# Patient Record
Sex: FEMALE | Race: WHITE | Employment: OTHER | URBAN - METROPOLITAN AREA
[De-identification: names, ages, dates, MRNs, and addresses within clinical notes are randomized per-mention and may not be internally consistent; named-entity substitution may affect disease eponyms.]

---

## 2017-01-18 ENCOUNTER — OFFICE VISIT (OUTPATIENT)
Dept: NEUROLOGY | Age: 62
End: 2017-01-18

## 2017-01-18 VITALS
HEIGHT: 61 IN | WEIGHT: 197 LBS | HEART RATE: 89 BPM | SYSTOLIC BLOOD PRESSURE: 119 MMHG | DIASTOLIC BLOOD PRESSURE: 73 MMHG | BODY MASS INDEX: 37.19 KG/M2

## 2017-01-18 DIAGNOSIS — G25.0 ESSENTIAL TREMOR: ICD-10-CM

## 2017-01-18 DIAGNOSIS — G31.84 MILD COGNITIVE IMPAIRMENT, SO STATED: Primary | ICD-10-CM

## 2017-01-18 DIAGNOSIS — F32.89 DEPRESSIVE DISORDER, NOT ELSEWHERE CLASSIFIED: ICD-10-CM

## 2017-01-18 DIAGNOSIS — G47.33 OBSTRUCTIVE SLEEP APNEA (ADULT) (PEDIATRIC): ICD-10-CM

## 2017-01-18 PROCEDURE — 99214 OFFICE O/P EST MOD 30 MIN: CPT | Performed by: PSYCHIATRY & NEUROLOGY

## 2017-01-18 RX ORDER — SERTRALINE HYDROCHLORIDE 100 MG/1
100 TABLET, FILM COATED ORAL DAILY
COMMUNITY
End: 2017-04-27

## 2017-01-18 RX ORDER — DONEPEZIL HYDROCHLORIDE 10 MG/1
10 TABLET, FILM COATED ORAL NIGHTLY
Qty: 90 TABLET | Refills: 3 | Status: SHIPPED | OUTPATIENT
Start: 2017-01-18 | End: 2017-06-13 | Stop reason: SDUPTHER

## 2017-01-23 ENCOUNTER — TELEPHONE (OUTPATIENT)
Dept: PULMONOLOGY | Age: 62
End: 2017-01-23

## 2017-01-30 ENCOUNTER — OFFICE VISIT (OUTPATIENT)
Dept: PULMONOLOGY | Age: 62
End: 2017-01-30

## 2017-01-30 VITALS
TEMPERATURE: 97.4 F | BODY MASS INDEX: 37.19 KG/M2 | OXYGEN SATURATION: 96 % | RESPIRATION RATE: 22 BRPM | WEIGHT: 197 LBS | SYSTOLIC BLOOD PRESSURE: 106 MMHG | HEIGHT: 61 IN | HEART RATE: 96 BPM | DIASTOLIC BLOOD PRESSURE: 74 MMHG

## 2017-01-30 DIAGNOSIS — R06.00 DYSPNEA, UNSPECIFIED TYPE: Primary | ICD-10-CM

## 2017-01-30 DIAGNOSIS — J47.9 BRONCHIECTASIS WITHOUT COMPLICATION (HCC): ICD-10-CM

## 2017-01-30 DIAGNOSIS — R93.89 ABNORMAL CT SCAN, CHEST: ICD-10-CM

## 2017-01-30 DIAGNOSIS — G47.33 MODERATE OBSTRUCTIVE SLEEP APNEA: ICD-10-CM

## 2017-01-30 PROCEDURE — 99214 OFFICE O/P EST MOD 30 MIN: CPT | Performed by: INTERNAL MEDICINE

## 2017-01-30 RX ORDER — PNV NO.95/FERROUS FUM/FOLIC AC 28MG-0.8MG
1 TABLET ORAL DAILY
COMMUNITY
End: 2017-04-27

## 2017-01-30 RX ORDER — ALBUTEROL SULFATE 90 UG/1
2 AEROSOL, METERED RESPIRATORY (INHALATION) EVERY 6 HOURS PRN
Qty: 1 INHALER | Refills: 6 | Status: SHIPPED | OUTPATIENT
Start: 2017-01-30 | End: 2018-01-23 | Stop reason: CLARIF

## 2017-01-30 ASSESSMENT — SLEEP AND FATIGUE QUESTIONNAIRES
HOW LIKELY ARE YOU TO NOD OFF OR FALL ASLEEP WHILE SITTING AND READING: 3
HOW LIKELY ARE YOU TO NOD OFF OR FALL ASLEEP WHILE LYING DOWN TO REST IN THE AFTERNOON WHEN CIRCUMSTANCES PERMIT: 3
HOW LIKELY ARE YOU TO NOD OFF OR FALL ASLEEP IN A CAR, WHILE STOPPED FOR A FEW MINUTES IN TRAFFIC: 0
HOW LIKELY ARE YOU TO NOD OFF OR FALL ASLEEP WHEN YOU ARE A PASSENGER IN A CAR FOR AN HOUR WITHOUT A BREAK: 0
ESS TOTAL SCORE: 13
HOW LIKELY ARE YOU TO NOD OFF OR FALL ASLEEP WHILE SITTING AND TALKING TO SOMEONE: 2
HOW LIKELY ARE YOU TO NOD OFF OR FALL ASLEEP WHILE SITTING QUIETLY AFTER LUNCH WITHOUT ALCOHOL: 3
HOW LIKELY ARE YOU TO NOD OFF OR FALL ASLEEP WHILE WATCHING TV: 2
NECK CIRCUMFERENCE (INCHES): 11
HOW LIKELY ARE YOU TO NOD OFF OR FALL ASLEEP WHILE SITTING INACTIVE IN A PUBLIC PLACE: 0

## 2017-02-02 ENCOUNTER — HOSPITAL ENCOUNTER (OUTPATIENT)
Dept: PULMONOLOGY | Age: 62
Discharge: HOME | End: 2017-02-02
Attending: INTERNAL MEDICINE | Admitting: INTERNAL MEDICINE

## 2017-02-02 DIAGNOSIS — J47.9 BRONCHIECTASIS WITHOUT COMPLICATION (HCC): ICD-10-CM

## 2017-02-02 DIAGNOSIS — R93.89 ABNORMAL CT SCAN, CHEST: ICD-10-CM

## 2017-02-02 DIAGNOSIS — R06.00 DYSPNEA, UNSPECIFIED TYPE: ICD-10-CM

## 2017-02-02 DIAGNOSIS — R06.00 DYSPNEA: ICD-10-CM

## 2017-02-02 LAB
HEMOGLOBIN: 14.5 G/DL (ref 12–16)
TSH SERPL DL<=0.05 MIU/L-ACNC: 1.07 UIU/ML (ref 0.27–4.2)

## 2017-02-02 RX ORDER — ALBUTEROL SULFATE 2.5 MG/3ML
2.5 SOLUTION RESPIRATORY (INHALATION) ONCE
Status: COMPLETED | OUTPATIENT
Start: 2017-02-02 | End: 2017-02-02

## 2017-02-02 RX ADMIN — ALBUTEROL SULFATE 2.5 MG: 2.5 SOLUTION RESPIRATORY (INHALATION) at 09:30

## 2017-02-06 ENCOUNTER — OFFICE VISIT (OUTPATIENT)
Dept: ORTHOPEDIC SURGERY | Age: 62
End: 2017-02-06

## 2017-02-06 VITALS
HEIGHT: 61 IN | BODY MASS INDEX: 37.21 KG/M2 | DIASTOLIC BLOOD PRESSURE: 84 MMHG | HEART RATE: 96 BPM | WEIGHT: 197.09 LBS | SYSTOLIC BLOOD PRESSURE: 115 MMHG

## 2017-02-06 DIAGNOSIS — M25.561 ACUTE PAIN OF BOTH KNEES: Primary | ICD-10-CM

## 2017-02-06 DIAGNOSIS — M25.562 ACUTE PAIN OF BOTH KNEES: Primary | ICD-10-CM

## 2017-02-06 PROCEDURE — 99213 OFFICE O/P EST LOW 20 MIN: CPT | Performed by: ORTHOPAEDIC SURGERY

## 2017-02-06 PROCEDURE — 73562 X-RAY EXAM OF KNEE 3: CPT | Performed by: ORTHOPAEDIC SURGERY

## 2017-02-07 VITALS
DIASTOLIC BLOOD PRESSURE: 73 MMHG | SYSTOLIC BLOOD PRESSURE: 107 MMHG | RESPIRATION RATE: 16 BRPM | OXYGEN SATURATION: 95 % | HEART RATE: 79 BPM

## 2017-02-07 ASSESSMENT — PAIN - FUNCTIONAL ASSESSMENT: PAIN_FUNCTIONAL_ASSESSMENT: 0-10

## 2017-02-10 ENCOUNTER — OFFICE VISIT (OUTPATIENT)
Dept: ORTHOPEDIC SURGERY | Age: 62
End: 2017-02-10

## 2017-02-10 VITALS
BODY MASS INDEX: 37.21 KG/M2 | SYSTOLIC BLOOD PRESSURE: 108 MMHG | DIASTOLIC BLOOD PRESSURE: 69 MMHG | WEIGHT: 197.09 LBS | HEIGHT: 61 IN | HEART RATE: 87 BPM

## 2017-02-10 DIAGNOSIS — M17.11 PRIMARY OSTEOARTHRITIS OF RIGHT KNEE: Primary | ICD-10-CM

## 2017-02-10 PROCEDURE — 99213 OFFICE O/P EST LOW 20 MIN: CPT | Performed by: ORTHOPAEDIC SURGERY

## 2017-02-10 PROCEDURE — 20610 DRAIN/INJ JOINT/BURSA W/O US: CPT | Performed by: ORTHOPAEDIC SURGERY

## 2017-02-13 ENCOUNTER — HOSPITAL ENCOUNTER (OUTPATIENT)
Dept: ULTRASOUND IMAGING | Age: 62
Discharge: HOME | End: 2017-02-13

## 2017-02-13 DIAGNOSIS — R10.11 RIGHT UPPER QUADRANT PAIN: ICD-10-CM

## 2017-02-13 DIAGNOSIS — R10.11 ABDOMINAL PAIN, RIGHT UPPER QUADRANT: ICD-10-CM

## 2017-02-14 ENCOUNTER — HOSPITAL ENCOUNTER (OUTPATIENT)
Dept: CT IMAGING | Age: 62
Discharge: HOME | End: 2017-02-07

## 2017-02-14 DIAGNOSIS — M25.562 ACUTE PAIN OF BOTH KNEES: ICD-10-CM

## 2017-02-14 DIAGNOSIS — M25.561 ACUTE PAIN OF BOTH KNEES: ICD-10-CM

## 2017-02-14 DIAGNOSIS — M25.561 PAIN IN RIGHT KNEE: ICD-10-CM

## 2017-02-24 ENCOUNTER — TELEPHONE (OUTPATIENT)
Dept: PULMONOLOGY | Age: 62
End: 2017-02-24

## 2017-02-24 ENCOUNTER — OFFICE VISIT (OUTPATIENT)
Dept: ORTHOPEDIC SURGERY | Age: 62
End: 2017-02-24

## 2017-02-24 VITALS
DIASTOLIC BLOOD PRESSURE: 69 MMHG | SYSTOLIC BLOOD PRESSURE: 135 MMHG | HEART RATE: 60 BPM | HEIGHT: 61 IN | WEIGHT: 197.09 LBS | BODY MASS INDEX: 37.21 KG/M2

## 2017-02-24 DIAGNOSIS — M17.12 PRIMARY OSTEOARTHRITIS OF LEFT KNEE: ICD-10-CM

## 2017-02-24 DIAGNOSIS — M17.11 PRIMARY OSTEOARTHRITIS OF RIGHT KNEE: Primary | ICD-10-CM

## 2017-02-24 PROCEDURE — 20610 DRAIN/INJ JOINT/BURSA W/O US: CPT | Performed by: ORTHOPAEDIC SURGERY

## 2017-02-24 PROCEDURE — 99213 OFFICE O/P EST LOW 20 MIN: CPT | Performed by: ORTHOPAEDIC SURGERY

## 2017-02-24 RX ORDER — ATORVASTATIN CALCIUM 20 MG/1
TABLET, FILM COATED ORAL
COMMUNITY
Start: 2017-01-17 | End: 2017-03-09 | Stop reason: ALTCHOICE

## 2017-02-24 RX ORDER — PANTOPRAZOLE SODIUM 40 MG/1
TABLET, DELAYED RELEASE ORAL
Refills: 0 | COMMUNITY
Start: 2017-02-16

## 2017-02-24 RX ORDER — GABAPENTIN 800 MG/1
TABLET ORAL
Refills: 2 | COMMUNITY
Start: 2017-02-13 | End: 2017-03-09 | Stop reason: SDUPTHER

## 2017-03-02 ENCOUNTER — TELEPHONE (OUTPATIENT)
Dept: ORTHOPEDIC SURGERY | Age: 62
End: 2017-03-02

## 2017-03-09 ENCOUNTER — TELEPHONE (OUTPATIENT)
Dept: ORTHOPEDIC SURGERY | Age: 62
End: 2017-03-09

## 2017-03-31 ENCOUNTER — OFFICE VISIT (OUTPATIENT)
Dept: ORTHOPEDIC SURGERY | Age: 62
End: 2017-03-31

## 2017-03-31 VITALS
WEIGHT: 195.99 LBS | DIASTOLIC BLOOD PRESSURE: 63 MMHG | HEIGHT: 63 IN | BODY MASS INDEX: 34.73 KG/M2 | SYSTOLIC BLOOD PRESSURE: 99 MMHG | HEART RATE: 106 BPM

## 2017-03-31 DIAGNOSIS — M17.10 LOCALIZED OSTEOARTHROSIS, LOWER LEG: Primary | ICD-10-CM

## 2017-03-31 PROCEDURE — 20610 DRAIN/INJ JOINT/BURSA W/O US: CPT | Performed by: ORTHOPAEDIC SURGERY

## 2017-04-06 ENCOUNTER — OFFICE VISIT (OUTPATIENT)
Dept: ORTHOPEDIC SURGERY | Age: 62
End: 2017-04-06

## 2017-04-06 VITALS
DIASTOLIC BLOOD PRESSURE: 70 MMHG | BODY MASS INDEX: 34.73 KG/M2 | SYSTOLIC BLOOD PRESSURE: 120 MMHG | HEART RATE: 78 BPM | HEIGHT: 63 IN | WEIGHT: 195.99 LBS

## 2017-04-06 DIAGNOSIS — M17.0 BILATERAL PRIMARY OSTEOARTHRITIS OF KNEE: Primary | ICD-10-CM

## 2017-04-06 PROCEDURE — 20610 DRAIN/INJ JOINT/BURSA W/O US: CPT | Performed by: ORTHOPAEDIC SURGERY

## 2017-04-06 RX ORDER — DONEPEZIL HYDROCHLORIDE 10 MG/1
10 TABLET, FILM COATED ORAL
COMMUNITY
Start: 2017-01-18 | End: 2017-05-18 | Stop reason: SDUPTHER

## 2017-04-06 RX ORDER — ATORVASTATIN CALCIUM 10 MG/1
10 TABLET, FILM COATED ORAL
COMMUNITY
Start: 2016-04-11 | End: 2017-04-27

## 2017-04-06 RX ORDER — CEPHALEXIN 500 MG/1
CAPSULE ORAL 3 TIMES DAILY
COMMUNITY
Start: 2017-04-04 | End: 2018-05-02 | Stop reason: CLARIF

## 2017-04-06 RX ORDER — CEPHALEXIN 500 MG/1
500 CAPSULE ORAL 3 TIMES DAILY
COMMUNITY
Start: 2017-04-04 | End: 2017-06-13

## 2017-04-06 RX ORDER — OXYBUTYNIN CHLORIDE 15 MG/1
15 TABLET, EXTENDED RELEASE ORAL
COMMUNITY
End: 2017-06-13

## 2017-04-06 RX ORDER — FLUDROCORTISONE ACETATE 0.1 MG/1
0.1 TABLET ORAL
COMMUNITY
Start: 2016-09-22 | End: 2017-06-13

## 2017-04-06 RX ORDER — SERTRALINE HYDROCHLORIDE 100 MG/1
200 TABLET, FILM COATED ORAL NIGHTLY
COMMUNITY
End: 2022-02-24

## 2017-04-06 RX ORDER — OXYCODONE AND ACETAMINOPHEN 7.5; 325 MG/1; MG/1
1 TABLET ORAL
COMMUNITY
End: 2017-08-30 | Stop reason: HOSPADM

## 2017-04-13 ENCOUNTER — OFFICE VISIT (OUTPATIENT)
Dept: ORTHOPEDIC SURGERY | Age: 62
End: 2017-04-13

## 2017-04-13 DIAGNOSIS — M17.0 PRIMARY OSTEOARTHRITIS OF BOTH KNEES: Primary | ICD-10-CM

## 2017-04-13 PROCEDURE — 20610 DRAIN/INJ JOINT/BURSA W/O US: CPT | Performed by: ORTHOPAEDIC SURGERY

## 2017-05-03 ENCOUNTER — HOSPITAL ENCOUNTER (OUTPATIENT)
Dept: SURGERY | Age: 62
Discharge: HOME | End: 2017-05-03
Attending: INTERNAL MEDICINE | Admitting: INTERNAL MEDICINE

## 2017-05-03 VITALS
RESPIRATION RATE: 16 BRPM | HEART RATE: 65 BPM | BODY MASS INDEX: 33.86 KG/M2 | HEIGHT: 62 IN | OXYGEN SATURATION: 100 % | WEIGHT: 184 LBS | TEMPERATURE: 97 F | SYSTOLIC BLOOD PRESSURE: 109 MMHG | DIASTOLIC BLOOD PRESSURE: 73 MMHG

## 2017-05-03 RX ORDER — HYDRALAZINE HYDROCHLORIDE 20 MG/ML
5 INJECTION INTRAMUSCULAR; INTRAVENOUS EVERY 10 MIN PRN
Status: DISCONTINUED | OUTPATIENT
Start: 2017-05-03 | End: 2017-05-04 | Stop reason: HOSPADM

## 2017-05-03 RX ORDER — MEPERIDINE HYDROCHLORIDE 50 MG/ML
12.5 INJECTION INTRAMUSCULAR; INTRAVENOUS; SUBCUTANEOUS EVERY 5 MIN PRN
Status: DISCONTINUED | OUTPATIENT
Start: 2017-05-03 | End: 2017-05-04 | Stop reason: HOSPADM

## 2017-05-03 RX ORDER — ONDANSETRON 2 MG/ML
4 INJECTION INTRAMUSCULAR; INTRAVENOUS PRN
Status: DISCONTINUED | OUTPATIENT
Start: 2017-05-03 | End: 2017-05-04 | Stop reason: HOSPADM

## 2017-05-03 RX ORDER — LIDOCAINE HYDROCHLORIDE 10 MG/ML
0.1 INJECTION, SOLUTION EPIDURAL; INFILTRATION; INTRACAUDAL; PERINEURAL ONCE
Status: DISCONTINUED | OUTPATIENT
Start: 2017-05-03 | End: 2017-05-04 | Stop reason: HOSPADM

## 2017-05-03 RX ORDER — LABETALOL HYDROCHLORIDE 5 MG/ML
5 INJECTION, SOLUTION INTRAVENOUS EVERY 10 MIN PRN
Status: DISCONTINUED | OUTPATIENT
Start: 2017-05-03 | End: 2017-05-04 | Stop reason: HOSPADM

## 2017-05-03 RX ORDER — PROMETHAZINE HYDROCHLORIDE 25 MG/ML
6.25 INJECTION, SOLUTION INTRAMUSCULAR; INTRAVENOUS
Status: DISCONTINUED | OUTPATIENT
Start: 2017-05-03 | End: 2017-05-04 | Stop reason: HOSPADM

## 2017-05-03 RX ORDER — MORPHINE SULFATE 2 MG/ML
1 INJECTION, SOLUTION INTRAMUSCULAR; INTRAVENOUS EVERY 5 MIN PRN
Status: DISCONTINUED | OUTPATIENT
Start: 2017-05-03 | End: 2017-05-04 | Stop reason: HOSPADM

## 2017-05-03 RX ORDER — MORPHINE SULFATE 2 MG/ML
2 INJECTION, SOLUTION INTRAMUSCULAR; INTRAVENOUS EVERY 5 MIN PRN
Status: DISCONTINUED | OUTPATIENT
Start: 2017-05-03 | End: 2017-05-04 | Stop reason: HOSPADM

## 2017-05-03 RX ORDER — RANITIDINE 150 MG/1
150 TABLET ORAL 2 TIMES DAILY
Status: ON HOLD | COMMUNITY
End: 2020-04-29

## 2017-05-03 RX ORDER — DIPHENHYDRAMINE HYDROCHLORIDE 50 MG/ML
12.5 INJECTION INTRAMUSCULAR; INTRAVENOUS
Status: ACTIVE | OUTPATIENT
Start: 2017-05-03 | End: 2017-05-03

## 2017-05-03 RX ORDER — SODIUM CHLORIDE, SODIUM LACTATE, POTASSIUM CHLORIDE, CALCIUM CHLORIDE 600; 310; 30; 20 MG/100ML; MG/100ML; MG/100ML; MG/100ML
INJECTION, SOLUTION INTRAVENOUS CONTINUOUS
Status: DISCONTINUED | OUTPATIENT
Start: 2017-05-03 | End: 2017-05-04 | Stop reason: HOSPADM

## 2017-05-03 RX ADMIN — SODIUM CHLORIDE, SODIUM LACTATE, POTASSIUM CHLORIDE, CALCIUM CHLORIDE: 600; 310; 30; 20 INJECTION, SOLUTION INTRAVENOUS at 07:15

## 2017-05-03 ASSESSMENT — PAIN DESCRIPTION - DESCRIPTORS: DESCRIPTORS: BURNING

## 2017-05-03 ASSESSMENT — PAIN - FUNCTIONAL ASSESSMENT: PAIN_FUNCTIONAL_ASSESSMENT: 0-10

## 2017-05-15 ENCOUNTER — TELEPHONE (OUTPATIENT)
Dept: NEUROLOGY | Age: 62
End: 2017-05-15

## 2017-05-18 RX ORDER — DONEPEZIL HYDROCHLORIDE 10 MG/1
10 TABLET, FILM COATED ORAL NIGHTLY
Qty: 90 TABLET | Refills: 2 | Status: SHIPPED | OUTPATIENT
Start: 2017-05-18 | End: 2017-06-13

## 2017-05-19 ENCOUNTER — OFFICE VISIT (OUTPATIENT)
Dept: ORTHOPEDIC SURGERY | Age: 62
End: 2017-05-19

## 2017-05-19 VITALS
DIASTOLIC BLOOD PRESSURE: 74 MMHG | SYSTOLIC BLOOD PRESSURE: 130 MMHG | BODY MASS INDEX: 33.88 KG/M2 | HEART RATE: 77 BPM | WEIGHT: 184.08 LBS | HEIGHT: 62 IN

## 2017-05-19 DIAGNOSIS — M17.10 LOCALIZED OSTEOARTHROSIS, LOWER LEG: Primary | ICD-10-CM

## 2017-05-19 PROCEDURE — 99213 OFFICE O/P EST LOW 20 MIN: CPT | Performed by: ORTHOPAEDIC SURGERY

## 2017-05-19 RX ORDER — DONEPEZIL HYDROCHLORIDE 10 MG/1
10 TABLET, FILM COATED ORAL
COMMUNITY
Start: 2017-01-18 | End: 2017-06-13

## 2017-05-19 RX ORDER — ALENDRONATE SODIUM 70 MG/1
TABLET ORAL
COMMUNITY
End: 2017-06-13

## 2017-05-19 RX ORDER — TROSPIUM CHLORIDE ER 60 MG/1
CAPSULE ORAL
COMMUNITY
End: 2017-06-13

## 2017-05-19 RX ORDER — CITALOPRAM 40 MG/1
TABLET ORAL
COMMUNITY
End: 2017-06-13

## 2017-05-19 RX ORDER — TROSPIUM CHLORIDE ER 60 MG/1
60 CAPSULE ORAL DAILY
COMMUNITY
End: 2017-08-02 | Stop reason: ALTCHOICE

## 2017-05-23 ENCOUNTER — TELEPHONE (OUTPATIENT)
Dept: PULMONOLOGY | Age: 62
End: 2017-05-23

## 2017-06-03 ENCOUNTER — HOSPITAL ENCOUNTER (OUTPATIENT)
Dept: CT IMAGING | Age: 62
Discharge: HOME | End: 2017-06-03
Attending: ORTHOPAEDIC SURGERY | Admitting: ORTHOPAEDIC SURGERY

## 2017-06-03 ENCOUNTER — HOSPITAL ENCOUNTER (OUTPATIENT)
Dept: ULTRASOUND IMAGING | Age: 62
Discharge: HOME | End: 2017-06-03
Attending: NURSE PRACTITIONER | Admitting: NURSE PRACTITIONER

## 2017-06-03 DIAGNOSIS — R10.12 LUQ PAIN: ICD-10-CM

## 2017-06-03 DIAGNOSIS — M43.10 SPONDYLOLISTHESIS: ICD-10-CM

## 2017-06-03 DIAGNOSIS — M43.16 SPONDYLOLISTHESIS OF LUMBAR REGION: ICD-10-CM

## 2017-06-03 DIAGNOSIS — R10.12 LEFT UPPER QUADRANT PAIN: ICD-10-CM

## 2017-06-09 ENCOUNTER — OFFICE VISIT (OUTPATIENT)
Dept: ORTHOPEDIC SURGERY | Age: 62
End: 2017-06-09

## 2017-06-09 ENCOUNTER — HOSPITAL ENCOUNTER (OUTPATIENT)
Dept: OTHER | Age: 62
Discharge: HOME | End: 2017-06-09
Attending: ORTHOPAEDIC SURGERY | Admitting: ORTHOPAEDIC SURGERY

## 2017-06-09 VITALS
WEIGHT: 184.08 LBS | BODY MASS INDEX: 33.88 KG/M2 | HEIGHT: 62 IN | DIASTOLIC BLOOD PRESSURE: 72 MMHG | SYSTOLIC BLOOD PRESSURE: 111 MMHG | HEART RATE: 87 BPM

## 2017-06-09 DIAGNOSIS — B99.9 INFECTION: ICD-10-CM

## 2017-06-09 DIAGNOSIS — M17.12 PATELLOFEMORAL ARTHRITIS OF LEFT KNEE: Primary | ICD-10-CM

## 2017-06-09 LAB
BASOPHILS ABSOLUTE: 0 K/UL (ref 0–0.2)
BASOPHILS RELATIVE PERCENT: 0.2 %
C-REACTIVE PROTEIN: 2.8 MG/L (ref 0–5.1)
EOSINOPHILS ABSOLUTE: 0.2 K/UL (ref 0–0.6)
EOSINOPHILS RELATIVE PERCENT: 4.1 %
HCT VFR BLD CALC: 41.7 % (ref 36–48)
HEMOGLOBIN: 13.7 G/DL (ref 12–16)
LYMPHOCYTES ABSOLUTE: 2.6 K/UL (ref 1–5.1)
LYMPHOCYTES RELATIVE PERCENT: 44.8 %
MCH RBC QN AUTO: 30.7 PG (ref 26–34)
MCHC RBC AUTO-ENTMCNC: 32.9 G/DL (ref 31–36)
MCV RBC AUTO: 93 FL (ref 80–100)
MONOCYTES ABSOLUTE: 0.5 K/UL (ref 0–1.3)
MONOCYTES RELATIVE PERCENT: 8.1 %
NEUTROPHILS ABSOLUTE: 2.5 K/UL (ref 1.7–7.7)
NEUTROPHILS RELATIVE PERCENT: 42.8 %
PDW BLD-RTO: 13.4 % (ref 12.4–15.4)
PLATELET # BLD: 213 K/UL (ref 135–450)
PMV BLD AUTO: 9.3 FL (ref 5–10.5)
RBC # BLD: 4.48 M/UL (ref 4–5.2)
SEDIMENTATION RATE, ERYTHROCYTE: 35 MM/HR (ref 0–30)
WBC # BLD: 5.9 K/UL (ref 4–11)

## 2017-06-09 PROCEDURE — 99213 OFFICE O/P EST LOW 20 MIN: CPT | Performed by: ORTHOPAEDIC SURGERY

## 2017-06-09 RX ORDER — POTASSIUM CHLORIDE 750 MG/1
20 TABLET, FILM COATED, EXTENDED RELEASE ORAL
COMMUNITY
End: 2018-07-12 | Stop reason: SDUPTHER

## 2017-06-09 RX ORDER — ESTRADIOL 0.5 MG/1
0.5 TABLET ORAL DAILY
COMMUNITY
Start: 2017-06-07 | End: 2018-03-05 | Stop reason: ALTCHOICE

## 2017-06-12 DIAGNOSIS — M22.42 CHONDROMALACIA OF PATELLA, LEFT: Primary | ICD-10-CM

## 2017-06-13 ENCOUNTER — OFFICE VISIT (OUTPATIENT)
Dept: NEUROLOGY | Age: 62
End: 2017-06-13

## 2017-06-13 VITALS
DIASTOLIC BLOOD PRESSURE: 74 MMHG | WEIGHT: 193 LBS | HEIGHT: 61 IN | HEART RATE: 70 BPM | BODY MASS INDEX: 36.44 KG/M2 | SYSTOLIC BLOOD PRESSURE: 115 MMHG

## 2017-06-13 DIAGNOSIS — G25.0 ESSENTIAL TREMOR: ICD-10-CM

## 2017-06-13 DIAGNOSIS — F34.1 DYSTHYMIA: ICD-10-CM

## 2017-06-13 DIAGNOSIS — G31.84 MILD COGNITIVE IMPAIRMENT, SO STATED: Primary | ICD-10-CM

## 2017-06-13 DIAGNOSIS — F51.01 PRIMARY INSOMNIA: ICD-10-CM

## 2017-06-13 DIAGNOSIS — G47.33 OSA (OBSTRUCTIVE SLEEP APNEA): ICD-10-CM

## 2017-06-13 DIAGNOSIS — I10 HTN (HYPERTENSION), BENIGN: ICD-10-CM

## 2017-06-13 DIAGNOSIS — E78.5 DYSLIPIDEMIA: ICD-10-CM

## 2017-06-13 PROCEDURE — 99214 OFFICE O/P EST MOD 30 MIN: CPT | Performed by: PSYCHIATRY & NEUROLOGY

## 2017-06-13 RX ORDER — NALOXEGOL OXALATE 25 MG/1
TABLET, FILM COATED ORAL
COMMUNITY
Start: 2017-04-03 | End: 2017-08-02 | Stop reason: ALTCHOICE

## 2017-06-13 RX ORDER — DONEPEZIL HYDROCHLORIDE 10 MG/1
10 TABLET, FILM COATED ORAL NIGHTLY
Qty: 90 TABLET | Refills: 1 | Status: SHIPPED | OUTPATIENT
Start: 2017-06-13 | End: 2017-09-12 | Stop reason: SDUPTHER

## 2017-06-13 RX ORDER — PRIMIDONE 50 MG/1
50 TABLET ORAL 2 TIMES DAILY
Qty: 60 TABLET | Refills: 2 | Status: SHIPPED | OUTPATIENT
Start: 2017-06-13 | End: 2017-09-12 | Stop reason: SDUPTHER

## 2017-06-16 ENCOUNTER — HOSPITAL ENCOUNTER (OUTPATIENT)
Dept: GENERAL RADIOLOGY | Age: 62
Discharge: HOME | End: 2017-06-16
Attending: ORTHOPAEDIC SURGERY | Admitting: ORTHOPAEDIC SURGERY

## 2017-06-16 VITALS
OXYGEN SATURATION: 95 % | HEART RATE: 84 BPM | SYSTOLIC BLOOD PRESSURE: 139 MMHG | DIASTOLIC BLOOD PRESSURE: 89 MMHG | RESPIRATION RATE: 16 BRPM

## 2017-06-16 DIAGNOSIS — M53.3 TAIL BONE PAIN: ICD-10-CM

## 2017-06-16 DIAGNOSIS — M51.17 INTERVERTEBRAL DISC DISORDER WITH RADICULOPATHY OF LUMBOSACRAL REGION: ICD-10-CM

## 2017-06-16 ASSESSMENT — PAIN - FUNCTIONAL ASSESSMENT: PAIN_FUNCTIONAL_ASSESSMENT: 0-10

## 2017-06-19 ENCOUNTER — HOSPITAL ENCOUNTER (OUTPATIENT)
Dept: CT IMAGING | Age: 62
Discharge: HOME | End: 2017-06-19
Attending: ORTHOPAEDIC SURGERY | Admitting: ORTHOPAEDIC SURGERY

## 2017-06-19 DIAGNOSIS — M19.90 OSTEOARTHRITIS: ICD-10-CM

## 2017-06-19 DIAGNOSIS — M19.90 CHRONIC ARTHRITIS: ICD-10-CM

## 2017-07-03 ENCOUNTER — OFFICE VISIT (OUTPATIENT)
Dept: ORTHOPEDIC SURGERY | Age: 62
End: 2017-07-03

## 2017-07-03 VITALS
SYSTOLIC BLOOD PRESSURE: 115 MMHG | WEIGHT: 192.9 LBS | HEART RATE: 90 BPM | DIASTOLIC BLOOD PRESSURE: 71 MMHG | BODY MASS INDEX: 36.42 KG/M2 | HEIGHT: 61 IN

## 2017-07-03 DIAGNOSIS — M17.11 PATELLOFEMORAL ARTHRITIS OF RIGHT KNEE: ICD-10-CM

## 2017-07-03 DIAGNOSIS — M17.12 PATELLOFEMORAL ARTHRITIS OF LEFT KNEE: Primary | ICD-10-CM

## 2017-07-03 PROCEDURE — 99213 OFFICE O/P EST LOW 20 MIN: CPT | Performed by: ORTHOPAEDIC SURGERY

## 2017-07-03 PROCEDURE — L1812 KO ELASTIC W/JOINTS PRE OTS: HCPCS | Performed by: ORTHOPAEDIC SURGERY

## 2017-07-25 ENCOUNTER — HOSPITAL ENCOUNTER (OUTPATIENT)
Dept: OTHER | Age: 62
Discharge: HOME | End: 2017-07-25

## 2017-07-25 DIAGNOSIS — M47.812 SPONDYLOSIS OF CERVICAL REGION WITHOUT MYELOPATHY OR RADICULOPATHY: ICD-10-CM

## 2017-08-01 ENCOUNTER — TELEPHONE (OUTPATIENT)
Dept: ORTHOPEDIC SURGERY | Age: 62
End: 2017-08-01

## 2017-08-02 ENCOUNTER — HOSPITAL ENCOUNTER (OUTPATIENT)
Dept: OTHER | Age: 62
Discharge: HOME | End: 2017-08-02
Attending: ORTHOPAEDIC SURGERY | Admitting: ORTHOPAEDIC SURGERY

## 2017-08-02 LAB
ABO/RH: NORMAL
ANION GAP SERPL CALCULATED.3IONS-SCNC: 11 MMOL/L (ref 3–16)
ANTIBODY SCREEN: NORMAL
APTT: 30 SEC (ref 24.1–34.9)
BUN BLDV-MCNC: 14 MG/DL (ref 7–20)
CALCIUM SERPL-MCNC: 9 MG/DL (ref 8.3–10.6)
CHLORIDE BLD-SCNC: 103 MMOL/L (ref 99–110)
CO2: 26 MMOL/L (ref 21–32)
CREAT SERPL-MCNC: 0.7 MG/DL (ref 0.6–1.2)
EKG ATRIAL RATE: 70 BPM
EKG DIAGNOSIS: NORMAL
EKG P AXIS: 20 DEGREES
EKG P-R INTERVAL: 104 MS
EKG Q-T INTERVAL: 406 MS
EKG QRS DURATION: 86 MS
EKG QTC CALCULATION (BAZETT): 438 MS
EKG R AXIS: 93 DEGREES
EKG T AXIS: 26 DEGREES
EKG VENTRICULAR RATE: 70 BPM
GFR AFRICAN AMERICAN: >60
GFR NON-AFRICAN AMERICAN: >60
GLUCOSE BLD-MCNC: 90 MG/DL (ref 70–99)
HCT VFR BLD CALC: 44.6 % (ref 36–48)
HEMOGLOBIN: 14.8 G/DL (ref 12–16)
INR BLD: 0.9 (ref 0.85–1.15)
MCH RBC QN AUTO: 31.3 PG (ref 26–34)
MCHC RBC AUTO-ENTMCNC: 33.1 G/DL (ref 31–36)
MCV RBC AUTO: 94.4 FL (ref 80–100)
PDW BLD-RTO: 14.5 % (ref 12.4–15.4)
PLATELET # BLD: 178 K/UL (ref 135–450)
PMV BLD AUTO: 9.3 FL (ref 5–10.5)
POTASSIUM SERPL-SCNC: 4.2 MMOL/L (ref 3.5–5.1)
PROTHROMBIN TIME: 10.2 SEC (ref 9.6–13)
RBC # BLD: 4.73 M/UL (ref 4–5.2)
SODIUM BLD-SCNC: 140 MMOL/L (ref 136–145)
WBC # BLD: 6.3 K/UL (ref 4–11)

## 2017-08-02 PROCEDURE — 93010 ELECTROCARDIOGRAM REPORT: CPT | Performed by: INTERNAL MEDICINE

## 2017-08-05 LAB
ORGANISM: ABNORMAL
URINE CULTURE, ROUTINE: ABNORMAL
URINE CULTURE, ROUTINE: ABNORMAL

## 2017-08-07 ENCOUNTER — TELEPHONE (OUTPATIENT)
Dept: ORTHOPEDIC SURGERY | Age: 62
End: 2017-08-07

## 2017-08-14 ENCOUNTER — TELEPHONE (OUTPATIENT)
Dept: ORTHOPEDIC SURGERY | Age: 62
End: 2017-08-14

## 2017-08-31 PROBLEM — M17.12 PATELLOFEMORAL ARTHRITIS OF LEFT KNEE: Status: RESOLVED | Noted: 2017-06-09 | Resolved: 2017-08-31

## 2017-08-31 PROBLEM — Z96.652 STATUS POST LEFT PARTIAL KNEE REPLACEMENT: Status: ACTIVE | Noted: 2017-08-31

## 2017-09-12 ENCOUNTER — OFFICE VISIT (OUTPATIENT)
Dept: NEUROLOGY | Age: 62
End: 2017-09-12

## 2017-09-12 VITALS
HEIGHT: 61 IN | SYSTOLIC BLOOD PRESSURE: 112 MMHG | OXYGEN SATURATION: 92 % | BODY MASS INDEX: 36.44 KG/M2 | HEART RATE: 101 BPM | DIASTOLIC BLOOD PRESSURE: 69 MMHG | WEIGHT: 193 LBS

## 2017-09-12 DIAGNOSIS — G47.33 OSA (OBSTRUCTIVE SLEEP APNEA): ICD-10-CM

## 2017-09-12 DIAGNOSIS — G31.84 MILD COGNITIVE IMPAIRMENT, SO STATED: Primary | ICD-10-CM

## 2017-09-12 DIAGNOSIS — G25.0 ESSENTIAL TREMOR: ICD-10-CM

## 2017-09-12 DIAGNOSIS — E78.5 DYSLIPIDEMIA: ICD-10-CM

## 2017-09-12 DIAGNOSIS — F34.1 DYSTHYMIA: ICD-10-CM

## 2017-09-12 PROCEDURE — 99214 OFFICE O/P EST MOD 30 MIN: CPT | Performed by: PSYCHIATRY & NEUROLOGY

## 2017-09-12 RX ORDER — PRIMIDONE 50 MG/1
50 TABLET ORAL 2 TIMES DAILY
Qty: 180 TABLET | Refills: 2 | Status: SHIPPED | OUTPATIENT
Start: 2017-09-12 | End: 2018-01-23 | Stop reason: SDUPTHER

## 2017-09-12 RX ORDER — DONEPEZIL HYDROCHLORIDE 10 MG/1
10 TABLET, FILM COATED ORAL NIGHTLY
Qty: 90 TABLET | Refills: 1 | Status: SHIPPED | OUTPATIENT
Start: 2017-09-12 | End: 2018-06-23 | Stop reason: SDUPTHER

## 2017-09-12 RX ORDER — OXYCODONE HYDROCHLORIDE AND ACETAMINOPHEN 5; 325 MG/1; MG/1
TABLET ORAL
COMMUNITY
Start: 2017-09-11 | End: 2018-03-05 | Stop reason: ALTCHOICE

## 2017-09-12 ASSESSMENT — ENCOUNTER SYMPTOMS
BLURRED VISION: 0
VOMITING: 1
DOUBLE VISION: 0
NAUSEA: 1
COUGH: 0

## 2017-09-14 ENCOUNTER — OFFICE VISIT (OUTPATIENT)
Dept: ORTHOPEDIC SURGERY | Age: 62
End: 2017-09-14

## 2017-09-14 VITALS
WEIGHT: 192.9 LBS | SYSTOLIC BLOOD PRESSURE: 116 MMHG | HEIGHT: 61 IN | BODY MASS INDEX: 36.42 KG/M2 | DIASTOLIC BLOOD PRESSURE: 87 MMHG | HEART RATE: 86 BPM

## 2017-09-14 DIAGNOSIS — M17.12 OSTEOARTHRITIS OF LEFT PATELLOFEMORAL JOINT: Primary | ICD-10-CM

## 2017-09-14 PROCEDURE — 99024 POSTOP FOLLOW-UP VISIT: CPT | Performed by: ORTHOPAEDIC SURGERY

## 2017-11-10 DIAGNOSIS — M17.11 PRIMARY OSTEOARTHRITIS OF RIGHT KNEE: Primary | ICD-10-CM

## 2017-11-14 ENCOUNTER — HOSPITAL ENCOUNTER (OUTPATIENT)
Dept: PHYSICAL THERAPY | Age: 62
Discharge: HOME | End: 2017-11-30
Admitting: ORTHOPAEDIC SURGERY

## 2017-11-20 ENCOUNTER — HOSPITAL ENCOUNTER (OUTPATIENT)
Dept: PHYSICAL THERAPY | Age: 62
Discharge: HOME | End: 2017-11-20
Admitting: ORTHOPAEDIC SURGERY

## 2017-11-20 NOTE — FLOWSHEET NOTE
Outpatient Physical Therapy     [] Daily Treatment Note   [] Progress Note   [] Discharge Note    Date:  11/20/2017    Patient Name:  Kesha Herrera        YOB: 1955    Medical Diagnosis: primary osteoarthritis of left knee (M17.11)                                             Treatment Diagnosis:  Decreased ROM, decreased strength, gait and balance impairments     Onset Date:  8/30/17               Referral Date: 11/10/17          Referring Physician:  Minesh Baldwin MD                                                Visits Allowed/Insurance/Certification Information:  [employer] Medicare     Restrictions/Precautions:  WBAT    Plan of care signed (Y/N):      Progress Note covers period from (if applicable):    [x]  NA    [] From          To           Next Progress Note due: 12/18/17    Visit# / total visits: 1 /16    Plan for Next Session:  Add nustep, continue with ROM and strengthening exercises, continue with manual therapy if needed for cross friction massage and patellar mobs. Subjective: see eval     Pain level: 0-10/10 left knee      Objective:   See eval    Exercises:    Exercises in bold performed in department today. Items not bolded are carried forward from prior visits for continuity of the record. Exercise/Equipment Resistance/Repetitions Other comments     Nustep NV      QS/ GS/ AP X10, each      Supine HS x10      Supine hip abd x10      Supine SLR x10      Patellar mobs X10, each direction                                                                                                    Therapeutic Exercise/Home Exercise Program:  x30 min. Patient educated on eval findings, POC and goals. Instructed in HEP with handout given. Group Therapy:       Therapeutic Activity:        Gait:     Neuromuscular Re-Education:      Canalith Repositioning Procedure:    Manual Therapy: x15 min.   Lotion applied to left leg due to severely dry flaky skin, retrograde STM to left knee with leg elevated to decrease swelling. Cross friction massage to left knee incision, especially proximal aspect. Patellar mobs in all directions. Modalities:  Instructed to ice versus heat to help decrease swelling.     Functional Outcome Measure:   Measure used:  LEFS  Score:  7/80  % Disability:  91%    Assessment/Treatment/Activity Tolerance:    GCode:  G3973XM  Patients response to treatment:   [x] Patient tolerated treatment well, with no adverse reactions noted [] Patient limited by fatigue   [] Patient limited by pain [] Patient limited by other medical complications   [] Other:     Goals: [id number]  Progress towards goals: goals set at eval      Prognosis: [x] Good [] Fair  [] Poor    Patient Requires Follow-up:  [x] Yes  [] No    Plan: [] Continue per plan of care [] Alter current plan (see comments)   [x] Plan of care initiated [] Hold pending MD visit [] Discharge    Timed Code Treatment Minutes:  45    Total Treatment Minutes:  60    Medicare Cap total YTD:  NA    Electronically signed by:  Juju Weinberg, [address] [place] [address]

## 2017-11-20 NOTE — PROGRESS NOTES
in anterior aspect of left knee. Patient reports 0/10 pain at rest,  10/10 pain at worst  Worsened by: Walking >3 minutes, stairs, squatting, standing >3 min, transitioning from sit to stand. Improved by:  Heating pad, medication, rest, sitting  Pt. also reports that the knee gives out, locks, pops, grinds. Current Functional Limitations:  Stand or walk for >3 min, stairs, household chores   PLOF: independent with all functional activities  Pt. Sleep is disturbed? Yes, only able to sleep for 4 hours before waking due to pain    Patient goal for therapy: \"get it back to normal \"      OBJECTIVE FINDINGS    Posture  Slouched with forward head and rounded shoulders use of cane on right side. Gait/Steps/Balance    Deviations on a level linoleum surface include:  Antalgic on left with straight cane, decreased yordy, decreased foot clearance for limited community distances.      Lumbar Spine  NT    Range of Motion/Strength Testing    Range Tested AROM PROM MMT/Resisted Comments   *denotes pain Left Right Left Right Left Right    Hip Flexion [facility]   2/5 3/5    Hip Extension \" \"   2/5 2/5    Hip Abduction \" \"   3+/5 3+/5    Hip Adduction \" \"   2/5 2/5    Hip IR \" \"   /5 /5    Hip ER \" \"   /5 /5    Knee Flexion 90 130   3/5 4+/5    Knee Extension -10 0   3/5 4/5    Ankle Dorsiflex [facility]   3/5 4/5    Ankle Plantarflex \" \"   3/5 4/5    Ankle Inversion \" \"   4/5 4/5    Ankle Eversion \" \"   4/5 4/5        Girth Measurements (cm)        Location Left Right Location Left Right   2\" above 50.4 49.5 Mid Calf     midpatella 43.8 43.0 Malleoli     2\" below 39.0 38.0 Figure of 8     Inferior Patellar Pole   Met Heads                   Flexibility    Muscle Left Right Comment Muscle Left Right Comment   Hamstrings (90/90) -45 -45  TFL/ITB (Perri)      Gastroc    Iliopsoas (Kenny)      Soleus    Rectus Femoris      Piriformis              Palpation/Tenderness/Visual Inspection    Noted mild/moderate/severe tenderness with: palpation of medial and lateral joint lines, peripatellar and suprapatellar regions, and along incision especially proximal aspect where scare tissue formation is felt under scar. Noted redness: none  Noted warmth: none   Skin is dry and flaky. Incision well healed without signs of infection. Joint Mobility/Accessory Motions    Noted decreased patellar mobility: decreased in all directions. Special Tests  Deferred due to s/p surgery   Test Left Right Comments Test Left  Right Comments   Anterior Drawer    Varus Stress      Posterior Drawer    Valgus Stress      Lachmans    Scouring      VMO Tone            Functional Outcome Measure    Measure used:  LEFS  Score:  7/80  % Disability:  91%    Additional Comments    SLS 3-5 sec bilaterally with straight cane    ASSESSMENT    GCode:  [id number]  Patient presents with s/s consistent with Dx. Recommend PT treatment to address problem list so that the patient may return to regular activities without any functional limitations noted. Problems     Decreased ROM      Decreased strength   Decreased joint mobility   Abnormality of gait   Decreased functional status   Increased swelling   Increased pain   Decreased flexibility   Poor posture/alignment    Rehabilitation Potential:  Good for goals listed below. Strengths for achieving goals include: cooperative  Limitations for achieving goals include:  Low motivation    Prognosis: [x]    Good []    Fair  []    Poor    GOALS  G Code:  E1843WL  Short Term Goals (  4  weeks) Long Term Goals ( 8  weeks)   1). Decrease pain to 5/10 at worst 1). Decrease pain to 0-3/10 at all times   2). Increase AROM by 5-10 degrees in limited areas. 2). AROM Left knee = Right knee within 3 degrees. 3). Increase strength by 1/2 to 1 grade in weak areas. 3).  MMT >/= 4+/5 throughout left LE   4). Patient able to ambulate up/down 4 steps with an alternating pattern without rail independently. 4).   Patient able to ambulate

## 2017-11-22 ENCOUNTER — HOSPITAL ENCOUNTER (OUTPATIENT)
Dept: PHYSICAL THERAPY | Age: 62
Discharge: HOME | End: 2017-11-22
Admitting: ORTHOPAEDIC SURGERY

## 2017-11-22 NOTE — FLOWSHEET NOTE
Outpatient Physical Therapy     [x] Daily Treatment Note   [] Progress Note   [] Discharge Note    Date:  11/22/2017    Patient Name:  Susie Urban        YOB: 1955    Medical Diagnosis: primary osteoarthritis of left knee (M17.11)                                             Treatment Diagnosis:  Decreased ROM, decreased strength, gait and balance impairments     Onset Date:  8/30/17               Referral Date: 11/10/17          Referring Physician:  Maria Ines Brandon MD                                                Visits Allowed/Insurance/Certification Information:  [employer] Duffy Medicare     Restrictions/Precautions:  WBAT    Plan of care signed (Y/N):      Progress Note covers period from (if applicable):    [x]  NA    [] From          To           Next Progress Note due: 12/18/17    Visit# / total visits: 2 /16    Plan for Next Session:  continue with ROM and strengthening exercises, continue with manual therapy if needed for cross friction massage and patellar mobs. Subjective: Patient had spinal surgery 2 years ago with fusions at lumbar spine. Patient reports that she had infection after surgery and has been on antibiotic since then. Had bladder bladder surgery 10/4/17. Patient reports that they put area in abdominal region in case patient need catheter. Pain level: 4/10 left knee    Objective:      Exercises:    Exercises in bold performed in department today. Items not bolded are carried forward from prior visits for continuity of the record.   Exercise/Equipment Resistance/Repetitions Other comments     Nustep NV      QS/ GS/ AP X10, each      Supine heel slides with sheet x10 hold 5-10 seconds      sidelying hip abd x10      Supine SLR x10      Patellar mobs X10, each direction     hamstring stretch EOB and long sitting gastrocs 3 reps hold 20-30 seconds      TKE sitting 1x10                                                                                nustep  5 minutes  Level

## 2017-11-27 ENCOUNTER — HOSPITAL ENCOUNTER (OUTPATIENT)
Dept: PHYSICAL THERAPY | Age: 62
Discharge: HOME | End: 2017-11-27
Admitting: ORTHOPAEDIC SURGERY

## 2017-11-27 NOTE — FLOWSHEET NOTE
Outpatient Physical Therapy     [x] Daily Treatment Note   [] Progress Note   [] Discharge Note    Date:  11/27/2017    Patient Name:  Gwen Montesinos        YOB: 1955    Medical Diagnosis: primary osteoarthritis of left knee (M17.11)                                             Treatment Diagnosis:  Decreased ROM, decreased strength, gait and balance impairments     Onset Date:  8/30/17               Referral Date: 11/10/17          Referring Physician:  Michelle Ratliff MD                                                Visits Allowed/Insurance/Certification Information:  [employer] Medicare, no ionto, no max     Restrictions/Precautions:  WBAT, recent bladder reconstruction surgery 10/4/17 (pt states no restrictions)    Plan of care signed (Y/N): sent to MD on 11/27/17 due to referral states it is her right knee, POC states left knee and sent for MD to sign,  Received back same day on 11/27/17    Progress Note covers period from (if applicable):    [x]  NA    [] From          To           Next Progress Note due: 12/18/17    Visit# / total visits:  3/16    Plan for Next Session:  continue with ROM and strengthening exercises, continue with manual therapy if needed for cross friction massage and patellar mobs, progress balance, gait, steps as able. Subjective:   Pt states her knee was more painful Saturday after being up on it more, pain up to 8/10. Pt states she went to bed and it was better by Sunday. When asked, pt states she doesn't recall how many reps of her exs that she is doing. Pt states she has to look at her handouts. Pt states she is doing HEP 2x/day. Pain level: 0/10 currently left knee at rest, 4/10 walking into dept. On Saturday, pain up to 8/10. Objective:  Pt having difficulty recalling most exs today. PT revised HEP (pt had 4 sheets of exs) to make less complicated. PT highlighted important exs with star on pt's handouts.  Pt kept asking PT (after each ex) Kisha Platt we done yet? \"  Pt states her schedule only said 30 minutes. PT explained to pt that her LE is very weak and stiff and she should plan on being in therapy about 60 minutes each visit. Exercises:    Exercises in bold performed in department today. Items not bolded are carried forward from prior visits for continuity of the record. Exercise/Equipment Resistance/Repetitions Other comments     Nustep Level 3, 5 minutes Seat 10, arms 11     QS/ GS/ AP 3x10 Pt inst to cont with 3x10 at home      Supine heel slides with sheet x10 hold 5-10 seconds Pt to cont at home     sidelying hip abd 2x10 Pt needed multiple cues for form, states she forgot this ex. Pt inst to work up to 3x10     Supine SLR 3x5 Pt only able to lift half range, cues to keep quad tight and lift as high as she can to the other knee. Supine knee extension stretch with towel roll under ankle  5 min  (PT forgot to give pt handout, will do next visit) Knee ext -3 deg after     hamstring stretch EOB and long sitting gastrocs 3 reps hold 20-30 seconds No time today    TKE sitting 1x10  No time today     Hip add baldomero hook lying  NV     standing heel raises         standing hip abd bilat        standing hamstring curl        wall slides       leg press machine       step ups       step downs                           Therapeutic Exercise/Home Exercise Program:    HEP established, See above, pt has handout(s), PT revised, need to give knee ext stretch HO next visit. Group Therapy:   NA    Therapeutic Activity:   NA     Gait: decreased yordy, stiff gait when first gets up but improves. Neuromuscular Re-Education: NA      Manual Therapy: x15 min. Patellar mobs in supine x 5 min  Manual stretch/PROM knee flexion in side lying x 10 min.   AROM after 102, PROM after 112    Modalities: instructed to ice at home    Functional Outcome Measure:   Measure used:  LEFS  Score:  7/80  % Disability:  91%    Assessment/Treatment/Activity Tolerance:    GCode: F7867ER  Patients response to treatment:   [x] Patient tolerated treatment well, with no adverse reactions noted [] Patient limited by fatigue   [] Patient limited by pain [] Patient limited by other medical complications   [] Other:     Goals: [id number]  Progress towards goals: NA    GOALS  G Code:  E5177HR  Short Term Goals (  4  weeks) Long Term Goals ( 8  weeks)   1). Decrease pain to 5/10 at worst 1). Decrease pain to 0-3/10 at all times   2). Increase AROM by 5-10 degrees in limited areas. 2). AROM Left knee = Right knee within 3 degrees. 3). Increase strength by 1/2 to 1 grade in weak areas. 3).  MMT >/= 4+/5 throughout left LE   4). Patient able to ambulate up/down 4 steps with an alternating pattern without rail independently. 4). Patient able to ambulate community distances without an AD independently without significant deviations noted. 5). Independent with HEP 5). Patient able to perform all regular activities independently. 6). 6).          Prognosis: [x] Good [] Fair  [] Poor    Patient Requires Follow-up:  [x] Yes  [] No    Plan: [x] Continue per plan of care [] Alter current plan (see comments)   [] Plan of care initiated [] Hold pending MD visit [] Discharge    Timed Code Treatment Minutes:  55    Total Treatment Minutes:  55 + nustep    Medicare Cap total YTD:  NA    Electronically signed by:  Hayden Escobar, [id number]

## 2017-11-29 ENCOUNTER — HOSPITAL ENCOUNTER (OUTPATIENT)
Dept: PHYSICAL THERAPY | Age: 62
Discharge: HOME | End: 2017-11-29
Admitting: ORTHOPAEDIC SURGERY

## 2017-11-29 NOTE — FLOWSHEET NOTE
GS/ AP 3x10 Pt inst to cont with 3x10 at home      Supine heel slides with sheet x10 hold 5-10 seconds Pt to cont at home     sidelying hip abd 3x10      Supine SLR 3x7 Improved today, pt lifting full range. Cues to keep quad tight and lift as high as she can to the other knee. Supine knee extension stretch with towel roll under ankle  Reviewed, 5 min  (PT forgot to give pt handout, will do next visit) Knee ext -3 deg after     hamstring stretch EOB and long sitting gastrocs 3 reps hold 20-30 seconds No time today    TKE sitting 3x10  Will add 2# next visit     Hip add baldomero hook lying  Hold 5 sec, 2x10 Pt to increase to 3x10 as able    standing heel raises    2x10                   \"    standing hip abd bilat        standing hamstring curl        wall slides       leg press machine       step ups       step downs                           Therapeutic Exercise/Home Exercise Program:  X 30 min total   HEP established, See above, pt has handout(s), PT revised, need to give knee ext stretch HO next visit. Group Therapy:   NA    Therapeutic Activity:   NA     Gait: NA     Neuromuscular Re-Education: NA      Manual Therapy: x15 min. Patellar mobs in supine x 5 min  Manual stretch/PROM knee flexion in side lying x 10 min. AROM after 112, PROM after 117    Modalities: instructed to ice at home    Functional Outcome Measure:   Measure used:  LEFS  Score:  7/80  % Disability:  91%    Assessment/Treatment/Activity Tolerance:    GCode:  B1981NP  Patients response to treatment:   [x] Patient tolerated treatment well, with no adverse reactions noted [] Patient limited by fatigue   [] Patient limited by pain [] Patient limited by other medical complications   [] Other:     Goals: S2858BO  Progress towards goals: NA    GOALS  G Code:  [id number]  Short Term Goals (  4  weeks) Long Term Goals ( 8  weeks)   1). Decrease pain to 5/10 at worst 1). Decrease pain to 0-3/10 at all times   2).   Increase AROM by 5-10 degrees in limited areas. 2). AROM Left knee = Right knee within 3 degrees. 3). Increase strength by 1/2 to 1 grade in weak areas. 3).  MMT >/= 4+/5 throughout left LE   4). Patient able to ambulate up/down 4 steps with an alternating pattern without rail independently. 4). Patient able to ambulate community distances without an AD independently without significant deviations noted. 5). Independent with HEP 5). Patient able to perform all regular activities independently. 6). 6).          Prognosis: [x] Good [] Fair  [] Poor    Patient Requires Follow-up:  [x] Yes  [] No    Plan: [x] Continue per plan of care [] Alter current plan (see comments)   [] Plan of care initiated [] Hold pending MD visit [] Discharge    Timed Code Treatment Minutes:  48    Total Treatment Minutes:  48 + nustep    Medicare Cap total YTD:  NA    Electronically signed by:  Jolene Soto, [id number]

## 2017-12-01 ENCOUNTER — HOSPITAL ENCOUNTER (OUTPATIENT)
Dept: GENERAL RADIOLOGY | Age: 62
Discharge: HOME | End: 2017-12-01
Attending: ORTHOPAEDIC SURGERY | Admitting: ORTHOPAEDIC SURGERY

## 2017-12-01 ENCOUNTER — HOSPITAL ENCOUNTER (OUTPATIENT)
Dept: OTHER | Age: 62
Discharge: HOME | End: 2017-12-31
Attending: ORTHOPAEDIC SURGERY | Admitting: ORTHOPAEDIC SURGERY

## 2017-12-01 VITALS
SYSTOLIC BLOOD PRESSURE: 132 MMHG | HEART RATE: 78 BPM | RESPIRATION RATE: 18 BRPM | DIASTOLIC BLOOD PRESSURE: 93 MMHG | TEMPERATURE: 96.1 F | OXYGEN SATURATION: 96 %

## 2017-12-01 DIAGNOSIS — G89.29 CHRONIC LOW BACK PAIN, UNSPECIFIED BACK PAIN LATERALITY, WITH SCIATICA PRESENCE UNSPECIFIED: ICD-10-CM

## 2017-12-01 DIAGNOSIS — M54.5 CHRONIC LOW BACK PAIN, UNSPECIFIED BACK PAIN LATERALITY, WITH SCIATICA PRESENCE UNSPECIFIED: ICD-10-CM

## 2017-12-01 DIAGNOSIS — M51.17 INTERVERTEBRAL DISC DISORDER WITH RADICULOPATHY OF LUMBOSACRAL REGION: ICD-10-CM

## 2017-12-01 ASSESSMENT — PAIN - FUNCTIONAL ASSESSMENT: PAIN_FUNCTIONAL_ASSESSMENT: 0-10

## 2017-12-01 NOTE — OP NOTE
[facility]   OUTPATIENT PROCEDURE NOTE      PATIENT NAME: Oliva Ramirez    :  1955    PROCEDURE DATE: 2017 MR: [id number]    PROCEDURE:  CAUDAL LUMBARSACRAL  EPIDURAL STEROID INJECTION    DIAGNOSIS: LUMBAR DISC HERNIATION AND SPONDYLOSIS WITH RADICULOPATHY    On 2017 the above noted patient was scheduled for outpatient percutaneous injection in the Fluoroscopic Radiology Outpatient Suite. This percutaneous cortisone injection procedure including possible complications has been discussed in detail with this patient prior to the procedure and they have agreed to proceed with this attempted treatment. Therefore, the patient was appropriately positioned on the fluoroscopic table. Prior to preparing the injection site, the patient was viewed under fluoroscopy in both AP and/or lateral views to determine appropriate injection site orientation. Following this, the region of injection was then prepped and draped in appropriate sterile fashion. Following this, the areas of injection were then subsequently identified utilizing judicious fluoroscopic radiographic imaging. Following identification of appropriate landmarks and injection sites, an appropriate length spinal needle was positioned at each injection site, again under fluoroscopic radiograph guidance. Following placement of all injection needle sites, again fluoroscopic imaging views were obtained in order to verify appropriate placement of all injection needles. Placement was also verified by questioning the patient in location to localized pain and sensitivity. Following verification of placement of all injection needles, next appropriate amount for body weight solution mixture involving use of Kenalog 40 mg per ml 4 cc and Marcaine . 25% 1cc was injected at [address] [facility] site under sterile technique. Standard procedure to assure appropriate injection was performed at each site. Following this all injection needles were removed, and after being cleansed sterile bandages were applied. The patient tolerated this procedure well. The patient was observed in the outpatient setting for a minimum of 15-20 minutes to assure they were feeling satisfactory prior to their discharge to home. All patients have been told prior to scheduling of this outpatient procedure to arrange for a family member or friend to provide home transportation. Patient was scheduled for FU visit in the next 1-2 weeks post injection for further clinical evaluation and treatment. There were no complications.     ALENA Butcher     12/1/2017  10:02 AM

## 2017-12-04 ENCOUNTER — HOSPITAL ENCOUNTER (OUTPATIENT)
Dept: PHYSICAL THERAPY | Age: 62
Discharge: HOME | End: 2017-12-04
Admitting: ORTHOPAEDIC SURGERY

## 2017-12-04 NOTE — FLOWSHEET NOTE
Outpatient Physical Therapy     [x] Daily Treatment Note   [] Progress Note   [] Discharge Note    Date:  12/4/2017    Patient Name:  Med Mayberry        YOB: 1955    Medical Diagnosis: primary osteoarthritis of left knee (M17.11)                                             Treatment Diagnosis:  Decreased ROM, decreased strength, gait and balance impairments     Onset Date:  8/30/17               Referral Date: 11/10/17          Referring Physician:  Sugey Mansfield MD                                                Visits Allowed/Insurance/Certification Information:  [employer] Medicare, no ionto, no max     Restrictions/Precautions:  WBAT, recent bladder reconstruction surgery 10/4/17 (pt states no restrictions), CAD, PACEMAKER, multiple back surgeries, neck surgery    Plan of care signed (Y/N): sent to MD on 11/27/17 due to referral states it is her right knee, POC states left knee and sent for MD to sign,  Received back same day on 11/27/17    Progress Note covers period from (if applicable):    [x]  NA    [] From          To           Next Progress Note due: 12/18/17    Visit# / total visits:  5/16    Plan for Next Session:  continue with ROM and strengthening exercises, continue with manual therapy if needed for cross friction massage and patellar mobs, progress balance, gait, steps as able. Subjective:   Pt states most pain lateral and inferior to incision. Pt states she was tired and wobbly after last visit, muscles were fatigued. Pt states she had epidural and another injection on Friday with Dr. Guera Dyson. Pt states the injections seem to be helping. Pain level:  4/10 walking into dept. Pain as high as 6/10 on Saturday from walking     Objective:      Exercises:    Exercises in bold performed in department today. Items not bolded are carried forward from prior visits for continuity of the record.   Exercise/Equipment Resistance/Repetitions Other comments     Nustep Level 3,

## 2017-12-07 ENCOUNTER — HOSPITAL ENCOUNTER (OUTPATIENT)
Dept: PHYSICAL THERAPY | Age: 62
Discharge: HOME | End: 2017-12-07
Admitting: ORTHOPAEDIC SURGERY

## 2017-12-07 NOTE — FLOWSHEET NOTE
minutes Seat 10, arms 11     QS/ GS/ AP 3x10 Pt inst to cont with 3x10 at home      Supine heel slides with sheet x10 hold 5-10 seconds Pt to cont at home     sidelying hip abd 3x10      Supine SLR 3x10 Improved today, pt lifting full range. Cues to keep quad tight and lift as high as she can to the other knee. Supine knee extension stretch with towel roll under ankle  Reviewed, 5 min  (PT forgot to give pt handout, will do next visit) Knee ext 0 deg today    hamstring stretch EOB (with or without sheet) and long sitting gastrocs with towel  Reviewed, 3 reps hold 20-30 seconds     TKE sitting 3#, 3x10       Hip add baldomero hook lying  Hold 5 sec, 2x10 Pt to increase to 3x10 as able    standing heel raises    3x12                   \"    standing hip abd bilat  (done standing on 2\" block)  3x12 bilat  Pt to increase to 3x10-15 as able    standing hamstring curl   3#, 3x10            \"    wall slides   2x10           \"    leg press machine       step ups  6\", 2x10 2 hand support    step downs  NV                         Therapeutic Exercise/Home Exercise Program:  X 35 min total   HEP established, See above, pt given handout(s) of new exercise(s)    Group Therapy:   NA    Therapeutic Activity:   NA     Gait: NA     Neuromuscular Re-Education: NA      Manual Therapy: x 20 min total .  Patellar mobs in supine x 5 min  Manual stretch/PROM knee flexion in side lying x 10 min.   AROM after 125  Scar massage, cross friction and STM to lateral retinaculum and distal lateral quad x 5 min    Modalities: PACEMAKER  Pt reminded to ice at home    Functional Outcome Measure:   Measure used:  LEFS  Score:  7/80  % Disability:  91%    Assessment/Treatment/Activity Tolerance:    GCode:  M1577BS  Patients response to treatment:   [x] Patient tolerated treatment well, with no adverse reactions noted [] Patient limited by fatigue   [] Patient limited by pain [] Patient limited by other medical complications   [] Other:     Goals: [id number]  Progress towards goals: NA    GOALS  G Code:  M2096EC  Short Term Goals (  4  weeks) Long Term Goals ( 8  weeks)   1). Decrease pain to 5/10 at worst 1). Decrease pain to 0-3/10 at all times   2). Increase AROM by 5-10 degrees in limited areas. 2). AROM Left knee = Right knee within 3 degrees. 3). Increase strength by 1/2 to 1 grade in weak areas. 3).  MMT >/= 4+/5 throughout left LE   4). Patient able to ambulate up/down 4 steps with an alternating pattern without rail independently. 4). Patient able to ambulate community distances without an AD independently without significant deviations noted. 5). Independent with HEP 5). Patient able to perform all regular activities independently. 6). 6).          Prognosis: [x] Good [] Fair  [] Poor    Patient Requires Follow-up:  [x] Yes  [] No    Plan: [x] Continue per plan of care [] Alter current plan (see comments)   [] Plan of care initiated [] Hold pending MD visit [] Discharge    Timed Code Treatment Minutes:  55    Total Treatment Minutes:  55 + nustep    Medicare Cap total YTD:  NA    Electronically signed by:  Louisa Miranda, [id number]

## 2017-12-11 ENCOUNTER — HOSPITAL ENCOUNTER (OUTPATIENT)
Dept: PHYSICAL THERAPY | Age: 62
Discharge: HOME | End: 2017-12-11
Admitting: ORTHOPAEDIC SURGERY

## 2017-12-11 NOTE — FLOWSHEET NOTE
at worst 1). Decrease pain to 0-3/10 at all times   2). Increase AROM by 5-10 degrees in limited areas. 2). AROM Left knee = Right knee within 3 degrees. 3). Increase strength by 1/2 to 1 grade in weak areas. 3).  MMT >/= 4+/5 throughout left LE   4). Patient able to ambulate up/down 4 steps with an alternating pattern without rail independently. 4). Patient able to ambulate community distances without an AD independently without significant deviations noted. 5). Independent with HEP 5). Patient able to perform all regular activities independently. 6). 6).          Prognosis: [x] Good [] Fair  [] Poor    Patient Requires Follow-up:  [x] Yes  [] No    Plan: [x] Continue per plan of care [] Alter current plan (see comments)   [] Plan of care initiated [] Hold pending MD visit [] Discharge    Timed Code Treatment Minutes:  55    Total Treatment Minutes:  55 + nustep    Medicare Cap total YTD:  NA    Electronically signed by:  Cecy Arriola, [id number]

## 2017-12-14 ENCOUNTER — HOSPITAL ENCOUNTER (OUTPATIENT)
Dept: PHYSICAL THERAPY | Age: 62
Discharge: HOME | End: 2017-12-14
Admitting: ORTHOPAEDIC SURGERY

## 2017-12-14 NOTE — FLOWSHEET NOTE
Outpatient Physical Therapy     [x] Daily Treatment Note   [] Progress Note   [] Discharge Note    Date:  12/14/2017    Patient Name:  Jose Vanegas        YOB: 1955    Medical Diagnosis: primary osteoarthritis of left knee (M17.11)                                             Treatment Diagnosis:  Decreased ROM, decreased strength, gait and balance impairments     Onset Date:  8/30/17               Referral Date: 11/10/17          Referring Physician:  Tana Negro MD                                                Visits Allowed/Insurance/Certification Information:  [employer] Medicare, no ionto, no max     Restrictions/Precautions:  WBAT, recent bladder reconstruction surgery 10/4/17 (pt states no restrictions), CAD, PACEMAKER, multiple back surgeries, neck surgery    Plan of care signed (Y/N): sent to MD on 11/27/17 due to referral states it is her right knee, POC states left knee and sent for MD to sign,  Received back same day on 11/27/17    Progress Note covers period from (if applicable):    []  NA    [] From          To           Next Progress Note due: 12/18/17    Visit# / total visits:  8/16    Plan for Next Session:  continue with ROM and strengthening exercises, continue with manual therapy if needed for cross friction massage and patellar mobs, progress balance, gait, steps as able. Subjective:   Pt states the wall ex makes her legs hurt and gets shannon horses       Pain level:  0/10 walking into dept. Pain as high as 2/10 since last visit     Objective:      Exercises:    Exercises in bold performed in department today. Items not bolded are carried forward from prior visits for continuity of the record.   Exercise/Equipment Resistance/Repetitions Other comments     Nustep Level 4, 10 minutes   Subjective taken during Seat 10, arms 11     QS/ GS/ AP 3x10 Pt inst to cont with 3x10 at home      Supine heel slides with sheet x10 hold 5-10 seconds Pt to cont at home     sidelying hip abd 3x10      Supine SLR 3x12 Improved today   Supine knee extension stretch with towel roll under ankle  Reviewed, 5 min  (PT forgot to give pt handout, will do next visit) Knee ext 0 deg today    hamstring stretch EOB (with or without sheet) and long sitting gastrocs with towel  Reviewed, 3 reps hold 20-30 seconds     TKE sitting 3#, 3x12       Hip add baldomero hook lying  Hold 5 sec, 2x10 Pt to increase to 3x10 as able    standing heel raises    3x12                   \"    standing hip abd bilat  (done standing on 2\" block)  3x12 bilat  Pt to increase to 3x10-15 as able    standing hamstring curl   3#, 3x10            \"    wall slides   3x10           \"    leg press machine  NV if able     step ups  6\", 2x10 1 hand support    step downs  4\", 2x10 2 hand support                        Therapeutic Exercise/Home Exercise Program:  X 25 min total   Cues still needed   Pt needed to leave at 1045  HEP established, See above, pt has handout(s)    Group Therapy:   NA    Therapeutic Activity:   NA     Gait: NA     Neuromuscular Re-Education: NA      Manual Therapy: x 20 min total .  Patellar mobs in supine x 5 min  Manual stretch/PROM knee flexion in side lying x 10 min. AROM after 128  Scar massage, cross friction and STM to lateral retinaculum and distal lateral quad x 5 min    Modalities: PACEMAKER  Pt reminded to ice at home    Functional Outcome Measure:   Measure used:  LEFS  Score:  7/80  % Disability:  91%    Assessment/Treatment/Activity Tolerance:    GCode:  B8967MH  Patients response to treatment:   [x] Patient tolerated treatment well, with no adverse reactions noted [] Patient limited by fatigue   [] Patient limited by pain [] Patient limited by other medical complications   [] Other:     Goals: [id number]  Progress towards goals: NA    GOALS  G Code:  [id number]  Short Term Goals (  4  weeks) Long Term Goals ( 8  weeks)   1). Decrease pain to 5/10 at worst 1). Decrease pain to 0-3/10 at all times   2).

## 2017-12-18 ENCOUNTER — HOSPITAL ENCOUNTER (OUTPATIENT)
Dept: ULTRASOUND IMAGING | Age: 62
Discharge: HOME | End: 2017-12-18
Attending: UROLOGY | Admitting: UROLOGY

## 2017-12-18 DIAGNOSIS — R32 URINARY INCONTINENCE, UNSPECIFIED TYPE: ICD-10-CM

## 2017-12-18 LAB
ANION GAP SERPL CALCULATED.3IONS-SCNC: 15 MMOL/L (ref 3–16)
BUN BLDV-MCNC: 16 MG/DL (ref 7–20)
CALCIUM SERPL-MCNC: 9.4 MG/DL (ref 8.3–10.6)
CHLORIDE BLD-SCNC: 106 MMOL/L (ref 99–110)
CO2: 22 MMOL/L (ref 21–32)
CREAT SERPL-MCNC: 0.6 MG/DL (ref 0.6–1.2)
GFR AFRICAN AMERICAN: >60
GFR NON-AFRICAN AMERICAN: >60
GLUCOSE BLD-MCNC: 103 MG/DL (ref 70–99)
POTASSIUM SERPL-SCNC: 3.9 MMOL/L (ref 3.5–5.1)
SODIUM BLD-SCNC: 143 MMOL/L (ref 136–145)

## 2017-12-19 ENCOUNTER — HOSPITAL ENCOUNTER (OUTPATIENT)
Dept: PHYSICAL THERAPY | Age: 62
Discharge: HOME | End: 2017-12-19
Admitting: ORTHOPAEDIC SURGERY

## 2017-12-19 NOTE — FLOWSHEET NOTE
Outpatient Physical Therapy     [x] Daily Treatment Note   [] Progress Note   [] Discharge Note    Date:  12/19/2017    Patient Name:  Kesha Herrera        YOB: 1955    Medical Diagnosis: primary osteoarthritis of left knee (M17.11)                                             Treatment Diagnosis:  Decreased ROM, decreased strength, gait and balance impairments     Onset Date:  8/30/17               Referral Date: 11/10/17          Referring Physician:  Minesh Patient, MD                                                Visits Allowed/Insurance/Certification Information:  [employer] Medicare, no ionto, no max     Restrictions/Precautions:  WBAT, recent bladder reconstruction surgery 10/4/17 (pt states no restrictions), CAD, PACEMAKER, multiple back surgeries, neck surgery    Plan of care signed (Y/N): sent to MD on 11/27/17 due to referral states it is her right knee, POC states left knee and sent for MD to sign,  Received back same day on 11/27/17    Progress Note covers period from (if applicable):    [x]  NA    [] From          To           Next Progress Note due: NV    Visit# / total visits:  9/16    Plan for Next Session:  continue with ROM and strengthening exercises, continue with manual therapy if needed for cross friction massage and patellar mobs, progress balance, gait, steps as able. Subjective:   Pt states knee keeps popping. Hurts on outside of knee. Pain level:  2/10 walking into dept. Pain as high as 4/10 since last visit     Objective:      Exercises:    Exercises in bold performed in department today. Items not bolded are carried forward from prior visits for continuity of the record.   Exercise/Equipment Resistance/Repetitions Other comments     Nustep Level 4, 10 minutes     Seat 10, arms 11     QS/ GS/ AP 3x10 Pt inst to cont with 3x10 at home      Supine heel slides with sheet x10 hold 5-10 seconds Pt to cont at home     sidelying hip abd 3x10      Supine SLR 3x12 Difficult to do 12   Supine knee extension stretch with towel roll under ankle  Reviewed, 5 min   Knee ext 0 deg today    hamstring stretch EOB (with or without sheet) and long sitting gastrocs with towel  Reviewed, 3 reps hold 20-30 seconds     TKE sitting 3#, 3x12       Hip add baldomero hook lying  Hold 5 sec, 2x10 Pt to increase to 3x10 as able    standing heel raises    3x12                   \"    standing hip abd bilat  (done standing on 2\" block)  3x12 bilat  Pt to increase to 3x10-15 as able    standing hamstring curl   3#, 3x10            \"    wall slides   3x10           \"    leg press machine  2 plates [id number]     step ups  6\", 2x10 1 hand support    step downs  4\", 2x10 2 hand support                        Therapeutic Exercise/Home Exercise Program:  X 23 min total   Cues still needed for some ex. HEP established, See above, pt has handout(s)    Group Therapy:   NA    Therapeutic Activity:   NA     Gait: NA     Neuromuscular Re-Education: NA      Manual Therapy: x 15 min total .  Patellar mobs in supine   Manual stretch/PROM knee flexion in side lying . AROM after 125, PROM 132  Scar massage, cross friction and STM to lateral retinaculum and distal lateral quad     Modalities: PACEMAKER  Pt reminded to ice at home    Functional Outcome Measure:   Measure used:  LEFS  Score:  7/80  % Disability:  91%    Assessment/Treatment/Activity Tolerance:    GCode:  X9538CM  Patients response to treatment:   [x] Patient tolerated treatment well, with no adverse reactions noted [] Patient limited by fatigue   [] Patient limited by pain [] Patient limited by other medical complications   [] Other:     Goals: [id number]  Progress towards goals: NA    GOALS  G Code:  [id number]  Short Term Goals (  4  weeks) Long Term Goals ( 8  weeks)   1). Decrease pain to 5/10 at worst 1). Decrease pain to 0-3/10 at all times   2). Increase AROM by 5-10 degrees in limited areas. 2). AROM Left knee = Right knee within 3 degrees.    3). Increase strength by 1/2 to 1 grade in weak areas. 3).  MMT >/= 4+/5 throughout left LE   4). Patient able to ambulate up/down 4 steps with an alternating pattern without rail independently. 4). Patient able to ambulate community distances without an AD independently without significant deviations noted. 5). Independent with HEP 5). Patient able to perform all regular activities independently. 6). 6). Prognosis: [x] Good [] Fair  [] Poor    Patient Requires Follow-up:  [x] Yes  [] No    Plan: [x] Continue per plan of care [] Alter current plan (see comments)   [] Plan of care initiated [] Hold pending MD visit [] Discharge    Timed Code Treatment Minutes:  38  Total Treatment Minutes:  38 + nustep    Medicare Cap total YTD:  NA    Electronically signed by:   Rosalynd Ganser, [id number]

## 2017-12-21 ENCOUNTER — HOSPITAL ENCOUNTER (OUTPATIENT)
Dept: PHYSICAL THERAPY | Age: 62
Discharge: HOME | End: 2017-12-21
Admitting: ORTHOPAEDIC SURGERY

## 2017-12-21 NOTE — FLOWSHEET NOTE
Outpatient Physical Therapy     [x] Daily Treatment Note   [x] Progress Note   [] Discharge Note    Date:  12/21/2017    Patient Name:  Lucita Recio        YOB: 1955    Medical Diagnosis: primary osteoarthritis of left knee (M17.11)                                             Treatment Diagnosis:  Decreased ROM, decreased strength, gait and balance impairments     Onset Date:  8/30/17               Referral Date: 11/10/17          Referring Physician:  Fern Rodriguez MD                                                Visits Allowed/Insurance/Certification Information:  [employer] Medicare, no ionto, no max     Restrictions/Precautions:  WBAT, recent bladder reconstruction surgery 10/4/17 (pt states no restrictions), CAD, PACEMAKER, multiple back surgeries, neck surgery    Plan of care signed (Y/N): sent to MD on 11/27/17 due to referral states it is her right knee, POC states left knee and sent for MD to sign,  Received back same day on 11/27/17    Progress Note covers period from (if applicable):    []  NA    [x] From 11/20/17 To 12/21/17           Next Progress Note due: by 1/1918    Visit# / total visits:  10/16    Plan for Next Session:  continue with strengthening exercises, especially quads, progress balance, gait, steps (including curb step) as able. Subjective:   Pt doing HEP daily. Pt states she has trouble going up and down a curb step. Pt can walk through grocery store with a cart for about an hour. Pt can walk without AD about 15 minutes before needing to sit. Pt able to perform all ADLs      Pain level:  0/10 walking into dept. Pain as high as 4/10 since last visit     Objective:   Limited ex today due to reassessment    Exercises:    Exercises in bold performed in department today. Items not bolded are carried forward from prior visits for continuity of the record.   Exercise/Equipment Resistance/Repetitions Other comments     Nustep Level 5, 10 minutes     Seat 10, arms min A/HHA for balance and strength. Neuromuscular Re-Education: NA      Manual Therapy: none today due to good ROM    Modalities: PACEMAKER  Pt reminded to ice at home as needed    Functional Outcome Measure:   Measure used:  LEFS  Score:  39/80  % Disability:  51.2%    Assessment/Treatment/Activity Tolerance:    GCode: Mobility current status [id number]  Patients response to treatment:   [x] Patient tolerated treatment well, with no adverse reactions noted [] Patient limited by fatigue   [] Patient limited by pain [] Patient limited by other medical complications   [] Other:     Goals: [id number]  Progress towards goals: goals met as marked below per progress note on 12/21/17    GOALS  G Code:  [id number]  Short Term Goals (  4  weeks) Long Term Goals ( 8  weeks)   1). Decrease pain to 5/10 at worst MET 1). Decrease pain to 0-3/10 at all times   2). Increase AROM by 5-10 degrees in limited areas. MET 2). AROM Left knee = Right knee within 3 degrees. MET   3). Increase strength by 1/2 to 1 grade in weak areas. PARTIALLY MET 3). MMT >/= 4+/5 throughout left LE   4). Patient able to ambulate up/down 4 steps with an alternating pattern without rail independently. 4). Patient able to ambulate community distances without an AD independently without significant deviations noted. MET   5). Independent with HEP 5). Patient able to perform all regular activities independently. MET   6). 6).          Prognosis: [x] Good [] Fair  [] Poor    Patient Requires Follow-up:  [x] Yes  [] No    Plan: [x] Continue per plan of care [] Alter current plan (see comments)   [] Plan of care initiated [] Hold pending MD visit [] Discharge    Timed Code Treatment Minutes:  55    Total Treatment Minutes:  55 + nustep    Medicare Cap total YTD:  NA    Electronically signed by:  Jolene Soto, [id number]

## 2017-12-28 ENCOUNTER — HOSPITAL ENCOUNTER (OUTPATIENT)
Dept: PHYSICAL THERAPY | Age: 62
Discharge: HOME | End: 2017-12-28
Admitting: ORTHOPAEDIC SURGERY

## 2017-12-28 NOTE — FLOWSHEET NOTE
Outpatient Physical Therapy     [x] Daily Treatment Note   [] Progress Note   [] Discharge Note    Date:  12/28/2017    Patient Name:  Jeni Goss        YOB: 1955    Medical Diagnosis: primary osteoarthritis of left knee (M17.11)                                             Treatment Diagnosis:  Decreased ROM, decreased strength, gait and balance impairments     Onset Date:  8/30/17               Referral Date: 11/10/17          Referring Physician:  Fredrick Arias MD                                                Visits Allowed/Insurance/Certification Information:  [employer] Medicare, no ionto, no max     Restrictions/Precautions:  WBAT, recent bladder reconstruction surgery 10/4/17 (pt states no restrictions), CAD, PACEMAKER, multiple back surgeries, neck surgery    Plan of care signed (Y/N): sent to MD on 11/27/17 due to referral states it is her right knee, POC states left knee and sent for MD to sign,  Received back same day on 11/27/17    Progress Note covers period from (if applicable):    [x]  NA    [] From            Next Progress Note due: by 1/19/18     Visit# / total visits:  11/16    Plan for Next Session:  continue with strengthening exercises, especially quads, progress balance, gait, steps (including curb step) as able. Subjective:   Pt doing HEP daily. Pt states continues to do well. Pt states she got rid of her toilet bars, her walker, her shower chair. States she no longer needs them. Pain level:  0/10 walking into dept. Pain as high as 3/10 since last visit     Objective:      Exercises:    Exercises in bold performed in department today. Items not bolded are carried forward from prior visits for continuity of the record.   Exercise/Equipment Resistance/Repetitions Other comments     Nustep Level 5, 10 minutes     Seat 10, arms 11     QS/ GS/ AP 3x10 Pt inst to cont with 3x10 at home      Supine heel slides with sheet x10 hold 5-10 seconds Pt to cont at home Right knee within 3 degrees. MET   3). Increase strength by 1/2 to 1 grade in weak areas. PARTIALLY MET 3). MMT >/= 4+/5 throughout left LE   4). Patient able to ambulate up/down 4 steps with an alternating pattern without rail independently. 4). Patient able to ambulate community distances without an AD independently without significant deviations noted. MET   5). Independent with HEP 5). Patient able to perform all regular activities independently. MET   6). 6).          Prognosis: [x] Good [] Fair  [] Poor    Patient Requires Follow-up:  [x] Yes  [] No    Plan: [x] Continue per plan of care [] Alter current plan (see comments)   [] Plan of care initiated [] Hold pending MD visit [] Discharge    Timed Code Treatment Minutes:  50    Total Treatment Minutes:  50 + nustep    Medicare Cap total YTD:  NA    Electronically signed by:  Cecy Arriola, [id number]

## 2018-01-01 ENCOUNTER — HOSPITAL ENCOUNTER (OUTPATIENT)
Dept: OTHER | Age: 63
Discharge: HOME | End: 2018-01-04
Attending: ORTHOPAEDIC SURGERY | Admitting: ORTHOPAEDIC SURGERY

## 2018-01-02 ENCOUNTER — HOSPITAL ENCOUNTER (OUTPATIENT)
Dept: MAMMOGRAPHY | Age: 63
Discharge: HOME | End: 2018-01-02
Attending: FAMILY MEDICINE | Admitting: FAMILY MEDICINE

## 2018-01-02 ENCOUNTER — HOSPITAL ENCOUNTER (OUTPATIENT)
Dept: PHYSICAL THERAPY | Age: 63
Discharge: HOME | End: 2018-01-02
Admitting: ORTHOPAEDIC SURGERY

## 2018-01-02 DIAGNOSIS — Z12.31 ENCOUNTER FOR SCREENING MAMMOGRAM FOR BREAST CANCER: ICD-10-CM

## 2018-01-02 NOTE — FLOWSHEET NOTE
Resistance/Repetitions Other comments     Nustep Level 5, 10 minutes     Seat 10, arms 11     QS 3x10, 8 sec hold      Supine heel slides with sheet x10 hold 5-10 seconds Pt to cont at home     Sidelying clam 3x10, 5 sec hold Issued HEP handout     Supine SLR 3x10, added 2# LLE Pt challenged with increased weight, but able to complete with rest breaks b/w sets. Supine knee extension stretch with towel roll under ankle  Reviewed, 5 min   Knee ext 0 deg today    hamstring stretch EOB (with or without sheet) and long sitting gastrocs with towel  Reviewed, 3 reps hold 20-30 seconds     TKE sitting 5#, 3x15, added 3 sec hold Pt does well with increased reps and hold duration     Hip add baldomero hook lying  Hold 5 sec, 2x10 Pt to increase to 3x10 as able    standing heel raises    3x12                   \"   Standing TKE, red theraband 2 sets 10 reps      standing hip abd bilat  (done standing on 2\" block)  3x15 bilat, added 2# PT gave VCs for proper LE and hip alignment    standing hamstring curl   5#, 3x12           \"    wall slides   3x10           \"    leg press machine  2 plates 6U29     step ups bilat  6\", 2x10 each  1 hand support    step downs  bilat 6\", 2x10 each 1 hand support                        Therapeutic Exercise/Home Exercise Program: X 50 min total     All exercises above performed in bold, issued new HEP exercise handout, reviewed HEP exercises. Group Therapy:   NA    Therapeutic Activity:   NA     Gait: X 10 min today  Gait: WFL with no AD  Steps:  Pt able to do 16 steps in dept with reciprocal pattern with progression from 2 rails to 1 rail. VCs provided for keeping hips facing anteriorly and improving eccentric control on descent.       Neuromuscular Re-Education: NA      Manual Therapy: none today due to good ROM, L knee flexion 131 deg, L knee ext 0 deg    Modalities: PACEMAKER  Pt reminded to ice at home as needed    Functional Outcome Measure:   Measure used:  LEFS  Score:  39/80  % Disability:  51.2%    Assessment/Treatment/Activity Tolerance:    GCode: Mobility current status [id number]  Patients response to treatment: Patient was able to progress with new exercises and progression of exercises and stair training this session despite a fall occurring this Friday 12/29/17 with pt having no c/o pain throughout PT session. [x] Patient tolerated treatment well, with no adverse reactions noted [] Patient limited by fatigue   [] Patient limited by pain [] Patient limited by other medical complications   [] Other:     Goals: F0976HG  Progress towards goals: goals met as marked below per progress note on 12/21/17    GOALS  G Code:  [id number]  Short Term Goals (  4  weeks) Long Term Goals ( 8  weeks)   1). Decrease pain to 5/10 at worst MET 1). Decrease pain to 0-3/10 at all times   2). Increase AROM by 5-10 degrees in limited areas. MET 2). AROM Left knee = Right knee within 3 degrees. MET   3). Increase strength by 1/2 to 1 grade in weak areas. PARTIALLY MET 3). MMT >/= 4+/5 throughout left LE   4). Patient able to ambulate up/down 4 steps with an alternating pattern without rail independently. 4). Patient able to ambulate community distances without an AD independently without significant deviations noted. MET   5). Independent with HEP 5). Patient able to perform all regular activities independently. MET   6). 6).          Prognosis: [x] Good [] Fair  [] Poor    Patient Requires Follow-up:  [x] Yes  [] No    Plan: [x] Continue per plan of care [] Alter current plan (see comments)   [] Plan of care initiated [] Hold pending MD visit [] Discharge    Timed Code Treatment Minutes:  60    Total Treatment Minutes:  60 + 10 nustep (70 total)    Medicare Cap total YTD:  NA    Electronically signed by:  Bruna Morris, PT, DPT #[id number]

## 2018-01-04 ENCOUNTER — HOSPITAL ENCOUNTER (OUTPATIENT)
Dept: PHYSICAL THERAPY | Age: 63
Discharge: HOME | End: 2018-01-04
Admitting: ORTHOPAEDIC SURGERY

## 2018-01-04 NOTE — FLOWSHEET NOTE
Reviewed, 5 min   Knee ext 0 deg today    hamstring stretch EOB (with or without sheet) and long sitting gastrocs with towel  Reviewed, 3 reps hold 20-30 seconds     TKE sitting 5#, 3x15, 3 sec hold Pt does well with increased reps and hold duration     Hip add baldomero hook lying  Hold 5 sec, 3x10     standing heel raises    3x12   Pt to increase to 3x15 as able   Standing TKE, red theraband 2 sets 10 reps      standing hip abd bilat  (done standing on 2\" block)  3x15 bilat, 2#     standing hamstring curl   5#, 3x15     wall slides   3x10 Pt to increase as able to 3x12-15    leg press machine  2 plates [id number]     step ups bilat  6\", 2x10 each  1 hand support    step downs  bilat 6\", 2x10 each 1 hand support                        Therapeutic Exercise/Home Exercise Program: X 48 min total     All exercises above performed in bold, reviewed HEP exercises, pt independent with HEP with use of handouts  Discussed exs pt can do at the [facility]. Reassessed for discharge note:   Range of Motion/Strength Testing                      Range Tested AROM PROM MMT/Resisted Comments   *denotes pain Left Right Left Right Left Right     Hip Flexion [facility]     4/5 4/5     Hip Extension \" \"     NT NT     Hip Abduction \" \"     4+/5 4+/5     Hip Adduction \" \"     4/5 4/5     Hip IR \" \"     4/5 4/5     Hip ER \" \"     4/5 4/5     Knee Flexion 130 130     4/5 4+/5     Knee Extension 0 0     4/5 4/5     Ankle Dorsiflex [facility]     4+/5 4+/5     Ankle Plantarflex \" \"     3-/5 3-/5 difficult for pt today   Ankle Inversion \" \"             Ankle Eversion              Group Therapy:   NA    Therapeutic Activity:   NA     Gait:   Gait: WFL with no AD  Steps:  Pt able to do 12 steps in dept with reciprocal pattern with 1 rail.       Neuromuscular Re-Education: NA      Manual Therapy: none today due to good ROM    Modalities: PACEMAKER  Pt reminded to ice at home as needed    Functional Outcome Measure:   Measure used:  LEFS  Score:  46/80  % Disability:  42.5%    Assessment/Treatment/Activity Tolerance:  Pt has made good progress with PT.   GCode: Mobility discharge status [id number]  Patients response to treatment:    [x] Patient tolerated treatment well, with no adverse reactions noted [] Patient limited by fatigue   [] Patient limited by pain [] Patient limited by other medical complications   [] Other:     Goals: [id number]  Progress towards goals: goals met as marked below     GOALS  G Code:  [id number]  Short Term Goals (  4  weeks) Long Term Goals ( 8  weeks)   1). Decrease pain to 5/10 at worst MET 1). Decrease pain to 0-3/10 at all times MET   2). Increase AROM by 5-10 degrees in limited areas. MET 2). AROM Left knee = Right knee within 3 degrees. MET   3). Increase strength by 1/2 to 1 grade in weak areas. PARTIALLY MET 3). MMT >/= 4+/5 throughout left LE PARTIALLY MET   4). Patient able to ambulate up/down 4 steps with an alternating pattern without rail independently. PARTIALLY MET, ABLE TO DO WITH 1 RAIL 4). Patient able to ambulate community distances without an AD independently without significant deviations noted. MET   5). Independent with HEP MET WITH USE OF HANDOUTS 5). Patient able to perform all regular activities independently. MET   6). 6).          Prognosis: [x] Good [] Fair  [] Poor    Patient Requires Follow-up:  [] Yes  [x] No    Plan: [] Continue per plan of care [] Alter current plan (see comments)   [] Plan of care initiated [] Hold pending MD visit [x] Discharge to [facility]    Timed Code Treatment Minutes:  48    Total Treatment Minutes:  48 + 10 nustep     Medicare Cap total YTD:  NA    Electronically signed by:  TAMRA [id number]

## 2018-01-23 ENCOUNTER — OFFICE VISIT (OUTPATIENT)
Dept: NEUROLOGY | Age: 63
End: 2018-01-23

## 2018-01-23 VITALS
DIASTOLIC BLOOD PRESSURE: 71 MMHG | OXYGEN SATURATION: 95 % | BODY MASS INDEX: 34.74 KG/M2 | HEIGHT: 61 IN | WEIGHT: 184 LBS | HEART RATE: 64 BPM | SYSTOLIC BLOOD PRESSURE: 115 MMHG

## 2018-01-23 DIAGNOSIS — G31.84 MCI (MILD COGNITIVE IMPAIRMENT) WITH MEMORY LOSS: Primary | ICD-10-CM

## 2018-01-23 DIAGNOSIS — G31.84 MILD COGNITIVE IMPAIRMENT: ICD-10-CM

## 2018-01-23 DIAGNOSIS — G47.33 OSA (OBSTRUCTIVE SLEEP APNEA): ICD-10-CM

## 2018-01-23 DIAGNOSIS — F34.1 DYSTHYMIA: ICD-10-CM

## 2018-01-23 DIAGNOSIS — F51.01 PRIMARY INSOMNIA: ICD-10-CM

## 2018-01-23 DIAGNOSIS — E78.5 DYSLIPIDEMIA: ICD-10-CM

## 2018-01-23 DIAGNOSIS — G25.0 ESSENTIAL TREMOR: ICD-10-CM

## 2018-01-23 PROCEDURE — 99214 OFFICE O/P EST MOD 30 MIN: CPT | Performed by: PSYCHIATRY & NEUROLOGY

## 2018-01-23 RX ORDER — PRIMIDONE 50 MG/1
100 TABLET ORAL 2 TIMES DAILY
Qty: 360 TABLET | Refills: 1 | Status: SHIPPED | OUTPATIENT
Start: 2018-01-23 | End: 2018-10-24 | Stop reason: SDUPTHER

## 2018-01-23 ASSESSMENT — ENCOUNTER SYMPTOMS
VOMITING: 1
DOUBLE VISION: 0
BACK PAIN: 1
NAUSEA: 1
BLURRED VISION: 0
COUGH: 0

## 2018-01-23 NOTE — PROGRESS NOTES
The patient came today for follow up regarding: tremors and memory loss. Since the patient's last visit, she continues to have episodic short-term memory loss. She lives by herself. She is independent. She can forget details of recent conversation and days of the week. Symptoms are waxing and waning. Degree is mild to moderate. No psychosis hallucination. No long-term memory loss. She is able to function throughout the day. She fell once since her last visit and no significant trauma or injury. She does have history of sleep apnea and insomnia. No changes with her sleep. She is on Aricept 10 mg daily. No side effect from such medication. She continues to have intermittent tremors are hands. Frequency is daily and duration could be minutes. Degree is moderate. Triggers including moving her head, eating or holding utensils. She is currently on Mysoline 50 mg twice daily. She denies any side effect from Mysoline. She is requesting increase in the dose. She denies any worsening of her neck or back pain. She denies any rigidity or stiffness. She is on Lyrica for fibromyalgia, depression and chronic pain. She is on Zoloft for her depression. She takes Percocet as needed for chronic neck and back pain. Patient takes statin for hyperlipidemia. She is not on aspirin. Other review of system was unremarkable. Past Medical History:   Diagnosis Date    Angina at rest [facility])     Anxiety     Arthritis     hands and hip    Blood transfusion     after back surgery    Bradycardia     Bronchiectasis ([facility] [address] 11/5/2013    CAD (coronary artery disease)     Chronic back pain     Depression     Hyperlipidemia     Hypertension     Localized morphea     NATHAN treated with BiPAP     Stress incontinence     Thyroid disease     hypothyroid     Prior to Visit Medications    Medication Sig Taking? Authorizing Provider   oxyCODONE-acetaminophen (PERCOCET) 5-325 MG per tablet .  Yes Historical Provider, MD   primidone (MYSOLINE) 50 MG tablet Take 1 tablet by mouth 2 times daily Yes Denia Wooten MD   donepezil (ARICEPT) 10 MG tablet Take 1 tablet by mouth nightly Yes Denia Wooten MD   fludrocortisone (FLORINEF) 0.1 MG tablet Take 0.1 mg by mouth 2 times daily Yes Historical Provider, MD   pregabalin (LYRICA) 75 MG capsule Take 75 mg by mouth 2 times daily  Yes Historical Provider, MD   potassium chloride (K-TAB) 10 MEQ extended release tablet Take 20 mEq by mouth Yes Historical Provider, MD   ranitidine (ZANTAC) 150 MG tablet Take 150 mg by mouth 2 times daily Yes Historical Provider, MD   sertraline (ZOLOFT) 100 MG tablet Take 200 mg by mouth  Yes Historical Provider, MD   cephALEXin (KEFLEX) 500 MG capsule 3 times daily  Yes Historical Provider, MD   pantoprazole (PROTONIX) 40 MG tablet TAKE 1 TABLET BY MOUTH DAILY Yes Historical Provider, MD   loratadine (CLARITIN) 10 MG tablet Take 10 mg by mouth daily Yes Historical Provider, MD   fluticasone (FLONASE) 50 MCG/ACT nasal spray 1 spray by Nasal route nightly. Yes Cheryl Pinto MD   levothyroxine (SYNTHROID) 75 MCG tablet Take 1 tablet by mouth daily for 360 days. Yes Jocelyn Feliz MD   Calcium Carbonate-Vitamin D (CALCIUM 500 + D PO) Take 600 mg by mouth 2 times daily. Yes Historical Provider, MD   nitroGLYCERIN (NITROSTAT) 0.4 MG SL tablet Place 1 tablet under the tongue every 5 minutes as needed for Chest pain. Yes Aspen Ko MD   amitriptyline (ELAVIL) 25 MG tablet Take 25 mg by mouth nightly  Yes Historical Provider, MD   Multiple Vitamin (THERA) TABS Take 1 tablet by mouth daily  Yes Historical Provider, MD   clonazepam (KLONOPIN) 1 MG tablet Take 1 tablet by mouth 3 times daily as needed for Anxiety for 21 doses.  Yes Damaris Pina MD   estradiol (ESTRACE) 0.5 MG tablet Take 0.5 mg by mouth daily   Historical Provider, MD     Allergies   Allergen Reactions    Asa [Aspirin]      GI intolerance    Benzoin Compound

## 2018-01-26 ENCOUNTER — OFFICE VISIT (OUTPATIENT)
Dept: ORTHOPEDIC SURGERY | Age: 63
End: 2018-01-26

## 2018-01-26 VITALS
BODY MASS INDEX: 34.76 KG/M2 | HEIGHT: 61 IN | DIASTOLIC BLOOD PRESSURE: 81 MMHG | WEIGHT: 184.08 LBS | HEART RATE: 114 BPM | SYSTOLIC BLOOD PRESSURE: 126 MMHG

## 2018-01-26 DIAGNOSIS — M17.12 PRIMARY OSTEOARTHRITIS OF LEFT KNEE: ICD-10-CM

## 2018-01-26 DIAGNOSIS — M25.562 ACUTE PAIN OF LEFT KNEE: Primary | ICD-10-CM

## 2018-01-26 PROCEDURE — 99213 OFFICE O/P EST LOW 20 MIN: CPT | Performed by: ORTHOPAEDIC SURGERY

## 2018-01-26 RX ORDER — MAGNESIUM HYDROXIDE 1200 MG/15ML
LIQUID ORAL 2 TIMES DAILY
Status: ON HOLD | COMMUNITY
Start: 2018-01-23 | End: 2019-12-05 | Stop reason: HOSPADM

## 2018-01-26 RX ORDER — ATORVASTATIN CALCIUM 20 MG/1
TABLET, FILM COATED ORAL
Status: ON HOLD | COMMUNITY
Start: 2018-01-09 | End: 2019-05-24

## 2018-01-26 RX ORDER — OXYCODONE AND ACETAMINOPHEN 7.5; 325 MG/1; MG/1
1 TABLET ORAL EVERY 4 HOURS PRN
Status: ON HOLD | COMMUNITY
Start: 2018-01-16 | End: 2018-05-14 | Stop reason: HOSPADM

## 2018-01-26 RX ORDER — MELOXICAM 15 MG/1
TABLET ORAL
Qty: 30 TABLET | Refills: 3 | Status: SHIPPED | OUTPATIENT
Start: 2018-01-26 | End: 2018-03-05 | Stop reason: ALTCHOICE

## 2018-01-26 RX ORDER — POTASSIUM CHLORIDE 1500 MG/1
TABLET, EXTENDED RELEASE ORAL
COMMUNITY
Start: 2018-01-16 | End: 2018-03-05 | Stop reason: ALTCHOICE

## 2018-01-26 RX ORDER — OFLOXACIN 3 MG/ML
SOLUTION/ DROPS OPHTHALMIC
COMMUNITY
Start: 2018-01-25 | End: 2018-07-12 | Stop reason: ALTCHOICE

## 2018-02-26 ENCOUNTER — OFFICE VISIT (OUTPATIENT)
Dept: ORTHOPEDIC SURGERY | Age: 63
End: 2018-02-26

## 2018-02-26 VITALS
WEIGHT: 184.08 LBS | HEIGHT: 61 IN | HEART RATE: 90 BPM | BODY MASS INDEX: 34.76 KG/M2 | SYSTOLIC BLOOD PRESSURE: 127 MMHG | DIASTOLIC BLOOD PRESSURE: 82 MMHG

## 2018-02-26 DIAGNOSIS — M17.10 LOCALIZED OSTEOARTHROSIS, LOWER LEG: Primary | ICD-10-CM

## 2018-02-26 PROCEDURE — 99213 OFFICE O/P EST LOW 20 MIN: CPT | Performed by: ORTHOPAEDIC SURGERY

## 2018-02-27 NOTE — PROGRESS NOTES
History  Augusto Rodriguez is a 60 y.o. female who returns for follow-up of a left patellofemoral arthroplasty. Patient is developed crepitation along the lateral aspect of the implant and over the lateral femoral condyle. Can progressively symptomatic to her despite the use of anti-inflammatories and intrajugular steroid injections she continues to have pain and crepitation. She also experiences occasional instability  Symptoms are has worsened. Pain level today is 6/10. Treatment to date includes as noted above. Patient's medications, allergies, past medical, surgical, social and family histories were reviewed and updated as appropriate. Review of Systems  Relevant review of systems reviewed and available in the patient's chart    Primary Area of CC: There is a well-healed surgical scar in the midline. The patella tracked centrally but there is crepitation along the lateral femoral condyle and lateral parapatellar border of the knee. There is good stability medially and laterally no evidence of cruciate ligament instability. Examination proximal and distal to the injured area show good overall ROM, no bony prominence or soft tissue tenderness, no instability or excessive stiffness. Examination of the contralateral extremity is non-focal and serves as an excellent control for the injured extremity. There is adequate ROM, no soft tissue or bony prominence tenderness, good overall strength, with skin that is intact, warm, clean and dry. Normal sensory/motor exam, normal vascular exam, and no significant lymphadenopathy. Radiology:           Impression  1. Localized osteoarthrosis, lower leg              Plan  Because of persistent symptomatology and it appears that there is mechanical irritation the retinaculum over the lateral femoral condyle.   We'll plan to convert the patient from the patellofemoral arthroplasty to a total knee arthroplasty in order to alleviate the soft tissue abrasion against the uncovered lateral femoral condyle. We discussed the risk, benefits, and potential complications of total knee replacement arthroplasty surgery. The patient voiced their understanding to concerns that include infection, deep vein thrombosis, neurological injury, and delayed rehabilitation. The patient also realizes that there are concerns regarding the potential need for manipulation under anesthesia if range of motion proves to be problematic. The patient also understands that there is always a chance of dystrophy and anesthetic complications that would include a stroke, cardiopulmonary pathology, and even death. We also discussed the rehabilitation involved with this operation and options that involved not only the hospitalization but then the potential of either going home with visiting home therapy versus going to a rehabilitation facility. We talked about the nuances of each of these treatments as well as utilization of a continuous passive motion machine (CPM machine). The patient also realizes the need for the hinged knee brace in the rehabilitation process as well as the very significant role that the patient plays in terms of rehabilitation after this type of operation. All questions were answered.     Gilson Stacy

## 2018-03-05 RX ORDER — FLUDROCORTISONE ACETATE 0.1 MG/1
0.1 TABLET ORAL 2 TIMES DAILY
COMMUNITY
End: 2019-03-10 | Stop reason: ALTCHOICE

## 2018-03-08 ENCOUNTER — HOSPITAL ENCOUNTER (OUTPATIENT)
Dept: SURGERY | Age: 63
Discharge: HOME | End: 2018-03-08
Attending: OPHTHALMOLOGY | Admitting: OPHTHALMOLOGY

## 2018-03-08 VITALS
HEIGHT: 63 IN | WEIGHT: 184 LBS | RESPIRATION RATE: 16 BRPM | SYSTOLIC BLOOD PRESSURE: 113 MMHG | DIASTOLIC BLOOD PRESSURE: 80 MMHG | TEMPERATURE: 97.4 F | HEART RATE: 65 BPM | BODY MASS INDEX: 32.6 KG/M2 | OXYGEN SATURATION: 97 %

## 2018-03-08 RX ORDER — HYDRALAZINE HYDROCHLORIDE 20 MG/ML
5 INJECTION INTRAMUSCULAR; INTRAVENOUS
Status: DISCONTINUED | OUTPATIENT
Start: 2018-03-08 | End: 2018-03-09 | Stop reason: HOSPADM

## 2018-03-08 RX ORDER — SODIUM CHLORIDE 0.9 % (FLUSH) 0.9 %
10 SYRINGE (ML) INJECTION PRN
Status: DISCONTINUED | OUTPATIENT
Start: 2018-03-08 | End: 2018-03-09 | Stop reason: HOSPADM

## 2018-03-08 RX ORDER — LABETALOL HYDROCHLORIDE 5 MG/ML
5 INJECTION, SOLUTION INTRAVENOUS EVERY 10 MIN PRN
Status: DISCONTINUED | OUTPATIENT
Start: 2018-03-08 | End: 2018-03-09 | Stop reason: HOSPADM

## 2018-03-08 RX ORDER — OXYCODONE HYDROCHLORIDE AND ACETAMINOPHEN 5; 325 MG/1; MG/1
2 TABLET ORAL PRN
Status: ACTIVE | OUTPATIENT
Start: 2018-03-08 | End: 2018-03-08

## 2018-03-08 RX ORDER — MORPHINE SULFATE 10 MG/ML
2 INJECTION, SOLUTION INTRAMUSCULAR; INTRAVENOUS EVERY 5 MIN PRN
Status: DISCONTINUED | OUTPATIENT
Start: 2018-03-08 | End: 2018-03-09 | Stop reason: HOSPADM

## 2018-03-08 RX ORDER — SODIUM CHLORIDE, SODIUM LACTATE, POTASSIUM CHLORIDE, CALCIUM CHLORIDE 600; 310; 30; 20 MG/100ML; MG/100ML; MG/100ML; MG/100ML
INJECTION, SOLUTION INTRAVENOUS CONTINUOUS
Status: DISCONTINUED | OUTPATIENT
Start: 2018-03-08 | End: 2018-03-09 | Stop reason: HOSPADM

## 2018-03-08 RX ORDER — OXYCODONE HYDROCHLORIDE AND ACETAMINOPHEN 5; 325 MG/1; MG/1
1 TABLET ORAL PRN
Status: ACTIVE | OUTPATIENT
Start: 2018-03-08 | End: 2018-03-08

## 2018-03-08 RX ORDER — TOBRAMYCIN AND DEXAMETHASONE 3; 1 MG/ML; MG/ML
1 SUSPENSION/ DROPS OPHTHALMIC CONTINUOUS
Status: DISCONTINUED | OUTPATIENT
Start: 2018-03-08 | End: 2018-03-09 | Stop reason: HOSPADM

## 2018-03-08 RX ORDER — DIPHENHYDRAMINE HYDROCHLORIDE 50 MG/ML
12.5 INJECTION INTRAMUSCULAR; INTRAVENOUS
Status: ACTIVE | OUTPATIENT
Start: 2018-03-08 | End: 2018-03-08

## 2018-03-08 RX ORDER — SODIUM CHLORIDE 0.9 % (FLUSH) 0.9 %
10 SYRINGE (ML) INJECTION EVERY 12 HOURS SCHEDULED
Status: DISCONTINUED | OUTPATIENT
Start: 2018-03-08 | End: 2018-03-09 | Stop reason: HOSPADM

## 2018-03-08 RX ORDER — PREDNISOLONE ACETATE 10 MG/ML
1 SUSPENSION/ DROPS OPHTHALMIC CONTINUOUS
Status: DISCONTINUED | OUTPATIENT
Start: 2018-03-08 | End: 2018-03-09 | Stop reason: HOSPADM

## 2018-03-08 RX ORDER — HYDROMORPHONE HCL 110MG/55ML
0.5 PATIENT CONTROLLED ANALGESIA SYRINGE INTRAVENOUS EVERY 5 MIN PRN
Status: DISCONTINUED | OUTPATIENT
Start: 2018-03-08 | End: 2018-03-09 | Stop reason: HOSPADM

## 2018-03-08 RX ORDER — ONDANSETRON 2 MG/ML
4 INJECTION INTRAMUSCULAR; INTRAVENOUS
Status: ACTIVE | OUTPATIENT
Start: 2018-03-08 | End: 2018-03-08

## 2018-03-08 RX ORDER — MORPHINE SULFATE 4 MG/ML
1 INJECTION, SOLUTION INTRAMUSCULAR; INTRAVENOUS EVERY 5 MIN PRN
Status: DISCONTINUED | OUTPATIENT
Start: 2018-03-08 | End: 2018-03-09 | Stop reason: HOSPADM

## 2018-03-08 RX ORDER — MEPERIDINE HYDROCHLORIDE 25 MG/ML
12.5 INJECTION INTRAMUSCULAR; INTRAVENOUS; SUBCUTANEOUS EVERY 5 MIN PRN
Status: DISCONTINUED | OUTPATIENT
Start: 2018-03-08 | End: 2018-03-09 | Stop reason: HOSPADM

## 2018-03-08 RX ORDER — HYDROMORPHONE HCL 110MG/55ML
0.25 PATIENT CONTROLLED ANALGESIA SYRINGE INTRAVENOUS EVERY 5 MIN PRN
Status: DISCONTINUED | OUTPATIENT
Start: 2018-03-08 | End: 2018-03-09 | Stop reason: HOSPADM

## 2018-03-08 RX ADMIN — SODIUM CHLORIDE, SODIUM LACTATE, POTASSIUM CHLORIDE, CALCIUM CHLORIDE: 600; 310; 30; 20 INJECTION, SOLUTION INTRAVENOUS at 07:29

## 2018-03-08 ASSESSMENT — PAIN SCALES - GENERAL
PAINLEVEL_OUTOF10: 0
PAINLEVEL_OUTOF10: 0

## 2018-03-08 ASSESSMENT — PAIN - FUNCTIONAL ASSESSMENT: PAIN_FUNCTIONAL_ASSESSMENT: 0-10

## 2018-04-05 ENCOUNTER — HOSPITAL ENCOUNTER (OUTPATIENT)
Dept: SURGERY | Age: 63
Discharge: HOME | End: 2018-04-05
Attending: OPHTHALMOLOGY | Admitting: OPHTHALMOLOGY

## 2018-04-05 VITALS
WEIGHT: 184 LBS | DIASTOLIC BLOOD PRESSURE: 76 MMHG | HEART RATE: 66 BPM | RESPIRATION RATE: 20 BRPM | BODY MASS INDEX: 32.6 KG/M2 | SYSTOLIC BLOOD PRESSURE: 169 MMHG | TEMPERATURE: 97.8 F | HEIGHT: 63 IN | OXYGEN SATURATION: 99 %

## 2018-04-05 DIAGNOSIS — R19.7 DIARRHEA: ICD-10-CM

## 2018-04-05 RX ORDER — SODIUM CHLORIDE, SODIUM LACTATE, POTASSIUM CHLORIDE, CALCIUM CHLORIDE 600; 310; 30; 20 MG/100ML; MG/100ML; MG/100ML; MG/100ML
INJECTION, SOLUTION INTRAVENOUS
Status: DISPENSED
Start: 2018-04-05 | End: 2018-04-05

## 2018-04-05 RX ORDER — OXYCODONE HYDROCHLORIDE AND ACETAMINOPHEN 5; 325 MG/1; MG/1
2 TABLET ORAL PRN
Status: ACTIVE | OUTPATIENT
Start: 2018-04-05 | End: 2018-04-05

## 2018-04-05 RX ORDER — FENTANYL CITRATE 50 UG/ML
25 INJECTION, SOLUTION INTRAMUSCULAR; INTRAVENOUS EVERY 5 MIN PRN
Status: DISCONTINUED | OUTPATIENT
Start: 2018-04-05 | End: 2018-04-06 | Stop reason: HOSPADM

## 2018-04-05 RX ORDER — HYDRALAZINE HYDROCHLORIDE 20 MG/ML
5 INJECTION INTRAMUSCULAR; INTRAVENOUS EVERY 10 MIN PRN
Status: DISCONTINUED | OUTPATIENT
Start: 2018-04-05 | End: 2018-04-06 | Stop reason: HOSPADM

## 2018-04-05 RX ORDER — SODIUM CHLORIDE, SODIUM LACTATE, POTASSIUM CHLORIDE, CALCIUM CHLORIDE 600; 310; 30; 20 MG/100ML; MG/100ML; MG/100ML; MG/100ML
INJECTION, SOLUTION INTRAVENOUS CONTINUOUS
Status: DISCONTINUED | OUTPATIENT
Start: 2018-04-05 | End: 2018-04-06 | Stop reason: HOSPADM

## 2018-04-05 RX ORDER — TOBRAMYCIN AND DEXAMETHASONE 3; 1 MG/ML; MG/ML
1 SUSPENSION/ DROPS OPHTHALMIC CONTINUOUS
Status: DISCONTINUED | OUTPATIENT
Start: 2018-04-05 | End: 2018-04-06 | Stop reason: HOSPADM

## 2018-04-05 RX ORDER — ONDANSETRON 2 MG/ML
4 INJECTION INTRAMUSCULAR; INTRAVENOUS
Status: ACTIVE | OUTPATIENT
Start: 2018-04-05 | End: 2018-04-05

## 2018-04-05 RX ORDER — PREDNISOLONE ACETATE 10 MG/ML
1 SUSPENSION/ DROPS OPHTHALMIC CONTINUOUS
Status: DISCONTINUED | OUTPATIENT
Start: 2018-04-05 | End: 2018-04-06 | Stop reason: HOSPADM

## 2018-04-05 RX ORDER — OXYCODONE HYDROCHLORIDE AND ACETAMINOPHEN 5; 325 MG/1; MG/1
1 TABLET ORAL PRN
Status: ACTIVE | OUTPATIENT
Start: 2018-04-05 | End: 2018-04-05

## 2018-04-05 RX ORDER — MEPERIDINE HYDROCHLORIDE 25 MG/ML
12.5 INJECTION INTRAMUSCULAR; INTRAVENOUS; SUBCUTANEOUS EVERY 5 MIN PRN
Status: DISCONTINUED | OUTPATIENT
Start: 2018-04-05 | End: 2018-04-06 | Stop reason: HOSPADM

## 2018-04-05 RX ORDER — LIDOCAINE HYDROCHLORIDE 10 MG/ML
INJECTION, SOLUTION EPIDURAL; INFILTRATION; INTRACAUDAL; PERINEURAL
Status: COMPLETED
Start: 2018-04-05 | End: 2018-04-05

## 2018-04-05 RX ADMIN — LIDOCAINE HYDROCHLORIDE 0.1 ML: 10 INJECTION, SOLUTION EPIDURAL; INFILTRATION; INTRACAUDAL; PERINEURAL at 06:27

## 2018-04-05 RX ADMIN — TOBRAMYCIN AND DEXAMETHASONE 1 DROP: 3; 1 SUSPENSION/ DROPS OPHTHALMIC at 08:45

## 2018-04-05 ASSESSMENT — PAIN - FUNCTIONAL ASSESSMENT: PAIN_FUNCTIONAL_ASSESSMENT: 0-10

## 2018-04-05 ASSESSMENT — PAIN SCALES - GENERAL
PAINLEVEL_OUTOF10: 0
PAINLEVEL_OUTOF10: 0

## 2018-04-13 ENCOUNTER — TELEPHONE (OUTPATIENT)
Dept: ORTHOPEDIC SURGERY | Age: 63
End: 2018-04-13

## 2018-04-25 ENCOUNTER — HOSPITAL ENCOUNTER (OUTPATIENT)
Dept: OTHER | Age: 63
Discharge: HOME | End: 2018-04-25
Attending: ORTHOPAEDIC SURGERY | Admitting: ORTHOPAEDIC SURGERY

## 2018-04-25 ENCOUNTER — OFFICE VISIT (OUTPATIENT)
Dept: NEUROLOGY | Age: 63
End: 2018-04-25

## 2018-04-25 VITALS
HEIGHT: 61 IN | OXYGEN SATURATION: 96 % | HEART RATE: 82 BPM | BODY MASS INDEX: 34.74 KG/M2 | DIASTOLIC BLOOD PRESSURE: 71 MMHG | WEIGHT: 184 LBS | SYSTOLIC BLOOD PRESSURE: 116 MMHG

## 2018-04-25 DIAGNOSIS — F51.01 PRIMARY INSOMNIA: ICD-10-CM

## 2018-04-25 DIAGNOSIS — G25.0 ESSENTIAL TREMOR: Primary | ICD-10-CM

## 2018-04-25 DIAGNOSIS — F34.1 DYSTHYMIA: ICD-10-CM

## 2018-04-25 DIAGNOSIS — G31.84 MCI (MILD COGNITIVE IMPAIRMENT) WITH MEMORY LOSS: ICD-10-CM

## 2018-04-25 DIAGNOSIS — E78.5 DYSLIPIDEMIA: ICD-10-CM

## 2018-04-25 DIAGNOSIS — G89.29 CHRONIC BILATERAL LOW BACK PAIN WITHOUT SCIATICA: ICD-10-CM

## 2018-04-25 DIAGNOSIS — M54.50 CHRONIC BILATERAL LOW BACK PAIN WITHOUT SCIATICA: ICD-10-CM

## 2018-04-25 LAB
ABO/RH: NORMAL
ANION GAP SERPL CALCULATED.3IONS-SCNC: 15 MMOL/L (ref 3–16)
ANTIBODY SCREEN: NORMAL
APTT: 26.8 SEC (ref 24.1–34.9)
BASOPHILS ABSOLUTE: 0 K/UL (ref 0–0.2)
BASOPHILS RELATIVE PERCENT: 0.7 %
BUN BLDV-MCNC: 18 MG/DL (ref 7–20)
CALCIUM SERPL-MCNC: 8.5 MG/DL (ref 8.3–10.6)
CHLORIDE BLD-SCNC: 110 MMOL/L (ref 99–110)
CO2: 17 MMOL/L (ref 21–32)
CREAT SERPL-MCNC: 0.6 MG/DL (ref 0.6–1.2)
EKG ATRIAL RATE: 120 BPM
EKG DIAGNOSIS: NORMAL
EKG P AXIS: 60 DEGREES
EKG P-R INTERVAL: 130 MS
EKG Q-T INTERVAL: 316 MS
EKG QRS DURATION: 82 MS
EKG QTC CALCULATION (BAZETT): 446 MS
EKG R AXIS: 146 DEGREES
EKG T AXIS: 59 DEGREES
EKG VENTRICULAR RATE: 120 BPM
EOSINOPHILS ABSOLUTE: 0.1 K/UL (ref 0–0.6)
EOSINOPHILS RELATIVE PERCENT: 2.2 %
GFR AFRICAN AMERICAN: >60
GFR NON-AFRICAN AMERICAN: >60
GLUCOSE BLD-MCNC: 126 MG/DL (ref 70–99)
HCT VFR BLD CALC: 42 % (ref 36–48)
HEMOGLOBIN: 13.7 G/DL (ref 12–16)
INR BLD: 0.91 (ref 0.85–1.15)
LYMPHOCYTES ABSOLUTE: 2.1 K/UL (ref 1–5.1)
LYMPHOCYTES RELATIVE PERCENT: 31.4 %
MCH RBC QN AUTO: 32.1 PG (ref 26–34)
MCHC RBC AUTO-ENTMCNC: 32.8 G/DL (ref 31–36)
MCV RBC AUTO: 98.1 FL (ref 80–100)
MONOCYTES ABSOLUTE: 0.4 K/UL (ref 0–1.3)
MONOCYTES RELATIVE PERCENT: 6.4 %
NEUTROPHILS ABSOLUTE: 3.9 K/UL (ref 1.7–7.7)
NEUTROPHILS RELATIVE PERCENT: 59.3 %
PDW BLD-RTO: 15.2 % (ref 12.4–15.4)
PLATELET # BLD: 200 K/UL (ref 135–450)
PMV BLD AUTO: 9.1 FL (ref 5–10.5)
POTASSIUM SERPL-SCNC: 3.5 MMOL/L (ref 3.5–5.1)
PROTHROMBIN TIME: 10.3 SEC (ref 9.6–13)
RBC # BLD: 4.28 M/UL (ref 4–5.2)
SODIUM BLD-SCNC: 142 MMOL/L (ref 136–145)
WBC # BLD: 6.6 K/UL (ref 4–11)

## 2018-04-25 PROCEDURE — 99214 OFFICE O/P EST MOD 30 MIN: CPT | Performed by: PSYCHIATRY & NEUROLOGY

## 2018-04-25 PROCEDURE — 93010 ELECTROCARDIOGRAM REPORT: CPT | Performed by: INTERNAL MEDICINE

## 2018-04-26 LAB — URINE CULTURE, ROUTINE: NORMAL

## 2018-05-02 ENCOUNTER — OFFICE VISIT (OUTPATIENT)
Dept: PULMONOLOGY | Age: 63
End: 2018-05-02

## 2018-05-02 VITALS
DIASTOLIC BLOOD PRESSURE: 73 MMHG | SYSTOLIC BLOOD PRESSURE: 121 MMHG | WEIGHT: 194 LBS | HEIGHT: 61 IN | RESPIRATION RATE: 16 BRPM | HEART RATE: 82 BPM | TEMPERATURE: 97.4 F | OXYGEN SATURATION: 97 % | BODY MASS INDEX: 36.63 KG/M2

## 2018-05-02 DIAGNOSIS — G47.33 OBSTRUCTIVE SLEEP APNEA: Primary | ICD-10-CM

## 2018-05-02 DIAGNOSIS — E66.9 OBESITY (BMI 30-39.9): ICD-10-CM

## 2018-05-02 DIAGNOSIS — G47.10 HYPERSOMNIA: ICD-10-CM

## 2018-05-02 PROCEDURE — 99214 OFFICE O/P EST MOD 30 MIN: CPT | Performed by: NURSE PRACTITIONER

## 2018-05-02 RX ORDER — CEPHALEXIN 500 MG/1
500 CAPSULE ORAL
COMMUNITY
Start: 2018-03-26 | End: 2018-05-10

## 2018-05-02 ASSESSMENT — SLEEP AND FATIGUE QUESTIONNAIRES
HOW LIKELY ARE YOU TO NOD OFF OR FALL ASLEEP WHILE LYING DOWN TO REST IN THE AFTERNOON WHEN CIRCUMSTANCES PERMIT: 3
ESS TOTAL SCORE: 10
HOW LIKELY ARE YOU TO NOD OFF OR FALL ASLEEP WHILE SITTING AND READING: 2
NECK CIRCUMFERENCE (INCHES): 11
HOW LIKELY ARE YOU TO NOD OFF OR FALL ASLEEP WHILE SITTING AND TALKING TO SOMEONE: 0
HOW LIKELY ARE YOU TO NOD OFF OR FALL ASLEEP WHILE SITTING INACTIVE IN A PUBLIC PLACE: 0
HOW LIKELY ARE YOU TO NOD OFF OR FALL ASLEEP WHILE SITTING QUIETLY AFTER LUNCH WITHOUT ALCOHOL: 3
HOW LIKELY ARE YOU TO NOD OFF OR FALL ASLEEP IN A CAR, WHILE STOPPED FOR A FEW MINUTES IN TRAFFIC: 0
HOW LIKELY ARE YOU TO NOD OFF OR FALL ASLEEP WHEN YOU ARE A PASSENGER IN A CAR FOR AN HOUR WITHOUT A BREAK: 0
HOW LIKELY ARE YOU TO NOD OFF OR FALL ASLEEP WHILE WATCHING TV: 2

## 2018-05-09 PROBLEM — M17.12 PRIMARY OSTEOARTHRITIS OF LEFT KNEE: Status: ACTIVE | Noted: 2018-05-09

## 2018-05-09 PROBLEM — M17.11 OSTEOARTHRITIS OF RIGHT KNEE: Status: ACTIVE | Noted: 2018-05-09

## 2018-05-25 ENCOUNTER — OFFICE VISIT (OUTPATIENT)
Dept: ORTHOPEDIC SURGERY | Age: 63
End: 2018-05-25

## 2018-05-25 VITALS
WEIGHT: 209 LBS | HEIGHT: 61 IN | DIASTOLIC BLOOD PRESSURE: 74 MMHG | BODY MASS INDEX: 39.46 KG/M2 | SYSTOLIC BLOOD PRESSURE: 118 MMHG | HEART RATE: 64 BPM

## 2018-05-25 DIAGNOSIS — M17.10 LOCALIZED OSTEOARTHROSIS, LOWER LEG: Primary | ICD-10-CM

## 2018-05-25 PROCEDURE — 99024 POSTOP FOLLOW-UP VISIT: CPT | Performed by: ORTHOPAEDIC SURGERY

## 2018-05-25 RX ORDER — ATORVASTATIN CALCIUM 20 MG/1
TABLET, FILM COATED ORAL
COMMUNITY
Start: 2018-01-09 | End: 2018-07-12 | Stop reason: SDUPTHER

## 2018-05-25 RX ORDER — PREDNISONE 10 MG/1
10 TABLET ORAL
COMMUNITY
Start: 2018-04-06 | End: 2018-07-12 | Stop reason: ALTCHOICE

## 2018-05-25 RX ORDER — PREDNISONE 10 MG/1
TABLET ORAL
COMMUNITY
Start: 2018-03-21 | End: 2018-07-12 | Stop reason: ALTCHOICE

## 2018-05-25 RX ORDER — OXYCODONE AND ACETAMINOPHEN 7.5; 325 MG/1; MG/1
1 TABLET ORAL EVERY 6 HOURS PRN
Status: ON HOLD | COMMUNITY
Start: 2018-01-16 | End: 2019-05-24

## 2018-05-25 RX ORDER — POTASSIUM CHLORIDE 1500 MG/1
20 TABLET, EXTENDED RELEASE ORAL 2 TIMES DAILY
COMMUNITY
Start: 2018-03-19

## 2018-06-04 ENCOUNTER — OFFICE VISIT (OUTPATIENT)
Dept: ORTHOPEDIC SURGERY | Age: 63
End: 2018-06-04

## 2018-06-04 VITALS
HEIGHT: 61 IN | BODY MASS INDEX: 39.46 KG/M2 | WEIGHT: 209 LBS | DIASTOLIC BLOOD PRESSURE: 87 MMHG | SYSTOLIC BLOOD PRESSURE: 121 MMHG | HEART RATE: 80 BPM

## 2018-06-04 DIAGNOSIS — M17.10 LOCALIZED OSTEOARTHROSIS, LOWER LEG: Primary | ICD-10-CM

## 2018-06-04 PROCEDURE — L1832 KO ADJ JNT POS R SUP PRE CST: HCPCS | Performed by: ORTHOPAEDIC SURGERY

## 2018-06-04 PROCEDURE — 99024 POSTOP FOLLOW-UP VISIT: CPT | Performed by: ORTHOPAEDIC SURGERY

## 2018-06-05 ENCOUNTER — TELEPHONE (OUTPATIENT)
Dept: ORTHOPEDIC SURGERY | Age: 63
End: 2018-06-05

## 2018-06-06 ENCOUNTER — HOSPITAL ENCOUNTER (OUTPATIENT)
Dept: PHYSICAL THERAPY | Age: 63
Discharge: HOME | End: 2018-06-30
Admitting: ORTHOPAEDIC SURGERY

## 2018-06-06 DIAGNOSIS — R19.7 DIARRHEA: ICD-10-CM

## 2018-06-06 NOTE — FLOWSHEET NOTE
re-education                                                                Therapeutic Exercise and NMR EXR  [] (92433) Provided verbal/tactile cueing for activities related to strengthening, flexibility, endurance, ROM for improvements in LE, proximal hip, and core control with self care, mobility, lifting, ambulation.  [] (60715) Provided verbal/tactile cueing for activities related to improving balance, coordination, kinesthetic sense, posture, motor skill, proprioception  to assist with LE, proximal hip, and core control in self care, mobility, lifting, ambulation and eccentric single leg control.      NMR and Therapeutic Activities:    [] (33664 or 79253) Provided verbal/tactile cueing for activities related to improving balance, coordination, kinesthetic sense, posture, motor skill, proprioception and motor activation to allow for proper function of core, proximal hip and LE with self care and ADLs  [] (39885) Gait Re-education- Provided training and instruction to the patient for proper LE, core and proximal hip recruitment and positioning and eccentric body weight control with ambulation re-education including up and down stairs     Home Exercise Program:    [x] (54948) Reviewed/Progressed HEP activities related to strengthening, flexibility, endurance, ROM of core, proximal hip and LE for functional self-care, mobility, lifting and ambulation/stair navigation   [] (87482)Reviewed/Progressed HEP activities related to improving balance, coordination, kinesthetic sense, posture, motor skill, proprioception of core, proximal hip and LE for self care, mobility, lifting, and ambulation/stair navigation      Manual Treatments:  PROM / STM / Oscillations-Mobs:  G-I, II, III, IV (PA's, Inf., Post.)  [] (31191) Provided manual therapy to mobilize LE, proximal hip and/or LS spine soft tissue/joints for the purpose of modulating pain, promoting relaxation,  increasing ROM, reducing/eliminating soft tissue ASSESSMENT:  See eval    Treatment/Activity Tolerance:  [] Patient tolerated treatment well [] Patient limited by fatique  [] Patient limited by pain  [] Patient limited by other medical complications  [] Other:     Prognosis: [] Good [] Fair  [] Poor    Patient Requires Follow-up: [x] Yes  [] No    PLAN: See eval  [] Continue per plan of care [] Alter current plan (see comments)  [x] Plan of care initiated [] Hold pending MD visit [] Discharge    Electronically signed by: Keisha Hernandez PT

## 2018-06-06 NOTE — PLAN OF CARE
[facility]  Orthopaedics and Sports Rehabilitation, [place] [address] [place], [address] [facility] [address]  Phone: [phone]   Fax:     [phone]       Physical Therapy Certification    Dear Referring Practitioner: Yue Coker,    We had the pleasure of evaluating the following patient for physical therapy services at [address] [place] [address]. A summary of our findings can be found in the initial assessment below. This includes our plan of care. If you have any questions or concerns regarding these findings, please do not hesitate to contact me at the office phone number checked above. Thank you for the referral.       Physician Signature:_______________________________Date:__________________  By signing above (or electronic signature), therapists plan is approved by physician      Patient: Noel Pierce   : 1955   MRN: [id number]  Referring Physician: Referring Practitioner: VÍCTOR      Evaluation Date: 2018      Medical Diagnosis Information:  Diagnosis: PT: L TKR (18)   Treatment Diagnosis: L knee pain secondary to TKR M17.0                                         Insurance information: PT Insurance Information: [employer]     Precautions/ Contra-indications: PACEMAKER  Latex Allergy:  [x]NO      []YES  Preferred Language for Healthcare:   [x]English       []other:    SUBJECTIVE: Patient stated complaint:Patient had patellofemoral replacement , reports she never had full relief and decided to undergo full TKR. Patient underwent R TKR 18. Patient went to [facility] for almost 3 weeks. SNF wanted to do home PT and was denied by insurance, so here for outpatient therapy. Patient in T-ROM brace unlocked 0-100 degrees. Reports she was swollen with increased redness/ pain in shin last night- told MA who told PT to check for blood clot concern.     Relevant Medical History:PACEMAKER, R knee OA  Functional Disability Index:PT G-Codes  Functional Assessment Tool Used: LEFS  Score: 95% disability  Functional Limitation: Mobility: Walking and moving around  Mobility: Walking and Moving Around Current Status (): At least 80 percent but less than 100 percent impaired, limited or restricted  Mobility: Walking and Moving Around Goal Status (): At least 20 percent but less than 40 percent impaired, limited or restricted    Pain Scale: 8/10  Easing factors: rest, ice, medication  Provocative factors: standing, bending, walking, swelling at end of day     Type: [x]Constant   []Intermittent  []Radiating [x]Localized []other:     Numbness/Tingling: none. Redness in anterior shin/ foot    Occupation/School:Disabled    Living Status/Prior Level of Function: Independent with ADLs and IADLs, bending knee. Lives alone. No stairs in home. Daughter helps with dressing, bathing at this time. OBJECTIVE:     ROM LEFT RIGHT   HIP Flex     HIP Abd     HIP Ext     HIP IR     HIP ER     Knee ext Lacking 4 0   Knee Flex 60 A  74 P 115   Ankle PF     Ankle DF     Ankle In     Ankle Ev     Strength  LEFT RIGHT   HIP Flexors NT    HIP Abductors     HIP Ext     Hip ER     Knee EXT (quad)     Knee Flex (HS)     Ankle DF     Ankle PF     Ankle Inv     Ankle EV          Circumference  Mid apex  7 cm prox             Reflexes/Sensation:    [x]Dermatomes/Myotomes intact    [x]Reflexes equal and normal bilaterally   []Other:    Joint mobility:    []Normal    [x]Hypo   []Hyper    Palpation: tender anterior shin, lateral shin near malleolus and global tenderness at knee joint    Functional Mobility/Transfers: assistance required with leg mobility. Difficulty with sit to stand    Posture: wearing T-scope brace with wheeled walker    Bandages/Dressings/Incisions: incision closed/ healing. Redness/ mild swelling in posterior calf and L ankle.     Gait: (include devices/WB status) ambulating slow yordy, decreased TKE with wheeled walker    Orthopedic Special Tests: [place] (-). Assessed by PT,  PT Supervisor and  where it was assessed blood clot was not a concern at this time. Patient instructed to monitor and call if symptoms change                       [x] Patient history, allergies, meds reviewed. Medical chart reviewed. See intake form. Review Of Systems (ROS):  [x]Performed Review of systems (Integumentary, CardioPulmonary, Neurological) by intake and observation. Intake form has been scanned into medical record. Patient has been instructed to contact their primary care physician regarding ROS issues if not already being addressed at this time.       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   [x]Prior surgical intervention  []Osteoporosis (M81.8)  []Osteopenia (M85.8)   Endocrine conditions   []Hypothyroid (E03.9)  []Hyperthyroid Gastrointestinal conditions   []Constipation (H34.29)   Metabolic conditions   []Morbid obesity (E66.01)  []Diabetes type 1(E10.65) or 2 (E11.65)   []Neuropathy (G60.9)     Pulmonary conditions   []Asthma (J45)  []Coughing   []COPD (J44.9)   Psychological Disorders  [x]Anxiety (F41.9)  [x]Depression (F32.9)   []Other:   []Other:          Barriers to/and or personal factors that will affect rehab potential:              []Age  []Sex              []Motivation/Lack of Motivation                        [x]Co-Morbidities              []Cognitive Function, education/learning barriers              []Environmental, home barriers              []profession/work barriers  [x]past PT/medical experience  []other:  Justification: Previous partial TKR last year without

## 2018-06-08 ENCOUNTER — HOSPITAL ENCOUNTER (OUTPATIENT)
Dept: PHYSICAL THERAPY | Age: 63
Discharge: HOME | End: 2018-06-09
Admitting: ORTHOPAEDIC SURGERY

## 2018-06-08 DIAGNOSIS — R19.7 DIARRHEA: ICD-10-CM

## 2018-06-12 ENCOUNTER — HOSPITAL ENCOUNTER (OUTPATIENT)
Dept: PHYSICAL THERAPY | Age: 63
Discharge: HOME | End: 2018-06-13
Admitting: ORTHOPAEDIC SURGERY

## 2018-06-12 DIAGNOSIS — R19.7 DIARRHEA: ICD-10-CM

## 2018-06-15 ENCOUNTER — HOSPITAL ENCOUNTER (OUTPATIENT)
Dept: PHYSICAL THERAPY | Age: 63
Discharge: HOME | End: 2018-06-16
Admitting: ORTHOPAEDIC SURGERY

## 2018-06-15 DIAGNOSIS — R19.7 DIARRHEA: ICD-10-CM

## 2018-06-20 ENCOUNTER — HOSPITAL ENCOUNTER (OUTPATIENT)
Dept: PHYSICAL THERAPY | Age: 63
Discharge: HOME | End: 2018-06-21
Admitting: ORTHOPAEDIC SURGERY

## 2018-06-20 DIAGNOSIS — R19.7 DIARRHEA: ICD-10-CM

## 2018-06-21 ENCOUNTER — TELEPHONE (OUTPATIENT)
Dept: PULMONOLOGY | Age: 63
End: 2018-06-21

## 2018-06-22 ENCOUNTER — OFFICE VISIT (OUTPATIENT)
Dept: ORTHOPEDIC SURGERY | Age: 63
End: 2018-06-22

## 2018-06-22 ENCOUNTER — HOSPITAL ENCOUNTER (OUTPATIENT)
Dept: PHYSICAL THERAPY | Age: 63
Discharge: HOME | End: 2018-06-23
Admitting: ORTHOPAEDIC SURGERY

## 2018-06-22 VITALS
DIASTOLIC BLOOD PRESSURE: 82 MMHG | WEIGHT: 209 LBS | BODY MASS INDEX: 39.46 KG/M2 | HEIGHT: 61 IN | HEART RATE: 76 BPM | SYSTOLIC BLOOD PRESSURE: 124 MMHG

## 2018-06-22 DIAGNOSIS — R19.7 DIARRHEA: ICD-10-CM

## 2018-06-22 DIAGNOSIS — M17.10 LOCALIZED OSTEOARTHROSIS, LOWER LEG: Primary | ICD-10-CM

## 2018-06-22 PROCEDURE — 99024 POSTOP FOLLOW-UP VISIT: CPT | Performed by: ORTHOPAEDIC SURGERY

## 2018-06-23 DIAGNOSIS — G31.84 MILD COGNITIVE IMPAIRMENT: ICD-10-CM

## 2018-06-25 ENCOUNTER — HOSPITAL ENCOUNTER (OUTPATIENT)
Dept: PHYSICAL THERAPY | Age: 63
Discharge: HOME | End: 2018-06-26
Admitting: ORTHOPAEDIC SURGERY

## 2018-06-25 DIAGNOSIS — R19.7 DIARRHEA: ICD-10-CM

## 2018-06-25 RX ORDER — DONEPEZIL HYDROCHLORIDE 10 MG/1
10 TABLET, FILM COATED ORAL NIGHTLY
Qty: 90 TABLET | Refills: 2 | Status: SHIPPED | OUTPATIENT
Start: 2018-06-25 | End: 2019-02-11 | Stop reason: SDUPTHER

## 2018-06-28 ENCOUNTER — HOSPITAL ENCOUNTER (OUTPATIENT)
Dept: PHYSICAL THERAPY | Age: 63
Discharge: HOME | End: 2018-06-29
Admitting: ORTHOPAEDIC SURGERY

## 2018-06-28 DIAGNOSIS — R19.7 DIARRHEA: ICD-10-CM

## 2018-07-01 ENCOUNTER — HOSPITAL ENCOUNTER (OUTPATIENT)
Dept: PHYSICAL THERAPY | Age: 63
Discharge: HOME | End: 2018-07-01
Attending: ORTHOPAEDIC SURGERY | Admitting: ORTHOPAEDIC SURGERY

## 2018-07-02 ENCOUNTER — HOSPITAL ENCOUNTER (OUTPATIENT)
Dept: PHYSICAL THERAPY | Age: 63
Discharge: HOME | End: 2018-07-03
Admitting: ORTHOPAEDIC SURGERY

## 2018-07-02 DIAGNOSIS — R19.7 DIARRHEA: ICD-10-CM

## 2018-07-02 NOTE — FLOWSHEET NOTE
[facility]    Physical Therapy Daily Treatment Note  Date:  2018    Patient Name:  Luis Ch    :  1955  MRN: [id number]  Restrictions/Precautions:    Medical/Treatment Diagnosis Information:  · Diagnosis: PT: L TKR (18)  · Treatment Diagnosis: L knee pain secondary to TKR E25.8  Insurance/Certification information:  PT Insurance Information: A[employer]  Physician Information:  Referring Practitioner: Christoph Barboza  Plan of care signed (Y/N):     Date of Patient follow up with Physician:     G-Code (if applicable):      Date G-Code Applied:         Progress Note: [x]  Yes  []  No  Next due by: Visit #10       Latex Allergy:  [x]NO      []YES  Preferred Language for Healthcare:   [x]English       []other:    Visit # Insurance Allowable   9 Med nec     Pain level:  6/10     SUBJECTIVE: Going to miss a couple of days of PT with separate heart surgical procedure. Worried her ROM may decrease. States she found out her difficulty seeing was related to her increase in Lyrica, patient states she has lowered her dose which has increased pain/ burning into LLE.           OBJECTIVE:   Observation:   Test measurements:  L knee flex: 112 degrees in  EZ S, knee ext 0     RESTRICTIONS/PRECAUTIONS: ++PACEMAKER++    Exercises/Interventions:     Therapeutic Ex Sets/sec Notes HEP   Belt stretch  Seated HSS 30\"x3  30\"x3     Heel prop 1'     Heel slides  Seated P Flex Review  x10     QS  SLR 10x10 s  3x10       LAQ  HS curls x30 3#  x30 red      HRs  Standing ABD x30  x25 L     EZ stretch 15' Flex/ Ext/ Flex    Bike 5' ROM     Step Ups  Tandem balance 4\"x15  x10   SBA                                              Manual Intervention      AIMEE Brock for flex/ ext 8'                                   NMR re-education                                                                Therapeutic Exercise and NMR EXR  [] (20309) Provided verbal/tactile cueing for activities related to strengthening, flexibility, endurance, ROM for improvements in LE, proximal hip, and core control with self care, mobility, lifting, ambulation.  [] (00499) Provided verbal/tactile cueing for activities related to improving balance, coordination, kinesthetic sense, posture, motor skill, proprioception  to assist with LE, proximal hip, and core control in self care, mobility, lifting, ambulation and eccentric single leg control. NMR and Therapeutic Activities:    [] (70391 or 98961) Provided verbal/tactile cueing for activities related to improving balance, coordination, kinesthetic sense, posture, motor skill, proprioception and motor activation to allow for proper function of core, proximal hip and LE with self care and ADLs  [] (46722) Gait Re-education- Provided training and instruction to the patient for proper LE, core and proximal hip recruitment and positioning and eccentric body weight control with ambulation re-education including up and down stairs     Home Exercise Program:    [x] (61122) Reviewed/Progressed HEP activities related to strengthening, flexibility, endurance, ROM of core, proximal hip and LE for functional self-care, mobility, lifting and ambulation/stair navigation   [] (85922)Reviewed/Progressed HEP activities related to improving balance, coordination, kinesthetic sense, posture, motor skill, proprioception of core, proximal hip and LE for self care, mobility, lifting, and ambulation/stair navigation      Manual Treatments:  PROM / STM / Oscillations-Mobs:  G-I, II, III, IV (PA's, Inf., Post.)  [] (88166) Provided manual therapy to mobilize LE, proximal hip and/or LS spine soft tissue/joints for the purpose of modulating pain, promoting relaxation,  increasing ROM, reducing/eliminating soft tissue swelling/inflammation/restriction, improving soft tissue extensibility and allowing for proper ROM for normal function with self care, mobility, lifting and ambulation. Modalities:  Declined    Charges:  Timed Code Treatment Minutes: 45   Total Treatment Minutes: 45     [] EVAL LOW 12070  [x] TO(85400) x  2   [] IONTO  [] NMR (73717) x      [] VASO  [x] Manual (16282) x       [] Other:  [] TA x       [] Mech Traction (20076)  [] ES(attended) (35457)      [] ES (un) (01366):      GOALS:   Patient stated goal: \"Bend knee again\"    Therapist goals for Patient:   Short Term Goals: To be achieved in: 2 weeks  1. Independent in HEP and progression per patient tolerance, in order to prevent re-injury. 2. Patient will have a decrease in pain to facilitate improvement in movement, function, and ADLs as indicated by Functional Deficits. Long Term Goals: To be achieved in: 12 weeks  1. Disability index score of 30% or less for the LEFS to assist with reaching prior level of function. 2. Patient will demonstrate increased AROM to 0-120 degrees to allow for proper joint functioning as indicated by patients Functional Deficits. 3. Patient will demonstrate an increase in Strength to good proximal hip strength and control, within 5lb HHD in LE to allow for proper functional mobility as indicated by patients Functional Deficits. 4. Patient will return to full functional activities without increased symptoms or restriction including ascend/ descend stairs with reciprocal gait. 5. Patient will demonstrate a TUG score < 12 seconds and ambualte without gait abnormality to prevent possibilities of falls in the community. Progression Towards Functional goals:  [x] Patient is progressing as expected towards functional goals listed. [] Progression is slowed due to complexities listed. [] Progression has been slowed due to co-morbidities. [] Plan just implemented, too soon to assess goals progression  [] Other:     ASSESSMENT:  Instructed to continue HEP regularly to build quad strength, verbalized understanding.     Treatment/Activity Tolerance:  [x] Patient tolerated treatment

## 2018-07-12 ENCOUNTER — HOSPITAL ENCOUNTER (OUTPATIENT)
Dept: PHYSICAL THERAPY | Age: 63
Discharge: HOME | End: 2018-07-13
Admitting: ORTHOPAEDIC SURGERY

## 2018-07-12 ENCOUNTER — HOSPITAL ENCOUNTER (INPATIENT)
Dept: MEDSURG UNIT | Age: 63
LOS: 3 days | Discharge: HOME | DRG: 872 | End: 2018-07-15
Attending: EMERGENCY MEDICINE | Admitting: INTERNAL MEDICINE
Payer: MEDICARE

## 2018-07-12 DIAGNOSIS — N39.0 URINARY TRACT INFECTION WITHOUT HEMATURIA, SITE UNSPECIFIED: Primary | ICD-10-CM

## 2018-07-12 DIAGNOSIS — N13.9 URINARY OBSTRUCTION: ICD-10-CM

## 2018-07-12 DIAGNOSIS — R19.7 DIARRHEA: ICD-10-CM

## 2018-07-12 PROBLEM — Z98.890 HISTORY OF BLADDER SURGERY: Status: ACTIVE | Noted: 2018-07-12

## 2018-07-12 PROBLEM — Z96.89 S/P PLACEMENT OF VNS (VAGUS NERVE STIMULATION) DEVICE: Status: ACTIVE | Noted: 2018-07-12

## 2018-07-12 PROBLEM — R33.9 URINARY RETENTION: Status: ACTIVE | Noted: 2018-07-12

## 2018-07-12 LAB
A/G RATIO: 1.2 (ref 1.1–2.2)
ALBUMIN SERPL-MCNC: 3.8 G/DL (ref 3.4–5)
ALP BLD-CCNC: 146 U/L (ref 40–129)
ALT SERPL-CCNC: 13 U/L (ref 10–40)
ANION GAP SERPL CALCULATED.3IONS-SCNC: 13 MMOL/L (ref 3–16)
AST SERPL-CCNC: 35 U/L (ref 15–37)
BACTERIA: ABNORMAL /HPF
BASOPHILS ABSOLUTE: 0.1 K/UL (ref 0–0.2)
BASOPHILS RELATIVE PERCENT: 1 %
BILIRUB SERPL-MCNC: <0.2 MG/DL (ref 0–1)
BILIRUBIN URINE: NEGATIVE
BLOOD, URINE: NEGATIVE
BUN BLDV-MCNC: 12 MG/DL (ref 7–20)
CALCIUM SERPL-MCNC: 9.7 MG/DL (ref 8.3–10.6)
CHLORIDE BLD-SCNC: 104 MMOL/L (ref 99–110)
CLARITY: ABNORMAL
CO2: 25 MMOL/L (ref 21–32)
COLOR: YELLOW
CREAT SERPL-MCNC: <0.5 MG/DL (ref 0.6–1.2)
EOSINOPHILS ABSOLUTE: 0.1 K/UL (ref 0–0.6)
EOSINOPHILS RELATIVE PERCENT: 1.4 %
EPITHELIAL CELLS, UA: ABNORMAL /HPF
GFR AFRICAN AMERICAN: >60
GFR NON-AFRICAN AMERICAN: >60
GLOBULIN: 3.3 G/DL
GLUCOSE BLD-MCNC: 110 MG/DL (ref 70–99)
GLUCOSE URINE: NEGATIVE MG/DL
HCT VFR BLD CALC: 41.5 % (ref 36–48)
HEMOGLOBIN: 13.8 G/DL (ref 12–16)
INR BLD: 1.04 (ref 0.86–1.14)
KETONES, URINE: ABNORMAL MG/DL
LACTIC ACID: 1.1 MMOL/L (ref 0.4–2)
LEUKOCYTE ESTERASE, URINE: ABNORMAL
LIPASE: 11 U/L (ref 13–60)
LYMPHOCYTES ABSOLUTE: 2.2 K/UL (ref 1–5.1)
LYMPHOCYTES RELATIVE PERCENT: 25.7 %
MCH RBC QN AUTO: 30.2 PG (ref 26–34)
MCHC RBC AUTO-ENTMCNC: 33.2 G/DL (ref 31–36)
MCV RBC AUTO: 90.8 FL (ref 80–100)
MICROSCOPIC EXAMINATION: YES
MONOCYTES ABSOLUTE: 0.7 K/UL (ref 0–1.3)
MONOCYTES RELATIVE PERCENT: 7.6 %
NEUTROPHILS ABSOLUTE: 5.6 K/UL (ref 1.7–7.7)
NEUTROPHILS RELATIVE PERCENT: 64.3 %
NITRITE, URINE: POSITIVE
PDW BLD-RTO: 15.4 % (ref 12.4–15.4)
PH UA: 7
PLATELET # BLD: 244 K/UL (ref 135–450)
PMV BLD AUTO: 8.8 FL (ref 5–10.5)
POTASSIUM SERPL-SCNC: 5.1 MMOL/L (ref 3.5–5.1)
PROTEIN UA: NEGATIVE MG/DL
PROTHROMBIN TIME: 11.9 SEC (ref 9.8–13)
RBC # BLD: 4.57 M/UL (ref 4–5.2)
RBC UA: ABNORMAL /HPF (ref 0–2)
SODIUM BLD-SCNC: 142 MMOL/L (ref 136–145)
SPECIFIC GRAVITY UA: 1.01
TOTAL PROTEIN: 7.1 G/DL (ref 6.4–8.2)
TROPONIN: <0.01 NG/ML
URINE REFLEX TO CULTURE: YES
URINE TYPE: ABNORMAL
UROBILINOGEN, URINE: 0.2 E.U./DL
WBC # BLD: 8.7 K/UL (ref 4–11)
WBC UA: ABNORMAL /HPF (ref 0–5)

## 2018-07-12 PROCEDURE — 96367 TX/PROPH/DG ADDL SEQ IV INF: CPT | Performed by: INTERNAL MEDICINE

## 2018-07-12 PROCEDURE — G0378 HOSPITAL OBSERVATION PER HR: HCPCS | Performed by: INTERNAL MEDICINE

## 2018-07-12 PROCEDURE — 87040 BLOOD CULTURE FOR BACTERIA: CPT

## 2018-07-12 PROCEDURE — 80053 COMPREHEN METABOLIC PANEL: CPT

## 2018-07-12 PROCEDURE — 74177 CT ABD & PELVIS W/CONTRAST: CPT

## 2018-07-12 PROCEDURE — 96365 THER/PROPH/DIAG IV INF INIT: CPT | Performed by: INTERNAL MEDICINE

## 2018-07-12 PROCEDURE — 85025 COMPLETE CBC W/AUTO DIFF WBC: CPT

## 2018-07-12 PROCEDURE — 99285 EMERGENCY DEPT VISIT HI MDM: CPT

## 2018-07-12 PROCEDURE — 87077 CULTURE AEROBIC IDENTIFY: CPT

## 2018-07-12 PROCEDURE — 96361 HYDRATE IV INFUSION ADD-ON: CPT

## 2018-07-12 PROCEDURE — 70491 CT SOFT TISSUE NECK W/DYE: CPT

## 2018-07-12 PROCEDURE — 96366 THER/PROPH/DIAG IV INF ADDON: CPT | Performed by: INTERNAL MEDICINE

## 2018-07-12 PROCEDURE — 81001 URINALYSIS AUTO W/SCOPE: CPT

## 2018-07-12 PROCEDURE — 73562 X-RAY EXAM OF KNEE 3: CPT

## 2018-07-12 PROCEDURE — 84484 ASSAY OF TROPONIN QUANT: CPT

## 2018-07-12 PROCEDURE — 87086 URINE CULTURE/COLONY COUNT: CPT

## 2018-07-12 PROCEDURE — 9990 CHARGE CONVERSION

## 2018-07-12 PROCEDURE — 83605 ASSAY OF LACTIC ACID: CPT

## 2018-07-12 PROCEDURE — 83690 ASSAY OF LIPASE: CPT

## 2018-07-12 PROCEDURE — 96372 THER/PROPH/DIAG INJ SC/IM: CPT | Performed by: INTERNAL MEDICINE

## 2018-07-12 PROCEDURE — 85610 PROTHROMBIN TIME: CPT

## 2018-07-12 PROCEDURE — 87186 SC STD MICRODIL/AGAR DIL: CPT

## 2018-07-12 PROCEDURE — 96365 THER/PROPH/DIAG IV INF INIT: CPT

## 2018-07-12 RX ORDER — CLONAZEPAM 1 MG/1
1 TABLET ORAL 3 TIMES DAILY PRN
Status: DISCONTINUED | OUTPATIENT
Start: 2018-07-12 | End: 2018-07-15 | Stop reason: HOSPADM

## 2018-07-12 RX ORDER — FLUDROCORTISONE ACETATE 0.1 MG/1
0.1 TABLET ORAL 2 TIMES DAILY
Status: DISCONTINUED | OUTPATIENT
Start: 2018-07-12 | End: 2018-07-15 | Stop reason: HOSPADM

## 2018-07-12 RX ORDER — ONDANSETRON 2 MG/ML
4 INJECTION INTRAMUSCULAR; INTRAVENOUS EVERY 6 HOURS PRN
Status: DISCONTINUED | OUTPATIENT
Start: 2018-07-12 | End: 2018-07-15 | Stop reason: HOSPADM

## 2018-07-12 RX ORDER — SODIUM CHLORIDE 0.9 % (FLUSH) 0.9 %
10 SYRINGE (ML) INJECTION PRN
Status: DISCONTINUED | OUTPATIENT
Start: 2018-07-12 | End: 2018-07-15 | Stop reason: HOSPADM

## 2018-07-12 RX ORDER — POTASSIUM CHLORIDE 20MEQ/15ML
40 LIQUID (ML) ORAL PRN
Status: DISCONTINUED | OUTPATIENT
Start: 2018-07-12 | End: 2018-07-12

## 2018-07-12 RX ORDER — 0.9 % SODIUM CHLORIDE 0.9 %
1000 INTRAVENOUS SOLUTION INTRAVENOUS ONCE
Status: COMPLETED | OUTPATIENT
Start: 2018-07-12 | End: 2018-07-12

## 2018-07-12 RX ORDER — OXYCODONE AND ACETAMINOPHEN 7.5; 325 MG/1; MG/1
1 TABLET ORAL ONCE
Status: COMPLETED | OUTPATIENT
Start: 2018-07-12 | End: 2018-07-12

## 2018-07-12 RX ORDER — DONEPEZIL HYDROCHLORIDE 5 MG/1
10 TABLET, FILM COATED ORAL NIGHTLY
Status: DISCONTINUED | OUTPATIENT
Start: 2018-07-12 | End: 2018-07-15 | Stop reason: HOSPADM

## 2018-07-12 RX ORDER — OXYCODONE AND ACETAMINOPHEN 7.5; 325 MG/1; MG/1
1 TABLET ORAL EVERY 6 HOURS PRN
Status: DISCONTINUED | OUTPATIENT
Start: 2018-07-12 | End: 2018-07-15 | Stop reason: HOSPADM

## 2018-07-12 RX ORDER — SODIUM CHLORIDE 9 MG/ML
INJECTION, SOLUTION INTRAVENOUS CONTINUOUS
Status: DISCONTINUED | OUTPATIENT
Start: 2018-07-12 | End: 2018-07-15 | Stop reason: HOSPADM

## 2018-07-12 RX ORDER — PRIMIDONE 50 MG/1
100 TABLET ORAL 2 TIMES DAILY
Status: DISCONTINUED | OUTPATIENT
Start: 2018-07-12 | End: 2018-07-15 | Stop reason: HOSPADM

## 2018-07-12 RX ORDER — SERTRALINE HYDROCHLORIDE 100 MG/1
200 TABLET, FILM COATED ORAL NIGHTLY
Status: DISCONTINUED | OUTPATIENT
Start: 2018-07-12 | End: 2018-07-15 | Stop reason: HOSPADM

## 2018-07-12 RX ORDER — POTASSIUM CHLORIDE 7.45 MG/ML
10 INJECTION INTRAVENOUS PRN
Status: DISCONTINUED | OUTPATIENT
Start: 2018-07-12 | End: 2018-07-15 | Stop reason: HOSPADM

## 2018-07-12 RX ORDER — SODIUM CHLORIDE 0.9 % (FLUSH) 0.9 %
10 SYRINGE (ML) INJECTION EVERY 12 HOURS SCHEDULED
Status: DISCONTINUED | OUTPATIENT
Start: 2018-07-12 | End: 2018-07-15 | Stop reason: HOSPADM

## 2018-07-12 RX ORDER — FLUTICASONE PROPIONATE 50 MCG
1 SPRAY, SUSPENSION (ML) NASAL NIGHTLY
Status: DISCONTINUED | OUTPATIENT
Start: 2018-07-12 | End: 2018-07-15 | Stop reason: HOSPADM

## 2018-07-12 RX ORDER — AMITRIPTYLINE HYDROCHLORIDE 25 MG/1
25 TABLET, FILM COATED ORAL NIGHTLY
Status: DISCONTINUED | OUTPATIENT
Start: 2018-07-12 | End: 2018-07-15 | Stop reason: HOSPADM

## 2018-07-12 RX ORDER — ATORVASTATIN CALCIUM 10 MG/1
20 TABLET, FILM COATED ORAL NIGHTLY
Status: DISCONTINUED | OUTPATIENT
Start: 2018-07-12 | End: 2018-07-15 | Stop reason: HOSPADM

## 2018-07-12 RX ORDER — POTASSIUM CHLORIDE 20 MEQ/1
40 TABLET, EXTENDED RELEASE ORAL PRN
Status: DISCONTINUED | OUTPATIENT
Start: 2018-07-12 | End: 2018-07-15 | Stop reason: HOSPADM

## 2018-07-12 RX ORDER — PANTOPRAZOLE SODIUM 40 MG/1
40 TABLET, DELAYED RELEASE ORAL DAILY
Status: DISCONTINUED | OUTPATIENT
Start: 2018-07-12 | End: 2018-07-15 | Stop reason: HOSPADM

## 2018-07-12 RX ORDER — FAMOTIDINE 20 MG/1
20 TABLET, FILM COATED ORAL 2 TIMES DAILY
Status: DISCONTINUED | OUTPATIENT
Start: 2018-07-12 | End: 2018-07-15 | Stop reason: HOSPADM

## 2018-07-12 RX ADMIN — Medication 1000 ML: at 11:20

## 2018-07-12 RX ADMIN — SODIUM CHLORIDE: 900 INJECTION, SOLUTION INTRAVENOUS at 18:59

## 2018-07-12 RX ADMIN — FAMOTIDINE 20 MG: 20 TABLET ORAL at 20:32

## 2018-07-12 RX ADMIN — SERTRALINE 200 MG: 100 TABLET, FILM COATED ORAL at 20:32

## 2018-07-12 RX ADMIN — FLUDROCORTISONE ACETATE 0.1 MG: 0.1 TABLET ORAL at 20:32

## 2018-07-12 RX ADMIN — PANTOPRAZOLE SODIUM 40 MG: 40 TABLET, DELAYED RELEASE ORAL at 18:59

## 2018-07-12 RX ADMIN — Medication 10 ML: at 20:31

## 2018-07-12 RX ADMIN — ATORVASTATIN CALCIUM 20 MG: 10 TABLET, FILM COATED ORAL at 20:32

## 2018-07-12 RX ADMIN — AMITRIPTYLINE HYDROCHLORIDE 25 MG: 25 TABLET, FILM COATED ORAL at 20:32

## 2018-07-12 RX ADMIN — CLONAZEPAM 1 MG: 1 TABLET ORAL at 20:32

## 2018-07-12 RX ADMIN — ENOXAPARIN SODIUM 40 MG: 40 INJECTION, SOLUTION INTRAVENOUS; SUBCUTANEOUS at 18:59

## 2018-07-12 RX ADMIN — Medication 1 SPRAY: at 20:32

## 2018-07-12 RX ADMIN — DONEPEZIL HYDROCHLORIDE 10 MG: 5 TABLET, FILM COATED ORAL at 20:32

## 2018-07-12 RX ADMIN — LEVOTHYROXINE SODIUM 75 MCG: 50 TABLET ORAL at 18:59

## 2018-07-12 RX ADMIN — SODIUM CHLORIDE: 900 INJECTION, SOLUTION INTRAVENOUS at 20:31

## 2018-07-12 RX ADMIN — Medication 1000 ML: at 14:38

## 2018-07-12 RX ADMIN — OXYCODONE AND ACETAMINOPHEN 1 TABLET: 7.5; 325 TABLET ORAL at 17:18

## 2018-07-12 RX ADMIN — PRIMIDONE 100 MG: 50 TABLET ORAL at 20:32

## 2018-07-12 ASSESSMENT — PAIN DESCRIPTION - PAIN TYPE: TYPE: ACUTE PAIN

## 2018-07-12 ASSESSMENT — PAIN DESCRIPTION - LOCATION: LOCATION: ABDOMEN

## 2018-07-12 ASSESSMENT — PAIN DESCRIPTION - DESCRIPTORS: DESCRIPTORS: SHARP

## 2018-07-12 ASSESSMENT — PAIN DESCRIPTION - FREQUENCY: FREQUENCY: INTERMITTENT

## 2018-07-12 ASSESSMENT — PAIN SCALES - GENERAL
PAINLEVEL_OUTOF10: 6
PAINLEVEL_OUTOF10: 8

## 2018-07-12 ASSESSMENT — PAIN DESCRIPTION - ORIENTATION: ORIENTATION: LEFT;LOWER

## 2018-07-12 NOTE — ED NOTES
[address] [address] [address]  07/12/18 [phone] No call back to STEPHANIE Hernandez   [facility]12/18 [facility]

## 2018-07-12 NOTE — PAYOR INFORMATION
Patient Willian Harrell:  [de-identified]  Primary AUTH/CERT:    [address] Name:   Duncan Alvarado, O, PPO  Primary Insurance Plan Name:  Jason Joseph MEDICARE PPO  Primary Insurance Group Number:  [id number]  Primary Insurance Plan Type: N  Primary Insurance Policy Number:  [id number]

## 2018-07-12 NOTE — ED PROVIDER NOTES
[address] ED  [address] [address]  Dept: [phone]  Loc: [phone]    eMERGENCY dEPARTMENT eNCOUnter      [address] [facility]    Chief Complaint   Patient presents with    Diarrhea     Diarrhea, excessive urination, chills onset yestereday. Concerned about poosible infection in knee c/o swellling, pain onset after surgery on 5/9/18. HPI    Randell Jackson is a 60 y.o. female who presents with Multiple complaints and concerns. Patient is reporting frequent urination and chills over the last 1 week. She states that she cannot get warm at home and she is packing herself in for blankets every day and she continues to have chills. She is unable to calm E if she feels feverish. She just says that she has constant chills. She does have a history of frequent urinary tract infections. She has a history of a bladder repair and she does have a stoma to her abdomen where she can do intermittent catheterizations but hasn't done any and a long time. She is reporting concerns that she may have an infection in her left knee she reports having left total knee replacement 2 weeks ago and she is high risk for infection and whenever she has an infection 8 days to her whole entire body. She also states that she is receiving Keflex 3 times a day every day consistently for the last 2 years because \"I have a bad infection in my spine. \"  The patient is also reporting an ENT procedure performed over at [facility] about a week ago. She states \"they did the spiral procedure on me. \"  Patient states that she has a history of obstructive sleep apnea and they made an incision underneath her right breast and implant to a stimulator of some sort that travels up into the abdomen total area of her neck. She denies chest pain, shortness of breath, abdominal pain, nausea or vomiting.   The chief complaint also states that she has diarrhea but the patient denies this.  She states that she has a history of loose stools and this is common for her. She has not had a stool today. REVIEW OF SYSTEMS    : See HPI, denies hematuria  GI: No vomiting or diarrhea  General: No measured fevers  All other systems reviewed and are negative unless noted in the HPI    PAST MEDICAL & SURGICAL HISTORY    Past Medical History:   Diagnosis Date    Angina at rest [facility])     Anxiety     Arthritis     hands and hip    Blood transfusion     after back surgery    Bradycardia     Bronchiectasis ([address] 11/5/2013    CAD (coronary artery disease)     Chronic back pain     Depression     Hyperlipidemia     Hypertension     Localized morphea     NATHAN treated with BiPAP     Stress incontinence     Thyroid disease     hypothyroid     Past Surgical History:   Procedure Laterality Date    ABDOMEN SURGERY  9/9/11     REPAIR INCISIONAL HERNIA REDUCIBLE WITH POSSIBLE MESH    BACK SURGERY      x3    BACK SURGERY      stimulator    BLADDER SUSPENSION      CATARACT REMOVAL WITH IMPLANT Left 03/08/2018    PHACO EMULSIFICATION OF CATARACT WITH INTRAOCULAR LENS IMPLANT LEFT EYE    CERVICAL DISCECTOMY  6/2014    and fusion    CHOLECYSTECTOMY      COLONOSCOPY  2002    1/26/12    ENDOSCOPY, COLON, DIAGNOSTIC      EYE SURGERY Right 04/05/2018    cataract removal    FOOT SURGERY Right 12/6/12    arthroplasty    FOOT SURGERY Left 04/30/2015    FOOT SURGERY Left 4/30/15    GASTRIC BYPASS SURGERY  8110,5127    HYSTERECTOMY      KNEE ARTHROSCOPY Left 10/3/2014    part.  medial & lateral meniscectomy, synovectomy plica exc    KNEE ARTHROSCOPY Right 10/13/15    partial medial meniscectomy, chondroplasty, partial lateral meniscectomy    NECK SURGERY      OTHER SURGICAL HISTORY  april 2013    removal spinal cord stimulater    OTHER SURGICAL HISTORY      bladder stimulator    OTHER SURGICAL HISTORY  08/30/2017    left patellofemoral arthroplasty    OTHER SURGICAL HISTORY  05/09/2018 convert left patellofemoral arthroplasty to left total knee replacement    PACEMAKER INSERTION  2011    Dr Mahoney/checked last week/told has 12 more yrs for this one/set at 60    SKIN BIOPSY      abdomen    SPINE SURGERY      stimulator    THROAT SURGERY      polyps removed    TONSILLECTOMY      UPPER GASTROINTESTINAL ENDOSCOPY      UPPER GASTROINTESTINAL ENDOSCOPY  05/03/2017       CURRENT MEDICATIONS  (may include discharge medications prescribed in the ED)  Current Outpatient Rx   Medication Sig Dispense Refill    donepezil (ARICEPT) 10 MG tablet TAKE 1 TABLET BY MOUTH NIGHTLY 90 tablet 2    oxyCODONE-acetaminophen (PERCOCET) 7.5-325 MG per tablet       atorvastatin (LIPITOR) 20 MG tablet       predniSONE (DELTASONE) 10 MG tablet Take 10 mg by mouth      predniSONE (DELTASONE) 10 MG tablet One PO Q D for two weeks      MONOJECT 60CC SYRINGE CATH TIP 60 ML JW Player       KLOR-CON M20 20 MEQ extended release tablet       sodium chloride 0.9 % SOLN 1,000 mL with povidone-iodine 10 % SOLN 30 mL Irrigate with 500 mLs as directed      enoxaparin (LOVENOX) 100 MG/ML injection Inject 0.9 mLs into the skin 2 times daily Stop taking when therapeutic on Coumadin 1 Syringe 3    cephALEXin (KEFLEX) 250 MG capsule Take 1 capsule by mouth 4 times daily      magnesium hydroxide (MILK OF MAGNESIA) 400 MG/5ML suspension Take 30 mLs by mouth daily as needed for Constipation      warfarin (COUMADIN) 1 MG tablet Take 7.5 tablets by mouth daily Maintain INR 2-3 30 tablet 3    fludrocortisone (FLORINEF) 0.1 MG tablet Take 0.1 mg by mouth 2 times daily      atorvastatin (LIPITOR) 20 MG tablet       ofloxacin (OCUFLOX) 0.3 % solution       Water For Irrigation, Sterile (STERILE WATER FOR IRRIGATION) 2 times daily       primidone (MYSOLINE) 50 MG tablet Take 2 tablets by mouth 2 times daily 360 tablet 1    pregabalin (LYRICA) 75 MG capsule Take 150 mg by mouth 3 times daily. Takes 2 tabs three times a day.       potassium chloride (K-TAB) 10 MEQ extended release tablet Take 20 mEq by mouth      ranitidine (ZANTAC) 150 MG tablet Take 150 mg by mouth 2 times daily      sertraline (ZOLOFT) 100 MG tablet Take 200 mg by mouth       pantoprazole (PROTONIX) 40 MG tablet TAKE 1 TABLET BY MOUTH DAILY  0    loratadine (CLARITIN) 10 MG tablet Take 10 mg by mouth daily      fluticasone (FLONASE) 50 MCG/ACT nasal spray 1 spray by Nasal route nightly. 1 Bottle 5    levothyroxine (SYNTHROID) 75 MCG tablet Take 1 tablet by mouth daily for 360 days. 30 tablet 11    nitroGLYCERIN (NITROSTAT) 0.4 MG SL tablet Place 1 tablet under the tongue every 5 minutes as needed for Chest pain. 25 tablet 3    amitriptyline (ELAVIL) 25 MG tablet Take 25 mg by mouth nightly       Multiple Vitamin (THERA) TABS Take 1 tablet by mouth daily       clonazepam (KLONOPIN) 1 MG tablet Take 1 tablet by mouth 3 times daily as needed for Anxiety for 21 doses. 21 tablet 0       ALLERGIES    Allergies   Allergen Reactions    Meloxicam Itching     Tolerates Ketorolac/Bromfenac ophthalmic drops.  Benzoin Compound      Blisters from adhesive compound under steri strips after knee VAS      Quetiapine      Other reaction(s): Other (See Comments)  Pt unable to recall reaction  Other reaction(s): Other (See Comments)      Quetiapine Fumarate      Other reaction(s): confused, dizzy  Other reaction(s): Unknown  seroquel      Seroquel [Quetiapine Fumarate] Other (See Comments)     Other reaction(s): confused, dizzy  Pt unable to recall reaction    Tizanidine      hallucinations      Amberderm Rash     Positive patch test results to [facility] Other Rash     Can use paper tape can not  use transpore    Tape [Adhesive Tape] Rash     Mild dermatitis at site of paper tape, with history of previous skin rashes to tape.  STERI-STRIPS  Can use paper tape can not  use transpore      Wound Dressings Rash     Positive patch test results to [facility] SOCIAL HISTORY    Social History     Social History    Marital status:      Spouse name: N/A    Number of children: N/A    Years of education: N/A     Social History Main Topics    Smoking status: Never Smoker    Smokeless tobacco: Never Used    Alcohol use No    Drug use: No    Sexual activity: Yes     Partners: Female     Other Topics Concern    None     Social History Narrative    None       PHYSICAL EXAM    VITAL SIGNS: /69   Pulse 65   Temp 98 °F (36.7 °C) (Oral)   Resp 17   Ht 5' 3\" (1.6 m)   Wt 184 lb (83.5 kg)   SpO2 97%   BMI 32.59 kg/m²    Constitutional:  Well developed, well nourished  HENT:  Atraumatic,  dry mucus membranes. There is no sublingual floor swelling. EYES: Conjunctiva dry, Nonicteric  NECK: No JVD, supple, no lymphadenopathy. Respiratory:  Breath sounds clear throughout auscultation and diminished in the bilateral lower bases. Respirations are even and unlabored. No cough noted. Cardiovascular: Regular rhythm and rate, S1 and S2.  Radial pulses 2+ equal bilaterally. Pedal and posttibial pulses are 1+ equal bilaterally. Extremities are pale but they're warm and dry. Brisk capillary refill. Abdomen:  Soft, morbidly obese abdomen without rebound or guarding. It is nontender and nondistended. There is a 1 cm round stoma to the right upper abdomen. The stoma is flat but it is pink and moist. Open to air. Old surgical healed incision across the right upper abdomen. No CVA flank tenderness   Integument:  Skin is warm and dry. Large amount of dark purple ecchymosis over the anterior shoulder on the right. Ecchymosis without swelling to the left eyelid and purple ecchymosis small amounts to the bilateral upper nasal bridge. Healing surgical incision underneath right breast.  The edges are well approximated. There is no erythema, fluctuance or induration. It is dry. She has a 3 cm long surgical incision to the submental area.   There is a mild Given 7/12/18 1442)     This patient was seen and evaluated by myself and my attending physician Dr. Lidia Bryant. Differential diagnosis includes but is not limited to urinary tract infection, pyelonephritis, urinary retention, sepsis, other infection, septic joint. Other. Patient comes in afebrile. She is not tachycardic. She has no leukocytosis or concerning anemia on CBC. Urinalysis is positive for nitrites trace leukocytes, 10-20 whites with 3 epithelials and 4+ bacteria. She was given IV Rocephin and IV fluids. CMP is unremarkable with a glucose of 110 and an alk phos of 146 lipase is 11. Troponin less than 0.01. No lactic acidosis. Although the patient doesn't have abdominal pain he did order a CT soft tissue neck and of the abdomen and pelvis with IV contrast because we could not find any [facility] records of her recent surgery and because she was presenting with surgical incisions underneath her right breast and her submental area. I did speak to the radiologist.  At the time he stated that he could see a small pocket of gas to the submandibular area just next to the stimulator and that it could be normal because of the recent surgery or that it could be an abscess. Based on my conversation with him I started the patient on IV vancomycin. The CT of the abdomen and pelvis shows moderate to severe urinary retention. The patient does have the ability to self catheterize but she states that she only does it when she needs it. She is unable to tell us the last time she did catheterize herself. We put an indwelling [place] catheter in the patient and it was decided that she would require hospitalization for further monitoring and IV antibiotics. I did speak with the hospitalist Cristina Hernandez regarding this patient. The patient and the family members were updated on the plan of care and they agree with the hospitalization. FINAL IMPRESSION    1.  Urinary tract infection without hematuria, site

## 2018-07-12 NOTE — PATIENT CARE CONFERENCE
Patient came in today feeling sick from her surgery she had last week and is also having problems with her bladder. She was instructed to call her PCP today to rule out UTI.      Jose Loza PT

## 2018-07-12 NOTE — PROGRESS NOTES
Consult has been called to Dr. Pamella Menjivar on 712/18. Spoke with [employer].  5:44 PM    Bradly Ramirez  7/12/2018

## 2018-07-12 NOTE — ED NOTES
Bedside report given to [facility]. Pt to floor per cart.      Jaycee Garner RN  07/12/18 [id number]

## 2018-07-13 PROBLEM — G31.84 MCI (MILD COGNITIVE IMPAIRMENT) WITH MEMORY LOSS: Chronic | Status: ACTIVE | Noted: 2018-01-23

## 2018-07-13 PROBLEM — M54.50 CHRONIC BILATERAL LOW BACK PAIN WITHOUT SCIATICA: Chronic | Status: ACTIVE | Noted: 2018-04-25

## 2018-07-13 PROBLEM — N39.0 ENTEROCOCCUS UTI: Chronic | Status: ACTIVE | Noted: 2018-07-13

## 2018-07-13 PROBLEM — F51.01 PRIMARY INSOMNIA: Chronic | Status: ACTIVE | Noted: 2017-06-13

## 2018-07-13 PROBLEM — M17.11 PATELLOFEMORAL ARTHRITIS OF RIGHT KNEE: Status: RESOLVED | Noted: 2017-07-03 | Resolved: 2018-07-13

## 2018-07-13 PROBLEM — Z95.0 PACEMAKER: Chronic | Status: ACTIVE | Noted: 2018-07-13

## 2018-07-13 PROBLEM — G89.29 CHRONIC BILATERAL LOW BACK PAIN WITHOUT SCIATICA: Chronic | Status: ACTIVE | Noted: 2018-04-25

## 2018-07-13 PROBLEM — A41.9 SEPSIS (HCC): Status: ACTIVE | Noted: 2018-07-13

## 2018-07-13 PROBLEM — G47.33 OSA (OBSTRUCTIVE SLEEP APNEA): Status: RESOLVED | Noted: 2017-06-13 | Resolved: 2018-07-13

## 2018-07-13 PROBLEM — M17.12 OSTEOARTHRITIS OF LEFT PATELLOFEMORAL JOINT: Status: RESOLVED | Noted: 2017-09-14 | Resolved: 2018-07-13

## 2018-07-13 PROBLEM — Z96.652 STATUS POST LEFT PARTIAL KNEE REPLACEMENT: Status: RESOLVED | Noted: 2017-08-31 | Resolved: 2018-07-13

## 2018-07-13 PROBLEM — Z98.890 HISTORY OF BLADDER SURGERY: Chronic | Status: ACTIVE | Noted: 2018-07-12

## 2018-07-13 PROBLEM — M17.11 OSTEOARTHRITIS OF RIGHT KNEE: Status: RESOLVED | Noted: 2018-05-09 | Resolved: 2018-07-13

## 2018-07-13 PROBLEM — Z96.89 S/P PLACEMENT OF VNS (VAGUS NERVE STIMULATION) DEVICE: Chronic | Status: ACTIVE | Noted: 2018-07-12

## 2018-07-13 PROBLEM — E78.5 DYSLIPIDEMIA: Status: RESOLVED | Noted: 2017-06-13 | Resolved: 2018-07-13

## 2018-07-13 PROBLEM — R19.7 DIARRHEA: Status: ACTIVE | Noted: 2018-07-13

## 2018-07-13 PROBLEM — I10 HTN (HYPERTENSION), BENIGN: Chronic | Status: ACTIVE | Noted: 2017-06-13

## 2018-07-13 PROBLEM — M25.462 EFFUSION OF LEFT KNEE JOINT: Status: ACTIVE | Noted: 2018-07-13

## 2018-07-13 PROBLEM — R35.89 POLYURIA: Status: ACTIVE | Noted: 2018-07-13

## 2018-07-13 PROBLEM — B95.2 ENTEROCOCCUS UTI: Chronic | Status: ACTIVE | Noted: 2018-07-13

## 2018-07-13 LAB
ANION GAP SERPL CALCULATED.3IONS-SCNC: 9 MMOL/L (ref 3–16)
BASOPHILS ABSOLUTE: 0.1 K/UL (ref 0–0.2)
BASOPHILS RELATIVE PERCENT: 1 %
BUN BLDV-MCNC: 9 MG/DL (ref 7–20)
CALCIUM SERPL-MCNC: 8 MG/DL (ref 8.3–10.6)
CHLORIDE BLD-SCNC: 110 MMOL/L (ref 99–110)
CO2: 22 MMOL/L (ref 21–32)
CREAT SERPL-MCNC: <0.5 MG/DL (ref 0.6–1.2)
EOSINOPHILS ABSOLUTE: 0.2 K/UL (ref 0–0.6)
EOSINOPHILS RELATIVE PERCENT: 2.9 %
GFR AFRICAN AMERICAN: >60
GFR NON-AFRICAN AMERICAN: >60
GLUCOSE BLD-MCNC: 91 MG/DL (ref 70–99)
HCT VFR BLD CALC: 36.1 % (ref 36–48)
HEMOGLOBIN: 12 G/DL (ref 12–16)
LYMPHOCYTES ABSOLUTE: 2.2 K/UL (ref 1–5.1)
LYMPHOCYTES RELATIVE PERCENT: 41.3 %
MCH RBC QN AUTO: 30.2 PG (ref 26–34)
MCHC RBC AUTO-ENTMCNC: 33.3 G/DL (ref 31–36)
MCV RBC AUTO: 90.5 FL (ref 80–100)
MONOCYTES ABSOLUTE: 0.5 K/UL (ref 0–1.3)
MONOCYTES RELATIVE PERCENT: 9.2 %
NEUTROPHILS ABSOLUTE: 2.5 K/UL (ref 1.7–7.7)
NEUTROPHILS RELATIVE PERCENT: 45.6 %
PDW BLD-RTO: 15.4 % (ref 12.4–15.4)
PLATELET # BLD: 212 K/UL (ref 135–450)
PMV BLD AUTO: 8.5 FL (ref 5–10.5)
POTASSIUM REFLEX MAGNESIUM: 3.9 MMOL/L (ref 3.5–5.1)
RBC # BLD: 3.99 M/UL (ref 4–5.2)
SODIUM BLD-SCNC: 141 MMOL/L (ref 136–145)
WBC # BLD: 5.4 K/UL (ref 4–11)

## 2018-07-13 PROCEDURE — 97161 PT EVAL LOW COMPLEX 20 MIN: CPT

## 2018-07-13 PROCEDURE — 85025 COMPLETE CBC W/AUTO DIFF WBC: CPT

## 2018-07-13 PROCEDURE — G0378 HOSPITAL OBSERVATION PER HR: HCPCS | Performed by: INTERNAL MEDICINE

## 2018-07-13 PROCEDURE — G8978 MOBILITY CURRENT STATUS: HCPCS

## 2018-07-13 PROCEDURE — 36415 COLL VENOUS BLD VENIPUNCTURE: CPT

## 2018-07-13 PROCEDURE — G8979 MOBILITY GOAL STATUS: HCPCS

## 2018-07-13 PROCEDURE — 97535 SELF CARE MNGMENT TRAINING: CPT

## 2018-07-13 PROCEDURE — G8987 SELF CARE CURRENT STATUS: HCPCS

## 2018-07-13 PROCEDURE — 80048 BASIC METABOLIC PNL TOTAL CA: CPT

## 2018-07-13 PROCEDURE — 9990 CHARGE CONVERSION

## 2018-07-13 PROCEDURE — 96372 THER/PROPH/DIAG INJ SC/IM: CPT | Performed by: INTERNAL MEDICINE

## 2018-07-13 PROCEDURE — 97116 GAIT TRAINING THERAPY: CPT

## 2018-07-13 PROCEDURE — 96366 THER/PROPH/DIAG IV INF ADDON: CPT | Performed by: INTERNAL MEDICINE

## 2018-07-13 PROCEDURE — 97530 THERAPEUTIC ACTIVITIES: CPT

## 2018-07-13 PROCEDURE — 97165 OT EVAL LOW COMPLEX 30 MIN: CPT

## 2018-07-13 PROCEDURE — G8988 SELF CARE GOAL STATUS: HCPCS

## 2018-07-13 RX ORDER — SACCHAROMYCES BOULARDII 250 MG
250 CAPSULE ORAL 2 TIMES DAILY
Status: DISCONTINUED | OUTPATIENT
Start: 2018-07-13 | End: 2018-07-15 | Stop reason: HOSPADM

## 2018-07-13 RX ADMIN — OXYCODONE HYDROCHLORIDE AND ACETAMINOPHEN 1 TABLET: 7.5; 325 TABLET ORAL at 08:47

## 2018-07-13 RX ADMIN — FLUDROCORTISONE ACETATE 0.1 MG: 0.1 TABLET ORAL at 20:23

## 2018-07-13 RX ADMIN — FAMOTIDINE 20 MG: 20 TABLET ORAL at 08:36

## 2018-07-13 RX ADMIN — CLONAZEPAM 1 MG: 1 TABLET ORAL at 22:35

## 2018-07-13 RX ADMIN — Medication 1 SPRAY: at 20:25

## 2018-07-13 RX ADMIN — FAMOTIDINE 20 MG: 20 TABLET ORAL at 20:24

## 2018-07-13 RX ADMIN — OXYCODONE HYDROCHLORIDE AND ACETAMINOPHEN 1 TABLET: 7.5; 325 TABLET ORAL at 21:52

## 2018-07-13 RX ADMIN — PRIMIDONE 100 MG: 50 TABLET ORAL at 08:36

## 2018-07-13 RX ADMIN — FLUDROCORTISONE ACETATE 0.1 MG: 0.1 TABLET ORAL at 08:36

## 2018-07-13 RX ADMIN — Medication 10 ML: at 08:37

## 2018-07-13 RX ADMIN — Medication 10 ML: at 20:24

## 2018-07-13 RX ADMIN — ENOXAPARIN SODIUM 40 MG: 40 INJECTION, SOLUTION INTRAVENOUS; SUBCUTANEOUS at 08:37

## 2018-07-13 RX ADMIN — PRIMIDONE 100 MG: 50 TABLET ORAL at 20:23

## 2018-07-13 RX ADMIN — SERTRALINE 200 MG: 100 TABLET, FILM COATED ORAL at 20:24

## 2018-07-13 RX ADMIN — LEVOTHYROXINE SODIUM 75 MCG: 50 TABLET ORAL at 08:36

## 2018-07-13 RX ADMIN — AMITRIPTYLINE HYDROCHLORIDE 25 MG: 25 TABLET, FILM COATED ORAL at 20:24

## 2018-07-13 RX ADMIN — ATORVASTATIN CALCIUM 20 MG: 10 TABLET, FILM COATED ORAL at 20:24

## 2018-07-13 RX ADMIN — PANTOPRAZOLE SODIUM 40 MG: 40 TABLET, DELAYED RELEASE ORAL at 08:36

## 2018-07-13 RX ADMIN — OXYCODONE HYDROCHLORIDE AND ACETAMINOPHEN 1 TABLET: 7.5; 325 TABLET ORAL at 14:44

## 2018-07-13 RX ADMIN — DONEPEZIL HYDROCHLORIDE 10 MG: 5 TABLET, FILM COATED ORAL at 20:23

## 2018-07-13 RX ADMIN — RDII 250 MG CAPSULE 250 MG: at 08:47

## 2018-07-13 RX ADMIN — CLONAZEPAM 1 MG: 1 TABLET ORAL at 08:47

## 2018-07-13 RX ADMIN — RDII 250 MG CAPSULE 250 MG: at 20:24

## 2018-07-13 RX ADMIN — NALOXEGOL OXALATE 25 MG: 12.5 TABLET, FILM COATED ORAL at 06:36

## 2018-07-13 ASSESSMENT — PAIN DESCRIPTION - PAIN TYPE: TYPE: ACUTE PAIN;CHRONIC PAIN

## 2018-07-13 ASSESSMENT — PAIN DESCRIPTION - LOCATION: LOCATION: BACK;KNEE

## 2018-07-13 ASSESSMENT — PAIN SCALES - GENERAL
PAINLEVEL_OUTOF10: 8
PAINLEVEL_OUTOF10: 3
PAINLEVEL_OUTOF10: 6
PAINLEVEL_OUTOF10: 8
PAINLEVEL_OUTOF10: 6
PAINLEVEL_OUTOF10: 8

## 2018-07-13 NOTE — PROGRESS NOTES
Inpatient Physical Therapy Evaluation and Treatment    Unit: 2West  Date:  7/13/2018  Patient Name:    Suha Newell  Admitting diagnosis:  UTI(URINARY TRACT INFECTION)  Admit Date:  7/12/2018  Precautions/Restrictions/WB Status/ Lines/ Wounds/ Oxygen:  Fall risk, IV, barnes catheter   Treatment Time:  08:50 - 09:33  Treatment Number:  1     Patient Goals for Therapy: \"to go home\"        Discharge Recommendations: home with PRN assist  AM-PAC Score: 20  DME needs for discharge:  Needs met    Preadmission Environment    Pt. Lives alone; her daughter is supportive, but unable to stay with pt around the clock  Home environment: [facility]. Steps to enter first floor:  1 threshold step  Bathroom: walk-in shower or tub; pt typically takes shower chair, grab bars, hand-held shower head  Equipment owned: wheelchair, BSC, shower chair, RW, 4WW, cane, reacher, Adjustable bed,no rails     Preadmission Status   Pt. Able to drive, pt's daughter helps also. Pt's daughter typically does the grocery shopping, but pt would like to do this more independently. Pt. Had supervision, light assistance from her daughter for dressing and bathing, but since her most recent surgery, she has not been able to shower. Pt. Had assistance from family for cooking, cleaning, laundry  Pt independent with microwave meals  Pt. Fully independent for transfers and gait and walked with SPC at all times   Pt reports near falls in the past couple months, but no actual falls. Pt has been in OP PT (home PT services denied by insurance) following L TKA which occurred 5/9/18. Has been FWB since 6/4/18 (was initially TTWB 2/2 complicated surgery). The brace has been discontinued.    Recent (7/3/18) hypoglossal nerve stimulator placement for treatment of sleep apnea; no precautions to note     Pain    Rating/Location: 8/10 (L knee), 6/10 (\"Arthritis\" in hips, knees, back), 8/10 at incision sites (under chin & under R breast)  Pain Medicine Status: pt has received pain medicine     Cognition    A&Ox4, patient appropriate and cooperative. Patient lying supine in bed with no family present. Follows 1 step and 2 step commands. Upper Extremity ROM/Strength  Please see OT evaluation. Lower Extremity ROM / Strength    AROM WFL     Right LE   4/5 quads       LEFT   3+/5 quads                                        4/5 ant tibs                 3+/5 ant tibs                                          4/5 hams                    4/5 hams                                           4/5 iliopsoas               3+/5 iliopsoas       Lower Extremity Sensation    No apparent deficits. Lower Extremity Proprioception   No apparent deficits. Coordination and Tone  WNL    Balance  Static Sitting: good  Dynamic Sitting: good  Static Standing: good (-)  Dynamic Standing: good (-)    Bed mobility    Supine to sit: mod I  Sit to supine: NT  Scooting to head of bed: NT  Scooting in sitting: independent   Rolling: NT    Transfers    Sit to stand: supervision from EOB, BSC  Stand to sit: supervision to [facility], chair   Bed to chair: NT, pt is ambulatory     Gait    Ambulated ~20' x2, to/from bathroom with SPC and SBA. Pt noted to be reaching for wall/furniture for increased stability, and suggested trialing walker, but she declined at this time. No LOB noted. Gait deviations included: slow yordy, wide JENISE, short step length    Activity Tolerance   No issues other than pain noted above. Positioning Needs   Pt reclined in chair, alarm active, call light and needs in reach. Patient/Family Education     PT role, POC, safety, dc recommendations   Assessment of Deficits Walking [id number]  Pt demonstrated decreased activity tolerance, decreased safety awareness, decreased strength, decreased balance, and decreased independence with transfers and gait. Pt is appropriate for acute PT to safely progress functional mobility prior to returning home.      Pt. Limited during

## 2018-07-13 NOTE — H&P
Hyperlipidemia     Hypertension     Localized morphea     NATHAN treated with BiPAP     Pacemaker     Stress incontinence     Thyroid disease     hypothyroid       Past Surgical History:          Procedure Laterality Date    ABDOMEN SURGERY  9/9/11     REPAIR INCISIONAL HERNIA REDUCIBLE WITH POSSIBLE MESH    BACK SURGERY      x3    BACK SURGERY      stimulator    BLADDER SUSPENSION      CATARACT REMOVAL WITH IMPLANT Left 03/08/2018    PHACO EMULSIFICATION OF CATARACT WITH INTRAOCULAR LENS IMPLANT LEFT EYE    CERVICAL DISCECTOMY  6/2014    and fusion    CHOLECYSTECTOMY      COLONOSCOPY  2002    1/26/12    ENDOSCOPY, COLON, DIAGNOSTIC      EYE SURGERY Right 04/05/2018    cataract removal    FOOT SURGERY Right 12/6/12    arthroplasty    FOOT SURGERY Left 04/30/2015    FOOT SURGERY Left 4/30/15    GASTRIC BYPASS SURGERY  9731,7520    HYSTERECTOMY      KNEE ARTHROSCOPY Left 10/3/2014    part. medial & lateral meniscectomy, synovectomy plica exc    KNEE ARTHROSCOPY Right 10/13/15    partial medial meniscectomy, chondroplasty, partial lateral meniscectomy    NECK SURGERY      OTHER SURGICAL HISTORY  april 2013    removal spinal cord stimulater    OTHER SURGICAL HISTORY      bladder stimulator    OTHER SURGICAL HISTORY  08/30/2017    left patellofemoral arthroplasty    OTHER SURGICAL HISTORY  05/09/2018    convert left patellofemoral arthroplasty to left total knee replacement   [facility]  2011    Dr Mahoney/checked last week/told has 12 more yrs for this one/set at 60    SKIN BIOPSY      abdomen    SPINE SURGERY      stimulator    THROAT SURGERY      polyps removed    TONSILLECTOMY      UPPER GASTROINTESTINAL ENDOSCOPY      UPPER GASTROINTESTINAL ENDOSCOPY  05/03/2017       Medications Prior to Admission:      Prior to Admission medications    Medication Sig Start Date End Date Taking?  Authorizing Provider   naloxegol (MOVANTIK) 25 MG TABS tablet Take 25 mg by mouth every morning MD   clonazepam (KLONOPIN) 1 MG tablet Take 1 tablet by mouth 3 times daily as needed for Anxiety for 21 doses. 12/3/11  Yes Nya Alexander MD   MONOJECT 60CC SYRINGE CATH TIP 60 ML 0235 [facility]  3/7/18   Historical Provider, MD       Allergies:  Meloxicam; Benzoin compound; Lyrica [pregabalin]; Quetiapine; Quetiapine fumarate; Seroquel [quetiapine fumarate]; Tizanidine; Amberderm; Other; Tape [adhesive tape]; and Wound dressings    Social History:      The patient currently lives---apparently at home. TOBACCO:   reports that she has never smoked. She has never used smokeless tobacco.  ETOH:   reports that she does not drink alcohol. Family History:      Positive as follows:    Family History   Problem Relation Age of Onset    Heart Disease Father     Cancer Sister         multiple organs    Other Sister     Asthma Neg Hx     Diabetes Neg Hx     Emphysema Neg Hx     Heart Failure Neg Hx     Hypertension Neg Hx        REVIEW OF SYSTEMS:   Pertinent positives as noted in the HPI. All other systems reviewed and negative. PHYSICAL EXAM PERFORMED:    /75   Pulse 82   Temp 98.4 °F (36.9 °C) (Oral)   Resp 18   Ht 5' 3\" (1.6 m)   Wt 184 lb (83.5 kg)   SpO2 92%   BMI 32.59 kg/m²     General appearance:  Mildly obese, late-middle-aged WF lying in bed in NAD. HEENT/Neck:  Submental scar. Respiratory:  Normal respiratory effort. Clear to auscultation, bilaterally without Rales/Wheezes/Rhonchi. Cardiovascular:  Regular rate and rhythm with normal S1/S2 without murmurs, rubs or gallops. PPM in situ. Abdomen: Soft, non-tender, non-distended with normal bowel sounds. Stoma present. Musculoskeletal:  L knee slightly swollen but not cellulitic-appearing. Skin: Incisional scar of L knee. Recent submental surgical scar. Neurologic:  Neurovascularly intact without any focal sensory/motor deficits. Cranial nerves: II-XII intact, grossly non-focal.  Psychiatric:  Flat affect.   Capillary Refill: Brisk,< 3 seconds   Peripheral Pulses: +2 palpable, equal bilaterally       Labs:     Recent Labs      07/12/18   1101   WBC  8.7   HGB  13.8   HCT  41.5   PLT  244     Recent Labs      07/12/18   1101   NA  142   K  5.1   CL  104   CO2  25   BUN  12   CREATININE  <0.5*   CALCIUM  9.7     Recent Labs      07/12/18   1101   AST  35   ALT  13   BILITOT  <0.2   ALKPHOS  146*     Recent Labs      07/12/18   1101   INR  1.04     Recent Labs      07/12/18   1101   TROPONINI  <0.01       Urinalysis:      Lab Results   Component Value Date    NITRU POSITIVE 07/12/2018    WBCUA 10-20 07/12/2018    BACTERIA 4+ 07/12/2018    RBCUA 1-3 07/12/2018    BLOODU Negative 07/12/2018    SPECGRAV 1.015 07/12/2018    GLUCOSEU Negative 07/12/2018    GLUCOSEU NEGATIVE 08/10/2011       Radiology:       CT SOFT TISSUE NECK W CONTRAST   Final Result   Stimulator device terminates in the right inferior tongue/ floor of the   mouth. There is a small gas containing collection in the right submandibular   region measuring up to 1.2 cm. It is unclear whether this represents a   developing abscess or resolving postoperative gas and additional   postoperative change. Asymmetric soft tissue fullness/ swelling of the right tongue and lingual   tonsil may relate to postsurgical change or a small hematoma. There is no   significant airway effacement. Proximal esophageal wall thickening. Correlate for esophagitis. Findings were discussed with Renetta Chan at 3:27 pm on 7/12/2018. CT ABDOMEN PELVIS W IV CONTRAST Additional Contrast? None   Final Result   Moderate to severe urinary retention. Small bowel containing right lower quadrant Kilgore's hernia. XR KNEE LEFT (3 VIEWS)   Final Result   Joint effusion and diffuse soft tissue swelling. No acute fracture.              ASSESSMENT:    Active Hospital Problems    Diagnosis Date Noted    Pacemaker [Z95.0] 07/13/2018    Polyuria [R35.8] 07/13/2018    Diarrhea [R19.7] 07/13/2018    Hx of Enterococcus UTI [N39.0, B95.2] 07/13/2018    Sepsis ([facility] [A41.9] 07/13/2018    CAD (coronary artery disease) [H25.99]     Complicated UTI (urinary tract infection) [N39.0] 07/12/2018    S/P placement of hypoglossal cranial nerve stimulator   [Z96.89] 07/12/2018    History of bladder augmentation [Z98.890] 07/12/2018    Urinary retention [R33.9] 07/12/2018    Chronic bilateral low back pain without sciatica [M54.5, G89.29] 04/25/2018    MCI (mild cognitive impairment) with memory loss [G31.84] 01/23/2018    HTN (hypertension), benign [I10] 06/13/2017    Primary insomnia [F51.01] 06/13/2017    Essential tremor [G25.0] 10/17/2016    NATHAN (obstructive sleep apnea) [G47.33] 01/26/2016    Obesity (BMI 30-39. 9) [E66.9] 02/12/2015    Osteoporosis [M81.0] 09/08/2014    S/P gastric bypass [Z98.84] 08/14/2014    Hypothyroidism [E03.9] 06/04/2014    Anxiety & depression [F41.9]     GERD (gastroesophageal reflux disease) [K21.9] 11/05/2013    Bronchiectasis ([facility] [J47.9] 11/05/2013    Esophageal dysmotility [K22.4] 11/05/2013         PLAN:    Complicated UTI; Hx Multiple Bladder Surgeries/Procedures. - urine culture. - pt has hx of Enterococus (SE to vanc) and Klebsiella (SE to Rocephin) UTI's. - Rocephin and vanc. - urology consult (pt followed by Dr. Madai Dejesus). Acute Urinary Retention.  - [place] placed in ED.  - urology consult (pt followed by Dr. Madai Dejesus). Diarrhea. - presumably a symptom of UTI rather than a primary GI pathology/infection. However, if diarrhea persists, will obtain stool studies. - will place on pro-biotic since she is on abx. L Knee Swelling s/p Recent L-TKA. - knee does not look cellulitic to me. - XR showing joint effusion, but my suspicion for septic arthritis is low. - I told her she can follow-up with ortho as an OP when discharged.  - PT/OT. ? Neck Abscess s/p Recent Hypoglossal Nerve Stimulator Implantation.   - CT soft

## 2018-07-13 NOTE — PROGRESS NOTES
Nutrition Assessment    Type and Reason for Visit: Initial, Positive Nutrition Screen (MST 2)    Malnutrition Assessment:  · Malnutrition Status: No malnutrition    Nutrition Diagnosis:   · Problem: No nutrition diagnosis at this time       Nutrition Assessment:  · Subjective Assessment: pt was lying in  bed c/o of ear pain dx with UTI; IVF's and IVATB infusing; poor Po inatke last few weeks r/t symptoms; question actaul weight loss of 20 # d/t stated weight and nit actual ; appetite has currently returned as evidence by >75% consumed of lunch     Nutrition Risk Level   Risk Level: [address] and/or Delivery: Continue current diet  Nutrition Education/Counseling/Coordination of Care:  Continued Inpatient Monitoring, Coordination of Care, Coordination of Community Care    Patient assessed for nutrition risk. Deemed to be at low risk at this time. Will continue to follow patient.       Electronically signed by Ting Easton RD, MARCOS on 7/13/18 at 5:44 PM    Contact Number: [phone]

## 2018-07-13 NOTE — CONSULTS
Urology Attending Consult Note      Reason for Consultation: hist augmented bladder, uti    HPI:  Arnav Sims is a 60 y.o. female who is being seen as an inpatient consult for evaluation of sepsis from UTI. She had a hist of recurrent UTIs and small bladder capacity and then had bowel/bladder augmentation cystoplasty, catheterizable bowel channel 1 year ago at [facility]. She has done fairly well over the year with a couple infections . She flush sterile saline through stoma BID but does not do cath drainage at home. Gradually over last week but really 2-3 days she had had malaise, and shaking chills and increased mucous from ostomy site and she came to ER. Left knee arthroplasty a couple months ago. No real change in pain of knee.       Ucx pending, prelim enterococcus CT reviewed below    Primary Care Provider:  Agustín Yusuf MD      Past Medical History:   Diagnosis Date    Angina at rest [facility])     Anxiety     Arthritis     hands and hip    Blood transfusion     after back surgery    Bradycardia     Bronchiectasis ([address] 11/5/2013    CAD (coronary artery disease)     Chronic back pain     Hyperlipidemia     Hypertension     Localized morphea     NATHAN treated with BiPAP     Pacemaker     Stress incontinence     Thyroid disease     hypothyroid       Past Surgical History:   Procedure Laterality Date    ABDOMEN SURGERY  9/9/11     REPAIR INCISIONAL HERNIA REDUCIBLE WITH POSSIBLE MESH    BACK SURGERY      x3    BACK SURGERY      stimulator    BLADDER SUSPENSION      CATARACT REMOVAL WITH IMPLANT Left 03/08/2018    PHACO EMULSIFICATION OF CATARACT WITH INTRAOCULAR LENS IMPLANT LEFT EYE    CERVICAL DISCECTOMY  6/2014    and fusion    CHOLECYSTECTOMY      COLONOSCOPY  2002 1/26/12    ENDOSCOPY, COLON, DIAGNOSTIC      EYE SURGERY Right 04/05/2018    cataract removal    FOOT SURGERY Right 12/6/12    arthroplasty    FOOT SURGERY Left 04/30/2015    FOOT SURGERY Left 4/30/15    GASTRIC BYPASS SURGERY  3502,0439    HYSTERECTOMY      KNEE ARTHROSCOPY Left 10/3/2014    part. medial & lateral meniscectomy, synovectomy plica exc    KNEE ARTHROSCOPY Right 10/13/15    partial medial meniscectomy, chondroplasty, partial lateral meniscectomy    NECK SURGERY      OTHER SURGICAL HISTORY  april 2013    removal spinal cord stimulater    OTHER SURGICAL HISTORY      bladder stimulator    OTHER SURGICAL HISTORY  08/30/2017    left patellofemoral arthroplasty    OTHER SURGICAL HISTORY  05/09/2018    convert left patellofemoral arthroplasty to left total knee replacement   [facility]  2011    Dr Mahoney/checked last week/told has 12 more yrs for this one/set at 60    SKIN BIOPSY      abdomen    SPINE SURGERY      stimulator    THROAT SURGERY      polyps removed    TONSILLECTOMY      UPPER GASTROINTESTINAL ENDOSCOPY      UPPER GASTROINTESTINAL ENDOSCOPY  05/03/2017         Medication List reviewed: Notably no anticoag. On vanco/zosyn now    Allergies   Allergen Reactions    Meloxicam Itching     Tolerates Ketorolac/Bromfenac ophthalmic drops.  Benzoin Compound      Blisters from adhesive compound under steri strips after knee VAS      Lyrica [Pregabalin]      Keeps patient awake    Quetiapine      Other reaction(s): Other (See Comments)  Pt unable to recall reaction  Other reaction(s): Other (See Comments)      Quetiapine Fumarate      Other reaction(s): confused, dizzy  Other reaction(s): Unknown  seroquel      Seroquel [Quetiapine Fumarate] Other (See Comments)     Other reaction(s): confused, dizzy  Pt unable to recall reaction    Tizanidine      hallucinations      Amberderm Rash     Positive patch [facility] to [facility] Other Rash     Can use paper tape can not  use transpore    Tape [Adhesive Tape] Rash     Mild dermatitis at site of paper tape, with history of previous skin rashes to tape.  STERI-STRIPS  Can use paper tape can not  use transpore      Wound Dressings Rash     Positive patch test results to [facility]         Family History   Problem Relation Age of Onset    Heart Disease Father     Cancer Sister         multiple organs    Other Sister     Asthma Neg Hx     Diabetes Neg Hx     Emphysema Neg Hx     Heart Failure Neg Hx     Hypertension Neg Hx        Social History   Substance Use Topics    Smoking status: Never Smoker    Smokeless tobacco: Never Used    Alcohol use No         Review of Systems: A 12 point ROS was performed and was unremarkable unless listed in the history of present illness. I/O last 3 completed shifts: In: 2378.5 [P.O.:720;  I.V.:1658.5]  Out: 2675 [Urine:2675]    Physical Exam:  Patient Vitals for the past 8 hrs:   BP Temp Temp src Pulse Resp SpO2   07/13/18 1930 119/73 97.8 °F (36.6 °C) Oral 65 16 97 %     Constitutional: pleasant female in NAD, with a obese body habitus   Eyes: pink conjunctivae, no ptosis  ENT: lips without cyanosis, normal appearance of ears and nose externally  Neck: no masses or lesions, trachea midline, no thyromegaly  Respiratory: normal respiratory movements without distress, no audible wheezes  Cardiovascular: regular rate and rhythm, lower extremities with 1+ edema   Abdomen: soft, NTND, no masses  Back: no CVAT  Lymphatic: no palpable cervical or supraclavicular lymphadenopathy  Skin: warm and dry, no rashes, lesions, or ulcers  Musculoskeletal: normal gait and stance, no digital cyanosis, head normocephalic  Psych: normal mood and affect, A&Ox3  :  [place] with minimal mucous, draining well.  2cm urinary stoma continent, pink, viable        Labs:    Lab Results   Component Value Date    CREATININE <0.5 (L) 07/13/2018    CREATININE <0.5 (L) 07/12/2018    CREATININE <0.5 (L) 05/13/2018     Lab Results   Component Value Date    COLORU Yellow 07/12/2018    NITRU POSITIVE 07/12/2018    GLUCOSEU Negative 07/12/2018    GLUCOSEU NEGATIVE 08/10/2011    KETUA TRACE 07/12/2018    UROBILINOGEN 0.2 07/12/2018    BILIRUBINUR Negative 07/12/2018    BILIRUBINUR NEG 12/29/2011    BILIRUBINUR NEGATIVE 08/10/2011     Lab Results   Component Value Date    WBC 5.4 07/13/2018    HGB 12.0 07/13/2018    HCT 36.1 07/13/2018    MCV 90.5 07/13/2018     07/13/2018       Radiology:  Imaging was independently reviewed by myself and I agree with the radiology interpretation  No hydronephrosis, distended augmented bladder. No stones     Impression:  58yo F with hist of recurrent UTIs and small bladder capacity s/p augment, catheterizable bowel channel 1 year ago at [facility] now with sepsis from UTI. Plan:  Cont broad spectrum abx and adjust based on cultures  Cont barnes drainage for now and flush barnes q1hr prn poor drainage or flush if large amt mucous in tubing   She eventually will need fu with [facility] urology, she had visit scheduled with them today.         Yifan Draper MD

## 2018-07-13 NOTE — PROGRESS NOTES
Pharmacy Note  Vancomycin Consult    Lc Levine is a 60 y.o. female started on Vancomycin for UTI consult received from Dr. Ivanna Cotto to manage therapy. Also receiving the following antibiotics: . Patient Active Problem List   Diagnosis    GERD (gastroesophageal reflux disease)    Esophageal dysmotility    Bronchiectasis (HCC)    Anxiety & depression    Hypothyroidism    S/P gastric bypass    Osteoporosis    Obesity (BMI 30-39. 9)    NATHAN (obstructive sleep apnea)    Essential tremor    HTN (hypertension), benign    Primary insomnia    MCI (mild cognitive impairment) with memory loss    Chronic bilateral low back pain without sciatica    Complicated UTI (urinary tract infection)    S/P placement of hypoglossal cranial nerve stimulator      History of bladder augmentation    Urinary retention    CAD (coronary artery disease)    Pacemaker    Polyuria    Diarrhea    Hx of Enterococcus UTI    Sepsis (HCC)    Effusion of prosthetic L knee joint       Allergies:  Meloxicam; Benzoin compound; Lyrica [pregabalin]; Quetiapine; Quetiapine fumarate; Seroquel [quetiapine fumarate]; Tizanidine; Amberderm; Other; Tape [adhesive tape]; and Wound dressings     Temp max:     Recent Labs      07/12/18   1101  07/13/18   0536   BUN  12  9       Recent Labs      07/12/18   1101  07/13/18   0536   CREATININE  <0.5*  <0.5*       Recent Labs      07/12/18   1101  07/13/18   0536   WBC  8.7  5.4         Intake/Output Summary (Last 24 hours) at 07/13/18 0759  Last data filed at 07/13/18 0538   Gross per 24 hour   Intake 3464.53 ml   Output 1650 ml   Net 1814.53 ml       Culture Date      Source                       Results  NGTD- Previous enterococcus    Ht Readings from Last 1 Encounters:   07/12/18 5' 3\" (1.6 m)        Wt Readings from Last 1 Encounters:   07/12/18 184 lb (83.5 kg)         Body mass index is 32.59 kg/m².     CrCl cannot be calculated (This lab value cannot be used to calculate CrCl because it is not a number: <0.5). Goal Trough Level: 12-15mcg/mL    Assessment/Plan:  Will initiate vancomycin 1000 mg IV every 12hours. Trough to be on 7/14 at 2030    Thank you for the consult. Will continue to follow.   Dell Palacios Pharm D 5/31/26090:64 AM  .

## 2018-07-13 NOTE — PLAN OF CARE
Problem: Falls - Risk of:  Goal: Will remain free from falls  Will remain free from falls   Outcome: Ongoing      Problem: Sensory:  Goal: General experience of comfort will improve  General experience of comfort will improve  Outcome: Ongoing      Problem: Urinary Elimination:  Goal: Signs and symptoms of infection will decrease  Signs and symptoms of infection will decrease  Outcome: Ongoing    Goal: Ability to reestablish a normal urinary elimination pattern will improve - after catheter removal  Ability to reestablish a normal urinary elimination pattern will improve  Outcome: Ongoing

## 2018-07-13 NOTE — PROGRESS NOTES
Pt A/O x 4 assessment completed. PM meds given. Stoma noted to abdomen at this time pt states she has had it over 1 year and  at [facility] placed it. PRN Klonopin given. Pt denies any needs at this time.  Call light within reach

## 2018-07-13 NOTE — PROGRESS NOTES
Pt appears to be sleeping at this time as manifested by eyes being closed. Respirations easy. No distress noted. Will continue to monitor.

## 2018-07-13 NOTE — PLAN OF CARE
07/13/18 1800   Urostomy    No Placement Date or Time found. Pre-existing: Yes  Inserted by: [facility], for bladder irrigation ONLY  Urostomy Type: (c)    Stomal Appliance Other (Comment)  (No appliance needed)   Stoma  Assessment Pink;Moist   Treatment Other (Comment)  (Stoma for Irrigation ONLY)     Ostomy Care Consult - Patient has a continent Urinary stoma placed by [facility]. Patient states that she irrigates the stoma with a catheter and Sterile NSS twice daily to flush bladder. Met patient with Dr Jeffrey Carolina from Urology. Patient currently has a [place] catheter through Urethra and into bladder which is draining clear pale yellow urine. Will not need to irrigate bladder while [place] cath is in place. Patient is to follow up with [facility] after Discharge. Will cintinue to follow as needed.   Thanks,  James Mcintosh RN, [facility]

## 2018-07-13 NOTE — PROGRESS NOTES
Inpatient Occupational Therapy  Evaluation and Treatment    Unit:[facility]  Date:  7/13/2018  Patient Name:    Suha Newell  Admitting diagnosis:  UTI(URINARY TRACT INFECTION)  Admit Date:  7/12/2018  Precautions/Restrictions/WB Status/ Lines/ Wounds/ Oxygen:  Treatment Time:  8817 - 0935  Treatment Number:  1      Patient Goals for Therapy: \" go home \"         Discharge Recommendations: Home with PRN assist, resume outpatient therapy  AM-PAC Score: 21  DME needs for discharge:  Needs met     Preadmission Environment    Pt. Lives alone; her daughter is supportive, but unable to stay with pt around the clock  Home environment: [facility] home. Steps to enter first floor:  1 threshold step  Bathroom: walk-in shower or tub; pt typically takes shower chair, grab bars, hand-held shower head  Equipment owned: wheelchair, BSC, shower chair, RW, 4WW, cane, reacher, Adjustable bed,no rails     Preadmission Status   Pt. Able to drive, pt's daughter helps also. Pt's daughter typically does the grocery shopping, but pt would like to do this more independently. Pt. Had supervision, light assistance from her daughter for dressing and bathing, but since her most recent surgery, she has not been able to shower. Pt. Had assistance from family for cooking, cleaning, laundry  Pt independent with microwave meals  Pt. Fully independent for transfers and gait and walked with [facility] at all times   Pt reports near falls in the past couple months, but no actual falls.      Pt has been in OP PT (home PT services denied by insurance) following L TKA which occurred 5/9/18. Has been FWB since 6/4/18 (was initially TTWB 2/2 complicated surgery). The brace has been discontinued.    Recent (7/3/18) hypoglossal nerve stimulator placement for treatment of sleep apnea; no precautions to note      Pain    Rating/Location: 8/10 (L knee), 6/10 (\"Arthritis\" in hips, knees, back), 8/10 at incision sites (under chin & under R breast)  Pain Medicine Status: pt has received pain medicine      Cognition    A&Ox4, patient appropriate and cooperative. Patient lying supine in bed with no family present. Follows 1 step and 2 step commands. Upper Extremity ROM:    WFL, pt able to perform all bed mobility, transfers, and gait without ROM limitation. BUE shoulder flexion limited to ~90 degrees [facility])    Upper Extremity Strength:    WFL, pt able to perform all bed mobility, transfers, and gait without strength limitation. BUE strength grossly 4/5    Upper Extremity Sensation:    No apparent deficits. Upper Extremity Proprioception:    No apparent deficits. Skin Integrity:    Grossly intact  interdry pad over incision below breast    Coordination and Tone:    Appears WNL through observation      Balance:    Static Sitting:good  Dynamic Sitting:good for LE strength testing  Static Standing:Good with unilateral support  Dynamic Standing:good with unilateral support    Bed mobility:    Supine to sit:SBA with HOB elevated, use of bedrails  Sit to supine:NT  Scooting to head of bed:NT  Scooting in sitting:SBA at EOB  Rolling:NT  Bridging:NT    Transfers:    Sit to stand:CGA from EOB and from [facility] placed over toilet  Stand to sit:CGA to [facility] placed over toilet, CGA to chair  Bed to Chair:Ambulatory CGA with SPC  Bed to BSC:Ambulatory to restroom CGA with SPC    Dressing:   UE:NT  LE:Min A to na depends over feet, Min A to pull up in stance    Bathing:  UE:NT  LE:NT  Grooming: Pt IND to brush teeth after set up     Eating:    IND per report, not observed    Positioning Needs:   Call light and needs in reach    Exercise / Activities Initiated:   none    Patient/Family Education:   Role of OT    Assessment of Deficits: Self Care [id number]  Pt demonstrated decreased activity tolerance, decreased safety awareness, decreased strength, decreased ROM, decreased balance, and decreased bed mobility, transfers, dressing, and bathing.     Pt. Limited during evaluation by

## 2018-07-13 NOTE — PLAN OF CARE
Problem: Falls - Risk of:  Goal: Will remain free from falls  Will remain free from falls   Outcome: Ongoing    Goal: Absence of physical injury  Absence of physical injury   Outcome: Ongoing      Problem: Sensory:  Goal: General experience of comfort will improve  General experience of comfort will improve   Outcome: Ongoing      Problem: Urinary Elimination:  Goal: Signs and symptoms of infection will decrease  Signs and symptoms of infection will decrease   Outcome: Ongoing    Goal: Ability to reestablish a normal urinary elimination pattern will improve - after catheter removal  Ability to reestablish a normal urinary elimination pattern will improve   Outcome: Ongoing    Goal: Complications related to the disease process, condition or treatment will be avoided or minimized  Complications related to the disease process, condition or treatment will be avoided or minimized   Outcome: Ongoing

## 2018-07-13 NOTE — CARE COORDINATION
Case Management Assessment  Initial Evaluation    Date/Time of Evaluation: 7/13/2018 12:00 PM  Assessment Completed by: Guadalupe Bhatti    Patient Name: Erica August  YOB: 1955  Diagnosis: UTI(URINARY TRACT INFECTION)  Date / Time: 7/12/2018  4:43 PM  Admission status/Date: 07/12/2018  Chart Reviewed: Yes      Patient Interviewed: Yes   Family Interviewed:  No      Hospitalization in the last 30 days:  No    Contacts  : Virgie Justice   Relationship to Patient:daughter  Phone Number: [phone]  Alternate Contact:     Relationship to Patient:     Phone Number:      Current PCP Dr Hinton Kil: Family Members  Transportation: self   and daughter   Meal Preparation: self and daughter     Housing  Home Environment: lives alone   Steps: 1 to entry  Plans to Return to Present Housing: Yes  Other Identified Issues: none     Luis Enrique Escobar  Currently active with [employer] : No  Type of Home Care Services: None  Passport/Waiver : No  Passport/Waiver Services: none     Durable Medical Equipment   DME Provider:   Equipment: wheelchair, walker, cane, shower chair, elevated toilet seat, grab bars, bedside commode    Has Home O2 in place on admit:  No  Informed of need to bring portable home O2 tank on day of discharge for nursing to connect prior to leaving:   Not Indicated  Verbalized agreement/Understanding:   No    Community Service Affiliation  Dialysis:  No  Outpatient PT/OT: No  - states has attended in the past   [facility]: No     CHF Clinic: No     Pulmonary Rehab: No  Pain Clinic: Yes - attends the Pain Clinic in [address]: No    Wound Clinic: No     Other: none     DISCHARGE PLAN:  Chart reviewed. Spoke with the patient at the bedside. Explained the role of the RN Case Manager, voices understanding. Patient reports that she resides alone, has the support and assist of her daughter when needed. She has had OP PT/OT and HHC in the past, is agreeable to these services if \"necessary\". CM will follow and provide any assist needed.

## 2018-07-13 NOTE — PROGRESS NOTES
Bedside report given to [facility] RN pt in stable condition no needs at this time.  Call light within reach

## 2018-07-14 LAB
ALBUMIN SERPL-MCNC: 2.9 G/DL (ref 3.4–5)
ALBUMIN SERPL-MCNC: 3 G/DL (ref 3.4–5)
ANION GAP SERPL CALCULATED.3IONS-SCNC: 10 MMOL/L (ref 3–16)
ANION GAP SERPL CALCULATED.3IONS-SCNC: 13 MMOL/L (ref 3–16)
BASOPHILS ABSOLUTE: 0.1 K/UL (ref 0–0.2)
BASOPHILS RELATIVE PERCENT: 1.2 %
BUN BLDV-MCNC: 10 MG/DL (ref 7–20)
BUN BLDV-MCNC: 8 MG/DL (ref 7–20)
CALCIUM SERPL-MCNC: 7.9 MG/DL (ref 8.3–10.6)
CALCIUM SERPL-MCNC: 8.5 MG/DL (ref 8.3–10.6)
CHLORIDE BLD-SCNC: 110 MMOL/L (ref 99–110)
CHLORIDE BLD-SCNC: 111 MMOL/L (ref 99–110)
CO2: 20 MMOL/L (ref 21–32)
CO2: 23 MMOL/L (ref 21–32)
CREAT SERPL-MCNC: <0.5 MG/DL (ref 0.6–1.2)
CREAT SERPL-MCNC: <0.5 MG/DL (ref 0.6–1.2)
EOSINOPHILS ABSOLUTE: 0.2 K/UL (ref 0–0.6)
EOSINOPHILS RELATIVE PERCENT: 3.9 %
GFR AFRICAN AMERICAN: >60
GFR AFRICAN AMERICAN: >60
GFR NON-AFRICAN AMERICAN: >60
GFR NON-AFRICAN AMERICAN: >60
GLUCOSE BLD-MCNC: 124 MG/DL (ref 70–99)
GLUCOSE BLD-MCNC: 93 MG/DL (ref 70–99)
HCT VFR BLD CALC: 36.8 % (ref 36–48)
HEMOGLOBIN: 12.2 G/DL (ref 12–16)
LYMPHOCYTES ABSOLUTE: 2.2 K/UL (ref 1–5.1)
LYMPHOCYTES RELATIVE PERCENT: 47 %
MAGNESIUM: 1.8 MG/DL (ref 1.8–2.4)
MCH RBC QN AUTO: 30.2 PG (ref 26–34)
MCHC RBC AUTO-ENTMCNC: 33.3 G/DL (ref 31–36)
MCV RBC AUTO: 91 FL (ref 80–100)
MONOCYTES ABSOLUTE: 0.4 K/UL (ref 0–1.3)
MONOCYTES RELATIVE PERCENT: 8.9 %
NEUTROPHILS ABSOLUTE: 1.8 K/UL (ref 1.7–7.7)
NEUTROPHILS RELATIVE PERCENT: 39 %
ORGANISM: ABNORMAL
PDW BLD-RTO: 15.4 % (ref 12.4–15.4)
PHOSPHORUS: 2.2 MG/DL (ref 2.5–4.9)
PHOSPHORUS: 3.2 MG/DL (ref 2.5–4.9)
PLATELET # BLD: 215 K/UL (ref 135–450)
PMV BLD AUTO: 8.6 FL (ref 5–10.5)
POTASSIUM SERPL-SCNC: 2.8 MMOL/L (ref 3.5–5.1)
POTASSIUM SERPL-SCNC: 3.6 MMOL/L (ref 3.5–5.1)
RBC # BLD: 4.04 M/UL (ref 4–5.2)
SODIUM BLD-SCNC: 143 MMOL/L (ref 136–145)
SODIUM BLD-SCNC: 144 MMOL/L (ref 136–145)
URINE CULTURE, ROUTINE: ABNORMAL
URINE CULTURE, ROUTINE: ABNORMAL
WBC # BLD: 4.7 K/UL (ref 4–11)

## 2018-07-14 PROCEDURE — 1200000000 HC SEMI PRIVATE

## 2018-07-14 PROCEDURE — 96367 TX/PROPH/DG ADDL SEQ IV INF: CPT | Performed by: INTERNAL MEDICINE

## 2018-07-14 PROCEDURE — 6370000000 HC RX 637 (ALT 250 FOR IP): Performed by: HOSPITALIST

## 2018-07-14 PROCEDURE — 6360000002 HC RX W HCPCS: Performed by: INTERNAL MEDICINE

## 2018-07-14 PROCEDURE — 96372 THER/PROPH/DIAG INJ SC/IM: CPT | Performed by: INTERNAL MEDICINE

## 2018-07-14 PROCEDURE — 51798 US URINE CAPACITY MEASURE: CPT

## 2018-07-14 PROCEDURE — 9990 CHARGE CONVERSION

## 2018-07-14 PROCEDURE — 2580000003 HC RX 258: Performed by: INTERNAL MEDICINE

## 2018-07-14 PROCEDURE — 6370000000 HC RX 637 (ALT 250 FOR IP): Performed by: INTERNAL MEDICINE

## 2018-07-14 PROCEDURE — 2580000003 HC RX 258: Performed by: PHYSICIAN ASSISTANT

## 2018-07-14 PROCEDURE — 36415 COLL VENOUS BLD VENIPUNCTURE: CPT

## 2018-07-14 PROCEDURE — 83735 ASSAY OF MAGNESIUM: CPT

## 2018-07-14 PROCEDURE — G0378 HOSPITAL OBSERVATION PER HR: HCPCS | Performed by: INTERNAL MEDICINE

## 2018-07-14 PROCEDURE — 6360000002 HC RX W HCPCS: Performed by: PHYSICIAN ASSISTANT

## 2018-07-14 PROCEDURE — 6370000000 HC RX 637 (ALT 250 FOR IP): Performed by: PHYSICIAN ASSISTANT

## 2018-07-14 PROCEDURE — 96375 TX/PRO/DX INJ NEW DRUG ADDON: CPT | Performed by: INTERNAL MEDICINE

## 2018-07-14 PROCEDURE — 80069 RENAL FUNCTION PANEL: CPT

## 2018-07-14 PROCEDURE — 96366 THER/PROPH/DIAG IV INF ADDON: CPT | Performed by: INTERNAL MEDICINE

## 2018-07-14 PROCEDURE — 85025 COMPLETE CBC W/AUTO DIFF WBC: CPT

## 2018-07-14 RX ORDER — MAGNESIUM SULFATE IN WATER 40 MG/ML
2 INJECTION, SOLUTION INTRAVENOUS ONCE
Status: COMPLETED | OUTPATIENT
Start: 2018-07-14 | End: 2018-07-14

## 2018-07-14 RX ORDER — POTASSIUM CHLORIDE 7.45 MG/ML
20 INJECTION INTRAVENOUS ONCE
Status: DISCONTINUED | OUTPATIENT
Start: 2018-07-14 | End: 2018-07-14 | Stop reason: CLARIF

## 2018-07-14 RX ORDER — POTASSIUM CHLORIDE 20 MEQ/1
40 TABLET, EXTENDED RELEASE ORAL 2 TIMES DAILY WITH MEALS
Status: COMPLETED | OUTPATIENT
Start: 2018-07-14 | End: 2018-07-14

## 2018-07-14 RX ADMIN — PRIMIDONE 100 MG: 50 TABLET ORAL at 21:29

## 2018-07-14 RX ADMIN — SODIUM CHLORIDE: 900 INJECTION, SOLUTION INTRAVENOUS at 03:07

## 2018-07-14 RX ADMIN — Medication 10 ML: at 21:30

## 2018-07-14 RX ADMIN — SERTRALINE 200 MG: 100 TABLET, FILM COATED ORAL at 21:30

## 2018-07-14 RX ADMIN — FAMOTIDINE 20 MG: 20 TABLET ORAL at 21:30

## 2018-07-14 RX ADMIN — OXYCODONE HYDROCHLORIDE AND ACETAMINOPHEN 1 TABLET: 7.5; 325 TABLET ORAL at 21:47

## 2018-07-14 RX ADMIN — RDII 250 MG CAPSULE 250 MG: at 09:04

## 2018-07-14 RX ADMIN — NALOXEGOL OXALATE 25 MG: 12.5 TABLET, FILM COATED ORAL at 05:48

## 2018-07-14 RX ADMIN — SODIUM CHLORIDE 20 MEQ: 9 INJECTION, SOLUTION INTRAVENOUS at 09:05

## 2018-07-14 RX ADMIN — PANTOPRAZOLE SODIUM 40 MG: 40 TABLET, DELAYED RELEASE ORAL at 09:04

## 2018-07-14 RX ADMIN — ENOXAPARIN SODIUM 40 MG: 40 INJECTION, SOLUTION INTRAVENOUS; SUBCUTANEOUS at 09:05

## 2018-07-14 RX ADMIN — MAGNESIUM SULFATE HEPTAHYDRATE 2 G: 40 INJECTION, SOLUTION INTRAVENOUS at 11:18

## 2018-07-14 RX ADMIN — LEVOTHYROXINE SODIUM 75 MCG: 50 TABLET ORAL at 09:04

## 2018-07-14 RX ADMIN — PRIMIDONE 100 MG: 50 TABLET ORAL at 09:04

## 2018-07-14 RX ADMIN — SODIUM CHLORIDE 1.5 G: 900 INJECTION INTRAVENOUS at 08:26

## 2018-07-14 RX ADMIN — SODIUM CHLORIDE: 900 INJECTION, SOLUTION INTRAVENOUS at 17:04

## 2018-07-14 RX ADMIN — FAMOTIDINE 20 MG: 20 TABLET ORAL at 09:04

## 2018-07-14 RX ADMIN — OXYCODONE HYDROCHLORIDE AND ACETAMINOPHEN 1 TABLET: 7.5; 325 TABLET ORAL at 16:05

## 2018-07-14 RX ADMIN — Medication 1 SPRAY: at 21:47

## 2018-07-14 RX ADMIN — POTASSIUM CHLORIDE 40 MEQ: 1500 TABLET, EXTENDED RELEASE ORAL at 16:05

## 2018-07-14 RX ADMIN — CLONAZEPAM 1 MG: 1 TABLET ORAL at 09:04

## 2018-07-14 RX ADMIN — FLUDROCORTISONE ACETATE 0.1 MG: 0.1 TABLET ORAL at 21:30

## 2018-07-14 RX ADMIN — SODIUM CHLORIDE 1.5 G: 900 INJECTION INTRAVENOUS at 13:50

## 2018-07-14 RX ADMIN — ATORVASTATIN CALCIUM 20 MG: 10 TABLET, FILM COATED ORAL at 21:30

## 2018-07-14 RX ADMIN — ONDANSETRON 4 MG: 2 INJECTION INTRAMUSCULAR; INTRAVENOUS at 23:54

## 2018-07-14 RX ADMIN — FLUDROCORTISONE ACETATE 0.1 MG: 0.1 TABLET ORAL at 09:04

## 2018-07-14 RX ADMIN — DONEPEZIL HYDROCHLORIDE 10 MG: 5 TABLET, FILM COATED ORAL at 21:29

## 2018-07-14 RX ADMIN — Medication 10 ML: at 09:05

## 2018-07-14 RX ADMIN — POTASSIUM CHLORIDE 40 MEQ: 1500 TABLET, EXTENDED RELEASE ORAL at 07:34

## 2018-07-14 RX ADMIN — RDII 250 MG CAPSULE 250 MG: at 21:30

## 2018-07-14 RX ADMIN — CLONAZEPAM 1 MG: 1 TABLET ORAL at 21:47

## 2018-07-14 RX ADMIN — AMITRIPTYLINE HYDROCHLORIDE 25 MG: 25 TABLET, FILM COATED ORAL at 21:30

## 2018-07-14 RX ADMIN — SODIUM CHLORIDE 1.5 G: 900 INJECTION INTRAVENOUS at 21:46

## 2018-07-14 RX ADMIN — OXYCODONE HYDROCHLORIDE AND ACETAMINOPHEN 1 TABLET: 7.5; 325 TABLET ORAL at 09:04

## 2018-07-14 ASSESSMENT — PAIN DESCRIPTION - DESCRIPTORS: DESCRIPTORS: SHARP

## 2018-07-14 ASSESSMENT — PAIN SCALES - GENERAL
PAINLEVEL_OUTOF10: 8
PAINLEVEL_OUTOF10: 8
PAINLEVEL_OUTOF10: 6
PAINLEVEL_OUTOF10: 6
PAINLEVEL_OUTOF10: 8

## 2018-07-14 ASSESSMENT — PAIN DESCRIPTION - PROGRESSION: CLINICAL_PROGRESSION: GRADUALLY WORSENING

## 2018-07-14 ASSESSMENT — PAIN DESCRIPTION - LOCATION: LOCATION: KNEE;BACK

## 2018-07-14 ASSESSMENT — PAIN DESCRIPTION - PAIN TYPE
TYPE: ACUTE PAIN;CHRONIC PAIN
TYPE: CHRONIC PAIN

## 2018-07-14 ASSESSMENT — PAIN DESCRIPTION - FREQUENCY: FREQUENCY: INTERMITTENT

## 2018-07-14 ASSESSMENT — PAIN DESCRIPTION - ORIENTATION: ORIENTATION: LEFT;LOWER

## 2018-07-14 ASSESSMENT — PAIN DESCRIPTION - ONSET: ONSET: PROGRESSIVE

## 2018-07-14 NOTE — PROGRESS NOTES
Rales/Wheezes/Rhonchi. Cardiovascular:  Regular rate and rhythm with normal S1/S2 without murmurs, rubs or gallops. PPM in situ. Abdomen: Soft, non-tender, non-distended with normal bowel sounds. Stoma present. Musculoskeletal:  L knee slightly swollen but not cellulitic-appearing. Skin: Incisional scar of L knee. Recent submental surgical scar. Neurologic:  Neurovascularly intact without any focal sensory/motor deficits. Cranial nerves: II-XII intact, grossly non-focal.  Psychiatric:  Flat affect. Capillary Refill: Brisk,< 3 seconds   Peripheral Pulses: +2 palpable, equal bilaterally     Labs:   Recent Labs      07/12/18   1101  07/13/18   0536  07/14/18   0540   WBC  8.7  5.4  4.7   HGB  13.8  12.0  12.2   HCT  41.5  36.1  36.8   PLT  244  212  215     Recent Labs      07/12/18   1101  07/13/18   0536  07/14/18   0540   NA  142  141  143   K  5.1  3.9  2.8*   CL  104  110  110   CO2  25  22  23   BUN  12  9  10   CREATININE  <0.5*  <0.5*  <0.5*   CALCIUM  9.7  8.0*  7.9*   PHOS   --    --   3.2     Recent Labs      07/12/18   1101   AST  35   ALT  13   BILITOT  <0.2   ALKPHOS  146*     Recent Labs      07/12/18   1101   INR  1.04     Recent Labs      07/12/18   1101   TROPONINI  <0.01       Urinalysis:    Lab Results   Component Value Date    NITRU POSITIVE 07/12/2018    WBCUA 10-20 07/12/2018    BACTERIA 4+ 07/12/2018    RBCUA 1-3 07/12/2018    BLOODU Negative 07/12/2018    SPECGRAV 1.015 07/12/2018    GLUCOSEU Negative 07/12/2018    GLUCOSEU NEGATIVE 08/10/2011       Radiology:  CT SOFT TISSUE NECK W CONTRAST   Final Result   Stimulator device terminates in the right inferior tongue/ floor of the   mouth. There is a small gas containing collection in the right submandibular   region measuring up to 1.2 cm. It is unclear whether this represents a   developing abscess or resolving postoperative gas and additional   postoperative change.       Asymmetric soft tissue fullness/ swelling of the right tongue and lingual   tonsil may relate to postsurgical change or a small hematoma. There is no   significant airway effacement. Proximal esophageal wall thickening. Correlate for esophagitis. Findings were discussed with Deborah Alegria at 3:27 pm on 7/12/2018. CT ABDOMEN PELVIS W IV CONTRAST Additional Contrast? None   Final Result   Moderate to severe urinary retention. Small bowel containing right lower quadrant Kilgore's hernia. XR KNEE LEFT (3 VIEWS)   Final Result   Joint effusion and diffuse soft tissue swelling. No acute fracture. Assessment/Plan:    Active Hospital Problems    Diagnosis Date Noted    Pacemaker [Z95.0] 07/13/2018    Polyuria [R35.8] 07/13/2018    Diarrhea [R19.7] 07/13/2018    Hx of Enterococcus UTI [N39.0, B95.2] 07/13/2018    Sepsis ([facility] [address] [A41.9] 07/13/2018    Effusion of prosthetic L knee joint [M25.462] 07/13/2018    CAD (coronary artery disease) [U71.45]     Complicated UTI (urinary tract infection) [N39.0] 07/12/2018    S/P placement of hypoglossal cranial nerve stimulator   [Z96.89] 07/12/2018    History of bladder augmentation [Z98.890] 07/12/2018    Urinary retention [R33.9] 07/12/2018    Chronic bilateral low back pain without sciatica [M54.5, G89.29] 04/25/2018    MCI (mild cognitive impairment) with memory loss [G31.84] 01/23/2018    HTN (hypertension), benign [I10] 06/13/2017    Primary insomnia [F51.01] 06/13/2017    Essential tremor [G25.0] 10/17/2016    NATHAN (obstructive sleep apnea) [G47.33] 01/26/2016    Obesity (BMI 30-39. 9) [E66.9] 02/12/2015    Osteoporosis [M81.0] 09/08/2014    S/P gastric bypass [Z98.84] 08/14/2014    Hypothyroidism [E03.9] 06/04/2014    Anxiety & depression [F41.9]     GERD (gastroesophageal reflux disease) [K21.9] 11/05/2013    Bronchiectasis ([facility] [address] [J47.9] 11/05/2013    Esophageal dysmotility [K22.4] 33/49/2517       Complicated UTI; Hx Multiple Bladder Surgeries/Procedures.   - urine culture prelim enterococcus.   - Rocephin and vanc to Unasyn IV. - urology consult (pt followed by Dr. Cammy Silva).       Acute Urinary Retention.  - [place] placed in ED.  - urology consult (pt followed by Dr. Cammy Silva).       Diarrhea - resolved. - presumably a symptom of UTI rather than a primary GI pathology/infection. - place on pro-biotic since she is on abx.        L Knee Swelling s/p Recent L -TKA. - knee does not look infected. - XR showing joint effusion, but my suspicion for septic arthritis is low. - I told her she can follow-up with ortho as an OP when discharged.  - PT/OT.        Neck Abscess unlikely with no symptoms. s/p Recent Hypoglossal Nerve Stimulator Implantation. - CT soft tissue neck showing fluid collection. - pt does not have any complaints of neck discomfort.         Sepsis. - pt barely but technically meets SIRS/sepsis criteria with tachycardia to 94 and tachypnea to 21. She does have known infection  - abx as above. - IVF's. - monitor labs and vitals.   - LA normal.       DVT Prophylaxis: lovenox  Diet: DIET GENERAL;  Code Status: Full Code        Dispo - cc    Jonathan Redd MD

## 2018-07-14 NOTE — PROGRESS NOTES
Bladder scan shows 838 ml, flushed barnes at this time per Urology note, output restarted. Mucus plug noted in tube. Will continue to monitor.

## 2018-07-14 NOTE — PROGRESS NOTES
Physical Therapy    Attempted tx. Pt. Supine in bed shivering, with reports of not feeling well. Patient reports issues with getting mucus plugs in catheter through the night and this morning which is making her not feel well. Patient politely declined therapy today, stating that she does not feel well enough to participate. Patient educated on benefits of PT treatment, but continued to decline. Warming blankets provided per patient request.  Will continue to follow.        Seferino De Los Santos, [address] [place] [address]

## 2018-07-14 NOTE — PLAN OF CARE
Problem: Falls - Risk of:  Goal: Will remain free from falls  Will remain free from falls   Outcome: Ongoing      Problem: Urinary Elimination:  Goal: Signs and symptoms of infection will decrease  Signs and symptoms of infection will decrease   Outcome: Ongoing      Problem: Pain:  Goal: Pain level will decrease  Pain level will decrease   Outcome: Ongoing

## 2018-07-14 NOTE — PROGRESS NOTES
Pt A/O x 4 assessment completed. PM med's given. Pt denies any needs at this time.  Call light within reach

## 2018-07-14 NOTE — PROGRESS NOTES
Bedside report given to [facility] RN  pt in stable condition no needs at this time.  Call light within reach

## 2018-07-15 VITALS
TEMPERATURE: 97.5 F | SYSTOLIC BLOOD PRESSURE: 89 MMHG | BODY MASS INDEX: 32.6 KG/M2 | DIASTOLIC BLOOD PRESSURE: 52 MMHG | RESPIRATION RATE: 16 BRPM | WEIGHT: 184 LBS | HEIGHT: 63 IN | OXYGEN SATURATION: 94 % | HEART RATE: 69 BPM

## 2018-07-15 LAB
ALBUMIN SERPL-MCNC: 2.9 G/DL (ref 3.4–5)
ANION GAP SERPL CALCULATED.3IONS-SCNC: 8 MMOL/L (ref 3–16)
BASOPHILS ABSOLUTE: 0 K/UL (ref 0–0.2)
BASOPHILS RELATIVE PERCENT: 0.9 %
BUN BLDV-MCNC: 6 MG/DL (ref 7–20)
CALCIUM SERPL-MCNC: 8.2 MG/DL (ref 8.3–10.6)
CHLORIDE BLD-SCNC: 108 MMOL/L (ref 99–110)
CO2: 24 MMOL/L (ref 21–32)
CREAT SERPL-MCNC: <0.5 MG/DL (ref 0.6–1.2)
EOSINOPHILS ABSOLUTE: 0.2 K/UL (ref 0–0.6)
EOSINOPHILS RELATIVE PERCENT: 3.4 %
GFR AFRICAN AMERICAN: >60
GFR NON-AFRICAN AMERICAN: >60
GLUCOSE BLD-MCNC: 91 MG/DL (ref 70–99)
HCT VFR BLD CALC: 36.9 % (ref 36–48)
HEMOGLOBIN: 12.2 G/DL (ref 12–16)
LYMPHOCYTES ABSOLUTE: 2.5 K/UL (ref 1–5.1)
LYMPHOCYTES RELATIVE PERCENT: 47.5 %
MAGNESIUM: 2.3 MG/DL (ref 1.8–2.4)
MCH RBC QN AUTO: 30.2 PG (ref 26–34)
MCHC RBC AUTO-ENTMCNC: 33 G/DL (ref 31–36)
MCV RBC AUTO: 91.3 FL (ref 80–100)
MONOCYTES ABSOLUTE: 0.4 K/UL (ref 0–1.3)
MONOCYTES RELATIVE PERCENT: 8.1 %
NEUTROPHILS ABSOLUTE: 2.1 K/UL (ref 1.7–7.7)
NEUTROPHILS RELATIVE PERCENT: 40.1 %
PDW BLD-RTO: 15.7 % (ref 12.4–15.4)
PHOSPHORUS: 2.8 MG/DL (ref 2.5–4.9)
PLATELET # BLD: 223 K/UL (ref 135–450)
PMV BLD AUTO: 8.8 FL (ref 5–10.5)
POTASSIUM SERPL-SCNC: 3.8 MMOL/L (ref 3.5–5.1)
RBC # BLD: 4.04 M/UL (ref 4–5.2)
SODIUM BLD-SCNC: 140 MMOL/L (ref 136–145)
WBC # BLD: 5.2 K/UL (ref 4–11)

## 2018-07-15 PROCEDURE — 6370000000 HC RX 637 (ALT 250 FOR IP): Performed by: HOSPITALIST

## 2018-07-15 PROCEDURE — 2580000003 HC RX 258: Performed by: PHYSICIAN ASSISTANT

## 2018-07-15 PROCEDURE — 96372 THER/PROPH/DIAG INJ SC/IM: CPT | Performed by: INTERNAL MEDICINE

## 2018-07-15 PROCEDURE — 80069 RENAL FUNCTION PANEL: CPT

## 2018-07-15 PROCEDURE — 83735 ASSAY OF MAGNESIUM: CPT

## 2018-07-15 PROCEDURE — 6370000000 HC RX 637 (ALT 250 FOR IP): Performed by: PHYSICIAN ASSISTANT

## 2018-07-15 PROCEDURE — 97116 GAIT TRAINING THERAPY: CPT

## 2018-07-15 PROCEDURE — 6360000002 HC RX W HCPCS: Performed by: INTERNAL MEDICINE

## 2018-07-15 PROCEDURE — 85025 COMPLETE CBC W/AUTO DIFF WBC: CPT

## 2018-07-15 PROCEDURE — 2580000003 HC RX 258: Performed by: INTERNAL MEDICINE

## 2018-07-15 PROCEDURE — 96366 THER/PROPH/DIAG IV INF ADDON: CPT | Performed by: INTERNAL MEDICINE

## 2018-07-15 PROCEDURE — 6360000002 HC RX W HCPCS: Performed by: PHYSICIAN ASSISTANT

## 2018-07-15 PROCEDURE — 36415 COLL VENOUS BLD VENIPUNCTURE: CPT

## 2018-07-15 PROCEDURE — 97110 THERAPEUTIC EXERCISES: CPT

## 2018-07-15 PROCEDURE — G0378 HOSPITAL OBSERVATION PER HR: HCPCS | Performed by: INTERNAL MEDICINE

## 2018-07-15 RX ORDER — SACCHAROMYCES BOULARDII 250 MG
250 CAPSULE ORAL 2 TIMES DAILY
Qty: 28 CAPSULE | Refills: 0 | Status: SHIPPED | OUTPATIENT
Start: 2018-07-15 | End: 2018-07-29

## 2018-07-15 RX ORDER — AMOXICILLIN AND CLAVULANATE POTASSIUM 875; 125 MG/1; MG/1
1 TABLET, FILM COATED ORAL 2 TIMES DAILY
Qty: 14 TABLET | Refills: 0 | Status: SHIPPED | OUTPATIENT
Start: 2018-07-15 | End: 2018-07-22

## 2018-07-15 RX ADMIN — SODIUM CHLORIDE 1.5 G: 900 INJECTION INTRAVENOUS at 13:50

## 2018-07-15 RX ADMIN — OXYCODONE HYDROCHLORIDE AND ACETAMINOPHEN 1 TABLET: 7.5; 325 TABLET ORAL at 12:03

## 2018-07-15 RX ADMIN — PANTOPRAZOLE SODIUM 40 MG: 40 TABLET, DELAYED RELEASE ORAL at 09:05

## 2018-07-15 RX ADMIN — FAMOTIDINE 20 MG: 20 TABLET ORAL at 09:05

## 2018-07-15 RX ADMIN — NALOXEGOL OXALATE 25 MG: 12.5 TABLET, FILM COATED ORAL at 05:37

## 2018-07-15 RX ADMIN — CLONAZEPAM 1 MG: 1 TABLET ORAL at 15:15

## 2018-07-15 RX ADMIN — OXYCODONE HYDROCHLORIDE AND ACETAMINOPHEN 1 TABLET: 7.5; 325 TABLET ORAL at 05:37

## 2018-07-15 RX ADMIN — SODIUM CHLORIDE: 900 INJECTION, SOLUTION INTRAVENOUS at 11:35

## 2018-07-15 RX ADMIN — Medication 10 ML: at 09:05

## 2018-07-15 RX ADMIN — ENOXAPARIN SODIUM 40 MG: 40 INJECTION, SOLUTION INTRAVENOUS; SUBCUTANEOUS at 09:06

## 2018-07-15 RX ADMIN — SODIUM CHLORIDE 1.5 G: 900 INJECTION INTRAVENOUS at 02:07

## 2018-07-15 RX ADMIN — RDII 250 MG CAPSULE 250 MG: at 09:05

## 2018-07-15 RX ADMIN — SODIUM CHLORIDE: 900 INJECTION, SOLUTION INTRAVENOUS at 02:07

## 2018-07-15 RX ADMIN — FLUDROCORTISONE ACETATE 0.1 MG: 0.1 TABLET ORAL at 09:05

## 2018-07-15 RX ADMIN — SODIUM CHLORIDE 1.5 G: 900 INJECTION INTRAVENOUS at 08:17

## 2018-07-15 RX ADMIN — CLONAZEPAM 1 MG: 1 TABLET ORAL at 09:05

## 2018-07-15 RX ADMIN — LEVOTHYROXINE SODIUM 75 MCG: 50 TABLET ORAL at 09:05

## 2018-07-15 RX ADMIN — PRIMIDONE 100 MG: 50 TABLET ORAL at 09:05

## 2018-07-15 ASSESSMENT — PAIN SCALES - GENERAL
PAINLEVEL_OUTOF10: 6
PAINLEVEL_OUTOF10: 8
PAINLEVEL_OUTOF10: 5

## 2018-07-15 ASSESSMENT — PAIN DESCRIPTION - LOCATION: LOCATION: BACK;KNEE

## 2018-07-15 ASSESSMENT — PAIN DESCRIPTION - PAIN TYPE: TYPE: CHRONIC PAIN

## 2018-07-15 ASSESSMENT — PAIN DESCRIPTION - ORIENTATION: ORIENTATION: LEFT;LOWER

## 2018-07-15 ASSESSMENT — PAIN DESCRIPTION - DESCRIPTORS: DESCRIPTORS: CONSTANT;THROBBING

## 2018-07-15 ASSESSMENT — PAIN DESCRIPTION - ONSET: ONSET: ON-GOING

## 2018-07-15 ASSESSMENT — PAIN DESCRIPTION - PROGRESSION: CLINICAL_PROGRESSION: GRADUALLY IMPROVING

## 2018-07-15 NOTE — DISCHARGE SUMMARY
Hospital Medicine Discharge Summary    Patient ID: Kartik Barrera      Patient's PCP: Marciano Quinteros MD    Admit Date: 7/12/2018     Discharge Date:   7/15/2018    Admitting Physician: Greg Macias MD     Discharge Physician: Claudell Hard, MD     Discharge Diagnoses: Active Hospital Problems    Diagnosis Date Noted    Pacemaker [Z95.0] 07/13/2018    Polyuria [R35.8] 07/13/2018    Diarrhea [R19.7] 07/13/2018    Hx of Enterococcus UTI [N39.0, B95.2] 07/13/2018    Sepsis ([facility] [address] [A41.9] 07/13/2018    Effusion of prosthetic L knee joint [M25.462] 07/13/2018    CAD (coronary artery disease) [N71.03]     Complicated UTI (urinary tract infection) [N39.0] 07/12/2018    S/P placement of hypoglossal cranial nerve stimulator   [Z96.89] 07/12/2018    History of bladder augmentation [Z98.890] 07/12/2018    Urinary retention [R33.9] 07/12/2018    Chronic bilateral low back pain without sciatica [M54.5, G89.29] 04/25/2018    MCI (mild cognitive impairment) with memory loss [G31.84] 01/23/2018    HTN (hypertension), benign [I10] 06/13/2017    Primary insomnia [F51.01] 06/13/2017    Essential tremor [G25.0] 10/17/2016    NATHAN (obstructive sleep apnea) [G47.33] 01/26/2016    Obesity (BMI 30-39. 9) [E66.9] 02/12/2015    Osteoporosis [M81.0] 09/08/2014    S/P gastric bypass [Z98.84] 08/14/2014    Hypothyroidism [E03.9] 06/04/2014    Anxiety & depression [F41.9]     GERD (gastroesophageal reflux disease) [K21.9] 11/05/2013    Bronchiectasis ([facility] [address] [J47.9] 11/05/2013    Esophageal dysmotility [K22.4] 11/05/2013       The patient was seen and examined on day of discharge and this discharge summary is in conjunction with any daily progress note from day of discharge. Hospital Course: 58yo woman with complex surgical and urological history, s/p bladder augmentation, recent hypoglossal nerve stimulator implant, presented with polyuria, chills and rigors. Complicated UTI;  Hx Multiple Bladder Surgeries/Procedures. - urine culture with enterococcus.   - Rocephin and vanc to Unasyn IV - to augmentin PO on discharge  - urology consult (pt followed by Dr. Kimmie Moody). - needs OP follow up with primary urology at [facility] and assess for urodynamics        Acute Urinary Retention.  - [place] placed in ED.  - urology consult (pt followed by Dr. Kimmie Moody). - barnes removed today - voiding trial.         Diarrhea - resolved. - presumably a symptom of UTI rather than a primary GI pathology/infection. - place on pro-biotic since she is on abx.        L Knee Swelling s/p Recent L -TKA. - knee does not look infected. - XR showing joint effusion, but my suspicion for septic arthritis is low.  - follow-up with ortho as an OP when discharged.  - PT/OT.        Neck Abscess unlikely with no symptoms. s/p Recent Hypoglossal Nerve Stimulator Implantation. - CT soft tissue neck showing fluid collection. - pt does not have any complaints of neck discomfort.         Sepsis. - pt barely but technically meets SIRS/sepsis criteria with tachycardia to 94 and tachypnea to 21. She does have known infection  - abx as above. - IVF's. - monitor labs and vitals. - LA normal.          Physical Exam Performed:     BP (!) 89/52   Pulse 69   Temp 97.5 °F (36.4 °C) (Oral)   Resp 16   Ht 5' 3\" (1.6 m)   Wt 184 lb (83.5 kg)   SpO2 94%   BMI 32.59 kg/m²       General appearance:  Mildly obese, late-middle-aged WF lying in bed in NAD. HEENT/Neck:  Submental scar. Respiratory:  Normal respiratory effort. Clear to auscultation, bilaterally without Rales/Wheezes/Rhonchi. Cardiovascular:  Regular rate and rhythm with normal S1/S2 without murmurs, rubs or gallops. PPM in situ. Abdomen: Soft, non-tender, non-distended with normal bowel sounds. Stoma present. Musculoskeletal:  L knee slightly swollen but not cellulitic-appearing. Skin: Incisional scar of L knee. Recent submental surgical scar.   Neurologic:  Neurovascularly intact without any focal sensory/motor deficits. Cranial nerves: II-XII intact, grossly non-focal.  Psychiatric:  Flat affect. Capillary Refill: Brisk,< 3 seconds   Peripheral Pulses: +2 palpable, equal bilaterally       Labs: For convenience and continuity at follow-up the following most recent labs are provided:      CBC:    Lab Results   Component Value Date    WBC 5.2 07/15/2018    HGB 12.2 07/15/2018    HCT 36.9 07/15/2018     07/15/2018       Renal:    Lab Results   Component Value Date     07/15/2018    K 3.8 07/15/2018    K 3.9 07/13/2018     07/15/2018    CO2 24 07/15/2018    BUN 6 07/15/2018    CREATININE <0.5 07/15/2018    CALCIUM 8.2 07/15/2018    PHOS 2.8 07/15/2018         Significant Diagnostic Studies    Radiology:   CT SOFT TISSUE NECK W CONTRAST   Final Result   Stimulator device terminates in the right inferior tongue/ floor of the   mouth. There is a small gas containing collection in the right submandibular   region measuring up to 1.2 cm. It is unclear whether this represents a   developing abscess or resolving postoperative gas and additional   postoperative change. Asymmetric soft tissue fullness/ swelling of the right tongue and lingual   tonsil may relate to postsurgical change or a small hematoma. There is no   significant airway effacement. Proximal esophageal wall thickening. Correlate for esophagitis. Findings were discussed with Jyoti Servin at 3:27 pm on 7/12/2018. CT ABDOMEN PELVIS W IV CONTRAST Additional Contrast? None   Final Result   Moderate to severe urinary retention. Small bowel containing right lower quadrant Kilgore's hernia. XR KNEE LEFT (3 VIEWS)   Final Result   Joint effusion and diffuse soft tissue swelling. No acute fracture.                 Consults:     IP CONSULT TO HOSPITALIST  IP CONSULT TO UROLOGY  IP CONSULT TO PHARMACY    Disposition: home    Condition at Discharge: Stable    Discharge Instructions/Follow-up:   urology 1-2 weeks. PCP 1 week. Code Status:  Full Code     Activity: activity as tolerated    Diet: regular diet      Discharge Medications:     Current Discharge Medication List           Details   saccharomyces boulardii (FLORASTOR) 250 MG capsule Take 1 capsule by mouth 2 times daily for 14 days  Qty: 28 capsule, Refills: 0      amoxicillin-clavulanate (AUGMENTIN) 875-125 MG per tablet Take 1 tablet by mouth 2 times daily for 7 days  Qty: 14 tablet, Refills: 0              Details   naloxegol (MOVANTIK) 25 MG TABS tablet Take 25 mg by mouth every morning      donepezil (ARICEPT) 10 MG tablet TAKE 1 TABLET BY MOUTH NIGHTLY  Qty: 90 tablet, Refills: 2    Associated Diagnoses: Mild cognitive impairment      oxyCODONE-acetaminophen (PERCOCET) 7.5-325 MG per tablet Take 1 tablet by mouth every 6 hours as needed for Pain. Nila Lin KLOR-CON M20 20 MEQ extended release tablet Take 20 mEq by mouth 2 times daily       fludrocortisone (FLORINEF) 0.1 MG tablet Take 0.1 mg by mouth 2 times daily      atorvastatin (LIPITOR) 20 MG tablet       Water For Irrigation, Sterile (STERILE WATER FOR IRRIGATION) 2 times daily       primidone (MYSOLINE) 50 MG tablet Take 2 tablets by mouth 2 times daily  Qty: 360 tablet, Refills: 1    Associated Diagnoses: Essential tremor; Mild cognitive impairment; MCI (mild cognitive impairment) with memory loss      ranitidine (ZANTAC) 150 MG tablet Take 150 mg by mouth 2 times daily      sertraline (ZOLOFT) 100 MG tablet Take 200 mg by mouth nightly       pantoprazole (PROTONIX) 40 MG tablet TAKE 1 TABLET BY MOUTH DAILY  Refills: 0      loratadine (CLARITIN) 10 MG tablet Take 10 mg by mouth daily      fluticasone (FLONASE) 50 MCG/ACT nasal spray 1 spray by Nasal route nightly. Qty: 1 Bottle, Refills: 5      levothyroxine (SYNTHROID) 75 MCG tablet Take 1 tablet by mouth daily for 360 days.   Qty: 30 tablet, Refills: 11    Associated Diagnoses: Osteoporosis; Hyperparathyroidism ([address] Hypothyroidism      nitroGLYCERIN (NITROSTAT) 0.4 MG SL tablet Place 1 tablet under the tongue every 5 minutes as needed for Chest pain. Qty: 25 tablet, Refills: 3      amitriptyline (ELAVIL) 25 MG tablet Take 25 mg by mouth nightly       Multiple Vitamin (THERA) TABS Take 1 tablet by mouth daily       clonazepam (KLONOPIN) 1 MG tablet Take 1 tablet by mouth 3 times daily as needed for Anxiety for 21 doses. Qty: 21 tablet, Refills: 0      MONOJECT 60CC SYRINGE CATH TIP 60 ML MISC              Time Spent on discharge is more than 30 minutes in the examination, evaluation, counseling and review of medications and discharge plan. Signed:    Kendra Hernández MD   7/15/2018      Thank you Chadd Gomez MD for the opportunity to be involved in this patient's care. If you have any questions or concerns please feel free to contact me at [phone].

## 2018-07-15 NOTE — PROGRESS NOTES
Urology Attending Progress Note      Subjective: pt feeling better    Vitals:  /66   Pulse 68   Temp 97.6 °F (36.4 °C) (Oral)   Resp 16   Ht 5' 3\" (1.6 m)   Wt 184 lb (83.5 kg)   SpO2 97%   BMI 32.59 kg/m²   Temp  Av.5 °F (36.4 °C)  Min: 97 °F (36.1 °C)  Max: 97.8 °F (36.6 °C)    Intake/Output Summary (Last 24 hours) at 07/15/18 1213  Last data filed at 07/15/18 1135   Gross per 24 hour   Intake             2972 ml   Output             2600 ml   Net              372 ml       Exam: urine in barnes clear    Labs:  WBC:    Lab Results   Component Value Date    WBC 5.2 07/15/2018     Hemoglobin/Hematocrit:  Lab Results   Component Value Date    HGB 12.2 07/15/2018    HCT 36.9 07/15/2018     BMP:  Lab Results   Component Value Date     07/15/2018    K 3.8 07/15/2018    K 3.9 2018     07/15/2018    CO2 24 07/15/2018    BUN 6 07/15/2018    LABALBU 2.9 07/15/2018    CREATININE <0.5 07/15/2018    CALCIUM 8.2 07/15/2018    GFRAA >60 07/15/2018    GFRAA >60 2013    LABGLOM >60 07/15/2018     PT/INR:    Lab Results   Component Value Date    PROTIME 11.9 2018    INR 1.04 2018     PTT:    Lab Results   Component Value Date    APTT 26.8 2018   [APTT    Urine Culture:  enterococcus    Antibiotic Therapy:  Unasyn    Impression/Plan: neurogenic bladder with augmentation, UTI  1. Will d/c barnes today and let pt urinate  2. Urine culture final, on abx  3.  D/c to home when feeling better and pt to f/u with  Urology    Jann Gómez MD

## 2018-07-15 NOTE — PROGRESS NOTES
Prescriptions for Florastor & Augmentin called into [facility] at [facility]. Dr. Martines Led aware.

## 2018-07-15 NOTE — PROGRESS NOTES
Discharge instructions given to pt,verbalize understanding. Pt waiting for physician to sign prescription.

## 2018-07-15 NOTE — PROGRESS NOTES
Inpatient Physical Therapy Daily Treatment Note    Unit: 2West  Date:  7/15/2018  Patient Name:    Agustin Boudreaux  Admitting diagnosis:  UTI(URINARY TRACT INFECTION)  Admit Date:  7/12/2018  Precautions/Restrictions:  Fall risk, IV, barnes catheter   Pt has been in OP PT (home PT services denied by insurance) following L TKA which occurred 5/9/18. Has been FWB since 6/4/18 (was initially TTWB 2/2 complicated surgery). The brace has been discontinued. Recent (7/3/18) hypoglossal nerve stimulator placement for treatment of sleep apnea; no precautions to note     Discharge Recommendations: Home with PRN assist  AM-PAC Score: 21  DME needs at discharge: needs met     Treatment Time: 9:19 - 9:45  Treatment Number:  2    Subjective    Pt. Supine in bed with no family present. Pt. agreeable to PT treatment. Pt stated \"I am depressed. I'm taking my medicine but I don't know if it is working. I just feel overwhelmed. \" PT discussed matters that are making pt feel overwhelmed and possible solutions to help improve mood. Educated on benefits of inc physical activity during admission and how it can help with inc mood. Pt's family will be visiting today as well. Pain    Generalized pain 3-4/10. States she took pain meds this AM    Bed Mobility   Supine to Sit: SBA  Sit to Supine: NT  Rolling: SBA  Scooting: SBA    Transfer Training   Sit to stand: SBA  Stand to sit: SBA  Bed to Chair: SBA    Gait Training   Patient ambulated with SPC and CGA x125ft. Noted wide JENISE, dec yordy, dec step length B, steady, VC's for placement and sequencing with SPC.     Therapeutic Exercise   Ankle Pumps: x20 B  Quad Sets: x15 B  Glute Sets: x15 B  Heel Slides: x15 L  SLR: x15 L with min assistance  Hip ABD/ADD: x15 L  LAQ: x15 L    Balance     Static Sitting: good  Dynamic Sitting: good  Static Standing: fair +  Dynamic Standing: fair    Patient Education       Role of PT, POC, D/C recommendations, LE exercises, safe/proper transfer technique    Positioning Needs       Reclined in chair with all needs in reach and alarm on         Activity Tolerance     Vitals stable, min fatigue, no SOB     Assessment   Patient tolerated tx well with improved endurance and tolerance for ambulation. No LOB noted. Pt able to perform LE exercises, however continues to be limited by dec ROM into L knee flexion. PT encouraged pt to continue with knee flexion exercises throughout the day. Encouraged to ambulate at least twice more today. Continue to recommend D/C home with PRN assist.     Plan   Continue with plan of care. At end of treatment, patient in chair, alarm on, with call light and needs within reach.     Time Coded Treatment Minutes:  26 minutes    Total Treatment Time:  26  minutes    Jonathan Parra PT, DPT # [id number]

## 2018-07-15 NOTE — CARE COORDINATION
DISCHARGE ORDER  Date/Time 7/15/2018 2:07 PM  Completed by: Maxim Hobson, Case Management    Patient Name: Suha Newell    : 1955  Admitting Diagnosis: UTI(URINARY TRACT INFECTION)  Admit Date/Time: 2018  4:43 PM    Noted discharge order. Confirmed discharge plan with patient / family (pt): Yes   Discharge Plan: Reviewed chart. Role of discharge planner explained and patient verbalized understanding. Discharge order is noted. Pt is being d/c'd to home today to begin oral antibiotics tomorrow. Pt will need to call daughter for transportation home. Pt instructed to follow up with  urology in 1-2 weeks. She denies any other home care needs at this time. Pt's O2 sats are 94% on RA. No further discharge needs needed or noted. Discharge timeout done at the bedside with patient, and discussed with [facility] RN. All discharge needs and concerns addressed.

## 2018-07-15 NOTE — PROGRESS NOTES
Patient c/o nausea. Given farrah crackers secondary patient states \"I think it is because I've not eaten much today\" and cool washcloth to forehead. Medicated with Zofran 4 mg IVP.

## 2018-07-17 LAB
BLOOD CULTURE, ROUTINE: NORMAL
CULTURE, BLOOD 2: NORMAL

## 2018-07-22 NOTE — ED PROVIDER NOTES
I independently performed a history and physical on [address] [place] [address]. All diagnostic, treatment, and disposition decisions were made by myself in conjunction with the advanced practice provider. For further details of [facility] emergency department encounter, please see the advance practice provider's documentation. In brief, this is a 60y.o. year old female, with   Chief Complaint   Patient presents with    Diarrhea     Diarrhea, excessive urination, chills onset yestereday. Concerned about poosible infection in knee c/o swellling, pain onset after surgery on 5/9/18. Patient complains of diarrhea and urination since having a spinal stimulator laced. On examination she appears chronically ill. She is confused. Patient has some erythema about her right jawline but no obvious induration or warmth. Her abdomen has a small stoma site that is not leaking anything on the right upper quadrant. Otherwise soft and diffusely tender. Workup at this time does not show any evidence of infection of her newly placed implant. She does have significant urinary retention and a urinary tract infection. Catheter was placed and the patient was admitted.      Elmer Padilla MD  07/22/18 Andreas Reveles

## 2018-07-27 ENCOUNTER — OFFICE VISIT (OUTPATIENT)
Dept: ORTHOPEDIC SURGERY | Age: 63
End: 2018-07-27

## 2018-07-27 VITALS
HEIGHT: 63 IN | HEART RATE: 91 BPM | BODY MASS INDEX: 30.12 KG/M2 | SYSTOLIC BLOOD PRESSURE: 104 MMHG | DIASTOLIC BLOOD PRESSURE: 80 MMHG | WEIGHT: 170 LBS

## 2018-07-27 DIAGNOSIS — M17.12 PRIMARY LOCALIZED OSTEOARTHROSIS OF LEFT LOWER LEG: Primary | ICD-10-CM

## 2018-07-27 PROCEDURE — 99024 POSTOP FOLLOW-UP VISIT: CPT | Performed by: ORTHOPAEDIC SURGERY

## 2018-07-27 RX ORDER — ONDANSETRON 4 MG/1
4 TABLET, FILM COATED ORAL
COMMUNITY
Start: 2018-07-17 | End: 2018-08-16 | Stop reason: SDUPTHER

## 2018-07-27 RX ORDER — RANITIDINE 150 MG/1
TABLET ORAL
COMMUNITY
Start: 2018-06-23 | End: 2018-07-30

## 2018-07-27 RX ORDER — TROSPIUM CHLORIDE 20 MG/1
TABLET, FILM COATED ORAL
COMMUNITY
Start: 2018-07-09 | End: 2018-08-16 | Stop reason: ALTCHOICE

## 2018-07-27 RX ORDER — OXYCODONE AND ACETAMINOPHEN 7.5; 325 MG/1; MG/1
TABLET ORAL
COMMUNITY
Start: 2018-01-16 | End: 2018-07-30

## 2018-07-27 RX ORDER — AMOXICILLIN AND CLAVULANATE POTASSIUM 875; 125 MG/1; MG/1
TABLET, FILM COATED ORAL
COMMUNITY
Start: 2018-07-15 | End: 2018-07-30

## 2018-07-27 RX ORDER — SERTRALINE HYDROCHLORIDE 100 MG/1
TABLET, FILM COATED ORAL
COMMUNITY
End: 2018-07-30

## 2018-07-27 RX ORDER — PHENAZOPYRIDINE HYDROCHLORIDE 200 MG/1
200 TABLET, FILM COATED ORAL
COMMUNITY
Start: 2018-07-22 | End: 2018-08-01

## 2018-07-27 NOTE — PROGRESS NOTES
History  Florentino Rodriguez is a 60 y.o. female who returns for follow-up of her left total knee arthroplasty. The patient was in protected weightbearing after grafting of a defect in her lateral femoral condyle at the junction of the femoral shaft and the femoral component. This delayed her ability to regain quadriceps strength and recently she's been hospitalized for a urinary tract infection which required placement of a catheter and high-dose antibiotics. Her knee appears to be benign does not show any signs of sepsis. Symptoms are has slightly improved. Pain level today is 4/10. Treatment to date includes left total knee arthroplasty with grafting of the femoral defect and physical therapy. Patient's medications, allergies, past medical, surgical, social and family histories were reviewed and updated as appropriate. Review of Systems  Relevant review of systems reviewed and available in the patient's chart    Primary Area of CC: Termination left knee reveals well-healed surgical scar in the midline. She is able to extend to approximately -5° flexes to 110°. There is good stability medially and laterally. Distal neurovascular function is intact. Examination proximal and distal to the injured area show good overall ROM, no bony prominence or soft tissue tenderness, no instability or excessive stiffness. Radiology:       Impression   Diagnosis Orders   1. Primary localized osteoarthrosis of left lower leg              Plan  Visions currently recovering from her urinary tract infection and hopefully this will resolve she'll then be able to return to physical therapy to complete her course of therapy. I'll have her follow up with Dr. Emelia Price for further evaluation and treatment.     Melissa Head

## 2018-07-30 ENCOUNTER — HOSPITAL ENCOUNTER (EMERGENCY)
Age: 63
Discharge: HOME | End: 2018-07-30
Attending: EMERGENCY MEDICINE
Payer: MEDICARE

## 2018-07-30 VITALS
HEIGHT: 63 IN | HEART RATE: 89 BPM | SYSTOLIC BLOOD PRESSURE: 140 MMHG | WEIGHT: 180 LBS | BODY MASS INDEX: 31.89 KG/M2 | DIASTOLIC BLOOD PRESSURE: 85 MMHG | TEMPERATURE: 98 F | RESPIRATION RATE: 15 BRPM | OXYGEN SATURATION: 99 %

## 2018-07-30 DIAGNOSIS — R19.7 DIARRHEA, UNSPECIFIED TYPE: ICD-10-CM

## 2018-07-30 DIAGNOSIS — R11.0 NAUSEA: ICD-10-CM

## 2018-07-30 DIAGNOSIS — N12 PYELONEPHRITIS: Primary | ICD-10-CM

## 2018-07-30 LAB
A/G RATIO: 1 (ref 1.1–2.2)
ALBUMIN SERPL-MCNC: 3.6 G/DL (ref 3.4–5)
ALP BLD-CCNC: 134 U/L (ref 40–129)
ALT SERPL-CCNC: 11 U/L (ref 10–40)
ANION GAP SERPL CALCULATED.3IONS-SCNC: 10 MMOL/L (ref 3–16)
AST SERPL-CCNC: 25 U/L (ref 15–37)
BACTERIA: ABNORMAL /HPF
BASOPHILS ABSOLUTE: 0.1 K/UL (ref 0–0.2)
BASOPHILS RELATIVE PERCENT: 0.9 %
BILIRUB SERPL-MCNC: 0.3 MG/DL (ref 0–1)
BILIRUBIN URINE: ABNORMAL
BLOOD, URINE: ABNORMAL
BUN BLDV-MCNC: 12 MG/DL (ref 7–20)
CALCIUM SERPL-MCNC: 9.5 MG/DL (ref 8.3–10.6)
CHLORIDE BLD-SCNC: 102 MMOL/L (ref 99–110)
CLARITY: ABNORMAL
CO2: 24 MMOL/L (ref 21–32)
COLOR: YELLOW
CREAT SERPL-MCNC: <0.5 MG/DL (ref 0.6–1.2)
EOSINOPHILS ABSOLUTE: 0.2 K/UL (ref 0–0.6)
EOSINOPHILS RELATIVE PERCENT: 2.4 %
EPITHELIAL CELLS, UA: ABNORMAL /HPF
GFR AFRICAN AMERICAN: >60
GFR NON-AFRICAN AMERICAN: >60
GLOBULIN: 3.7 G/DL
GLUCOSE BLD-MCNC: 100 MG/DL (ref 70–99)
GLUCOSE URINE: NEGATIVE MG/DL
HCT VFR BLD CALC: 41.7 % (ref 36–48)
HEMOGLOBIN: 13.6 G/DL (ref 12–16)
KETONES, URINE: ABNORMAL MG/DL
LEUKOCYTE ESTERASE, URINE: ABNORMAL
LIPASE: 10 U/L (ref 13–60)
LYMPHOCYTES ABSOLUTE: 2.8 K/UL (ref 1–5.1)
LYMPHOCYTES RELATIVE PERCENT: 36.2 %
MCH RBC QN AUTO: 29.6 PG (ref 26–34)
MCHC RBC AUTO-ENTMCNC: 32.7 G/DL (ref 31–36)
MCV RBC AUTO: 90.4 FL (ref 80–100)
MICROSCOPIC EXAMINATION: YES
MONOCYTES ABSOLUTE: 0.6 K/UL (ref 0–1.3)
MONOCYTES RELATIVE PERCENT: 7.4 %
MUCUS: ABNORMAL /LPF
NEUTROPHILS ABSOLUTE: 4 K/UL (ref 1.7–7.7)
NEUTROPHILS RELATIVE PERCENT: 53.1 %
NITRITE, URINE: POSITIVE
PDW BLD-RTO: 15.5 % (ref 12.4–15.4)
PH UA: 6
PLATELET # BLD: 243 K/UL (ref 135–450)
PMV BLD AUTO: 8.5 FL (ref 5–10.5)
POTASSIUM SERPL-SCNC: 4.5 MMOL/L (ref 3.5–5.1)
PROTEIN UA: ABNORMAL MG/DL
RBC # BLD: 4.61 M/UL (ref 4–5.2)
RBC UA: ABNORMAL /HPF (ref 0–2)
SODIUM BLD-SCNC: 136 MMOL/L (ref 136–145)
SPECIFIC GRAVITY UA: 1.02
TOTAL PROTEIN: 7.3 G/DL (ref 6.4–8.2)
URINE TYPE: ABNORMAL
UROBILINOGEN, URINE: 1 E.U./DL
WBC # BLD: 7.6 K/UL (ref 4–11)
WBC UA: ABNORMAL /HPF (ref 0–5)

## 2018-07-30 PROCEDURE — 87086 URINE CULTURE/COLONY COUNT: CPT

## 2018-07-30 PROCEDURE — 87184 SC STD DISK METHOD PER PLATE: CPT

## 2018-07-30 PROCEDURE — 96374 THER/PROPH/DIAG INJ IV PUSH: CPT

## 2018-07-30 PROCEDURE — 96375 TX/PRO/DX INJ NEW DRUG ADDON: CPT

## 2018-07-30 PROCEDURE — 2580000003 HC RX 258: Performed by: NURSE PRACTITIONER

## 2018-07-30 PROCEDURE — 81001 URINALYSIS AUTO W/SCOPE: CPT

## 2018-07-30 PROCEDURE — 80053 COMPREHEN METABOLIC PANEL: CPT

## 2018-07-30 PROCEDURE — 83690 ASSAY OF LIPASE: CPT

## 2018-07-30 PROCEDURE — 96361 HYDRATE IV INFUSION ADD-ON: CPT

## 2018-07-30 PROCEDURE — 6360000002 HC RX W HCPCS

## 2018-07-30 PROCEDURE — 85025 COMPLETE CBC W/AUTO DIFF WBC: CPT

## 2018-07-30 PROCEDURE — 99284 EMERGENCY DEPT VISIT MOD MDM: CPT

## 2018-07-30 PROCEDURE — 87186 SC STD MICRODIL/AGAR DIL: CPT

## 2018-07-30 PROCEDURE — 6360000002 HC RX W HCPCS: Performed by: NURSE PRACTITIONER

## 2018-07-30 PROCEDURE — 87077 CULTURE AEROBIC IDENTIFY: CPT

## 2018-07-30 PROCEDURE — 6370000000 HC RX 637 (ALT 250 FOR IP): Performed by: NURSE PRACTITIONER

## 2018-07-30 RX ORDER — MORPHINE SULFATE 1 MG/ML
4 INJECTION, SOLUTION EPIDURAL; INTRATHECAL; INTRAVENOUS ONCE
Status: COMPLETED | OUTPATIENT
Start: 2018-07-30 | End: 2018-07-30

## 2018-07-30 RX ORDER — METOCLOPRAMIDE HYDROCHLORIDE 5 MG/ML
10 INJECTION INTRAMUSCULAR; INTRAVENOUS ONCE
Status: COMPLETED | OUTPATIENT
Start: 2018-07-30 | End: 2018-07-30

## 2018-07-30 RX ORDER — METOCLOPRAMIDE HYDROCHLORIDE 5 MG/ML
INJECTION INTRAMUSCULAR; INTRAVENOUS
Status: COMPLETED
Start: 2018-07-30 | End: 2018-07-30

## 2018-07-30 RX ORDER — ONDANSETRON 4 MG/1
4 TABLET, ORALLY DISINTEGRATING ORAL EVERY 8 HOURS PRN
Qty: 20 TABLET | Refills: 0 | Status: SHIPPED | OUTPATIENT
Start: 2018-07-30 | End: 2019-03-10 | Stop reason: ALTCHOICE

## 2018-07-30 RX ORDER — 0.9 % SODIUM CHLORIDE 0.9 %
1000 INTRAVENOUS SOLUTION INTRAVENOUS ONCE
Status: COMPLETED | OUTPATIENT
Start: 2018-07-30 | End: 2018-07-30

## 2018-07-30 RX ORDER — AMOXICILLIN AND CLAVULANATE POTASSIUM 875; 125 MG/1; MG/1
1 TABLET, FILM COATED ORAL 2 TIMES DAILY
Qty: 20 TABLET | Refills: 0 | Status: SHIPPED | OUTPATIENT
Start: 2018-07-30 | End: 2018-08-09

## 2018-07-30 RX ORDER — AMOXICILLIN AND CLAVULANATE POTASSIUM 875; 125 MG/1; MG/1
1 TABLET, FILM COATED ORAL ONCE
Status: COMPLETED | OUTPATIENT
Start: 2018-07-30 | End: 2018-07-30

## 2018-07-30 RX ORDER — ONDANSETRON 2 MG/ML
4 INJECTION INTRAMUSCULAR; INTRAVENOUS ONCE
Status: DISCONTINUED | OUTPATIENT
Start: 2018-07-30 | End: 2018-07-30

## 2018-07-30 RX ADMIN — Medication 4 MG: at 18:47

## 2018-07-30 RX ADMIN — AMOXICILLIN AND CLAVULANATE POTASSIUM 1 TABLET: 875; 125 TABLET, FILM COATED ORAL at 21:50

## 2018-07-30 RX ADMIN — SODIUM CHLORIDE 1000 ML: 9 INJECTION, SOLUTION INTRAVENOUS at 18:46

## 2018-07-30 RX ADMIN — METOCLOPRAMIDE HYDROCHLORIDE 10 MG: 5 INJECTION INTRAMUSCULAR; INTRAVENOUS at 18:47

## 2018-07-30 RX ADMIN — METOCLOPRAMIDE 10 MG: 5 INJECTION, SOLUTION INTRAMUSCULAR; INTRAVENOUS at 18:47

## 2018-07-30 ASSESSMENT — ENCOUNTER SYMPTOMS
SHORTNESS OF BREATH: 0
RESPIRATORY NEGATIVE: 1
EYES NEGATIVE: 1
ABDOMINAL PAIN: 1
DIARRHEA: 1
NAUSEA: 1
VOMITING: 0
COUGH: 0

## 2018-07-30 ASSESSMENT — PAIN DESCRIPTION - DESCRIPTORS: DESCRIPTORS: SHARP

## 2018-07-30 ASSESSMENT — PAIN DESCRIPTION - PAIN TYPE: TYPE: ACUTE PAIN

## 2018-07-30 ASSESSMENT — PAIN SCALES - GENERAL: PAINLEVEL_OUTOF10: 8

## 2018-07-30 ASSESSMENT — PAIN DESCRIPTION - FREQUENCY: FREQUENCY: CONTINUOUS

## 2018-07-30 ASSESSMENT — PAIN DESCRIPTION - LOCATION: LOCATION: ABDOMEN

## 2018-07-30 NOTE — ED PROVIDER NOTES
nausea. Negative for vomiting. Endocrine: Negative. Genitourinary: Positive for flank pain. Negative for decreased urine volume, hematuria and vaginal discharge. Skin: Negative. Neurological: Negative. Hematological: Negative. Psychiatric/Behavioral: Negative. Positives and Pertinent negatives as per HPI. Except as noted above in the ROS, all other systems were reviewed and negative. PAST MEDICAL HISTORY     Past Medical History:   Diagnosis Date    Angina at rest [facility])     Anxiety     Arthritis     hands and hip    Blood transfusion     after back surgery    Bradycardia     Bronchiectasis ([address] 11/5/2013    CAD (coronary artery disease)     Chronic back pain     Hyperlipidemia     Hypertension     Localized morphea     NATHAN treated with BiPAP     Pacemaker     Stress incontinence     Thyroid disease     hypothyroid         SURGICAL HISTORY       Past Surgical History:   Procedure Laterality Date    ABDOMEN SURGERY  9/9/11     REPAIR INCISIONAL HERNIA REDUCIBLE WITH POSSIBLE MESH    BACK SURGERY      x3    BACK SURGERY      stimulator    BLADDER SUSPENSION      CATARACT REMOVAL WITH IMPLANT Left 03/08/2018    PHACO EMULSIFICATION OF CATARACT WITH INTRAOCULAR LENS IMPLANT LEFT EYE    CERVICAL DISCECTOMY  6/2014    and fusion    CHOLECYSTECTOMY      COLONOSCOPY  2002 1/26/12    ENDOSCOPY, COLON, DIAGNOSTIC      EYE SURGERY Right 04/05/2018    cataract removal    FOOT SURGERY Right 12/6/12    arthroplasty    FOOT SURGERY Left 04/30/2015    FOOT SURGERY Left 4/30/15    GASTRIC BYPASS SURGERY  3777,3002    HYSTERECTOMY      KNEE ARTHROSCOPY Left 10/3/2014    part.  medial & lateral meniscectomy, synovectomy plica exc    KNEE ARTHROSCOPY Right 10/13/15    partial medial meniscectomy, chondroplasty, partial lateral meniscectomy    NECK SURGERY      OTHER SURGICAL HISTORY  april 2013    removal spinal cord stimulater    OTHER SURGICAL HISTORY      bladder Take 2 tablets by mouth 2 times daily    RANITIDINE (ZANTAC) 150 MG TABLET    Take 150 mg by mouth 2 times daily    SERTRALINE (ZOLOFT) 100 MG TABLET    Take 200 mg by mouth nightly     TROSPIUM (SANCTURA) 20 MG TABLET        WATER FOR IRRIGATION, STERILE (STERILE WATER FOR IRRIGATION)    2 times daily          ALLERGIES     Meloxicam; Benzoin compound; Lyrica [pregabalin]; Quetiapine; Quetiapine fumarate; Seroquel [quetiapine fumarate]; Tizanidine; Amberderm; Other; Tape [adhesive tape]; and Wound dressings    FAMILY HISTORY       Family History   Problem Relation Age of Onset    Heart Disease Father     Cancer Sister         multiple organs    Other Sister     Asthma Neg Hx     Diabetes Neg Hx     Emphysema Neg Hx     Heart Failure Neg Hx     Hypertension Neg Hx           SOCIAL HISTORY       Social History     Social History    Marital status:      Spouse name: N/A    Number of children: N/A    Years of education: N/A     Social History Main Topics    Smoking status: Never Smoker    Smokeless tobacco: Never Used    Alcohol use No    Drug use: No    Sexual activity: Yes     Partners: Female     Other Topics Concern    None     Social History Narrative    None       SCREENINGS             PHYSICAL EXAM    (up to 7 for level 4, 8 or more for level 5)     ED Triage Vitals [07/30/18 1744]   BP Temp Temp Source Pulse Resp SpO2 Height Weight   (!) 149/82 97.9 °F (36.6 °C) Oral 71 20 98 % 5' 3\" (1.6 m) 180 lb (81.6 kg)       Physical Exam   Constitutional: She is oriented to person, place, and time. She appears well-developed and well-nourished. HENT:   Head: Normocephalic and atraumatic. Right Ear: External ear normal.   Left Ear: External ear normal.   Nose: Nose normal.   Eyes: Conjunctivae are normal.   Neck: Normal range of motion. Cardiovascular: Normal rate, regular rhythm and normal heart sounds. Pulmonary/Chest: Effort normal and breath sounds normal.   Abdominal: Soft.  Bowel sounds are normal. She exhibits no distension and no mass. There is tenderness in the right lower quadrant and suprapubic area. There is CVA tenderness. Musculoskeletal: Normal range of motion. Neurological: She is alert and oriented to person, place, and time. Skin: Skin is warm and dry. Psychiatric: She has a normal mood and affect.  Her behavior is normal.       DIAGNOSTIC RESULTS   LABS:    Labs Reviewed   CBC WITH AUTO DIFFERENTIAL - Abnormal; Notable for the following:        Result Value    RDW 15.5 (*)     All other components within normal limits    Narrative:     Performed at:  [facility],  [email or web address] [facility]   Phone [phone]   COMPREHENSIVE METABOLIC PANEL - Abnormal; Notable for the following:     Glucose 100 (*)     CREATININE <0.5 (*)     Albumin/Globulin Ratio 1.0 (*)     Alkaline Phosphatase 134 (*)     All other components within normal limits    Narrative:     Performed at:  [place] [address],  [employer] [facility]   Phone [phone]   LIPASE - Abnormal; Notable for the following:     Lipase 10.0 (*)     All other components within normal limits    Narrative:     Performed at:  [facility],  [employer] [facility]   Phone [phone]   URINALYSIS - Abnormal; Notable for the following:     Clarity, UA SL CLOUDY (*)     Bilirubin Urine MODERATE (*)     Ketones, Urine TRACE (*)     Blood, Urine TRACE-INTACT (*)     Protein, UA TRACE (*)     Nitrite, Urine POSITIVE (*)     Leukocyte Esterase, Urine SMALL (*)     All other components within normal limits    Narrative:     Performed at:  [place] [address],  [facility]   Phone [phone]   MICROSCOPIC URINALYSIS - Abnormal; Notable for the following:     Mucus, UA 2+ (*)     WBC, UA  (*)     RBC, UA 3-5 (*)     Bacteria, UA 4+ words are mis-transcribed.)    MATIAS Akins CNP (electronically signed)         MATIAS Akins CNP  07/30/18 7495

## 2018-08-06 LAB
ORGANISM: ABNORMAL
ORGANISM: ABNORMAL
URINE CULTURE, ROUTINE: ABNORMAL

## 2018-08-09 ENCOUNTER — HOSPITAL ENCOUNTER (OUTPATIENT)
Dept: CT IMAGING | Age: 63
Discharge: HOME | End: 2018-08-09
Payer: MEDICARE

## 2018-08-09 DIAGNOSIS — R10.30 LOWER ABDOMINAL PAIN OF UNKNOWN ETIOLOGY: ICD-10-CM

## 2018-08-09 PROCEDURE — 6360000004 HC RX CONTRAST MEDICATION: Performed by: FAMILY MEDICINE

## 2018-08-09 PROCEDURE — 74178 CT ABD&PLV WO CNTR FLWD CNTR: CPT

## 2018-08-09 RX ADMIN — IOHEXOL 50 ML: 240 INJECTION, SOLUTION INTRATHECAL; INTRAVASCULAR; INTRAVENOUS; ORAL at 10:06

## 2018-08-09 RX ADMIN — IOPAMIDOL 75 ML: 755 INJECTION, SOLUTION INTRAVENOUS at 10:07

## 2018-08-14 ENCOUNTER — HOSPITAL ENCOUNTER (OUTPATIENT)
Age: 63
Setting detail: SPECIMEN
Discharge: HOME | End: 2018-08-14
Payer: MEDICARE

## 2018-08-14 DIAGNOSIS — R19.7 DIARRHEA, UNSPECIFIED TYPE: ICD-10-CM

## 2018-08-15 ENCOUNTER — HOSPITAL ENCOUNTER (OUTPATIENT)
Age: 63
Setting detail: SPECIMEN
Discharge: HOME | End: 2018-08-15
Payer: MEDICARE

## 2018-08-15 PROCEDURE — 87493 C DIFF AMPLIFIED PROBE: CPT

## 2018-08-15 PROCEDURE — 87324 CLOSTRIDIUM AG IA: CPT

## 2018-08-15 PROCEDURE — 87449 NOS EACH ORGANISM AG IA: CPT

## 2018-08-16 ENCOUNTER — HOSPITAL ENCOUNTER (EMERGENCY)
Age: 63
Discharge: HOME | End: 2018-08-16
Attending: EMERGENCY MEDICINE
Payer: MEDICARE

## 2018-08-16 VITALS
BODY MASS INDEX: 30.12 KG/M2 | RESPIRATION RATE: 18 BRPM | HEIGHT: 63 IN | WEIGHT: 170 LBS | OXYGEN SATURATION: 94 % | DIASTOLIC BLOOD PRESSURE: 64 MMHG | TEMPERATURE: 97.6 F | HEART RATE: 73 BPM | SYSTOLIC BLOOD PRESSURE: 128 MMHG

## 2018-08-16 DIAGNOSIS — R33.9 URINARY RETENTION: ICD-10-CM

## 2018-08-16 DIAGNOSIS — Z97.8 FOLEY CATHETER IN PLACE: ICD-10-CM

## 2018-08-16 DIAGNOSIS — A04.72 C. DIFFICILE DIARRHEA: Primary | ICD-10-CM

## 2018-08-16 LAB
A/G RATIO: 1.2 (ref 1.1–2.2)
ALBUMIN SERPL-MCNC: 3.5 G/DL (ref 3.4–5)
ALP BLD-CCNC: 115 U/L (ref 40–129)
ALT SERPL-CCNC: 12 U/L (ref 10–40)
ANION GAP SERPL CALCULATED.3IONS-SCNC: 15 MMOL/L (ref 3–16)
AST SERPL-CCNC: 22 U/L (ref 15–37)
BACTERIA: ABNORMAL /HPF
BASOPHILS ABSOLUTE: 0.1 K/UL (ref 0–0.2)
BASOPHILS RELATIVE PERCENT: 1.2 %
BILIRUB SERPL-MCNC: <0.2 MG/DL (ref 0–1)
BILIRUBIN URINE: ABNORMAL
BLOOD, URINE: NEGATIVE
BUN BLDV-MCNC: 11 MG/DL (ref 7–20)
C DIFFICILE TOXIN, EIA: NORMAL
CALCIUM SERPL-MCNC: 8.9 MG/DL (ref 8.3–10.6)
CHLORIDE BLD-SCNC: 105 MMOL/L (ref 99–110)
CLARITY: CLEAR
CLOSTRIDIUM DIFFICILE DNA AMPLIFICATION: NORMAL
CO2: 20 MMOL/L (ref 21–32)
COLOR: YELLOW
CREAT SERPL-MCNC: <0.5 MG/DL (ref 0.6–1.2)
EOSINOPHILS ABSOLUTE: 0.1 K/UL (ref 0–0.6)
EOSINOPHILS RELATIVE PERCENT: 1.7 %
GFR AFRICAN AMERICAN: >60
GFR NON-AFRICAN AMERICAN: >60
GLOBULIN: 2.9 G/DL
GLUCOSE BLD-MCNC: 86 MG/DL (ref 70–99)
GLUCOSE URINE: NEGATIVE MG/DL
HCT VFR BLD CALC: 43.1 % (ref 36–48)
HEMOGLOBIN: 14.1 G/DL (ref 12–16)
KETONES, URINE: ABNORMAL MG/DL
LEUKOCYTE ESTERASE, URINE: ABNORMAL
LYMPHOCYTES ABSOLUTE: 2.3 K/UL (ref 1–5.1)
LYMPHOCYTES RELATIVE PERCENT: 36.5 %
MCH RBC QN AUTO: 29.8 PG (ref 26–34)
MCHC RBC AUTO-ENTMCNC: 32.8 G/DL (ref 31–36)
MCV RBC AUTO: 90.9 FL (ref 80–100)
MICROSCOPIC EXAMINATION: YES
MONOCYTES ABSOLUTE: 0.5 K/UL (ref 0–1.3)
MONOCYTES RELATIVE PERCENT: 7.4 %
NEUTROPHILS ABSOLUTE: 3.3 K/UL (ref 1.7–7.7)
NEUTROPHILS RELATIVE PERCENT: 53.2 %
NITRITE, URINE: POSITIVE
PDW BLD-RTO: 18 % (ref 12.4–15.4)
PH UA: 7
PLATELET # BLD: 214 K/UL (ref 135–450)
PMV BLD AUTO: 9.2 FL (ref 5–10.5)
POTASSIUM SERPL-SCNC: 3.7 MMOL/L (ref 3.5–5.1)
PROTEIN UA: NEGATIVE MG/DL
RBC # BLD: 4.74 M/UL (ref 4–5.2)
RBC UA: ABNORMAL /HPF (ref 0–2)
SODIUM BLD-SCNC: 140 MMOL/L (ref 136–145)
SPECIFIC GRAVITY UA: 1.01
TOTAL PROTEIN: 6.4 G/DL (ref 6.4–8.2)
URINE REFLEX TO CULTURE: YES
URINE TYPE: ABNORMAL
UROBILINOGEN, URINE: 0.2 E.U./DL
WBC # BLD: 6.2 K/UL (ref 4–11)
WBC UA: ABNORMAL /HPF (ref 0–5)

## 2018-08-16 PROCEDURE — 81001 URINALYSIS AUTO W/SCOPE: CPT

## 2018-08-16 PROCEDURE — 96360 HYDRATION IV INFUSION INIT: CPT

## 2018-08-16 PROCEDURE — 51798 US URINE CAPACITY MEASURE: CPT

## 2018-08-16 PROCEDURE — 36415 COLL VENOUS BLD VENIPUNCTURE: CPT

## 2018-08-16 PROCEDURE — 51702 INSERT TEMP BLADDER CATH: CPT

## 2018-08-16 PROCEDURE — 99284 EMERGENCY DEPT VISIT MOD MDM: CPT

## 2018-08-16 PROCEDURE — 85025 COMPLETE CBC W/AUTO DIFF WBC: CPT

## 2018-08-16 PROCEDURE — 80053 COMPREHEN METABOLIC PANEL: CPT

## 2018-08-16 PROCEDURE — 6370000000 HC RX 637 (ALT 250 FOR IP): Performed by: NURSE PRACTITIONER

## 2018-08-16 PROCEDURE — 87086 URINE CULTURE/COLONY COUNT: CPT

## 2018-08-16 PROCEDURE — 2580000003 HC RX 258: Performed by: NURSE PRACTITIONER

## 2018-08-16 RX ORDER — CEPHALEXIN 500 MG/1
500 CAPSULE ORAL 3 TIMES DAILY
Status: ON HOLD | COMMUNITY
End: 2020-04-07 | Stop reason: HOSPADM

## 2018-08-16 RX ORDER — ONDANSETRON 4 MG/1
4 TABLET, FILM COATED ORAL EVERY 8 HOURS PRN
Qty: 20 TABLET | Refills: 0 | Status: SHIPPED | OUTPATIENT
Start: 2018-08-16 | End: 2018-11-08

## 2018-08-16 RX ORDER — 0.9 % SODIUM CHLORIDE 0.9 %
500 INTRAVENOUS SOLUTION INTRAVENOUS ONCE
Status: COMPLETED | OUTPATIENT
Start: 2018-08-16 | End: 2018-08-16

## 2018-08-16 RX ORDER — METRONIDAZOLE 250 MG/1
500 TABLET ORAL ONCE
Status: COMPLETED | OUTPATIENT
Start: 2018-08-16 | End: 2018-08-16

## 2018-08-16 RX ORDER — METRONIDAZOLE 500 MG/1
500 TABLET ORAL 3 TIMES DAILY
Qty: 30 TABLET | Refills: 0 | Status: SHIPPED | OUTPATIENT
Start: 2018-08-16 | End: 2018-08-26

## 2018-08-16 RX ADMIN — SODIUM CHLORIDE 500 ML: 9 INJECTION, SOLUTION INTRAVENOUS at 12:10

## 2018-08-16 RX ADMIN — METRONIDAZOLE 500 MG: 250 TABLET ORAL at 14:51

## 2018-08-16 ASSESSMENT — PAIN SCALES - GENERAL: PAINLEVEL_OUTOF10: 8

## 2018-08-16 ASSESSMENT — ENCOUNTER SYMPTOMS
DIARRHEA: 1
ABDOMINAL PAIN: 1
SHORTNESS OF BREATH: 0

## 2018-08-16 ASSESSMENT — PAIN DESCRIPTION - LOCATION: LOCATION: ABDOMEN

## 2018-08-16 NOTE — ED PROVIDER NOTES
nitroGLYCERIN (NITROSTAT) 0.4 MG SL tablet Place 1 tablet under the tongue every 5 minutes as needed for Chest pain., Disp-25 tablet, R-3      amitriptyline (ELAVIL) 25 MG tablet Take 25 mg by mouth nightly Historical Med      Multiple Vitamin (THERA) TABS Take 1 tablet by mouth daily       clonazepam (KLONOPIN) 1 MG tablet Take 1 tablet by mouth 3 times daily as needed for Anxiety for 21 doses. , Disp-21 tablet, R-0      MONOJECT 60CC SYRINGE CATH TIP 60 ML MISC Starting Wed 3/7/2018, SIMIN, Historical Med               ALLERGIES     Meloxicam; Benzoin compound; Lyrica [pregabalin]; Quetiapine; Quetiapine fumarate; Seroquel [quetiapine fumarate]; Tizanidine; Amberderm; Other; Tape [adhesive tape]; and Wound dressings    FAMILY HISTORY       Family History   Problem Relation Age of Onset    Heart Disease Father     Cancer Sister         multiple organs    Other Sister     Asthma Neg Hx     Diabetes Neg Hx     Emphysema Neg Hx     Heart Failure Neg Hx     Hypertension Neg Hx           SOCIAL HISTORY       Social History     Social History    Marital status:      Spouse name: N/A    Number of children: N/A    Years of education: N/A     Social History Main Topics    Smoking status: Never Smoker    Smokeless tobacco: Never Used    Alcohol use No    Drug use: No    Sexual activity: Yes     Partners: Female     Other Topics Concern    None     Social History Narrative    None       SCREENINGS    [place] Coma Scale  Eye Opening: Spontaneous  Best Verbal Response: Oriented  Best Motor Response: Obeys commands  [place] Coma Scale Score: 15        PHYSICAL EXAM    (up to 7 for level 4, 8 or more for level 5)     ED Triage Vitals [08/16/18 1133]   BP Temp Temp Source Pulse Resp SpO2 Height Weight   129/73 97.6 °F (36.4 °C) Oral 83 18 93 % 5' 3\" (1.6 m) 170 lb (77.1 kg)       Physical Exam   Constitutional: She is oriented to person, place, and time. She appears well-developed and well-nourished. HENT:   Head: Normocephalic and atraumatic. Neck: Normal range of motion. Cardiovascular: Normal rate. Pulmonary/Chest: Effort normal. No respiratory distress. Abdominal: Soft. She exhibits no distension. There is tenderness in the periumbilical area. Musculoskeletal: Normal range of motion. Neurological: She is alert and oriented to person, place, and time. Skin: Skin is warm and dry. Psychiatric: She has a normal mood and affect. DIAGNOSTIC RESULTS   LABS:    Labs Reviewed   CBC WITH AUTO DIFFERENTIAL - Abnormal; Notable for the following:        Result Value    RDW 18.0 (*)     All other components within normal limits    Narrative:     Performed at:  [facility],  [facility] [employer] [facility] [place]   Phone [phone]   URINE RT REFLEX TO CULTURE - Abnormal; Notable for the following:     Bilirubin Urine MODERATE (*)     Ketones, Urine TRACE (*)     Nitrite, Urine POSITIVE (*)     Leukocyte Esterase, Urine TRACE (*)     All other components within normal limits    Narrative:     Performed at:  [place] [address],  [facility] [employer] [facility]   Phone [phone]   COMPREHENSIVE METABOLIC PANEL - Abnormal; Notable for the following:     CO2 20 (*)     CREATININE <0.5 (*)     All other components within normal limits    Narrative:     Performed at:  [facility] [address],  [facility] [employer] [facility]   Phone [phone]   MICROSCOPIC URINALYSIS - Abnormal; Notable for the following:     WBC, UA 10-20 (*)     Bacteria, UA 2+ (*)     All other components within normal limits    Narrative:     Performed at:  [facility],  [place] [place]   Phone [phone]   URINE CULTURE       All other labs were within normal range or not returned as of this dictation. EKG:  All EKG's are interpreted by the Emergency Department Physician who either signs or Co-signs this chart in the absence of a cardiologist.  Please see their note for interpretation of EKG. RADIOLOGY:   Non-plain film images such as CT, Ultrasound and MRI are read by the radiologist. Plain radiographic images are visualized and preliminarily interpreted by the  ED Provider with the below findings:        Interpretation per the Radiologist below, if available at the time of this note:    No orders to display     No results found. PROCEDURES   Unless otherwise noted below, none     Procedures    CRITICAL CARE TIME   N/A    CONSULTS:  IP CONSULT TO UROLOGY      EMERGENCY DEPARTMENT COURSE and DIFFERENTIAL DIAGNOSIS/MDM:   Vitals:    Vitals:    08/16/18 1133 08/16/18 1452   BP: 129/73 128/64   Pulse: 83 73   Resp: 18 18   Temp: 97.6 °F (36.4 °C)    TempSrc: Oral    SpO2: 93% 94%   Weight: 170 lb (77.1 kg)    Height: 5' 3\" (1.6 m)        Patient was given the following medications:  Medications   0.9 % sodium chloride bolus (0 mLs Intravenous Stopped 8/16/18 1315)   metroNIDAZOLE (FLAGYL) tablet 500 mg (500 mg Oral Given 8/16/18 1451)       Patient was seen and evaluated by Dr. Hieu Vela myself. Patient here for urinary retention. Patient reports that she is a history of a bladder augmentation and had a suprapubic catheter to her right upper abdomen. She reports this was removed on Tuesday and she is supposed to irrigate this throughout the day. She also reports that she does have urinary output from her urethra however that has decreased as well. Patient states that she's had diarrhea and had a C. difficile test.  Not been notified of his results yet. Patient is currently on antibiotics for UTI. On exam today the patient is awake and alert hemodynamically stable nontoxic in appearance. Patient did have a bladder scan which showed urinary retention. He [place] catheter was placed and did have urine obtained.   Patient is currently being treated for UTI however days, Disp-30 tablet, R-0Print             DISCONTINUED MEDICATIONS:  Discharge Medication List as of 8/16/2018  2:26 PM                 (Please note that portions of this note were completed with a voice recognition program.  Efforts were made to edit the dictations but occasionally words are mis-transcribed.)    MATIAS Nicholson CNP (electronically signed)     MATIAS Nicholson CNP  08/16/18 155

## 2018-08-16 NOTE — ED PROVIDER NOTES
I independently performed a history and physical on [address] [place] [address]. All diagnostic, treatment, and disposition decisions were made by myself in conjunction with the advanced practice provider. For further details of [facility] emergency department encounter, please see the advance practice provider's documentation. In brief, this is a 60y.o. year old female, with   Chief Complaint   Patient presents with    Urinary Retention     Pt brought in by squad for urinary retention. Pt states that she had a suprapubic catheter removed on Tuesday, initially was able to void, but now hasn't voided since yesterday afternoon.  Diarrhea     Pt states that she has had diarrhea for 6-8 weeks and sent a sample to lab (here) yesterday to be tested for c-diff. Patient presents with urinary retention and diarrhea. She underwent kali-bladder surgery about 10 months ago. On exam, I find a chronically ill female crying. Abdomen is soft, non-tender, non-distended, normal bowel sounds. Patient's c difficile is indeterminate. We will place the patient on Flagyl. Patient has a barnes catheter that is actively draining.   She was instructed to follow up with her PCP and urologist.     Rick Phillips MD  08/16/18 8242

## 2018-08-17 ENCOUNTER — HOSPITAL ENCOUNTER (EMERGENCY)
Age: 63
Discharge: HOME | End: 2018-08-17
Attending: EMERGENCY MEDICINE
Payer: MEDICARE

## 2018-08-17 VITALS
RESPIRATION RATE: 16 BRPM | SYSTOLIC BLOOD PRESSURE: 127 MMHG | DIASTOLIC BLOOD PRESSURE: 78 MMHG | WEIGHT: 170 LBS | BODY MASS INDEX: 30.12 KG/M2 | HEART RATE: 79 BPM | HEIGHT: 63 IN | TEMPERATURE: 98.4 F | OXYGEN SATURATION: 96 %

## 2018-08-17 DIAGNOSIS — T83.091A COMPLICATION, BLOCKED FOLEY CATHETER, INITIAL ENCOUNTER (HCC): Primary | ICD-10-CM

## 2018-08-17 LAB
AMORPHOUS: ABNORMAL /HPF
BACTERIA: ABNORMAL /HPF
BILIRUBIN URINE: ABNORMAL
BLOOD, URINE: ABNORMAL
CLARITY: ABNORMAL
COLOR: YELLOW
GLUCOSE URINE: NEGATIVE MG/DL
KETONES, URINE: 15 MG/DL
LEUKOCYTE ESTERASE, URINE: ABNORMAL
MICROSCOPIC EXAMINATION: YES
MUCUS: ABNORMAL /LPF
NITRITE, URINE: POSITIVE
PH UA: 7
PROTEIN UA: 30 MG/DL
RBC UA: ABNORMAL /HPF (ref 0–2)
SPECIFIC GRAVITY UA: 1.02
URINE CULTURE, ROUTINE: NORMAL
URINE REFLEX TO CULTURE: YES
URINE TYPE: ABNORMAL
UROBILINOGEN, URINE: 1 E.U./DL
WBC UA: >100 /HPF (ref 0–5)

## 2018-08-17 PROCEDURE — 51701 INSERT BLADDER CATHETER: CPT

## 2018-08-17 PROCEDURE — 99283 EMERGENCY DEPT VISIT LOW MDM: CPT

## 2018-08-17 PROCEDURE — 81001 URINALYSIS AUTO W/SCOPE: CPT

## 2018-08-17 PROCEDURE — 87086 URINE CULTURE/COLONY COUNT: CPT

## 2018-08-17 PROCEDURE — 51798 US URINE CAPACITY MEASURE: CPT

## 2018-08-17 ASSESSMENT — PAIN DESCRIPTION - LOCATION: LOCATION: ABDOMEN

## 2018-08-17 ASSESSMENT — PAIN SCALES - GENERAL: PAINLEVEL_OUTOF10: 7

## 2018-08-18 NOTE — ED PROVIDER NOTES
fludrocortisone (FLORINEF) 0.1 MG tablet Take 0.1 mg by mouth 2 times daily   Yes Historical Provider, MD   atorvastatin (LIPITOR) 20 MG tablet  1/9/18  Yes Historical Provider, MD   primidone (MYSOLINE) 50 MG tablet Take 2 tablets by mouth 2 times daily 1/23/18  Yes Kevon Dasilva MD   ranitidine (ZANTAC) 150 MG tablet Take 150 mg by mouth 2 times daily   Yes Historical Provider, MD   sertraline (ZOLOFT) 100 MG tablet Take 200 mg by mouth nightly    Yes Historical Provider, MD   pantoprazole (PROTONIX) 40 MG tablet TAKE 1 TABLET BY MOUTH DAILY 2/16/17  Yes Historical Provider, MD   loratadine (CLARITIN) 10 MG tablet Take 10 mg by mouth daily   Yes Historical Provider, MD   fluticasone (FLONASE) 50 MCG/ACT nasal spray 1 spray by Nasal route nightly. 4/9/15  Yes Taylor Duffy MD   levothyroxine (SYNTHROID) 75 MCG tablet Take 1 tablet by mouth daily for 360 days. 6/4/14 10/17/25 Yes Sia Gerard MD   amitriptyline (ELAVIL) 25 MG tablet Take 25 mg by mouth nightly    Yes Historical Provider, MD   Multiple Vitamin (THERA) TABS Take 1 tablet by mouth daily    Yes Historical Provider, MD   clonazepam (KLONOPIN) 1 MG tablet Take 1 tablet by mouth 3 times daily as needed for Anxiety for 21 doses. 12/3/11  Yes Savita Bloom MD   ondansetron (ZOFRAN) 4 MG tablet Take 1 tablet by mouth every 8 hours as needed for Nausea 8/16/18   MATIAS Wolff - CNP   MONOJECT 60CC SYRINGE CATH TIP 60 ML [address] [facility]  3/7/18   Historical Provider, MD   KLOR-CON M20 20 MEQ extended release tablet Take 20 mEq by mouth 2 times daily  3/19/18   Historical Provider, MD   Water For Irrigation, Sterile (STERILE WATER FOR IRRIGATION) 2 times daily  1/23/18   Historical Provider, MD   nitroGLYCERIN (NITROSTAT) 0.4 MG SL tablet Place 1 tablet under the tongue every 5 minutes as needed for Chest pain.  9/4/12   Gunnar Kohler MD       Allergies as of 08/17/2018 - Review Complete 08/17/2018   Allergen Reaction Noted    Meloxicam patellofemoral arthroplasty    OTHER SURGICAL HISTORY  05/09/2018    convert left patellofemoral arthroplasty to left total knee replacement   Padmini Player  2011    Dr Mahoney/checked last week/told has 12 more yrs for this one/set at 60    SKIN BIOPSY      abdomen    SPINE SURGERY      stimulator    THROAT SURGERY      polyps removed    TONSILLECTOMY      UPPER GASTROINTESTINAL ENDOSCOPY      UPPER GASTROINTESTINAL ENDOSCOPY  05/03/2017        Family History:    Family History   Problem Relation Age of Onset    Heart Disease Father     Cancer Sister         multiple organs    Other Sister     Asthma Neg Hx     Diabetes Neg Hx     Emphysema Neg Hx     Heart Failure Neg Hx     Hypertension Neg Hx        Social History     Social History    Marital status:      Spouse name: N/A    Number of children: N/A    Years of education: N/A     Occupational History    Not on file. Social History Main Topics    Smoking status: Never Smoker    Smokeless tobacco: Never Used    Alcohol use No    Drug use: No    Sexual activity: Yes     Partners: Female     Other Topics Concern    Not on file     Social History Narrative    No narrative on file       Nursing notes reviewed. ED Triage Vitals [08/17/18 1822]   Enc Vitals Group      BP (!) 116/96      Pulse 79      Resp 16      Temp 98.4 °F (36.9 °C)      Temp Source Oral      SpO2 96 %      Weight 170 lb (77.1 kg)      Height 5' 3\" (1.6 m)      Head Circumference       Peak Flow       Pain Score       Pain Loc       Pain Edu? Excl. in [address]? GENERAL:  Awake, alert. Well developed, well nourished with no apparent distress. HENT:  Normocephalic, Atraumatic, no lacerations. EYES:  Conjunctiva normal, Pupils equal round and reactive to light, extraocular movements normal.  NECK:  Trachea is midline. No stridor. CHEST:  Regular rate and rhythm, no murmurs/rubs/gallops, normal S1/S2, chest wall non-tender.    LUNGS:  No to remove a significant amount of debris and now patient has had resolution of her abdominal pain and there is urine freely flowing from the catheter at this time. This is a very pleasant patient with [place] complications. Vitals have been reviewed; the patient is nontoxic in appearance. I feel the source of the abdominal pain was due to the [place] obstruction and not another acute abdominal process. Appropriate labs were ordered. There is no significant evidence for toxicity, shock, sepsis, or any disease process requiring other immediate surgical or medical intervention at this time. At this time, I feel the patient is stable for d/c home with a leg bag as they are well appearing, have stable vital signs and their pain has been controlled. We have considered possible differential diagnoses and that further evaluation is needed by their urologist in follow-up. They verbalized comprehension that if they are not improving as expected or if other new symptoms or signs of concern develop, other etiologies or diagnoses may need to be considered requiring other tests, treatments, consultations, and/or admission. Final Impression    1. Complication, blocked [place] catheter, [facility]        Blood pressure 127/78, pulse 79, temperature 98.4 °F (36.9 °C), temperature source Oral, resp. rate 16, height 5' 3\" (1.6 m), weight 170 lb (77.1 kg), SpO2 96 %, not currently breastfeeding. Patient was given scripts for the following medications. I counseled patient how to take these medications. Discharge Medication List as of 8/17/2018  8:14 PM          Disposition  Pt is in stable condition upon Discharge to home. Please note, critical care time was 0 minutes, exclusive of separately billable procedures and discussion with the family, specifically for management of the [place] complication initially, direct bedside care, reevaluation, review of records, and consultation.     The note was completed using

## 2018-08-19 LAB — URINE CULTURE, ROUTINE: NORMAL

## 2018-08-21 ENCOUNTER — HOSPITAL ENCOUNTER (EMERGENCY)
Age: 63
Discharge: HOME | End: 2018-08-21
Attending: EMERGENCY MEDICINE
Payer: MEDICARE

## 2018-08-21 VITALS
OXYGEN SATURATION: 98 % | SYSTOLIC BLOOD PRESSURE: 109 MMHG | WEIGHT: 170 LBS | HEIGHT: 63 IN | TEMPERATURE: 98 F | HEART RATE: 94 BPM | RESPIRATION RATE: 16 BRPM | BODY MASS INDEX: 30.12 KG/M2 | DIASTOLIC BLOOD PRESSURE: 81 MMHG

## 2018-08-21 DIAGNOSIS — T83.018D: Primary | ICD-10-CM

## 2018-08-21 LAB
BACTERIA: ABNORMAL /HPF
BILIRUBIN URINE: ABNORMAL
BLOOD, URINE: ABNORMAL
CLARITY: ABNORMAL
COLOR: YELLOW
EPITHELIAL CELLS, UA: ABNORMAL /HPF
GLUCOSE URINE: NEGATIVE MG/DL
KETONES, URINE: 15 MG/DL
LEUKOCYTE ESTERASE, URINE: ABNORMAL
MICROSCOPIC EXAMINATION: YES
NITRITE, URINE: NEGATIVE
PH UA: 6.5
PROTEIN UA: NEGATIVE MG/DL
RBC UA: ABNORMAL /HPF (ref 0–2)
SPECIFIC GRAVITY UA: 1.01
URINE REFLEX TO CULTURE: YES
URINE TYPE: ABNORMAL
UROBILINOGEN, URINE: 0.2 E.U./DL
WBC UA: ABNORMAL /HPF (ref 0–5)
YEAST: PRESENT /HPF

## 2018-08-21 PROCEDURE — 51702 INSERT TEMP BLADDER CATH: CPT

## 2018-08-21 PROCEDURE — 81001 URINALYSIS AUTO W/SCOPE: CPT

## 2018-08-21 PROCEDURE — 99284 EMERGENCY DEPT VISIT MOD MDM: CPT

## 2018-08-21 PROCEDURE — 87086 URINE CULTURE/COLONY COUNT: CPT

## 2018-08-21 ASSESSMENT — ENCOUNTER SYMPTOMS
ABDOMINAL PAIN: 1
VOMITING: 0
EYE REDNESS: 0
SORE THROAT: 0
SHORTNESS OF BREATH: 0
COUGH: 0
BACK PAIN: 0
NAUSEA: 0
EYE PAIN: 0

## 2018-08-21 ASSESSMENT — PAIN SCALES - GENERAL: PAINLEVEL_OUTOF10: 8

## 2018-08-21 ASSESSMENT — PAIN DESCRIPTION - PAIN TYPE: TYPE: ACUTE PAIN

## 2018-08-21 ASSESSMENT — PAIN DESCRIPTION - DESCRIPTORS: DESCRIPTORS: CONSTANT;SPASM

## 2018-08-21 NOTE — ED PROVIDER NOTES
education: N/A     Social History Main Topics    Smoking status: Never Smoker    Smokeless tobacco: Never Used    Alcohol use No    Drug use: No    Sexual activity: Yes     Partners: Female     Other Topics Concern    None     Social History Narrative    None     Family History   Problem Relation Age of Onset    Heart Disease Father     Cancer Sister         multiple organs    Other Sister     Asthma Neg Hx     Diabetes Neg Hx     Emphysema Neg Hx     Heart Failure Neg Hx     Hypertension Neg Hx        Medications/Allergies     Discharge Medication List as of 8/21/2018  1:53 PM      CONTINUE these medications which have NOT CHANGED    Details   metroNIDAZOLE (FLAGYL) 500 MG tablet Take 1 tablet by mouth 3 times daily for 10 days, Disp-30 tablet, R-0Print      ondansetron (ZOFRAN) 4 MG tablet Take 1 tablet by mouth every 8 hours as needed for Nausea, Disp-20 tablet, R-0Print      cephALEXin (KEFLEX) 500 MG capsule Take 500 mg by mouth 3 times dailyHistorical Med      ondansetron (ZOFRAN ODT) 4 MG disintegrating tablet Take 1 tablet by mouth every 8 hours as needed for Nausea, Disp-20 tablet, R-0Print      donepezil (ARICEPT) 10 MG tablet TAKE 1 TABLET BY MOUTH NIGHTLY, Disp-90 tablet, R-2Normal      oxyCODONE-acetaminophen (PERCOCET) 7.5-325 MG per tablet Take 1 tablet by mouth every 6 hours as needed for Pain. Mario Brooks Historical Med      MONOJECT 60CC SYRINGE CATH TIP 60 ML MISC Starting Wed 3/7/2018, SIMIN, Historical Med      KLOR-CON M20 20 MEQ extended release tablet Take 20 mEq by mouth 2 times daily , DAWHistorical Med      fludrocortisone (FLORINEF) 0.1 MG tablet Take 0.1 mg by mouth 2 times dailyHistorical Med      atorvastatin (LIPITOR) 20 MG tablet Historical Med      Water For Irrigation, Sterile (STERILE WATER FOR IRRIGATION) 2 TIMES DAILY Starting Tue 1/23/2018, Historical Med      primidone (MYSOLINE) 50 MG tablet Take 2 tablets by mouth 2 times daily, Disp-360 tablet, R-1Normal      ranitidine (ZANTAC) 150 MG tablet Take 150 mg by mouth 2 times dailyHistorical Med      sertraline (ZOLOFT) 100 MG tablet Take 200 mg by mouth nightly Historical Med      pantoprazole (PROTONIX) 40 MG tablet TAKE 1 TABLET BY MOUTH DAILY, R-0      loratadine (CLARITIN) 10 MG tablet Take 10 mg by mouth daily      fluticasone (FLONASE) 50 MCG/ACT nasal spray 1 spray by Nasal route nightly., Disp-1 Bottle, R-5      levothyroxine (SYNTHROID) 75 MCG tablet Take 1 tablet by mouth daily for 360 days. , Disp-30 tablet, R-11      nitroGLYCERIN (NITROSTAT) 0.4 MG SL tablet Place 1 tablet under the tongue every 5 minutes as needed for Chest pain., Disp-25 tablet, R-3      amitriptyline (ELAVIL) 25 MG tablet Take 25 mg by mouth nightly Historical Med      Multiple Vitamin (THERA) TABS Take 1 tablet by mouth daily       clonazepam (KLONOPIN) 1 MG tablet Take 1 tablet by mouth 3 times daily as needed for Anxiety for 21 doses. , Disp-21 tablet, R-0           Allergies   Allergen Reactions    Meloxicam Itching     Tolerates Ketorolac/Bromfenac ophthalmic drops.  Benzoin Compound      Blisters from adhesive compound under steri strips after knee VAS      Lyrica [Pregabalin]      Keeps patient awake    Quetiapine      Other reaction(s): Other (See Comments)  Pt unable to recall reaction  Other reaction(s): Other (See Comments)      Quetiapine Fumarate      Other reaction(s): confused, dizzy  Other reaction(s): Unknown  seroquel      Seroquel [Quetiapine Fumarate] Other (See Comments)     Other reaction(s): confused, dizzy  Pt unable to recall reaction    Tizanidine      hallucinations      Amberderm Rash     Positive patch test results to [facility] Other Rash     Can use paper tape can not  use transpore    Tape [Adhesive Tape] Rash     Mild dermatitis at site of paper tape, with history of previous skin rashes to tape.  STERI-STRIPS  Can use paper tape can not  use transpore      Wound Dressings Rash     Positive patch test Culture Yes     Urine Type Not Specified    Microscopic Urinalysis   Result Value Ref Range    WBC, UA 20-50 (A) 0 - 5 /HPF    RBC, UA None seen 0 - 2 /HPF    Epi Cells 3-5 /HPF    Bacteria, UA 2+ (A) /HPF    Yeast, UA Present (A) /HPF     Radiographs:  No results found. Procedures/EKG:   Procedures      ED Course and MDM           MDM  Number of Diagnoses or Management Options  Urinary catheter dysfunction, subsequent encounter: established and worsening     Amount and/or Complexity of Data Reviewed  Clinical lab tests: ordered and reviewed  Review and summarize past medical records: yes    Risk of Complications, Morbidity, and/or Mortality  Presenting problems: moderate  Diagnostic procedures: low  Management options: low        In brief, Matthew Kraus is a 60 y.o. female who presented to the emergency department With complaints of no drainage to urinary catheter. It was replaced and is draining normally. Had over 200 mL of urine output. Patient is currently on antibiotics for UTI. Discussed that she needs to follow-up with her urologist for further management. Discussed return precautions and need for f/u. Verbalized understanding of diagnosis and discharge plan. ED Medication Orders     None        Vitals:    08/21/18 1206 08/21/18 1358   BP: 103/73 109/81   Pulse: 105 94   Resp: 17 16   Temp: 97.9 °F (36.6 °C) 98 °F (36.7 °C)   TempSrc: Oral    SpO2: 97% 98%   Weight: 170 lb (77.1 kg)    Height: 5' 3\" (1.6 m)          DISPOSITION Decision To Discharge 08/21/2018 01:42:53 PM       PATIENT REFERRED TO:  Your urologist at [address] [place] [address] an appointment as soon as possible for a visit         DISCHARGE MEDICATIONS:  Discharge Medication List as of 8/21/2018  1:53 PM          Final Impression      1.  Urinary catheter dysfunction, subsequent encounter        (Please note that portions of this note may have been completed with a voice recognition program. Efforts were made to edit the dictations but

## 2018-08-21 NOTE — ED NOTES
Pt states her catheter is clogged with mucous. States it hasnt drained since yesterday. .  Discontinued the catheter that was in place. Mucous noted. Catheter replace with 16 f barnes using sterile process. Urine return.      Isadora Ortiz RN  08/21/18 2558

## 2018-08-22 ENCOUNTER — HOSPITAL ENCOUNTER (EMERGENCY)
Age: 63
Discharge: HOME | End: 2018-08-23
Attending: EMERGENCY MEDICINE
Payer: MEDICARE

## 2018-08-22 DIAGNOSIS — T83.9XXD PROBLEM WITH FOLEY CATHETER, SUBSEQUENT ENCOUNTER: Primary | ICD-10-CM

## 2018-08-22 LAB — URINE CULTURE, ROUTINE: NORMAL

## 2018-08-22 PROCEDURE — 99283 EMERGENCY DEPT VISIT LOW MDM: CPT

## 2018-08-22 PROCEDURE — 51702 INSERT TEMP BLADDER CATH: CPT

## 2018-08-23 VITALS
WEIGHT: 170 LBS | HEART RATE: 99 BPM | TEMPERATURE: 98.3 F | SYSTOLIC BLOOD PRESSURE: 152 MMHG | DIASTOLIC BLOOD PRESSURE: 97 MMHG | OXYGEN SATURATION: 94 % | BODY MASS INDEX: 30.11 KG/M2 | RESPIRATION RATE: 20 BRPM

## 2018-08-23 PROCEDURE — 51798 US URINE CAPACITY MEASURE: CPT

## 2018-08-23 PROCEDURE — 6370000000 HC RX 637 (ALT 250 FOR IP): Performed by: EMERGENCY MEDICINE

## 2018-08-23 RX ORDER — OXYCODONE HYDROCHLORIDE AND ACETAMINOPHEN 5; 325 MG/1; MG/1
1 TABLET ORAL ONCE
Status: COMPLETED | OUTPATIENT
Start: 2018-08-23 | End: 2018-08-23

## 2018-08-23 RX ADMIN — OXYCODONE HYDROCHLORIDE AND ACETAMINOPHEN 1 TABLET: 5; 325 TABLET ORAL at 01:32

## 2018-08-23 ASSESSMENT — PAIN DESCRIPTION - PAIN TYPE: TYPE: ACUTE PAIN

## 2018-08-23 ASSESSMENT — PAIN SCALES - GENERAL
PAINLEVEL_OUTOF10: 6
PAINLEVEL_OUTOF10: 8

## 2018-08-23 ASSESSMENT — PAIN DESCRIPTION - LOCATION: LOCATION: ABDOMEN

## 2018-08-23 ASSESSMENT — PAIN DESCRIPTION - ORIENTATION: ORIENTATION: LOWER

## 2018-08-23 ASSESSMENT — PAIN DESCRIPTION - FREQUENCY: FREQUENCY: CONTINUOUS

## 2018-08-23 NOTE — ED NOTES
Pt's catheter flushed with NS some urine return noted but patient has pain in pelvic region when meeting resistance. MD at bedside and aware. Catheter to be replaced. We didn't have a bigger cath size here in ED.      Jere Yip RN  08/23/18 3613

## 2018-08-24 NOTE — ED PROVIDER NOTES
Left 04/30/2015    FOOT SURGERY Left 4/30/15    GASTRIC BYPASS SURGERY  0680,0300    HYSTERECTOMY      KNEE ARTHROSCOPY Left 10/3/2014    part. medial & lateral meniscectomy, synovectomy plica exc    KNEE ARTHROSCOPY Right 10/13/15    partial medial meniscectomy, chondroplasty, partial lateral meniscectomy    NECK SURGERY      OTHER SURGICAL HISTORY  april 2013    removal spinal cord stimulater    OTHER SURGICAL HISTORY      bladder stimulator    OTHER SURGICAL HISTORY  08/30/2017    left patellofemoral arthroplasty    OTHER SURGICAL HISTORY  05/09/2018    convert left patellofemoral arthroplasty to left total knee replacement   Mu Yates  2011    Dr Mahoney/checked last week/told has 12 more yrs for this one/set at 60    SKIN BIOPSY      abdomen    SPINE SURGERY      stimulator    THROAT SURGERY      polyps removed    TONSILLECTOMY      UPPER GASTROINTESTINAL ENDOSCOPY      UPPER GASTROINTESTINAL ENDOSCOPY  05/03/2017     Social History     Social History    Marital status:       Spouse name: N/A    Number of children: N/A    Years of education: N/A     Social History Main Topics    Smoking status: Never Smoker    Smokeless tobacco: Never Used    Alcohol use No    Drug use: No    Sexual activity: Yes     Partners: Female     Other Topics Concern    None     Social History Narrative    None       Medications/Allergies     Discharge Medication List as of 8/23/2018  2:03 AM      CONTINUE these medications which have NOT CHANGED    Details   metroNIDAZOLE (FLAGYL) 500 MG tablet Take 1 tablet by mouth 3 times daily for 10 days, Disp-30 tablet, R-0Print      ondansetron (ZOFRAN) 4 MG tablet Take 1 tablet by mouth every 8 hours as needed for Nausea, Disp-20 tablet, R-0Print      cephALEXin (KEFLEX) 500 MG capsule Take 500 mg by mouth 3 times dailyHistorical Med      ondansetron (ZOFRAN ODT) 4 MG disintegrating tablet Take 1 tablet by mouth every 8 hours as needed for Nausea, Disp-20 tablet, R-0Print      donepezil (ARICEPT) 10 MG tablet TAKE 1 TABLET BY MOUTH NIGHTLY, Disp-90 tablet, R-2Normal      oxyCODONE-acetaminophen (PERCOCET) 7.5-325 MG per tablet Take 1 tablet by mouth every 6 hours as needed for Pain. Jim Counter Historical Med      MONOJECT 60CC SYRINGE CATH TIP 60 ML MISC Starting Wed 3/7/2018, SIMIN, Historical Med      KLOR-CON M20 20 MEQ extended release tablet Take 20 mEq by mouth 2 times daily , DAWHistorical Med      fludrocortisone (FLORINEF) 0.1 MG tablet Take 0.1 mg by mouth 2 times dailyHistorical Med      atorvastatin (LIPITOR) 20 MG tablet Historical Med      Water For Irrigation, Sterile (STERILE WATER FOR IRRIGATION) 2 TIMES DAILY Starting Tue 1/23/2018, Historical Med      primidone (MYSOLINE) 50 MG tablet Take 2 tablets by mouth 2 times daily, Disp-360 tablet, R-1Normal      ranitidine (ZANTAC) 150 MG tablet Take 150 mg by mouth 2 times dailyHistorical Med      sertraline (ZOLOFT) 100 MG tablet Take 200 mg by mouth nightly Historical Med      pantoprazole (PROTONIX) 40 MG tablet TAKE 1 TABLET BY MOUTH DAILY, R-0      loratadine (CLARITIN) 10 MG tablet Take 10 mg by mouth daily      fluticasone (FLONASE) 50 MCG/ACT nasal spray 1 spray by Nasal route nightly., Disp-1 Bottle, R-5      levothyroxine (SYNTHROID) 75 MCG tablet Take 1 tablet by mouth daily for 360 days. , Disp-30 tablet, R-11      nitroGLYCERIN (NITROSTAT) 0.4 MG SL tablet Place 1 tablet under the tongue every 5 minutes as needed for Chest pain., Disp-25 tablet, R-3      amitriptyline (ELAVIL) 25 MG tablet Take 25 mg by mouth nightly Historical Med      Multiple Vitamin (THERA) TABS Take 1 tablet by mouth daily       clonazepam (KLONOPIN) 1 MG tablet Take 1 tablet by mouth 3 times daily as needed for Anxiety for 21 doses. , Disp-21 tablet, R-0           Allergies   Allergen Reactions    Meloxicam Itching     Tolerates Ketorolac/Bromfenac ophthalmic drops.     Benzoin Compound      Blisters from adhesive compound consolable. Diagnostics   Labs:  No results found for this visit on 08/22/18. Radiographs:  No results found. Procedures/EKG:   None    ED Course and MDM   In brief, Scott Hunt is a 60 y.o. female who presented to the emergency department with report that her [place] catheter has not been draining well. The patient did have irrigation of the catheter. The patient did have the catheter exchanged in the emergency department due to the inability to adequately irrigate the catheter. It is likely obstructed by mucus. The patient is to follow-up with her primary care provider and surgeon regarding the persistent issues with the catheter. She is to return to the emergency department if any worsening concerning symptoms. Patient is stable no acute distress. ED Medication Orders     Start Ordered     Status Ordering Provider    08/23/18 0145 08/23/18 0120  oxyCODONE-acetaminophen (PERCOCET) 5-325 MG per tablet 1 tablet  ONCE      Last MAR action:  Given - by Ken Moscoso on 08/23/18 at [address] [place] [address], [address] [place] [address]          Final Impression      1.  Problem with [place] catheter, subsequent encounter      DISPOSITION Decision To Discharge 08/23/2018 01:46:39 AM     (Please note that portions of this note may have been completed with a voice recognition program. Efforts were made to edit the dictations but occasionally words are mis-transcribed.)    Roz Roth MD  [address] MD  08/24/18 [address], MD  08/24/18 8643

## 2018-09-21 ENCOUNTER — APPOINTMENT (OUTPATIENT)
Dept: GENERAL RADIOLOGY | Age: 63
End: 2018-09-21
Payer: MEDICARE

## 2018-09-21 ENCOUNTER — HOSPITAL ENCOUNTER (EMERGENCY)
Age: 63
Discharge: HOME | End: 2018-09-21
Attending: EMERGENCY MEDICINE
Payer: MEDICARE

## 2018-09-21 VITALS
WEIGHT: 170 LBS | HEIGHT: 63 IN | BODY MASS INDEX: 30.12 KG/M2 | OXYGEN SATURATION: 100 % | SYSTOLIC BLOOD PRESSURE: 129 MMHG | HEART RATE: 80 BPM | DIASTOLIC BLOOD PRESSURE: 69 MMHG | RESPIRATION RATE: 16 BRPM | TEMPERATURE: 97.5 F

## 2018-09-21 DIAGNOSIS — S39.012A STRAIN OF LUMBAR REGION, INITIAL ENCOUNTER: Primary | ICD-10-CM

## 2018-09-21 DIAGNOSIS — S20.212A RIB CONTUSION, LEFT, INITIAL ENCOUNTER: ICD-10-CM
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
## 2018-09-21 DIAGNOSIS — M25.551 RIGHT HIP PAIN: ICD-10-CM

## 2018-09-21 DIAGNOSIS — S50.01XA CONTUSION OF RIGHT ELBOW, INITIAL ENCOUNTER: ICD-10-CM

## 2018-09-21 PROCEDURE — 73080 X-RAY EXAM OF ELBOW: CPT

## 2018-09-21 PROCEDURE — 73502 X-RAY EXAM HIP UNI 2-3 VIEWS: CPT

## 2018-09-21 PROCEDURE — 99283 EMERGENCY DEPT VISIT LOW MDM: CPT

## 2018-09-21 PROCEDURE — 6370000000 HC RX 637 (ALT 250 FOR IP): Performed by: NURSE PRACTITIONER

## 2018-09-21 PROCEDURE — 71101 X-RAY EXAM UNILAT RIBS/CHEST: CPT

## 2018-09-21 PROCEDURE — 72100 X-RAY EXAM L-S SPINE 2/3 VWS: CPT

## 2018-09-21 RX ORDER — OXYCODONE HYDROCHLORIDE AND ACETAMINOPHEN 5; 325 MG/1; MG/1
1 TABLET ORAL ONCE
Status: COMPLETED | OUTPATIENT
Start: 2018-09-21 | End: 2018-09-21

## 2018-09-21 RX ORDER — TROSPIUM CHLORIDE 20 MG/1
20 TABLET, FILM COATED ORAL 2 TIMES DAILY
COMMUNITY
Start: 2018-09-18 | End: 2018-10-18

## 2018-09-21 RX ORDER — NITROFURANTOIN MACROCRYSTALS 50 MG/1
50 CAPSULE ORAL DAILY
COMMUNITY
Start: 2018-09-16 | End: 2018-10-16

## 2018-09-21 RX ADMIN — OXYCODONE HYDROCHLORIDE AND ACETAMINOPHEN 1 TABLET: 5; 325 TABLET ORAL at 20:27

## 2018-09-21 ASSESSMENT — PAIN DESCRIPTION - LOCATION: LOCATION: BACK;ARM;HIP

## 2018-09-21 ASSESSMENT — PAIN SCALES - GENERAL
PAINLEVEL_OUTOF10: 10
PAINLEVEL_OUTOF10: 6
PAINLEVEL_OUTOF10: 8

## 2018-09-21 ASSESSMENT — PAIN DESCRIPTION - PAIN TYPE: TYPE: ACUTE PAIN

## 2018-09-21 NOTE — ED PROVIDER NOTES
04/30/2015    FOOT SURGERY Left 4/30/15    GASTRIC BYPASS SURGERY  [id number]    HYSTERECTOMY      KNEE ARTHROSCOPY Left 10/3/2014    part. medial & lateral meniscectomy, synovectomy plica exc    KNEE ARTHROSCOPY Right 10/13/15    partial medial meniscectomy, chondroplasty, partial lateral meniscectomy    NECK SURGERY      OTHER SURGICAL HISTORY  april 2013    removal spinal cord stimulater    OTHER SURGICAL HISTORY      bladder stimulator    OTHER SURGICAL HISTORY  08/30/2017    left patellofemoral arthroplasty    OTHER SURGICAL HISTORY  05/09/2018    convert left patellofemoral arthroplasty to left total knee replacement   Radha Rondon  2011    Dr Mahoney/checked last week/told has 12 more yrs for this one/set at 60    SKIN BIOPSY      abdomen    SPINE SURGERY      stimulator    THROAT SURGERY      polyps removed    TONSILLECTOMY      UPPER GASTROINTESTINAL ENDOSCOPY      UPPER GASTROINTESTINAL ENDOSCOPY  05/03/2017     Family History   Problem Relation Age of Onset    Heart Disease Father     Cancer Sister         multiple organs    Other Sister     Asthma Neg Hx     Diabetes Neg Hx     Emphysema Neg Hx     Heart Failure Neg Hx     Hypertension Neg Hx      Social History     Social History    Marital status:      Spouse name: N/A    Number of children: N/A    Years of education: N/A     Occupational History    Not on file. Social History Main Topics    Smoking status: Never Smoker    Smokeless tobacco: Never Used    Alcohol use No    Drug use: No    Sexual activity: Yes     Partners: Female     Other Topics Concern    Not on file     Social History Narrative    No narrative on file     No current facility-administered medications for this encounter.       Current Outpatient Prescriptions   Medication Sig Dispense Refill    nitrofurantoin (MACRODANTIN) 50 MG capsule Take 50 mg by mouth daily      trospium (SANCTURA) 20 MG tablet Take 20 mg by mouth 2 times daily Blisters from adhesive compound under steri strips after knee VAS      Lyrica [Pregabalin]      Keeps patient awake    Quetiapine      Other reaction(s): Other (See Comments)  Pt unable to recall reaction  Other reaction(s): Other (See Comments)      Quetiapine Fumarate      Other reaction(s): confused, dizzy  Other reaction(s): Unknown  seroquel      Seroquel [Quetiapine Fumarate] Other (See Comments)     Other reaction(s): confused, dizzy  Pt unable to recall reaction    Tizanidine      hallucinations      Amberderm Rash     Positive patch test results to [facility] Other Rash     Can use paper tape can not  use transpore    Tape [Adhesive Tape] Rash     Mild dermatitis at site of paper tape, with history of previous skin rashes to tape. STERI-STRIPS  Can use paper tape can not  use transpore      Wound Dressings Rash     Positive patch test results to [facility]  10 systems reviewed, pertinent positives per HPI otherwise noted to be negative    PHYSICAL EXAM  /69   Pulse 80   Temp 97.5 °F (36.4 °C)   Resp 16   Ht 5' 3\" (1.6 m)   Wt 170 lb (77.1 kg)   SpO2 100%   BMI 30.11 kg/m²     Physical Exam   Constitutional: She is oriented to person, place, and time. She appears well-developed and well-nourished. She is active. Non-toxic appearance. She appears ill (chronic). No distress. HENT:   Head: Normocephalic and atraumatic. Eyes: Conjunctivae are normal. Right eye exhibits no discharge. Left eye exhibits no discharge. Neck: Normal range of motion. Neck supple. Pulmonary/Chest: Effort normal. No respiratory distress. She exhibits tenderness and bony tenderness. She exhibits no crepitus. Abdominal: Soft. Bowel sounds are normal. There is no tenderness. There is no rebound. Musculoskeletal:        Right shoulder: She exhibits normal range of motion, no deformity, no pain and normal strength.         Right elbow: She exhibits laceration (skin findings in the right elbow. 2. Bony demineralization. XR RIBS LEFT INCLUDE CHEST (MIN 3 VIEWS)   Final Result   1. No acute findings in the chest.  Specifically, no displaced left rib   fractures are identified. 2. Bony demineralization partially limits evaluation for fractures. XR LUMBAR SPINE (2-3 VIEWS)   Final Result   1. No acute findings in the lumbar spine or right hip. 2. Bony demineralization partially limits evaluation for fractures especially   in the lumbar spine. Consider further evaluation with CT (for the lumbar   spine) or MRI (for the pelvis) if there are clinical findings of fracture. 3. Moderate lumbar spine degenerative disc disease. XR HIP RIGHT (2-3 VIEWS)   Final Result   1. No acute findings in the lumbar spine or right hip. 2. Bony demineralization partially limits evaluation for fractures especially   in the lumbar spine. Consider further evaluation with CT (for the lumbar   spine) or MRI (for the pelvis) if there are clinical findings of fracture. 3. Moderate lumbar spine degenerative disc disease. No results found. ED COURSE/MDM    Patient was given the following medications:  Medications   oxyCODONE-acetaminophen (PERCOCET) 5-325 MG per tablet 1 tablet (1 tablet Oral Given 9/21/18 2027)            Patient Evaluate her for multiple injuries status post fall. Imaging performed without acute findings. No gross fractures. Patient's neurovascularly intact distal to injuries with brisk capillary refill. She was given pain medication here in the emergency room with moderate improvement in pain. She'll continue her prescribed Percocet for pain control. Recommended reevaluation by her physician. .  Afebrile, non toxic in appearance, in no acute distress, pulse ox is 100% and she is not hypoxic. Will be discharged to home in stable condition with outpatient follow up.   Instructed patient and/or family to return to the emergency room for

## 2018-10-22 ENCOUNTER — OFFICE VISIT (OUTPATIENT)
Dept: ORTHOPEDIC SURGERY | Age: 63
End: 2018-10-22
Payer: MEDICARE

## 2018-10-22 VITALS — HEIGHT: 63 IN | WEIGHT: 169.97 LBS | BODY MASS INDEX: 30.12 KG/M2

## 2018-10-22 DIAGNOSIS — Z98.890 S/P KNEE SURGERY: ICD-10-CM

## 2018-10-22 DIAGNOSIS — M25.562 LEFT KNEE PAIN, UNSPECIFIED CHRONICITY: Primary | ICD-10-CM

## 2018-10-22 PROCEDURE — 99213 OFFICE O/P EST LOW 20 MIN: CPT | Performed by: ORTHOPAEDIC SURGERY

## 2018-10-24 DIAGNOSIS — G31.84 MCI (MILD COGNITIVE IMPAIRMENT) WITH MEMORY LOSS: ICD-10-CM

## 2018-10-24 DIAGNOSIS — G31.84 MILD COGNITIVE IMPAIRMENT: ICD-10-CM

## 2018-10-24 DIAGNOSIS — G25.0 ESSENTIAL TREMOR: ICD-10-CM

## 2018-10-25 RX ORDER — PRIMIDONE 50 MG/1
TABLET ORAL
Qty: 360 TABLET | Refills: 0 | Status: SHIPPED | OUTPATIENT
Start: 2018-10-25 | End: 2019-01-23 | Stop reason: SDUPTHER

## 2018-10-25 NOTE — TELEPHONE ENCOUNTER
Last seen 04.25.18     Last office note states to RTO 6 months/10.2018    Next appointment scheduled for 11.08.18

## 2018-10-26 ENCOUNTER — HOSPITAL ENCOUNTER (OUTPATIENT)
Age: 63
Discharge: HOME | End: 2018-10-26
Payer: MEDICARE

## 2018-10-26 LAB
C-REACTIVE PROTEIN: 1.3 MG/L (ref 0–5.1)
HCT VFR BLD CALC: 44.7 % (ref 36–48)
HEMOGLOBIN: 14.6 G/DL (ref 12–16)
MCH RBC QN AUTO: 31.6 PG (ref 26–34)
MCHC RBC AUTO-ENTMCNC: 32.7 G/DL (ref 31–36)
MCV RBC AUTO: 96.6 FL (ref 80–100)
PDW BLD-RTO: 15.6 % (ref 12.4–15.4)
PLATELET # BLD: 201 K/UL (ref 135–450)
PMV BLD AUTO: 10.2 FL (ref 5–10.5)
RBC # BLD: 4.63 M/UL (ref 4–5.2)
SEDIMENTATION RATE, ERYTHROCYTE: 19 MM/HR (ref 0–30)
WBC # BLD: 5.8 K/UL (ref 4–11)

## 2018-10-26 PROCEDURE — 85027 COMPLETE CBC AUTOMATED: CPT

## 2018-10-26 PROCEDURE — 85652 RBC SED RATE AUTOMATED: CPT

## 2018-10-26 PROCEDURE — 36415 COLL VENOUS BLD VENIPUNCTURE: CPT

## 2018-10-26 PROCEDURE — 86140 C-REACTIVE PROTEIN: CPT

## 2018-10-29 ENCOUNTER — HOSPITAL ENCOUNTER (OUTPATIENT)
Dept: NON INVASIVE DIAGNOSTICS | Age: 63
Discharge: HOME | End: 2018-10-29
Payer: MEDICARE

## 2018-10-29 LAB
LV EF: 58 %
LVEF MODALITY: NORMAL

## 2018-10-29 PROCEDURE — 93306 TTE W/DOPPLER COMPLETE: CPT

## 2018-11-08 ENCOUNTER — OFFICE VISIT (OUTPATIENT)
Dept: NEUROLOGY | Age: 63
End: 2018-11-08
Payer: MEDICARE

## 2018-11-08 VITALS
SYSTOLIC BLOOD PRESSURE: 119 MMHG | HEIGHT: 61 IN | BODY MASS INDEX: 34.74 KG/M2 | HEART RATE: 84 BPM | OXYGEN SATURATION: 96 % | WEIGHT: 184 LBS | DIASTOLIC BLOOD PRESSURE: 78 MMHG

## 2018-11-08 DIAGNOSIS — G25.0 ESSENTIAL TREMOR: ICD-10-CM

## 2018-11-08 DIAGNOSIS — G47.33 OSA (OBSTRUCTIVE SLEEP APNEA): ICD-10-CM

## 2018-11-08 DIAGNOSIS — G31.84 MCI (MILD COGNITIVE IMPAIRMENT) WITH MEMORY LOSS: Primary | ICD-10-CM

## 2018-11-08 DIAGNOSIS — F34.1 DYSTHYMIA: ICD-10-CM

## 2018-11-08 DIAGNOSIS — F51.01 PRIMARY INSOMNIA: ICD-10-CM

## 2018-11-08 PROCEDURE — 99214 OFFICE O/P EST MOD 30 MIN: CPT | Performed by: PSYCHIATRY & NEUROLOGY

## 2018-11-08 RX ORDER — DILTIAZEM HYDROCHLORIDE EXTENDED-RELEASE TABLETS 180 MG/1
180 TABLET, EXTENDED RELEASE ORAL
Status: ON HOLD | COMMUNITY
Start: 2018-10-23 | End: 2019-05-26 | Stop reason: HOSPADM

## 2018-11-08 RX ORDER — LEVOFLOXACIN 500 MG/1
TABLET, FILM COATED ORAL
Status: ON HOLD | COMMUNITY
Start: 2018-10-03 | End: 2019-08-15

## 2018-11-08 RX ORDER — NITROFURANTOIN 25; 75 MG/1; MG/1
100 CAPSULE ORAL
COMMUNITY
Start: 2018-09-14 | End: 2019-03-10 | Stop reason: ALTCHOICE

## 2018-11-08 NOTE — PROGRESS NOTES
The patient came today for follow up regarding: Tremors and dementia. Since her last visit, she continues to have intermittent but daily tremors in her hands. Duration is minutes and degree is moderate. The same triggers with eating and postural. No resting tremors. No other associated symptoms. She is on Mysoline 50 mg bid. No SE from such medication. She fell once in the last few months but no LOC or LILLY. She lives alone. She gets help from her daughter. She has the same episode of short-term memory loss that can be daily. Degree is moderate. She needs prompt from family. She takes Aricept 10 mg daily. No side effect from Aricept. No long-term memory loss, psychosis or hallucination. No severe depression. She was diagnosed recently with sleep apnea. She has been using CPAP for the last month. No improvement so far. She has chronic insomnia. No other new symptoms today. Other review of system was unremarkable. Past Medical History:   Diagnosis Date    Angina at rest [facility])     Anxiety     Arthritis     hands and hip    Blood transfusion     after back surgery    Bradycardia     Bronchiectasis ([facility] [address] 11/5/2013    CAD (coronary artery disease)     Chronic back pain     Hyperlipidemia     Hypertension     Localized morphea     NATHAN treated with BiPAP     Pacemaker     Stress incontinence     Thyroid disease     hypothyroid     Prior to Visit Medications    Medication Sig Taking? Authorizing Provider   levofloxacin (LEVAQUIN) 500 MG tablet Take one tablet prior to [employer].  Yes Historical Provider, MD   diltiazem (CARDIZEM LA) 180 MG TB24 extended release tablet Take 180 mg by mouth Yes Historical Provider, MD   nitrofurantoin, macrocrystal-monohydrate, (MACROBID) 100 MG capsule Take 100 mg by mouth Yes Historical Provider, MD   primidone (MYSOLINE) 50 MG tablet TAKE TWO TABLETS BY MOUTH TWICE DAILY Yes Donavan Lane MD   cephALEXin (KEFLEX) 500 MG capsule Take 500 Bruce Jarvis MD     Allergies   Allergen Reactions    Meloxicam Itching     Tolerates Ketorolac/Bromfenac ophthalmic drops.  Benzoin Compound      Blisters from adhesive compound under steri strips after knee VAS      Lyrica [Pregabalin]      Keeps patient awake    Quetiapine      Other reaction(s): Other (See Comments)  Pt unable to recall reaction  Other reaction(s): Other (See Comments)      Quetiapine Fumarate      Other reaction(s): confused, dizzy  Other reaction(s): Unknown  seroquel      Seroquel [Quetiapine Fumarate] Other (See Comments)     Other reaction(s): confused, dizzy  Pt unable to recall reaction    Tizanidine      hallucinations      Amberderm Rash     Positive patch test results to [facility] Other Rash     Can use paper tape can not  use transpore    Tape [Adhesive Tape] Rash     Mild dermatitis at site of paper tape, with history of previous skin rashes to tape.  STERI-STRIPS  Can use paper tape can not  use transpore      Wound Dressings Rash     Positive patch test results to [facility]       Social History   Substance Use Topics    Smoking status: Never Smoker    Smokeless tobacco: Never Used    Alcohol use No     Family History   Problem Relation Age of Onset    Heart Disease Father     Cancer Sister         multiple organs    Other Sister     Asthma Neg Hx     Diabetes Neg Hx     Emphysema Neg Hx     Heart Failure Neg Hx     Hypertension Neg Hx      Past Surgical History:   Procedure Laterality Date    ABDOMEN SURGERY  9/9/11     REPAIR INCISIONAL HERNIA REDUCIBLE WITH POSSIBLE MESH    BACK SURGERY      x3    BACK SURGERY      stimulator    BLADDER SUSPENSION      CATARACT REMOVAL WITH IMPLANT Left 03/08/2018    PHACO EMULSIFICATION OF CATARACT WITH INTRAOCULAR LENS IMPLANT LEFT EYE    CERVICAL DISCECTOMY  6/2014    and fusion    CHOLECYSTECTOMY      COLONOSCOPY  2002 1/26/12    ENDOSCOPY, COLON, DIAGNOSTIC      EYE SURGERY Right 04/05/2018 cataract removal    FOOT SURGERY Right 12/6/12    arthroplasty    FOOT SURGERY Left 04/30/2015    FOOT SURGERY Left 4/30/15    GASTRIC BYPASS SURGERY  0353,7269    HYSTERECTOMY      KNEE ARTHROSCOPY Left 10/3/2014    part. medial & lateral meniscectomy, synovectomy plica exc    KNEE ARTHROSCOPY Right 10/13/15    partial medial meniscectomy, chondroplasty, partial lateral meniscectomy    NECK SURGERY      OTHER SURGICAL HISTORY  april 2013    removal spinal cord stimulater    OTHER SURGICAL HISTORY      bladder stimulator    OTHER SURGICAL HISTORY  08/30/2017    left patellofemoral arthroplasty    OTHER SURGICAL HISTORY  05/09/2018    convert left patellofemoral arthroplasty to left total knee replacement   Gricelda Little  2011    Dr Mahoney/checked last week/told has 12 more yrs for this one/set at 60    SKIN BIOPSY      abdomen    SPINE SURGERY      stimulator    THROAT SURGERY      polyps removed    TONSILLECTOMY      UPPER GASTROINTESTINAL ENDOSCOPY      UPPER GASTROINTESTINAL ENDOSCOPY  05/03/2017         Exam:   Constitutional:   Vitals:    11/08/18 1153   BP: 119/78   Pulse: 84   SpO2: 96%   Weight: 184 lb (83.5 kg)   Height: 5' 1\" (1.549 m)       General appearance: well-nourished. Eye: No icterus. Neck: supple  Cardiovascular: No carotid bruit. Heart: S1, S2         + lower leg edema with good pulsation. Mental Status: Oriented to person, place, problem, and time. Fluent speech. Poor fund of knowledge. Normal attention span and concentration. Poor immediate recall. Cranial Nerves:   II: Visual fields: Full to confrontation  III: Pupils: equal, round, reactive to light  III,IV,VI: Extra Ocular Movements are intact.  No nystagmus  V: Facial sensation is intact to pin prick and light touch  VII: Facial strength and movements: intact and symmetric smile,cheek puffing and eyebrow elevation  VIII: Hearing: Intact to finger rub bilaterally  IX: Palate elevation is

## 2018-12-04 ENCOUNTER — HOSPITAL ENCOUNTER (OUTPATIENT)
Dept: PHYSICAL THERAPY | Age: 63
Setting detail: THERAPIES SERIES
Discharge: HOME | End: 2018-12-04
Payer: MEDICARE

## 2018-12-04 PROCEDURE — G8979 MOBILITY GOAL STATUS: HCPCS | Performed by: SPECIALIST

## 2018-12-04 PROCEDURE — 97161 PT EVAL LOW COMPLEX 20 MIN: CPT | Performed by: SPECIALIST

## 2018-12-04 PROCEDURE — G8978 MOBILITY CURRENT STATUS: HCPCS | Performed by: SPECIALIST

## 2018-12-04 PROCEDURE — 97110 THERAPEUTIC EXERCISES: CPT | Performed by: SPECIALIST

## 2018-12-04 NOTE — FLOWSHEET NOTE
Minutes: 45     [x] EVAL(LOW) 99769 (typically 20 minutes face-to-face)  [x] NI(13532) x  2   [] IONTO  [] NMR (58479) x      [] VASO  [] Manual (50239) x       [] Other:  [] TA x       [] Mech Traction (58903)  [] ES(attended) (61126)      [] ES (un) (48539):     GOALS:    Patient stated goal: independent ADLs/walking    Therapist goals for Patient:   Short Term Goals: To be achieved in: 2 weeks  1. Independent in HEP and progression per patient tolerance, in order to prevent re-injury. 2. Patient will have a decrease in pain to facilitate improvement in movement, function, and ADLs as indicated by Functional Deficits. Long Term Goals: To be achieved in: 6 weeks  1. Disability index score of 59% or less for the LEFS to assist with reaching prior level of function. 2. Patient will demonstrate increased AROM to 125 to allow for proper joint functioning as indicated by patients Functional Deficits. 3. Patient will demonstrate an increase in Strength to good proximal hip strength and control, within 5lb HHD in LE to allow for proper functional mobility as indicated by patients Functional Deficits. 4. Patient will return to [facility] for functional activities without increased symptoms or restriction. 5. Ambulate short distances with cane. (patient specific functional goal)       Progression Towards Functional goals:  [] Patient is progressing as expected towards functional goals listed. [] Progression is slowed due to complexities listed. [] Progression has been slowed due to co-morbidities.   [x] Plan just implemented, too soon to assess goals progression  [] Other:     ASSESSMENT:  See eval    Treatment/Activity Tolerance:  [x] Patient tolerated treatment well [] Patient limited by fatique  [] Patient limited by pain  [] Patient limited by other medical complications  [] Other:     Prognosis: [x] Good [] Fair  [] Poor    Patient Requires Follow-up: [x] Yes  [] No    PLAN: See eval  [] Continue per plan of care [] Alter current plan (see comments)  [x] Plan of care initiated [] Hold pending MD visit [] Discharge    Electronically signed by: Sonia Harris PT

## 2018-12-06 ENCOUNTER — HOSPITAL ENCOUNTER (OUTPATIENT)
Dept: PHYSICAL THERAPY | Age: 63
Setting detail: THERAPIES SERIES
Discharge: HOME | End: 2018-12-06
Payer: MEDICARE

## 2018-12-06 PROCEDURE — 97110 THERAPEUTIC EXERCISES: CPT | Performed by: SPECIALIST

## 2018-12-06 NOTE — FLOWSHEET NOTE
single leg control. NMR and Therapeutic Activities:    [x] (91755 or 53547) Provided verbal/tactile cueing for activities related to improving balance, coordination, kinesthetic sense, posture, motor skill, proprioception and motor activation to allow for proper function of core, proximal hip and LE with self care and ADLs  [] (88701) Gait Re-education- Provided training and instruction to the patient for proper LE, core and proximal hip recruitment and positioning and eccentric body weight control with ambulation re-education including up and down stairs     Home Exercise Program:    [x] (59451) Reviewed/Progressed HEP activities related to strengthening, flexibility, endurance, ROM of core, proximal hip and LE for functional self-care, mobility, lifting and ambulation/stair navigation   [] (83502)Reviewed/Progressed HEP activities related to improving balance, coordination, kinesthetic sense, posture, motor skill, proprioception of core, proximal hip and LE for self care, mobility, lifting, and ambulation/stair navigation      Manual Treatments:  PROM / STM / Oscillations-Mobs:  G-I, II, III, IV (PA's, Inf., Post.)  [x] (74008) Provided manual therapy to mobilize LE, proximal hip and/or LS spine soft tissue/joints for the purpose of modulating pain, promoting relaxation,  increasing ROM, reducing/eliminating soft tissue swelling/inflammation/restriction, improving soft tissue extensibility and allowing for proper ROM for normal function with self care, mobility, lifting and ambulation.      Modalities:  CP PRN    Charges:  Timed Code Treatment Minutes: 38   Total Treatment Minutes: 38     [] EVAL(LOW) 59775 (typically 20 minutes face-to-face)  [x] NC(10185) x  3   [] IONTO  [] NMR (23811) x      [] VASO  [] Manual (18357) x       [] Other:  [] TA x       [] Mech Traction (58553)  [] ES(attended) (65900)      [] ES (un) (05619):     GOALS:    Patient stated goal: independent ADLs/walking    Therapist goals for

## 2018-12-11 ENCOUNTER — APPOINTMENT (OUTPATIENT)
Dept: PHYSICAL THERAPY | Age: 63
End: 2018-12-11
Payer: MEDICARE

## 2018-12-12 ENCOUNTER — HOSPITAL ENCOUNTER (OUTPATIENT)
Dept: PHYSICAL THERAPY | Age: 63
Setting detail: THERAPIES SERIES
Discharge: HOME | End: 2018-12-12
Payer: MEDICARE

## 2018-12-12 PROCEDURE — 97110 THERAPEUTIC EXERCISES: CPT | Performed by: SPECIALIST

## 2018-12-13 ENCOUNTER — APPOINTMENT (OUTPATIENT)
Dept: PHYSICAL THERAPY | Age: 63
End: 2018-12-13
Payer: MEDICARE

## 2018-12-18 ENCOUNTER — HOSPITAL ENCOUNTER (OUTPATIENT)
Dept: PHYSICAL THERAPY | Age: 63
Setting detail: THERAPIES SERIES
Discharge: HOME | End: 2018-12-18
Payer: MEDICARE

## 2018-12-18 PROCEDURE — 97110 THERAPEUTIC EXERCISES: CPT | Performed by: SPECIALIST

## 2018-12-18 NOTE — FLOWSHEET NOTE
[facility]    [facility] Therapy Daily Treatment Note  Date:  2018    Patient Name:  Noel Palmer    :  1955  MRN: [id number]  Restrictions/Precautions:  pacemaker  Medical/Treatment Diagnosis Information:  ·  chronic left knee pain M25.562  G 89.29  ·  S/P L TKR 49  Insurance/Certification information:   [employer]  Physician Information:   Dr Kennedy Blend of care signed (Y/N):     Date of Patient follow up with Physician:     G-Code (if applicable):      Date G-Code Applied:         Progress Note: [x]  Yes  []  No  Next due by: Visit #10       Latex Allergy:  [x]NO      []YES  Preferred Language for Healthcare:   [x]English       []other:    Visit # Insurance Allowable   4 [employer], $40, med nec     Pain level:  0/10 left knee Left buttock 8/10    SUBJECTIVE:  Compliance with HEP    OBJECTIVE:   Observation:   Test measurements:    18: AROM 122 PROM 124  18: AROM 124 PROM 127  18  AROM 126 PROM 130    RESTRICTIONS/PRECAUTIONS: pacemaker    Exercises/Interventions:     Therapeutic Ex Sets/sec Reps Notes HEP   bike  6 min    EZ stretch  10 min    x   x   x                             See flow sheet  20 min                                 Manual Intervention       Pat mob/PROM knee  5 min                                        NMR re-education                                                                          Therapeutic Exercise and NMR EXR  [x] ([id number]) Provided verbal/tactile cueing for activities related to strengthening, flexibility, endurance, ROM for improvements in LE, proximal hip, and core control with self care, mobility, lifting, ambulation.  [] (17632) Provided verbal/tactile cueing for activities related to improving balance, coordination, kinesthetic sense, posture, motor skill, proprioception  to assist with LE, proximal hip, and core control in self care, mobility, lifting, ambulation and eccentric single

## 2018-12-20 ENCOUNTER — HOSPITAL ENCOUNTER (OUTPATIENT)
Dept: PHYSICAL THERAPY | Age: 63
Setting detail: THERAPIES SERIES
Discharge: HOME | End: 2018-12-20
Payer: MEDICARE

## 2018-12-20 ENCOUNTER — OFFICE VISIT (OUTPATIENT)
Dept: ORTHOPEDIC SURGERY | Age: 63
End: 2018-12-20
Payer: MEDICARE

## 2018-12-20 VITALS — WEIGHT: 184.08 LBS | HEIGHT: 61 IN | BODY MASS INDEX: 34.76 KG/M2

## 2018-12-20 DIAGNOSIS — Z96.652 STATUS POST TOTAL LEFT KNEE REPLACEMENT: Primary | ICD-10-CM

## 2018-12-20 PROCEDURE — 97110 THERAPEUTIC EXERCISES: CPT | Performed by: SPECIALIST

## 2018-12-20 PROCEDURE — 97112 NEUROMUSCULAR REEDUCATION: CPT | Performed by: SPECIALIST

## 2018-12-20 PROCEDURE — 99213 OFFICE O/P EST LOW 20 MIN: CPT | Performed by: ORTHOPAEDIC SURGERY

## 2018-12-20 NOTE — FLOWSHEET NOTE
Inv.          Ladders     Square     Jump/Hop  Low                        Med.                        High               Modality declined    PTA Assessment Pt deconditioned, very fatigued at conclusion. C/o LBP through out tx. Pt has a difficult time with exercises. Fatigues very easily.     Time Based Treatment 20 minutes 40 minutes     Electronically signed by: Jennifer Dawkins, PTA, ATC

## 2018-12-20 NOTE — FLOWSHEET NOTE
[facility]    Physical Therapy Daily Treatment Note  Date:  2018    Patient Name:  Carly Bui    :  1955  MRN: [id number]  Restrictions/Precautions:  pacemaker  Medical/Treatment Diagnosis Information:  ·  chronic left knee pain M25.562  G 89.29  ·  S/P L TKR [birth year]  Insurance/Certification information:   [employer]  Physician Information:   Dr Alonso Canas of care signed (Y/N):     Date of Patient follow up with Physician:     G-Code (if applicable):      Date G-Code Applied:         Progress Note: [x]  Yes  []  No  Next due by: Visit #10       Latex Allergy:  [x]NO      []YES  Preferred Language for Healthcare:   [x]English       []other:    Visit # Insurance Allowable   5 [place], $40, med nec     Pain level:  0/10 left knee Left buttock 8/10    SUBJECTIVE:  Compliance with HEP    OBJECTIVE:   Observation:   Test measurements:    18: AROM 122 PROM 124  18: AROM 124 PROM 127  18  AROM 126 PROM 130  18: PROM 130    RESTRICTIONS/PRECAUTIONS: pacemaker    Exercises/Interventions:     Therapeutic Ex Sets/sec Reps Notes HEP         x   SLS  1 min ea x   x                             See flow sheet  40 min                                 Manual Intervention       Pat mob/PROM knee  5 min                                        NMR re-education                                                                          Therapeutic Exercise and NMR EXR  [x] (24900) Provided verbal/tactile cueing for activities related to strengthening, flexibility, endurance, ROM for improvements in LE, proximal hip, and core control with self care, mobility, lifting, ambulation.  [] (83388) Provided verbal/tactile cueing for activities related to improving balance, coordination, kinesthetic sense, posture, motor skill, proprioception  to assist with LE, proximal hip, and core control in self care, mobility, lifting, ambulation and eccentric single leg control. NMR and Therapeutic Activities:    [x] (39469 or 09663) Provided verbal/tactile cueing for activities related to improving balance, coordination, kinesthetic sense, posture, motor skill, proprioception and motor activation to allow for proper function of core, proximal hip and LE with self care and ADLs  [] (59414) Gait Re-education- Provided training and instruction to the patient for proper LE, core and proximal hip recruitment and positioning and eccentric body weight control with ambulation re-education including up and down stairs     Home Exercise Program:    [x] (62266) Reviewed/Progressed HEP activities related to strengthening, flexibility, endurance, ROM of core, proximal hip and LE for functional self-care, mobility, lifting and ambulation/stair navigation   [] (90134)Reviewed/Progressed HEP activities related to improving balance, coordination, kinesthetic sense, posture, motor skill, proprioception of core, proximal hip and LE for self care, mobility, lifting, and ambulation/stair navigation      Manual Treatments:  PROM / STM / Oscillations-Mobs:  G-I, II, III, IV (PA's, Inf., Post.)  [x] (50023) Provided manual therapy to mobilize LE, proximal hip and/or LS spine soft tissue/joints for the purpose of modulating pain, promoting relaxation,  increasing ROM, reducing/eliminating soft tissue swelling/inflammation/restriction, improving soft tissue extensibility and allowing for proper ROM for normal function with self care, mobility, lifting and ambulation.      Modalities:  CP PRN    Charges:  Timed Code Treatment Minutes: 47   Total Treatment Minutes: 47     [] EVAL(LOW) 37283 (typically 20 minutes face-to-face)  [x] AF(73157) x  2   [] IONTO  [x] NMR (77686) x  1   [] VASO  [] Manual (55473) x       [] Other:  [] TA x       [] Mech Traction (88945)  [] ES(attended) (82723)      [] ES (un) (41026):     GOALS:    Patient stated goal: independent ADLs/walking    Therapist goals for

## 2018-12-27 ENCOUNTER — HOSPITAL ENCOUNTER (OUTPATIENT)
Dept: PHYSICAL THERAPY | Age: 63
Setting detail: THERAPIES SERIES
End: 2018-12-27
Payer: MEDICARE

## 2019-01-03 ENCOUNTER — HOSPITAL ENCOUNTER (OUTPATIENT)
Dept: PHYSICAL THERAPY | Age: 64
Setting detail: THERAPIES SERIES
Discharge: HOME | End: 2019-01-03
Payer: COMMERCIAL

## 2019-01-03 PROCEDURE — 97112 NEUROMUSCULAR REEDUCATION: CPT | Performed by: SPECIALIST

## 2019-01-03 PROCEDURE — 97110 THERAPEUTIC EXERCISES: CPT | Performed by: SPECIALIST

## 2019-01-08 ENCOUNTER — HOSPITAL ENCOUNTER (OUTPATIENT)
Dept: PHYSICAL THERAPY | Age: 64
Setting detail: THERAPIES SERIES
Discharge: HOME | End: 2019-01-08
Payer: COMMERCIAL

## 2019-01-08 PROCEDURE — 97110 THERAPEUTIC EXERCISES: CPT | Performed by: SPECIALIST

## 2019-01-09 ENCOUNTER — HOSPITAL ENCOUNTER (OUTPATIENT)
Dept: PHYSICAL THERAPY | Age: 64
Setting detail: THERAPIES SERIES
Discharge: HOME | End: 2019-01-09
Payer: COMMERCIAL

## 2019-01-09 PROCEDURE — 97110 THERAPEUTIC EXERCISES: CPT | Performed by: SPECIALIST

## 2019-01-10 ENCOUNTER — APPOINTMENT (OUTPATIENT)
Dept: PHYSICAL THERAPY | Age: 64
End: 2019-01-10
Payer: COMMERCIAL

## 2019-01-14 ENCOUNTER — APPOINTMENT (OUTPATIENT)
Dept: PHYSICAL THERAPY | Age: 64
End: 2019-01-14
Payer: COMMERCIAL

## 2019-01-15 ENCOUNTER — APPOINTMENT (OUTPATIENT)
Dept: PHYSICAL THERAPY | Age: 64
End: 2019-01-15
Payer: COMMERCIAL

## 2019-01-23 DIAGNOSIS — G31.84 MILD COGNITIVE IMPAIRMENT: ICD-10-CM

## 2019-01-23 DIAGNOSIS — G25.0 ESSENTIAL TREMOR: ICD-10-CM

## 2019-01-23 DIAGNOSIS — G31.84 MCI (MILD COGNITIVE IMPAIRMENT) WITH MEMORY LOSS: ICD-10-CM

## 2019-01-23 RX ORDER — PRIMIDONE 50 MG/1
TABLET ORAL
Qty: 360 TABLET | Refills: 1 | Status: SHIPPED | OUTPATIENT
Start: 2019-01-23 | End: 2019-07-20 | Stop reason: SDUPTHER

## 2019-01-26 ENCOUNTER — HOSPITAL ENCOUNTER (OUTPATIENT)
Dept: MAMMOGRAPHY | Age: 64
Discharge: HOME | End: 2019-01-26
Payer: COMMERCIAL

## 2019-01-26 DIAGNOSIS — Z12.31 ENCOUNTER FOR SCREENING MAMMOGRAM FOR BREAST CANCER: ICD-10-CM

## 2019-01-26 PROCEDURE — 77067 SCR MAMMO BI INCL CAD: CPT

## 2019-02-11 DIAGNOSIS — G31.84 MILD COGNITIVE IMPAIRMENT: ICD-10-CM

## 2019-02-11 RX ORDER — DONEPEZIL HYDROCHLORIDE 10 MG/1
10 TABLET, FILM COATED ORAL NIGHTLY
Qty: 90 TABLET | Refills: 2 | Status: SHIPPED | OUTPATIENT
Start: 2019-02-11 | End: 2022-03-18

## 2019-03-10 ENCOUNTER — APPOINTMENT (OUTPATIENT)
Dept: GENERAL RADIOLOGY | Age: 64
End: 2019-03-10
Payer: MEDICARE

## 2019-03-10 ENCOUNTER — HOSPITAL ENCOUNTER (EMERGENCY)
Age: 64
Discharge: HOME | End: 2019-03-11
Attending: EMERGENCY MEDICINE
Payer: MEDICARE

## 2019-03-10 ENCOUNTER — APPOINTMENT (OUTPATIENT)
Dept: CT IMAGING | Age: 64
End: 2019-03-10
Payer: MEDICARE

## 2019-03-10 DIAGNOSIS — G45.9 TIA (TRANSIENT ISCHEMIC ATTACK): Primary | ICD-10-CM

## 2019-03-10 DIAGNOSIS — R94.31 ABNORMAL EKG: ICD-10-CM

## 2019-03-10 DIAGNOSIS — R53.83 FATIGUE, UNSPECIFIED TYPE: ICD-10-CM

## 2019-03-10 LAB
A/G RATIO: 1.4 (ref 1.1–2.2)
ALBUMIN SERPL-MCNC: 4.1 G/DL (ref 3.4–5)
ALP BLD-CCNC: 121 U/L (ref 40–129)
ALT SERPL-CCNC: 26 U/L (ref 10–40)
ANION GAP SERPL CALCULATED.3IONS-SCNC: 12 MMOL/L (ref 3–16)
AST SERPL-CCNC: 36 U/L (ref 15–37)
BILIRUB SERPL-MCNC: 0.3 MG/DL (ref 0–1)
BUN BLDV-MCNC: 16 MG/DL (ref 7–20)
CALCIUM SERPL-MCNC: 9.5 MG/DL (ref 8.3–10.6)
CHLORIDE BLD-SCNC: 104 MMOL/L (ref 99–110)
CO2: 24 MMOL/L (ref 21–32)
CREAT SERPL-MCNC: 0.6 MG/DL (ref 0.6–1.2)
GFR AFRICAN AMERICAN: >60
GFR NON-AFRICAN AMERICAN: >60
GLOBULIN: 3 G/DL
GLUCOSE BLD-MCNC: 127 MG/DL (ref 70–99)
HCT VFR BLD CALC: 43.4 % (ref 36–48)
HEMOGLOBIN: 14.4 G/DL (ref 12–16)
MCH RBC QN AUTO: 31.2 PG (ref 26–34)
MCHC RBC AUTO-ENTMCNC: 33.1 G/DL (ref 31–36)
MCV RBC AUTO: 94.5 FL (ref 80–100)
PDW BLD-RTO: 14.4 % (ref 12.4–15.4)
PLATELET # BLD: 164 K/UL (ref 135–450)
PMV BLD AUTO: 9.4 FL (ref 5–10.5)
POTASSIUM SERPL-SCNC: 3.7 MMOL/L (ref 3.5–5.1)
RBC # BLD: 4.59 M/UL (ref 4–5.2)
SODIUM BLD-SCNC: 140 MMOL/L (ref 136–145)
TOTAL PROTEIN: 7.1 G/DL (ref 6.4–8.2)
WBC # BLD: 6.2 K/UL (ref 4–11)

## 2019-03-10 PROCEDURE — 85027 COMPLETE CBC AUTOMATED: CPT

## 2019-03-10 PROCEDURE — 96374 THER/PROPH/DIAG INJ IV PUSH: CPT

## 2019-03-10 PROCEDURE — 71046 X-RAY EXAM CHEST 2 VIEWS: CPT

## 2019-03-10 PROCEDURE — 2580000003 HC RX 258: Performed by: PHYSICIAN ASSISTANT

## 2019-03-10 PROCEDURE — 70450 CT HEAD/BRAIN W/O DYE: CPT

## 2019-03-10 PROCEDURE — 99285 EMERGENCY DEPT VISIT HI MDM: CPT

## 2019-03-10 PROCEDURE — 80053 COMPREHEN METABOLIC PANEL: CPT

## 2019-03-10 PROCEDURE — 93005 ELECTROCARDIOGRAM TRACING: CPT | Performed by: EMERGENCY MEDICINE

## 2019-03-10 PROCEDURE — 96361 HYDRATE IV INFUSION ADD-ON: CPT

## 2019-03-10 PROCEDURE — 6360000002 HC RX W HCPCS: Performed by: EMERGENCY MEDICINE

## 2019-03-10 PROCEDURE — 87040 BLOOD CULTURE FOR BACTERIA: CPT

## 2019-03-10 RX ORDER — ONDANSETRON 2 MG/ML
4 INJECTION INTRAMUSCULAR; INTRAVENOUS EVERY 6 HOURS PRN
Status: DISCONTINUED | OUTPATIENT
Start: 2019-03-10 | End: 2019-03-11 | Stop reason: HOSPADM

## 2019-03-10 RX ORDER — 0.9 % SODIUM CHLORIDE 0.9 %
1000 INTRAVENOUS SOLUTION INTRAVENOUS ONCE
Status: COMPLETED | OUTPATIENT
Start: 2019-03-10 | End: 2019-03-11

## 2019-03-10 RX ORDER — ONDANSETRON 2 MG/ML
INJECTION INTRAMUSCULAR; INTRAVENOUS
Status: DISCONTINUED
Start: 2019-03-10 | End: 2019-03-11 | Stop reason: HOSPADM

## 2019-03-10 RX ORDER — 0.9 % SODIUM CHLORIDE 0.9 %
1000 INTRAVENOUS SOLUTION INTRAVENOUS ONCE
Status: DISCONTINUED | OUTPATIENT
Start: 2019-03-10 | End: 2019-03-11 | Stop reason: HOSPADM

## 2019-03-10 RX ADMIN — SODIUM CHLORIDE 1000 ML: 9 INJECTION, SOLUTION INTRAVENOUS at 23:11

## 2019-03-10 RX ADMIN — ONDANSETRON 4 MG: 2 INJECTION INTRAMUSCULAR; INTRAVENOUS at 21:19

## 2019-03-11 VITALS
RESPIRATION RATE: 15 BRPM | OXYGEN SATURATION: 99 % | HEIGHT: 61 IN | SYSTOLIC BLOOD PRESSURE: 96 MMHG | BODY MASS INDEX: 23.6 KG/M2 | WEIGHT: 125 LBS | DIASTOLIC BLOOD PRESSURE: 56 MMHG | HEART RATE: 62 BPM

## 2019-03-11 LAB
EKG ATRIAL RATE: 66 BPM
EKG DIAGNOSIS: NORMAL
EKG P AXIS: -4 DEGREES
EKG P-R INTERVAL: 132 MS
EKG Q-T INTERVAL: 426 MS
EKG QRS DURATION: 90 MS
EKG QTC CALCULATION (BAZETT): 446 MS
EKG R AXIS: 79 DEGREES
EKG T AXIS: 7 DEGREES
EKG VENTRICULAR RATE: 66 BPM
LACTIC ACID: 1.1 MMOL/L (ref 0.4–2)
TROPONIN: <0.01 NG/ML

## 2019-03-11 PROCEDURE — 96361 HYDRATE IV INFUSION ADD-ON: CPT

## 2019-03-11 PROCEDURE — 84484 ASSAY OF TROPONIN QUANT: CPT

## 2019-03-11 PROCEDURE — 83605 ASSAY OF LACTIC ACID: CPT

## 2019-03-11 PROCEDURE — 93010 ELECTROCARDIOGRAM REPORT: CPT | Performed by: INTERNAL MEDICINE

## 2019-03-11 ASSESSMENT — HEART SCORE: ECG: 1

## 2019-03-16 LAB — BLOOD CULTURE, ROUTINE: NORMAL

## 2019-04-01 ENCOUNTER — OFFICE VISIT (OUTPATIENT)
Dept: NEUROLOGY | Age: 64
End: 2019-04-01
Payer: COMMERCIAL

## 2019-04-01 VITALS
SYSTOLIC BLOOD PRESSURE: 105 MMHG | HEART RATE: 75 BPM | OXYGEN SATURATION: 94 % | HEIGHT: 61 IN | DIASTOLIC BLOOD PRESSURE: 65 MMHG | WEIGHT: 125 LBS | BODY MASS INDEX: 23.6 KG/M2

## 2019-04-01 DIAGNOSIS — F34.1 DYSTHYMIA: ICD-10-CM

## 2019-04-01 DIAGNOSIS — G31.84 MCI (MILD COGNITIVE IMPAIRMENT) WITH MEMORY LOSS: ICD-10-CM

## 2019-04-01 DIAGNOSIS — G25.0 ESSENTIAL TREMOR: Primary | ICD-10-CM

## 2019-04-01 DIAGNOSIS — F51.01 PRIMARY INSOMNIA: ICD-10-CM

## 2019-04-01 DIAGNOSIS — G45.1 TIA INVOLVING LEFT INTERNAL CAROTID ARTERY: ICD-10-CM

## 2019-04-01 PROCEDURE — 99214 OFFICE O/P EST MOD 30 MIN: CPT | Performed by: PSYCHIATRY & NEUROLOGY

## 2019-04-01 RX ORDER — NITROFURANTOIN MACROCRYSTALS 50 MG/1
50 CAPSULE ORAL
Status: ON HOLD | COMMUNITY
End: 2019-05-26 | Stop reason: HOSPADM

## 2019-04-01 RX ORDER — GABAPENTIN 400 MG/1
400 CAPSULE ORAL 3 TIMES DAILY
Status: ON HOLD | COMMUNITY
End: 2019-12-05 | Stop reason: HOSPADM

## 2019-04-01 NOTE — PROGRESS NOTES
The patient came today for follow up regarding: Recent TIA, Tremors and dementia. Since her last visit, she was seen in the ED last month for acute MS changes, confusion and left facial droop. She was home when these sx occur. She was seen in the ED and had unremarkable workup except for low blood pressure. She was discharged home from the ER with possible diagnosis of a TIA. Patient denies any residual deficit. She lives by herself. She continues to have episodic short-term memory loss. She is on Aricept 10 mg daily. No side effect from Aricept. Family denies any hallucination or psychosis. Degree of memory loss is mild to moderate and frequency is sporadic. No triggers or other associated symptoms. She walks with her cane. No recent falling or injury or head trauma. She does have history of intermittent hand tremors. She is on Mysoline 50 mg daily. She feels Mysoline helps her tremor. Symptoms are not daily and can be waxing and waning. Triggers including position, action or stress. Tremors are worse in the right than left hand. She is on statin but she does not take aspirin for unclear reason. She is on blood pressure medications. She takes gabapentin for chronic pain. Other review of system was unremarkable. Past Medical History:   Diagnosis Date    Angina at rest [facility])     Anxiety     Arthritis     hands and hip    Blood transfusion     after back surgery    Bradycardia     Bronchiectasis ([facility] [address] 11/5/2013    CAD (coronary artery disease)     Chronic back pain     Hyperlipidemia     Hypertension     Localized morphea     NATHAN treated with BiPAP     Pacemaker     Stress incontinence     Thyroid disease     hypothyroid     Prior to Visit Medications    Medication Sig Taking? Authorizing Provider   nitrofurantoin (MACRODANTIN) 50 MG capsule Take 50 mg by mouth Yes Historical Provider, MD   gabapentin (NEURONTIN) 400 MG capsule Take 400 mg by mouth.  Yes Historical Provider, MD   donepezil (ARICEPT) 10 MG tablet Take 1 tablet by mouth nightly Yes oRbert , MD   primidone (MYSOLINE) 50 MG tablet TAKE 2 TABLETS BY MOUTH TWICE DAILY Yes Robert , MD   levofloxacin (LEVAQUIN) 500 MG tablet Take one tablet prior to [employer]. Yes Historical Provider, MD   diltiazem (CARDIZEM LA) 180 MG TB24 extended release tablet Take 180 mg by mouth Yes Historical Provider, MD   cephALEXin (KEFLEX) 500 MG capsule Take 500 mg by mouth 3 times daily Yes Historical Provider, MD   oxyCODONE-acetaminophen (PERCOCET) 7.5-325 MG per tablet Take 1 tablet by mouth every 6 hours as needed for Pain. . Yes Historical Provider, MD Deisi Fay CATH TIP 61 ML [address] [facility]  Yes Historical Provider, MD   KLOR-CON M20 20 MEQ extended release tablet Take 20 mEq by mouth 2 times daily  Yes Historical Provider, MD   atorvastatin (LIPITOR) 20 MG tablet  Yes Historical Provider, MD   Water For Irrigation, Sterile (STERILE WATER FOR IRRIGATION) 2 times daily  Yes Historical Provider, MD   ranitidine (ZANTAC) 150 MG tablet Take 150 mg by mouth 2 times daily Yes Historical Provider, MD   sertraline (ZOLOFT) 100 MG tablet Take 200 mg by mouth nightly  Yes Historical Provider, MD   pantoprazole (PROTONIX) 40 MG tablet TAKE 1 TABLET BY MOUTH DAILY Yes Historical Provider, MD   loratadine (CLARITIN) 10 MG tablet Take 10 mg by mouth daily Yes Historical Provider, MD   fluticasone (FLONASE) 50 MCG/ACT nasal spray 1 spray by Nasal route nightly. Yes Delilah Lopez MD   levothyroxine (SYNTHROID) 75 MCG tablet Take 1 tablet by mouth daily for 360 days. Yes Brenda Fierro MD   nitroGLYCERIN (NITROSTAT) 0.4 MG SL tablet Place 1 tablet under the tongue every 5 minutes as needed for Chest pain.  Yes Jovanni Lorenzo MD   amitriptyline (ELAVIL) 25 MG tablet Take 25 mg by mouth nightly  Yes Historical Provider, MD   Multiple Vitamin (THERA) TABS Take 1 tablet by mouth daily  Yes Historical Provider, MD clonazepam (KLONOPIN) 1 MG tablet Take 1 tablet by mouth 3 times daily as needed for Anxiety for 21 doses. Yes Armin Campbell MD     Allergies   Allergen Reactions    Meloxicam Itching     Tolerates Ketorolac/Bromfenac ophthalmic drops.  Benzoin Compound      Blisters from adhesive compound under steri strips after knee VAS      Lyrica [Pregabalin]      Keeps patient awake    Quetiapine      Other reaction(s): Other (See Comments)  Pt unable to recall reaction  Other reaction(s): Other (See Comments)      Quetiapine Fumarate      Other reaction(s): confused, dizzy  Other reaction(s): Unknown  seroquel      Seroquel [Quetiapine Fumarate] Other (See Comments)     Other reaction(s): confused, dizzy  Pt unable to recall reaction    Tizanidine      hallucinations      Amberderm Rash     Positive patch test results to [facility] Other Rash     Can use paper tape can not  use transpore    Tape [Adhesive Tape] Rash     Mild dermatitis at site of paper tape, with history of previous skin rashes to tape.  STERI-STRIPS  Can use paper tape can not  use transpore      Wound Dressings Rash     Positive patch test results to [facility]       Social History     Tobacco Use    Smoking status: Never Smoker    Smokeless tobacco: Never Used   Substance Use Topics    Alcohol use: No     Alcohol/week: 0.0 oz     Family History   Problem Relation Age of Onset    Heart Disease Father     Cancer Sister         multiple organs    Other Sister     Asthma Neg Hx     Diabetes Neg Hx     Emphysema Neg Hx     Heart Failure Neg Hx     Hypertension Neg Hx      Past Surgical History:   Procedure Laterality Date    ABDOMEN SURGERY  9/9/11     REPAIR INCISIONAL HERNIA REDUCIBLE WITH POSSIBLE MESH    BACK SURGERY      x3    BACK SURGERY      stimulator    BLADDER SUSPENSION      CATARACT REMOVAL WITH IMPLANT Left 03/08/2018    PHACO EMULSIFICATION OF CATARACT WITH INTRAOCULAR LENS IMPLANT LEFT EYE    CERVICAL DISCECTOMY  6/2014    and fusion    CHOLECYSTECTOMY      COLONOSCOPY  2002 1/26/12    ENDOSCOPY, COLON, DIAGNOSTIC      EYE SURGERY Right 04/05/2018    cataract removal    FOOT SURGERY Right 12/6/12    arthroplasty    FOOT SURGERY Left 04/30/2015    FOOT SURGERY Left 4/30/15    GASTRIC BYPASS SURGERY  4590,9749    HYSTERECTOMY      KNEE ARTHROSCOPY Left 10/3/2014    part. medial & lateral meniscectomy, synovectomy plica exc    KNEE ARTHROSCOPY Right 10/13/15    partial medial meniscectomy, chondroplasty, partial lateral meniscectomy    NECK SURGERY      OTHER SURGICAL HISTORY  april 2013    removal spinal cord stimulater    OTHER SURGICAL HISTORY      bladder stimulator    OTHER SURGICAL HISTORY  08/30/2017    left patellofemoral arthroplasty    OTHER SURGICAL HISTORY  05/09/2018    convert left patellofemoral arthroplasty to left total knee replacement   Keyla Aguilera  2011    Dr Mahoney/checked last week/told has 12 more yrs for this one/set at 60    SKIN BIOPSY      abdomen    SPINE SURGERY      stimulator    THROAT SURGERY      polyps removed    TONSILLECTOMY      UPPER GASTROINTESTINAL ENDOSCOPY      UPPER GASTROINTESTINAL ENDOSCOPY  05/03/2017         Exam:   Constitutional:   Vitals:    04/01/19 1348   BP: 105/65   Pulse: 75   SpO2: 94%   Weight: 125 lb (56.7 kg)   Height: 5' 1\" (1.549 m)       General appearance: well-nourished. Eye: No icterus. Neck: supple  Cardiovascular:          + lower leg edema with good pulsation. Mental Status: Oriented to person, place, problem, and time. Fluent speech. Poor fund of knowledge. Normal attention span and concentration. Poor immediate recall. Good remote memory. Cranial Nerves:   II: Visual fields: Full to confrontation  III: Pupils: equal, round, reactive to light  III,IV,VI: Extra Ocular Movements are intact.  No nystagmus  V: Facial sensation is intact to pin prick and light touch  VII: Facial strength and movements: intact and symmetric smile,cheek puffing and eyebrow elevation  VIII: Hearing: Intact to finger rub bilaterally  IX: Palate elevation is symmetric  XI: Shoulder shrug is intact  XII: Tongue movements are normal  Musculoskeletal: 5/5 in all 4 extremities. Normal tone. Mild postural hand tremors. No change  Reflexes: Bilateral biceps 2/4, triceps 2/4, brachial radialis 2/4, knee 1/4 and ankle 1/4. Planters: flexor bilaterally. Coordination: no pronator drift, no dysmetria. Finger nose finger testing within normal limits. Sensation: normal to all modalities. Gait/Posture: steady with her walker. No change    ROS : A 10-12 system review of constitutional, cardiovascular, respiratory, musculoskeletal, endocrine, hematological, skin, SHEENT, genitourinary, psychiatric and neurologic systems was obtained and is unremarkable except as mentioned in my HPI      Medical decision making:  I personally reviewed social history, past medical history, medications, allergy, surgical history, and family history as documented in the patient's electronic health records. Labs and/or neuroimaging and other test results reviewed and discussed with the patient. Reviewed notes from other physicians. Provided patient education regarding risk, benefits and treatment options as well as adherence to medication regimen and side effect from these medications. Assessment:     Diagnosis Orders   1. Essential tremor     2. TIA involving left internal carotid artery     3. MCI (mild cognitive impairment) with memory loss     4. Dysthymia     5. Chronic bilateral low back pain without sciatica     6. Primary insomnia             Plan:  Discussed stroke prevention with the patient an dher family  Reviewed recent ER visit and neuroimaging results  Advised the patient to discuss with her cardiologist the need to be on aspirin daily  Continue statin  Continue Aricept 10 mg daily  Continue Mysoline 50 mg daily.   Side effects from these medications were discussed  Advised the patient to follow up with PCP regarding her polypharmacy  Fall precaution  No driving  Continue home blood pressure medications  Improving sleep hygiene  Continue home Synthroid  Follow-up in 6 month

## 2019-04-08 ENCOUNTER — TELEPHONE (OUTPATIENT)
Dept: NEUROLOGY | Age: 64
End: 2019-04-08

## 2019-04-08 NOTE — TELEPHONE ENCOUNTER
Pt called requesting to talk to nurse in regards to writing a letter to her insurance company. Please advise.   Thank you

## 2019-04-10 NOTE — TELEPHONE ENCOUNTER
Left detailed message on voicemail for patient, explaining that I had spoken to TS in regards to this request, and that he does not advise patient to have her current pacemaker removed and another one implanted. The risks are too high for complications. I also explained that if her pcp had any concerns in regards to this and wanted to speak with TS, she could call the office, and he would discuss it with her.

## 2019-04-12 ENCOUNTER — OFFICE VISIT (OUTPATIENT)
Dept: ORTHOPEDIC SURGERY | Age: 64
End: 2019-04-12
Payer: COMMERCIAL

## 2019-04-12 VITALS — HEIGHT: 61 IN | BODY MASS INDEX: 23.6 KG/M2 | WEIGHT: 125 LBS

## 2019-04-12 DIAGNOSIS — M70.62 TROCHANTERIC BURSITIS OF LEFT HIP: ICD-10-CM

## 2019-04-12 DIAGNOSIS — M25.552 LEFT HIP PAIN: Primary | ICD-10-CM

## 2019-04-12 PROCEDURE — 99213 OFFICE O/P EST LOW 20 MIN: CPT | Performed by: ORTHOPAEDIC SURGERY

## 2019-04-12 NOTE — PROGRESS NOTES
Opal Almeida  [facility]#-[phone]  LOT#- [id number]  EXP:12/2022    4CC LIDOCAINE  XTO#-[id number]  LOT#--[id number]  EXP: 10/2020    2CC DEPO  NDC#-[id number]  LOT#- [id number]  EXP: 12/2020    SITE: left hip

## 2019-04-12 NOTE — PROGRESS NOTES
CHIEF COMPLAINT:    Chief Complaint   Patient presents with    Hip Pain     LEFT HIP. NO INJURY PAIN FOR ABOUT 3 MONTHS. Evelyn Rosa Maria LATERAL SIDED, GOES UP INTO BUTTOCK & SOME GROIN PAIN. NO NUMBNESS. HX OF LUMBAR SX IN THE PAST       HISTORY OF PRESENT ILLNESS:                The patient is a 58 y.o. female who presents to clinic for evaluation of left hip pain. She states this pain began a few months ago without injury. She has a history of an ipsilateral knee replacement about one year ago. She states she still is not quite ambulating normal and this surgery. She also had an extensive history of multiple lumbar fusions. Today, she points to the lateral aspect of the hip as the primary location of her pain. She does admit to some pain that wraps around to the groin and goes down the lateral side of the thigh. She denies any radiating pain past the knee. Pain is worsened with activity and relieved by rest.  She is ambulating with a cane.     Past Medical History:   Diagnosis Date    [facility] rest [facility]     Anxiety     Arthritis     hands and hip    Blood transfusion     after back surgery    Bradycardia     Bronchiectasis ([facility] [address] 11/5/2013    CAD (coronary artery disease)     Chronic back pain     Hyperlipidemia     Hypertension     Localized morphea     NATHAN treated with BiPAP     Pacemaker     Stress incontinence     Thyroid disease     hypothyroid    Trochanteric bursitis of left hip 4/12/2019          The pain assessment was noted & is as follows:  Pain Assessment  Location of Pain: Pelvis  Location Modifiers: Left  Severity of Pain: 6  Quality of Pain: Aching, Dull, Sharp  Duration of Pain: Persistent  Frequency of Pain: Intermittent  Aggravating Factors: Bending, Stretching, Walking, Stairs, Other (Comment)(SITTING)  Limiting Behavior: Some  Relieving Factors: Rest  Result of Injury: No  Work-Related Injury: No  Are there other pain locations you wish to document?: No]      Work Status/Functionality:     Past Medical History: Medical history form was reviewed today & can be found in the media tab  Past Medical History:   Diagnosis Date    [facility] rest [facility])     Anxiety     Arthritis     hands and hip    Blood transfusion     after back surgery    Bradycardia     Bronchiectasis ([address] 11/5/2013    CAD (coronary artery disease)     Chronic back pain     Hyperlipidemia     Hypertension     Localized morphea     NATHAN treated with BiPAP     Pacemaker     Stress incontinence     Thyroid disease     hypothyroid    Trochanteric bursitis of left hip 4/12/2019      Past Surgical History:     Past Surgical History:   Procedure Laterality Date    ABDOMEN SURGERY  9/9/11     REPAIR INCISIONAL HERNIA REDUCIBLE WITH POSSIBLE MESH    BACK SURGERY      x3    BACK SURGERY      stimulator    BLADDER SUSPENSION      CATARACT REMOVAL WITH IMPLANT Left 03/08/2018    PHACO EMULSIFICATION OF CATARACT WITH INTRAOCULAR LENS IMPLANT LEFT EYE    CERVICAL DISCECTOMY  6/2014    and fusion    CHOLECYSTECTOMY      COLONOSCOPY  2002 1/26/12    ENDOSCOPY, COLON, DIAGNOSTIC      EYE SURGERY Right 04/05/2018    cataract removal    FOOT SURGERY Right 12/6/12    arthroplasty    FOOT SURGERY Left 04/30/2015    FOOT SURGERY Left 4/30/15    GASTRIC BYPASS SURGERY  1570,9431    HYSTERECTOMY      KNEE ARTHROSCOPY Left 10/3/2014    part.  medial & lateral meniscectomy, synovectomy plica exc    KNEE ARTHROSCOPY Right 10/13/15    partial medial meniscectomy, chondroplasty, partial lateral meniscectomy    NECK SURGERY      OTHER SURGICAL HISTORY  april 2013    removal spinal cord stimulater    OTHER SURGICAL HISTORY      bladder stimulator    OTHER SURGICAL HISTORY  08/30/2017    left patellofemoral arthroplasty    OTHER SURGICAL HISTORY  05/09/2018    convert left patellofemoral arthroplasty to left total knee replacement   Maricarmen Moyer  2011    Dr Mahoney/checked last week/told has 12 more yrs for this one/set at 60    SKIN BIOPSY      abdomen    SPINE SURGERY      stimulator    THROAT SURGERY      polyps removed    TONSILLECTOMY      UPPER GASTROINTESTINAL ENDOSCOPY      UPPER GASTROINTESTINAL ENDOSCOPY  05/03/2017     Current Medications:     Current Outpatient Medications:     nitrofurantoin (MACRODANTIN) 50 MG capsule, Take 50 mg by mouth, Disp: , Rfl:     gabapentin (NEURONTIN) 400 MG capsule, Take 400 mg by mouth., Disp: , Rfl:     donepezil (ARICEPT) 10 MG tablet, Take 1 tablet by mouth nightly, Disp: 90 tablet, Rfl: 2    primidone (MYSOLINE) 50 MG tablet, TAKE 2 TABLETS BY MOUTH TWICE DAILY, Disp: 360 tablet, Rfl: 1    levofloxacin (LEVAQUIN) 500 MG tablet, Take one tablet prior to [employer]. , Disp: , Rfl:     diltiazem (CARDIZEM LA) 180 MG TB24 extended release tablet, Take 180 mg by mouth, Disp: , Rfl:     cephALEXin (KEFLEX) 500 MG capsule, Take 500 mg by mouth 3 times daily, Disp: , Rfl:     oxyCODONE-acetaminophen (PERCOCET) 7.5-325 MG per tablet, Take 1 tablet by mouth every 6 hours as needed for Pain. ., Disp: , Rfl:     MONOJECT 60CC SYRINGE CATH TIP 61 ML MISC, , Disp: , Rfl:     KLOR-CON M20 20 MEQ extended release tablet, Take 20 mEq by mouth 2 times daily , Disp: , Rfl:     atorvastatin (LIPITOR) 20 MG tablet, , Disp: , Rfl:     Water For Irrigation, Sterile (STERILE WATER FOR IRRIGATION), 2 times daily , Disp: , Rfl:     ranitidine (ZANTAC) 150 MG tablet, Take 150 mg by mouth 2 times daily, Disp: , Rfl:     sertraline (ZOLOFT) 100 MG tablet, Take 200 mg by mouth nightly , Disp: , Rfl:     pantoprazole (PROTONIX) 40 MG tablet, TAKE 1 TABLET BY MOUTH DAILY, Disp: , Rfl: 0    loratadine (CLARITIN) 10 MG tablet, Take 10 mg by mouth daily, Disp: , Rfl:     fluticasone (FLONASE) 50 MCG/ACT nasal spray, 1 spray by Nasal route nightly., Disp: 1 Bottle, Rfl: 5    levothyroxine (SYNTHROID) 75 MCG tablet, Take 1 tablet by mouth daily for 360 days. , Disp: 30 tablet, Rfl: 11    nitroGLYCERIN (NITROSTAT) 0.4 MG SL tablet, Place 1 tablet under the tongue every 5 minutes as needed for Chest pain., Disp: 25 tablet, Rfl: 3    amitriptyline (ELAVIL) 25 MG tablet, Take 25 mg by mouth nightly , Disp: , Rfl:     Multiple Vitamin (THERA) TABS, Take 1 tablet by mouth daily , Disp: , Rfl:     clonazepam (KLONOPIN) 1 MG tablet, Take 1 tablet by mouth 3 times daily as needed for Anxiety for 21 doses. , Disp: 21 tablet, Rfl: 0  Allergies:  Meloxicam; Benzoin compound; Lyrica [pregabalin]; Quetiapine; Quetiapine fumarate; Seroquel [quetiapine fumarate]; Tizanidine; Amberderm; Other; Tape [adhesive tape]; and Wound dressings  Social History:    reports that she has never smoked. She has never used smokeless tobacco. She reports that she does not drink alcohol or use drugs. Family History:   Family History   Problem Relation Age of Onset    Heart Disease Father     Cancer Sister         multiple organs    Other Sister     Asthma Neg Hx     Diabetes Neg Hx     Emphysema Neg Hx     Heart Failure Neg Hx     Hypertension Neg Hx        REVIEW OF SYSTEMS:   For new problems, a full review of systems will be found scanned in the patient's chart. CONSTITUTIONAL: Denies unexplained weight loss, fevers, chills   NEUROLOGICAL: Denies unsteady gait or progressive weakness  SKIN: Denies skin changes, delayed healing, rash, itching       PHYSICAL EXAM:    Vitals: Height 5' 0.98\" (1.549 m), weight 125 lb (56.7 kg), not currently breastfeeding. GENERAL EXAM:  · General Apparence: Patient is adequately groomed with no evidence of malnutrition. · Orientation: The patient is oriented to time, place and person. · Mood & Affect:The patient's mood and affect are appropriate       Left hip PHYSICAL EXAMINATION:  · Inspection:  No visible deformity. No significant edema, erythema or ecchymosis.     · Palpation:  Tenderness to palpation directly over the lateral aspect of the hip      · Range of Motion: Range of motion of the hip is not significantly limited    · Strength: No gross strength deficits are noted    · Special Tests:  Negative logroll testing. Negative straight leg raise testing. Negative Homans testing. · Skin:  There are no rashes, ulcerations or lesions. · There are no dysvascular changes     Gait & station: Antalgic with a cane      Additional Examinations:        Right Lower Extremity: Examination of the right lower extremity does not show any tenderness, deformity or injury. Range of motion is unremarkable. There is no gross instability. There are no rashes, ulcerations or lesions. Strength and tone are normal.      Diagnostic Testing: The following x rays were read and interpreted by myself      1.  2 x-rays of the left hip were taken today and reveal normal anatomy with no advanced osteoarthritis of the hip. She does have a significant amount of hardware to her lumbar spine    Orders     Orders Placed This Encounter   Procedures    XR HIP LEFT (2-3 VIEWS)     Standing Status:   Future     Number of Occurrences:   1     Standing Expiration Date:   5/12/2019    US ARTHR/ASP/INJ MAJOR JNT/BURSA LEFT     Order Specific Question:   Reason for exam:     Answer:   PAIN    PA METHYLPREDNISOLONE 40 MG INJ         Assessment / Treatment Plan:     1. Left hip trochanteric bursitis    I discussed with her that given the lateral hip pain she is having, I would recommend that we attempt a cortisone injection at her greater trochanter today. She is also given some physical therapy exercises she can perform at home. She has tolerated these injections well in the past with her knee. If her symptoms do not improve after this injection, she should return to her back surgeon for possible radicular cause for her pain. We will see her back for the hip as needed.     I discussed in detail the risks, benefits, and complications of an injection which include but are not limited to infection, skin reactions, hot swollen joints, and anaphylaxis with the patient. The patient verbalized good understanding and gave informed consent for the injection. The skin was prepped using sterile alcohol. A sterile 22-gauge needle was inserted into the area of maximal tenderness over the greater trochanter and a mixture of 4 mL of 2% Carbocaine, 4 mL of 0.25% Marcaine, and 80 mg of Depo-Medrol was injected under sterile technique. The needle was withdrawn and the puncture site sealed with a Band-Aid. Technique: Under sterile conditions a [employer] ultrasound unit with a variable frequency (6.0-15.0 MHz) linear transducer was used to localize the placement of a 22-gauge needle into the area of maximal tenderness over the left greater trochanter. Findings: Successful needle placement for Hip injection. Final images were taken and saved for permanent record. The patient tolerated the injection well. The patient was instructed to call the office immediately if there is any pain, redness, warmth, fever, or chills. I spent 15 minutes face-to-face with the patient and greater than 50% of that time was spent counseling/coordinating care for the above-stated diagnosis and treatment. I have personally performed and/or participated in the history, exam and medical decision making and agree with all pertinent clinical information. I have also reviewed and agree with the past medical, family and social history unless otherwise noted. This dictation was performed with a verbal recognition program (DRAGON) and it was checked for errors. It is possible that there are still dictated errors within this office note. If so, please bring any errors to my attention for an addendum. All efforts were made to ensure that this office note is accurate.           Anabela Packer MD

## 2019-04-15 ENCOUNTER — TELEPHONE (OUTPATIENT)
Dept: NEUROLOGY | Age: 64
End: 2019-04-15

## 2019-04-15 NOTE — TELEPHONE ENCOUNTER
Pt called said when she was here 4/12/19 she was told we would send her medication refills into [facility], but she contacted pharmacy & they did not have any refills for her. Pt requesting refill. Please advise. Thank you.

## 2019-04-24 ENCOUNTER — TELEPHONE (OUTPATIENT)
Dept: ORTHOPEDIC SURGERY | Age: 64
End: 2019-04-24

## 2019-05-07 ENCOUNTER — HOSPITAL ENCOUNTER (OUTPATIENT)
Age: 64
Discharge: HOME | End: 2019-05-07
Payer: COMMERCIAL

## 2019-05-07 PROCEDURE — 87086 URINE CULTURE/COLONY COUNT: CPT

## 2019-05-08 LAB — URINE CULTURE, ROUTINE: NORMAL

## 2019-05-09 ENCOUNTER — HOSPITAL ENCOUNTER (OUTPATIENT)
Dept: ENDOSCOPY | Age: 64
Discharge: HOME | End: 2019-05-09
Payer: MEDICARE

## 2019-05-09 VITALS
HEIGHT: 61 IN | BODY MASS INDEX: 23.6 KG/M2 | WEIGHT: 125 LBS | DIASTOLIC BLOOD PRESSURE: 63 MMHG | RESPIRATION RATE: 16 BRPM | HEART RATE: 84 BPM | SYSTOLIC BLOOD PRESSURE: 113 MMHG

## 2019-05-09 PROCEDURE — 6370000000 HC RX 637 (ALT 250 FOR IP): Performed by: OPHTHALMOLOGY

## 2019-05-09 PROCEDURE — 66821 AFTER CATARACT LASER SURGERY: CPT

## 2019-05-09 RX ORDER — SOFT LENS RINSE,STORE SOLUTION
SOLUTION, NON-ORAL MISCELLANEOUS ONCE
Status: COMPLETED | OUTPATIENT
Start: 2019-05-09 | End: 2019-05-09

## 2019-05-09 RX ORDER — PROPARACAINE HYDROCHLORIDE 5 MG/ML
1 SOLUTION/ DROPS OPHTHALMIC ONCE
Status: COMPLETED | OUTPATIENT
Start: 2019-05-09 | End: 2019-05-09

## 2019-05-09 RX ORDER — TROPICAMIDE 10 MG/ML
1 SOLUTION/ DROPS OPHTHALMIC ONCE
Status: COMPLETED | OUTPATIENT
Start: 2019-05-09 | End: 2019-05-09

## 2019-05-09 RX ORDER — PHENYLEPHRINE HCL 2.5 %
1 DROPS OPHTHALMIC (EYE) ONCE
Status: COMPLETED | OUTPATIENT
Start: 2019-05-09 | End: 2019-05-09

## 2019-05-09 RX ORDER — APRACLONIDINE HYDROCHLORIDE 5 MG/ML
1 SOLUTION/ DROPS OPHTHALMIC 2 TIMES DAILY PRN
Status: COMPLETED | OUTPATIENT
Start: 2019-05-09 | End: 2019-05-09

## 2019-05-09 RX ADMIN — APRACLONIDINE 1 DROP: 5.75 SOLUTION OPHTHALMIC at 13:05

## 2019-05-09 RX ADMIN — TROPICAMIDE 1 DROP: 10 SOLUTION/ DROPS OPHTHALMIC at 12:22

## 2019-05-09 RX ADMIN — PHENYLEPHRINE HYDROCHLORIDE 1 DROP: 25 SOLUTION/ DROPS OPHTHALMIC at 12:25

## 2019-05-09 RX ADMIN — PROPARACAINE HYDROCHLORIDE 1 DROP: 5 SOLUTION/ DROPS OPHTHALMIC at 12:35

## 2019-05-09 RX ADMIN — Medication: at 13:03

## 2019-05-09 RX ADMIN — APRACLONIDINE 1 DROP: 5.75 SOLUTION OPHTHALMIC at 12:27

## 2019-05-09 ASSESSMENT — PAIN - FUNCTIONAL ASSESSMENT
PAIN_FUNCTIONAL_ASSESSMENT: 0-10
PAIN_FUNCTIONAL_ASSESSMENT: 0-10

## 2019-05-09 NOTE — PROGRESS NOTES
Pt here for laser eye procedure with   Procedure explained to pt and consent obtained  Laser eye procedure completed  See Dr. Dorina Medina procedural note for details  Pt august well  No c/o's voiced   Discharge instructions reviewed with pt and copy given    Understanding verbalized  Pt discharged to home in stable condition

## 2019-05-15 ENCOUNTER — OFFICE VISIT (OUTPATIENT)
Dept: ORTHOPEDIC SURGERY | Age: 64
End: 2019-05-15
Payer: COMMERCIAL

## 2019-05-15 VITALS — BODY MASS INDEX: 23.6 KG/M2 | WEIGHT: 125 LBS | HEIGHT: 61 IN

## 2019-05-15 DIAGNOSIS — Z98.1 HISTORY OF LUMBAR FUSION: ICD-10-CM

## 2019-05-15 DIAGNOSIS — Z98.890 S/P KNEE SURGERY: Primary | ICD-10-CM

## 2019-05-15 DIAGNOSIS — M25.552 LEFT HIP PAIN: ICD-10-CM

## 2019-05-15 PROCEDURE — 99214 OFFICE O/P EST MOD 30 MIN: CPT | Performed by: PHYSICIAN ASSISTANT

## 2019-05-15 RX ORDER — METHYLPREDNISOLONE 4 MG/1
TABLET ORAL
Qty: 1 KIT | Refills: 0 | Status: ON HOLD | OUTPATIENT
Start: 2019-05-15 | End: 2019-05-24

## 2019-05-15 NOTE — PROGRESS NOTES
CHIEF COMPLAINT:    Chief Complaint   Patient presents with    Hip Pain     CK LEFT HIP. MARLENA LAST VISIT ON 4/12/19       HISTORY OF PRESENT ILLNESS:                The patient is a 58 y.o. female this patient presents today for orthopedic evaluation of her LEFT hip. She's had ongoing posterior lateral hip pain for several months. We tried a trochanteric injection back on 4/12/2019 and also recommended that she be evaluated by her physiatry doctor. She describes pain through the lateral and posterior aspects of the hip. No groin pain. She states it radiates from her low back through her buttock into the lateral hip. She occasionally feels pain into the knee. She has a history of a LEFT total knee replacement back in 2018 by Dr. Tomás Luna. She's had an extensive history regarding multiple lumbar surgeries/fusions. She denies any changes in bowel or bladder control. She states she does have an upcoming appointment for potentially with sounds like an SI joint injection.       Past Medical History:   Diagnosis Date    [facility]ina at rest [facility])     Anxiety     Arthritis     hands and hip    Blood transfusion     after back surgery    Bradycardia     Bronchiectasis ([address] 11/5/2013    CAD (coronary artery disease)     Chronic back pain     Hyperlipidemia     Hypertension     Localized morphea     NATHAN treated with BiPAP     Pacemaker     Stress incontinence     Thyroid disease     hypothyroid    Trochanteric bursitis of left hip 4/12/2019            The pain assessment was noted & is as follows:   ]      Work Status/Functionality:     Past Medical History: Medical history form was reviewed today & can be found in the media tab  Past Medical History:   Diagnosis Date    [facility]ina [facility] rest [facility])     Anxiety     Arthritis     hands and hip    Blood transfusion     after back surgery    Bradycardia     Bronchiectasis ([address] 11/5/2013    CAD (coronary artery disease)     Chronic back pain     Hyperlipidemia     Hypertension     Localized morphea     NATHAN treated with BiPAP     Pacemaker     Stress incontinence     Thyroid disease     hypothyroid    Trochanteric bursitis of left hip 4/12/2019      Past Surgical History:     Past Surgical History:   Procedure Laterality Date    ABDOMEN SURGERY  9/9/11     REPAIR INCISIONAL HERNIA REDUCIBLE WITH POSSIBLE MESH    BACK SURGERY      x3    BACK SURGERY      stimulator    BLADDER SUSPENSION      CATARACT REMOVAL WITH IMPLANT Left 03/08/2018    PHACO EMULSIFICATION OF CATARACT WITH INTRAOCULAR LENS IMPLANT LEFT EYE    CERVICAL DISCECTOMY  6/2014    and fusion    CHOLECYSTECTOMY      COLONOSCOPY  2002 1/26/12    ENDOSCOPY, COLON, DIAGNOSTIC      EYE SURGERY Right 04/05/2018    cataract removal    FOOT SURGERY Right 12/6/12    arthroplasty    FOOT SURGERY Left 04/30/2015    FOOT SURGERY Left 4/30/15    GASTRIC BYPASS SURGERY  9748,7284    HYSTERECTOMY      KNEE ARTHROSCOPY Left 10/3/2014    part.  medial & lateral meniscectomy, synovectomy plica exc    KNEE ARTHROSCOPY Right 10/13/15    partial medial meniscectomy, chondroplasty, partial lateral meniscectomy    NECK SURGERY      OTHER SURGICAL HISTORY  april 2013    removal spinal cord stimulater    OTHER SURGICAL HISTORY      bladder stimulator    OTHER SURGICAL HISTORY  08/30/2017    left patellofemoral arthroplasty    OTHER SURGICAL HISTORY  05/09/2018    convert left patellofemoral arthroplasty to left total knee replacement   Rennis Mouse  2011    Dr Mahoney/checked last week/told has 12 more yrs for this one/set at 60    SKIN BIOPSY      abdomen    SPINE SURGERY      stimulator    THROAT SURGERY      polyps removed    TONSILLECTOMY      UPPER GASTROINTESTINAL ENDOSCOPY      UPPER GASTROINTESTINAL ENDOSCOPY  05/03/2017     Current Medications:     Current Outpatient Medications:     methylPREDNISolone (MEDROL, CANDACE,) 4 MG tablet, Take by mouth as directed on package TABS, Take 1 tablet by mouth daily , Disp: , Rfl:     clonazepam (KLONOPIN) 1 MG tablet, Take 1 tablet by mouth 3 times daily as needed for Anxiety for 21 doses. , Disp: 21 tablet, Rfl: 0  Allergies:  Meloxicam; Benzoin compound; Lyrica [pregabalin]; Quetiapine; Quetiapine fumarate; Seroquel [quetiapine fumarate]; Tizanidine; Amberderm; Other; Tape [adhesive tape]; and Wound dressings  Social History:    reports that she has never smoked. She has never used smokeless tobacco. She reports that she does not drink alcohol or use drugs. Family History:   Family History   Problem Relation Age of Onset    Heart Disease Father     Cancer Sister         multiple organs    Other Sister     Asthma Neg Hx     Diabetes Neg Hx     Emphysema Neg Hx     Heart Failure Neg Hx     Hypertension Neg Hx        REVIEW OF SYSTEMS:   For new problems, a full review of systems will be found scanned in the patient's chart. CONSTITUTIONAL: Denies unexplained weight loss, fevers, chills   NEUROLOGICAL: Denies unsteady gait or progressive weakness  SKIN: Denies skin changes, delayed healing, rash, itching       PHYSICAL EXAM:    Vitals: Height 5' 0.98\" (1.549 m), weight 125 lb (56.7 kg), not currently breastfeeding. GENERAL EXAM:  · General Apparence: Patient is adequately groomed with no evidence of malnutrition. · Orientation: The patient is oriented to time, place and person.    · Mood & Affect:The patient's mood and affect are appropriate       LEFT hip PHYSICAL EXAMINATION:  · Inspection:  No significant deformity ecchymosis or erythema    · Palpation:  She is tender particularly in the posterior lateral hip, not even over the greater trochanter but more posterior in the LEFT buttock and LEFT SI joint and extending into the paraspinous musculature of the LEFT low back      · Range of Motion: Negative logroll testing of the LEFT hip    · Strength: Hip flexor hip abductor and adductor are intact, moderate global weakness bilaterally secondary to disuse atrophy    · Special Tests:  Negative logroll testing LEFT hip            · Skin:  There are no rashes, ulcerations or lesions. · There are no distal  dysvascular changes     Gait & station: Slightly antalgic using a cane      Additional Examinations:        Right Lower Extremity: Examination of the right lower extremity does not show any tenderness, deformity or injury. Range of motion is unremarkable. There is no gross instability. There are no rashes, ulcerations or lesions. Strength and tone are normal.      Diagnostic Testing:    Orders   No orders of the defined types were placed in this encounter. Assessment / Treatment Plan:     1. LEFT hip pain, likely lumbar etiology  The patient does not have advanced LEFT hip arthritis. Her pain is more consistent with lumbar etiology involving the posterior lateral buttock and low back. Given her extensive surgical history here, I recommended full evaluation by physiatry or spine specialist.  She has tolerated oral steroids in the past and I will send her a Medrol Dosepak to see this helps her symptoms. If her symptoms significantly worsen or she notices any changes in bowel or bladder control, she will go directly to the emergency room.

## 2019-05-21 ENCOUNTER — OFFICE VISIT (OUTPATIENT)
Dept: NEUROLOGY | Age: 64
End: 2019-05-21
Payer: COMMERCIAL

## 2019-05-21 VITALS
HEIGHT: 61 IN | DIASTOLIC BLOOD PRESSURE: 80 MMHG | WEIGHT: 125 LBS | HEART RATE: 67 BPM | SYSTOLIC BLOOD PRESSURE: 125 MMHG | OXYGEN SATURATION: 97 % | BODY MASS INDEX: 23.6 KG/M2

## 2019-05-21 DIAGNOSIS — E78.5 DYSLIPIDEMIA: ICD-10-CM

## 2019-05-21 DIAGNOSIS — I10 HTN (HYPERTENSION), BENIGN: ICD-10-CM

## 2019-05-21 DIAGNOSIS — G25.0 ESSENTIAL TREMOR: ICD-10-CM

## 2019-05-21 DIAGNOSIS — G70.00 MG (MYASTHENIA GRAVIS) (HCC): ICD-10-CM

## 2019-05-21 DIAGNOSIS — I65.23 BILATERAL CAROTID ARTERY STENOSIS: ICD-10-CM

## 2019-05-21 DIAGNOSIS — G31.84 MCI (MILD COGNITIVE IMPAIRMENT) WITH MEMORY LOSS: ICD-10-CM

## 2019-05-21 DIAGNOSIS — F34.1 DYSTHYMIA: ICD-10-CM

## 2019-05-21 DIAGNOSIS — H02.402: Primary | ICD-10-CM

## 2019-05-21 PROCEDURE — 99214 OFFICE O/P EST MOD 30 MIN: CPT | Performed by: PSYCHIATRY & NEUROLOGY

## 2019-05-21 NOTE — PROGRESS NOTES
The patient came today for follow up regarding: left eye ptosis, tremors and dementia. The patient came back again this month for left eye ptosis and visual disturbance in the left eye. Symptoms started few month ago. She reports painless visual disturbance and blurred vision in left eye after her cataract surgery. Degree severe and daily. No headache or any pain. No double vision or weakness or numbness. No triggers. Other associated symptom including mild left eye ptosis. No dysphagia, dysarthria or excessive fatigue or weakness. No recurrent falling. Recent CT of the head from 2 month ago was unremarkable. She has a pacemaker and cannot have MRI. No recent carotid Doppler. History of episodic short-term memory loss. Degree is mild to moderate and can be daily. She is on Aricept. No side effect. She reports the same intermittent hand tremors. She takes Mysoline daily. No changes compared to last month. She is on statin and blood pressure medications. She did not start aspirin yet. No other new symptoms today. Other review of system was unremarkable. Past Medical History:   Diagnosis Date    Angina at rest [facility])     Anxiety     Arthritis     hands and hip    Blood transfusion     after back surgery    Bradycardia     [address]ct[address] ([facility] [address] 11/5/2013    CAD (coronary artery disease)     Chronic back pain     Hyperlipidemia     Hypertension     Localized morphea     NATHAN treated with BiPAP     Pacemaker     Stress incontinence     Thyroid disease     hypothyroid    Trochanteric bursitis of left hip 4/12/2019     Prior to Visit Medications    Medication Sig Taking? Authorizing Provider   methylPREDNISolone (MEDROL, CANDACE,) 4 MG tablet Take by mouth as directed on package insert Yes AIMEE Carrizales   nitrofurantoin (MACRODANTIN) 50 MG capsule Take 50 mg by mouth Yes Historical Provider, MD   gabapentin (NEURONTIN) 400 MG capsule Take 400 mg by mouth.  Yes Historical Provider, MD   donepezil (ARICEPT) 10 MG tablet Take 1 tablet by mouth nightly Yes Korin Ruffin MD   primidone (MYSOLINE) 50 MG tablet TAKE 2 TABLETS BY MOUTH TWICE DAILY Yes Korin Ruffin MD   levofloxacin (LEVAQUIN) 500 MG tablet Take one tablet prior to [employer]. Yes Historical Provider, MD   diltiazem (CARDIZEM LA) 180 MG TB24 extended release tablet Take 180 mg by mouth Yes Historical Provider, MD   cephALEXin (KEFLEX) 500 MG capsule Take 500 mg by mouth 3 times daily Yes Historical Provider, MD   oxyCODONE-acetaminophen (PERCOCET) 7.5-325 MG per tablet Take 1 tablet by mouth every 6 hours as needed for Pain. . Yes Historical Provider, MD Socorro Kasper CATH TIP 61 ML [address] [facility]  Yes Historical Provider, MD   KLOR-CON M20 20 MEQ extended release tablet Take 20 mEq by mouth 2 times daily  Yes Historical Provider, MD   atorvastatin (LIPITOR) 20 MG tablet  Yes Historical Provider, MD   Water For Irrigation, Sterile (STERILE WATER FOR IRRIGATION) 2 times daily  Yes Historical Provider, MD   ranitidine (ZANTAC) 150 MG tablet Take 150 mg by mouth 2 times daily Yes Historical Provider, MD   sertraline (ZOLOFT) 100 MG tablet Take 200 mg by mouth nightly  Yes Historical Provider, MD   pantoprazole (PROTONIX) 40 MG tablet TAKE 1 TABLET BY MOUTH DAILY Yes Historical Provider, MD   loratadine (CLARITIN) 10 MG tablet Take 10 mg by mouth daily Yes Historical Provider, MD   fluticasone (FLONASE) 50 MCG/ACT nasal spray 1 spray by Nasal route nightly. Yes Derald Gilford, MD   levothyroxine (SYNTHROID) 75 MCG tablet Take 1 tablet by mouth daily for 360 days. Yes Josiah Fothergill, MD   nitroGLYCERIN (NITROSTAT) 0.4 MG SL tablet Place 1 tablet under the tongue every 5 minutes as needed for Chest pain.  Yes Aristides Church MD   amitriptyline (ELAVIL) 25 MG tablet Take 25 mg by mouth nightly  Yes Historical Provider, MD   Multiple Vitamin (THERA) TABS Take 1 tablet by mouth daily  Yes Historical Provider, MD   clonazepam (KLONOPIN) 1 MG tablet Take 1 tablet by mouth 3 times daily as needed for Anxiety for 21 doses. Yes Andreas Hdz MD     Allergies   Allergen Reactions    Meloxicam Itching     Tolerates Ketorolac/Bromfenac ophthalmic drops.  Benzoin Compound      Blisters from adhesive compound under steri strips after knee VAS      Lyrica [Pregabalin]      Keeps patient awake    Quetiapine      Other reaction(s): Other (See Comments)  Pt unable to recall reaction  Other reaction(s): Other (See Comments)      Quetiapine Fumarate      Other reaction(s): confused, dizzy  Other reaction(s): Unknown  seroquel      Seroquel [Quetiapine Fumarate] Other (See Comments)     Other reaction(s): confused, dizzy  Pt unable to recall reaction    Tizanidine      hallucinations      Amberderm Rash     Positive patch test results to [facility] Other Rash     Can use paper tape can not  use transpore    Tape [Adhesive Tape] Rash     Mild dermatitis at site of paper tape, with history of previous skin rashes to tape.  STERI-STRIPS  Can use paper tape can not  use transpore      Wound Dressings Rash     Positive patch test results to [facility]       Social History     Tobacco Use    Smoking status: Never Smoker    Smokeless tobacco: Never Used   Substance Use Topics    Alcohol use: No     Alcohol/week: 0.0 oz     Family History   Problem Relation Age of Onset    Heart Disease Father     Cancer Sister         multiple organs    Other Sister     Asthma Neg Hx     Diabetes Neg Hx     Emphysema Neg Hx     Heart Failure Neg Hx     Hypertension Neg Hx      Past Surgical History:   Procedure Laterality Date    ABDOMEN SURGERY  9/9/11     REPAIR INCISIONAL HERNIA REDUCIBLE WITH POSSIBLE MESH    BACK SURGERY      x3    BACK SURGERY      stimulator    BLADDER SUSPENSION      CATARACT REMOVAL WITH IMPLANT Left 03/08/2018    PHACO EMULSIFICATION OF CATARACT WITH INTRAOCULAR LENS IMPLANT LEFT EYE    CERVICAL DISCECTOMY  6/2014    and fusion    CHOLECYSTECTOMY      COLONOSCOPY  2002 1/26/12    ENDOSCOPY, COLON, DIAGNOSTIC      EYE SURGERY Right 04/05/2018    cataract removal    FOOT SURGERY Right 12/6/12    arthroplasty    FOOT SURGERY Left 04/30/2015    FOOT SURGERY Left 4/30/15    GASTRIC BYPASS SURGERY  [id number]    HYSTERECTOMY      KNEE ARTHROSCOPY Left 10/3/2014    part. medial & lateral meniscectomy, synovectomy plica exc    KNEE ARTHROSCOPY Right 10/13/15    partial medial meniscectomy, chondroplasty, partial lateral meniscectomy    NECK SURGERY      OTHER SURGICAL HISTORY  april 2013    removal spinal cord stimulater    OTHER SURGICAL HISTORY      bladder stimulator    OTHER SURGICAL HISTORY  08/30/2017    left patellofemoral arthroplasty    OTHER SURGICAL HISTORY  05/09/2018    convert left patellofemoral arthroplasty to left total knee replacement   Ryan Almanzar  2011    Dr Mahoney/checked last week/told has 12 more yrs for this one/set at 60    SKIN BIOPSY      abdomen    SPINE SURGERY      stimulator    THROAT SURGERY      polyps removed    TONSILLECTOMY      UPPER GASTROINTESTINAL ENDOSCOPY      UPPER GASTROINTESTINAL ENDOSCOPY  05/03/2017         Exam:   Constitutional:   Vitals:    05/21/19 1151   BP: 125/80   Pulse: 67   SpO2: 97%   Weight: 125 lb (56.7 kg)   Height: 5' 1\" (1.549 m)       General appearance: well-nourished. Eye: No icterus. Neck: supple  Cardiovascular:          + lower leg edema with good pulsation. Mental Status: Oriented to person, place, problem, and time. Fluent speech. Poor fund of knowledge. Normal attention span and concentration. Poor immediate recall. Good remote memory. No change  Cranial Nerves:   II: Visual fields: Full to confrontation  III: Pupils: equal, round, reactive to light. Poor VA From left eye. Mild left eye ptosis. III,IV,VI: Extra Ocular Movements are intact.  No nystagmus  V: Facial sensation is intact to pin prick and light touch  VII: Facial strength and movements: intact and symmetric smile,cheek puffing and eyebrow elevation  VIII: Hearing: Intact to finger rub bilaterally  IX: Palate elevation is symmetric  XI: Shoulder shrug is intact  XII: Tongue movements are normal  Musculoskeletal: 5/5 in all 4 extremities. Normal tone. Mild postural hand tremors. No change  Reflexes: Bilateral biceps 2/4, triceps 2/4, brachial radialis 2/4, knee 1/4 and ankle 1/4. Planters: flexor bilaterally. Coordination: no pronator drift, no dysmetria. Finger nose finger testing within normal limits. Sensation: normal to all modalities. Gait/Posture: steady with her walker. No change    ROS : A 10-12 system review of constitutional, cardiovascular, respiratory, musculoskeletal, endocrine, hematological, skin, SHEENT, genitourinary, psychiatric and neurologic systems was obtained and is unremarkable except as mentioned in my HPI      Medical decision making:  I personally reviewed social history, past medical history, medications, allergy, surgical history, and family history as documented in the patient's electronic health records. Labs and/or neuroimaging and other test results reviewed and discussed with the patient. Reviewed notes from other physicians. Provided patient education regarding risk, benefits and treatment options as well as adherence to medication regimen and side effect from these medications. Assessment:     Diagnosis Orders   1. Eyelash ptosis, left     2. Essential tremor     3. Dysthymia     4. MCI (mild cognitive impairment) with memory loss     5. MG (myasthenia gravis) (HCC)  Acetylcholine Receptor, Binding    ACETYLCHOLINE RECEPTOR AB   6. Bilateral carotid artery stenosis  VL DUP CAROTID BILATERAL   7. HTN (hypertension), benign     8. Dyslipidemia         Left eye visual disturbance and left eye ptosis. New complaints.    Chronic cognitive impairment MCI  Chronic essential tremor  Depression  HTN  HLD      Plan: Will check for [facility] to rule out myasthenia although seem less likely. Repeat CUS for stroke prevention   Start ASA 81 mg daily  Statin  Fall precautions  Monitor BP and continue with the same BP medications. Hold BP medications if BP below 120/80  Stroke prevention was discussed today  Continue the same dose of Aricept and Mysoline  SSRI  RTC 6 months or sooner if new sx arise.

## 2019-05-24 ENCOUNTER — HOSPITAL ENCOUNTER (OUTPATIENT)
Dept: GENERAL RADIOLOGY | Age: 64
Discharge: HOME | End: 2019-05-24
Payer: MEDICARE

## 2019-05-24 ENCOUNTER — HOSPITAL ENCOUNTER (OUTPATIENT)
Age: 64
Setting detail: OBSERVATION
Discharge: HOME | End: 2019-05-26
Attending: EMERGENCY MEDICINE | Admitting: INTERNAL MEDICINE
Payer: MEDICARE

## 2019-05-24 ENCOUNTER — APPOINTMENT (OUTPATIENT)
Dept: GENERAL RADIOLOGY | Age: 64
End: 2019-05-24
Payer: MEDICARE

## 2019-05-24 VITALS
RESPIRATION RATE: 16 BRPM | SYSTOLIC BLOOD PRESSURE: 107 MMHG | OXYGEN SATURATION: 98 % | DIASTOLIC BLOOD PRESSURE: 77 MMHG | HEART RATE: 92 BPM

## 2019-05-24 DIAGNOSIS — Z95.0 CARDIAC PACEMAKER: ICD-10-CM

## 2019-05-24 DIAGNOSIS — M54.5 LOW BACK PAIN, UNSPECIFIED BACK PAIN LATERALITY, UNSPECIFIED CHRONICITY, WITH SCIATICA PRESENCE UNSPECIFIED: ICD-10-CM

## 2019-05-24 DIAGNOSIS — R41.89 EPISODE OF UNRESPONSIVENESS: Primary | ICD-10-CM

## 2019-05-24 PROBLEM — R55 SYNCOPE AND COLLAPSE: Status: ACTIVE | Noted: 2019-05-24

## 2019-05-24 LAB
A/G RATIO: 1.2 (ref 1.1–2.2)
ALBUMIN SERPL-MCNC: 4.1 G/DL (ref 3.4–5)
ALP BLD-CCNC: 124 U/L (ref 40–129)
ALT SERPL-CCNC: 38 U/L (ref 10–40)
ANION GAP SERPL CALCULATED.3IONS-SCNC: 13 MMOL/L (ref 3–16)
AST SERPL-CCNC: 44 U/L (ref 15–37)
BASOPHILS ABSOLUTE: 0 K/UL (ref 0–0.2)
BASOPHILS RELATIVE PERCENT: 0 %
BILIRUB SERPL-MCNC: 0.3 MG/DL (ref 0–1)
BILIRUBIN URINE: NEGATIVE
BLOOD, URINE: NEGATIVE
BUN BLDV-MCNC: 14 MG/DL (ref 7–20)
CALCIUM SERPL-MCNC: 10 MG/DL (ref 8.3–10.6)
CHLORIDE BLD-SCNC: 106 MMOL/L (ref 99–110)
CHP ED QC CHECK: NORMAL
CLARITY: ABNORMAL
CO2: 24 MMOL/L (ref 21–32)
COLOR: YELLOW
CREAT SERPL-MCNC: 0.7 MG/DL (ref 0.6–1.2)
EKG ATRIAL RATE: 79 BPM
EKG DIAGNOSIS: NORMAL
EKG P-R INTERVAL: 168 MS
EKG Q-T INTERVAL: 386 MS
EKG QRS DURATION: 88 MS
EKG QTC CALCULATION (BAZETT): 442 MS
EKG R AXIS: 75 DEGREES
EKG T AXIS: 71 DEGREES
EKG VENTRICULAR RATE: 79 BPM
EOSINOPHILS ABSOLUTE: 0.3 K/UL (ref 0–0.6)
EOSINOPHILS RELATIVE PERCENT: 3 %
GFR AFRICAN AMERICAN: >60
GFR NON-AFRICAN AMERICAN: >60
GLOBULIN: 3.3 G/DL
GLUCOSE BLD-MCNC: 105 MG/DL
GLUCOSE BLD-MCNC: 105 MG/DL (ref 70–99)
GLUCOSE BLD-MCNC: 107 MG/DL (ref 70–99)
GLUCOSE URINE: NEGATIVE MG/DL
HCT VFR BLD CALC: 43.9 % (ref 36–48)
HEMOGLOBIN: 14.6 G/DL (ref 12–16)
KETONES, URINE: NEGATIVE MG/DL
LEUKOCYTE ESTERASE, URINE: NEGATIVE
LV EF: 55 %
LVEF MODALITY: NORMAL
LYMPHOCYTES ABSOLUTE: 7.8 K/UL (ref 1–5.1)
LYMPHOCYTES RELATIVE PERCENT: 76 %
MCH RBC QN AUTO: 32.3 PG (ref 26–34)
MCHC RBC AUTO-ENTMCNC: 33.3 G/DL (ref 31–36)
MCV RBC AUTO: 96.9 FL (ref 80–100)
MICROSCOPIC EXAMINATION: ABNORMAL
MONOCYTES ABSOLUTE: 0.2 K/UL (ref 0–1.3)
MONOCYTES RELATIVE PERCENT: 2 %
NEUTROPHILS ABSOLUTE: 2 K/UL (ref 1.7–7.7)
NEUTROPHILS RELATIVE PERCENT: 19 %
NITRITE, URINE: NEGATIVE
PDW BLD-RTO: 14 % (ref 12.4–15.4)
PERFORMED ON: ABNORMAL
PH UA: 6.5 (ref 5–8)
PLATELET # BLD: 241 K/UL (ref 135–450)
PLATELET SLIDE REVIEW: ADEQUATE
PMV BLD AUTO: 8.4 FL (ref 5–10.5)
POTASSIUM REFLEX MAGNESIUM: 4 MMOL/L (ref 3.5–5.1)
PROTEIN UA: NEGATIVE MG/DL
RBC # BLD: 4.52 M/UL (ref 4–5.2)
SLIDE REVIEW: ABNORMAL
SODIUM BLD-SCNC: 143 MMOL/L (ref 136–145)
SPECIFIC GRAVITY UA: 1.01 (ref 1–1.03)
TOTAL PROTEIN: 7.4 G/DL (ref 6.4–8.2)
TROPONIN: <0.01 NG/ML
URINE TYPE: ABNORMAL
UROBILINOGEN, URINE: 0.2 E.U./DL
WBC # BLD: 10.3 K/UL (ref 4–11)

## 2019-05-24 PROCEDURE — 99285 EMERGENCY DEPT VISIT HI MDM: CPT

## 2019-05-24 PROCEDURE — 85025 COMPLETE CBC W/AUTO DIFF WBC: CPT

## 2019-05-24 PROCEDURE — 80053 COMPREHEN METABOLIC PANEL: CPT

## 2019-05-24 PROCEDURE — 6360000002 HC RX W HCPCS: Performed by: PHYSICIAN ASSISTANT

## 2019-05-24 PROCEDURE — 84484 ASSAY OF TROPONIN QUANT: CPT

## 2019-05-24 PROCEDURE — G0378 HOSPITAL OBSERVATION PER HR: HCPCS

## 2019-05-24 PROCEDURE — 71045 X-RAY EXAM CHEST 1 VIEW: CPT

## 2019-05-24 PROCEDURE — 6370000000 HC RX 637 (ALT 250 FOR IP): Performed by: INTERNAL MEDICINE

## 2019-05-24 PROCEDURE — 81003 URINALYSIS AUTO W/O SCOPE: CPT

## 2019-05-24 PROCEDURE — 64483 NJX AA&/STRD TFRM EPI L/S 1: CPT

## 2019-05-24 PROCEDURE — 99255 IP/OBS CONSLTJ NEW/EST HI 80: CPT | Performed by: INTERNAL MEDICINE

## 2019-05-24 PROCEDURE — 2580000003 HC RX 258: Performed by: PHYSICIAN ASSISTANT

## 2019-05-24 PROCEDURE — 2580000003 HC RX 258: Performed by: EMERGENCY MEDICINE

## 2019-05-24 PROCEDURE — 96374 THER/PROPH/DIAG INJ IV PUSH: CPT

## 2019-05-24 PROCEDURE — 36415 COLL VENOUS BLD VENIPUNCTURE: CPT

## 2019-05-24 PROCEDURE — 77002 NEEDLE LOCALIZATION BY XRAY: CPT

## 2019-05-24 PROCEDURE — 6370000000 HC RX 637 (ALT 250 FOR IP): Performed by: PHYSICIAN ASSISTANT

## 2019-05-24 PROCEDURE — 93010 ELECTROCARDIOGRAM REPORT: CPT | Performed by: INTERNAL MEDICINE

## 2019-05-24 PROCEDURE — 93306 TTE W/DOPPLER COMPLETE: CPT

## 2019-05-24 PROCEDURE — 96372 THER/PROPH/DIAG INJ SC/IM: CPT

## 2019-05-24 PROCEDURE — 96361 HYDRATE IV INFUSION ADD-ON: CPT

## 2019-05-24 PROCEDURE — 87086 URINE CULTURE/COLONY COUNT: CPT

## 2019-05-24 PROCEDURE — 6360000002 HC RX W HCPCS: Performed by: EMERGENCY MEDICINE

## 2019-05-24 PROCEDURE — 93005 ELECTROCARDIOGRAM TRACING: CPT | Performed by: EMERGENCY MEDICINE

## 2019-05-24 RX ORDER — ATORVASTATIN CALCIUM 10 MG/1
20 TABLET, FILM COATED ORAL NIGHTLY
Status: DISCONTINUED | OUTPATIENT
Start: 2019-05-24 | End: 2019-05-24

## 2019-05-24 RX ORDER — CEPHALEXIN 250 MG/1
500 CAPSULE ORAL 3 TIMES DAILY
Status: DISCONTINUED | OUTPATIENT
Start: 2019-05-24 | End: 2019-05-26 | Stop reason: HOSPADM

## 2019-05-24 RX ORDER — LEVOTHYROXINE SODIUM 88 UG/1
88 TABLET ORAL DAILY
COMMUNITY

## 2019-05-24 RX ORDER — SODIUM CHLORIDE 9 MG/ML
INJECTION, SOLUTION INTRAVENOUS CONTINUOUS
Status: DISCONTINUED | OUTPATIENT
Start: 2019-05-24 | End: 2019-05-26 | Stop reason: HOSPADM

## 2019-05-24 RX ORDER — MAGNESIUM SULFATE 1 G/100ML
1 INJECTION INTRAVENOUS PRN
Status: DISCONTINUED | OUTPATIENT
Start: 2019-05-24 | End: 2019-05-26 | Stop reason: HOSPADM

## 2019-05-24 RX ORDER — OXYCODONE AND ACETAMINOPHEN 10; 325 MG/1; MG/1
1 TABLET ORAL EVERY 4 HOURS PRN
Status: DISCONTINUED | OUTPATIENT
Start: 2019-05-24 | End: 2019-05-26 | Stop reason: HOSPADM

## 2019-05-24 RX ORDER — POTASSIUM CHLORIDE 20 MEQ/1
20 TABLET, EXTENDED RELEASE ORAL 2 TIMES DAILY
Status: DISCONTINUED | OUTPATIENT
Start: 2019-05-24 | End: 2019-05-26 | Stop reason: HOSPADM

## 2019-05-24 RX ORDER — CETIRIZINE HYDROCHLORIDE 10 MG/1
10 TABLET ORAL DAILY
Status: DISCONTINUED | OUTPATIENT
Start: 2019-05-25 | End: 2019-05-26 | Stop reason: HOSPADM

## 2019-05-24 RX ORDER — DILTIAZEM HYDROCHLORIDE 180 MG/1
180 CAPSULE, COATED, EXTENDED RELEASE ORAL DAILY
Status: DISCONTINUED | OUTPATIENT
Start: 2019-05-25 | End: 2019-05-25

## 2019-05-24 RX ORDER — SODIUM CHLORIDE 0.9 % (FLUSH) 0.9 %
10 SYRINGE (ML) INJECTION EVERY 12 HOURS SCHEDULED
Status: DISCONTINUED | OUTPATIENT
Start: 2019-05-24 | End: 2019-05-26 | Stop reason: HOSPADM

## 2019-05-24 RX ORDER — SODIUM CHLORIDE 0.9 % (FLUSH) 0.9 %
10 SYRINGE (ML) INJECTION PRN
Status: DISCONTINUED | OUTPATIENT
Start: 2019-05-24 | End: 2019-05-26 | Stop reason: HOSPADM

## 2019-05-24 RX ORDER — ACETAMINOPHEN 325 MG/1
650 TABLET ORAL EVERY 4 HOURS PRN
Status: DISCONTINUED | OUTPATIENT
Start: 2019-05-24 | End: 2019-05-26 | Stop reason: HOSPADM

## 2019-05-24 RX ORDER — POTASSIUM CHLORIDE 7.45 MG/ML
10 INJECTION INTRAVENOUS PRN
Status: DISCONTINUED | OUTPATIENT
Start: 2019-05-24 | End: 2019-05-26 | Stop reason: HOSPADM

## 2019-05-24 RX ORDER — DONEPEZIL HYDROCHLORIDE 5 MG/1
10 TABLET, FILM COATED ORAL NIGHTLY
Status: DISCONTINUED | OUTPATIENT
Start: 2019-05-24 | End: 2019-05-26 | Stop reason: HOSPADM

## 2019-05-24 RX ORDER — PRIMIDONE 50 MG/1
100 TABLET ORAL 2 TIMES DAILY
Status: DISCONTINUED | OUTPATIENT
Start: 2019-05-24 | End: 2019-05-26 | Stop reason: HOSPADM

## 2019-05-24 RX ORDER — 0.9 % SODIUM CHLORIDE 0.9 %
1000 INTRAVENOUS SOLUTION INTRAVENOUS ONCE
Status: COMPLETED | OUTPATIENT
Start: 2019-05-24 | End: 2019-05-24

## 2019-05-24 RX ORDER — ONDANSETRON 2 MG/ML
4 INJECTION INTRAMUSCULAR; INTRAVENOUS
Status: DISCONTINUED | OUTPATIENT
Start: 2019-05-24 | End: 2019-05-24 | Stop reason: SDUPTHER

## 2019-05-24 RX ORDER — FAMOTIDINE 20 MG/1
20 TABLET, FILM COATED ORAL DAILY
Status: DISCONTINUED | OUTPATIENT
Start: 2019-05-24 | End: 2019-05-26 | Stop reason: HOSPADM

## 2019-05-24 RX ORDER — LEVOTHYROXINE SODIUM 88 UG/1
88 TABLET ORAL DAILY
Status: DISCONTINUED | OUTPATIENT
Start: 2019-05-25 | End: 2019-05-26 | Stop reason: HOSPADM

## 2019-05-24 RX ORDER — POTASSIUM CHLORIDE 20 MEQ/1
40 TABLET, EXTENDED RELEASE ORAL PRN
Status: DISCONTINUED | OUTPATIENT
Start: 2019-05-24 | End: 2019-05-26 | Stop reason: HOSPADM

## 2019-05-24 RX ORDER — ONDANSETRON 2 MG/ML
4 INJECTION INTRAMUSCULAR; INTRAVENOUS EVERY 6 HOURS PRN
Status: DISCONTINUED | OUTPATIENT
Start: 2019-05-24 | End: 2019-05-26 | Stop reason: HOSPADM

## 2019-05-24 RX ORDER — OXYCODONE AND ACETAMINOPHEN 10; 325 MG/1; MG/1
10-325 TABLET ORAL EVERY 4 HOURS PRN
Status: ON HOLD | COMMUNITY
Start: 2019-05-22 | End: 2019-12-05 | Stop reason: HOSPADM

## 2019-05-24 RX ORDER — PANTOPRAZOLE SODIUM 40 MG/1
40 TABLET, DELAYED RELEASE ORAL DAILY
Status: DISCONTINUED | OUTPATIENT
Start: 2019-05-24 | End: 2019-05-26 | Stop reason: HOSPADM

## 2019-05-24 RX ORDER — AMITRIPTYLINE HYDROCHLORIDE 25 MG/1
25 TABLET, FILM COATED ORAL NIGHTLY
Status: DISCONTINUED | OUTPATIENT
Start: 2019-05-24 | End: 2019-05-24

## 2019-05-24 RX ORDER — SERTRALINE HYDROCHLORIDE 100 MG/1
200 TABLET, FILM COATED ORAL NIGHTLY
Status: DISCONTINUED | OUTPATIENT
Start: 2019-05-24 | End: 2019-05-26 | Stop reason: HOSPADM

## 2019-05-24 RX ADMIN — OXYCODONE HYDROCHLORIDE AND ACETAMINOPHEN 1 TABLET: 10; 325 TABLET ORAL at 16:37

## 2019-05-24 RX ADMIN — SODIUM CHLORIDE: 9 INJECTION, SOLUTION INTRAVENOUS at 16:38

## 2019-05-24 RX ADMIN — CEPHALEXIN 500 MG: 250 CAPSULE ORAL at 22:02

## 2019-05-24 RX ADMIN — PRIMIDONE 100 MG: 50 TABLET ORAL at 21:55

## 2019-05-24 RX ADMIN — SERTRALINE 200 MG: 100 TABLET, FILM COATED ORAL at 21:56

## 2019-05-24 RX ADMIN — POTASSIUM CHLORIDE 20 MEQ: 1500 TABLET, EXTENDED RELEASE ORAL at 21:55

## 2019-05-24 RX ADMIN — ENOXAPARIN SODIUM 40 MG: 40 INJECTION SUBCUTANEOUS at 16:38

## 2019-05-24 RX ADMIN — ONDANSETRON 4 MG: 2 INJECTION INTRAMUSCULAR; INTRAVENOUS at 10:38

## 2019-05-24 RX ADMIN — DONEPEZIL HYDROCHLORIDE 10 MG: 5 TABLET, FILM COATED ORAL at 21:55

## 2019-05-24 RX ADMIN — SODIUM CHLORIDE 1000 ML: 9 INJECTION, SOLUTION INTRAVENOUS at 10:49

## 2019-05-24 RX ADMIN — OXYCODONE HYDROCHLORIDE AND ACETAMINOPHEN 1 TABLET: 10; 325 TABLET ORAL at 21:56

## 2019-05-24 ASSESSMENT — PAIN - FUNCTIONAL ASSESSMENT
PAIN_FUNCTIONAL_ASSESSMENT: 0-10
PAIN_FUNCTIONAL_ASSESSMENT: PREVENTS OR INTERFERES SOME ACTIVE ACTIVITIES AND ADLS
PAIN_FUNCTIONAL_ASSESSMENT: PREVENTS OR INTERFERES SOME ACTIVE ACTIVITIES AND ADLS

## 2019-05-24 ASSESSMENT — PAIN SCALES - GENERAL
PAINLEVEL_OUTOF10: 3
PAINLEVEL_OUTOF10: 8
PAINLEVEL_OUTOF10: 8
PAINLEVEL_OUTOF10: 5

## 2019-05-24 ASSESSMENT — PAIN DESCRIPTION - FREQUENCY
FREQUENCY: INTERMITTENT
FREQUENCY: INTERMITTENT

## 2019-05-24 ASSESSMENT — PAIN DESCRIPTION - ORIENTATION
ORIENTATION: LOWER;MID;LEFT
ORIENTATION: LOWER

## 2019-05-24 ASSESSMENT — PAIN DESCRIPTION - DESCRIPTORS
DESCRIPTORS: ACHING;CONSTANT;DISCOMFORT
DESCRIPTORS: ACHING;CONSTANT;DISCOMFORT

## 2019-05-24 ASSESSMENT — PAIN DESCRIPTION - PAIN TYPE
TYPE: CHRONIC PAIN
TYPE: CHRONIC PAIN

## 2019-05-24 ASSESSMENT — PAIN DESCRIPTION - ONSET
ONSET: GRADUAL
ONSET: GRADUAL

## 2019-05-24 ASSESSMENT — PAIN DESCRIPTION - PROGRESSION
CLINICAL_PROGRESSION: GRADUALLY WORSENING
CLINICAL_PROGRESSION: GRADUALLY WORSENING

## 2019-05-24 ASSESSMENT — PAIN DESCRIPTION - LOCATION
LOCATION: BACK
LOCATION: BACK;HIP

## 2019-05-24 NOTE — PROGRESS NOTES
4 Eyes Skin Assessment     The patient is being assess for   Admission    I agree that 2 RN's have performed a thorough Head to Toe Skin Assessment on the patient. ALL assessment sites listed below have been assessed. Areas assessed by both nurses:   [x]   Head, Face, and Ears   [x]   Shoulders, Back, and Chest, Abdomen  [x]   Arms, Elbows, and Hands   [x]   Coccyx, Sacrum, and Ischium  [x]   Legs, Feet, and Heels        Scattered scabs and bruising. Dry/ flaky skin. Urostomy site to right abd with dressing over it. **SHARE this note so that the co-signing nurse is able to place an eSignature**    Co-signer eSignature: Electronically signed by Carli Webb RN on 5/24/19 at 7:51 PM    Does the Patient have Skin Breakdown?   No          Van Prevention initiated:  No   Wound Care Orders initiated:  No      [facility] nurse consulted for Pressure Injury (Stage 3,4, Unstageable, DTI, NWPT, Complex wounds)and New or Established Ostomies:  No      Primary Nurse eSignature: Electronically signed by Abran Merchant RN on 5/24/19 at 7:49 PM

## 2019-05-24 NOTE — CONSULTS
[facility]  [phone]        Reason for Consultation/Chief Complaint: \"I have passed out. \"    History of Present Illness:  Luis Stevens is a 58 y.o. patient who presented to the hospital for spinal steroid injection. After the injection she sat up and felt dizzy and felt every thing was far away. She did not have chest pain SOB or palpitations. She subsequently does not recall what happened. When woke up she was nauseated and vomited. It is reported by radiology staff that she was pale and had lost pulse very briefly. A code blue was called. When the code team physician arrived she was sitting in chair. I talked to the patient and got above history. Read the current chart in epic. She follows at [facility] and has [employer]onic cardiac pacemaker. She also has tongue stimulator for sleep apnea. she denies any recent cardiac events. Last visit at [address] [place] [address] Drive was in October 2018. She reports that she has passed out multiple times in past.   I have been asked to provide consultation regarding further management and testing. Past Medical History:   has a past medical history of Angina at rest [facility]), Anxiety, Arthritis, Blood transfusion, Bradycardia, Bronchiectasis ([address] CAD (coronary artery disease), Chronic back pain, Hyperlipidemia, Hypertension, Localized morphea, NATHAN treated with BiPAP, Pacemaker, Stress incontinence, Thyroid disease, and Trochanteric bursitis of left hip. Surgical History:   has a past surgical history that includes Gastric bypass surgery (3655,1398); Cholecystectomy; Tonsillectomy; Hysterectomy; back surgery; skin biopsy; Spine surgery; Colonoscopy (2002); Pacemaker insertion (2011); back surgery; Foot surgery (Right, 12/6/12); Throat surgery; bladder suspension; other surgical history (april 2013); Abdomen surgery (9/9/11); other surgical history; Cervical discectomy (6/2014); Knee arthroscopy (Left, 10/3/2014);  Endoscopy, colon, diagnostic; Upper gastrointestinal endoscopy; Neck surgery; Foot surgery (Left, 04/30/2015); Foot surgery (Left, 4/30/15); Knee arthroscopy (Right, 10/13/15); Upper gastrointestinal endoscopy (05/03/2017); other surgical history (08/30/2017); Cataract removal with implant (Left, 03/08/2018); eye surgery (Right, 04/05/2018); and other surgical history (05/09/2018). Social History:   reports that she has never smoked. She has never used smokeless tobacco. She reports that she does not drink alcohol or use drugs. Family History:  family history includes Cancer in her sister; Heart Disease in her father; Other in her sister. Home Medications:  Were reviewed and are listed in nursing record. and/or listed below  Prior to Admission medications    Medication Sig Start Date End Date Taking? Authorizing Provider   oxyCODONE-acetaminophen (PERCOCET)  MG per tablet Take  tablets by mouth every 4 hours as needed. 5/22/19  Yes Historical Provider, MD   methylPREDNISolone (MEDROL, CANDACE,) 4 MG tablet Take by mouth as directed on package insert 5/15/19  Yes AIMEE Reyna   nitrofurantoin (MACRODANTIN) 50 MG capsule Take 50 mg by mouth   Yes Historical Provider, MD   gabapentin (NEURONTIN) 400 MG capsule Take 400 mg by mouth daily. Yes Historical Provider, MD   donepezil (ARICEPT) 10 MG tablet Take 1 tablet by mouth nightly 2/11/19  Yes Wood Felder MD   primidone (MYSOLINE) 50 MG tablet TAKE 2 TABLETS BY MOUTH TWICE DAILY 1/23/19  Yes Wood Felder MD   levofloxacin (LEVAQUIN) 500 MG tablet Take one tablet prior to [employer].  10/3/18  Yes Historical Provider, MD   diltiazem (CARDIZEM LA) 180 MG TB24 extended release tablet Take 180 mg by mouth 10/23/18  Yes Historical Provider, MD   cephALEXin (KEFLEX) 500 MG capsule Take 500 mg by mouth 3 times daily   Yes Historical Provider, MD   MONOJECT 60CC SYRINGE CATH TIP 60 ML [facility]  3/7/18  Yes Historical Provider, MD MALONEYOR-CON M20 20 MEQ extended than her stated age  [de-identified]:    05/24/19 1300   BP: 106/67   Pulse: 74   Resp: 16   Temp: 97.9 °F (36.6 °C)   SpO2: 90%    Weight: 153 lb 3.2 oz (69.5 kg)         General Appearance:  Alert, cooperative, no distress, appears stated age   Head:  Normocephalic, without obvious abnormality, atraumatic   Eyes:  PERRL, conjunctiva/corneas clear       Nose: Nares normal, no drainage or sinus tenderness   Throat: Lips, mucosa, and tongue normal   Neck: Supple, symmetrical, trachea midline, no adenopathy, thyroid: not enlarged, symmetric, no tenderness/mass/nodules, no carotid bruit or JVD       Lungs:   Clear to auscultation bilaterally, respirations unlabored   Chest Wall:  No tenderness or deformity   Heart:  Regular rate and rhythm, S1, S2 normal, no murmur, rub or gallop   Abdomen:   Soft, non-tender, bowel sounds active all four quadrants,  no masses, no organomegaly           Extremities: Extremities normal, atraumatic, no cyanosis or edema   Pulses: 1+ and symmetric   Skin: Skin color, texture, turgor normal, no rashes or lesions   Pysch: Normal mood and affect   Neurologic: Normal gross motor and sensory exam.         Labs  CBC:   Lab Results   Component Value Date    WBC 10.3 05/24/2019    RBC 4.52 05/24/2019    HGB 14.6 05/24/2019    HCT 43.9 05/24/2019    MCV 96.9 05/24/2019    RDW 14.0 05/24/2019     05/24/2019     CMP:    Lab Results   Component Value Date     05/24/2019    K 4.0 05/24/2019     05/24/2019    CO2 24 05/24/2019    BUN 14 05/24/2019    CREATININE 0.7 05/24/2019    GFRAA >60 05/24/2019    GFRAA >60 05/13/2013    AGRATIO 1.2 05/24/2019    LABGLOM >60 05/24/2019    GLUCOSE 105 05/24/2019    PROT 7.4 05/24/2019    PROT 6.6 09/03/2012    CALCIUM 10.0 05/24/2019    BILITOT 0.3 05/24/2019    ALKPHOS 124 05/24/2019    AST 44 05/24/2019    ALT 38 05/24/2019     PT/INR:  No results found for: [employer]  Lab Results   Component Value Date    CKTOTAL 31 07/24/2015    TROPONINI <0.01 05/24/2019 EKG:  I have reviewed EKG with the following interpretation:  Impression:   Final 05/24/2019 10:32 AM 14      Electronic atrial pacemakerNonspecific T wave abnormalityAbnormal ECGWhen compared with ECG of3.10.19 atrial pacing is now present. Confirmed by Jeffery Foley MD, [address] ([id number]) on 5/24/2019 10:44:58 AM         Chest xray 5.24.19     FINDINGS:   Unchanged left subclavian dual chamber pacemaker with intact leads in   expected position.  Partial inclusion of an implanted neural stimulator with   an intact appearing lead extending into the right neck.  Changes of anterior   cervical spine fusion.  Surgical clips near the gastroesophageal junction.       Clear lungs.  No definite findings of pneumothorax or pleural effusion. Normal mediastinal, hilar, and cardiac contours.  No acute fracture.           Impression   No acute cardiopulmonary process. Echo doppler of heart 10.29.18   Summary   Pacemaker / ICD lead was visualized in the right atrium and right ventricle.   Normal left ventricle systolic function with an estimated ejection fraction   of 55-60%.   No regional wall motion abnormalities are seen.   Grade I diastolic dysfunction with normal filling pressure.   Systolic pulmonary artery pressure (SPAP) is normal and estimated at 23 mmHg   (right atrial pressure 8 mmHg).  Previous echo done 2015 - EF 55%       Assessment  Syncopal episode appears to be vasovagal in nature possible orthostatic hypotension    Patient Active Problem List   Diagnosis    GERD (gastroesophageal reflux disease)    Esophageal dysmotility    Bronchiectasis (HCC)    Anxiety & depression    Hypothyroidism    S/P gastric bypass    Osteoporosis    Obesity (BMI 30-39. 9)    NATHAN (obstructive sleep apnea)    Essential tremor    HTN (hypertension), benign    Dyslipidemia    Primary insomnia    Status post total left knee replacement    MCI (mild cognitive impairment) with memory loss    Chronic bilateral low back pain

## 2019-05-24 NOTE — OP NOTE
[address]    OUTPATIENT PROCEDURE NOTE      PATIENT NAME: Randell Jackson    :  1955    PROCEDURE DATE: 2019 MR: [id number]    PROCEDURE:  LEFT L12 L23  LUMBAR TRANSFORAMINAL EPIDURAL STEROID INJECTION    DIAGNOSIS: LUMBAR DISC HERNIATION AND SPONDYLOSIS WITH RADICULOPATHY    On 2019 the above noted patient was scheduled for outpatient percutaneous injection in the Fluoroscopic Radiology Outpatient Suite. This percutaneous cortisone injection procedure including possible complications has been discussed in detail with this patient prior to the procedure and they have agreed to proceed with this attempted treatment. Therefore, the patient was appropriately positioned on the fluoroscopic table. Prior to preparing the injection site, the patient was viewed under fluoroscopy in both AP and/or lateral views to determine appropriate injection site orientation. Following this, the region of injection was then prepped and draped in appropriate sterile fashion. Following this, the areas of injection were then subsequently identified utilizing judicious fluoroscopic radiographic imaging. Following identification of appropriate landmarks and injection sites, an appropriate length spinal needle was positioned at each injection site, again under fluoroscopic radiograph guidance. Following placement of all injection needle sites, again fluoroscopic imaging views were obtained in order to verify appropriate placement of all injection needles. Placement was also verified by questioning the patient in location to localized pain and sensitivity. Following verification of placement of all injection needles, next appropriate amount for body weight solution mixture involving use of Kenalog 40 mg per ml 4 cc and Marcaine . 25% 1cc was injected at LEFT L12 L23  LUMBAR TRANSFORAMINAL EPIDURAL STEROID INJECTION site under sterile technique.   Standard procedure to assure appropriate injection was performed at each site. Following this all injection needles were removed, and after being cleansed sterile bandages were applied. The patient tolerated this procedure well. The patient was observed in the outpatient setting for a minimum of 15-20 minutes to assure they were feeling satisfactory prior to their discharge to home. All patients have been told prior to scheduling of this outpatient procedure to arrange for a family member or friend to provide home transportation. Patient was scheduled for FU visit in the next 1-2 weeks post injection for further clinical evaluation and treatment. There were no complications.     ALENA Velez.     5/24/2019  10:52 AM

## 2019-05-24 NOTE — ED NOTES
Perfect serve message to Dr. Fernanda Warren @ [address] [facility]  05/24/19 1214 [facility] with the medicine team returned the call to Dr. French Rodriguez @ [address] [place] [address]  05/24/19 [phone]

## 2019-05-24 NOTE — ED NOTES
Spoke with [employer] representative Kera. She stated pt has dual chamber pacemaker. Battery and leads are within range. Pacemaker was functioning properly today with no findings. Fax of interrogation being sent at this time.  Will notify      Negar Yoo RN  05/24/19 1040

## 2019-05-24 NOTE — FLOWSHEET NOTE
05/24/19 1406   Vital Signs   Blood Pressure Lying 117/72   Pulse Lying 60 PER MINUTE   Blood Pressure Sitting 136/81   Pulse Sitting 60 PER MINUTE   Blood Pressure Standing 142/87   Pulse Standing 76 PER MINUTE       Orthostatics as shown above.  Jose Welch, RN

## 2019-05-24 NOTE — ED PROVIDER NOTES
[facility] ED      CHIEF COMPLAINT  Loss of Consciousness (Pt was called as a CODE blue in radiology after steroid injection for back pain. Per Radiology nurse, the pt was dusky and lost pulse and was not breathing, pulse absent for approx 15 seconds. Pt became alert and started vomiting. She has a pacemaker.  )       HISTORY OF PRESENT ILLNESS  Lc Levine is a 58 y.o. female  who presents to the ED complaining of episode of loss of consciousness and possible loss of pulses and apnea. Patient had just received an epidural steroid injection and was in the radiology department as an outpatient for this procedure when a code blue was called overhead. I responded to code blue and found the patient to be sitting in a wheelchair, pale appearing but awake and responding to verbal stimuli. She is placed in a cot and brought immediately to the emergency department for further evaluation. Patient did have a pulse upon my initial evaluation and was responsive and breathing spontaneously without any cyanosis but did appeal pale. She is not diaphoretic. She states that she does not really recall what occurred but didn't lose consciousness. Fingerstick blood sugar obtained immediately noted to be 105. Patient's blood pressure prior to the procedure was about 665 systolic per the nursing staff which was reportedly low for the patient. They proceeded with the procedure at which point afterwards the patient had lost consciousness as noted above. She does have a pacemaker in place. She feels very nauseated. No abdominal pain. No chest pain. No focal numbness, tingling or weakness. Talking to the family, she had possibly similar episode about a month ago where they state that they had to breathe in her mouth she was not breathing.   Per the chart though it appears that she had presented with some slowness to respond and facial droop and was evaluated for possible TIA and has since followed up with neurology. No reported that time of any apneic episode. No other complaints, modifying factors or associated symptoms. I have reviewed the following from the nursing documentation. Past Medical History:   Diagnosis Date    Angina at rest [facility])     Anxiety     Arthritis     hands and hip    Blood transfusion     after back surgery    Bradycardia     Bronchiectasis ([address] 11/5/2013    CAD (coronary artery disease)     Chronic back pain     Hyperlipidemia     Hypertension     Localized morphea     NATHAN treated with BiPAP     Pacemaker     Stress incontinence     Thyroid disease     hypothyroid    Trochanteric bursitis of left hip 4/12/2019     Past Surgical History:   Procedure Laterality Date    ABDOMEN SURGERY  9/9/11     REPAIR INCISIONAL HERNIA REDUCIBLE WITH POSSIBLE MESH    BACK SURGERY      x3    BACK SURGERY      stimulator    BLADDER SUSPENSION      CATARACT REMOVAL WITH IMPLANT Left 03/08/2018    PHACO EMULSIFICATION OF CATARACT WITH INTRAOCULAR LENS IMPLANT LEFT EYE    CERVICAL DISCECTOMY  6/2014    and fusion    CHOLECYSTECTOMY      COLONOSCOPY  2002 1/26/12    ENDOSCOPY, COLON, DIAGNOSTIC      EYE SURGERY Right 04/05/2018    cataract removal    FOOT SURGERY Right 12/6/12    arthroplasty    FOOT SURGERY Left 04/30/2015    FOOT SURGERY Left 4/30/15    GASTRIC BYPASS SURGERY  [id number]    HYSTERECTOMY      KNEE ARTHROSCOPY Left 10/3/2014    part.  medial & lateral meniscectomy, synovectomy plica exc    KNEE ARTHROSCOPY Right 10/13/15    partial medial meniscectomy, chondroplasty, partial lateral meniscectomy    NECK SURGERY      OTHER SURGICAL HISTORY  april 2013    removal spinal cord stimulater    OTHER SURGICAL HISTORY      bladder stimulator    OTHER SURGICAL HISTORY  08/30/2017    left patellofemoral arthroplasty    OTHER SURGICAL HISTORY  05/09/2018    convert left patellofemoral arthroplasty to left total knee replacement    PACEMAKER INSERTION  2011 Dr Mahoney/checked last week/told has 12 more yrs for this one/set at 60    SKIN BIOPSY      abdomen    SPINE SURGERY      stimulator    THROAT SURGERY      polyps removed    TONSILLECTOMY      UPPER GASTROINTESTINAL ENDOSCOPY      UPPER GASTROINTESTINAL ENDOSCOPY  05/03/2017     Family History   Problem Relation Age of Onset    Heart Disease Father     Cancer Sister         multiple organs    Other Sister     Asthma Neg Hx     Diabetes Neg Hx     Emphysema Neg Hx     Heart Failure Neg Hx     Hypertension Neg Hx      Social History     Socioeconomic History    Marital status:       Spouse name: Not on file    Number of children: Not on file    Years of education: Not on file    Highest education level: Not on file   Occupational History    Not on file   Social Needs    Financial resource strain: Not on file    Food insecurity:     Worry: Not on file     Inability: Not on file    Transportation needs:     Medical: Not on file     Non-medical: Not on file   Tobacco Use    Smoking status: Never Smoker    Smokeless tobacco: Never Used   Substance and Sexual Activity    Alcohol use: No     Alcohol/week: 0.0 oz    Drug use: No    Sexual activity: Yes     Partners: Female   Lifestyle    Physical activity:     Days per week: Not on file     Minutes per session: Not on file    Stress: Not on file   Relationships    Social connections:     Talks on phone: Not on file     Gets together: Not on file     Attends Holiness service: Not on file     Active member of club or organization: Not on file     Attends meetings of clubs or organizations: Not on file     Relationship status: Not on file    Intimate partner violence:     Fear of current or ex partner: Not on file     Emotionally abused: Not on file     Physically abused: Not on file     Forced sexual activity: Not on file   Other Topics Concern    Not on file   Social History Narrative    Not on file     Current Facility-Administered Medications   Medication Dose Route Frequency Provider Last Rate Last Dose    ondansetron (ZOFRAN) injection 4 mg  4 mg Intravenous Q1H PRN Verena Hill MD   4 mg at 05/24/19 1038     Current Outpatient Medications   Medication Sig Dispense Refill    methylPREDNISolone (MEDROL, CANDACE,) 4 MG tablet Take by mouth as directed on package insert 1 kit 0    nitrofurantoin (MACRODANTIN) 50 MG capsule Take 50 mg by mouth      gabapentin (NEURONTIN) 400 MG capsule Take 400 mg by mouth.  donepezil (ARICEPT) 10 MG tablet Take 1 tablet by mouth nightly 90 tablet 2    primidone (MYSOLINE) 50 MG tablet TAKE 2 TABLETS BY MOUTH TWICE DAILY 360 tablet 1    levofloxacin (LEVAQUIN) 500 MG tablet Take one tablet prior to [employer].  diltiazem (CARDIZEM LA) 180 MG TB24 extended release tablet Take 180 mg by mouth      cephALEXin (KEFLEX) 500 MG capsule Take 500 mg by mouth 3 times daily      oxyCODONE-acetaminophen (PERCOCET) 7.5-325 MG per tablet Take 1 tablet by mouth every 6 hours as needed for Pain. .      MONOJECT 60CC SYRINGE CATH TIP 60 ML MISC       KLOR-CON M20 20 MEQ extended release tablet Take 20 mEq by mouth 2 times daily       atorvastatin (LIPITOR) 20 MG tablet       Water For Irrigation, Sterile (STERILE WATER FOR IRRIGATION) 2 times daily       ranitidine (ZANTAC) 150 MG tablet Take 150 mg by mouth 2 times daily      sertraline (ZOLOFT) 100 MG tablet Take 200 mg by mouth nightly       pantoprazole (PROTONIX) 40 MG tablet TAKE 1 TABLET BY MOUTH DAILY  0    loratadine (CLARITIN) 10 MG tablet Take 10 mg by mouth daily      fluticasone (FLONASE) 50 MCG/ACT nasal spray 1 spray by Nasal route nightly. 1 Bottle 5    levothyroxine (SYNTHROID) 75 MCG tablet Take 1 tablet by mouth daily for 360 days. 30 tablet 11    nitroGLYCERIN (NITROSTAT) 0.4 MG SL tablet Place 1 tablet under the tongue every 5 minutes as needed for Chest pain.  25 tablet 3    amitriptyline (ELAVIL) 25 MG tablet Take 25 mg by right of midline. No drainage currently or bleeding. Soft. Non-distended. No masses. No organomegaly. No guarding or rebound. MUSCULOSKELETAL: No extremity edema. Compartments soft. No deformity. No tenderness in the extremities. All extremities neurovascularly intact. SKIN: Warm and dry. No acute rashes. NEUROLOGICAL: Alert and oriented. CN's 2-12 intact. No gross facial drooping. Strength 5/5, sensation intact. No gross focal deficits. PSYCHIATRIC: Normal mood and affect. LABS  I have reviewed all labs for this visit.    Results for orders placed or performed during the hospital encounter of 05/24/19   CBC Auto Differential   Result Value Ref Range    WBC 10.3 4.0 - 11.0 K/uL    RBC 4.52 4.00 - 5.20 M/uL    Hemoglobin 14.6 12.0 - 16.0 g/dL    Hematocrit 43.9 36.0 - 48.0 %    MCV 96.9 80.0 - 100.0 fL    MCH 32.3 26.0 - 34.0 pg    MCHC 33.3 31.0 - 36.0 g/dL    RDW 14.0 12.4 - 15.4 %    Platelets 609 124 - 516 K/uL    MPV 8.4 5.0 - 10.5 fL    PLATELET SLIDE REVIEW Adequate     SLIDE REVIEW see below     Neutrophils % 19.0 %    Lymphocytes % 76.0 %    Monocytes % 2.0 %    Eosinophils % 3.0 %    Basophils % 0.0 %    Neutrophils # 2.0 1.7 - 7.7 K/uL    Lymphocytes # 7.8 (H) 1.0 - 5.1 K/uL    Monocytes # 0.2 0.0 - 1.3 K/uL    Eosinophils # 0.3 0.0 - 0.6 K/uL    Basophils # 0.0 0.0 - 0.2 K/uL   Comprehensive Metabolic Panel w/ Reflex to MG   Result Value Ref Range    Sodium 143 136 - 145 mmol/L    Potassium reflex Magnesium 4.0 3.5 - 5.1 mmol/L    Chloride 106 99 - 110 mmol/L    CO2 24 21 - 32 mmol/L    Anion Gap 13 3 - 16    Glucose 107 (H) 70 - 99 mg/dL    BUN 14 7 - 20 mg/dL    CREATININE 0.7 0.6 - 1.2 mg/dL    GFR Non-African American >60 >60    GFR African American >60 >60    Calcium 10.0 8.3 - 10.6 mg/dL    Total Protein 7.4 6.4 - 8.2 g/dL    Alb 4.1 3.4 - 5.0 g/dL    Albumin/Globulin Ratio 1.2 1.1 - 2.2    Total Bilirubin 0.3 0.0 - 1.0 mg/dL    Alkaline Phosphatase 124 40 - 129 U/L    ALT 38 10 - 40 U/L    AST 44 (H) 15 - 37 U/L    Globulin 3.3 g/dL   Troponin   Result Value Ref Range    Troponin <0.01 <0.01 ng/mL   POCT Glucose   Result Value Ref Range    Glucose 105 mg/dL    QC OK? ok    EKG 12 Lead   Result Value Ref Range    Ventricular Rate 79 BPM    Atrial Rate 79 BPM    P-R Interval 168 ms    QRS Duration 88 ms    Q-T Interval 386 ms    QTc Calculation (Bazett) 442 ms    R Axis 75 degrees    T Axis 71 degrees    Diagnosis       Electronic atrial pacemakerNonspecific T wave abnormalityAbnormal ECGWhen compared with ECG of3.10.19 atrial pacing is now present. Confirmed by Hnery Flores MD, [address] ([id number]) on 5/24/2019 10:44:58 AM       ECG  The Ekg interpreted by me shows  normal pacemaker rhythm with a rate of 79  Axis is   Normal  QTc is  normal  Intervals and Durations are unremarkable. ST Segments: nonspecific changes  Paced rhythm has replaced sinus rhythm when compared with prior EKG dated March 10, 2019. RADIOLOGY  Xr Chest Portable    Result Date: 5/24/2019  EXAMINATION: ONE XRAY VIEW OF THE CHEST 5/24/2019 11:02 am COMPARISON: Chest radiograph 03/10/2019 HISTORY: ORDERING SYSTEM PROVIDED HISTORY: syncope TECHNOLOGIST PROVIDED HISTORY: Reason for exam:->syncope Ordering Physician Provided Reason for Exam: Loss of Consciousness (Pt was called as a CODE blue in radiology after steroid injection for back pain. Per Radiology nurse, the pt was dusky and lost pulse and was not breathing, pulse absent for approx 15 seconds. Pt became alert and started vomiting. She has a pacemaker. ) Acuity: Acute Type of Exam: Initial FINDINGS: Unchanged left subclavian dual chamber pacemaker with intact leads in expected position. Partial inclusion of an implanted neural stimulator with an intact appearing lead extending into the right neck. Changes of anterior cervical spine fusion. Surgical clips near the gastroesophageal junction. Clear lungs. No definite findings of pneumothorax or pleural effusion.  Normal mediastinal, hilar, and cardiac contours. No acute fracture. No acute cardiopulmonary process. ED COURSE/MDM  Patient seen and evaluated. Old records reviewed. Labs and imaging reviewed and results discussed with patient. Patient presenting for evaluation after a possible syncopal episode and loss of consciousness versus possible cardiac arrest/respiratory arrest as she was noted to be pulseless and apneic per the nursing staff that was there. She has regained her color now on reevaluation and is currently asymptomatic. We did interrogate the patient's pacemaker which is a dual-chamber pacemaker by [employer]. The battery is good without any appearance of any malfunction. The arrhythmia summary revealed 4 ventricular high rate episodes but most recently noted to be April 27, 2019 and no events at all today. Her troponin is negative. She does not have any chest pain. Workup otherwise unremarkable including CBC, chemistry and Accu-Chek here. At this time that I feel that inpatient admission for further observation and management is most appropriate. Her cardiologist is at [facility] but she prefers to be admitted here if possible. I do not feel that there is any need for intervention of her pacemaker and felt that admission here was appropriate for further management. I spoke with Maite Barboza with the hospitalist team.  We thoroughly discussed the history, physical exam, laboratory and imaging studies, as well as, emergency department course. Based upon that discussion, we've decided to admit Brandie Cantu for further observation and evaluation of Cordelia Pillai's unresponsive episode. As I have deemed necessary from their history, physical, and studies, I have considered and evaluated Brandie Cantu for the following diagnoses:  ACUTE CORONARY SYNDROME, PERICARDIAL TAMPONADE, PNEUMOTHORAX, PULMONARY EMBOLISM, and THORACIC DISSECTION.         During the patient's ED course, the patient was given:  Medications   ondansetron (ZOFRAN) injection 4 mg (4 mg Intravenous Given 5/24/19 1038)   0.9 % sodium chloride bolus (1,000 mLs Intravenous New Bag 5/24/19 1049)        CLINICAL IMPRESSION  1. Episode of unresponsiveness    2. Cardiac pacemaker        Blood pressure 111/65, pulse 60, temperature 97.3 °F (36.3 °C), resp. rate 13, weight 125 lb (56.7 kg), SpO2 99 %, not currently breastfeeding. Megan Tafoya was admitted in stable condition. DISCLAIMER: This chart was created using Dragon dictation software. Efforts were made by me to ensure accuracy, however some errors may be present due to limitations of this technology and occasionally words are not transcribed correctly.         Joby Helms MD  05/24/19 5091

## 2019-05-24 NOTE — PROGRESS NOTES
Pt. Oriented to room and call light system. No signs of distress noted. Pt. Assessment completed- see flow sheet. Pt. denies further needs at this time. Will continue to monitor. Call light is within reach.   Kary Rahman RN

## 2019-05-24 NOTE — PROGRESS NOTES
After completition of epidural steroid injection patient report feeling dizzy and \"not right\". Patient assisted x's 2 to wheelchair. Attempt made to get VS, unable to obtain. Patient became unresponsive, no pulse palpable, no respirations visible. Sternal rubbed patient and she slowly regained consciousness and vomited clear fluid. Rapid response called, patient moved to the emergency room. Patients daughter Shade Whitlock notified, she is now with patient in the ER. Report given to RAGHAV ALEMAN. Care transferred.   Gloria VALDES RN

## 2019-05-24 NOTE — ED NOTES
Interrogation of [employer] pacemaker completed at this time and data sent. Pt resting in bed with no wants or need. Pt on monitor and breathing easy. Will continue to monitor.      Antonina Cunha RN  05/24/19 2653

## 2019-05-25 LAB
ANION GAP SERPL CALCULATED.3IONS-SCNC: 12 MMOL/L (ref 3–16)
BASOPHILS ABSOLUTE: 0 K/UL (ref 0–0.2)
BASOPHILS RELATIVE PERCENT: 0.1 %
BUN BLDV-MCNC: 9 MG/DL (ref 7–20)
CALCIUM SERPL-MCNC: 9.2 MG/DL (ref 8.3–10.6)
CHLORIDE BLD-SCNC: 107 MMOL/L (ref 99–110)
CO2: 19 MMOL/L (ref 21–32)
CREAT SERPL-MCNC: <0.5 MG/DL (ref 0.6–1.2)
D DIMER: 265 NG/ML DDU (ref 0–229)
EOSINOPHILS ABSOLUTE: 0 K/UL (ref 0–0.6)
EOSINOPHILS RELATIVE PERCENT: 0 %
GFR AFRICAN AMERICAN: >60
GFR NON-AFRICAN AMERICAN: >60
GLUCOSE BLD-MCNC: 209 MG/DL (ref 70–99)
HCT VFR BLD CALC: 41.4 % (ref 36–48)
HEMOGLOBIN: 14 G/DL (ref 12–16)
LYMPHOCYTES ABSOLUTE: 1.1 K/UL (ref 1–5.1)
LYMPHOCYTES RELATIVE PERCENT: 18.1 %
MAGNESIUM: 2 MG/DL (ref 1.8–2.4)
MCH RBC QN AUTO: 32.4 PG (ref 26–34)
MCHC RBC AUTO-ENTMCNC: 33.9 G/DL (ref 31–36)
MCV RBC AUTO: 95.8 FL (ref 80–100)
MONOCYTES ABSOLUTE: 0.1 K/UL (ref 0–1.3)
MONOCYTES RELATIVE PERCENT: 2.2 %
NEUTROPHILS ABSOLUTE: 4.9 K/UL (ref 1.7–7.7)
NEUTROPHILS RELATIVE PERCENT: 79.6 %
PDW BLD-RTO: 13.7 % (ref 12.4–15.4)
PLATELET # BLD: 201 K/UL (ref 135–450)
PMV BLD AUTO: 8.5 FL (ref 5–10.5)
POTASSIUM SERPL-SCNC: 4.3 MMOL/L (ref 3.5–5.1)
RBC # BLD: 4.33 M/UL (ref 4–5.2)
SODIUM BLD-SCNC: 138 MMOL/L (ref 136–145)
URINE CULTURE, ROUTINE: NORMAL
WBC # BLD: 6.2 K/UL (ref 4–11)

## 2019-05-25 PROCEDURE — G0378 HOSPITAL OBSERVATION PER HR: HCPCS

## 2019-05-25 PROCEDURE — 97116 GAIT TRAINING THERAPY: CPT

## 2019-05-25 PROCEDURE — 6370000000 HC RX 637 (ALT 250 FOR IP): Performed by: INTERNAL MEDICINE

## 2019-05-25 PROCEDURE — 85025 COMPLETE CBC W/AUTO DIFF WBC: CPT

## 2019-05-25 PROCEDURE — 99233 SBSQ HOSP IP/OBS HIGH 50: CPT | Performed by: INTERNAL MEDICINE

## 2019-05-25 PROCEDURE — 6360000002 HC RX W HCPCS: Performed by: PHYSICIAN ASSISTANT

## 2019-05-25 PROCEDURE — 6370000000 HC RX 637 (ALT 250 FOR IP): Performed by: PHYSICIAN ASSISTANT

## 2019-05-25 PROCEDURE — 97161 PT EVAL LOW COMPLEX 20 MIN: CPT

## 2019-05-25 PROCEDURE — 36415 COLL VENOUS BLD VENIPUNCTURE: CPT

## 2019-05-25 PROCEDURE — 2580000003 HC RX 258: Performed by: PHYSICIAN ASSISTANT

## 2019-05-25 PROCEDURE — 83735 ASSAY OF MAGNESIUM: CPT

## 2019-05-25 PROCEDURE — 96372 THER/PROPH/DIAG INJ SC/IM: CPT

## 2019-05-25 PROCEDURE — 80048 BASIC METABOLIC PNL TOTAL CA: CPT

## 2019-05-25 PROCEDURE — 85379 FIBRIN DEGRADATION QUANT: CPT

## 2019-05-25 RX ORDER — LANOLIN ALCOHOL/MO/W.PET/CERES
3 CREAM (GRAM) TOPICAL NIGHTLY PRN
Status: DISCONTINUED | OUTPATIENT
Start: 2019-05-25 | End: 2019-05-25

## 2019-05-25 RX ORDER — LANOLIN ALCOHOL/MO/W.PET/CERES
3 CREAM (GRAM) TOPICAL NIGHTLY PRN
Status: DISCONTINUED | OUTPATIENT
Start: 2019-05-25 | End: 2019-05-26 | Stop reason: HOSPADM

## 2019-05-25 RX ORDER — CLONAZEPAM 1 MG/1
1 TABLET ORAL 3 TIMES DAILY PRN
Status: DISCONTINUED | OUTPATIENT
Start: 2019-05-25 | End: 2019-05-26 | Stop reason: HOSPADM

## 2019-05-25 RX ADMIN — CETIRIZINE HYDROCHLORIDE 10 MG: 10 TABLET ORAL at 08:42

## 2019-05-25 RX ADMIN — LEVOTHYROXINE SODIUM 88 MCG: 88 TABLET ORAL at 06:50

## 2019-05-25 RX ADMIN — DONEPEZIL HYDROCHLORIDE 10 MG: 5 TABLET, FILM COATED ORAL at 21:24

## 2019-05-25 RX ADMIN — FAMOTIDINE 20 MG: 20 TABLET, FILM COATED ORAL at 08:42

## 2019-05-25 RX ADMIN — CEPHALEXIN 500 MG: 250 CAPSULE ORAL at 14:59

## 2019-05-25 RX ADMIN — CEPHALEXIN 500 MG: 250 CAPSULE ORAL at 08:42

## 2019-05-25 RX ADMIN — PRIMIDONE 100 MG: 50 TABLET ORAL at 21:23

## 2019-05-25 RX ADMIN — SERTRALINE 200 MG: 100 TABLET, FILM COATED ORAL at 21:23

## 2019-05-25 RX ADMIN — POTASSIUM CHLORIDE 20 MEQ: 1500 TABLET, EXTENDED RELEASE ORAL at 08:42

## 2019-05-25 RX ADMIN — OXYCODONE HYDROCHLORIDE AND ACETAMINOPHEN 1 TABLET: 10; 325 TABLET ORAL at 21:24

## 2019-05-25 RX ADMIN — POTASSIUM CHLORIDE 20 MEQ: 1500 TABLET, EXTENDED RELEASE ORAL at 21:24

## 2019-05-25 RX ADMIN — MELATONIN 3 MG ORAL TABLET 3 MG: 3 TABLET ORAL at 01:05

## 2019-05-25 RX ADMIN — PANTOPRAZOLE SODIUM 40 MG: 40 TABLET, DELAYED RELEASE ORAL at 08:42

## 2019-05-25 RX ADMIN — CEPHALEXIN 500 MG: 250 CAPSULE ORAL at 21:24

## 2019-05-25 RX ADMIN — ENOXAPARIN SODIUM 40 MG: 40 INJECTION SUBCUTANEOUS at 08:42

## 2019-05-25 RX ADMIN — PRIMIDONE 100 MG: 50 TABLET ORAL at 08:48

## 2019-05-25 RX ADMIN — DILTIAZEM HYDROCHLORIDE 180 MG: 180 CAPSULE, COATED, EXTENDED RELEASE ORAL at 08:48

## 2019-05-25 RX ADMIN — CLONAZEPAM 1 MG: 1 TABLET ORAL at 21:24

## 2019-05-25 RX ADMIN — OXYCODONE HYDROCHLORIDE AND ACETAMINOPHEN 1 TABLET: 10; 325 TABLET ORAL at 02:09

## 2019-05-25 RX ADMIN — SODIUM CHLORIDE: 9 INJECTION, SOLUTION INTRAVENOUS at 03:50

## 2019-05-25 ASSESSMENT — PAIN DESCRIPTION - DESCRIPTORS
DESCRIPTORS: ACHING;CONSTANT;DISCOMFORT
DESCRIPTORS: ACHING;CONSTANT;DISCOMFORT

## 2019-05-25 ASSESSMENT — PAIN DESCRIPTION - ORIENTATION
ORIENTATION: LEFT;LOWER;MID
ORIENTATION: LEFT;LOWER;MID

## 2019-05-25 ASSESSMENT — PAIN DESCRIPTION - FREQUENCY
FREQUENCY: INTERMITTENT
FREQUENCY: INTERMITTENT

## 2019-05-25 ASSESSMENT — PAIN DESCRIPTION - LOCATION
LOCATION: BACK;HIP
LOCATION: BACK;HIP

## 2019-05-25 ASSESSMENT — PAIN - FUNCTIONAL ASSESSMENT
PAIN_FUNCTIONAL_ASSESSMENT: PREVENTS OR INTERFERES SOME ACTIVE ACTIVITIES AND ADLS
PAIN_FUNCTIONAL_ASSESSMENT: PREVENTS OR INTERFERES SOME ACTIVE ACTIVITIES AND ADLS

## 2019-05-25 ASSESSMENT — PAIN SCALES - GENERAL
PAINLEVEL_OUTOF10: 8
PAINLEVEL_OUTOF10: 6
PAINLEVEL_OUTOF10: 8
PAINLEVEL_OUTOF10: 6

## 2019-05-25 ASSESSMENT — PAIN DESCRIPTION - PAIN TYPE
TYPE: CHRONIC PAIN
TYPE: CHRONIC PAIN

## 2019-05-25 ASSESSMENT — PAIN DESCRIPTION - PROGRESSION
CLINICAL_PROGRESSION: GRADUALLY WORSENING
CLINICAL_PROGRESSION: GRADUALLY WORSENING

## 2019-05-25 ASSESSMENT — PAIN DESCRIPTION - ONSET
ONSET: GRADUAL
ONSET: GRADUAL

## 2019-05-25 NOTE — PROGRESS NOTES
AM assessment completed. Scheduled medications given per MAR. VSS on room air. A/O x4. Denies any needs, call light in reach. Will monitor. Repositioned in bed.

## 2019-05-25 NOTE — FLOWSHEET NOTE
05/25/19 1000   Vital Signs   Orthostatic B/P and Pulse?  Yes   Blood Pressure Lying 106/69   Pulse Lying 70 PER MINUTE   Blood Pressure Sitting 101/67   Pulse Sitting 71 PER MINUTE   Blood Pressure Standing 100/64   Pulse Standing 93 PER MINUTE       Orthostatic BP completed at this time

## 2019-05-25 NOTE — PROGRESS NOTES
Inpatient Physical Therapy Evaluation and Treatment     Unit:  PCU  Date:  7/13/2018  Patient Name:    Kalina Dorman  \"Cordelia\"  Admitting diagnosis: syncope (vasovagal response)  Admit Date:  7/12/2018  Precautions/Restrictions/WB Status/ Lines/ Wounds/ Oxygen:  Fall risk, IV, catheter, pending D-Dimer, patient came here yesterday after an epidural shot in her back (suspected to be vasovagal response per mD)  Treatment Time:  6454-0776  Treatment Number:  1      Patient Goals for Therapy: \"to go home\"         Discharge Recommendations: home with PRN assist  AM-PAC Score: 21  DME needs for discharge:  Needs met     Preadmission Environment    Pt. Lives alone; her daughter is supportive, but unable to stay with pt around the clock  Home environment: [facility]. Steps to enter first floor:  1 threshold step  Bathroom: walk-in shower or tub; pt typically takes shower chair, grab bars, hand-held shower head  Equipment owned: wheelchair, BSC, shower chair, RW, 4WW, cane, reacher, Adjustable bed,no rails     Preadmission Status   Pt. hasnt driven for awhile (had cataract sx last year, cannot see out of L eye well). Pt's daughter helps also. Pt's daughter typically does the grocery shopping, but pt would like to do this more independently. Pt. showering and dressing IND. Pt. Had assistance from family for cooking, cleaning, laundry. Pt independent with microwave meals. Pt. Fully independent for transfers and gait and walked with SPC at all times   No falls. Outpatient therapy completed for B knees in January. Pain    Rating/Location:  Back (epidural yesterday), knee, hip pain. 8/10. Pain Medicine Status: pt has received pain medicine      Cognition    A&Ox4, patient appropriate and cooperative, but flat affect. Patient lying supine in bed with no family present.   Follows 1 step and 2 step commands.     Upper Extremity ROM/Strength  Please see OT evaluation.       Lower Extremity ROM / Strength    AROM to/from sit with mod I  2). Sit to/from stand with independence   3). Bed to chair with SPC and independence   4). Gait: ambulate 150 feet with cane, steady, no LOB, supervision.     Rehabilitation Potential   Good for goals listed above. Strengths for achieving goals include: PLOF, cooperation, family support  Barriers to achieving goals include:  pain     Plan    To be seen 3-5x/week while in acute care setting for therapeutic exercises, bed mobility, transfers, progressive gait training, balance training, and family/patient education.      Timed Code Treatment Minutes:    10  minutes  Total Treatment Time:  30  minutes     Lynette Tabares, PT  #[id number]

## 2019-05-25 NOTE — PROGRESS NOTES
EXTREMITIES: No lower extremity edema. Motor movement grossly intact. No cyanosis or clubbing. Current Inpatient Medications:   donepezil  10 mg Oral Nightly    potassium chloride  20 mEq Oral BID    cetirizine  10 mg Oral Daily    pantoprazole  40 mg Oral Daily    primidone  100 mg Oral BID    famotidine  20 mg Oral Daily    sertraline  200 mg Oral Nightly    sodium chloride flush  10 mL Intravenous 2 times per day    enoxaparin  40 mg Subcutaneous Daily    cephALEXin  500 mg Oral TID    levothyroxine  88 mcg Oral Daily      sodium chloride 75 mL/hr at 05/25/19 0350       Diagnostics:    EKG: normal sinus rhythm, unchanged from previous tracings. Pacemaker interrogation: MVP  Labs:   CBC:   Recent Labs     05/24/19  1036 05/25/19  0441   WBC 10.3 6.2   RBC 4.52 4.33   HGB 14.6 14.0   HCT 43.9 41.4   MCV 96.9 95.8   RDW 14.0 13.7    201     BMP:  Recent Labs     05/24/19  1036 05/25/19  0441    138   K 4.0 4.3    107   CO2 24 19*   BUN 14 9   CREATININE 0.7 <0.5*     BNP: No results for input(s): BNP in the last 72 hours. PT/INR:   Recent Labs     05/24/19  1036   PROT 7.4     APTT:No results for input(s): APTT in the last 72 hours. CARDIAC ENZYMES:  Recent Labs     05/24/19  1036 05/24/19  1353 05/24/19  1637   TROPONINI <0.01 <0.01 <0.01     FASTING LIPID PANEL:  Lab Results   Component Value Date    CHOL 228 09/05/2015    HDL 56 09/05/2015    HDL 40 08/11/2011    TRIG 147 09/05/2015     LIVER PROFILE:  Recent Labs     05/24/19  1036   AST 44*   ALT 38   BILITOT 0.3   ALKPHOS 124       ASSESSMENT AND PLAN:    Patient Active Problem List   Diagnosis    GERD (gastroesophageal reflux disease)    Esophageal dysmotility    Bronchiectasis (HCC)    Anxiety & depression    Hypothyroidism    S/P gastric bypass    Osteoporosis    Obesity (BMI 30-39. 9)    NATHAN (obstructive sleep apnea)    Essential tremor    HTN (hypertension), benign    Dyslipidemia    Primary insomnia    Status post total left knee replacement    MCI (mild cognitive impairment) with memory loss    Chronic bilateral low back pain without sciatica    Complicated UTI (urinary tract infection)    S/P placement of hypoglossal cranial nerve stimulator      History of bladder augmentation    Urinary retention    CAD (coronary artery disease)    Pacemaker    Polyuria    Diarrhea    Hx of Enterococcus UTI    [facility] ([facility])    Effusion of prosthetic L knee joint    Dysthymia    TIA involving left internal carotid artery    Trochanteric bursitis of left hip    Eyelash ptosis, left    MG (myasthenia gravis) ([facility])    Bilateral carotid artery stenosis    Syncope and collapse       Syncope   -ongoing neurological w/u   -pacemaker interrogation normal   -appears to be vasovagal   -Syncope- no prodrome, no CP, palpitations, vision changes. No post ictal period or bowel/bladder loss. No syncope with showers, position, bowel movements    Encouraged to avoid dehydration by increasing fluid intake. Also increase Na intake. Pay close attention to prodromal symptoms. Educated on and how to avert syncope by lying down and elevating legs. Discussed specific techniques to decrease pooling of blood in legs such as foot exercises or tensing leg muscles when standing. Utilize compression stockings. 20-30mmHg compression stockings recommended    Cardiology  will sign off but remains available if needed. Please see orders. Discussed with patient and nursing. Thank you for asking me to assist in the care of this patient. Please contact me if you have any questions regarding her evaluation. All questions and concerns were addressed to the patient/family. Alternatives to my treatment were discussed. The note was completed using EMR. Every effortwas made to ensure accuracy; however, inadvertent computerized transcription errors may be present. ELI GalvanC.   [facility]  [phone] Nettie [place] office

## 2019-05-25 NOTE — PROGRESS NOTES
Vitals:    05/24/19 2152   BP: 134/86   Pulse: 76   Resp: 16   Temp: 97.4 °F (36.3 °C)   SpO2: 95%     Patient resting in bed, A&Ox4. A. Paced on telemetry. Currently on RA. [place] catheter in place. Urostomy site in place. PRN percocet provided. No further needs indicated at this time. Bed in low position with wheels locked. Call light in reach. Will continue to monitor.

## 2019-05-25 NOTE — CARE COORDINATION
Case Management Assessment  Initial Evaluation    Date/Time of Evaluation: 5/25/2019 1:31 PM  Assessment Completed by: Steve Montana    Patient Name: Abran Jj  YOB: 1955  Diagnosis: Syncope and collapse [R55]  Date / Time: 5/24/2019 10:37 AM  Admission status/Date: observation  Chart Reviewed: Yes      Patient Interviewed: Yes   Family Interviewed:  No      Hospitalization in the last 30 days:  No    Contacts  :     Relationship to Patient:   Phone Number:    Alternate Contact:     Relationship to Patient:     Phone Number:       Met with: patient    Current PCP; Shalom Eckert, [address] required for SNF : Y       3 night stay required -  [employer]  Support Systems: Children, Family Members, Friends/Neighbors  Transportation: self/daughter    Meal Preparation: daughter/self    Housing  Home Environment: lives alone (daughter nearby and is supportive)  Steps: one  Plans to Return to Present Housing: Yes  Other Identified Issues: CANDICE Velazquez 77  Currently active with [address] : No  Type of Home Care Services: OT, PT, Aide Services  Passport/Waiver : No  :                      Phone Number:    Passport/Waiver Services: NA          [facility] Provider:   Equipment: Walker_X_Cane_X_RTS_X_ BSC_X_Shower Chair_x_  02__ HHN__ CPAP__  BiPap__  Hospital Bed__ W/C_X__ Other__________      Has Home O2 in place on admit:  No  Informed of need to bring portable home O2 tank on day of discharge for nursing to connect prior to leaving:   No  Verbalized agreement/Understanding:   No    Community Service Affiliation  Dialysis:  No    · Name:  · Location  · Dialysis Schedule:  · Phone:   · Fax: Outpatient PT/OT: No    Cancer Center: No     CHF Clinic: No     Pulmonary Rehab: No  Pain Clinic: No  Community Mental Health: No    Wound Clinic: No     Other: NA    DISCHARGE PLAN: Chart reviewed.  Met with pt at bedside and explained the role of the CM. Plan: Will return home. Denies any HC or DME. Will F/U w/ Dr. Mojgna Newman. NNNF    Explained Case Management role/services.

## 2019-05-26 VITALS
DIASTOLIC BLOOD PRESSURE: 65 MMHG | TEMPERATURE: 97.3 F | WEIGHT: 155.4 LBS | BODY MASS INDEX: 29.34 KG/M2 | OXYGEN SATURATION: 95 % | RESPIRATION RATE: 18 BRPM | SYSTOLIC BLOOD PRESSURE: 100 MMHG | HEART RATE: 68 BPM | HEIGHT: 61 IN

## 2019-05-26 LAB
ANION GAP SERPL CALCULATED.3IONS-SCNC: 8 MMOL/L (ref 3–16)
BUN BLDV-MCNC: 13 MG/DL (ref 7–20)
CALCIUM SERPL-MCNC: 8.9 MG/DL (ref 8.3–10.6)
CHLORIDE BLD-SCNC: 110 MMOL/L (ref 99–110)
CO2: 23 MMOL/L (ref 21–32)
CREAT SERPL-MCNC: <0.5 MG/DL (ref 0.6–1.2)
GFR AFRICAN AMERICAN: >60
GFR NON-AFRICAN AMERICAN: >60
GLUCOSE BLD-MCNC: 129 MG/DL (ref 70–99)
MAGNESIUM: 1.9 MG/DL (ref 1.8–2.4)
POTASSIUM SERPL-SCNC: 3.8 MMOL/L (ref 3.5–5.1)
SODIUM BLD-SCNC: 141 MMOL/L (ref 136–145)

## 2019-05-26 PROCEDURE — 83735 ASSAY OF MAGNESIUM: CPT

## 2019-05-26 PROCEDURE — 80048 BASIC METABOLIC PNL TOTAL CA: CPT

## 2019-05-26 PROCEDURE — 36415 COLL VENOUS BLD VENIPUNCTURE: CPT

## 2019-05-26 PROCEDURE — 96372 THER/PROPH/DIAG INJ SC/IM: CPT

## 2019-05-26 PROCEDURE — 6370000000 HC RX 637 (ALT 250 FOR IP): Performed by: INTERNAL MEDICINE

## 2019-05-26 PROCEDURE — 6370000000 HC RX 637 (ALT 250 FOR IP): Performed by: PHYSICIAN ASSISTANT

## 2019-05-26 PROCEDURE — G0378 HOSPITAL OBSERVATION PER HR: HCPCS

## 2019-05-26 PROCEDURE — 6360000002 HC RX W HCPCS: Performed by: PHYSICIAN ASSISTANT

## 2019-05-26 PROCEDURE — 2580000003 HC RX 258: Performed by: PHYSICIAN ASSISTANT

## 2019-05-26 RX ADMIN — CEPHALEXIN 500 MG: 250 CAPSULE ORAL at 08:47

## 2019-05-26 RX ADMIN — SODIUM CHLORIDE: 9 INJECTION, SOLUTION INTRAVENOUS at 06:55

## 2019-05-26 RX ADMIN — PRIMIDONE 100 MG: 50 TABLET ORAL at 08:47

## 2019-05-26 RX ADMIN — ENOXAPARIN SODIUM 40 MG: 40 INJECTION SUBCUTANEOUS at 08:47

## 2019-05-26 RX ADMIN — CETIRIZINE HYDROCHLORIDE 10 MG: 10 TABLET ORAL at 08:47

## 2019-05-26 RX ADMIN — CLONAZEPAM 1 MG: 1 TABLET ORAL at 08:52

## 2019-05-26 RX ADMIN — Medication 10 ML: at 08:47

## 2019-05-26 RX ADMIN — POTASSIUM CHLORIDE 20 MEQ: 1500 TABLET, EXTENDED RELEASE ORAL at 08:47

## 2019-05-26 RX ADMIN — FAMOTIDINE 20 MG: 20 TABLET, FILM COATED ORAL at 08:47

## 2019-05-26 RX ADMIN — LEVOTHYROXINE SODIUM 88 MCG: 88 TABLET ORAL at 06:55

## 2019-05-26 RX ADMIN — PANTOPRAZOLE SODIUM 40 MG: 40 TABLET, DELAYED RELEASE ORAL at 08:47

## 2019-05-26 NOTE — PROGRESS NOTES
Dc instructions reviewed with patient and spouse, patient stated she has all of her belongings and is fully dressed. Patient waiting for transport to take her out of facility. IV removed with no complications.

## 2019-05-26 NOTE — DISCHARGE SUMMARY
non-tender, non-distended with normal bowel sounds. Musculoskeletal:  No clubbing, cyanosis or edema bilaterally. Full range of motion without deformity. Neurologic:  Neurovascularly intact without any focal sensory/motor deficits. Cranial nerves: II-XII intact, grossly non-focal.  Psychiatric:  Alert and oriented, thought content appropriate, normal insight        Labs: For convenience and continuity at follow-up the following most recent labs are provided:    Normal left ventricular systolic function with a visually estimated ejection   fraction of 55%.   No regional wall motion abnormalities are seen.   Normal left ventricular diastolic filling pressure. No significant valvular   heart disease.   The right ventricle is dilated with impaired systolic function.   Systolic pulmonary artery pressure (SPAP) is normal and estimated at 21 mmHg   (right atrial pressure 8 mmHg).     CBC:    Lab Results   Component Value Date    WBC 6.2 05/25/2019    HGB 14.0 05/25/2019    HCT 41.4 05/25/2019     05/25/2019       Renal:    Lab Results   Component Value Date     05/26/2019    K 3.8 05/26/2019    K 4.0 05/24/2019     05/26/2019    CO2 23 05/26/2019    BUN 13 05/26/2019    CREATININE <0.5 05/26/2019    CALCIUM 8.9 05/26/2019    PHOS 2.8 07/15/2018         Significant Diagnostic Studies    Radiology:   XR CHEST PORTABLE   Final Result   No acute cardiopulmonary process. Consults:     IP CONSULT TO HOSPITALIST  IP CONSULT TO CARDIOLOGY    Disposition:  home     Condition at Discharge: Stable    Discharge Instructions/Follow-up:  pcp  cardio    Code Status:  Full Code     Activity: activity as tolerated    Diet: regular diet      Discharge Medications:     Current Discharge Medication List           Details   oxyCODONE-acetaminophen (PERCOCET)  MG per tablet Take  tablets by mouth every 4 hours as needed.       levothyroxine (SYNTHROID) 88 MCG tablet Take 88 mcg by mouth Daily gabapentin (NEURONTIN) 400 MG capsule Take 400 mg by mouth 3 times daily. donepezil (ARICEPT) 10 MG tablet Take 1 tablet by mouth nightly  Qty: 90 tablet, Refills: 2    Associated Diagnoses: Mild cognitive impairment      primidone (MYSOLINE) 50 MG tablet TAKE 2 TABLETS BY MOUTH TWICE DAILY  Qty: 360 tablet, Refills: 1    Associated Diagnoses: Essential tremor; Mild cognitive impairment; MCI (mild cognitive impairment) with memory loss      levofloxacin (LEVAQUIN) 500 MG tablet Take one tablet prior to Urodynamics. pt. takes PRN      cephALEXin (KEFLEX) 500 MG capsule Take 500 mg by mouth 3 times daily      MONOJECT 60CC SYRINGE CATH TIP 60 ML MISC       KLOR-CON M20 20 MEQ extended release tablet Take 20 mEq by mouth 2 times daily       Water For Irrigation, Sterile (STERILE WATER FOR IRRIGATION) 2 times daily       ranitidine (ZANTAC) 150 MG tablet Take 150 mg by mouth 2 times daily      sertraline (ZOLOFT) 100 MG tablet Take 200 mg by mouth nightly       pantoprazole (PROTONIX) 40 MG tablet TAKE 1 TABLET BY MOUTH DAILY  Refills: 0      loratadine (CLARITIN) 10 MG tablet Take 10 mg by mouth daily      fluticasone (FLONASE) 50 MCG/ACT nasal spray 1 spray by Nasal route nightly. Qty: 1 Bottle, Refills: 5      nitroGLYCERIN (NITROSTAT) 0.4 MG SL tablet Place 1 tablet under the tongue every 5 minutes as needed for Chest pain. Qty: 25 tablet, Refills: 3      Multiple Vitamin (THERA) TABS Take 1 tablet by mouth daily       clonazepam (KLONOPIN) 1 MG tablet Take 1 tablet by mouth 3 times daily as needed for Anxiety for 21 doses. Qty: 21 tablet, Refills: 0             Time Spent on discharge is more than 45 minutes in the examination, evaluation, counseling and review of medications and discharge plan. Signed:    Bee Garzon MD   5/26/2019      Thank you Marcus Albright MD for the opportunity to be involved in this patient's care.  If you have any questions or concerns please feel free to contact me at [phone].

## 2019-05-30 ENCOUNTER — HOSPITAL ENCOUNTER (OUTPATIENT)
Dept: VASCULAR LAB | Age: 64
Discharge: HOME | End: 2019-05-30
Payer: MEDICARE

## 2019-05-30 ENCOUNTER — TELEPHONE (OUTPATIENT)
Dept: NEUROLOGY | Age: 64
End: 2019-05-30

## 2019-05-30 ENCOUNTER — HOSPITAL ENCOUNTER (OUTPATIENT)
Age: 64
Discharge: HOME | End: 2019-05-30
Payer: MEDICARE

## 2019-05-30 DIAGNOSIS — I65.23 BILATERAL CAROTID ARTERY STENOSIS: ICD-10-CM

## 2019-05-30 PROCEDURE — 83516 IMMUNOASSAY NONANTIBODY: CPT

## 2019-05-30 PROCEDURE — 93880 EXTRACRANIAL BILAT STUDY: CPT

## 2019-05-30 PROCEDURE — 83519 RIA NONANTIBODY: CPT

## 2019-05-30 NOTE — TELEPHONE ENCOUNTER
Pt called said she was in the hospital 5/24/19 after code blue was called when she had spinal injection (She passed out). She said her tremors have been pretty bad & was wondering if she should consider changing her meds? Her VL DUP CAROTID BILATERAL was completed today. Please advise. Thank you.

## 2019-06-01 LAB
ACETYLCHOLINE BINDING ANTIBODY: 0 NMOL/L (ref 0–0.4)
ACETYLCHOLINE BLOCKING AB: 0 % (ref 0–26)

## 2019-06-25 ENCOUNTER — OFFICE VISIT (OUTPATIENT)
Dept: ORTHOPEDIC SURGERY | Age: 64
End: 2019-06-25
Payer: MEDICARE

## 2019-06-25 VITALS — BODY MASS INDEX: 29.34 KG/M2 | HEIGHT: 61 IN | WEIGHT: 155.42 LBS

## 2019-06-25 DIAGNOSIS — M17.11 PRIMARY OSTEOARTHRITIS OF RIGHT KNEE: Primary | ICD-10-CM

## 2019-06-25 DIAGNOSIS — M25.561 RIGHT KNEE PAIN, UNSPECIFIED CHRONICITY: ICD-10-CM

## 2019-06-25 PROCEDURE — 99214 OFFICE O/P EST MOD 30 MIN: CPT | Performed by: PHYSICIAN ASSISTANT

## 2019-06-25 PROCEDURE — 20611 DRAIN/INJ JOINT/BURSA W/US: CPT | Performed by: PHYSICIAN ASSISTANT

## 2019-06-25 PROCEDURE — L1812 KO ELASTIC W/JOINTS PRE OTS: HCPCS | Performed by: PHYSICIAN ASSISTANT

## 2019-06-25 NOTE — PROGRESS NOTES
CHIEF COMPLAINT:    Chief Complaint   Patient presents with    Knee Pain     RIGHT KNEE. POPPING & CRACKING FOR ABOUT 2 MONTHS NO INJURY       HISTORY OF PRESENT ILLNESS:                The patient is a 58 y.o. female today she is here for orthopedic evaluation of her RIGHT knee. She has had 2 previous arthroscopic surgeries she believes by Dr. Shimon Gray in the past.  She is also had contralateral TKR in August 2018 by Dr. ki nguyen. She is reporting worsening pain in her RIGHT knee. She describes popping and clicking as well as a feeling of instability. She does use a cane to assist with ambulation. She does receive long-term pain medication prescribed by pain management physician    She reports she recently had an epidural injection about a month ago and had an episode in which they had to call a CODE BLUE. Past Medical History:   Diagnosis Date    Angina at rest [facility])     Anxiety     Arthritis     hands and hip    Blood transfusion     after back surgery    Bradycardia     Bronchiectasis ([facility] [address] 11/5/2013    CAD (coronary artery disease)     Chronic back pain     Hyperlipidemia     Hypertension     Localized morphea     NATHAN treated with BiPAP     Pacemaker     Stress incontinence     Thyroid disease     hypothyroid    Trochanteric bursitis of left hip 4/12/2019        Work Status:  The patient does not work    The pain assessment was noted & is as follows:  Pain Assessment  Location of Pain: Knee  Location Modifiers: Right  Severity of Pain: 7  Quality of Pain: Throbbing, Sharp, Aching, Popping, Cracking  Duration of Pain: Persistent  Frequency of Pain: Constant  Aggravating Factors: Stairs, Walking, Kneeling, Stretching, Bending, Straightening  Limiting Behavior: Some  Relieving Factors: Rest  Result of Injury: No  Work-Related Injury: No  Are there other pain locations you wish to document?: No]      Work Status/Functionality:     Past Medical History: Medical history form was reviewed today & can be found in the media tab  Past Medical History:   Diagnosis Date    Angina at rest [facility])     Anxiety     Arthritis     hands and hip    Blood transfusion     after back surgery    Bradycardia     Bronchiectasis ([address] 11/5/2013    CAD (coronary artery disease)     Chronic back pain     Hyperlipidemia     Hypertension     Localized morphea     NATHAN treated with BiPAP     Pacemaker     Stress incontinence     Thyroid disease     hypothyroid    Trochanteric bursitis of left hip 4/12/2019      Past Surgical History:     Past Surgical History:   Procedure Laterality Date    ABDOMEN SURGERY  9/9/11     REPAIR INCISIONAL HERNIA REDUCIBLE WITH POSSIBLE MESH    BACK SURGERY      x3    BACK SURGERY      stimulator    BLADDER SUSPENSION      CATARACT REMOVAL WITH IMPLANT Left 03/08/2018    PHACO EMULSIFICATION OF CATARACT WITH INTRAOCULAR LENS IMPLANT LEFT EYE    CERVICAL DISCECTOMY  6/2014    and fusion    CHOLECYSTECTOMY      COLONOSCOPY  2002 1/26/12    ENDOSCOPY, COLON, DIAGNOSTIC      EYE SURGERY Right 04/05/2018    cataract removal    FOOT SURGERY Right 12/6/12    arthroplasty    FOOT SURGERY Left 04/30/2015    FOOT SURGERY Left 4/30/15    GASTRIC BYPASS SURGERY  7932,9787    HYSTERECTOMY      KNEE ARTHROSCOPY Left 10/3/2014    part.  medial & lateral meniscectomy, synovectomy plica exc    KNEE ARTHROSCOPY Right 10/13/15    partial medial meniscectomy, chondroplasty, partial lateral meniscectomy    NECK SURGERY      OTHER SURGICAL HISTORY  april 2013    removal spinal cord stimulater    OTHER SURGICAL HISTORY      bladder stimulator    OTHER SURGICAL HISTORY  08/30/2017    left patellofemoral arthroplasty    OTHER SURGICAL HISTORY  05/09/2018    convert left patellofemoral arthroplasty to left total knee replacement   yRan Almanzar  2011    Dr Mahoney/checked last week/told has 12 more yrs for this one/set at 60    SKIN BIOPSY      abdomen    SPINE SURGERY stimulator    THROAT SURGERY      polyps removed    TONSILLECTOMY      UPPER GASTROINTESTINAL ENDOSCOPY      UPPER GASTROINTESTINAL ENDOSCOPY  05/03/2017     Current Medications:     Current Outpatient Medications:     oxyCODONE-acetaminophen (PERCOCET)  MG per tablet, Take  tablets by mouth every 4 hours as needed. , Disp: , Rfl:     levothyroxine (SYNTHROID) 88 MCG tablet, Take 88 mcg by mouth Daily, Disp: , Rfl:     gabapentin (NEURONTIN) 400 MG capsule, Take 400 mg by mouth 3 times daily. , Disp: , Rfl:     donepezil (ARICEPT) 10 MG tablet, Take 1 tablet by mouth nightly, Disp: 90 tablet, Rfl: 2    primidone (MYSOLINE) 50 MG tablet, TAKE 2 TABLETS BY MOUTH TWICE DAILY (Patient taking differently: TAKE 2 TABLETS BY MOUTH every morning and 3 tablets by mouth every evening.), Disp: 360 tablet, Rfl: 1    levofloxacin (LEVAQUIN) 500 MG tablet, Take one tablet prior to Urodynamics. pt. takes PRN, Disp: , Rfl:     cephALEXin (KEFLEX) 500 MG capsule, Take 500 mg by mouth 3 times daily, Disp: , Rfl:     MONOJECT 60CC SYRINGE CATH TIP 60 ML MISC, , Disp: , Rfl:     KLOR-CON M20 20 MEQ extended release tablet, Take 20 mEq by mouth 2 times daily , Disp: , Rfl:     Water For Irrigation, Sterile (STERILE WATER FOR IRRIGATION), 2 times daily , Disp: , Rfl:     ranitidine (ZANTAC) 150 MG tablet, Take 150 mg by mouth 2 times daily, Disp: , Rfl:     sertraline (ZOLOFT) 100 MG tablet, Take 200 mg by mouth nightly , Disp: , Rfl:     pantoprazole (PROTONIX) 40 MG tablet, TAKE 1 TABLET BY MOUTH DAILY, Disp: , Rfl: 0    loratadine (CLARITIN) 10 MG tablet, Take 10 mg by mouth daily, Disp: , Rfl:     fluticasone (FLONASE) 50 MCG/ACT nasal spray, 1 spray by Nasal route nightly.  (Patient taking differently: 1 spray by Nasal route as needed ), Disp: 1 Bottle, Rfl: 5    nitroGLYCERIN (NITROSTAT) 0.4 MG SL tablet, Place 1 tablet under the tongue every 5 minutes as needed for Chest pain., Disp: 25 tablet, Rfl: 3    Multiple Vitamin (THERA) TABS, Take 1 tablet by mouth daily , Disp: , Rfl:     clonazepam (KLONOPIN) 1 MG tablet, Take 1 tablet by mouth 3 times daily as needed for Anxiety for 21 doses. , Disp: 21 tablet, Rfl: 0  Allergies:  Meloxicam; Benzoin compound; Lyrica [pregabalin]; Quetiapine; Quetiapine fumarate; Seroquel [quetiapine fumarate]; Tizanidine; Amberderm; Other; Tape [adhesive tape]; and Wound dressings  Social History:    reports that she has never smoked. She has never used smokeless tobacco. She reports that she does not drink alcohol or use drugs. Family History:   Family History   Problem Relation Age of Onset    Heart Disease Father     Cancer Sister         multiple organs    Other Sister     Asthma Neg Hx     Diabetes Neg Hx     Emphysema Neg Hx     Heart Failure Neg Hx     Hypertension Neg Hx        REVIEW OF SYSTEMS:   For new problems, a full review of systems will be found scanned in the patient's chart. CONSTITUTIONAL: Denies unexplained weight loss, fevers, chills   NEUROLOGICAL: She does complain of unsteady gait. SKIN: Denies skin changes, delayed healing, rash, itching       PHYSICAL EXAM:    Vitals: Height 5' 0.98\" (1.549 m), weight 155 lb 6.8 oz (70.5 kg), not currently breastfeeding. GENERAL EXAM:  · General Apparence: Patient is adequately groomed with no evidence of malnutrition. · Orientation: The patient is oriented to time, place and person. · Mood & Affect:The patient's mood and affect are appropriate       RIGHT knee PHYSICAL EXAMINATION:  · Inspection: No significant swelling ecchymosis or erythema, well-healed arthroscopy portals    · Palpation: She has tenderness to the medial joint line and the medial facet of the patella.   Mild palpable patellofemoral crepitus    · Range of Motion: 0 to 110 degrees    · Strength: Extensor mechanism is intact    · Special Tests: No significant weakness appreciated with ACL PCL MCL or LCL          · Skin:  There are no rashes, ulcerations or lesions. · There are no distal dysvascular changes     Gait & station: She ambulates today with an antalgic gait using her cane      Additional Examinations:              Diagnostic Testing: The following x rays were read and interpreted by myself      1.  3 X-ray views of the RIGHT knee demonstrate mild to moderate tricompartment osteoarthritis    Orders     Orders Placed This Encounter   Procedures    XR KNEE RIGHT (3 VIEWS)     Standing Status:   Future     Number of Occurrences:   1     Standing Expiration Date:   6/25/2020    US ARTHR/ASP/INJ MAJOR JNT/BURSA RIGHT     Order Specific Question:   Reason for exam:     Answer:   pain    SD METHYLPREDNISOLONE 40 MG INJ    Economy Hinged Knee Wrap Around     Patient was prescribed a Breg Economy Hinged Wrap Around Knee Brace. The right knee will require stabilization / immobilization from this semi-rigid / rigid orthosis to improve their function. The orthosis will assist in protecting the affected area, provide functional support and facilitate healing. The patient was educated and fit by a healthcare professional with expert knowledge and specialization in brace application while under the direct supervision of the treating physician. Verbal and written instructions for the use of and application of this item were provided. They were instructed to contact the office immediately should the brace result in increased pain, decreased sensation, increased swelling or worsening of the condition. Assessment / Treatment Plan:     1. Right Knee osteoarthritis, possible recurrent medial meniscus tear: The patient has multiple comorbidities. She struggled with her contralateral total knee replacement. It is my recommendation that we exhaust all conservative treatments before considering any type of surgical procedure for her knee. I recommend that we begin with cortisone injections today and continue with injection therapies.

## 2019-06-28 ENCOUNTER — TELEPHONE (OUTPATIENT)
Dept: NEUROLOGY | Age: 64
End: 2019-06-28

## 2019-07-03 ENCOUNTER — OFFICE VISIT (OUTPATIENT)
Dept: ORTHOPEDIC SURGERY | Age: 64
End: 2019-07-03
Payer: MEDICARE

## 2019-07-03 ENCOUNTER — HOSPITAL ENCOUNTER (OUTPATIENT)
Age: 64
Discharge: HOME | End: 2019-07-03
Payer: MEDICARE

## 2019-07-03 VITALS — HEIGHT: 61 IN | BODY MASS INDEX: 29.34 KG/M2 | WEIGHT: 155.42 LBS

## 2019-07-03 DIAGNOSIS — Z96.652 STATUS POST TOTAL LEFT KNEE REPLACEMENT: Primary | ICD-10-CM

## 2019-07-03 DIAGNOSIS — Z96.652 PAIN DUE TO TOTAL LEFT KNEE REPLACEMENT, SUBSEQUENT ENCOUNTER: ICD-10-CM

## 2019-07-03 DIAGNOSIS — T84.84XD PAIN DUE TO TOTAL LEFT KNEE REPLACEMENT, SUBSEQUENT ENCOUNTER: ICD-10-CM

## 2019-07-03 DIAGNOSIS — Z96.652 STATUS POST TOTAL LEFT KNEE REPLACEMENT: ICD-10-CM

## 2019-07-03 LAB
BASOPHILS ABSOLUTE: 0.1 K/UL (ref 0–0.2)
BASOPHILS RELATIVE PERCENT: 0.7 %
C-REACTIVE PROTEIN: 2.2 MG/L (ref 0–5.1)
EOSINOPHILS ABSOLUTE: 0.1 K/UL (ref 0–0.6)
EOSINOPHILS RELATIVE PERCENT: 0.9 %
HCT VFR BLD CALC: 40.3 % (ref 36–48)
HEMOGLOBIN: 13.2 G/DL (ref 12–16)
LYMPHOCYTES ABSOLUTE: 2.6 K/UL (ref 1–5.1)
LYMPHOCYTES RELATIVE PERCENT: 36.4 %
MCH RBC QN AUTO: 32 PG (ref 26–34)
MCHC RBC AUTO-ENTMCNC: 32.9 G/DL (ref 31–36)
MCV RBC AUTO: 97.3 FL (ref 80–100)
MONOCYTES ABSOLUTE: 0.5 K/UL (ref 0–1.3)
MONOCYTES RELATIVE PERCENT: 7.6 %
NEUTROPHILS ABSOLUTE: 3.8 K/UL (ref 1.7–7.7)
NEUTROPHILS RELATIVE PERCENT: 54.4 %
PDW BLD-RTO: 14.4 % (ref 12.4–15.4)
PLATELET # BLD: 238 K/UL (ref 135–450)
PMV BLD AUTO: 9 FL (ref 5–10.5)
RBC # BLD: 4.14 M/UL (ref 4–5.2)
WBC # BLD: 7.1 K/UL (ref 4–11)

## 2019-07-03 PROCEDURE — 86140 C-REACTIVE PROTEIN: CPT

## 2019-07-03 PROCEDURE — 99214 OFFICE O/P EST MOD 30 MIN: CPT | Performed by: ORTHOPAEDIC SURGERY

## 2019-07-03 PROCEDURE — 85025 COMPLETE CBC W/AUTO DIFF WBC: CPT

## 2019-07-03 PROCEDURE — 36415 COLL VENOUS BLD VENIPUNCTURE: CPT

## 2019-07-03 NOTE — PROGRESS NOTES
left hip 4/12/2019      Past Surgical History:     Past Surgical History:   Procedure Laterality Date    ABDOMEN SURGERY  9/9/11     REPAIR INCISIONAL HERNIA REDUCIBLE WITH POSSIBLE MESH    BACK SURGERY      x3    BACK SURGERY      stimulator    BLADDER SUSPENSION      CATARACT REMOVAL WITH IMPLANT Left 03/08/2018    PHACO EMULSIFICATION OF CATARACT WITH INTRAOCULAR LENS IMPLANT LEFT EYE    CERVICAL DISCECTOMY  6/2014    and fusion    CHOLECYSTECTOMY      COLONOSCOPY  2002    1/26/12    ENDOSCOPY, COLON, DIAGNOSTIC      EYE SURGERY Right 04/05/2018    cataract removal    FOOT SURGERY Right 12/6/12    arthroplasty    FOOT SURGERY Left 04/30/2015    FOOT SURGERY Left 4/30/15    GASTRIC BYPASS SURGERY  [id number]    HYSTERECTOMY      KNEE ARTHROSCOPY Left 10/3/2014    part. medial & lateral meniscectomy, synovectomy plica exc    KNEE ARTHROSCOPY Right 10/13/15    partial medial meniscectomy, chondroplasty, partial lateral meniscectomy    NECK SURGERY      OTHER SURGICAL HISTORY  april 2013    removal spinal cord stimulater    OTHER SURGICAL HISTORY      bladder stimulator    OTHER SURGICAL HISTORY  08/30/2017    left patellofemoral arthroplasty    OTHER SURGICAL HISTORY  05/09/2018    convert left patellofemoral arthroplasty to left total knee replacement   Minor Ray  2011    Dr Mahoney/checked last week/told has 12 more yrs for this one/set at 60    SKIN BIOPSY      abdomen    SPINE SURGERY      stimulator    THROAT SURGERY      polyps removed    TONSILLECTOMY      UPPER GASTROINTESTINAL ENDOSCOPY      UPPER GASTROINTESTINAL ENDOSCOPY  05/03/2017     Current Medications:     Current Outpatient Medications:     oxyCODONE-acetaminophen (PERCOCET)  MG per tablet, Take  tablets by mouth every 4 hours as needed. , Disp: , Rfl:     levothyroxine (SYNTHROID) 88 MCG tablet, Take 88 mcg by mouth Daily, Disp: , Rfl:     gabapentin (NEURONTIN) 400 MG capsule, Take 400 mg by mouth 3 times daily. , Disp: , Rfl:     donepezil (ARICEPT) 10 MG tablet, Take 1 tablet by mouth nightly, Disp: 90 tablet, Rfl: 2    primidone (MYSOLINE) 50 MG tablet, TAKE 2 TABLETS BY MOUTH TWICE DAILY (Patient taking differently: TAKE 2 TABLETS BY MOUTH every morning and 3 tablets by mouth every evening.), Disp: 360 tablet, Rfl: 1    levofloxacin (LEVAQUIN) 500 MG tablet, Take one tablet prior to Urodynamics. pt. takes PRN, Disp: , Rfl:     cephALEXin (KEFLEX) 500 MG capsule, Take 500 mg by mouth 3 times daily, Disp: , Rfl:     MONOJECT 60CC SYRINGE CATH TIP 60 ML MISC, , Disp: , Rfl:     KLOR-CON M20 20 MEQ extended release tablet, Take 20 mEq by mouth 2 times daily , Disp: , Rfl:     Water For Irrigation, Sterile (STERILE WATER FOR IRRIGATION), 2 times daily , Disp: , Rfl:     ranitidine (ZANTAC) 150 MG tablet, Take 150 mg by mouth 2 times daily, Disp: , Rfl:     sertraline (ZOLOFT) 100 MG tablet, Take 200 mg by mouth nightly , Disp: , Rfl:     pantoprazole (PROTONIX) 40 MG tablet, TAKE 1 TABLET BY MOUTH DAILY, Disp: , Rfl: 0    loratadine (CLARITIN) 10 MG tablet, Take 10 mg by mouth daily, Disp: , Rfl:     fluticasone (FLONASE) 50 MCG/ACT nasal spray, 1 spray by Nasal route nightly. (Patient taking differently: 1 spray by Nasal route as needed ), Disp: 1 Bottle, Rfl: 5    nitroGLYCERIN (NITROSTAT) 0.4 MG SL tablet, Place 1 tablet under the tongue every 5 minutes as needed for Chest pain., Disp: 25 tablet, Rfl: 3    Multiple Vitamin (THERA) TABS, Take 1 tablet by mouth daily , Disp: , Rfl:     clonazepam (KLONOPIN) 1 MG tablet, Take 1 tablet by mouth 3 times daily as needed for Anxiety for 21 doses. , Disp: 21 tablet, Rfl: 0  Allergies:  Meloxicam; Benzoin compound; Lyrica [pregabalin]; Quetiapine; Quetiapine fumarate; Seroquel [quetiapine fumarate]; Tizanidine; Amberderm; Other; Tape [adhesive tape]; and Wound dressings  Social History:    reports that she has never smoked.  She has never used smokeless tobacco. She reports that she does not drink alcohol or use drugs. Family History:   Family History   Problem Relation Age of Onset    Heart Disease Father     Cancer Sister         multiple organs    Other Sister     Asthma Neg Hx     Diabetes Neg Hx     Emphysema Neg Hx     Heart Failure Neg Hx     Hypertension Neg Hx        REVIEW OF SYSTEMS:   For new problems, a full review of systems will be found scanned in the patient's chart. CONSTITUTIONAL: Denies unexplained weight loss, fevers, chills   NEUROLOGICAL: Denies unsteady gait or progressive weakness  SKIN: Denies skin changes, delayed healing, rash, itching       PHYSICAL EXAM:    Vitals: Height 5' 0.98\" (1.549 m), weight 155 lb 6.8 oz (70.5 kg), not currently breastfeeding. GENERAL EXAM:  · General Apparence: Patient is adequately groomed with no evidence of malnutrition. · Orientation: The patient is oriented to time, place and person. · Mood & Affect:The patient's mood and affect are appropriate       LEFT knee PHYSICAL EXAMINATION:  · Inspection: Well-healed surgical incision anteriorly. 1+ effusion. No gross rotational or angular deformity. · Palpation: Tender along the extensor mechanism along and along the distal thigh. · Range of Motion: 1-125 degrees    · Strength: No gross motor weakness    · Special Tests: No gross ligamentous instability          · Skin:  There are no rashes, ulcerations or lesions. · There are very mild distal dysvascular changes     Gait & station: She is wearing a brace on the right knee from her arthritis. Additional Examinations:        Right knee is in a brace. She is got some tenderness but good range of motion. This is the knee we have treated in the past with a cortisone injection. Diagnostic Testing: The following x rays were read and interpreted by myself      1.  3 x-ray views of the LEFT knee demonstrates a cruciate retaining knee replacement.   The femoral component significantly notch of the distal femur for about an inch. There is no gross loosening visible. Orders     Orders Placed This Encounter   Procedures    XR KNEE LEFT (3 VIEWS)     Standing Status:   Future     Number of Occurrences:   1     Standing Expiration Date:   7/3/2020     Order Specific Question:   Reason for exam:     Answer:   pain         Assessment / Treatment Plan:     1. Painful LEFT knee replacement. Of course, I am worried about an occult infection. She may also possibly have a stress reaction going on longer distal thigh given the notching of her femoral component. 2.  We will check a CBC, sed rate and C-reactive protein. The next step would be getting a bone scan. 3. I have personally performed and/or participated in the history, exam and medical decision making and agree with all pertinent clinical information. I have also reviewed and agree with the past medical, family and social history unless otherwise noted. This dictation was performed with a verbal recognition program (DRAGON) and it was checked for errors. It is possible that there are still dictated errors within this office note. If so, please bring any errors to my attention for an addendum. All efforts were made to ensure that this office note is accurate.           Vanessa Galindo MD

## 2019-07-08 ENCOUNTER — OFFICE VISIT (OUTPATIENT)
Dept: ORTHOPEDIC SURGERY | Age: 64
End: 2019-07-08
Payer: MEDICARE

## 2019-07-08 VITALS — HEIGHT: 61 IN | WEIGHT: 155.42 LBS | BODY MASS INDEX: 29.34 KG/M2

## 2019-07-08 DIAGNOSIS — Z96.652 PAIN DUE TO TOTAL LEFT KNEE REPLACEMENT, INITIAL ENCOUNTER (HCC): ICD-10-CM

## 2019-07-08 DIAGNOSIS — T84.84XA PAIN DUE TO TOTAL LEFT KNEE REPLACEMENT, INITIAL ENCOUNTER (HCC): ICD-10-CM

## 2019-07-08 DIAGNOSIS — M17.11 PRIMARY OSTEOARTHRITIS OF RIGHT KNEE: ICD-10-CM

## 2019-07-08 DIAGNOSIS — Z96.652 STATUS POST TOTAL LEFT KNEE REPLACEMENT: Primary | ICD-10-CM

## 2019-07-08 PROCEDURE — 99214 OFFICE O/P EST MOD 30 MIN: CPT | Performed by: ORTHOPAEDIC SURGERY

## 2019-07-08 NOTE — PROGRESS NOTES
demonstrates trace swelling. Tender through the medial lateral joint line. Range of motion 0 to 110 degrees      Diagnostic Testing:      Orders   No orders of the defined types were placed in this encounter. Assessment / Treatment Plan:     1. Painful LEFT total knee replacement. The patient's lab work is negative for infection. We are going to get a bone scan of the LEFT knee to rule out stress fracture and loosening. I discussed the magnitude of the stemmed revision surgery with the patient. Will follow up to discuss bone scan    I had a very specific discussion with the patient regarding the magnitude of revision hip replacement surgery. She is going to consider this as we get the bone scan and decide whether her symptoms are bad enough to warrant this type of treatment. 2.  Right knee osteoarthritis: We will get her approved for Visco    I have personally performed and/or participated in the history, exam and medical decision making and agree with all pertinent clinical information. I have also reviewed and agree with the past medical, family and social history unless otherwise noted. This dictation was performed with a verbal recognition program (DRAGON) and it was checked for errors. It is possible that there are still dictated errors within this office note. If so, please bring any errors to my attention for an addendum. All efforts were made to ensure that this office note is accurate.           Liu Chester MD

## 2019-07-09 ENCOUNTER — TELEPHONE (OUTPATIENT)
Dept: ORTHOPEDIC SURGERY | Age: 64
End: 2019-07-09

## 2019-07-15 ENCOUNTER — HOSPITAL ENCOUNTER (OUTPATIENT)
Dept: NUCLEAR MEDICINE | Age: 64
Discharge: HOME | End: 2019-07-15
Payer: MEDICARE

## 2019-07-15 DIAGNOSIS — Z96.652 STATUS POST TOTAL LEFT KNEE REPLACEMENT: ICD-10-CM

## 2019-07-15 PROCEDURE — A9503 TC99M MEDRONATE: HCPCS | Performed by: ORTHOPAEDIC SURGERY

## 2019-07-15 PROCEDURE — 78315 BONE IMAGING 3 PHASE: CPT

## 2019-07-15 PROCEDURE — 3430000000 HC RX DIAGNOSTIC RADIOPHARMACEUTICAL: Performed by: ORTHOPAEDIC SURGERY

## 2019-07-15 RX ORDER — TC 99M MEDRONATE 20 MG/10ML
25.6 INJECTION, POWDER, LYOPHILIZED, FOR SOLUTION INTRAVENOUS
Status: COMPLETED | OUTPATIENT
Start: 2019-07-15 | End: 2019-07-15

## 2019-07-15 RX ADMIN — TC 99M MEDRONATE 25.6 MILLICURIE: 20 INJECTION, POWDER, LYOPHILIZED, FOR SOLUTION INTRAVENOUS at 09:07

## 2019-07-18 ENCOUNTER — OFFICE VISIT (OUTPATIENT)
Dept: ORTHOPEDIC SURGERY | Age: 64
End: 2019-07-18
Payer: MEDICARE

## 2019-07-18 VITALS — BODY MASS INDEX: 29.34 KG/M2 | HEIGHT: 61 IN | WEIGHT: 155.42 LBS

## 2019-07-18 DIAGNOSIS — M17.11 PRIMARY OSTEOARTHRITIS OF RIGHT KNEE: ICD-10-CM

## 2019-07-18 DIAGNOSIS — Z96.652 STATUS POST TOTAL LEFT KNEE REPLACEMENT: Primary | ICD-10-CM

## 2019-07-18 PROCEDURE — 99214 OFFICE O/P EST MOD 30 MIN: CPT | Performed by: PHYSICIAN ASSISTANT

## 2019-07-18 NOTE — PROGRESS NOTES
CHIEF COMPLAINT:    Chief Complaint   Patient presents with    Knee Pain     LEFT KNEE BONE SCAN RESULTS       HISTORY OF PRESENT ILLNESS:                The patient is a 58 y.o. female this patient presents today for follow-up regarding her bone scan regarding her LEFT knee. She reports 5+ years of pain in her LEFT knee. She has had a LEFT total knee replacement by [facility] in May 2018. She notes ongoing pain in her knee even before the surgery. She states that she has difficulty ambulating. She had lab work which was negative for infection. She had a bone scan today to see if she has any obvious loosening. She describes diffuse pain through the LEFT knee which radiates up into the thigh and down into the calf. She does have some lumbar issues and is going to see her spine doctor this week for S1 epidural.    She also has known osteoarthritis of her RIGHT knee. She has had previous Visco supplementation which helped significantly in terms of decreasing morning stiffness and increasing overall function. She said decrease in the need for anti-inflammatory and over-the-counter medications as a result of these injections.   Past Medical History:   Diagnosis Date    Angina at rest [facility])     Anxiety     Arthritis     hands and hip    Blood transfusion     after back surgery    Bradycardia     Bronchiectasis ([facility] [address] 11/5/2013    CAD (coronary artery disease)     Chronic back pain     Hyperlipidemia     Hypertension     Localized morphea     NATHAN treated with BiPAP     Pacemaker     Stress incontinence     Thyroid disease     hypothyroid    Trochanteric bursitis of left hip 4/12/2019            The pain assessment was noted & is as follows:  Pain Assessment  Location of Pain: Knee  Location Modifiers: Left  Severity of Pain: 6  Quality of Pain: Aching, Dull, Sharp  Duration of Pain: Persistent  Frequency of Pain: Intermittent  Aggravating Factors: Stretching, Bending, Walking, Stairs  Limiting

## 2019-07-19 ENCOUNTER — HOSPITAL ENCOUNTER (OUTPATIENT)
Dept: GENERAL RADIOLOGY | Age: 64
Discharge: HOME | End: 2019-07-19
Payer: MEDICARE

## 2019-07-19 VITALS
RESPIRATION RATE: 18 BRPM | SYSTOLIC BLOOD PRESSURE: 127 MMHG | DIASTOLIC BLOOD PRESSURE: 70 MMHG | OXYGEN SATURATION: 94 % | HEART RATE: 71 BPM

## 2019-07-19 DIAGNOSIS — G89.29 CHRONIC SI JOINT PAIN: ICD-10-CM

## 2019-07-19 DIAGNOSIS — M53.3 CHRONIC SI JOINT PAIN: ICD-10-CM

## 2019-07-19 PROCEDURE — 64483 NJX AA&/STRD TFRM EPI L/S 1: CPT

## 2019-07-19 NOTE — OP NOTE
injection was performed at each site. Following this all injection needles were removed, and after being cleansed sterile bandages were applied. The patient tolerated this procedure well. The patient was observed in the outpatient setting for a minimum of 15-20 minutes to assure they were feeling satisfactory prior to their discharge to home. All patients have been told prior to scheduling of this outpatient procedure to arrange for a family member or friend to provide home transportation. Patient was scheduled for FU visit in the next 1-2 weeks post injection for further clinical evaluation and treatment. There were no complications.     ALENA Martinez.     7/19/2019  10:01 AM

## 2019-07-23 ENCOUNTER — TELEPHONE (OUTPATIENT)
Dept: ORTHOPEDIC SURGERY | Age: 64
End: 2019-07-23

## 2019-07-25 ENCOUNTER — TELEPHONE (OUTPATIENT)
Dept: ORTHOPEDIC SURGERY | Age: 64
End: 2019-07-25

## 2019-07-30 ENCOUNTER — NURSE ONLY (OUTPATIENT)
Dept: ORTHOPEDIC SURGERY | Age: 64
End: 2019-07-30
Payer: MEDICARE

## 2019-07-30 DIAGNOSIS — M17.11 PRIMARY OSTEOARTHRITIS OF RIGHT KNEE: Primary | ICD-10-CM

## 2019-07-30 PROCEDURE — 20611 DRAIN/INJ JOINT/BURSA W/US: CPT | Performed by: PHYSICIAN ASSISTANT

## 2019-08-06 ENCOUNTER — NURSE ONLY (OUTPATIENT)
Dept: ORTHOPEDIC SURGERY | Age: 64
End: 2019-08-06
Payer: MEDICARE

## 2019-08-06 DIAGNOSIS — M17.11 PRIMARY OSTEOARTHRITIS OF RIGHT KNEE: Primary | ICD-10-CM

## 2019-08-06 PROCEDURE — 99999 PR OFFICE/OUTPT VISIT,PROCEDURE ONLY: CPT | Performed by: PHYSICIAN ASSISTANT

## 2019-08-12 ENCOUNTER — OFFICE VISIT (OUTPATIENT)
Dept: ORTHOPEDIC SURGERY | Age: 64
End: 2019-08-12
Payer: MEDICARE

## 2019-08-12 DIAGNOSIS — M17.11 PRIMARY OSTEOARTHRITIS OF RIGHT KNEE: Primary | ICD-10-CM

## 2019-08-12 PROCEDURE — 99999 PR OFFICE/OUTPT VISIT,PROCEDURE ONLY: CPT | Performed by: PHYSICIAN ASSISTANT

## 2019-08-13 ENCOUNTER — OFFICE VISIT (OUTPATIENT)
Dept: ORTHOPEDIC SURGERY | Age: 64
End: 2019-08-13
Payer: MEDICARE

## 2019-08-13 VITALS
HEART RATE: 73 BPM | DIASTOLIC BLOOD PRESSURE: 72 MMHG | SYSTOLIC BLOOD PRESSURE: 117 MMHG | WEIGHT: 155.42 LBS | BODY MASS INDEX: 29.34 KG/M2 | HEIGHT: 61 IN

## 2019-08-13 DIAGNOSIS — M19.072 LOCALIZED OSTEOARTHRITIS OF LEFT ANKLE: ICD-10-CM

## 2019-08-13 DIAGNOSIS — M25.572 ACUTE LEFT ANKLE PAIN: Primary | ICD-10-CM

## 2019-08-13 PROCEDURE — 99203 OFFICE O/P NEW LOW 30 MIN: CPT | Performed by: PODIATRIST

## 2019-08-13 NOTE — PROGRESS NOTES
HISTORY OF PRESENT ILLNESS: This is an initial visit for a 69-year-old female who presents with chronic pain to her left ankle. She states that she fell and broke her ankle about 6 years ago. That was treated conservatively but she states that she has had pain since then. She typically has pain with activity and resolves with rest.      FAMILY HISTORY: Documented in chart. SOCIAL HISTORY: Documented in chart. REVIEW OF SYSTEMS:  The patient denies any issues with dermatologic, pulmonary, cardiovascular, genitourinary, hematologic, gastrointestinal, neurologic, psychiatric, and HEENT systems. Family History, Social History, and Review of Systems were reviewed from patient history form dated on 8/13/2019 and available in the patient's chart under the MEDIA tab. PHYSICAL EXAMINATION:     The patient is alert and orientated x3. She has minimal edema at the left ankle. There is no erythema or ecchymosis present. She does have palpable tenderness just above the lateral malleolus on the fibula. There is no pain or crepitus with range of motion of the ankle however it is decreased in dorsiflexion. She has palpable pedal pulses bilateral.  Her sensation is grossly intact bilateral.    The remainder of the exam is unremarkable. RADIOGRAPHS: 3 weightbearing x-ray views of left ankle were taken. These do not demonstrate any acute fracture dislocation. There are mild signs of osteoarthritis at the left ankle in particular at the lateral gutter. ASSESSMENT: Left ankle osteoarthritis, left ankle pain      PLAN: The patient was educated on the pathology and its treatment options. I ordered a CT to rule out a nonunion of the fibula. I will see her back after the CT.

## 2019-08-15 ENCOUNTER — HOSPITAL ENCOUNTER (OUTPATIENT)
Age: 64
Setting detail: OUTPATIENT SURGERY
Discharge: HOME | End: 2019-08-15
Attending: PAIN MEDICINE | Admitting: PAIN MEDICINE
Payer: MEDICARE

## 2019-08-15 VITALS
RESPIRATION RATE: 16 BRPM | TEMPERATURE: 97.5 F | DIASTOLIC BLOOD PRESSURE: 81 MMHG | HEIGHT: 60 IN | WEIGHT: 140 LBS | SYSTOLIC BLOOD PRESSURE: 144 MMHG | HEART RATE: 65 BPM | BODY MASS INDEX: 27.48 KG/M2 | OXYGEN SATURATION: 99 %

## 2019-08-15 PROCEDURE — 6360000004 HC RX CONTRAST MEDICATION: Performed by: PAIN MEDICINE

## 2019-08-15 PROCEDURE — 3600000012 HC SURGERY LEVEL 2 ADDTL 15MIN: Performed by: PAIN MEDICINE

## 2019-08-15 PROCEDURE — 2709999900 HC NON-CHARGEABLE SUPPLY: Performed by: PAIN MEDICINE

## 2019-08-15 PROCEDURE — 3600000002 HC SURGERY LEVEL 2 BASE: Performed by: PAIN MEDICINE

## 2019-08-15 PROCEDURE — 7100000010 HC PHASE II RECOVERY - FIRST 15 MIN: Performed by: PAIN MEDICINE

## 2019-08-15 PROCEDURE — 64450 NJX AA&/STRD OTHER PN/BRANCH: CPT | Performed by: PAIN MEDICINE

## 2019-08-15 PROCEDURE — 2500000003 HC RX 250 WO HCPCS: Performed by: PAIN MEDICINE

## 2019-08-15 RX ORDER — LIDOCAINE HYDROCHLORIDE 20 MG/ML
INJECTION, SOLUTION EPIDURAL; INFILTRATION; INTRACAUDAL; PERINEURAL PRN
Status: DISCONTINUED | OUTPATIENT
Start: 2019-08-15 | End: 2019-08-15 | Stop reason: ALTCHOICE

## 2019-08-15 RX ORDER — LIDOCAINE HYDROCHLORIDE 10 MG/ML
INJECTION, SOLUTION EPIDURAL; INFILTRATION; INTRACAUDAL; PERINEURAL PRN
Status: DISCONTINUED | OUTPATIENT
Start: 2019-08-15 | End: 2019-08-15 | Stop reason: ALTCHOICE

## 2019-08-15 ASSESSMENT — PAIN DESCRIPTION - DESCRIPTORS: DESCRIPTORS: ACHING;BURNING;CONSTANT

## 2019-08-15 ASSESSMENT — PAIN - FUNCTIONAL ASSESSMENT
PAIN_FUNCTIONAL_ASSESSMENT: PREVENTS OR INTERFERES WITH MANY ACTIVE NOT PASSIVE ACTIVITIES
PAIN_FUNCTIONAL_ASSESSMENT: 0-10

## 2019-08-15 ASSESSMENT — PAIN SCALES - GENERAL: PAINLEVEL_OUTOF10: 0

## 2019-08-15 NOTE — PROCEDURES
Deepthi Sheffield is a 58 y.o. female patient. No diagnosis found. Past Medical History:   Diagnosis Date    Angina at rest [facility])     Anxiety     Arthritis     hands and hip    Blood transfusion     after back surgery    Bradycardia     Bronchiectasis ([facility] [address] 11/5/2013    CAD (coronary artery disease)     Chronic back pain     Hyperlipidemia     Hypertension     Localized morphea     NATHAN treated with BiPAP     Pacemaker     Stress incontinence     Thyroid disease     hypothyroid    Trochanteric bursitis of left hip 4/12/2019     Blood pressure 116/69, pulse 60, temperature 97.5 °F (36.4 °C), temperature source Temporal, resp. rate 16, height 5' (1.524 m), weight 140 lb (63.5 kg), SpO2 97 %, not currently breastfeeding.     Marya De Leon MD  8/15/2019

## 2019-08-16 ENCOUNTER — TELEPHONE (OUTPATIENT)
Dept: ORTHOPEDIC SURGERY | Age: 64
End: 2019-08-16

## 2019-08-20 ENCOUNTER — TELEPHONE (OUTPATIENT)
Dept: ORTHOPEDIC SURGERY | Age: 64
End: 2019-08-20

## 2019-08-21 ENCOUNTER — HOSPITAL ENCOUNTER (OUTPATIENT)
Dept: CT IMAGING | Age: 64
Discharge: HOME | End: 2019-08-21
Payer: MEDICARE

## 2019-08-21 PROCEDURE — 73700 CT LOWER EXTREMITY W/O DYE: CPT

## 2019-08-23 ENCOUNTER — HOSPITAL ENCOUNTER (OUTPATIENT)
Dept: GENERAL RADIOLOGY | Age: 64
Discharge: HOME | End: 2019-08-23
Payer: MEDICARE

## 2019-08-23 ENCOUNTER — HOSPITAL ENCOUNTER (OUTPATIENT)
Age: 64
Discharge: HOME | End: 2019-08-23
Payer: MEDICARE

## 2019-08-23 DIAGNOSIS — K59.00 CONSTIPATION, UNSPECIFIED CONSTIPATION TYPE: ICD-10-CM

## 2019-08-23 PROCEDURE — 74018 RADEX ABDOMEN 1 VIEW: CPT

## 2019-08-28 ENCOUNTER — OFFICE VISIT (OUTPATIENT)
Dept: ORTHOPEDIC SURGERY | Age: 64
End: 2019-08-28
Payer: MEDICARE

## 2019-08-28 VITALS
BODY MASS INDEX: 27.48 KG/M2 | SYSTOLIC BLOOD PRESSURE: 129 MMHG | DIASTOLIC BLOOD PRESSURE: 85 MMHG | WEIGHT: 139.99 LBS | HEART RATE: 86 BPM | HEIGHT: 60 IN

## 2019-08-28 DIAGNOSIS — M25.372 ANKLE INSTABILITY, LEFT: Primary | ICD-10-CM

## 2019-08-28 DIAGNOSIS — M24.572 ANKLE CONTRACTURE, LEFT: ICD-10-CM

## 2019-08-28 PROCEDURE — 99213 OFFICE O/P EST LOW 20 MIN: CPT | Performed by: PODIATRIST

## 2019-09-04 ENCOUNTER — HOSPITAL ENCOUNTER (OUTPATIENT)
Age: 64
Discharge: HOME | End: 2019-09-04
Payer: MEDICARE

## 2019-09-04 LAB
ALBUMIN SERPL-MCNC: 3.8 G/DL (ref 3.4–5)
ALP BLD-CCNC: 114 U/L (ref 40–129)
ALT SERPL-CCNC: 50 U/L (ref 10–40)
AST SERPL-CCNC: 60 U/L (ref 15–37)
BILIRUB SERPL-MCNC: <0.2 MG/DL (ref 0–1)
BILIRUBIN DIRECT: <0.2 MG/DL (ref 0–0.3)
BILIRUBIN, INDIRECT: ABNORMAL MG/DL (ref 0–1)
TOTAL PROTEIN: 7 G/DL (ref 6.4–8.2)

## 2019-09-04 PROCEDURE — 80076 HEPATIC FUNCTION PANEL: CPT

## 2019-09-04 PROCEDURE — 36415 COLL VENOUS BLD VENIPUNCTURE: CPT

## 2019-09-09 ENCOUNTER — HOSPITAL ENCOUNTER (OUTPATIENT)
Age: 64
Discharge: HOME | End: 2019-09-09
Payer: MEDICARE

## 2019-09-09 ENCOUNTER — HOSPITAL ENCOUNTER (OUTPATIENT)
Dept: ULTRASOUND IMAGING | Age: 64
Discharge: HOME | End: 2019-09-09
Payer: MEDICARE

## 2019-09-09 DIAGNOSIS — R79.89 ELEVATED LFTS: ICD-10-CM

## 2019-09-09 LAB
FERRITIN: 34.7 NG/ML (ref 15–150)
HBV SURFACE AB TITR SER: <3.5 MIU/ML
HEPATITIS B SURFACE ANTIGEN INTERPRETATION: NORMAL
HEPATITIS C ANTIBODY INTERPRETATION: NORMAL
IRON SATURATION: 14 % (ref 15–50)
IRON: 51 UG/DL (ref 37–145)
TOTAL IRON BINDING CAPACITY: 372 UG/DL (ref 260–445)

## 2019-09-09 PROCEDURE — 86708 HEPATITIS A ANTIBODY: CPT

## 2019-09-09 PROCEDURE — 83516 IMMUNOASSAY NONANTIBODY: CPT

## 2019-09-09 PROCEDURE — 36415 COLL VENOUS BLD VENIPUNCTURE: CPT

## 2019-09-09 PROCEDURE — 82103 ALPHA-1-ANTITRYPSIN TOTAL: CPT

## 2019-09-09 PROCEDURE — 83540 ASSAY OF IRON: CPT

## 2019-09-09 PROCEDURE — 86038 ANTINUCLEAR ANTIBODIES: CPT

## 2019-09-09 PROCEDURE — 87340 HEPATITIS B SURFACE AG IA: CPT

## 2019-09-09 PROCEDURE — 82784 ASSAY IGA/IGD/IGG/IGM EACH: CPT

## 2019-09-09 PROCEDURE — 86803 HEPATITIS C AB TEST: CPT

## 2019-09-09 PROCEDURE — 83550 IRON BINDING TEST: CPT

## 2019-09-09 PROCEDURE — 76705 ECHO EXAM OF ABDOMEN: CPT

## 2019-09-09 PROCEDURE — 86706 HEP B SURFACE ANTIBODY: CPT

## 2019-09-09 PROCEDURE — 82728 ASSAY OF FERRITIN: CPT

## 2019-09-09 PROCEDURE — 82104 ALPHA-1-ANTITRYPSIN PHENO: CPT

## 2019-09-09 PROCEDURE — 82390 ASSAY OF CERULOPLASMIN: CPT

## 2019-09-10 LAB
ANTI-NUCLEAR ANTIBODY (ANA): NEGATIVE
F-ACTIN AB IGG: 5 UNITS (ref 0–19)
HAV AB SERPL IA-ACNC: NEGATIVE
MITOCHONDRIAL M2 AB, IGG: 2.9 UNITS (ref 0–20)

## 2019-09-11 LAB
ALPHA-1 ANTITRYPSIN PHENOTYPE: NORMAL
ALPHA-1 ANTITRYPSIN: 135 MG/DL (ref 90–200)
ALPHA-1 ANTITRYPSIN: 146 MG/DL (ref 90–200)
CERULOPLASMIN: 33 MG/DL (ref 16–45)
IGA: 233 MG/DL (ref 68–408)
TISSUE TRANSGLUTAMINASE IGA: 1 U/ML (ref 0–3)

## 2019-09-13 ENCOUNTER — HOSPITAL ENCOUNTER (OUTPATIENT)
Dept: CT IMAGING | Age: 64
Discharge: HOME | End: 2019-09-13
Payer: MEDICARE

## 2019-09-13 DIAGNOSIS — M79.605 LEFT LEG PAIN: ICD-10-CM

## 2019-09-13 PROCEDURE — 73700 CT LOWER EXTREMITY W/O DYE: CPT

## 2019-10-08 ENCOUNTER — HOSPITAL ENCOUNTER (OUTPATIENT)
Age: 64
Discharge: HOME | End: 2019-10-08
Payer: MEDICARE

## 2019-10-08 ENCOUNTER — HOSPITAL ENCOUNTER (OUTPATIENT)
Dept: CT IMAGING | Age: 64
Discharge: HOME | End: 2019-10-08
Payer: MEDICARE

## 2019-10-08 DIAGNOSIS — R94.5 ABNORMAL RESULTS OF LIVER FUNCTION STUDIES: ICD-10-CM

## 2019-10-08 DIAGNOSIS — R10.11 RIGHT UPPER QUADRANT PAIN: ICD-10-CM

## 2019-10-08 LAB
ANION GAP SERPL CALCULATED.3IONS-SCNC: 14 MMOL/L (ref 3–16)
BUN BLDV-MCNC: 15 MG/DL (ref 7–20)
CALCIUM SERPL-MCNC: 9.2 MG/DL (ref 8.3–10.6)
CHLORIDE BLD-SCNC: 106 MMOL/L (ref 99–110)
CO2: 19 MMOL/L (ref 21–32)
CREAT SERPL-MCNC: <0.5 MG/DL (ref 0.6–1.2)
GFR AFRICAN AMERICAN: >60
GFR NON-AFRICAN AMERICAN: >60
GLUCOSE BLD-MCNC: 118 MG/DL (ref 70–99)
POTASSIUM SERPL-SCNC: 4.2 MMOL/L (ref 3.5–5.1)
SODIUM BLD-SCNC: 139 MMOL/L (ref 136–145)

## 2019-10-08 PROCEDURE — 74177 CT ABD & PELVIS W/CONTRAST: CPT

## 2019-10-08 PROCEDURE — 80048 BASIC METABOLIC PNL TOTAL CA: CPT

## 2019-10-08 PROCEDURE — 6360000004 HC RX CONTRAST MEDICATION: Performed by: NURSE PRACTITIONER

## 2019-10-08 PROCEDURE — 36415 COLL VENOUS BLD VENIPUNCTURE: CPT

## 2019-10-08 RX ADMIN — IOPAMIDOL 75 ML: 755 INJECTION, SOLUTION INTRAVENOUS at 06:54

## 2019-10-08 RX ADMIN — IOHEXOL 50 ML: 240 INJECTION, SOLUTION INTRATHECAL; INTRAVASCULAR; INTRAVENOUS; ORAL at 06:55

## 2019-10-14 ENCOUNTER — HOSPITAL ENCOUNTER (OUTPATIENT)
Age: 64
Discharge: HOME | End: 2019-10-14
Payer: MEDICARE

## 2019-10-14 LAB
A/G RATIO: 1.2 (ref 1.1–2.2)
ALBUMIN SERPL-MCNC: 3.5 G/DL (ref 3.4–5)
ALP BLD-CCNC: 133 U/L (ref 40–129)
ALT SERPL-CCNC: 35 U/L (ref 10–40)
ANION GAP SERPL CALCULATED.3IONS-SCNC: 15 MMOL/L (ref 3–16)
AST SERPL-CCNC: 41 U/L (ref 15–37)
BASOPHILS ABSOLUTE: 0.1 K/UL (ref 0–0.2)
BASOPHILS RELATIVE PERCENT: 0.9 %
BILIRUB SERPL-MCNC: <0.2 MG/DL (ref 0–1)
BUN BLDV-MCNC: 14 MG/DL (ref 7–20)
CALCIUM SERPL-MCNC: 9 MG/DL (ref 8.3–10.6)
CHLORIDE BLD-SCNC: 107 MMOL/L (ref 99–110)
CO2: 19 MMOL/L (ref 21–32)
CREAT SERPL-MCNC: <0.5 MG/DL (ref 0.6–1.2)
EOSINOPHILS ABSOLUTE: 0.2 K/UL (ref 0–0.6)
EOSINOPHILS RELATIVE PERCENT: 2.6 %
FERRITIN: 166.9 NG/ML (ref 15–150)
GFR AFRICAN AMERICAN: >60
GFR NON-AFRICAN AMERICAN: >60
GLOBULIN: 3 G/DL
GLUCOSE BLD-MCNC: 100 MG/DL (ref 70–99)
HCT VFR BLD CALC: 40 % (ref 36–48)
HEMOGLOBIN: 13 G/DL (ref 12–16)
IRON SATURATION: 19 % (ref 15–50)
IRON: 65 UG/DL (ref 37–145)
LYMPHOCYTES ABSOLUTE: 2.6 K/UL (ref 1–5.1)
LYMPHOCYTES RELATIVE PERCENT: 37.8 %
MCH RBC QN AUTO: 30.1 PG (ref 26–34)
MCHC RBC AUTO-ENTMCNC: 32.3 G/DL (ref 31–36)
MCV RBC AUTO: 93 FL (ref 80–100)
MONOCYTES ABSOLUTE: 0.5 K/UL (ref 0–1.3)
MONOCYTES RELATIVE PERCENT: 7 %
NEUTROPHILS ABSOLUTE: 3.5 K/UL (ref 1.7–7.7)
NEUTROPHILS RELATIVE PERCENT: 51.7 %
PDW BLD-RTO: 16.1 % (ref 12.4–15.4)
PLATELET # BLD: 197 K/UL (ref 135–450)
PMV BLD AUTO: 9.3 FL (ref 5–10.5)
POTASSIUM SERPL-SCNC: 4.6 MMOL/L (ref 3.5–5.1)
RBC # BLD: 4.31 M/UL (ref 4–5.2)
SODIUM BLD-SCNC: 141 MMOL/L (ref 136–145)
TOTAL IRON BINDING CAPACITY: 351 UG/DL (ref 260–445)
TOTAL PROTEIN: 6.5 G/DL (ref 6.4–8.2)
WBC # BLD: 6.8 K/UL (ref 4–11)

## 2019-10-14 PROCEDURE — 80053 COMPREHEN METABOLIC PANEL: CPT

## 2019-10-14 PROCEDURE — 85025 COMPLETE CBC W/AUTO DIFF WBC: CPT

## 2019-10-14 PROCEDURE — 36415 COLL VENOUS BLD VENIPUNCTURE: CPT

## 2019-10-14 PROCEDURE — 83540 ASSAY OF IRON: CPT

## 2019-10-14 PROCEDURE — 83550 IRON BINDING TEST: CPT

## 2019-10-14 PROCEDURE — 82728 ASSAY OF FERRITIN: CPT

## 2019-10-31 ENCOUNTER — APPOINTMENT (OUTPATIENT)
Dept: GENERAL RADIOLOGY | Age: 64
End: 2019-10-31
Payer: MEDICARE

## 2019-10-31 ENCOUNTER — HOSPITAL ENCOUNTER (EMERGENCY)
Age: 64
Discharge: HOME | End: 2019-10-31
Payer: MEDICARE

## 2019-10-31 VITALS
WEIGHT: 150 LBS | RESPIRATION RATE: 16 BRPM | BODY MASS INDEX: 29.45 KG/M2 | DIASTOLIC BLOOD PRESSURE: 52 MMHG | TEMPERATURE: 97.5 F | HEIGHT: 60 IN | SYSTOLIC BLOOD PRESSURE: 124 MMHG | OXYGEN SATURATION: 98 % | HEART RATE: 80 BPM
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
## 2019-10-31 DIAGNOSIS — M79.18 MUSCULOSKELETAL PAIN: ICD-10-CM

## 2019-10-31 PROCEDURE — 6370000000 HC RX 637 (ALT 250 FOR IP): Performed by: NURSE PRACTITIONER

## 2019-10-31 PROCEDURE — 72040 X-RAY EXAM NECK SPINE 2-3 VW: CPT

## 2019-10-31 PROCEDURE — 99283 EMERGENCY DEPT VISIT LOW MDM: CPT

## 2019-10-31 PROCEDURE — 73560 X-RAY EXAM OF KNEE 1 OR 2: CPT

## 2019-10-31 PROCEDURE — 72100 X-RAY EXAM L-S SPINE 2/3 VWS: CPT

## 2019-10-31 RX ORDER — LIDOCAINE 50 MG/G
1 PATCH TOPICAL DAILY
Qty: 7 PATCH | Refills: 0 | Status: SHIPPED | OUTPATIENT
Start: 2019-10-31 | End: 2019-12-03 | Stop reason: ALTCHOICE

## 2019-10-31 RX ORDER — LIDOCAINE 4 G/G
1 PATCH TOPICAL ONCE
Status: DISCONTINUED | OUTPATIENT
Start: 2019-10-31 | End: 2019-10-31 | Stop reason: HOSPADM

## 2019-10-31 ASSESSMENT — PAIN DESCRIPTION - DESCRIPTORS: DESCRIPTORS: ACHING

## 2019-10-31 ASSESSMENT — PAIN DESCRIPTION - ORIENTATION: ORIENTATION: LEFT

## 2019-10-31 ASSESSMENT — PAIN DESCRIPTION - PROGRESSION: CLINICAL_PROGRESSION: GRADUALLY WORSENING

## 2019-10-31 ASSESSMENT — PAIN SCALES - GENERAL: PAINLEVEL_OUTOF10: 8

## 2019-10-31 ASSESSMENT — PAIN DESCRIPTION - FREQUENCY: FREQUENCY: CONTINUOUS

## 2019-10-31 ASSESSMENT — PAIN DESCRIPTION - ONSET: ONSET: ON-GOING

## 2019-10-31 ASSESSMENT — PAIN DESCRIPTION - PAIN TYPE: TYPE: ACUTE PAIN

## 2019-10-31 ASSESSMENT — PAIN DESCRIPTION - LOCATION: LOCATION: BACK;KNEE;NECK

## 2019-11-06 ENCOUNTER — OFFICE VISIT (OUTPATIENT)
Dept: ORTHOPEDIC SURGERY | Age: 64
End: 2019-11-06
Payer: MEDICARE

## 2019-11-06 VITALS — HEIGHT: 60 IN | BODY MASS INDEX: 29.43 KG/M2 | WEIGHT: 149.91 LBS

## 2019-11-06 DIAGNOSIS — Z96.652 PAIN DUE TO TOTAL LEFT KNEE REPLACEMENT, INITIAL ENCOUNTER (HCC): ICD-10-CM

## 2019-11-06 DIAGNOSIS — T84.84XA PAIN DUE TO TOTAL LEFT KNEE REPLACEMENT, INITIAL ENCOUNTER (HCC): ICD-10-CM

## 2019-11-06 DIAGNOSIS — M25.562 LEFT KNEE PAIN, UNSPECIFIED CHRONICITY: Primary | ICD-10-CM

## 2019-11-06 DIAGNOSIS — Z96.652 HISTORY OF TOTAL LEFT KNEE REPLACEMENT: ICD-10-CM

## 2019-11-06 DIAGNOSIS — M17.11 PRIMARY OSTEOARTHRITIS OF RIGHT KNEE: ICD-10-CM

## 2019-11-06 PROCEDURE — 99214 OFFICE O/P EST MOD 30 MIN: CPT | Performed by: PHYSICIAN ASSISTANT

## 2019-11-06 RX ORDER — METHYLPREDNISOLONE ACETATE 40 MG/ML
80 INJECTION, SUSPENSION INTRA-ARTICULAR; INTRALESIONAL; INTRAMUSCULAR; SOFT TISSUE ONCE
Status: DISCONTINUED | OUTPATIENT
Start: 2019-11-06 | End: 2019-12-05 | Stop reason: HOSPADM

## 2019-11-07 ENCOUNTER — TELEPHONE (OUTPATIENT)
Dept: ORTHOPEDIC SURGERY | Age: 64
End: 2019-11-07

## 2019-11-08 ENCOUNTER — HOSPITAL ENCOUNTER (OUTPATIENT)
Age: 64
Discharge: HOME | End: 2019-11-08
Payer: MEDICARE

## 2019-11-08 ENCOUNTER — HOSPITAL ENCOUNTER (OUTPATIENT)
Dept: GENERAL RADIOLOGY | Age: 64
Discharge: HOME | End: 2019-11-08
Payer: MEDICARE

## 2019-11-08 DIAGNOSIS — R06.02 BREATH SHORTNESS: ICD-10-CM

## 2019-11-08 PROCEDURE — 71046 X-RAY EXAM CHEST 2 VIEWS: CPT

## 2019-11-12 ENCOUNTER — OFFICE VISIT (OUTPATIENT)
Dept: PULMONOLOGY | Age: 64
End: 2019-11-12
Payer: MEDICARE

## 2019-11-12 VITALS
TEMPERATURE: 96.7 F | BODY MASS INDEX: 29.25 KG/M2 | SYSTOLIC BLOOD PRESSURE: 116 MMHG | HEART RATE: 63 BPM | DIASTOLIC BLOOD PRESSURE: 60 MMHG | OXYGEN SATURATION: 97 % | RESPIRATION RATE: 16 BRPM | WEIGHT: 149 LBS | HEIGHT: 60 IN

## 2019-11-12 DIAGNOSIS — R93.89 ABNORMAL CT OF THE CHEST: ICD-10-CM

## 2019-11-12 DIAGNOSIS — R94.2 DECREASED DIFFUSION CAPACITY: ICD-10-CM

## 2019-11-12 DIAGNOSIS — R06.00 DYSPNEA, UNSPECIFIED TYPE: Primary | ICD-10-CM

## 2019-11-12 DIAGNOSIS — G47.33 MODERATE OBSTRUCTIVE SLEEP APNEA: ICD-10-CM

## 2019-11-12 DIAGNOSIS — J47.9 BRONCHIECTASIS WITHOUT COMPLICATION (HCC): ICD-10-CM

## 2019-11-12 PROCEDURE — 99214 OFFICE O/P EST MOD 30 MIN: CPT | Performed by: INTERNAL MEDICINE

## 2019-11-12 RX ORDER — DILTIAZEM HYDROCHLORIDE 180 MG/1
CAPSULE, EXTENDED RELEASE ORAL
Status: ON HOLD | COMMUNITY
Start: 2019-09-26 | End: 2020-04-29

## 2019-11-12 RX ORDER — ERYTHROMYCIN 5 MG/G
OINTMENT OPHTHALMIC
COMMUNITY
Start: 2019-10-30 | End: 2019-12-03 | Stop reason: ALTCHOICE

## 2019-11-12 RX ORDER — BUTALBITAL, ACETAMINOPHEN AND CAFFEINE 50; 325; 40 MG/1; MG/1; MG/1
1 TABLET ORAL
COMMUNITY
End: 2021-03-29

## 2019-11-12 RX ORDER — FESOTERODINE FUMARATE 8 MG/1
TABLET, EXTENDED RELEASE ORAL
Status: ON HOLD | COMMUNITY
End: 2019-12-04

## 2019-11-12 RX ORDER — LOPERAMIDE HYDROCHLORIDE 2 MG/1
TABLET ORAL
COMMUNITY
Start: 2019-03-29 | End: 2019-12-03 | Stop reason: ALTCHOICE

## 2019-11-12 RX ORDER — DICYCLOMINE HYDROCHLORIDE 10 MG/1
CAPSULE ORAL
COMMUNITY
Start: 2019-11-05 | End: 2019-12-03 | Stop reason: ALTCHOICE

## 2019-11-12 RX ORDER — SULFAMETHOXAZOLE AND TRIMETHOPRIM 800; 160 MG/1; MG/1
1 TABLET ORAL 2 TIMES DAILY
Status: ON HOLD | COMMUNITY
Start: 2019-11-03 | End: 2020-04-07 | Stop reason: HOSPADM

## 2019-11-12 RX ORDER — LEVOFLOXACIN 500 MG/1
TABLET, FILM COATED ORAL
COMMUNITY
End: 2019-12-03 | Stop reason: ALTCHOICE

## 2019-11-12 RX ORDER — FLUDROCORTISONE ACETATE 0.1 MG/1
TABLET ORAL
Status: ON HOLD | COMMUNITY
Start: 2019-10-31 | End: 2019-12-05 | Stop reason: HOSPADM

## 2019-11-12 RX ORDER — ALBUTEROL SULFATE 90 UG/1
2 AEROSOL, METERED RESPIRATORY (INHALATION) EVERY 6 HOURS PRN
Qty: 1 INHALER | Refills: 6 | Status: ON HOLD | OUTPATIENT
Start: 2019-11-12 | End: 2021-04-14

## 2019-11-14 ENCOUNTER — HOSPITAL ENCOUNTER (OUTPATIENT)
Age: 64
Discharge: HOME | End: 2019-11-14
Payer: MEDICARE

## 2019-11-14 ENCOUNTER — HOSPITAL ENCOUNTER (OUTPATIENT)
Dept: NUCLEAR MEDICINE | Age: 64
Discharge: HOME | End: 2019-11-14
Payer: MEDICARE

## 2019-11-14 DIAGNOSIS — T84.84XA PAIN DUE TO TOTAL LEFT KNEE REPLACEMENT, INITIAL ENCOUNTER (HCC): ICD-10-CM

## 2019-11-14 DIAGNOSIS — Z96.652 PAIN DUE TO TOTAL LEFT KNEE REPLACEMENT, INITIAL ENCOUNTER (HCC): ICD-10-CM

## 2019-11-14 DIAGNOSIS — M25.562 LEFT KNEE PAIN, UNSPECIFIED CHRONICITY: ICD-10-CM

## 2019-11-14 DIAGNOSIS — Z96.652 HISTORY OF TOTAL LEFT KNEE REPLACEMENT: ICD-10-CM

## 2019-11-14 DIAGNOSIS — R06.00 DYSPNEA, UNSPECIFIED TYPE: ICD-10-CM

## 2019-11-14 LAB — TSH REFLEX: 0.61 UIU/ML (ref 0.27–4.2)

## 2019-11-14 PROCEDURE — A9503 TC99M MEDRONATE: HCPCS | Performed by: PHYSICIAN ASSISTANT

## 2019-11-14 PROCEDURE — 87086 URINE CULTURE/COLONY COUNT: CPT

## 2019-11-14 PROCEDURE — 36415 COLL VENOUS BLD VENIPUNCTURE: CPT

## 2019-11-14 PROCEDURE — 3430000000 HC RX DIAGNOSTIC RADIOPHARMACEUTICAL: Performed by: PHYSICIAN ASSISTANT

## 2019-11-14 PROCEDURE — 78315 BONE IMAGING 3 PHASE: CPT

## 2019-11-14 PROCEDURE — 84443 ASSAY THYROID STIM HORMONE: CPT

## 2019-11-14 RX ORDER — TC 99M MEDRONATE 20 MG/10ML
25.9 INJECTION, POWDER, LYOPHILIZED, FOR SOLUTION INTRAVENOUS
Status: COMPLETED | OUTPATIENT
Start: 2019-11-14 | End: 2019-11-14

## 2019-11-14 RX ADMIN — TC 99M MEDRONATE 25.9 MILLICURIE: 20 INJECTION, POWDER, LYOPHILIZED, FOR SOLUTION INTRAVENOUS at 10:00

## 2019-11-15 LAB — URINE CULTURE, ROUTINE: NORMAL

## 2019-11-21 ENCOUNTER — HOSPITAL ENCOUNTER (OUTPATIENT)
Dept: PULMONOLOGY | Age: 64
Discharge: HOME | End: 2019-11-21
Payer: MEDICARE

## 2019-11-21 DIAGNOSIS — R06.00 DYSPNEA, UNSPECIFIED TYPE: ICD-10-CM

## 2019-11-21 DIAGNOSIS — J47.9 BRONCHIECTASIS WITHOUT COMPLICATION (HCC): ICD-10-CM

## 2019-11-21 DIAGNOSIS — R94.2 DECREASED DIFFUSION CAPACITY: ICD-10-CM

## 2019-11-21 LAB
DLCO %PRED: 66 %
DLCO PRED: NORMAL
DLCO/VA %PRED: NORMAL
DLCO/VA PRED: NORMAL
DLCO/VA: NORMAL
DLCO: NORMAL
EXPIRATORY TIME-POST: NORMAL
EXPIRATORY TIME: NORMAL
FEF 25-75% %CHNG: NORMAL
FEF 25-75% %PRED-POST: NORMAL
FEF 25-75% %PRED-PRE: NORMAL
FEF 25-75% PRED: NORMAL
FEF 25-75%-POST: NORMAL
FEF 25-75%-PRE: NORMAL
FEV1 %PRED-POST: 110 %
FEV1 %PRED-PRE: 111 %
FEV1 PRED: NORMAL
FEV1-POST: NORMAL
FEV1-PRE: NORMAL
FEV1/FVC %PRED-POST: NORMAL
FEV1/FVC %PRED-PRE: NORMAL
FEV1/FVC PRED: NORMAL
FEV1/FVC-POST: 84 %
FEV1/FVC-PRE: 83 %
FVC %PRED-POST: NORMAL
FVC %PRED-PRE: NORMAL
FVC PRED: NORMAL
FVC-POST: NORMAL
FVC-PRE: NORMAL
GAW %PRED: NORMAL
GAW PRED: NORMAL
GAW: NORMAL
IC %PRED: NORMAL
IC PRED: NORMAL
IC: NORMAL
MEP: NORMAL
MIP: NORMAL
MVV %PRED-PRE: NORMAL
MVV PRED: NORMAL
MVV-PRE: NORMAL
PEF %PRED-POST: NORMAL
PEF %PRED-PRE: NORMAL
PEF PRED: NORMAL
PEF%CHNG: NORMAL
PEF-POST: NORMAL
PEF-PRE: NORMAL
RAW %PRED: NORMAL
RAW PRED: NORMAL
RAW: NORMAL
RV %PRED: NORMAL
RV PRED: NORMAL
RV: NORMAL
SVC %PRED: NORMAL
SVC PRED: NORMAL
SVC: NORMAL
TLC %PRED: 93 %
TLC PRED: NORMAL
TLC: NORMAL
VA %PRED: NORMAL
VA PRED: NORMAL
VA: NORMAL
VTG %PRED: NORMAL
VTG PRED: NORMAL
VTG: NORMAL

## 2019-11-21 PROCEDURE — 6360000002 HC RX W HCPCS: Performed by: INTERNAL MEDICINE

## 2019-11-21 PROCEDURE — 94729 DIFFUSING CAPACITY: CPT

## 2019-11-21 PROCEDURE — 94060 EVALUATION OF WHEEZING: CPT

## 2019-11-21 PROCEDURE — 94726 PLETHYSMOGRAPHY LUNG VOLUMES: CPT

## 2019-11-21 PROCEDURE — 94618 PULMONARY STRESS TESTING: CPT

## 2019-11-21 PROCEDURE — 94640 AIRWAY INHALATION TREATMENT: CPT

## 2019-11-21 RX ORDER — ALBUTEROL SULFATE 2.5 MG/3ML
2.5 SOLUTION RESPIRATORY (INHALATION) ONCE
Status: COMPLETED | OUTPATIENT
Start: 2019-11-21 | End: 2019-11-21

## 2019-11-21 RX ADMIN — ALBUTEROL SULFATE 2.5 MG: 2.5 SOLUTION RESPIRATORY (INHALATION) at 09:24

## 2019-11-21 ASSESSMENT — PULMONARY FUNCTION TESTS
FEV1_PERCENT_PREDICTED_POST: 110
FEV1/FVC_PRE: 83
FEV1/FVC_POST: 84
FEV1_PERCENT_PREDICTED_PRE: 111

## 2019-11-26 ENCOUNTER — OFFICE VISIT (OUTPATIENT)
Dept: ORTHOPEDIC SURGERY | Age: 64
End: 2019-11-26
Payer: MEDICARE

## 2019-11-26 ENCOUNTER — HOSPITAL ENCOUNTER (OUTPATIENT)
Age: 64
Discharge: HOME | End: 2019-11-26
Payer: MEDICARE

## 2019-11-26 VITALS — WEIGHT: 149.03 LBS | HEIGHT: 60 IN | BODY MASS INDEX: 29.26 KG/M2

## 2019-11-26 DIAGNOSIS — T84.84XA PAIN DUE TO TOTAL LEFT KNEE REPLACEMENT, INITIAL ENCOUNTER (HCC): ICD-10-CM

## 2019-11-26 DIAGNOSIS — T84.84XA PAIN DUE TO TOTAL LEFT KNEE REPLACEMENT, INITIAL ENCOUNTER (HCC): Primary | ICD-10-CM

## 2019-11-26 DIAGNOSIS — Z96.652 PAIN DUE TO TOTAL LEFT KNEE REPLACEMENT, INITIAL ENCOUNTER (HCC): ICD-10-CM

## 2019-11-26 DIAGNOSIS — Z96.652 PAIN DUE TO TOTAL LEFT KNEE REPLACEMENT, INITIAL ENCOUNTER (HCC): Primary | ICD-10-CM

## 2019-11-26 LAB
BASOPHILS ABSOLUTE: 0.1 K/UL (ref 0–0.2)
BASOPHILS RELATIVE PERCENT: 1.1 %
C-REACTIVE PROTEIN: 0.5 MG/L (ref 0–5.1)
EOSINOPHILS ABSOLUTE: 0.2 K/UL (ref 0–0.6)
EOSINOPHILS RELATIVE PERCENT: 2.8 %
HCT VFR BLD CALC: 42.1 % (ref 36–48)
HEMOGLOBIN: 13.2 G/DL (ref 12–16)
LYMPHOCYTES ABSOLUTE: 3.1 K/UL (ref 1–5.1)
LYMPHOCYTES RELATIVE PERCENT: 49.7 %
MCH RBC QN AUTO: 29.6 PG (ref 26–34)
MCHC RBC AUTO-ENTMCNC: 31.4 G/DL (ref 31–36)
MCV RBC AUTO: 94.3 FL (ref 80–100)
MONOCYTES ABSOLUTE: 0.4 K/UL (ref 0–1.3)
MONOCYTES RELATIVE PERCENT: 6.6 %
NEUTROPHILS ABSOLUTE: 2.5 K/UL (ref 1.7–7.7)
NEUTROPHILS RELATIVE PERCENT: 39.8 %
PDW BLD-RTO: 16 % (ref 12.4–15.4)
PLATELET # BLD: 248 K/UL (ref 135–450)
PMV BLD AUTO: 8.2 FL (ref 5–10.5)
RBC # BLD: 4.46 M/UL (ref 4–5.2)
SEDIMENTATION RATE, ERYTHROCYTE: 28 MM/HR (ref 0–30)
WBC # BLD: 6.3 K/UL (ref 4–11)

## 2019-11-26 PROCEDURE — 86140 C-REACTIVE PROTEIN: CPT

## 2019-11-26 PROCEDURE — 82175 ASSAY OF ARSENIC: CPT

## 2019-11-26 PROCEDURE — 82495 ASSAY OF CHROMIUM: CPT

## 2019-11-26 PROCEDURE — 83655 ASSAY OF LEAD: CPT

## 2019-11-26 PROCEDURE — 85025 COMPLETE CBC W/AUTO DIFF WBC: CPT

## 2019-11-26 PROCEDURE — 83825 ASSAY OF MERCURY: CPT

## 2019-11-26 PROCEDURE — 85652 RBC SED RATE AUTOMATED: CPT

## 2019-11-26 PROCEDURE — 36415 COLL VENOUS BLD VENIPUNCTURE: CPT

## 2019-11-26 PROCEDURE — 99214 OFFICE O/P EST MOD 30 MIN: CPT | Performed by: PHYSICIAN ASSISTANT

## 2019-11-27 LAB
ARSENIC BLOOD: <10 UG/L (ref 0–12)
LEAD LEVEL BLOOD: <2 UG/DL (ref 0–4.9)
MERCURY BLOOD: <2.5 UG/L (ref 0–10)

## 2019-11-28 LAB — CHROMIUM, SERUM: <1 UG/L

## 2019-12-03 ENCOUNTER — HOSPITAL ENCOUNTER (EMERGENCY)
Age: 64
Discharge: ANOTHER HOSPITAL | End: 2019-12-03
Attending: EMERGENCY MEDICINE
Payer: MEDICARE

## 2019-12-03 ENCOUNTER — APPOINTMENT (OUTPATIENT)
Dept: GENERAL RADIOLOGY | Age: 64
End: 2019-12-03
Payer: MEDICARE

## 2019-12-03 ENCOUNTER — APPOINTMENT (OUTPATIENT)
Dept: CT IMAGING | Age: 64
End: 2019-12-03
Payer: MEDICARE

## 2019-12-03 ENCOUNTER — HOSPITAL ENCOUNTER (INPATIENT)
Age: 64
LOS: 2 days | Discharge: HOME HEALTH | DRG: 310 | End: 2019-12-05
Attending: FAMILY MEDICINE | Admitting: FAMILY MEDICINE
Payer: MEDICARE

## 2019-12-03 VITALS
OXYGEN SATURATION: 96 % | SYSTOLIC BLOOD PRESSURE: 102 MMHG | TEMPERATURE: 98.4 F | RESPIRATION RATE: 18 BRPM | WEIGHT: 155 LBS | BODY MASS INDEX: 30.27 KG/M2 | DIASTOLIC BLOOD PRESSURE: 64 MMHG | HEART RATE: 60 BPM

## 2019-12-03 DIAGNOSIS — R42 LIGHTHEADED: ICD-10-CM

## 2019-12-03 DIAGNOSIS — R00.0 WIDE-COMPLEX TACHYCARDIA: Primary | ICD-10-CM

## 2019-12-03 DIAGNOSIS — R06.00 DYSPNEA, UNSPECIFIED TYPE: ICD-10-CM

## 2019-12-03 PROBLEM — I47.29 NON-SUSTAINED VENTRICULAR TACHYCARDIA (HCC): Status: ACTIVE | Noted: 2019-12-03

## 2019-12-03 LAB
A/G RATIO: 0.9 (ref 1.1–2.2)
ALBUMIN SERPL-MCNC: 3.1 G/DL (ref 3.4–5)
ALP BLD-CCNC: 126 U/L (ref 40–129)
ALT SERPL-CCNC: 32 U/L (ref 10–40)
ANION GAP SERPL CALCULATED.3IONS-SCNC: 10 MMOL/L (ref 3–16)
AST SERPL-CCNC: 44 U/L (ref 15–37)
BASOPHILS ABSOLUTE: 0.1 K/UL (ref 0–0.2)
BASOPHILS RELATIVE PERCENT: 1.1 %
BILIRUB SERPL-MCNC: <0.2 MG/DL (ref 0–1)
BUN BLDV-MCNC: 10 MG/DL (ref 7–20)
CALCIUM SERPL-MCNC: 8.7 MG/DL (ref 8.3–10.6)
CHLORIDE BLD-SCNC: 108 MMOL/L (ref 99–110)
CO2: 18 MMOL/L (ref 21–32)
CREAT SERPL-MCNC: 0.6 MG/DL (ref 0.6–1.2)
EKG ATRIAL RATE: 77 BPM
EKG DIAGNOSIS: NORMAL
EKG P AXIS: 22 DEGREES
EKG P-R INTERVAL: 166 MS
EKG Q-T INTERVAL: 400 MS
EKG QRS DURATION: 88 MS
EKG QTC CALCULATION (BAZETT): 452 MS
EKG R AXIS: 75 DEGREES
EKG T AXIS: 60 DEGREES
EKG VENTRICULAR RATE: 77 BPM
EOSINOPHILS ABSOLUTE: 0.2 K/UL (ref 0–0.6)
EOSINOPHILS RELATIVE PERCENT: 2.8 %
GFR AFRICAN AMERICAN: >60
GFR NON-AFRICAN AMERICAN: >60
GLOBULIN: 3.3 G/DL
GLUCOSE BLD-MCNC: 117 MG/DL (ref 70–99)
GLUCOSE BLD-MCNC: 67 MG/DL (ref 70–99)
HCT VFR BLD CALC: 39.8 % (ref 36–48)
HEMOGLOBIN: 12.7 G/DL (ref 12–16)
LYMPHOCYTES ABSOLUTE: 3 K/UL (ref 1–5.1)
LYMPHOCYTES RELATIVE PERCENT: 53.9 %
MCH RBC QN AUTO: 30.1 PG (ref 26–34)
MCHC RBC AUTO-ENTMCNC: 31.9 G/DL (ref 31–36)
MCV RBC AUTO: 94.5 FL (ref 80–100)
MONOCYTES ABSOLUTE: 0.5 K/UL (ref 0–1.3)
MONOCYTES RELATIVE PERCENT: 8.8 %
NEUTROPHILS ABSOLUTE: 1.9 K/UL (ref 1.7–7.7)
NEUTROPHILS RELATIVE PERCENT: 33.4 %
PDW BLD-RTO: 16.1 % (ref 12.4–15.4)
PERFORMED ON: ABNORMAL
PLATELET # BLD: 218 K/UL (ref 135–450)
PMV BLD AUTO: 8.2 FL (ref 5–10.5)
POTASSIUM SERPL-SCNC: 4.7 MMOL/L (ref 3.5–5.1)
RBC # BLD: 4.21 M/UL (ref 4–5.2)
SODIUM BLD-SCNC: 136 MMOL/L (ref 136–145)
TOTAL PROTEIN: 6.4 G/DL (ref 6.4–8.2)
TROPONIN: <0.01 NG/ML
TROPONIN: <0.01 NG/ML
WBC # BLD: 5.6 K/UL (ref 4–11)

## 2019-12-03 PROCEDURE — 96360 HYDRATION IV INFUSION INIT: CPT

## 2019-12-03 PROCEDURE — 71260 CT THORAX DX C+: CPT

## 2019-12-03 PROCEDURE — 99285 EMERGENCY DEPT VISIT HI MDM: CPT

## 2019-12-03 PROCEDURE — G0379 DIRECT REFER HOSPITAL OBSERV: HCPCS

## 2019-12-03 PROCEDURE — 84484 ASSAY OF TROPONIN QUANT: CPT

## 2019-12-03 PROCEDURE — 6360000004 HC RX CONTRAST MEDICATION: Performed by: EMERGENCY MEDICINE

## 2019-12-03 PROCEDURE — 6370000000 HC RX 637 (ALT 250 FOR IP): Performed by: EMERGENCY MEDICINE

## 2019-12-03 PROCEDURE — 93010 ELECTROCARDIOGRAM REPORT: CPT | Performed by: INTERNAL MEDICINE

## 2019-12-03 PROCEDURE — 85025 COMPLETE CBC W/AUTO DIFF WBC: CPT

## 2019-12-03 PROCEDURE — 1200000000 HC SEMI PRIVATE

## 2019-12-03 PROCEDURE — G0378 HOSPITAL OBSERVATION PER HR: HCPCS

## 2019-12-03 PROCEDURE — 93005 ELECTROCARDIOGRAM TRACING: CPT | Performed by: EMERGENCY MEDICINE

## 2019-12-03 PROCEDURE — 71045 X-RAY EXAM CHEST 1 VIEW: CPT

## 2019-12-03 PROCEDURE — 2580000003 HC RX 258: Performed by: EMERGENCY MEDICINE

## 2019-12-03 PROCEDURE — 80053 COMPREHEN METABOLIC PANEL: CPT

## 2019-12-03 RX ORDER — CLONAZEPAM 1 MG/1
1 TABLET ORAL 3 TIMES DAILY PRN
Status: DISCONTINUED | OUTPATIENT
Start: 2019-12-03 | End: 2019-12-05 | Stop reason: HOSPADM

## 2019-12-03 RX ORDER — ASPIRIN 325 MG
325 TABLET ORAL ONCE
Status: COMPLETED | OUTPATIENT
Start: 2019-12-03 | End: 2019-12-03

## 2019-12-03 RX ORDER — DILTIAZEM HYDROCHLORIDE 180 MG/1
180 CAPSULE, COATED, EXTENDED RELEASE ORAL DAILY
Status: DISCONTINUED | OUTPATIENT
Start: 2019-12-04 | End: 2019-12-05 | Stop reason: HOSPADM

## 2019-12-03 RX ORDER — LEVOTHYROXINE SODIUM 88 UG/1
88 TABLET ORAL DAILY
Status: DISCONTINUED | OUTPATIENT
Start: 2019-12-04 | End: 2019-12-05 | Stop reason: HOSPADM

## 2019-12-03 RX ORDER — ONDANSETRON 2 MG/ML
4 INJECTION INTRAMUSCULAR; INTRAVENOUS EVERY 6 HOURS PRN
Status: DISCONTINUED | OUTPATIENT
Start: 2019-12-03 | End: 2019-12-05 | Stop reason: HOSPADM

## 2019-12-03 RX ORDER — SULFAMETHOXAZOLE AND TRIMETHOPRIM 800; 160 MG/1; MG/1
1 TABLET ORAL ONCE
Status: COMPLETED | OUTPATIENT
Start: 2019-12-04 | End: 2019-12-04

## 2019-12-03 RX ORDER — FLUTICASONE PROPIONATE 50 MCG
1 SPRAY, SUSPENSION (ML) NASAL NIGHTLY
Status: DISCONTINUED | OUTPATIENT
Start: 2019-12-04 | End: 2019-12-05 | Stop reason: HOSPADM

## 2019-12-03 RX ORDER — CETIRIZINE HYDROCHLORIDE 5 MG/1
5 TABLET ORAL DAILY
Status: DISCONTINUED | OUTPATIENT
Start: 2019-12-04 | End: 2019-12-05 | Stop reason: HOSPADM

## 2019-12-03 RX ORDER — DONEPEZIL HYDROCHLORIDE 5 MG/1
10 TABLET, FILM COATED ORAL NIGHTLY
Status: DISCONTINUED | OUTPATIENT
Start: 2019-12-04 | End: 2019-12-05 | Stop reason: HOSPADM

## 2019-12-03 RX ORDER — CHOLECALCIFEROL (VITAMIN D3) 125 MCG
2000 CAPSULE ORAL DAILY
Status: DISCONTINUED | OUTPATIENT
Start: 2019-12-04 | End: 2019-12-05 | Stop reason: HOSPADM

## 2019-12-03 RX ORDER — OXYCODONE AND ACETAMINOPHEN 10; 325 MG/1; MG/1
1 TABLET ORAL EVERY 6 HOURS PRN
Status: DISCONTINUED | OUTPATIENT
Start: 2019-12-03 | End: 2019-12-05 | Stop reason: HOSPADM

## 2019-12-03 RX ORDER — PANTOPRAZOLE SODIUM 40 MG/1
40 TABLET, DELAYED RELEASE ORAL DAILY
Status: DISCONTINUED | OUTPATIENT
Start: 2019-12-04 | End: 2019-12-05 | Stop reason: HOSPADM

## 2019-12-03 RX ORDER — GABAPENTIN 400 MG/1
400 CAPSULE ORAL 3 TIMES DAILY
Status: DISCONTINUED | OUTPATIENT
Start: 2019-12-04 | End: 2019-12-05 | Stop reason: HOSPADM

## 2019-12-03 RX ORDER — CEPHALEXIN 250 MG/1
500 CAPSULE ORAL 3 TIMES DAILY
Status: DISCONTINUED | OUTPATIENT
Start: 2019-12-04 | End: 2019-12-05 | Stop reason: HOSPADM

## 2019-12-03 RX ORDER — ALBUTEROL SULFATE 90 UG/1
2 AEROSOL, METERED RESPIRATORY (INHALATION) EVERY 6 HOURS PRN
Status: DISCONTINUED | OUTPATIENT
Start: 2019-12-03 | End: 2019-12-04

## 2019-12-03 RX ORDER — TROSPIUM CHLORIDE 20 MG/1
20 TABLET, FILM COATED ORAL
Status: DISCONTINUED | OUTPATIENT
Start: 2019-12-04 | End: 2019-12-04

## 2019-12-03 RX ORDER — LAMOTRIGINE 25 MG/1
50 TABLET ORAL DAILY
Status: DISCONTINUED | OUTPATIENT
Start: 2019-12-04 | End: 2019-12-05 | Stop reason: HOSPADM

## 2019-12-03 RX ORDER — FAMOTIDINE 20 MG/1
20 TABLET, FILM COATED ORAL NIGHTLY
Status: DISCONTINUED | OUTPATIENT
Start: 2019-12-04 | End: 2019-12-05 | Stop reason: HOSPADM

## 2019-12-03 RX ORDER — ATORVASTATIN CALCIUM 40 MG/1
40 TABLET, FILM COATED ORAL NIGHTLY
Status: DISCONTINUED | OUTPATIENT
Start: 2019-12-04 | End: 2019-12-05 | Stop reason: HOSPADM

## 2019-12-03 RX ORDER — FLUDROCORTISONE ACETATE 0.1 MG/1
0.1 TABLET ORAL DAILY
Status: DISCONTINUED | OUTPATIENT
Start: 2019-12-04 | End: 2019-12-04

## 2019-12-03 RX ORDER — 0.9 % SODIUM CHLORIDE 0.9 %
500 INTRAVENOUS SOLUTION INTRAVENOUS ONCE
Status: COMPLETED | OUTPATIENT
Start: 2019-12-03 | End: 2019-12-03

## 2019-12-03 RX ORDER — PRIMIDONE 50 MG/1
50 TABLET ORAL NIGHTLY
Status: DISCONTINUED | OUTPATIENT
Start: 2019-12-04 | End: 2019-12-05 | Stop reason: HOSPADM

## 2019-12-03 RX ORDER — POTASSIUM CHLORIDE 20 MEQ/1
20 TABLET, EXTENDED RELEASE ORAL 2 TIMES DAILY
Status: DISCONTINUED | OUTPATIENT
Start: 2019-12-04 | End: 2019-12-05 | Stop reason: HOSPADM

## 2019-12-03 RX ORDER — SODIUM CHLORIDE 0.9 % (FLUSH) 0.9 %
10 SYRINGE (ML) INJECTION PRN
Status: DISCONTINUED | OUTPATIENT
Start: 2019-12-03 | End: 2019-12-05 | Stop reason: HOSPADM

## 2019-12-03 RX ORDER — SODIUM CHLORIDE 0.9 % (FLUSH) 0.9 %
10 SYRINGE (ML) INJECTION EVERY 12 HOURS SCHEDULED
Status: DISCONTINUED | OUTPATIENT
Start: 2019-12-04 | End: 2019-12-05 | Stop reason: HOSPADM

## 2019-12-03 RX ADMIN — ASPIRIN 325 MG: 325 TABLET ORAL at 19:13

## 2019-12-03 RX ADMIN — IOPAMIDOL 85 ML: 755 INJECTION, SOLUTION INTRAVENOUS at 17:22

## 2019-12-03 RX ADMIN — SODIUM CHLORIDE 500 ML: 9 INJECTION, SOLUTION INTRAVENOUS at 17:05

## 2019-12-03 ASSESSMENT — PAIN DESCRIPTION - LOCATION
LOCATION: CHEST
LOCATION: BACK;KNEE

## 2019-12-03 ASSESSMENT — PAIN SCALES - GENERAL
PAINLEVEL_OUTOF10: 8
PAINLEVEL_OUTOF10: 6

## 2019-12-04 PROBLEM — Z79.899 POLYPHARMACY: Status: ACTIVE | Noted: 2019-12-04

## 2019-12-04 PROBLEM — R06.02 SOB (SHORTNESS OF BREATH): Status: ACTIVE | Noted: 2019-12-04

## 2019-12-04 PROBLEM — I95.1 ORTHOSTATIC HYPOTENSION: Status: ACTIVE | Noted: 2019-12-04

## 2019-12-04 PROBLEM — I47.29 NON-SUSTAINED VENTRICULAR TACHYCARDIA: Status: RESOLVED | Noted: 2019-12-03 | Resolved: 2019-12-04

## 2019-12-04 PROBLEM — I47.19 PAT (PAROXYSMAL ATRIAL TACHYCARDIA): Status: ACTIVE | Noted: 2019-12-04

## 2019-12-04 PROBLEM — I47.1 PAT (PAROXYSMAL ATRIAL TACHYCARDIA) (HCC): Status: ACTIVE | Noted: 2019-12-04

## 2019-12-04 LAB
A/G RATIO: 1.2 (ref 1.1–2.2)
ALBUMIN SERPL-MCNC: 3.1 G/DL (ref 3.4–5)
ALP BLD-CCNC: 113 U/L (ref 40–129)
ALT SERPL-CCNC: 34 U/L (ref 10–40)
ANION GAP SERPL CALCULATED.3IONS-SCNC: 10 MMOL/L (ref 3–16)
AST SERPL-CCNC: 50 U/L (ref 15–37)
BILIRUB SERPL-MCNC: <0.2 MG/DL (ref 0–1)
BUN BLDV-MCNC: 10 MG/DL (ref 7–20)
CALCIUM SERPL-MCNC: 8.7 MG/DL (ref 8.3–10.6)
CHLORIDE BLD-SCNC: 108 MMOL/L (ref 99–110)
CHOLESTEROL, TOTAL: 249 MG/DL (ref 0–199)
CO2: 21 MMOL/L (ref 21–32)
CREAT SERPL-MCNC: <0.5 MG/DL (ref 0.6–1.2)
GFR AFRICAN AMERICAN: >60
GFR NON-AFRICAN AMERICAN: >60
GLOBULIN: 2.5 G/DL
GLUCOSE BLD-MCNC: 84 MG/DL (ref 70–99)
HCT VFR BLD CALC: 36.6 % (ref 36–48)
HDLC SERPL-MCNC: 59 MG/DL (ref 40–60)
HEMOGLOBIN: 11.9 G/DL (ref 12–16)
LDL CHOLESTEROL CALCULATED: 162 MG/DL
MAGNESIUM: 2 MG/DL (ref 1.8–2.4)
MCH RBC QN AUTO: 30.2 PG (ref 26–34)
MCHC RBC AUTO-ENTMCNC: 32.4 G/DL (ref 31–36)
MCV RBC AUTO: 93.3 FL (ref 80–100)
PDW BLD-RTO: 16.2 % (ref 12.4–15.4)
PLATELET # BLD: 213 K/UL (ref 135–450)
PMV BLD AUTO: 8.3 FL (ref 5–10.5)
POTASSIUM REFLEX MAGNESIUM: 4.9 MMOL/L (ref 3.5–5.1)
RBC # BLD: 3.93 M/UL (ref 4–5.2)
SODIUM BLD-SCNC: 139 MMOL/L (ref 136–145)
TOTAL PROTEIN: 5.6 G/DL (ref 6.4–8.2)
TRIGL SERPL-MCNC: 138 MG/DL (ref 0–150)
TROPONIN: <0.01 NG/ML
VLDLC SERPL CALC-MCNC: 28 MG/DL
WBC # BLD: 6.2 K/UL (ref 4–11)

## 2019-12-04 PROCEDURE — 96360 HYDRATION IV INFUSION INIT: CPT

## 2019-12-04 PROCEDURE — G0378 HOSPITAL OBSERVATION PER HR: HCPCS

## 2019-12-04 PROCEDURE — 97530 THERAPEUTIC ACTIVITIES: CPT

## 2019-12-04 PROCEDURE — 51702 INSERT TEMP BLADDER CATH: CPT

## 2019-12-04 PROCEDURE — 84484 ASSAY OF TROPONIN QUANT: CPT

## 2019-12-04 PROCEDURE — 94150 VITAL CAPACITY TEST: CPT

## 2019-12-04 PROCEDURE — 2580000003 HC RX 258: Performed by: STUDENT IN AN ORGANIZED HEALTH CARE EDUCATION/TRAINING PROGRAM

## 2019-12-04 PROCEDURE — 99222 1ST HOSP IP/OBS MODERATE 55: CPT | Performed by: INTERNAL MEDICINE

## 2019-12-04 PROCEDURE — 83735 ASSAY OF MAGNESIUM: CPT

## 2019-12-04 PROCEDURE — 6370000000 HC RX 637 (ALT 250 FOR IP): Performed by: FAMILY MEDICINE

## 2019-12-04 PROCEDURE — 96372 THER/PROPH/DIAG INJ SC/IM: CPT

## 2019-12-04 PROCEDURE — 1200000000 HC SEMI PRIVATE

## 2019-12-04 PROCEDURE — 2580000003 HC RX 258: Performed by: FAMILY MEDICINE

## 2019-12-04 PROCEDURE — 97165 OT EVAL LOW COMPLEX 30 MIN: CPT

## 2019-12-04 PROCEDURE — 85027 COMPLETE CBC AUTOMATED: CPT

## 2019-12-04 PROCEDURE — 36415 COLL VENOUS BLD VENIPUNCTURE: CPT

## 2019-12-04 PROCEDURE — 97162 PT EVAL MOD COMPLEX 30 MIN: CPT

## 2019-12-04 PROCEDURE — 80053 COMPREHEN METABOLIC PANEL: CPT

## 2019-12-04 PROCEDURE — 6360000002 HC RX W HCPCS: Performed by: FAMILY MEDICINE

## 2019-12-04 PROCEDURE — 96361 HYDRATE IV INFUSION ADD-ON: CPT

## 2019-12-04 PROCEDURE — 80061 LIPID PANEL: CPT

## 2019-12-04 RX ORDER — ALBUTEROL SULFATE 90 UG/1
2 AEROSOL, METERED RESPIRATORY (INHALATION) EVERY 4 HOURS PRN
Status: DISCONTINUED | OUTPATIENT
Start: 2019-12-04 | End: 2019-12-05 | Stop reason: HOSPADM

## 2019-12-04 RX ORDER — FLUDROCORTISONE ACETATE 0.1 MG/1
0.2 TABLET ORAL DAILY
Status: DISCONTINUED | OUTPATIENT
Start: 2019-12-05 | End: 2019-12-05 | Stop reason: HOSPADM

## 2019-12-04 RX ORDER — 0.9 % SODIUM CHLORIDE 0.9 %
500 INTRAVENOUS SOLUTION INTRAVENOUS ONCE
Status: DISCONTINUED | OUTPATIENT
Start: 2019-12-04 | End: 2019-12-05 | Stop reason: HOSPADM

## 2019-12-04 RX ADMIN — OXYCODONE HYDROCHLORIDE AND ACETAMINOPHEN 1 TABLET: 10; 325 TABLET ORAL at 09:13

## 2019-12-04 RX ADMIN — PANTOPRAZOLE SODIUM 40 MG: 40 TABLET, DELAYED RELEASE ORAL at 09:13

## 2019-12-04 RX ADMIN — DILTIAZEM HYDROCHLORIDE 180 MG: 180 CAPSULE, COATED, EXTENDED RELEASE ORAL at 09:13

## 2019-12-04 RX ADMIN — DONEPEZIL HYDROCHLORIDE 10 MG: 5 TABLET, FILM COATED ORAL at 20:22

## 2019-12-04 RX ADMIN — SERTRALINE HYDROCHLORIDE 200 MG: 50 TABLET ORAL at 20:22

## 2019-12-04 RX ADMIN — DONEPEZIL HYDROCHLORIDE 10 MG: 5 TABLET, FILM COATED ORAL at 00:34

## 2019-12-04 RX ADMIN — POTASSIUM CHLORIDE 20 MEQ: 20 TABLET, EXTENDED RELEASE ORAL at 00:34

## 2019-12-04 RX ADMIN — PRIMIDONE 50 MG: 50 TABLET ORAL at 00:34

## 2019-12-04 RX ADMIN — FAMOTIDINE 20 MG: 20 TABLET, FILM COATED ORAL at 20:22

## 2019-12-04 RX ADMIN — FLUTICASONE PROPIONATE 1 SPRAY: 50 SPRAY, METERED NASAL at 00:39

## 2019-12-04 RX ADMIN — TROSPIUM CHLORIDE 20 MG: 20 TABLET, FILM COATED ORAL at 06:44

## 2019-12-04 RX ADMIN — GABAPENTIN 400 MG: 400 CAPSULE ORAL at 20:22

## 2019-12-04 RX ADMIN — CETIRIZINE HYDROCHLORIDE 5 MG: 5 TABLET ORAL at 09:13

## 2019-12-04 RX ADMIN — SULFAMETHOXAZOLE AND TRIMETHOPRIM 1 TABLET: 800; 160 TABLET ORAL at 00:34

## 2019-12-04 RX ADMIN — GABAPENTIN 400 MG: 400 CAPSULE ORAL at 09:13

## 2019-12-04 RX ADMIN — CYANOCOBALAMIN TAB 500 MCG 2000 MCG: 500 TAB at 09:13

## 2019-12-04 RX ADMIN — LAMOTRIGINE 50 MG: 25 TABLET ORAL at 09:13

## 2019-12-04 RX ADMIN — POTASSIUM CHLORIDE 20 MEQ: 20 TABLET, EXTENDED RELEASE ORAL at 09:13

## 2019-12-04 RX ADMIN — Medication 10 ML: at 09:13

## 2019-12-04 RX ADMIN — Medication 10 ML: at 20:23

## 2019-12-04 RX ADMIN — CLONAZEPAM 1 MG: 1 TABLET ORAL at 00:34

## 2019-12-04 RX ADMIN — POTASSIUM CHLORIDE 20 MEQ: 20 TABLET, EXTENDED RELEASE ORAL at 20:22

## 2019-12-04 RX ADMIN — FLUTICASONE PROPIONATE 1 SPRAY: 50 SPRAY, METERED NASAL at 20:26

## 2019-12-04 RX ADMIN — CLONAZEPAM 1 MG: 1 TABLET ORAL at 09:13

## 2019-12-04 RX ADMIN — CEPHALEXIN 500 MG: 250 CAPSULE ORAL at 14:58

## 2019-12-04 RX ADMIN — GABAPENTIN 400 MG: 400 CAPSULE ORAL at 14:58

## 2019-12-04 RX ADMIN — ATORVASTATIN CALCIUM 40 MG: 40 TABLET, FILM COATED ORAL at 20:22

## 2019-12-04 RX ADMIN — FLUDROCORTISONE ACETATE 0.1 MG: 0.1 TABLET ORAL at 09:13

## 2019-12-04 RX ADMIN — OXYCODONE HYDROCHLORIDE AND ACETAMINOPHEN 1 TABLET: 10; 325 TABLET ORAL at 20:22

## 2019-12-04 RX ADMIN — ENOXAPARIN SODIUM 40 MG: 40 INJECTION SUBCUTANEOUS at 09:12

## 2019-12-04 RX ADMIN — CEPHALEXIN 500 MG: 250 CAPSULE ORAL at 00:34

## 2019-12-04 RX ADMIN — CEPHALEXIN 500 MG: 250 CAPSULE ORAL at 20:22

## 2019-12-04 RX ADMIN — SERTRALINE HYDROCHLORIDE 200 MG: 50 TABLET ORAL at 00:34

## 2019-12-04 RX ADMIN — GABAPENTIN 400 MG: 400 CAPSULE ORAL at 00:34

## 2019-12-04 RX ADMIN — LEVOTHYROXINE SODIUM 88 MCG: 0.09 TABLET ORAL at 06:44

## 2019-12-04 RX ADMIN — PRIMIDONE 50 MG: 50 TABLET ORAL at 20:22

## 2019-12-04 RX ADMIN — OXYCODONE HYDROCHLORIDE AND ACETAMINOPHEN 1 TABLET: 10; 325 TABLET ORAL at 00:34

## 2019-12-04 RX ADMIN — CLONAZEPAM 1 MG: 1 TABLET ORAL at 20:22

## 2019-12-04 ASSESSMENT — PAIN DESCRIPTION - PROGRESSION
CLINICAL_PROGRESSION: NOT CHANGED
CLINICAL_PROGRESSION: NOT CHANGED

## 2019-12-04 ASSESSMENT — PAIN DESCRIPTION - PAIN TYPE
TYPE: CHRONIC PAIN
TYPE: CHRONIC PAIN
TYPE: ACUTE PAIN
TYPE: ACUTE PAIN
TYPE: CHRONIC PAIN

## 2019-12-04 ASSESSMENT — PAIN SCALES - GENERAL
PAINLEVEL_OUTOF10: 0
PAINLEVEL_OUTOF10: 8
PAINLEVEL_OUTOF10: 0
PAINLEVEL_OUTOF10: 0
PAINLEVEL_OUTOF10: 6
PAINLEVEL_OUTOF10: 6
PAINLEVEL_OUTOF10: 8
PAINLEVEL_OUTOF10: 6
PAINLEVEL_OUTOF10: 0
PAINLEVEL_OUTOF10: 8
PAINLEVEL_OUTOF10: 8
PAINLEVEL_OUTOF10: 6
PAINLEVEL_OUTOF10: 8

## 2019-12-04 ASSESSMENT — PAIN DESCRIPTION - LOCATION
LOCATION: BACK
LOCATION: BACK;LEG
LOCATION: BACK
LOCATION: BACK
LOCATION: BACK;LEG

## 2019-12-04 ASSESSMENT — PAIN DESCRIPTION - FREQUENCY
FREQUENCY: INTERMITTENT

## 2019-12-04 ASSESSMENT — PAIN DESCRIPTION - ONSET: ONSET: ON-GOING

## 2019-12-04 ASSESSMENT — PAIN DESCRIPTION - DESCRIPTORS
DESCRIPTORS: ACHING

## 2019-12-04 ASSESSMENT — PAIN DESCRIPTION - ORIENTATION
ORIENTATION: LEFT
ORIENTATION: LEFT

## 2019-12-05 VITALS
OXYGEN SATURATION: 96 % | SYSTOLIC BLOOD PRESSURE: 109 MMHG | BODY MASS INDEX: 30.94 KG/M2 | HEIGHT: 60 IN | TEMPERATURE: 97.7 F | HEART RATE: 82 BPM | RESPIRATION RATE: 18 BRPM | DIASTOLIC BLOOD PRESSURE: 71 MMHG | WEIGHT: 157.6 LBS

## 2019-12-05 PROCEDURE — 6370000000 HC RX 637 (ALT 250 FOR IP): Performed by: STUDENT IN AN ORGANIZED HEALTH CARE EDUCATION/TRAINING PROGRAM

## 2019-12-05 PROCEDURE — 6370000000 HC RX 637 (ALT 250 FOR IP): Performed by: FAMILY MEDICINE

## 2019-12-05 PROCEDURE — G0378 HOSPITAL OBSERVATION PER HR: HCPCS

## 2019-12-05 RX ORDER — FLUDROCORTISONE ACETATE 0.1 MG/1
0.2 TABLET ORAL DAILY
Qty: 14 TABLET | Refills: 0 | Status: SHIPPED | OUTPATIENT
Start: 2019-12-05 | End: 2019-12-12

## 2019-12-05 RX ADMIN — CYANOCOBALAMIN TAB 500 MCG 2000 MCG: 500 TAB at 09:52

## 2019-12-05 RX ADMIN — PANTOPRAZOLE SODIUM 40 MG: 40 TABLET, DELAYED RELEASE ORAL at 09:51

## 2019-12-05 RX ADMIN — LEVOTHYROXINE SODIUM 88 MCG: 0.09 TABLET ORAL at 05:49

## 2019-12-05 RX ADMIN — DILTIAZEM HYDROCHLORIDE 180 MG: 180 CAPSULE, COATED, EXTENDED RELEASE ORAL at 09:52

## 2019-12-05 RX ADMIN — POTASSIUM CHLORIDE 20 MEQ: 20 TABLET, EXTENDED RELEASE ORAL at 09:52

## 2019-12-05 RX ADMIN — LAMOTRIGINE 50 MG: 25 TABLET ORAL at 09:51

## 2019-12-05 RX ADMIN — FLUDROCORTISONE ACETATE 0.2 MG: 0.1 TABLET ORAL at 09:51

## 2019-12-05 RX ADMIN — GABAPENTIN 400 MG: 400 CAPSULE ORAL at 09:51

## 2019-12-05 RX ADMIN — CEPHALEXIN 500 MG: 250 CAPSULE ORAL at 09:51

## 2019-12-05 RX ADMIN — CETIRIZINE HYDROCHLORIDE 5 MG: 5 TABLET ORAL at 09:51

## 2019-12-05 ASSESSMENT — PAIN SCALES - GENERAL: PAINLEVEL_OUTOF10: 10

## 2019-12-05 ASSESSMENT — PAIN DESCRIPTION - LOCATION: LOCATION: HEAD

## 2019-12-05 ASSESSMENT — PAIN DESCRIPTION - PAIN TYPE: TYPE: ACUTE PAIN

## 2019-12-17 ENCOUNTER — HOSPITAL ENCOUNTER (EMERGENCY)
Age: 64
Discharge: HOME | End: 2019-12-17
Payer: MEDICARE

## 2019-12-17 ENCOUNTER — APPOINTMENT (OUTPATIENT)
Dept: CT IMAGING | Age: 64
End: 2019-12-17
Payer: MEDICARE

## 2019-12-17 VITALS
HEART RATE: 83 BPM | HEIGHT: 60 IN | TEMPERATURE: 97 F | RESPIRATION RATE: 16 BRPM | BODY MASS INDEX: 30.43 KG/M2 | WEIGHT: 155 LBS | SYSTOLIC BLOOD PRESSURE: 99 MMHG | DIASTOLIC BLOOD PRESSURE: 49 MMHG | OXYGEN SATURATION: 100 %

## 2019-12-17 DIAGNOSIS — R10.12 LEFT UPPER QUADRANT PAIN: Primary | ICD-10-CM

## 2019-12-17 DIAGNOSIS — N30.00 ACUTE CYSTITIS WITHOUT HEMATURIA: ICD-10-CM

## 2019-12-17 DIAGNOSIS — R11.0 NAUSEA: ICD-10-CM

## 2019-12-17 LAB
A/G RATIO: 1.7 (ref 1.1–2.2)
ALBUMIN SERPL-MCNC: 4 G/DL (ref 3.4–5)
ALP BLD-CCNC: 114 U/L (ref 40–129)
ALT SERPL-CCNC: 21 U/L (ref 10–40)
ANION GAP SERPL CALCULATED.3IONS-SCNC: 11 MMOL/L (ref 3–16)
AST SERPL-CCNC: 41 U/L (ref 15–37)
BACTERIA: ABNORMAL /HPF
BASOPHILS ABSOLUTE: 0.1 K/UL (ref 0–0.2)
BASOPHILS RELATIVE PERCENT: 1.1 %
BILIRUB SERPL-MCNC: <0.2 MG/DL (ref 0–1)
BILIRUBIN URINE: NEGATIVE
BLOOD, URINE: ABNORMAL
BUN BLDV-MCNC: 12 MG/DL (ref 7–20)
CALCIUM SERPL-MCNC: 9.3 MG/DL (ref 8.3–10.6)
CHLORIDE BLD-SCNC: 107 MMOL/L (ref 99–110)
CLARITY: ABNORMAL
CO2: 23 MMOL/L (ref 21–32)
COLOR: YELLOW
CREAT SERPL-MCNC: 0.7 MG/DL (ref 0.6–1.2)
EOSINOPHILS ABSOLUTE: 0.1 K/UL (ref 0–0.6)
EOSINOPHILS RELATIVE PERCENT: 2.2 %
EPITHELIAL CELLS, UA: ABNORMAL /HPF
GFR AFRICAN AMERICAN: >60
GFR NON-AFRICAN AMERICAN: >60
GLOBULIN: 2.4 G/DL
GLUCOSE BLD-MCNC: 70 MG/DL (ref 70–99)
GLUCOSE URINE: NEGATIVE MG/DL
HCT VFR BLD CALC: 39.5 % (ref 36–48)
HEMOGLOBIN: 12.5 G/DL (ref 12–16)
KETONES, URINE: NEGATIVE MG/DL
LEUKOCYTE ESTERASE, URINE: ABNORMAL
LIPASE: 27 U/L (ref 13–60)
LYMPHOCYTES ABSOLUTE: 2.6 K/UL (ref 1–5.1)
LYMPHOCYTES RELATIVE PERCENT: 51.7 %
MCH RBC QN AUTO: 30.3 PG (ref 26–34)
MCHC RBC AUTO-ENTMCNC: 31.7 G/DL (ref 31–36)
MCV RBC AUTO: 95.6 FL (ref 80–100)
MICROSCOPIC EXAMINATION: YES
MONOCYTES ABSOLUTE: 0.5 K/UL (ref 0–1.3)
MONOCYTES RELATIVE PERCENT: 9.4 %
MUCUS: ABNORMAL /LPF
NEUTROPHILS ABSOLUTE: 1.8 K/UL (ref 1.7–7.7)
NEUTROPHILS RELATIVE PERCENT: 35.6 %
NITRITE, URINE: NEGATIVE
PDW BLD-RTO: 16 % (ref 12.4–15.4)
PH UA: 6 (ref 5–8)
PLATELET # BLD: 261 K/UL (ref 135–450)
PMV BLD AUTO: 8.2 FL (ref 5–10.5)
POTASSIUM REFLEX MAGNESIUM: 5.1 MMOL/L (ref 3.5–5.1)
PROTEIN UA: ABNORMAL MG/DL
RBC # BLD: 4.13 M/UL (ref 4–5.2)
RBC UA: ABNORMAL /HPF (ref 0–2)
SODIUM BLD-SCNC: 141 MMOL/L (ref 136–145)
SPECIFIC GRAVITY UA: >=1.03 (ref 1–1.03)
TOTAL PROTEIN: 6.4 G/DL (ref 6.4–8.2)
URINE REFLEX TO CULTURE: YES
URINE TYPE: ABNORMAL
UROBILINOGEN, URINE: 0.2 E.U./DL
WBC # BLD: 5.1 K/UL (ref 4–11)
WBC UA: ABNORMAL /HPF (ref 0–5)

## 2019-12-17 PROCEDURE — 36415 COLL VENOUS BLD VENIPUNCTURE: CPT

## 2019-12-17 PROCEDURE — 83690 ASSAY OF LIPASE: CPT

## 2019-12-17 PROCEDURE — 96375 TX/PRO/DX INJ NEW DRUG ADDON: CPT

## 2019-12-17 PROCEDURE — 96361 HYDRATE IV INFUSION ADD-ON: CPT

## 2019-12-17 PROCEDURE — 85025 COMPLETE CBC W/AUTO DIFF WBC: CPT

## 2019-12-17 PROCEDURE — 87086 URINE CULTURE/COLONY COUNT: CPT

## 2019-12-17 PROCEDURE — 81001 URINALYSIS AUTO W/SCOPE: CPT

## 2019-12-17 PROCEDURE — 6360000002 HC RX W HCPCS: Performed by: NURSE PRACTITIONER

## 2019-12-17 PROCEDURE — 6360000004 HC RX CONTRAST MEDICATION: Performed by: NURSE PRACTITIONER

## 2019-12-17 PROCEDURE — 80053 COMPREHEN METABOLIC PANEL: CPT

## 2019-12-17 PROCEDURE — 99284 EMERGENCY DEPT VISIT MOD MDM: CPT

## 2019-12-17 PROCEDURE — 2580000003 HC RX 258: Performed by: NURSE PRACTITIONER

## 2019-12-17 PROCEDURE — 96365 THER/PROPH/DIAG IV INF INIT: CPT

## 2019-12-17 PROCEDURE — 74177 CT ABD & PELVIS W/CONTRAST: CPT

## 2019-12-17 RX ORDER — METOCLOPRAMIDE HYDROCHLORIDE 5 MG/ML
10 INJECTION INTRAMUSCULAR; INTRAVENOUS ONCE
Status: COMPLETED | OUTPATIENT
Start: 2019-12-17 | End: 2019-12-17

## 2019-12-17 RX ORDER — MORPHINE SULFATE 4 MG/ML
4 INJECTION, SOLUTION INTRAMUSCULAR; INTRAVENOUS ONCE
Status: COMPLETED | OUTPATIENT
Start: 2019-12-17 | End: 2019-12-17

## 2019-12-17 RX ORDER — CEFDINIR 300 MG/1
300 CAPSULE ORAL 2 TIMES DAILY
Qty: 20 CAPSULE | Refills: 0 | Status: SHIPPED | OUTPATIENT
Start: 2019-12-17 | End: 2019-12-27

## 2019-12-17 RX ORDER — 0.9 % SODIUM CHLORIDE 0.9 %
1000 INTRAVENOUS SOLUTION INTRAVENOUS ONCE
Status: COMPLETED | OUTPATIENT
Start: 2019-12-17 | End: 2019-12-17

## 2019-12-17 RX ORDER — DICYCLOMINE HYDROCHLORIDE 10 MG/1
10 CAPSULE ORAL EVERY 6 HOURS PRN
Qty: 20 CAPSULE | Refills: 0 | Status: SHIPPED | OUTPATIENT
Start: 2019-12-17 | End: 2021-03-29

## 2019-12-17 RX ORDER — METOCLOPRAMIDE 10 MG/1
10 TABLET ORAL 4 TIMES DAILY
Qty: 20 TABLET | Refills: 0 | Status: ON HOLD | OUTPATIENT
Start: 2019-12-17 | End: 2020-04-29

## 2019-12-17 RX ORDER — ONDANSETRON 2 MG/ML
4 INJECTION INTRAMUSCULAR; INTRAVENOUS ONCE
Status: COMPLETED | OUTPATIENT
Start: 2019-12-17 | End: 2019-12-17

## 2019-12-17 RX ORDER — DIPHENHYDRAMINE HYDROCHLORIDE 50 MG/ML
12.5 INJECTION INTRAMUSCULAR; INTRAVENOUS ONCE
Status: COMPLETED | OUTPATIENT
Start: 2019-12-17 | End: 2019-12-17

## 2019-12-17 RX ADMIN — ONDANSETRON HYDROCHLORIDE 4 MG: 2 INJECTION, SOLUTION INTRAMUSCULAR; INTRAVENOUS at 10:57

## 2019-12-17 RX ADMIN — METOCLOPRAMIDE 10 MG: 5 INJECTION, SOLUTION INTRAMUSCULAR; INTRAVENOUS at 13:02

## 2019-12-17 RX ADMIN — SODIUM CHLORIDE 1000 ML: 9 INJECTION, SOLUTION INTRAVENOUS at 14:19

## 2019-12-17 RX ADMIN — MORPHINE SULFATE 4 MG: 4 INJECTION, SOLUTION INTRAMUSCULAR; INTRAVENOUS at 10:56

## 2019-12-17 RX ADMIN — CEFTRIAXONE SODIUM 1 G: 1 INJECTION, POWDER, FOR SOLUTION INTRAMUSCULAR; INTRAVENOUS at 14:00

## 2019-12-17 RX ADMIN — IOPAMIDOL 75 ML: 755 INJECTION, SOLUTION INTRAVENOUS at 11:50

## 2019-12-17 RX ADMIN — DIPHENHYDRAMINE HYDROCHLORIDE 12.5 MG: 50 INJECTION, SOLUTION INTRAMUSCULAR; INTRAVENOUS at 13:02

## 2019-12-17 RX ADMIN — SODIUM CHLORIDE 1000 ML: 9 INJECTION, SOLUTION INTRAVENOUS at 11:03

## 2019-12-17 ASSESSMENT — ENCOUNTER SYMPTOMS
COUGH: 0
DIARRHEA: 1
NAUSEA: 1
VOMITING: 0
BACK PAIN: 0
SHORTNESS OF BREATH: 0
ABDOMINAL DISTENTION: 0
ABDOMINAL PAIN: 1
BLOOD IN STOOL: 0

## 2019-12-17 ASSESSMENT — PAIN SCALES - GENERAL: PAINLEVEL_OUTOF10: 6

## 2019-12-18 LAB — URINE CULTURE, ROUTINE: NORMAL

## 2019-12-19 ENCOUNTER — APPOINTMENT (OUTPATIENT)
Dept: CT IMAGING | Age: 64
End: 2019-12-19
Payer: MEDICARE

## 2019-12-19 ENCOUNTER — HOSPITAL ENCOUNTER (EMERGENCY)
Age: 64
Discharge: HOME | End: 2019-12-19
Attending: EMERGENCY MEDICINE
Payer: MEDICARE

## 2019-12-19 ENCOUNTER — APPOINTMENT (OUTPATIENT)
Dept: GENERAL RADIOLOGY | Age: 64
End: 2019-12-19
Payer: MEDICARE

## 2019-12-19 VITALS
HEIGHT: 60 IN | SYSTOLIC BLOOD PRESSURE: 90 MMHG | HEART RATE: 68 BPM | BODY MASS INDEX: 30.43 KG/M2 | RESPIRATION RATE: 15 BRPM | WEIGHT: 155 LBS | DIASTOLIC BLOOD PRESSURE: 60 MMHG | OXYGEN SATURATION: 94 % | TEMPERATURE: 98.4 F

## 2019-12-19 DIAGNOSIS — R06.00 DYSPNEA, UNSPECIFIED TYPE: Primary | ICD-10-CM

## 2019-12-19 DIAGNOSIS — N39.0 URINARY TRACT INFECTION WITHOUT HEMATURIA, SITE UNSPECIFIED: ICD-10-CM

## 2019-12-19 LAB
A/G RATIO: 1.3 (ref 1.1–2.2)
ALBUMIN SERPL-MCNC: 3.6 G/DL (ref 3.4–5)
ALP BLD-CCNC: 118 U/L (ref 40–129)
ALT SERPL-CCNC: 16 U/L (ref 10–40)
ANION GAP SERPL CALCULATED.3IONS-SCNC: 11 MMOL/L (ref 3–16)
ANISOCYTOSIS: ABNORMAL
AST SERPL-CCNC: 30 U/L (ref 15–37)
ATYPICAL LYMPHOCYTE RELATIVE PERCENT: 3 % (ref 0–6)
BACTERIA: ABNORMAL /HPF
BASOPHILS ABSOLUTE: 0.1 K/UL (ref 0–0.2)
BASOPHILS RELATIVE PERCENT: 2 %
BILIRUB SERPL-MCNC: <0.2 MG/DL (ref 0–1)
BILIRUBIN URINE: NEGATIVE
BLOOD, URINE: NEGATIVE
BUN BLDV-MCNC: 7 MG/DL (ref 7–20)
CALCIUM SERPL-MCNC: 9.2 MG/DL (ref 8.3–10.6)
CHLORIDE BLD-SCNC: 105 MMOL/L (ref 99–110)
CLARITY: CLEAR
CO2: 24 MMOL/L (ref 21–32)
COLOR: YELLOW
CREAT SERPL-MCNC: 0.6 MG/DL (ref 0.6–1.2)
D DIMER: 702 NG/ML DDU (ref 0–229)
EKG ATRIAL RATE: 74 BPM
EKG ATRIAL RATE: 83 BPM
EKG DIAGNOSIS: NORMAL
EKG DIAGNOSIS: NORMAL
EKG P AXIS: 43 DEGREES
EKG P AXIS: 70 DEGREES
EKG P-R INTERVAL: 132 MS
EKG P-R INTERVAL: 148 MS
EKG Q-T INTERVAL: 386 MS
EKG Q-T INTERVAL: 412 MS
EKG QRS DURATION: 90 MS
EKG QRS DURATION: 92 MS
EKG QTC CALCULATION (BAZETT): 453 MS
EKG QTC CALCULATION (BAZETT): 457 MS
EKG R AXIS: 80 DEGREES
EKG R AXIS: 83 DEGREES
EKG T AXIS: 56 DEGREES
EKG T AXIS: 66 DEGREES
EKG VENTRICULAR RATE: 74 BPM
EKG VENTRICULAR RATE: 83 BPM
EOSINOPHILS ABSOLUTE: 0.2 K/UL (ref 0–0.6)
EOSINOPHILS RELATIVE PERCENT: 3 %
GFR AFRICAN AMERICAN: >60
GFR NON-AFRICAN AMERICAN: >60
GLOBULIN: 2.7 G/DL
GLUCOSE BLD-MCNC: 91 MG/DL (ref 70–99)
GLUCOSE URINE: NEGATIVE MG/DL
HCT VFR BLD CALC: 38.2 % (ref 36–48)
HEMOGLOBIN: 12 G/DL (ref 12–16)
KETONES, URINE: NEGATIVE MG/DL
LEUKOCYTE ESTERASE, URINE: ABNORMAL
LIPASE: 16 U/L (ref 13–60)
LYMPHOCYTES ABSOLUTE: 3.3 K/UL (ref 1–5.1)
LYMPHOCYTES RELATIVE PERCENT: 49 %
MCH RBC QN AUTO: 30.3 PG (ref 26–34)
MCHC RBC AUTO-ENTMCNC: 31.5 G/DL (ref 31–36)
MCV RBC AUTO: 96.3 FL (ref 80–100)
MICROSCOPIC EXAMINATION: YES
MONOCYTES ABSOLUTE: 0.4 K/UL (ref 0–1.3)
MONOCYTES RELATIVE PERCENT: 6 %
MUCUS: ABNORMAL /LPF
NEUTROPHILS ABSOLUTE: 2.3 K/UL (ref 1.7–7.7)
NEUTROPHILS RELATIVE PERCENT: 37 %
NITRITE, URINE: NEGATIVE
PDW BLD-RTO: 16.2 % (ref 12.4–15.4)
PH UA: 6 (ref 5–8)
PLATELET # BLD: 240 K/UL (ref 135–450)
PLATELET SLIDE REVIEW: ADEQUATE
PMV BLD AUTO: 8.5 FL (ref 5–10.5)
POTASSIUM REFLEX MAGNESIUM: 4.2 MMOL/L (ref 3.5–5.1)
PRO-BNP: 235 PG/ML (ref 0–124)
PROTEIN UA: NEGATIVE MG/DL
RBC # BLD: 3.97 M/UL (ref 4–5.2)
RBC UA: ABNORMAL /HPF (ref 0–2)
SLIDE REVIEW: ABNORMAL
SMUDGE CELLS: PRESENT
SODIUM BLD-SCNC: 140 MMOL/L (ref 136–145)
SPECIFIC GRAVITY UA: 1.01 (ref 1–1.03)
TOTAL PROTEIN: 6.3 G/DL (ref 6.4–8.2)
TROPONIN: <0.01 NG/ML
URINE REFLEX TO CULTURE: YES
URINE TYPE: ABNORMAL
UROBILINOGEN, URINE: 0.2 E.U./DL
WBC # BLD: 6.3 K/UL (ref 4–11)
WBC UA: ABNORMAL /HPF (ref 0–5)

## 2019-12-19 PROCEDURE — 96361 HYDRATE IV INFUSION ADD-ON: CPT

## 2019-12-19 PROCEDURE — 93005 ELECTROCARDIOGRAM TRACING: CPT | Performed by: PHYSICIAN ASSISTANT

## 2019-12-19 PROCEDURE — 96360 HYDRATION IV INFUSION INIT: CPT

## 2019-12-19 PROCEDURE — 87086 URINE CULTURE/COLONY COUNT: CPT

## 2019-12-19 PROCEDURE — 71260 CT THORAX DX C+: CPT

## 2019-12-19 PROCEDURE — 85379 FIBRIN DEGRADATION QUANT: CPT

## 2019-12-19 PROCEDURE — 80053 COMPREHEN METABOLIC PANEL: CPT

## 2019-12-19 PROCEDURE — 6370000000 HC RX 637 (ALT 250 FOR IP): Performed by: PHYSICIAN ASSISTANT

## 2019-12-19 PROCEDURE — 6360000004 HC RX CONTRAST MEDICATION: Performed by: PHYSICIAN ASSISTANT

## 2019-12-19 PROCEDURE — 71045 X-RAY EXAM CHEST 1 VIEW: CPT

## 2019-12-19 PROCEDURE — 93010 ELECTROCARDIOGRAM REPORT: CPT | Performed by: INTERNAL MEDICINE

## 2019-12-19 PROCEDURE — 93005 ELECTROCARDIOGRAM TRACING: CPT | Performed by: EMERGENCY MEDICINE

## 2019-12-19 PROCEDURE — 83880 ASSAY OF NATRIURETIC PEPTIDE: CPT

## 2019-12-19 PROCEDURE — 81001 URINALYSIS AUTO W/SCOPE: CPT

## 2019-12-19 PROCEDURE — 74177 CT ABD & PELVIS W/CONTRAST: CPT

## 2019-12-19 PROCEDURE — 51798 US URINE CAPACITY MEASURE: CPT

## 2019-12-19 PROCEDURE — 2580000003 HC RX 258: Performed by: PHYSICIAN ASSISTANT

## 2019-12-19 PROCEDURE — 83690 ASSAY OF LIPASE: CPT

## 2019-12-19 PROCEDURE — 99285 EMERGENCY DEPT VISIT HI MDM: CPT

## 2019-12-19 PROCEDURE — 84484 ASSAY OF TROPONIN QUANT: CPT

## 2019-12-19 PROCEDURE — 85025 COMPLETE CBC W/AUTO DIFF WBC: CPT

## 2019-12-19 RX ORDER — ACETAMINOPHEN 500 MG
500 TABLET ORAL ONCE
Status: COMPLETED | OUTPATIENT
Start: 2019-12-19 | End: 2019-12-19

## 2019-12-19 RX ORDER — 0.9 % SODIUM CHLORIDE 0.9 %
1000 INTRAVENOUS SOLUTION INTRAVENOUS ONCE
Status: COMPLETED | OUTPATIENT
Start: 2019-12-19 | End: 2019-12-19

## 2019-12-19 RX ADMIN — ACETAMINOPHEN 500 MG: 500 TABLET ORAL at 15:07

## 2019-12-19 RX ADMIN — IOPAMIDOL 75 ML: 755 INJECTION, SOLUTION INTRAVENOUS at 14:48

## 2019-12-19 RX ADMIN — SODIUM CHLORIDE 1000 ML: 9 INJECTION, SOLUTION INTRAVENOUS at 14:26

## 2019-12-19 ASSESSMENT — PAIN DESCRIPTION - DESCRIPTORS: DESCRIPTORS: HEADACHE

## 2019-12-19 ASSESSMENT — PAIN SCALES - GENERAL
PAINLEVEL_OUTOF10: 6
PAINLEVEL_OUTOF10: 3

## 2019-12-20 ASSESSMENT — ENCOUNTER SYMPTOMS
ABDOMINAL PAIN: 1
SHORTNESS OF BREATH: 1

## 2019-12-22 LAB — URINE CULTURE, ROUTINE: NORMAL

## 2020-01-08 ENCOUNTER — OFFICE VISIT (OUTPATIENT)
Dept: ORTHOPEDIC SURGERY | Age: 65
End: 2020-01-08
Payer: MEDICARE

## 2020-01-08 VITALS — HEIGHT: 60 IN | WEIGHT: 154.98 LBS | BODY MASS INDEX: 30.43 KG/M2

## 2020-01-08 PROCEDURE — 99213 OFFICE O/P EST LOW 20 MIN: CPT | Performed by: ORTHOPAEDIC SURGERY

## 2020-01-08 PROCEDURE — L1812 KO ELASTIC W/JOINTS PRE OTS: HCPCS | Performed by: ORTHOPAEDIC SURGERY

## 2020-01-08 RX ORDER — SULINDAC 150 MG/1
150 TABLET ORAL 2 TIMES DAILY
Qty: 60 TABLET | Refills: 2 | Status: SHIPPED | OUTPATIENT
Start: 2020-01-08 | End: 2020-04-13

## 2020-01-08 NOTE — PROGRESS NOTES
good.      Assessment/plan: Ongoing reported disabling pain involving the LEFT knee without clear-cut etiology. We will fit her for a new economy hinged knee brace when to get some physical therapy and [facility]. If symptoms persist the only other option would be to send her to Dr. Purnima Hope to think that she is a candidate for some sort of revision. I have personally performed and/or participated in the history, exam and medical decision making and agree with all pertinent clinical information. I have also reviewed and agree with the past medical, family and social history unless otherwise noted. This dictation was performed with a verbal recognition program (DRAGON) and it was checked for errors. It is possible that there are still dictated errors within this office note. If so, please bring any errors to my attention for an addendum. All efforts were made to ensure that this office note is accurate.           Linda Morin MD

## 2020-01-27 ENCOUNTER — HOSPITAL ENCOUNTER (OUTPATIENT)
Dept: MAMMOGRAPHY | Age: 65
Discharge: HOME | End: 2020-01-27
Payer: MEDICARE

## 2020-01-27 PROCEDURE — 77067 SCR MAMMO BI INCL CAD: CPT

## 2020-01-28 ENCOUNTER — TELEPHONE (OUTPATIENT)
Dept: PULMONOLOGY | Age: 65
End: 2020-01-28

## 2020-02-13 ENCOUNTER — TELEPHONE (OUTPATIENT)
Dept: PULMONOLOGY | Age: 65
End: 2020-02-13

## 2020-02-13 ENCOUNTER — HOSPITAL ENCOUNTER (OUTPATIENT)
Dept: PHYSICAL THERAPY | Age: 65
Setting detail: THERAPIES SERIES
Discharge: HOME | End: 2020-02-13
Payer: MEDICARE

## 2020-02-13 PROCEDURE — 97163 PT EVAL HIGH COMPLEX 45 MIN: CPT

## 2020-02-13 PROCEDURE — 97110 THERAPEUTIC EXERCISES: CPT

## 2020-02-13 PROCEDURE — 97116 GAIT TRAINING THERAPY: CPT

## 2020-02-13 PROCEDURE — 97530 THERAPEUTIC ACTIVITIES: CPT

## 2020-02-13 ASSESSMENT — PAIN DESCRIPTION - ONSET: ONSET: ON-GOING

## 2020-02-13 ASSESSMENT — PAIN DESCRIPTION - ORIENTATION: ORIENTATION: LEFT

## 2020-02-13 ASSESSMENT — PAIN SCALES - GENERAL: PAINLEVEL_OUTOF10: 7

## 2020-02-13 ASSESSMENT — PAIN - FUNCTIONAL ASSESSMENT: PAIN_FUNCTIONAL_ASSESSMENT: PREVENTS OR INTERFERES SOME ACTIVE ACTIVITIES AND ADLS

## 2020-02-13 ASSESSMENT — PAIN DESCRIPTION - DESCRIPTORS: DESCRIPTORS: DISCOMFORT

## 2020-02-13 ASSESSMENT — PAIN DESCRIPTION - PAIN TYPE: TYPE: CHRONIC PAIN

## 2020-02-13 ASSESSMENT — PAIN DESCRIPTION - FREQUENCY: FREQUENCY: CONTINUOUS

## 2020-02-13 ASSESSMENT — PAIN DESCRIPTION - LOCATION: LOCATION: BACK;KNEE

## 2020-02-13 NOTE — TELEPHONE ENCOUNTER
Patient returned call to [facility] PFT lab has no opening. Need to call to see if she can be added Monday with same day appt with Dr Denia Mckinnye.

## 2020-02-13 NOTE — PROGRESS NOTES
Out Patient Physical Therapy: Note received from Dr Julieth Dial at [facility] 11:59 am that pt cannot go back to PT until she has oxygen, which was ordered today. Note scanned into system.     Tonya Vo PT

## 2020-02-13 NOTE — TELEPHONE ENCOUNTER
Patient PCP office called states she is having issues with O2 levels dropping down to 84% with walking only 30ft. Dr Marlon Rowe is requesting appt asap? Asked if patient is currently having any additional symptoms she states she is not. 11/12/19    Assessment:       · Dyspnea-doubt of pulmonary origin. Deconditioning is contributing.   · Mild lower lobe pulmonary bronchiectasis  · Abnormal CT chest with chronic lower lobe scarring  · Decreased diffusion capacity  · Recurrent SVT post dual-chamber pacemaker  · Moderate NATHAN followed by  sleep clinic/ENT post hypoglossal nerve stimulator July 2018  · Chronic pain syndrome on opiates  · Lifelong non-smoker         Plan:    · Repeat PFTs and 6-minute walk  · Check TSH   · Trial of albuterol 2 puffs Q4-6 hrs PRN  · Advised to follow-up with  sleep clinic/ENT for hypoglossal nerve stimulator management

## 2020-02-13 NOTE — PROGRESS NOTES
entrance  Bathroom Shower/Tub: Walk-in shower  Bathroom Toilet: Handicap height  Bathroom Equipment: Grab bars in shower; Shower chair  Home Equipment: 4 wheeled walker;Cane  Receives Help From: Family  ADL Assistance: (family assist with showers, pt dresses indep)  Ambulation Assistance: Independent(cane or [id number])  Transfer Assistance: Independent    Objective     Posture: Poor  Observation: Very rounded shoulders, scapulae sit laterally, protruding abdomen, lack of muscle bulk noted throughout torso, UEs and LEs. RLE AROM: WFL  LLE AROM : WFL(Stiffness noted in L knee for full knee extension,, clicking noted L knee when pt performs bridge duing bed mobility)  RUE AROM : WFL  LUE AROM : WFL    Strength RLE: R hip flexion 4-, abd 3+, ext 3, knee ext 5, knee flex 4+, DF 4+  Strength LLE: L Hip flexion 3-, abd 3-, ext 3-, Knee ext 4, knee flexion 4-, DF 4+  Strength : BUEs grossly 4 to 4+/5 throughout    Bed mobiity  Bridging: Modified independent (minimal hip clearance on 3 trials)  Rolling to Left: Modified independent  Rolling to Right: Modified independent  Supine to Sit: Modified independent  Sit to Supine: Modified independent  Comment: all bed mobility on flat treatment table modified indep but slow and effortful. Pt demonstrates decreased trunk control and relies on UEs to perform movements  Transfers  Sit to Stand: Modified independent  Stand to sit: Modified independent  Comment: Practicied sit to and from stand from 18 inch treatment table height 5 x in succession. Pt relies heavily on UEs to arise and for eccentric lowering of body wt. Early fatigue, high heart rate and decreased O2 sats noted with transfer training     Ambulation  Quality of Gait: On 1st trial using cane pt moves quite slowly, hips shifted to Left, O2 sats drop to 84% after 30 ft.  2nd and 3rd trials with 4WW pt demonstrates improved posture, mild increase in pace, O2 sats drop to 88%.   Distance: 3 trails of 30-40 ft with seated rest and she may need O2 during home activity . Daughter Doreen Traore called Dr. Shon Stearns office while still at PT. Pt taken to car via w/c to avoid any desaturation with activity upon leaving. O2 sat was 91% upon leaving PT dept. Plan   Times per week: 2 x week for 6 weeks.  Pt advised to see MD today for assessment of oxygen needs prior to return to PT  Specific instructions for Next Treatment: reassess vital signs prior to clinical decision making regarding exercise regime  Current Treatment Recommendations: Strengthening, Gait Training, Balance Training, Functional Mobility Training, Endurance Training, Transfer Training, Safety Education & Training, Home Exercise Program    Goals  Short term goals  Time Frame for Short term goals: 3 weeks  Short term goal 1: Pt to report back and knee pain to be less than or equal to 5/10  Short term goal 2: Pt to perform bed mobility with greater ease in normalized time frame  Short term goal 3: pt to be able to amb 150 ft with stable HR and O2 sats  Short term goal 4: pt to be indep with basic HEP  Long term goals  Time Frame for Long term goals : 6 weeks  Long term goal 1: Pt to rate back and knee pain less than or equal to 4/10  Long term goal 2: Pt to be able to complete 5 sit to and from stand transfers without drop in O2 sats  Long term goal 3: Pt to be able to amb 200 ft with stable HEP and O2 sats  Long term goal 4: pt to be indep with complete HEP  Patient Goals   Patient goals : \"to get more strength and endurance\"       Therapy Time   Individual Concurrent Group Co-treatment   Time In 0800         Time Out 0900         Minutes 60         Timed Code Treatment Minutes: [address], [id number]

## 2020-02-13 NOTE — PROGRESS NOTES
TINETTI BALANCE ASSESSMENT TOOL    Patient name:  Camilo Swain     Date:  2/13/2020  Completed by:  Wenceslao Nichole 15    Patient is seated in hard, armless chair;  Sitting Balance     Score: [] 0 [x] 1  Leans or slides in chair = 0     Steady, safe = 1    Rises from chair     Score: [] 0 [x] 1 [] 2  Unable to without help = 0  Able, uses arms to help = 1  Able without use of arms = 2    Attempts to rise     Score: [] 0 [] 1 [x] 2  Unable to without help = 0  Able, requires > 1 attempt = 1  Able to rise, 1 attempt = 2    Immediate standing Balance (first 5 seconds) Score: [] 0 [] 1 [x] 2  Unsteady (staggers, moves feet, trunk sway) = 0  Steady but uses walker or other support = 1  Steady without walker or other support = 2    Standing balance     Score: [] 0 [x] 1 [] 2  Unsteady = 0  Steady but wide stance and uses support = 1  Narrow stance without support = 2    Nudged (sternal nudge)    Score: [] 0 [x] 1 []   2  Begins to fall = 0  Staggers, grabs, catches self = 1  Steady = 2    Eyes closed      Score: [x] 0 [] 1  Unsteady = 0  Steady = 1    Turning 360 degrees    Score: [x] 0 [] 1  (A)  (A) Discontinuous steps = 0      (A) Continuous = 1  (B) Unsteady= 0     Score: [] 0 [x] 1  (B)  (B) Steady = 1    Sitting down     Score: [] 0 [x] 1 [] 2  Unsafe (misjudged distance, falls into chair) = 0  Uses arms or not a smooth motion = 1  Safe, smooth motion = 2  Balance Total Score    Score:       9 /16            GAIT SECTION    Patient stands with therapist, walks across room (+/- aids), first at usual pace, then at rapid pace.   Indication of gait (Immediately after told to [facility].) Score: [] 0 [x] 1  Any hesitancy or multiple attempts = 0  No hesitancy = 1    Step height (Right)      Score: [] 0 [x] 1  R foot does not clear floor = 0  R foot completely clears floor = 1    Step length (Right)      Score: [] 0 [x] 1  Step to = 0  Step through R = 1    Step height (Left)      Score: [] 0 [x] 1  R foot does not clear floor = 0  R foot completely clears floor = 1    Step length (Left)      Score: [] 0 [x] 1  Step to = 0  Step through L = 1    Step symmetry     Score: [] 0 [x] 1  Right and left step length not equal = 0  Right and left step length appear equal = 1    Step continuity     Score: [] 0 [x] 1  Stopping or discontinuity between steps = 0  Steps appear continuous = 1    Path (Excursion)     Score: [] 0 [x] 1 [] 2  Marked deviation = 0  Mild/moderate deviation or uses w. aid = 1  Straight without w. aid = 2    Trunk       Score: [x] 0 [] 1 [] 2  Marked sway or uses w. aid = 0  No sway but flex. knees or back or  uses arms for stability = 1  No sway, flex. , use of arms or w. aid = 2    Walking stance     Score: [x] 0 [] 1  Heels apart = 0  Heels almost touching while walking = 1    Gait Total Score     Score:       8 /12              TOTAL SCORE = BALANCE + GAIT  Score:        17 /28       Risk Indicators:   Score 18 or less = High risk of falls  Score 19-23 = Moderate risk of falls  Score 24 or more = Low risk of falls    Herreraetti ME, Delonte TF, Be R, Fall Risk Index for elderly patients based on number of chronic disabilities.   Am J Med [id number]

## 2020-02-13 NOTE — FLOWSHEET NOTE
Outpatient Physical Therapy     [x] Daily Treatment Note   [] Progress Note   [] Discharge Note    Date:  2/13/2020    Patient Name:  Barber Hodge         YOB: 1955    Medical Diagnosis:  Generalized weakness    ICD 10: R53.1     Treatment Diagnosis:      Onset Date:      Referral Date:  1/27/2020    Referring Physician:  Nohemi Valdovinos MD  Visits Allowed/Insurance/Certification information:   [facility] [place] [facility]    Restrictions/Precautions:  Monitor SpO2 and HR  Plan of care sent to provider:      [x]Faxed  []Co-signature    (attempts: 1[] 2 []3[])         Plan of care signed:      []Yes date:            []No      Progress Note covers period from (if applicable):    [x]NA    []From          To           Next Progress Note due:   10th visit or 3/13/2020    Visit# / total visits:  1/12    Plan for Next Session:  Monitor SpO2 and HR, gentle ex, mobility and gait      Subjective:  Quality of Gait: On 1st trial using cane pt moves quite slowly, hips shifted to Left, O2 sats drop to 84% after 30 ft.  2nd and 3rd trials with 4WW pt demonstrates improved posture, mild increase in pace, O2 sats drop to 88%. Distance: 3 trails of 30-40 ft with seated rest and cued breathing between trials  Balance     Pain level:   AT EVAL:7/10 chronic back and L knee pain with medication      Objective:  See vitals in activity tolerance section below     Exercises:    Exercises in bold performed in department today. Items not bolded are carried forward from prior visits for continuity of the record.   Exercise/Equipment Resistance/Repetitions HEP Other comments       []      Diaphragmatic breathing Hooklying 10 x []    Glute sets 10 x [x]    Quad sets 10 x B [x]    SLR 5 x B [x]    Sidelying abd  5 x B []    Standing B heel raise 10 x with UE support []        []        []          []         []        []        []        []        []        []        []        []      Therapeutic Exercise/Home Exercise Program:   20 history. Overall pt demonstrates poor posture, decreased muscle bulk in arms , legs, and torso, decreased activity tolerance, early fatigue, difficulty with all mobility but especially with ambulation. Tinetti Score 17/28 put her in high fall risk category. Recommended pt return to use of 4WW for ambulation for energy conservation and safety. Recommended pt go to MD office post PT to have oxygen needs assessed.   Treatment Diagnosis: weakness, decreased activty Patients response to treatment:   [x]Patient tolerated treatment well []Patient limited by fatigue   []Patient limited by pain  []Patient limited by other medical complications   []Other:     Goals:   Progress towards goals:  Goals established on 2/13/20   Time Frame for Short term goals: 3 weeks  Short term goal 1: Pt to report back and knee pain to be less than or equal to 5/10  Short term goal 2: Pt to perform bed mobility with greater ease in normalized time frame  Short term goal 3: pt to be able to amb 150 ft with stable HR and O2 sats  Short term goal 4: pt to be indep with basic HEP    Time Frame for Long term goals : 6 weeks  Long term goal 1: Pt to rate back and knee pain less than or equal to 4/10  Long term goal 2: Pt to be able to complete 5 sit to and from stand transfers without drop in O2 sats  Long term goal 3: Pt to be able to amb 200 ft with stable HEP and O2 sats  Long term goal 4: pt to be indep with complete HEP  Patient Goals   Patient goals : \"to get more strength and endurance\"       Prognosis: []Good   [x]Fair   []Poor    Patient Requires Follow-up:  [x]Yes  []No    Plan: [x]Plan of care initiated     []Continue per plan of care    [] Alter current plan (see comments)    []Hold pending MD visit []Discharge    Timed Code Treatment Minutes:  40    Total Treatment Minutes:  60    Medicare Cap total YTD:   $178.86 (eval, gait, ex, TA)       Time In:8:00 Time Out: 9:00    Electronically signed by:  Stella Sandhu [id number]

## 2020-02-14 NOTE — TELEPHONE ENCOUNTER
Pt returned call and was informed. Pt scheduled appt for PFT and f/u. Pt will arrive early for CXR.   Orders

## 2020-02-17 ENCOUNTER — HOSPITAL ENCOUNTER (OUTPATIENT)
Age: 65
Discharge: HOME | End: 2020-02-17
Payer: MEDICARE

## 2020-02-17 ENCOUNTER — HOSPITAL ENCOUNTER (OUTPATIENT)
Dept: CT IMAGING | Age: 65
Discharge: HOME | End: 2020-02-17
Payer: MEDICARE

## 2020-02-17 ENCOUNTER — TELEPHONE (OUTPATIENT)
Dept: PULMONOLOGY | Age: 65
End: 2020-02-17

## 2020-02-17 ENCOUNTER — HOSPITAL ENCOUNTER (OUTPATIENT)
Dept: PULMONOLOGY | Age: 65
Discharge: HOME | End: 2020-02-17
Payer: MEDICARE

## 2020-02-17 ENCOUNTER — HOSPITAL ENCOUNTER (OUTPATIENT)
Dept: GENERAL RADIOLOGY | Age: 65
Discharge: HOME | End: 2020-02-17
Payer: MEDICARE

## 2020-02-17 ENCOUNTER — OFFICE VISIT (OUTPATIENT)
Dept: PULMONOLOGY | Age: 65
End: 2020-02-17
Payer: MEDICARE

## 2020-02-17 VITALS
SYSTOLIC BLOOD PRESSURE: 102 MMHG | OXYGEN SATURATION: 97 % | DIASTOLIC BLOOD PRESSURE: 78 MMHG | HEART RATE: 107 BPM | RESPIRATION RATE: 18 BRPM | HEIGHT: 60 IN | WEIGHT: 148 LBS | BODY MASS INDEX: 29.06 KG/M2

## 2020-02-17 LAB
ANION GAP SERPL CALCULATED.3IONS-SCNC: 16 MMOL/L (ref 3–16)
BASOPHILS ABSOLUTE: 0.1 K/UL (ref 0–0.2)
BASOPHILS RELATIVE PERCENT: 1.1 %
BUN BLDV-MCNC: 17 MG/DL (ref 7–20)
CALCIUM SERPL-MCNC: 9.6 MG/DL (ref 8.3–10.6)
CHLORIDE BLD-SCNC: 106 MMOL/L (ref 99–110)
CO2: 19 MMOL/L (ref 21–32)
CREAT SERPL-MCNC: 0.7 MG/DL (ref 0.6–1.2)
EKG ATRIAL RATE: 81 BPM
EKG DIAGNOSIS: NORMAL
EKG P AXIS: 54 DEGREES
EKG P-R INTERVAL: 138 MS
EKG Q-T INTERVAL: 408 MS
EKG QRS DURATION: 84 MS
EKG QTC CALCULATION (BAZETT): 473 MS
EKG R AXIS: 73 DEGREES
EKG T AXIS: 50 DEGREES
EKG VENTRICULAR RATE: 81 BPM
EOSINOPHILS ABSOLUTE: 0.1 K/UL (ref 0–0.6)
EOSINOPHILS RELATIVE PERCENT: 2.4 %
GFR AFRICAN AMERICAN: >60
GFR NON-AFRICAN AMERICAN: >60
GLUCOSE BLD-MCNC: 114 MG/DL (ref 70–99)
HCT VFR BLD CALC: 38.8 % (ref 36–48)
HEMOGLOBIN: 12.7 G/DL (ref 12–16)
LYMPHOCYTES ABSOLUTE: 2.8 K/UL (ref 1–5.1)
LYMPHOCYTES RELATIVE PERCENT: 48.7 %
MCH RBC QN AUTO: 31.9 PG (ref 26–34)
MCHC RBC AUTO-ENTMCNC: 32.9 G/DL (ref 31–36)
MCV RBC AUTO: 97.2 FL (ref 80–100)
MONOCYTES ABSOLUTE: 0.4 K/UL (ref 0–1.3)
MONOCYTES RELATIVE PERCENT: 7.1 %
NEUTROPHILS ABSOLUTE: 2.4 K/UL (ref 1.7–7.7)
NEUTROPHILS RELATIVE PERCENT: 40.7 %
PDW BLD-RTO: 15.7 % (ref 12.4–15.4)
PLATELET # BLD: 319 K/UL (ref 135–450)
PMV BLD AUTO: 9 FL (ref 5–10.5)
POTASSIUM SERPL-SCNC: 3.8 MMOL/L (ref 3.5–5.1)
RBC # BLD: 3.99 M/UL (ref 4–5.2)
SODIUM BLD-SCNC: 141 MMOL/L (ref 136–145)
WBC # BLD: 5.8 K/UL (ref 4–11)

## 2020-02-17 PROCEDURE — 71260 CT THORAX DX C+: CPT

## 2020-02-17 PROCEDURE — 94618 PULMONARY STRESS TESTING: CPT

## 2020-02-17 PROCEDURE — 93005 ELECTROCARDIOGRAM TRACING: CPT | Performed by: INTERNAL MEDICINE

## 2020-02-17 PROCEDURE — 71046 X-RAY EXAM CHEST 2 VIEWS: CPT

## 2020-02-17 PROCEDURE — 80048 BASIC METABOLIC PNL TOTAL CA: CPT

## 2020-02-17 PROCEDURE — 93010 ELECTROCARDIOGRAM REPORT: CPT | Performed by: INTERNAL MEDICINE

## 2020-02-17 PROCEDURE — 6360000002 HC RX W HCPCS: Performed by: INTERNAL MEDICINE

## 2020-02-17 PROCEDURE — 94060 EVALUATION OF WHEEZING: CPT

## 2020-02-17 PROCEDURE — 94729 DIFFUSING CAPACITY: CPT

## 2020-02-17 PROCEDURE — 36415 COLL VENOUS BLD VENIPUNCTURE: CPT

## 2020-02-17 PROCEDURE — 94726 PLETHYSMOGRAPHY LUNG VOLUMES: CPT

## 2020-02-17 PROCEDURE — 99214 OFFICE O/P EST MOD 30 MIN: CPT | Performed by: INTERNAL MEDICINE

## 2020-02-17 PROCEDURE — 94640 AIRWAY INHALATION TREATMENT: CPT

## 2020-02-17 PROCEDURE — 85025 COMPLETE CBC W/AUTO DIFF WBC: CPT

## 2020-02-17 PROCEDURE — 6360000004 HC RX CONTRAST MEDICATION: Performed by: INTERNAL MEDICINE

## 2020-02-17 RX ORDER — ALBUTEROL SULFATE 2.5 MG/3ML
2.5 SOLUTION RESPIRATORY (INHALATION) ONCE
Status: COMPLETED | OUTPATIENT
Start: 2020-02-17 | End: 2020-02-17

## 2020-02-17 RX ADMIN — IOPAMIDOL 85 ML: 755 INJECTION, SOLUTION INTRAVENOUS at 15:21

## 2020-02-17 RX ADMIN — ALBUTEROL SULFATE 2.5 MG: 2.5 SOLUTION RESPIRATORY (INHALATION) at 12:47

## 2020-02-17 NOTE — PROGRESS NOTES
cyanocobalamin 1000 MCG tablet, Take 2,500 mcg by mouth daily, Disp: , Rfl:     lamoTRIgine (LAMICTAL PO), Take 50 mg by mouth daily, Disp: , Rfl:     CARTIA  MG extended release capsule, , Disp: , Rfl:     butalbital-acetaminophen-caffeine (FIORICET, ESGIC) -40 MG per tablet, Take 1 tablet by mouth, Disp: , Rfl:     sulfamethoxazole-trimethoprim (BACTRIM DS;SEPTRA DS) 800-160 MG per tablet, Take 1 tablet by mouth 2 times daily , Disp: , Rfl:     albuterol sulfate HFA (VENTOLIN HFA) 108 (90 Base) MCG/ACT inhaler, Inhale 2 puffs into the lungs every 6 hours as needed for Wheezing or Shortness of Breath, Disp: 1 Inhaler, Rfl: 6    levothyroxine (SYNTHROID) 88 MCG tablet, Take 88 mcg by mouth Daily, Disp: , Rfl:     donepezil (ARICEPT) 10 MG tablet, Take 1 tablet by mouth nightly, Disp: 90 tablet, Rfl: 2    cephALEXin (KEFLEX) 500 MG capsule, Take 500 mg by mouth 3 times daily, Disp: , Rfl:     MONOJECT 60CC SYRINGE CATH TIP 60 ML MISC, , Disp: , Rfl:     KLOR-CON M20 20 MEQ extended release tablet, Take 20 mEq by mouth 2 times daily , Disp: , Rfl:     ranitidine (ZANTAC) 150 MG tablet, Take 150 mg by mouth 2 times daily, Disp: , Rfl:     sertraline (ZOLOFT) 100 MG tablet, Take 200 mg by mouth nightly , Disp: , Rfl:     pantoprazole (PROTONIX) 40 MG tablet, TAKE 1 TABLET BY MOUTH DAILY, Disp: , Rfl: 0    fluticasone (FLONASE) 50 MCG/ACT nasal spray, 1 spray by Nasal route nightly. (Patient taking differently: 1 spray by Nasal route as needed ), Disp: 1 Bottle, Rfl: 5    nitroGLYCERIN (NITROSTAT) 0.4 MG SL tablet, Place 1 tablet under the tongue every 5 minutes as needed for Chest pain., Disp: 25 tablet, Rfl: 3    Multiple Vitamin (THERA) TABS, Take 1 tablet by mouth daily , Disp: , Rfl:           Objective:   PHYSICAL EXAM:    Blood pressure 102/78, pulse 107, resp. rate 18, height 5' (1.524 m), weight 148 lb (67.1 kg), SpO2 97 %, not currently breastfeeding.' on RA  Gen: No distress.  BMI of 29.10. Ill-appearing  Eyes: PERRL. No sclera icterus. No conjunctival injection. ENT: No discharge. Pharynx clear. Mallampati class IV. Neck: Trachea midline. No obvious mass. Neck circumference \"  Resp: No accessory muscle use. No crackles. No wheezes. No rhonchi. No dullness on percussion. Good air entry. CV: Sinus tachycardia. Regular rhythm. No murmur or rub. No edema. GI: Non-tender. Non-distended. No hernia. Skin: Warm and dry. No nodule on exposed extremities. Lymph: No cervical LAD. No supraclavicular LAD. M/S: No cyanosis. No joint deformity. No clubbing. Neuro: Awake. Alert. Moves all four extremities. Psych: Oriented x 3. No anxiety. DATA reviewed by me:   PFTs 02/17/2020 FVC 2.80(104%) FEV1 2.58 (126 %) FEV1/FVC 92 % TLC 4.51 (101%) DLCO 14.31 (71 %) 6MW 80 F low O2 96%  PFTs 02/02/2017 FVC 2.88 (98 %) FEV1 2.50 (110 %) FEV1/FVC 87 % TLC 4.95 (104%) DLCO 16.96 (79 %) 6MW 700 F low O2 94%      Chest x-ray 2/17/2020   No acute cardiopulmonary disease        Cardiopulmonary exercise testing 3/25/2015 suggestive of deconditioning    Negative methacholine challenge test    2/7/2015 PSG AHI 15.7 desaturation to 85%  2/25/2015 CPAP titration CPAP 10 cm H2O  10/27/2015 BiPAP titration auto BiPAP EPAP min 7 IPAP max 16 pressure support for      Hb 13  A1AT 146     Assessment:       · Dyspnea and Hypoxia on exertion-unclear etiology. Rule out PE. Severe cardiac/respiratory deconditioning due to recent hospitalization is a possibility  · Tachycardia on exertion - 145.   Appears sinus on exam today  · Mild lower lobe pulmonary bronchiectasis  · Abnormal CT chest with chronic lower lobe scarring  · Decreased diffusion capacity  · Recurrent SVT post dual-chamber pacemaker  · Moderate NATHAN followed by  sleep clinic/ENT post hypoglossal nerve stimulator July 2018  · Chronic pain syndrome on opiates  · Lifelong non-smoker        Plan:    · CTPA rule out PE   · Check CBC and BMP   · Advised to follow-up with cardiologist for tachycardia-might need a Holter monitor  · Albuterol 2 puffs Q4-6 hrs PRN  · Advised to follow-up with  sleep clinic/ENT for hypoglossal nerve stimulator management        More than 25 minutes of time was spent in direct patient contact during this visit, more than 50% of which was spent counseling and/or coordinating care.

## 2020-02-18 NOTE — PROCEDURES
[address]                 [address] [facility] [address]                               PULMONARY FUNCTION    PATIENT NAME: Mario Lara                     :        1955  MED REC NO:   [id number]                          ROOM:  ACCOUNT NO:   [de-identified]                           ADMIT DATE: 2020  PROVIDER:     Angelika Yun MD    DATE OF PROCEDURE:  2020    ATTENDING PROVIDER:  Bony Massey MD    INDICATION:  Dyspnea. FINDINGS:  1. Spirometry: The FEV1 is 2.58 liters, which is 126% of predicted. The FEV1/FVC ratio is 0.92. There was no significant response to  bronchodilators. 2.  Plethysmography:  The total lung capacity is within normal limits. 3.  The diffusion capacity of carbon monoxide is 71% of predicted. 4.  The flow volume loop is normal.    IMPRESSION:  There is no obstructive or restrictive defect present. There is a mildly decreased DLCO which is nonspecific. A six-minute walk test was conducted per [facility] protocol. The  patient was unable to complete the test and stopped after walking 80  feet over 1-1/2 minute with an SpO2 of 96%. The heart rate at rest was  114 and heart rate after walking 1-1/2 minutes was 145.         Beau Cota MD    D: 2020 15:56:03       T: 2020 21:51:15     [email or web address]  Job#: [id number]     Doc#: [id number]    CC:

## 2020-02-18 NOTE — TELEPHONE ENCOUNTER
Pt was seen yesterday and said she was only to f/u with cardiology. Does pt need f/u with Dr Abhijit Garcia?

## 2020-02-19 LAB
DLCO %PRED: 71 %
DLCO PRED: NORMAL
DLCO/VA %PRED: NORMAL
DLCO/VA PRED: NORMAL
DLCO/VA: NORMAL
DLCO: NORMAL
EXPIRATORY TIME-POST: NORMAL
EXPIRATORY TIME: NORMAL
FEF 25-75% %CHNG: NORMAL
FEF 25-75% %PRED-POST: NORMAL
FEF 25-75% %PRED-PRE: NORMAL
FEF 25-75% PRED: NORMAL
FEF 25-75%-POST: NORMAL
FEF 25-75%-PRE: NORMAL
FEV1 %PRED-POST: 130 %
FEV1 %PRED-PRE: 126 %
FEV1 PRED: NORMAL
FEV1-POST: NORMAL
FEV1-PRE: NORMAL
FEV1/FVC %PRED-POST: NORMAL
FEV1/FVC %PRED-PRE: NORMAL
FEV1/FVC PRED: NORMAL
FEV1/FVC-POST: 86 %
FEV1/FVC-PRE: 92 %
FVC %PRED-POST: NORMAL
FVC %PRED-PRE: NORMAL
FVC PRED: NORMAL
FVC-POST: NORMAL
FVC-PRE: NORMAL
GAW %PRED: NORMAL
GAW PRED: NORMAL
GAW: NORMAL
IC %PRED: NORMAL
IC PRED: NORMAL
IC: NORMAL
MEP: NORMAL
MIP: NORMAL
MVV %PRED-PRE: NORMAL
MVV PRED: NORMAL
MVV-PRE: NORMAL
PEF %PRED-POST: NORMAL
PEF %PRED-PRE: NORMAL
PEF PRED: NORMAL
PEF%CHNG: NORMAL
PEF-POST: NORMAL
PEF-PRE: NORMAL
RAW %PRED: NORMAL
RAW PRED: NORMAL
RAW: NORMAL
RV %PRED: NORMAL
RV PRED: NORMAL
RV: NORMAL
SVC %PRED: NORMAL
SVC PRED: NORMAL
SVC: NORMAL
TLC %PRED: 101 %
TLC PRED: NORMAL
TLC: NORMAL
VA %PRED: NORMAL
VA PRED: NORMAL
VA: NORMAL
VTG %PRED: NORMAL
VTG PRED: NORMAL
VTG: NORMAL

## 2020-02-19 ASSESSMENT — PULMONARY FUNCTION TESTS
FEV1/FVC_PRE: 92
FEV1/FVC_POST: 86
FEV1_PERCENT_PREDICTED_POST: 130
FEV1_PERCENT_PREDICTED_PRE: 126

## 2020-02-26 ENCOUNTER — HOSPITAL ENCOUNTER (OUTPATIENT)
Dept: OCCUPATIONAL THERAPY | Age: 65
Setting detail: THERAPIES SERIES
Discharge: HOME | End: 2020-02-26
Payer: MEDICARE

## 2020-02-26 ENCOUNTER — OFFICE VISIT (OUTPATIENT)
Dept: ORTHOPEDIC SURGERY | Age: 65
End: 2020-02-26
Payer: MEDICARE

## 2020-02-26 VITALS — BODY MASS INDEX: 29.04 KG/M2 | HEIGHT: 60 IN | WEIGHT: 147.93 LBS

## 2020-02-26 PROCEDURE — 99213 OFFICE O/P EST LOW 20 MIN: CPT | Performed by: ORTHOPAEDIC SURGERY

## 2020-02-26 PROCEDURE — L3913 HFO W/O JOINTS CF: HCPCS | Performed by: OCCUPATIONAL THERAPIST

## 2020-02-26 NOTE — PROGRESS NOTES
Chief Complaint  Hand Pain (Hand pain, finger locking )      History of Present Illness:  Camilo Swain is a 71 y.o. female right-hand-dominant female who presents for right greater than left hand weakness and dysfunction with abnormal popping of the fingers, crossover of the fingers and  weakness. She denies numbness of the hands. She states the symptoms have been ongoing for approximately 6 months. She feels that the right middle finger and ring finger are most symptomatic. She denies prior work-up or diagnosis of rheumatoid arthritis or other rheumatologic disorder. She has had no treatment for the fingers to this point. She denies locking of the fingers.     Medical History  Past Medical History:   Diagnosis Date    Angina at rest [facility])     Anxiety     Arthritis     hands and hip    Blood transfusion     after back surgery    Bradycardia     Bronchiectasis ([facility] [address] 11/5/2013    CAD (coronary artery disease)     Chronic back pain     Hyperlipidemia     Hypertension     Localized morphea     NATHAN treated with BiPAP     Pacemaker     Stress incontinence     Thyroid disease     hypothyroid    Trochanteric bursitis of left hip 4/12/2019     Past Surgical History:   Procedure Laterality Date    ABDOMEN SURGERY  9/9/11     REPAIR INCISIONAL HERNIA REDUCIBLE WITH POSSIBLE MESH    BACK SURGERY      x3    BACK SURGERY      stimulator    BLADDER SUSPENSION      CARDIAC PACEMAKER PLACEMENT      CATARACT REMOVAL WITH IMPLANT Left 03/08/2018    PHACO EMULSIFICATION OF CATARACT WITH INTRAOCULAR LENS IMPLANT LEFT EYE    CERVICAL DISCECTOMY  6/2014    and fusion    CHOLECYSTECTOMY      COLONOSCOPY  2002 1/26/12    ENDOSCOPY, COLON, DIAGNOSTIC      EPIDURAL STEROID INJECTION Left 8/15/2019    LEFT KNEE GENICULAR NERVE BLOCK SITE CONFIRMED BY FLUOROSCOPY performed by Chiquita Flores MD at [address] [place] [address] Right 04/05/2018    cataract removal    FOOT SURGERY Right 12/6/12 arthroplasty    FOOT SURGERY Left 04/30/2015    FOOT SURGERY Left 4/30/15    GASTRIC BYPASS SURGERY  8528,8546    HYSTERECTOMY      KNEE ARTHROSCOPY Left 10/3/2014    part. medial & lateral meniscectomy, synovectomy plica exc    KNEE ARTHROSCOPY Right 10/13/15    partial medial meniscectomy, chondroplasty, partial lateral meniscectomy    NECK SURGERY      OTHER SURGICAL HISTORY  april 2013    removal spinal cord stimulater    OTHER SURGICAL HISTORY      bladder stimulator    OTHER SURGICAL HISTORY  08/30/2017    left patellofemoral arthroplasty    OTHER SURGICAL HISTORY  05/09/2018    convert left patellofemoral arthroplasty to left total knee replacement   Fariha Barrett  2011    Dr Mahoney/checked last week/told has 12 more yrs for this one/set at 60    SKIN BIOPSY      abdomen    SPINE SURGERY      stimulator    THROAT SURGERY      polyps removed    TONSILLECTOMY      UPPER GASTROINTESTINAL ENDOSCOPY      UPPER GASTROINTESTINAL ENDOSCOPY  05/03/2017     Family History   Problem Relation Age of Onset    Heart Disease Father     Cancer Sister         multiple organs    Other Sister     Asthma Neg Hx     Diabetes Neg Hx     Emphysema Neg Hx     Heart Failure Neg Hx     Hypertension Neg Hx      Social History     Socioeconomic History    Marital status:       Spouse name: None    Number of children: None    Years of education: None    Highest education level: None   Occupational History    None   Social Needs    Financial resource strain: None    Food insecurity:     Worry: None     Inability: None    Transportation needs:     Medical: None     Non-medical: None   Tobacco Use    Smoking status: Never Smoker    Smokeless tobacco: Never Used   Substance and Sexual Activity    Alcohol use: No     Alcohol/week: 0.0 standard drinks    Drug use: No    Sexual activity: Yes     Partners: Female   Lifestyle    Physical activity:     Days per week: None     Minutes per session: None    Stress: None   Relationships    Social connections:     Talks on phone: None     Gets together: None     Attends Taoist service: None     Active member of club or organization: None     Attends meetings of clubs or organizations: None     Relationship status: None    Intimate partner violence:     Fear of current or ex partner: None     Emotionally abused: None     Physically abused: None     Forced sexual activity: None   Other Topics Concern    None   Social History Narrative    None     Current Outpatient Medications   Medication Sig Dispense Refill    sulindac (CLINORIL) 150 MG tablet Take 1 tablet by mouth 2 times daily 60 tablet 2    metoclopramide (REGLAN) 10 MG tablet Take 1 tablet by mouth 4 times daily WARNING:  May cause drowsiness. May impair ability to operate vehicles or machinery. Do not use in combination with alcohol.  20 tablet 0    dicyclomine (BENTYL) 10 MG capsule Take 1 capsule by mouth every 6 hours as needed (cramps) 20 capsule 0    cyanocobalamin 1000 MCG tablet Take 2,500 mcg by mouth daily      lamoTRIgine (LAMICTAL PO) Take 50 mg by mouth daily      CARTIA  MG extended release capsule       butalbital-acetaminophen-caffeine (FIORICET, ESGIC) -40 MG per tablet Take 1 tablet by mouth      sulfamethoxazole-trimethoprim (BACTRIM DS;SEPTRA DS) 800-160 MG per tablet Take 1 tablet by mouth 2 times daily       albuterol sulfate HFA (VENTOLIN HFA) 108 (90 Base) MCG/ACT inhaler Inhale 2 puffs into the lungs every 6 hours as needed for Wheezing or Shortness of Breath 1 Inhaler 6    levothyroxine (SYNTHROID) 88 MCG tablet Take 88 mcg by mouth Daily      donepezil (ARICEPT) 10 MG tablet Take 1 tablet by mouth nightly 90 tablet 2    cephALEXin (KEFLEX) 500 MG capsule Take 500 mg by mouth 3 times daily      MONOJECT 60CC SYRINGE CATH TIP 60 ML MISC       KLOR-CON M20 20 MEQ extended release tablet Take 20 mEq by mouth 2 times daily       ranitidine (ZANTAC) 150 MG tablet Take 150 mg by mouth 2 times daily      sertraline (ZOLOFT) 100 MG tablet Take 200 mg by mouth nightly       pantoprazole (PROTONIX) 40 MG tablet TAKE 1 TABLET BY MOUTH DAILY  0    fluticasone (FLONASE) 50 MCG/ACT nasal spray 1 spray by Nasal route nightly. (Patient taking differently: 1 spray by Nasal route as needed ) 1 Bottle 5    nitroGLYCERIN (NITROSTAT) 0.4 MG SL tablet Place 1 tablet under the tongue every 5 minutes as needed for Chest pain. 25 tablet 3    Multiple Vitamin (THERA) TABS Take 1 tablet by mouth daily        No current facility-administered medications for this visit. Allergies   Allergen Reactions    Meloxicam Itching     Tolerates Ketorolac/Bromfenac ophthalmic drops.  Benzoin Compound      Blisters from adhesive compound under steri strips after knee VAS      Lyrica [Pregabalin]      Keeps patient awake    Quetiapine      Other reaction(s): Other (See Comments)  Pt unable to recall reaction  Other reaction(s): Other (See Comments)      Quetiapine Fumarate      Other reaction(s): confused, dizzy  Other reaction(s): Unknown  seroquel      Seroquel [Quetiapine Fumarate] Other (See Comments)     Other reaction(s): confused, dizzy  Pt unable to recall reaction    Tizanidine      hallucinations      Amberderm Rash     Positive patch test results to [facility] Other Rash     Can use paper tape can not  use transpore    Tape [Adhesive Tape] Rash     Mild dermatitis at site of paper tape, with history of previous skin rashes to tape. STERI-STRIPS  Can use paper tape can not  use transpore      Wound Dressings Rash     Positive patch test results to [facility]         Review of Systems  Relevant review of systems reviewed and available in the patient's chart    Vital Signs  There were no vitals filed for this visit. General Exam:   Constitutional: Patient is adequately groomed with no evidence of malnutrition  Mental Status:  The patient is oriented to time, place and person. The patient's mood and affect are appropriate. Lymphatic: The lymphatic examination bilaterally reveals all areas to be without enlargement or induration. Neurological: The patient has good coordination. There is no weakness or sensory deficit. Hand Examination - Right  Inspection: No atrophy or clawing. No swelling or sign of infection. Palpation: Mild discomfort across the MP joints dorsally. No palmar pain. No sign of Dupuytren's in the palm    Range of Motion: Flexion and extension of the fingers actively is present. There is subluxation of the dorsal tendons ulnarly at the middle finger and ring finger consistent with sagittal band dysfunction. No clicking or popping volarly. Strength: Mild  weakness. Patient reports full finger sensation    Special Tests:      Skin: There are no additional worrisome rashes, ulcerations or lesions. Gait: normal    Circulation: well perfused     Radiology:         Assessment: Right hand weakness    Impression:   No diagnosis found. Office Procedures:  No orders of the defined types were placed in this encounter. Treatment Plan: Clinical findings consistent with sagittal band dysfunction of the right hand, greater than the left. Recommendation made for conservative measures at this point, especially with her severe medical comorbidities. Hand therapy referral today for a MP block or brace. Use of this brace will hopefully allow some tightening of the dorsal soft tissues when appropriate aligned to allow better function of the fingers. I am not recommending surgery at this point. I think it is important that she has rheumatoid arthritis work-up, therefore rheumatology referral has been placed. We will see her after rheumatology referral and work-up to repeat assessment on hands. We may consider left-sided brace at that point if the right hand brace is helping. All questions and concerns were addressed today.

## 2020-02-26 NOTE — PLAN OF CARE
[address] [place], [address]              Occupational Therapy Splint Certification Form    Dear Referring Practitioner: Dr. Renu Cintron,  The following patient has been evaluated for occupational therapy services for fabrication of a hand splint. Insurance requires the referring physician to review the treatment plan. Please review the attached evaluation and/or summary of the patient's plan of care, and verify that you agree by signing the attached document and sending it back to our office. Plan of Care/Treatment to date:  [x] Fabrication of custom hand based splint        [x] Instruction on splint use, care and wearing schedule        [x] Follow up as needed for splint modifications          Frequency/Duration:  [x] One time visit for splint fabrication and instructions. Follow up as needed for splint modifcations        [] Splint fabricated, patient to return for full evaluation. Rehab Potential: [x] good [] fair  [] poor         SPLINT EVALUATION  Date: 2020  Name: Rajeev Hernandez            : 1955      Medical/Treatment Diagnosis Information:  · Diagnosis: R hand sagittal band injuries (M94.063)    Insurance/Certification information:  OT Insurance Information: t Medicare  Physician Information:  Referring Practitioner: Dr. Renu Cintron       Next MD Appointment: 20    Subjective  History of Injury/ Mechanism of Injury: insidious onset  Injury Date: approx 6 months ago  Dominant Hand:    [x] Right []Left  Occupational/Vocational Status: Not working  Progress of any previous OT/PT: the patient []has/ [x]has not received OT/PT previously for this diagnosis.     Pain: 3/10    Type of splint:   Hand based P1 block    Splint Purpose: [x]Immobilize or protect [x]Promote healing of bands   [x]Relieve pain  []Provide support for improved hand function []Maximize joint

## 2020-03-09 ENCOUNTER — TELEPHONE (OUTPATIENT)
Dept: ORTHOPEDIC SURGERY | Age: 65
End: 2020-03-09

## 2020-04-06 ENCOUNTER — APPOINTMENT (OUTPATIENT)
Dept: GENERAL RADIOLOGY | Age: 65
End: 2020-04-06
Payer: MEDICARE

## 2020-04-06 ENCOUNTER — HOSPITAL ENCOUNTER (OUTPATIENT)
Age: 65
Setting detail: OBSERVATION
Discharge: HOME | End: 2020-04-07
Attending: EMERGENCY MEDICINE | Admitting: HOSPITALIST
Payer: MEDICARE

## 2020-04-06 ENCOUNTER — APPOINTMENT (OUTPATIENT)
Dept: CT IMAGING | Age: 65
End: 2020-04-06
Payer: MEDICARE

## 2020-04-06 LAB
A/G RATIO: 1.4 (ref 1.1–2.2)
ALBUMIN SERPL-MCNC: 3.8 G/DL (ref 3.4–5)
ALP BLD-CCNC: 151 U/L (ref 40–129)
ALT SERPL-CCNC: 12 U/L (ref 10–40)
ANION GAP SERPL CALCULATED.3IONS-SCNC: 10 MMOL/L (ref 3–16)
AST SERPL-CCNC: 28 U/L (ref 15–37)
BASOPHILS ABSOLUTE: 0 K/UL (ref 0–0.2)
BASOPHILS RELATIVE PERCENT: 1.1 %
BILIRUB SERPL-MCNC: <0.2 MG/DL (ref 0–1)
BUN BLDV-MCNC: 22 MG/DL (ref 7–20)
CALCIUM SERPL-MCNC: 9 MG/DL (ref 8.3–10.6)
CHLORIDE BLD-SCNC: 103 MMOL/L (ref 99–110)
CO2: 21 MMOL/L (ref 21–32)
CREAT SERPL-MCNC: 0.8 MG/DL (ref 0.6–1.2)
D DIMER: 394 NG/ML DDU (ref 0–229)
EOSINOPHILS ABSOLUTE: 0.1 K/UL (ref 0–0.6)
EOSINOPHILS RELATIVE PERCENT: 2.3 %
GFR AFRICAN AMERICAN: >60
GFR NON-AFRICAN AMERICAN: >60
GLOBULIN: 2.8 G/DL
GLUCOSE BLD-MCNC: 88 MG/DL (ref 70–99)
HCT VFR BLD CALC: 34.2 % (ref 36–48)
HEMOGLOBIN: 11.3 G/DL (ref 12–16)
LYMPHOCYTES ABSOLUTE: 2.3 K/UL (ref 1–5.1)
LYMPHOCYTES RELATIVE PERCENT: 53.3 %
MCH RBC QN AUTO: 32.3 PG (ref 26–34)
MCHC RBC AUTO-ENTMCNC: 33 G/DL (ref 31–36)
MCV RBC AUTO: 97.8 FL (ref 80–100)
MONOCYTES ABSOLUTE: 0.4 K/UL (ref 0–1.3)
MONOCYTES RELATIVE PERCENT: 9.3 %
NEUTROPHILS ABSOLUTE: 1.5 K/UL (ref 1.7–7.7)
NEUTROPHILS RELATIVE PERCENT: 34 %
PDW BLD-RTO: 14.5 % (ref 12.4–15.4)
PLATELET # BLD: 277 K/UL (ref 135–450)
PMV BLD AUTO: 7.5 FL (ref 5–10.5)
POTASSIUM REFLEX MAGNESIUM: 4.5 MMOL/L (ref 3.5–5.1)
PRO-BNP: 66 PG/ML (ref 0–124)
RBC # BLD: 3.49 M/UL (ref 4–5.2)
SODIUM BLD-SCNC: 134 MMOL/L (ref 136–145)
TOTAL PROTEIN: 6.6 G/DL (ref 6.4–8.2)
TROPONIN: <0.01 NG/ML
TROPONIN: <0.01 NG/ML
WBC # BLD: 4.4 K/UL (ref 4–11)

## 2020-04-06 PROCEDURE — 83880 ASSAY OF NATRIURETIC PEPTIDE: CPT

## 2020-04-06 PROCEDURE — 84443 ASSAY THYROID STIM HORMONE: CPT

## 2020-04-06 PROCEDURE — 6370000000 HC RX 637 (ALT 250 FOR IP)

## 2020-04-06 PROCEDURE — 80053 COMPREHEN METABOLIC PANEL: CPT

## 2020-04-06 PROCEDURE — 84145 PROCALCITONIN (PCT): CPT

## 2020-04-06 PROCEDURE — 6360000004 HC RX CONTRAST MEDICATION: Performed by: EMERGENCY MEDICINE

## 2020-04-06 PROCEDURE — 6370000000 HC RX 637 (ALT 250 FOR IP): Performed by: EMERGENCY MEDICINE

## 2020-04-06 PROCEDURE — 93005 ELECTROCARDIOGRAM TRACING: CPT | Performed by: PHYSICIAN ASSISTANT

## 2020-04-06 PROCEDURE — 36415 COLL VENOUS BLD VENIPUNCTURE: CPT

## 2020-04-06 PROCEDURE — 82728 ASSAY OF FERRITIN: CPT

## 2020-04-06 PROCEDURE — 85025 COMPLETE CBC W/AUTO DIFF WBC: CPT

## 2020-04-06 PROCEDURE — 2580000003 HC RX 258: Performed by: EMERGENCY MEDICINE

## 2020-04-06 PROCEDURE — 85379 FIBRIN DEGRADATION QUANT: CPT

## 2020-04-06 PROCEDURE — 71260 CT THORAX DX C+: CPT

## 2020-04-06 PROCEDURE — 99285 EMERGENCY DEPT VISIT HI MDM: CPT

## 2020-04-06 PROCEDURE — 86140 C-REACTIVE PROTEIN: CPT

## 2020-04-06 PROCEDURE — 71045 X-RAY EXAM CHEST 1 VIEW: CPT

## 2020-04-06 PROCEDURE — 84484 ASSAY OF TROPONIN QUANT: CPT

## 2020-04-06 RX ORDER — BIOTIN 1000 MCG
TABLET,CHEWABLE ORAL
COMMUNITY
End: 2021-07-01

## 2020-04-06 RX ORDER — LORATADINE 10 MG/1
10 CAPSULE, LIQUID FILLED ORAL DAILY
COMMUNITY
End: 2021-04-14

## 2020-04-06 RX ORDER — TRIAMCINOLONE ACETONIDE 1 MG/G
CREAM TOPICAL 2 TIMES DAILY
COMMUNITY

## 2020-04-06 RX ORDER — FESOTERODINE FUMARATE 8 MG/1
TABLET, FILM COATED, EXTENDED RELEASE ORAL DAILY
COMMUNITY

## 2020-04-06 RX ORDER — ALBUTEROL SULFATE 90 UG/1
6 AEROSOL, METERED RESPIRATORY (INHALATION) ONCE
Status: DISCONTINUED | OUTPATIENT
Start: 2020-04-06 | End: 2020-04-06 | Stop reason: CLARIF

## 2020-04-06 RX ORDER — MIDODRINE HYDROCHLORIDE 2.5 MG/1
2.5 TABLET ORAL 3 TIMES DAILY
COMMUNITY
End: 2021-03-29

## 2020-04-06 RX ORDER — OXYCODONE AND ACETAMINOPHEN 10; 325 MG/1; MG/1
1 TABLET ORAL EVERY 4 HOURS PRN
Status: ON HOLD | COMMUNITY
End: 2020-05-01 | Stop reason: HOSPADM

## 2020-04-06 RX ORDER — CLONAZEPAM 1 MG/1
1 TABLET ORAL 2 TIMES DAILY PRN
COMMUNITY
End: 2021-03-29

## 2020-04-06 RX ORDER — ESZOPICLONE 2 MG/1
2 TABLET, FILM COATED ORAL NIGHTLY
COMMUNITY
End: 2022-02-24

## 2020-04-06 RX ORDER — ALBUTEROL SULFATE 90 UG/1
AEROSOL, METERED RESPIRATORY (INHALATION)
Status: COMPLETED
Start: 2020-04-06 | End: 2020-04-06

## 2020-04-06 RX ORDER — SODIUM CHLORIDE, SODIUM LACTATE, POTASSIUM CHLORIDE, AND CALCIUM CHLORIDE .6; .31; .03; .02 G/100ML; G/100ML; G/100ML; G/100ML
1000 INJECTION, SOLUTION INTRAVENOUS ONCE
Status: COMPLETED | OUTPATIENT
Start: 2020-04-06 | End: 2020-04-06

## 2020-04-06 RX ORDER — LORAZEPAM 1 MG/1
1 TABLET ORAL ONCE
Status: COMPLETED | OUTPATIENT
Start: 2020-04-06 | End: 2020-04-06

## 2020-04-06 RX ORDER — PLECANATIDE 3 MG/1
TABLET ORAL
COMMUNITY
End: 2021-03-29

## 2020-04-06 RX ADMIN — IOPAMIDOL 85 ML: 755 INJECTION, SOLUTION INTRAVENOUS at 22:28

## 2020-04-06 RX ADMIN — SODIUM CHLORIDE, POTASSIUM CHLORIDE, SODIUM LACTATE AND CALCIUM CHLORIDE 1000 ML: 600; 310; 30; 20 INJECTION, SOLUTION INTRAVENOUS at 21:13

## 2020-04-06 RX ADMIN — LORAZEPAM 1 MG: 1 TABLET ORAL at 21:13

## 2020-04-06 RX ADMIN — ALBUTEROL SULFATE 6 PUFF: 90 AEROSOL, METERED RESPIRATORY (INHALATION) at 18:32

## 2020-04-06 ASSESSMENT — HEART SCORE: ECG: 1

## 2020-04-06 NOTE — ED NOTES
Bed: 09  Expected date:   Expected time:   Means of arrival:   Comments:  Medic Genny Reddy [address]  04/06/20 [id number]

## 2020-04-06 NOTE — ED PROVIDER NOTES
[address] ED  EMERGENCY DEPARTMENT ENCOUNTER         Pt Name: Eddi Russ  MRN: [id number]  [place] 1955  Date of evaluation: 4/6/2020  Provider: AIMEE Bo  PCP: Sunil Diaz MD    This patient was seen and evaluated by the attending physician Karla Abdul MD.      [address] [facility] [place]       Chief Complaint   Patient presents with    Shortness of Breath       HISTORY OF PRESENT ILLNESS   (Location/Symptom, Timing/Onset, Context/Setting, Quality, Duration, Modifying Factors, Severity)  Note limiting factors. Eddi Russ is a 71 y.o. female who presents via EMS for evaluation of shortness of breath. Patient notes that approximately 2 weeks ago she developed a mild cough. It lasted for a couple days, she is no longer with a cough. Yesterday however she noted that she developed some shortness of breath with activity. She felt winded. Additionally with walking she felt lightheaded like she was going to pass out. She denies any syncopal episodes. She denies room spinning or other vertiginous symptoms. She endorses a mild sore throat with this. She denies any runny nose nasal congestion or chest pain. She denies any nausea vomiting or abdominal pain. She has a baseline history of urinary problems, she has some sort of a stoma work and she can self cath herself. She denies any new symptoms with this. She notes that she was seen and evaluated about a year ago for similar symptoms. She notes that she was seen by the cardiologist and pulmonologist, Radha Vu both told me I was fine\". She is not had severe symptoms since. She called her family doctor with her symptoms and he instructed her to walk around the room and then wear her pulse oximeter, she did that and she notes \"the top number was 87 the bottom number was 115\". He then instructed her to go to the emergency department therefore she called 911. She does not wear oxygen at home she has never required it.   She denies history of other breathing problems, she does not have an inhaler at home. She denies smoking. She denies any lower extremity redness or pain. She denies any recent travel or immobility. She lives by herself however her family is visiting her regularly, she notes Otoniel Pedroza are all sick\". She denies being in contact with any recent Cobin positive persons or anyone working in healthcare. Nursing Notes were all reviewed and agreed with or any disagreements were addressed  in the HPI. REVIEW OF SYSTEMS    (2-9 systems for level 4, 10 or more for level 5)     Review of Systems    Positives and Pertinent negatives as per HPI. Except as noted abovein the ROS, all other systems were reviewed and negative.        PAST MEDICAL HISTORY     Past Medical History:   Diagnosis Date    Angina at rest [facility])     Anxiety     Arthritis     hands and hip    Blood transfusion     after back surgery    Bradycardia     Bronchiectasis ([facility] [address] 11/5/2013    CAD (coronary artery disease)     Chronic back pain     Hyperlipidemia     Hypertension     Localized morphea     NATHAN treated with BiPAP     Pacemaker     Stress incontinence     Thyroid disease     hypothyroid    Trochanteric bursitis of left hip 4/12/2019         SURGICAL HISTORY     Past Surgical History:   Procedure Laterality Date    ABDOMEN SURGERY  9/9/11     REPAIR INCISIONAL HERNIA REDUCIBLE WITH POSSIBLE MESH    BACK SURGERY      x3    BACK SURGERY      stimulator    BLADDER SUSPENSION      CARDIAC PACEMAKER PLACEMENT      CATARACT REMOVAL WITH IMPLANT Left 03/08/2018    PHACO EMULSIFICATION OF CATARACT WITH INTRAOCULAR LENS IMPLANT LEFT EYE    CERVICAL DISCECTOMY  6/2014    and fusion    CHOLECYSTECTOMY      COLONOSCOPY  2002 1/26/12    ENDOSCOPY, COLON, DIAGNOSTIC      EPIDURAL STEROID INJECTION Left 8/15/2019    LEFT KNEE GENICULAR NERVE BLOCK SITE CONFIRMED BY FLUOROSCOPY performed by Vance Aguilar MD at [address] [place] [address] FESOTERODINE FUMARATE ER (TOVIAZ) 8 MG TB24    Take by mouth    FLUTICASONE (FLONASE) 50 MCG/ACT NASAL SPRAY    1 spray by Nasal route nightly. KLOR-CON M20 20 MEQ EXTENDED RELEASE TABLET    Take 20 mEq by mouth 2 times daily     LAMOTRIGINE (LAMICTAL PO)    Take 50 mg by mouth daily    LEVOTHYROXINE (SYNTHROID) 88 MCG TABLET    Take 88 mcg by mouth Daily    LORATADINE (CLARITIN) 10 MG CAPSULE    Take 10 mg by mouth daily    METOCLOPRAMIDE (REGLAN) 10 MG TABLET    Take 1 tablet by mouth 4 times daily WARNING:  May cause drowsiness. May impair ability to operate vehicles or machinery. Do not use in combination with alcohol. MIDODRINE (PROAMATINE) 2.5 MG TABLET    Take 2.5 mg by mouth 3 times daily    MONOJECT 60CC SYRINGE CATH TIP 60 ML MISC        MULTIPLE VITAMIN (THERA) TABS    Take 1 tablet by mouth daily     NITROGLYCERIN (NITROSTAT) 0.4 MG SL TABLET    Place 1 tablet under the tongue every 5 minutes as needed for Chest pain. OXYCODONE-ACETAMINOPHEN (PERCOCET)  MG PER TABLET    Take 1 tablet by mouth every 4 hours as needed for Pain. PANTOPRAZOLE (PROTONIX) 40 MG TABLET    TAKE 1 TABLET BY MOUTH DAILY    PLECANATIDE (TRULANCE) 3 MG TABS    Take by mouth    RANITIDINE (ZANTAC) 150 MG TABLET    Take 150 mg by mouth 2 times daily    SENNOSIDES (SENNA LAX PO)    Take by mouth    SERTRALINE (ZOLOFT) 100 MG TABLET    Take 200 mg by mouth nightly     SULFAMETHOXAZOLE-TRIMETHOPRIM (BACTRIM DS;SEPTRA DS) 800-160 MG PER TABLET    Take 1 tablet by mouth 2 times daily     SULINDAC (CLINORIL) 150 MG TABLET    Take 1 tablet by mouth 2 times daily    TRIAMCINOLONE (KENALOG) 0.1 % CREAM    Apply topically 2 times daily Apply topically 2 times daily. ALLERGIES     Meloxicam; Benzoin compound; Lyrica [pregabalin]; Quetiapine; Quetiapine fumarate; Seroquel [quetiapine fumarate]; Tizanidine; Adhesive tape; Amberderm; Linaclotide;  Other; and Wound dressings    FAMILYHISTORY       Family History   Problem [facility],  [place], [facility]   Phone [phone]   COMPREHENSIVE METABOLIC PANEL W/ REFLEX TO MG FOR LOW K - Abnormal; Notable for the following components:    Sodium 134 (*)     BUN 22 (*)     Alkaline Phosphatase 151 (*)     All other components within normal limits    Narrative:     Performed at:  [facility],  [place], [facility]   Phone [phone]   TROPONIN    Narrative:     Performed at:  [facility] [address],  [place], [facility]   Phone [phone]   BRAIN NATRIURETIC PEPTIDE    Narrative:     Performed at:  [facility],  [place], [facility]   Phone [phone]       All other labs were within normal range or not returned as of this dictation. EKG: All EKG's are interpreted by the Emergency Department Physician who either signs orCo-signs this chart in the absence of a cardiologist.  Please see their note for interpretation of EKG. RADIOLOGY:   Non-plain film images such as CT, Ultrasound and MRI are read by the radiologist. Plain radiographic images are visualized andpreliminarily interpreted by the  ED Provider with the below findings:        Interpretation [facility] Radiologist below, if available at the time of this note:    XR CHEST PORTABLE   Final Result   No pneumonia or edema           No results found.        PROCEDURES   Unless otherwise noted below, none     Procedures    CRITICAL CARE TIME   N/A    CONSULTS:  None      EMERGENCY DEPARTMENT COURSE and DIFFERENTIALDIAGNOSIS/MDM:   Vitals:    Vitals:    04/06/20 1722 04/06/20 1831   BP: 123/70 107/85   Pulse: 89 63   Resp: 18 18   Temp: 97.9 °F (36.6 °C)    TempSrc: Oral    SpO2: 98% 97%   Weight: 145 lb (65.8 kg)    Height: 5' (1.524 m)        Patient was given thefollowing medications:  Medications   albuterol sulfate  (90 Base) negative. BNP is within normal limits. Chest x-ray is negative for sign of acute pneumonia or edema. Every time I go and reevaluate the patient she is playing on her phone, not appearing in any distress. She was road tested, she did become mildly tachycardic with the road test but she did not become hypoxic. She just recently had a CT chest, less than 20 days ago which was negative. Her legs are without edema or erythema. No tenderness. I have very low concern for acute PE.     9:18 PM: I discussed the history, physical, and treatment plan with Dr. Roma Starr MD. Adriel Del Real was signed out in stable condition. Please see Dr. Roma Starr MD's note for further details, including diagnosis and disposition. FINAL IMPRESSION      1. Shortness of breath          DISPOSITION/PLAN   DISPOSITION        PATIENT REFERREDTO:  No follow-up provider specified.     DISCHARGE MEDICATIONS:  New Prescriptions    No medications on file       DISCONTINUED MEDICATIONS:  Discontinued Medications    No medications on file              (Please note that portions ofthis note were completed with a voice recognition program.  Efforts were made to edit the dictations but occasionally words are mis-transcribed.)    Elisabeth Her (electronically signed)        Elisabeth Her  04/06/20 8958

## 2020-04-06 NOTE — ED PROVIDER NOTES
I independently performed a history and physical on [address] [place] [address]. All diagnostic, treatment, and disposition decisions were made by myself in conjunction with the advanced practice provider. For further details of Stephon Duenas emergency department encounter, please see AIMEE Burch's documentation. Patient is a 69-year-old female complaining of worsening shortness of breath starting yesterday along with significant lightheadedness to the point where she feels like she is going to pass out. She does occasionally have intermittent chest pain as well today. She vomited 2 days ago but none since. She does complain of intermittent diarrhea. No abdominal pain. No fever, cough, exposure to anybody with coronavirus. She states she had similar symptoms like this roughly a year ago but they were unable to determine what caused it. She had a normal stress test within the last few months. She did check her oxygen at home and it was reportedly at 87% and therefore she was told by her primary doctor to go to the emergency department for further evaluation. Physical:   Gen: Mild acute distress. AOx3. Psych: Anxious mood and affect  HEENT: NCAT,  PERRL, dry MM  Neck: supple  Cardiac: RRR, pulses 2+ in upper extremities  Lungs: C2AB, no R/R/W  Abdomen: soft and nontender with no R/D/G  Neuro: no focal neuro deficits with strength and sensation 5/5 in all 4 extremities    The Ekg interpreted by me shows  normal pacemaker rhythm with a rate of 65 - atrial paced   Axis is   Normal  QTc is  normal  Intervals and Durations are unremarkable. ST Segments: no acute change and nonspecific changes  Significant change from prior EKG dated - 2/17/20  No STEMI, now paced (was normal sinus rhythm with old EKG)      MDM: Patient was evaluated due to concern for increasing shortness of breath since yesterday. She did appear anxious when I spoke to her and this may have been part of the issue.   She denies any leg

## 2020-04-07 VITALS
DIASTOLIC BLOOD PRESSURE: 75 MMHG | HEART RATE: 75 BPM | WEIGHT: 155 LBS | HEIGHT: 60 IN | TEMPERATURE: 97.1 F | OXYGEN SATURATION: 95 % | BODY MASS INDEX: 30.43 KG/M2 | SYSTOLIC BLOOD PRESSURE: 110 MMHG | RESPIRATION RATE: 18 BRPM

## 2020-04-07 LAB
C-REACTIVE PROTEIN: <0.3 MG/L (ref 0–5.1)
EKG ATRIAL RATE: 65 BPM
EKG DIAGNOSIS: NORMAL
EKG P AXIS: 42 DEGREES
EKG P-R INTERVAL: 166 MS
EKG Q-T INTERVAL: 428 MS
EKG QRS DURATION: 84 MS
EKG QTC CALCULATION (BAZETT): 445 MS
EKG R AXIS: 69 DEGREES
EKG T AXIS: 53 DEGREES
EKG VENTRICULAR RATE: 65 BPM
FERRITIN: 32.3 NG/ML (ref 15–150)
PROCALCITONIN: 0.04 NG/ML (ref 0–0.15)
TROPONIN: <0.01 NG/ML
TROPONIN: <0.01 NG/ML
TSH SERPL DL<=0.05 MIU/L-ACNC: 0.46 UIU/ML (ref 0.27–4.2)

## 2020-04-07 PROCEDURE — 84484 ASSAY OF TROPONIN QUANT: CPT

## 2020-04-07 PROCEDURE — 36415 COLL VENOUS BLD VENIPUNCTURE: CPT

## 2020-04-07 PROCEDURE — 93010 ELECTROCARDIOGRAM REPORT: CPT | Performed by: INTERNAL MEDICINE

## 2020-04-07 PROCEDURE — G0378 HOSPITAL OBSERVATION PER HR: HCPCS

## 2020-04-07 PROCEDURE — 2580000003 HC RX 258: Performed by: HOSPITALIST

## 2020-04-07 PROCEDURE — 6360000002 HC RX W HCPCS: Performed by: HOSPITALIST

## 2020-04-07 PROCEDURE — 6370000000 HC RX 637 (ALT 250 FOR IP): Performed by: HOSPITALIST

## 2020-04-07 PROCEDURE — 96372 THER/PROPH/DIAG INJ SC/IM: CPT

## 2020-04-07 PROCEDURE — 99220 PR INITIAL OBSERVATION CARE/DAY 70 MINUTES: CPT | Performed by: INTERNAL MEDICINE

## 2020-04-07 RX ORDER — DILTIAZEM HYDROCHLORIDE 180 MG/1
180 CAPSULE, COATED, EXTENDED RELEASE ORAL DAILY
Status: DISCONTINUED | OUTPATIENT
Start: 2020-04-07 | End: 2020-04-07 | Stop reason: HOSPADM

## 2020-04-07 RX ORDER — POLYETHYLENE GLYCOL 3350 17 G/17G
17 POWDER, FOR SOLUTION ORAL DAILY PRN
Status: DISCONTINUED | OUTPATIENT
Start: 2020-04-07 | End: 2020-04-07 | Stop reason: HOSPADM

## 2020-04-07 RX ORDER — PANTOPRAZOLE SODIUM 40 MG/1
40 TABLET, DELAYED RELEASE ORAL DAILY
Status: DISCONTINUED | OUTPATIENT
Start: 2020-04-07 | End: 2020-04-07 | Stop reason: HOSPADM

## 2020-04-07 RX ORDER — PROMETHAZINE HYDROCHLORIDE 25 MG/1
12.5 TABLET ORAL EVERY 6 HOURS PRN
Status: DISCONTINUED | OUTPATIENT
Start: 2020-04-07 | End: 2020-04-07 | Stop reason: HOSPADM

## 2020-04-07 RX ORDER — SODIUM CHLORIDE 9 MG/ML
INJECTION, SOLUTION INTRAVENOUS CONTINUOUS
Status: DISCONTINUED | OUTPATIENT
Start: 2020-04-07 | End: 2020-04-07 | Stop reason: HOSPADM

## 2020-04-07 RX ORDER — DICYCLOMINE HYDROCHLORIDE 10 MG/1
10 CAPSULE ORAL EVERY 6 HOURS PRN
Status: DISCONTINUED | OUTPATIENT
Start: 2020-04-07 | End: 2020-04-07 | Stop reason: HOSPADM

## 2020-04-07 RX ORDER — ACETAMINOPHEN 325 MG/1
650 TABLET ORAL EVERY 6 HOURS PRN
Status: DISCONTINUED | OUTPATIENT
Start: 2020-04-07 | End: 2020-04-07 | Stop reason: HOSPADM

## 2020-04-07 RX ORDER — ALBUTEROL SULFATE 2.5 MG/3ML
2.5 SOLUTION RESPIRATORY (INHALATION) EVERY 4 HOURS PRN
Status: DISCONTINUED | OUTPATIENT
Start: 2020-04-07 | End: 2020-04-07 | Stop reason: HOSPADM

## 2020-04-07 RX ORDER — CLONAZEPAM 1 MG/1
1 TABLET ORAL 2 TIMES DAILY PRN
Status: DISCONTINUED | OUTPATIENT
Start: 2020-04-07 | End: 2020-04-07 | Stop reason: HOSPADM

## 2020-04-07 RX ORDER — DONEPEZIL HYDROCHLORIDE 5 MG/1
10 TABLET, FILM COATED ORAL NIGHTLY
Status: DISCONTINUED | OUTPATIENT
Start: 2020-04-07 | End: 2020-04-07 | Stop reason: HOSPADM

## 2020-04-07 RX ORDER — SODIUM CHLORIDE 0.9 % (FLUSH) 0.9 %
10 SYRINGE (ML) INJECTION EVERY 12 HOURS SCHEDULED
Status: DISCONTINUED | OUTPATIENT
Start: 2020-04-07 | End: 2020-04-07 | Stop reason: HOSPADM

## 2020-04-07 RX ORDER — LAMOTRIGINE 25 MG/1
50 TABLET ORAL DAILY
Status: DISCONTINUED | OUTPATIENT
Start: 2020-04-07 | End: 2020-04-07 | Stop reason: HOSPADM

## 2020-04-07 RX ORDER — SODIUM CHLORIDE 0.9 % (FLUSH) 0.9 %
10 SYRINGE (ML) INJECTION PRN
Status: DISCONTINUED | OUTPATIENT
Start: 2020-04-07 | End: 2020-04-07 | Stop reason: HOSPADM

## 2020-04-07 RX ORDER — LEVOTHYROXINE SODIUM 88 UG/1
88 TABLET ORAL DAILY
Status: DISCONTINUED | OUTPATIENT
Start: 2020-04-07 | End: 2020-04-07 | Stop reason: HOSPADM

## 2020-04-07 RX ORDER — ONDANSETRON 2 MG/ML
4 INJECTION INTRAMUSCULAR; INTRAVENOUS EVERY 6 HOURS PRN
Status: DISCONTINUED | OUTPATIENT
Start: 2020-04-07 | End: 2020-04-07 | Stop reason: HOSPADM

## 2020-04-07 RX ORDER — SERTRALINE HYDROCHLORIDE 100 MG/1
200 TABLET, FILM COATED ORAL NIGHTLY
Status: DISCONTINUED | OUTPATIENT
Start: 2020-04-07 | End: 2020-04-07 | Stop reason: HOSPADM

## 2020-04-07 RX ORDER — ACETAMINOPHEN 650 MG/1
650 SUPPOSITORY RECTAL EVERY 6 HOURS PRN
Status: DISCONTINUED | OUTPATIENT
Start: 2020-04-07 | End: 2020-04-07 | Stop reason: HOSPADM

## 2020-04-07 RX ORDER — MIDODRINE HYDROCHLORIDE 2.5 MG/1
2.5 TABLET ORAL 3 TIMES DAILY
Status: DISCONTINUED | OUTPATIENT
Start: 2020-04-07 | End: 2020-04-07 | Stop reason: HOSPADM

## 2020-04-07 RX ORDER — ALBUTEROL SULFATE 90 UG/1
2 AEROSOL, METERED RESPIRATORY (INHALATION) EVERY 6 HOURS PRN
Status: DISCONTINUED | OUTPATIENT
Start: 2020-04-07 | End: 2020-04-07

## 2020-04-07 RX ADMIN — DICYCLOMINE HYDROCHLORIDE 10 MG: 10 CAPSULE ORAL at 10:02

## 2020-04-07 RX ADMIN — ENOXAPARIN SODIUM 40 MG: 40 INJECTION SUBCUTANEOUS at 10:02

## 2020-04-07 RX ADMIN — SODIUM CHLORIDE: 9 INJECTION, SOLUTION INTRAVENOUS at 05:14

## 2020-04-07 RX ADMIN — Medication 10 ML: at 10:03

## 2020-04-07 RX ADMIN — PROMETHAZINE HYDROCHLORIDE 12.5 MG: 25 TABLET ORAL at 10:02

## 2020-04-07 RX ADMIN — DILTIAZEM HYDROCHLORIDE 180 MG: 180 CAPSULE, COATED, EXTENDED RELEASE ORAL at 10:01

## 2020-04-07 NOTE — H&P
Pacemaker     Stress incontinence     Thyroid disease     hypothyroid    Trochanteric bursitis of left hip 4/12/2019       Past Surgical History:        Procedure Laterality Date    ABDOMEN SURGERY  9/9/11     REPAIR INCISIONAL HERNIA REDUCIBLE WITH POSSIBLE MESH    BACK SURGERY      x3    BACK SURGERY      stimulator    BLADDER SUSPENSION      CARDIAC PACEMAKER PLACEMENT      CATARACT REMOVAL WITH IMPLANT Left 03/08/2018    PHACO EMULSIFICATION OF CATARACT WITH INTRAOCULAR LENS IMPLANT LEFT EYE    CERVICAL DISCECTOMY  6/2014    and fusion    CHOLECYSTECTOMY      COLONOSCOPY  2002    1/26/12    ENDOSCOPY, COLON, DIAGNOSTIC      EPIDURAL STEROID INJECTION Left 8/15/2019    LEFT KNEE GENICULAR NERVE BLOCK SITE CONFIRMED BY FLUOROSCOPY performed by Jodee Chan MD at [facility] [facility] Right 04/05/2018    cataract removal    FOOT SURGERY Right 12/6/12    arthroplasty    FOOT SURGERY Left 04/30/2015    FOOT SURGERY Left 4/30/15    GASTRIC BYPASS SURGERY  9319,[id number]    HYSTERECTOMY      KNEE ARTHROSCOPY Left 10/3/2014    part.  medial & lateral meniscectomy, synovectomy plica exc    KNEE ARTHROSCOPY Right 10/13/15    partial medial meniscectomy, chondroplasty, partial lateral meniscectomy    NECK SURGERY      OTHER SURGICAL HISTORY  april 2013    removal spinal cord stimulater    OTHER SURGICAL HISTORY      bladder stimulator    OTHER SURGICAL HISTORY  08/30/2017    left patellofemoral arthroplasty    OTHER SURGICAL HISTORY  05/09/2018    convert left patellofemoral arthroplasty to left total knee replacement   Imani Lu  2011    Dr Mahoney/checked last week/told has 12 more yrs for this one/set at 60    SKIN BIOPSY      abdomen    SPINE SURGERY      stimulator    THROAT SURGERY      polyps removed    TONSILLECTOMY      UPPER GASTROINTESTINAL ENDOSCOPY      UPPER GASTROINTESTINAL ENDOSCOPY  05/03/2017       Medications Prior to Admission:    Prior to Admission medications    Medication Sig Start Date End Date Taking? Authorizing Provider   oxyCODONE-acetaminophen (PERCOCET)  MG per tablet Take 1 tablet by mouth every 4 hours as needed for Pain. Yes Historical Provider, MD   clonazePAM (KLONOPIN) 1 MG tablet Take 1 mg by mouth 2 times daily as needed. Yes Historical Provider, MD   loratadine (CLARITIN) 10 MG capsule Take 10 mg by mouth daily   Yes Historical Provider, MD   Biotin 1000 MCG CHEW Take by mouth   Yes Historical Provider, MD   triamcinolone (KENALOG) 0.1 % cream Apply topically 2 times daily Apply topically 2 times daily. Yes Historical Provider, MD   Fesoterodine Fumarate ER (TOVIAZ) 8 MG TB24 Take by mouth   Yes Historical Provider, MD   Plecanatide (TRULANCE) 3 MG TABS Take by mouth   Yes Historical Provider, MD   midodrine (PROAMATINE) 2.5 MG tablet Take 2.5 mg by mouth 3 times daily   Yes Historical Provider, MD   eszopiclone (LUNESTA) 2 MG TABS Take 2 mg by mouth nightly. Yes Historical Provider, MD   sulindac (CLINORIL) 150 MG tablet Take 1 tablet by mouth 2 times daily 1/8/20  Yes Iam Mendoza MD   metoclopramide (REGLAN) 10 MG tablet Take 1 tablet by mouth 4 times daily WARNING:  May cause drowsiness. May impair ability to operate vehicles or machinery. Do not use in combination with alcohol.  12/17/19  Yes MATIAS Murillo CNP   dicyclomine (BENTYL) 10 MG capsule Take 1 capsule by mouth every 6 hours as needed (cramps) 12/17/19  Yes MATIAS Murillo CNP   cyanocobalamin 1000 MCG tablet Take 2,500 mcg by mouth daily   Yes Historical Provider, MD   lamoTRIgine (LAMICTAL PO) Take 50 mg by mouth daily   Yes Historical Provider, MD   CARTIA  MG extended release capsule  9/26/19  Yes Historical Provider, MD   butalbital-acetaminophen-caffeine (FIORICET, ESGIC) -40 MG per tablet Take 1 tablet by mouth   Yes Historical Provider, MD   sulfamethoxazole-trimethoprim (BACTRIM DS;SEPTRA DS) 800-160 MG per tablet BACTERIA Rare 12/19/2019    CLARITYU Clear 12/19/2019    SPECGRAV 1.015 12/19/2019    LEUKOCYTESUR TRACE 12/19/2019    BLOODU Negative 12/19/2019    GLUCOSEU Negative 12/19/2019    GLUCOSEU NEGATIVE 08/10/2011    AMORPHOUS 3+ 08/17/2018     CULTURES  N/A    EKG:  I have reviewed the EKG with the following interpretation:   4/6/2020  Atrial-paced rhythm rate of 65  Abnormal ECG  When compared with ECG of 2.17.20 atrial paced rhythm is now presentConfirmed by Rosette Stein MD, [address] ([id number]) on 4/7/2020 6:49:04 AM    RADIOLOGY    CT CHEST PULMONARY EMBOLISM W CONTRAST   Final Result   No evidence of pulmonary embolism or acute pulmonary abnormality. XR CHEST PORTABLE   Final Result   No pneumonia or edema           Pertinent previous results reviewed     TTE 5/24/2019  Conclusions      Summary   Normal left ventricular systolic function with a visually estimated ejection   fraction of 55%. No regional wall motion abnormalities are seen. Normal left ventricular diastolic filling pressure. No significant valvular   heart disease. The right ventricle is dilated with impaired systolic function. Systolic pulmonary artery pressure (SPAP) is normal and estimated at 21 mmHg   (right atrial pressure 8 mmHg). CT Chest Pulmonary 2/17/2020  No evidence of pulmonary embolism or aortic dissection.       Overall, no acute abnormalities are detected within the chest.       Large amount of stool is noted within the visualized colon.  Correlate with   clinical evidence of constipation. Stress Test  12/31/2019    Interpetation Summary:  The LV EF is 88%  Normal global and regional wall motion in all territories.       o Negative ECG for ischemia with pharmocologic stress.     o No significant areas of ischemia identified with pharmocologic stress.     o Nuclear stress image findings indicate low risk for future ischemic       event .         Davion Rizzo have reviewed the chart on [employer] and personally interviewed and examined patient, reviewed the data (labs and imaging) and after discussion with my PA formulated the plan. Agree with note with the following edits. HPI:     Patient is a 76-year-old white female with a history of chronic pain syndrome, tobacco abuse, bradycardia status post pacemaker, chronic dyspnea on exertion with extensive outpatient work-up including pulmonary and cardiac work-up, history of pots who came to the ER with some exertion and dyspnea with ambulation. Review of her chart shows that she has had ongoing problems with dyspnea which is been worked up both by pulmonologist and cardiologist.  In fact she recently had a stress test that was normal.  She is seeing a pulmonologist was not been able to come up with an etiology. Patient is admitted the hospital for same. Since her admission she feels okay. She still has some persistent chronic dyspnea. No ACS. Troponins are negative. She had a CTPA that showed no PE. Oxygen saturation on room air is 100%. I reviewed the patient's Past Medical History, Past Surgical History, Medications, and Allergies. Physical exam:    /75   Pulse 75   Temp 97.1 °F (36.2 °C) (Oral)   Resp 18   Ht 5' (1.524 m)   Wt 155 lb (70.3 kg)   SpO2 95%   BMI 30.27 kg/m²     Gen: No distress. Alert. Eyes: PERRL. No sclera icterus. No conjunctival injection. ENT: No discharge. Pharynx clear. Neck: Trachea midline. Normal thyroid. Resp: No accessory muscle use. No crackles. No wheezes. No rhonchi. No dullness on percussion. CV: Regular rate. Regular rhythm. No murmur or rub. No edema.              ASSESSMENT/PLAN:    Chest Pain  ROONEY - Chronic  Tachycardia with ambulation - chronic  - CXR, CT PA chest - non acute, trop neg x 3  - Admitted to PCU, telemetry  - Patient has undergone significant work-up through her cardiologist and her pulmonologist with an unclear etiology as to her dyspnea on exertion   - recent stress in December 2019 was negative for any ischemia; echo in May 2019 showed a normal ejection fraction of 55%; patient underwent a 6-minute walk test which showed no desaturations but did show tachycardia with heart rate in the 140s in February of this year  - EKG here is nonacute and troponins have been negative  - Patient will need to follow-up outpatient with her PCP, no further work-up here in the hospital as this appears to be a chronic issue  - cont'd Diltiazem    Hyponatremia  - 134, mild  - IVF  - monitored    Normocytic Anemia  - Hgb 11.3  - Likely dilutional  - Baseline hemoglobin around 13    Hypothyroidism  - home dose of synthroid 88 mcg      GERD  - Protonix    History of POTS  - follows with Dr. Aparna Bryson  - on Midodrine    Hx of Bradycardia  - s/p pacemaker    Severe Urgency  Neurogenic Bladder  - underwent bladder augmentation & continent diversion in 2017 at [facility]  - cont'd home regimen    Former Smoker    Hx of MCI with Memory Loss  - episodic short term memory loss  - follows with Dr. Sandra Coleman in Neurology  - on Aricept    Moderate NATHAN  - Followed by  sleep clinic/ENT post hypoglossal nerve stimulator in July 2018    Chronic Pain with Opioid Dependence  - held PRN Percocet for now 2/2 low BP and c/o dizziness  - resumed home regimen at d/c    DVT Prophylaxis: Lovenox  Diet: DIET GENERAL;  Code Status: Full Code       Medication List      CHANGE how you take these medications    fluticasone 50 MCG/ACT nasal spray  Commonly known as:  FLONASE  1 spray by Nasal route nightly.   What changed:    · when to take this  · reasons to take this        CONTINUE taking these medications    albuterol sulfate  (90 Base) MCG/ACT inhaler  Commonly known as:  Ventolin HFA  Inhale 2 puffs into the lungs every 6 hours as needed for Wheezing or Shortness of Breath     Biotin 1000 MCG Chew     butalbital-acetaminophen-caffeine -40 MG per tablet  Commonly known as:  FIORICET, ESGIC     Cartia  MG extended release capsule  Generic drug:  dilTIAZem     Claritin 10 MG capsule  Generic drug:  loratadine     clonazePAM 1 MG tablet  Commonly known as:  KLONOPIN     cyanocobalamin 1000 MCG tablet     dicyclomine 10 MG capsule  Commonly known as:  Bentyl  Take 1 capsule by mouth every 6 hours as needed (cramps)     donepezil 10 MG tablet  Commonly known as:  ARICEPT  Take 1 tablet by mouth nightly     Klor-Con M20 20 MEQ extended release tablet  Generic drug:  potassium chloride     LAMICTAL PO     levothyroxine 88 MCG tablet  Commonly known as:  SYNTHROID     Lunesta 2 MG Tabs  Generic drug:  eszopiclone     metoclopramide 10 MG tablet  Commonly known as:  Reglan  Take 1 tablet by mouth 4 times daily WARNING:  May cause drowsiness. May impair ability to operate vehicles or machinery. Do not use in combination with alcohol.     midodrine 2.5 MG tablet  Commonly known as:  PROAMATINE     MONOJECT 60CC SYRINGE CATH TIP 60 ML Misc  Generic drug:  Syringe (Disposable)     nitroGLYCERIN 0.4 MG SL tablet  Commonly known as:  Nitrostat  Place 1 tablet under the tongue every 5 minutes as needed for Chest pain. oxyCODONE-acetaminophen  MG per tablet  Commonly known as:  PERCOCET     pantoprazole 40 MG tablet  Commonly known as:  PROTONIX     SENNA LAX PO     sertraline 100 MG tablet  Commonly known as:  ZOLOFT     sulindac 150 MG tablet  Commonly known as:  CLINORIL  Take 1 tablet by mouth 2 times daily     thera/beta-carotene Tabs     Toviaz 8 MG Tb24  Generic drug:  Fesoterodine Fumarate ER     triamcinolone 0.1 % cream  Commonly known as:  KENALOG     Trulance 3 MG Tabs  Generic drug:  Plecanatide     Zantac 150 MG tablet  Generic drug:  raNITIdine        STOP taking these medications    cephALEXin 500 MG capsule  Commonly known as:  KEFLEX     sulfamethoxazole-trimethoprim 800-160 MG per tablet  Commonly known as:  BACTRIM DS;SEPTRA DS            Dispo: discharged in stable condition to home.     Will need f/u with PCP in 1

## 2020-04-07 NOTE — PLAN OF CARE
64f presenting to ER with chest pain, sob. Not hypoxemic, but becoming dyspneic with tachycardia on ambulation. No fever or cough. Ct chest neg for PE/anything.  F/u serial trop, procalcitonin, tsh

## 2020-04-07 NOTE — PROGRESS NOTES
Admission navigator completed with exception of home medications, personal belongings, head to toe, care plan and education.  Tiffanie Montague Clinical 
Assessment complete as charted. VSS on room air. She denies pain. Atrial paced rhythm on telemetry. She is A/Ox4. Meds as per MAR. Safety measures in place and will continue to monitor.
Assessment completed. VSS. Patient voices no complaints at this time. Will continue to monitor.
EOS report and transfer of care to [facility]. Patient resting in bed, stable.
Patient admitted to room 320-1 from ER. Patient oriented to room, call light, bed rails, phone, lights and bathroom. Patient instructed about the schedule of the day including: vital sign frequency, lab draws, possible tests, frequency of MD and staff rounds, daily weights, I &O's and prescribed diet. Bed alarm in place, patient aware of placement and reason. PCU #19 Telemetry box in place, patient aware of placement and reason. Bed locked, in lowest position, side rails up 2/4, call light within reach. Recliner Assessment  Patient is not able to demonstrated the ability to move from a reclining position to an upright position within the recliner. however patient is alert, oriented and able to provide informed consent    4 Eyes Skin Assessment     NOT completed at this time.     Primary Nurse eSignature: Electronically signed by Piyush Haro on 4/7/20 at 4:25 AM EDT
Patient to be discharged. VSS. Will be leaving with family.
Report received from night shift RN. Care transferred.
per minute. Therapy will be held for heart rate (HR) greater than 140 beats per minute, pending direction from physician. 3. Bronchodilators will be administered via Metered Dose Inhaler (MDI) with spacer when the following criteria are met:  a. Alert and cooperative     b. HR < 140 bpm  c. RR < 30 bpm                d. Can demonstrate a 2-3 second inspiratory hold  4. Bronchodilators will be administered via Hand Held Nebulizer [facility]) to patients when ANY of the following criteria are met  a. Incognizant or uncooperative          b. Patients treated with HHN at Home        c. Unable to demonstrate proper use of MDI with spacer     d. RR > 30 bpm   5. Bronchodilators will be delivered via Metered Dose Inhaler (MDI), HHN, Aerogen to intubated patients on mechanical ventilation. 6. Inhalation medication orders will be delivered and/or substituted as outlined below. Aerosolized Medications Ordering and Administration Guidelines:    1. All Medications will be ordered by a physician, and their frequency and/or modality will be adjusted as defined by the patients Respiratory Severity Index (RSI) score. 2. If the patient does not have documented COPD, consider discontinuing anticholinergics when RSI is less than 9.  3. If the bronchospasm worsens (increased RSI), then the bronchodilator frequency can be increased to a maximum of every 4 hours. If greater than every 4 hours is required, the physician will be contacted. 4. If the bronchospasm improves, the frequency of the bronchodilator can be decreased, based on the patient's RSI, but not less than home treatment regimen frequency. 5. Bronchodilator(s) will be discontinued if patient has a RSI less than 9 and has received no scheduled or as needed treatment for 72  Hrs. Patients Ordered on a Mucolytic Agent:    1. Must always be administered with a bronchodilator.     2. Discontinue if patient experiences worsened bronchospasm, or secretions have lessened to the

## 2020-04-08 ENCOUNTER — CARE COORDINATION (OUTPATIENT)
Dept: CARE COORDINATION | Age: 65
End: 2020-04-08

## 2020-04-08 ENCOUNTER — TELEPHONE (OUTPATIENT)
Dept: PULMONOLOGY | Age: 65
End: 2020-04-08

## 2020-04-08 NOTE — TELEPHONE ENCOUNTER
I called Dr Stephan Palacio office and they were not able to pull pt up in their system. Pt has to call and verify info. I called pt and asked her to call and verify and have Dr Stephan Palacio office call this office to discuss. I also informed pt of Dr Perry Romano response. She is not willing to repeat 6 MW just yet due to covid pandemic.

## 2020-04-08 NOTE — CARE COORDINATION
COVID-19 Screening Initial Follow-up Note    Patient contacted regarding Angel Negron. Care Transition Nurse/ Ambulatory Care Manager contacted the daughter by telephone to perform post discharge assessment. Verified name and  with family as identifiers. Provided introduction to self, and explanation of the CTN/ACM role, and reason for call due to risk factors for infection and/or exposure to COVID-19. Symptoms reviewed with family who verbalized the following symptoms: fatigue, shortness of breath and no new/worsening symptoms       Due to no new or worsening symptoms encounter was not routed to provider for escalation. Patient has following risk factors of: MG. CTN/ACM reviewed discharge instructions, medical action plan and red flags such as increased shortness of breath, increasing fever and signs of decompensation with family who verbalized understanding. Discussed exposure protocols and quarantine with CDC Guidelines What to do if you are sick with coronavirus disease  Family was given an opportunity for questions and concerns. The family agrees to contact the [facility] exposure line [phone], [facility] department [address]: ([phone]) and PCP office for questions related to their healthcare. CTN/ACM provided contact information for future reference. Reviewed and educated family on any new and changed medications related to discharge diagnosis     Patient/family/caregiver given information for [facility] Loop and agrees to enroll no  Patient's preferred e-mail:    Patient's preferred phone number:   Based on Loop alert triggers, patient will be contacted by nurse care manager for worsening symptoms. Plan for follow-up call in 14 days based on severity of symptoms and risk factors    Spoke with patient's daughter Jai Mcgee over the phone. She was heading to patient's home with prescriptions. Her mother is resting.   She has plenty groceries and has been observing the corona virus safety precautions. Wilmer Amador said the only symptom her mother is experiencing is mild SOB. She did not have any questions or concerns. Plan  Contact in 14 days    Enrique Gauthier.  Isela Mosher RN, BSN, [address] [place] [address] Care  [phone]

## 2020-04-13 RX ORDER — SULINDAC 150 MG/1
TABLET ORAL
Qty: 60 TABLET | Refills: 1 | Status: ON HOLD
Start: 2020-04-13 | End: 2020-05-01 | Stop reason: HOSPADM

## 2020-04-14 NOTE — TELEPHONE ENCOUNTER
Dr. Nikhil Romeo office L/M on VM stating that pt IS a pt at Dr. Nikhil Romeo office, but pt cannot schedule with them unless they get confirmation from our office that pt is being treated by one of our physicians, and asking what pt needs to be seen for. Return call number [phone] ext. [phone].

## 2020-04-22 ENCOUNTER — CARE COORDINATION (OUTPATIENT)
Dept: CARE COORDINATION | Age: 65
End: 2020-04-22

## 2020-04-23 ENCOUNTER — CARE COORDINATION (OUTPATIENT)
Dept: CARE COORDINATION | Age: 65
End: 2020-04-23

## 2020-04-27 ENCOUNTER — TELEPHONE (OUTPATIENT)
Dept: PULMONOLOGY | Age: 65
End: 2020-04-27

## 2020-04-28 ENCOUNTER — APPOINTMENT (OUTPATIENT)
Dept: ULTRASOUND IMAGING | Age: 65
DRG: 440 | End: 2020-04-28
Payer: MEDICARE

## 2020-04-28 ENCOUNTER — HOSPITAL ENCOUNTER (INPATIENT)
Age: 65
LOS: 3 days | Discharge: HOME | DRG: 440 | End: 2020-05-01
Attending: EMERGENCY MEDICINE | Admitting: INTERNAL MEDICINE
Payer: MEDICARE

## 2020-04-28 ENCOUNTER — APPOINTMENT (OUTPATIENT)
Dept: CT IMAGING | Age: 65
DRG: 440 | End: 2020-04-28
Payer: MEDICARE

## 2020-04-28 PROBLEM — K85.00 IDIOPATHIC ACUTE PANCREATITIS: Status: ACTIVE | Noted: 2020-04-28

## 2020-04-28 LAB
A/G RATIO: 1.4 (ref 1.1–2.2)
ALBUMIN SERPL-MCNC: 3.5 G/DL (ref 3.4–5)
ALP BLD-CCNC: 387 U/L (ref 40–129)
ALT SERPL-CCNC: 97 U/L (ref 10–40)
ANION GAP SERPL CALCULATED.3IONS-SCNC: 13 MMOL/L (ref 3–16)
AST SERPL-CCNC: 164 U/L (ref 15–37)
BASOPHILS ABSOLUTE: 0.1 K/UL (ref 0–0.2)
BASOPHILS RELATIVE PERCENT: 0.8 %
BILIRUB SERPL-MCNC: 0.9 MG/DL (ref 0–1)
BUN BLDV-MCNC: 16 MG/DL (ref 7–20)
CALCIUM SERPL-MCNC: 8.6 MG/DL (ref 8.3–10.6)
CHLORIDE BLD-SCNC: 106 MMOL/L (ref 99–110)
CO2: 18 MMOL/L (ref 21–32)
CREAT SERPL-MCNC: 0.6 MG/DL (ref 0.6–1.2)
D DIMER: 660 NG/ML DDU (ref 0–229)
EOSINOPHILS ABSOLUTE: 0 K/UL (ref 0–0.6)
EOSINOPHILS RELATIVE PERCENT: 0.5 %
GFR AFRICAN AMERICAN: >60
GFR NON-AFRICAN AMERICAN: >60
GLOBULIN: 2.5 G/DL
GLUCOSE BLD-MCNC: 131 MG/DL (ref 70–99)
HCT VFR BLD CALC: 34.9 % (ref 36–48)
HEMOGLOBIN: 11.4 G/DL (ref 12–16)
LACTIC ACID: 1.8 MMOL/L (ref 0.4–2)
LIPASE: 373 U/L (ref 13–60)
LYMPHOCYTES ABSOLUTE: 0.5 K/UL (ref 1–5.1)
LYMPHOCYTES RELATIVE PERCENT: 5.4 %
MAGNESIUM: 1.3 MG/DL (ref 1.8–2.4)
MCH RBC QN AUTO: 31.6 PG (ref 26–34)
MCHC RBC AUTO-ENTMCNC: 32.7 G/DL (ref 31–36)
MCV RBC AUTO: 96.6 FL (ref 80–100)
MONOCYTES ABSOLUTE: 0.5 K/UL (ref 0–1.3)
MONOCYTES RELATIVE PERCENT: 5.4 %
NEUTROPHILS ABSOLUTE: 8.4 K/UL (ref 1.7–7.7)
NEUTROPHILS RELATIVE PERCENT: 87.9 %
PDW BLD-RTO: 14 % (ref 12.4–15.4)
PLATELET # BLD: 234 K/UL (ref 135–450)
PMV BLD AUTO: 8.2 FL (ref 5–10.5)
POTASSIUM REFLEX MAGNESIUM: 3.3 MMOL/L (ref 3.5–5.1)
PRO-BNP: 296 PG/ML (ref 0–124)
RBC # BLD: 3.61 M/UL (ref 4–5.2)
SODIUM BLD-SCNC: 137 MMOL/L (ref 136–145)
TOTAL PROTEIN: 6 G/DL (ref 6.4–8.2)
TROPONIN: <0.01 NG/ML
WBC # BLD: 9.6 K/UL (ref 4–11)

## 2020-04-28 PROCEDURE — 85025 COMPLETE CBC W/AUTO DIFF WBC: CPT

## 2020-04-28 PROCEDURE — 93005 ELECTROCARDIOGRAM TRACING: CPT | Performed by: EMERGENCY MEDICINE

## 2020-04-28 PROCEDURE — 71275 CT ANGIOGRAPHY CHEST: CPT

## 2020-04-28 PROCEDURE — 83735 ASSAY OF MAGNESIUM: CPT

## 2020-04-28 PROCEDURE — 2060000000 HC ICU INTERMEDIATE R&B

## 2020-04-28 PROCEDURE — 80053 COMPREHEN METABOLIC PANEL: CPT

## 2020-04-28 PROCEDURE — 96375 TX/PRO/DX INJ NEW DRUG ADDON: CPT

## 2020-04-28 PROCEDURE — 76705 ECHO EXAM OF ABDOMEN: CPT

## 2020-04-28 PROCEDURE — 6370000000 HC RX 637 (ALT 250 FOR IP): Performed by: PHYSICIAN ASSISTANT

## 2020-04-28 PROCEDURE — 84484 ASSAY OF TROPONIN QUANT: CPT

## 2020-04-28 PROCEDURE — 6360000002 HC RX W HCPCS: Performed by: PHYSICIAN ASSISTANT

## 2020-04-28 PROCEDURE — 6360000004 HC RX CONTRAST MEDICATION: Performed by: PHYSICIAN ASSISTANT

## 2020-04-28 PROCEDURE — 99285 EMERGENCY DEPT VISIT HI MDM: CPT

## 2020-04-28 PROCEDURE — 83690 ASSAY OF LIPASE: CPT

## 2020-04-28 PROCEDURE — 83880 ASSAY OF NATRIURETIC PEPTIDE: CPT

## 2020-04-28 PROCEDURE — 83605 ASSAY OF LACTIC ACID: CPT

## 2020-04-28 PROCEDURE — 96365 THER/PROPH/DIAG IV INF INIT: CPT

## 2020-04-28 PROCEDURE — 36415 COLL VENOUS BLD VENIPUNCTURE: CPT

## 2020-04-28 PROCEDURE — 85379 FIBRIN DEGRADATION QUANT: CPT

## 2020-04-28 PROCEDURE — 2580000003 HC RX 258: Performed by: PHYSICIAN ASSISTANT

## 2020-04-28 RX ORDER — 0.9 % SODIUM CHLORIDE 0.9 %
500 INTRAVENOUS SOLUTION INTRAVENOUS ONCE
Status: DISCONTINUED | OUTPATIENT
Start: 2020-04-28 | End: 2020-04-28

## 2020-04-28 RX ORDER — POTASSIUM CHLORIDE 20 MEQ/1
20 TABLET, EXTENDED RELEASE ORAL ONCE
Status: COMPLETED | OUTPATIENT
Start: 2020-04-28 | End: 2020-04-28

## 2020-04-28 RX ORDER — 0.9 % SODIUM CHLORIDE 0.9 %
1000 INTRAVENOUS SOLUTION INTRAVENOUS ONCE
Status: DISCONTINUED | OUTPATIENT
Start: 2020-04-28 | End: 2020-04-28

## 2020-04-28 RX ORDER — 0.9 % SODIUM CHLORIDE 0.9 %
1000 INTRAVENOUS SOLUTION INTRAVENOUS ONCE
Status: COMPLETED | OUTPATIENT
Start: 2020-04-28 | End: 2020-04-28

## 2020-04-28 RX ORDER — MAGNESIUM SULFATE 1 G/100ML
1 INJECTION INTRAVENOUS ONCE
Status: COMPLETED | OUTPATIENT
Start: 2020-04-28 | End: 2020-04-28

## 2020-04-28 RX ORDER — KETOROLAC TROMETHAMINE 30 MG/ML
15 INJECTION, SOLUTION INTRAMUSCULAR; INTRAVENOUS ONCE
Status: COMPLETED | OUTPATIENT
Start: 2020-04-28 | End: 2020-04-28

## 2020-04-28 RX ADMIN — POTASSIUM CHLORIDE 20 MEQ: 1500 TABLET, EXTENDED RELEASE ORAL at 19:37

## 2020-04-28 RX ADMIN — MAGNESIUM SULFATE HEPTAHYDRATE 1 G: 1 INJECTION, SOLUTION INTRAVENOUS at 19:37

## 2020-04-28 RX ADMIN — SODIUM CHLORIDE 1000 ML: 9 INJECTION, SOLUTION INTRAVENOUS at 22:04

## 2020-04-28 RX ADMIN — IOPAMIDOL 85 ML: 755 INJECTION, SOLUTION INTRAVENOUS at 20:19

## 2020-04-28 RX ADMIN — KETOROLAC TROMETHAMINE 15 MG: 30 INJECTION, SOLUTION INTRAMUSCULAR at 22:24

## 2020-04-28 ASSESSMENT — PAIN DESCRIPTION - LOCATION: LOCATION: CHEST

## 2020-04-28 ASSESSMENT — PAIN SCALES - GENERAL: PAINLEVEL_OUTOF10: 6

## 2020-04-28 NOTE — ED PROVIDER NOTES
[facility]        Pt Name: Chet Hernández  MRN: [id number]  [place] 1955  Date of evaluation: 4/28/2020  Provider: AIMEE Marmolejo  PCP: [address], [place]    This patient was seen and evaluated by the attending physician Yong Fitzpatrick MD    [address] [facility]       Chief Complaint   Patient presents with    Chest Pain     chest pain started today at 1300 c/o abominal, chest pain, diarrhea, fevers and chills       HISTORY OF PRESENT ILLNESS   (Location/Symptom, Timing/Onset, Context/Setting, Quality, Duration, Modifying Factors, Severity)  Note limiting factors. Chet Hernández is a 71 y.o. female who presents via private vehicle for evaluation of chest pain. Patient notes that approximately 5 days ago she started with diarrhea. She endorses multiple episodes of nonbloody diarrhea. She notes that a couple days ago she started having nausea as well, she notes a couple episodes of emesis, nonbilious she denies hematemesis. She notes that today she woke up and she had worsening abdominal pain and she also felt a midsternal chest pressure. This is different from her typical baseline chest pain which is typically sharp. This chest pain occurred around 11 AM.  She took nitro and aspirin without significant or really any improvement. Additionally she notes a mild headache that started today. She denies any new numbness tingling or weakness of the upper or lower extremities, she denies feeling lightheaded or dizzy or like she is going to pass out. She does note that she feels weak. Summary-echocardiogram 5/24/2019   Normal left ventricular systolic function with a visually estimated ejection   fraction of 55%. No regional wall motion abnormalities are seen. Normal left ventricular diastolic filling pressure. No significant valvular   heart disease. The right ventricle is dilated with impaired systolic function.    Systolic pulmonary artery pressure mouth nightly. dicyclomine (BENTYL) 10 MG capsule Take 1 capsule by mouth every 6 hours as needed (cramps)  Qty: 20 capsule, Refills: 0      cyanocobalamin 1000 MCG tablet Take 2,500 mcg by mouth daily      lamoTRIgine (LAMICTAL PO) Take 50 mg by mouth daily      butalbital-acetaminophen-caffeine (FIORICET, ESGIC) -40 MG per tablet Take 1 tablet by mouth      levothyroxine (SYNTHROID) 88 MCG tablet Take 88 mcg by mouth Daily      donepezil (ARICEPT) 10 MG tablet Take 1 tablet by mouth nightly  Qty: 90 tablet, Refills: 2    Associated Diagnoses: Mild cognitive impairment      MONOJECT 60CC SYRINGE CATH TIP 60 ML MISC       KLOR-CON M20 20 MEQ extended release tablet Take 20 mEq by mouth 2 times daily       sertraline (ZOLOFT) 100 MG tablet Take 200 mg by mouth nightly       pantoprazole (PROTONIX) 40 MG tablet TAKE 1 TABLET BY MOUTH DAILY  Refills: 0      fluticasone (FLONASE) 50 MCG/ACT nasal spray 1 spray by Nasal route nightly. Qty: 1 Bottle, Refills: 5      nitroGLYCERIN (NITROSTAT) 0.4 MG SL tablet Place 1 tablet under the tongue every 5 minutes as needed for Chest pain. Qty: 25 tablet, Refills: 3      Multiple Vitamin (THERA) TABS Take 1 tablet by mouth daily       clonazePAM (KLONOPIN) 1 MG tablet Take 1 mg by mouth 2 times daily as needed. albuterol sulfate HFA (VENTOLIN HFA) 108 (90 Base) MCG/ACT inhaler Inhale 2 puffs into the lungs every 6 hours as needed for Wheezing or Shortness of Breath  Qty: 1 Inhaler, Refills: 6               ALLERGIES     Meloxicam; Benzoin compound; Lyrica [pregabalin]; Quetiapine; Quetiapine fumarate; Seroquel [quetiapine fumarate]; Tizanidine; Adhesive tape; Amberderm; Linaclotide;  Other; and Wound dressings    FAMILYHISTORY       Family History   Problem Relation Age of Onset    Heart Disease Father     Cancer Sister         multiple organs    Other Sister     Asthma Neg Hx     Diabetes Neg Hx     Emphysema Neg Hx     Heart Failure Neg Hx     IX, XI through XII grossly intact. Normal FMF, no ataxia or pronator drift appreciated. Power intact upper and lower extremities, sensation intact x4. No gross facial drooping. PSYCHIATRIC: Normal mood and affect.     DIAGNOSTIC RESULTS   LABS:    Labs Reviewed   CBC WITH AUTO DIFFERENTIAL - Abnormal; Notable for the following components:       Result Value    RBC 3.61 (*)     Hemoglobin 11.4 (*)     Hematocrit 34.9 (*)     Neutrophils Absolute 8.4 (*)     Lymphocytes Absolute 0.5 (*)     All other components within normal limits    Narrative:     Performed at:  [facility] [place],  [facility]   Phone [phone]   COMPREHENSIVE METABOLIC PANEL W/ REFLEX TO MG FOR LOW K - Abnormal; Notable for the following components:    Potassium reflex Magnesium 3.3 (*)     CO2 18 (*)     Glucose 131 (*)     Total Protein 6.0 (*)     Alkaline Phosphatase 387 (*)     ALT 97 (*)      (*)     All other components within normal limits    Narrative:     Performed at:  [facility] [place],  [place]   Phone [phone]   BRAIN NATRIURETIC PEPTIDE - Abnormal; Notable for the following components:    Pro- (*)     All other components within normal limits    Narrative:     Performed at:  [facility]) - [facility] [place],  [place]   Phone [phone]   LIPASE - Abnormal; Notable for the following components:    Lipase 373.0 (*)     All other components within normal limits    Narrative:     Performed at:  [facility]) [facility] [place],  [employer], [facility]   Phone [phone]   D-DIMER, QUANTITATIVE - Abnormal; Notable for the following components:    D-Dimer, Quant 660 (*)     All other components within normal limits    Narrative:     Performed at:  [facility] [place],  [employer], [place] at the time of this note:    [address]   Preliminary Result   1. Status post cholecystectomy. 2. Persistent finding of mild prominence of extrahepatic biliary tree. Similar finding was visualized on prior study of 09/09/2019. Finding may be   physiologic associated with prior cholecystectomy as described above. CTA CHEST ABDOMEN PELVIS W CONTRAST   Final Result   No aortic dissection noted      Nonspecific enhancement of mucosa of the common duct. This could be   artifactual or a secondary sign of cholangitis. Correlate with liver   function test.      Gas is seen within the bladder. This could be due to underlying infection or   fistula to bowel. Correlate with urinalysis. Appearance is similar           No results found.        PROCEDURES   Unless otherwise noted below, none     Procedures    CRITICAL CARE TIME   N/A    CONSULTS:  IP CONSULT TO GI  IP CONSULT TO HOSPITALIST      EMERGENCY DEPARTMENT COURSE and DIFFERENTIALDIAGNOSIS/MDM:   Vitals:    Vitals:    04/28/20 1930 04/28/20 2302 04/29/20 0054 04/29/20 0313   BP: (!) 92/58 (!) 99/57 100/78 (!) 115/58   Pulse: 92 70 98 89   Resp: 15  16 16   Temp:   97.2 °F (36.2 °C) 98.1 °F (36.7 °C)   TempSrc:   Oral Oral   SpO2: 97% 93% 94% 96%   Weight:   152 lb 14.4 oz (69.4 kg)    Height:   5' 1\" (1.549 m)        Patient was given thefollowing medications:  Medications   magnesium sulfate 3 g in dextrose 5 % 100 mL IVPB ( Intravenous Canceled Entry 4/29/20 0250)   potassium chloride (KLOR-CON M) extended release tablet 20 mEq (20 mEq Oral Given 4/28/20 1937)   magnesium sulfate 1 g in dextrose 5% 100 mL IVPB (0 g Intravenous Stopped 4/28/20 2052)   iopamidol (ISOVUE-370) 76 % injection 85 mL (85 mLs Intravenous Given 4/28/20 2019)   0.9 % sodium chloride bolus (0 mLs Intravenous Stopped 4/28/20 2350)   ketorolac (TORADOL) injection 15 mg (15 mg Intravenous Given 4/28/20 2224)   0.9 % sodium chloride bolus (0 mLs Intravenous Stopped 4/29/20 3976)       I spoke with Dr. Jake Cartagena, plan to admit to medicine, hopefully MRCP tomorrow, likely pancreatitis likely secondary to common bile duct. Hold on antibiotics at this time given afebrile and without leukocytosis. Patient is a 71-year-old female who presents for evaluation of chest pain. On exam she is alert oriented afebrile well-perfused. She is however she is persistently with on the lower end of normal blood pressures. Overall she is maintaining an adequate map however review of patient's past medical history reviews that she persistently runs on the lower end from a blood pressure standpoint. Patient is not complaining of any lightheadedness or weakness. She was given IV fluids. Patient is complaining of a midsternal chest pain however her troponin is negative, twelve-lead reveals sinus tachycardia, which improved throughout her ER stay. I have low concern for acute coronary syndrome, this is not patient's typical chest pain it was not relieved by nitro or aspirin. Patient does however have new transaminitis, markedly elevated lipase. CT abdomen pelvis was obtained given patient's presentation of chest pain that radiates to the abdomen and headache, no evidence of acute dissection however she does have evidence of common bile duct wall thickening. In absence of fever or acute leukocytosis I do not believe patient warrants antibiosis at this time however urinalysis is nitrite positive, will need to treat for acute cystitis although she is asymptomatic therefore sending cultures prior to antibiosis may be warranted, especially her presence of suprapubic cath. Additionally, her magnesium is low, she was given supplementation in the emergency department. All information including ED workup, results, treatment, diagnosis has been reviewed and discussed with ED attending physician and directly discussed with Hospitalist who is the admitting physician. Pt will be admitted in stable condition.  Pt advised of admission and is in full agreement. FINAL IMPRESSION      1. Acute biliary pancreatitis, unspecified complication status    2.  Hypomagnesemia          DISPOSITION/PLAN   DISPOSITION        PATIENT REFERREDTO:  Judie Alejandro MD  [address] [place] [address] DR RUBEN Alfonso [address] [phone]            DISCHARGE MEDICATIONS:  Current Discharge Medication List          DISCONTINUED MEDICATIONS:  Current Discharge Medication List      STOP taking these medications       metoclopramide (REGLAN) 10 MG tablet Comments:   Reason for Stopping:         CARTIA  MG extended release capsule Comments:   Reason for Stopping:         ranitidine (ZANTAC) 150 MG tablet Comments:   Reason for Stopping:                      (Please note that portions ofthis note were completed with a voice recognition program.  Efforts were made to edit the dictations but occasionally words are mis-transcribed.)    Elisabeth Miranda (electronically signed)        Elisabeth Miranda  04/29/20 9868

## 2020-04-29 PROBLEM — K85.10 ACUTE BILIARY PANCREATITIS: Status: ACTIVE | Noted: 2020-04-28

## 2020-04-29 LAB
A/G RATIO: 1.3 (ref 1.1–2.2)
ALBUMIN SERPL-MCNC: 3.2 G/DL (ref 3.4–5)
ALP BLD-CCNC: 306 U/L (ref 40–129)
ALT SERPL-CCNC: 71 U/L (ref 10–40)
AMORPHOUS: ABNORMAL /HPF
ANION GAP SERPL CALCULATED.3IONS-SCNC: 10 MMOL/L (ref 3–16)
AST SERPL-CCNC: 95 U/L (ref 15–37)
BACTERIA: ABNORMAL /HPF
BILIRUB SERPL-MCNC: 0.5 MG/DL (ref 0–1)
BILIRUBIN URINE: NEGATIVE
BLOOD, URINE: NEGATIVE
BUN BLDV-MCNC: 15 MG/DL (ref 7–20)
CALCIUM SERPL-MCNC: 8 MG/DL (ref 8.3–10.6)
CHLORIDE BLD-SCNC: 108 MMOL/L (ref 99–110)
CLARITY: CLEAR
CO2: 19 MMOL/L (ref 21–32)
COLOR: YELLOW
CREAT SERPL-MCNC: <0.5 MG/DL (ref 0.6–1.2)
CRYSTALS, UA: ABNORMAL /HPF
EKG ATRIAL RATE: 108 BPM
EKG DIAGNOSIS: NORMAL
EKG P AXIS: 56 DEGREES
EKG P-R INTERVAL: 130 MS
EKG Q-T INTERVAL: 344 MS
EKG QRS DURATION: 80 MS
EKG QTC CALCULATION (BAZETT): 460 MS
EKG R AXIS: 82 DEGREES
EKG T AXIS: 75 DEGREES
EKG VENTRICULAR RATE: 108 BPM
FOLATE: >20 NG/ML (ref 4.78–24.2)
GFR AFRICAN AMERICAN: >60
GFR NON-AFRICAN AMERICAN: >60
GLOBULIN: 2.4 G/DL
GLUCOSE BLD-MCNC: 106 MG/DL (ref 70–99)
GLUCOSE URINE: NEGATIVE MG/DL
KETONES, URINE: NEGATIVE MG/DL
LEUKOCYTE ESTERASE, URINE: NEGATIVE
MAGNESIUM: 1.8 MG/DL (ref 1.8–2.4)
MAGNESIUM: 1.8 MG/DL (ref 1.8–2.4)
MICROSCOPIC EXAMINATION: YES
NITRITE, URINE: POSITIVE
PH UA: 5 (ref 5–8)
PHOSPHORUS: 2.5 MG/DL (ref 2.5–4.9)
POTASSIUM REFLEX MAGNESIUM: 3.4 MMOL/L (ref 3.5–5.1)
PROTEIN UA: NEGATIVE MG/DL
RBC UA: ABNORMAL /HPF (ref 0–4)
SODIUM BLD-SCNC: 137 MMOL/L (ref 136–145)
SPECIFIC GRAVITY UA: 1.01 (ref 1–1.03)
TOTAL PROTEIN: 5.6 G/DL (ref 6.4–8.2)
TRIGL SERPL-MCNC: 44 MG/DL (ref 0–150)
URINE TYPE: ABNORMAL
UROBILINOGEN, URINE: 0.2 E.U./DL
VITAMIN B-12: >2000 PG/ML (ref 211–911)
WBC UA: ABNORMAL /HPF (ref 0–5)

## 2020-04-29 PROCEDURE — 80053 COMPREHEN METABOLIC PANEL: CPT

## 2020-04-29 PROCEDURE — 6360000002 HC RX W HCPCS: Performed by: INTERNAL MEDICINE

## 2020-04-29 PROCEDURE — C9113 INJ PANTOPRAZOLE SODIUM, VIA: HCPCS | Performed by: INTERNAL MEDICINE

## 2020-04-29 PROCEDURE — 82746 ASSAY OF FOLIC ACID SERUM: CPT

## 2020-04-29 PROCEDURE — 2580000003 HC RX 258: Performed by: PHYSICIAN ASSISTANT

## 2020-04-29 PROCEDURE — 83735 ASSAY OF MAGNESIUM: CPT

## 2020-04-29 PROCEDURE — 2580000003 HC RX 258: Performed by: INTERNAL MEDICINE

## 2020-04-29 PROCEDURE — 6370000000 HC RX 637 (ALT 250 FOR IP): Performed by: INTERNAL MEDICINE

## 2020-04-29 PROCEDURE — 36415 COLL VENOUS BLD VENIPUNCTURE: CPT

## 2020-04-29 PROCEDURE — 81001 URINALYSIS AUTO W/SCOPE: CPT

## 2020-04-29 PROCEDURE — 93010 ELECTROCARDIOGRAM REPORT: CPT | Performed by: INTERNAL MEDICINE

## 2020-04-29 PROCEDURE — 84478 ASSAY OF TRIGLYCERIDES: CPT

## 2020-04-29 PROCEDURE — 99232 SBSQ HOSP IP/OBS MODERATE 35: CPT | Performed by: INTERNAL MEDICINE

## 2020-04-29 PROCEDURE — 82607 VITAMIN B-12: CPT

## 2020-04-29 PROCEDURE — 84100 ASSAY OF PHOSPHORUS: CPT

## 2020-04-29 PROCEDURE — 2500000003 HC RX 250 WO HCPCS: Performed by: INTERNAL MEDICINE

## 2020-04-29 PROCEDURE — 2060000000 HC ICU INTERMEDIATE R&B

## 2020-04-29 RX ORDER — DICYCLOMINE HYDROCHLORIDE 10 MG/1
10 CAPSULE ORAL EVERY 6 HOURS PRN
Status: DISCONTINUED | OUTPATIENT
Start: 2020-04-29 | End: 2020-05-01 | Stop reason: HOSPADM

## 2020-04-29 RX ORDER — MAGNESIUM SULFATE 1 G/100ML
1 INJECTION INTRAVENOUS PRN
Status: DISCONTINUED | OUTPATIENT
Start: 2020-04-29 | End: 2020-05-01 | Stop reason: HOSPADM

## 2020-04-29 RX ORDER — DONEPEZIL HYDROCHLORIDE 5 MG/1
10 TABLET, FILM COATED ORAL NIGHTLY
Status: DISCONTINUED | OUTPATIENT
Start: 2020-04-29 | End: 2020-05-01 | Stop reason: HOSPADM

## 2020-04-29 RX ORDER — SODIUM CHLORIDE 0.9 % (FLUSH) 0.9 %
10 SYRINGE (ML) INJECTION EVERY 12 HOURS SCHEDULED
Status: DISCONTINUED | OUTPATIENT
Start: 2020-04-29 | End: 2020-05-01 | Stop reason: HOSPADM

## 2020-04-29 RX ORDER — ALBUTEROL SULFATE 90 UG/1
2 AEROSOL, METERED RESPIRATORY (INHALATION) EVERY 6 HOURS PRN
Status: DISCONTINUED | OUTPATIENT
Start: 2020-04-29 | End: 2020-05-01 | Stop reason: HOSPADM

## 2020-04-29 RX ORDER — OXYCODONE HYDROCHLORIDE 5 MG/1
5 TABLET ORAL EVERY 4 HOURS PRN
Status: DISCONTINUED | OUTPATIENT
Start: 2020-04-29 | End: 2020-05-01 | Stop reason: HOSPADM

## 2020-04-29 RX ORDER — MIDODRINE HYDROCHLORIDE 5 MG/1
2.5 TABLET ORAL 3 TIMES DAILY
Status: DISCONTINUED | OUTPATIENT
Start: 2020-04-29 | End: 2020-05-01 | Stop reason: HOSPADM

## 2020-04-29 RX ORDER — TROSPIUM CHLORIDE 20 MG/1
20 TABLET, FILM COATED ORAL
Status: DISCONTINUED | OUTPATIENT
Start: 2020-04-29 | End: 2020-05-01 | Stop reason: HOSPADM

## 2020-04-29 RX ORDER — SERTRALINE HYDROCHLORIDE 100 MG/1
200 TABLET, FILM COATED ORAL NIGHTLY
Status: DISCONTINUED | OUTPATIENT
Start: 2020-04-29 | End: 2020-05-01 | Stop reason: HOSPADM

## 2020-04-29 RX ORDER — SODIUM CHLORIDE 0.9 % (FLUSH) 0.9 %
10 SYRINGE (ML) INJECTION PRN
Status: DISCONTINUED | OUTPATIENT
Start: 2020-04-29 | End: 2020-05-01 | Stop reason: HOSPADM

## 2020-04-29 RX ORDER — LAMOTRIGINE 25 MG/1
50 TABLET ORAL DAILY
Status: DISCONTINUED | OUTPATIENT
Start: 2020-04-29 | End: 2020-05-01 | Stop reason: HOSPADM

## 2020-04-29 RX ORDER — SODIUM CHLORIDE 9 MG/ML
INJECTION, SOLUTION INTRAVENOUS CONTINUOUS
Status: DISCONTINUED | OUTPATIENT
Start: 2020-04-29 | End: 2020-04-30

## 2020-04-29 RX ORDER — CLONAZEPAM 1 MG/1
1 TABLET ORAL 2 TIMES DAILY PRN
Status: DISCONTINUED | OUTPATIENT
Start: 2020-04-29 | End: 2020-05-01 | Stop reason: HOSPADM

## 2020-04-29 RX ORDER — NITROGLYCERIN 0.4 MG/1
0.4 TABLET SUBLINGUAL EVERY 5 MIN PRN
Status: DISCONTINUED | OUTPATIENT
Start: 2020-04-29 | End: 2020-05-01 | Stop reason: HOSPADM

## 2020-04-29 RX ORDER — ONDANSETRON 2 MG/ML
4 INJECTION INTRAMUSCULAR; INTRAVENOUS EVERY 6 HOURS PRN
Status: DISCONTINUED | OUTPATIENT
Start: 2020-04-29 | End: 2020-05-01 | Stop reason: HOSPADM

## 2020-04-29 RX ORDER — ACETAMINOPHEN 325 MG/1
650 TABLET ORAL EVERY 4 HOURS PRN
Status: DISCONTINUED | OUTPATIENT
Start: 2020-04-29 | End: 2020-05-01 | Stop reason: HOSPADM

## 2020-04-29 RX ORDER — POTASSIUM CHLORIDE 7.45 MG/ML
10 INJECTION INTRAVENOUS PRN
Status: DISCONTINUED | OUTPATIENT
Start: 2020-04-29 | End: 2020-05-01 | Stop reason: HOSPADM

## 2020-04-29 RX ORDER — 0.9 % SODIUM CHLORIDE 0.9 %
1000 INTRAVENOUS SOLUTION INTRAVENOUS ONCE
Status: COMPLETED | OUTPATIENT
Start: 2020-04-29 | End: 2020-04-29

## 2020-04-29 RX ORDER — CETIRIZINE HYDROCHLORIDE 10 MG/1
5 TABLET ORAL DAILY
Status: DISCONTINUED | OUTPATIENT
Start: 2020-04-29 | End: 2020-05-01 | Stop reason: HOSPADM

## 2020-04-29 RX ORDER — POTASSIUM CHLORIDE 20 MEQ/1
20 TABLET, EXTENDED RELEASE ORAL 2 TIMES DAILY
Status: DISCONTINUED | OUTPATIENT
Start: 2020-04-29 | End: 2020-05-01 | Stop reason: HOSPADM

## 2020-04-29 RX ORDER — FLUTICASONE PROPIONATE 50 MCG
1 SPRAY, SUSPENSION (ML) NASAL NIGHTLY
Status: DISCONTINUED | OUTPATIENT
Start: 2020-04-29 | End: 2020-05-01 | Stop reason: HOSPADM

## 2020-04-29 RX ORDER — LEVOTHYROXINE SODIUM 88 UG/1
88 TABLET ORAL DAILY
Status: DISCONTINUED | OUTPATIENT
Start: 2020-04-29 | End: 2020-05-01 | Stop reason: HOSPADM

## 2020-04-29 RX ORDER — PANTOPRAZOLE SODIUM 40 MG/10ML
40 INJECTION, POWDER, LYOPHILIZED, FOR SOLUTION INTRAVENOUS DAILY
Status: DISCONTINUED | OUTPATIENT
Start: 2020-04-29 | End: 2020-05-01 | Stop reason: HOSPADM

## 2020-04-29 RX ADMIN — OXYCODONE HYDROCHLORIDE 5 MG: 5 TABLET ORAL at 22:09

## 2020-04-29 RX ADMIN — Medication 10 ML: at 22:10

## 2020-04-29 RX ADMIN — ONDANSETRON HYDROCHLORIDE 4 MG: 2 INJECTION, SOLUTION INTRAMUSCULAR; INTRAVENOUS at 09:32

## 2020-04-29 RX ADMIN — SODIUM CHLORIDE: 9 INJECTION, SOLUTION INTRAVENOUS at 06:12

## 2020-04-29 RX ADMIN — HYDROMORPHONE HYDROCHLORIDE 1 MG: 1 INJECTION, SOLUTION INTRAMUSCULAR; INTRAVENOUS; SUBCUTANEOUS at 05:49

## 2020-04-29 RX ADMIN — LEVOTHYROXINE SODIUM 88 MCG: 88 TABLET ORAL at 05:49

## 2020-04-29 RX ADMIN — CETIRIZINE HYDROCHLORIDE 5 MG: 10 TABLET ORAL at 09:15

## 2020-04-29 RX ADMIN — DONEPEZIL HYDROCHLORIDE 10 MG: 5 TABLET, FILM COATED ORAL at 22:09

## 2020-04-29 RX ADMIN — MIDODRINE HYDROCHLORIDE 2.5 MG: 5 TABLET ORAL at 14:38

## 2020-04-29 RX ADMIN — Medication 10 ML: at 09:15

## 2020-04-29 RX ADMIN — HYDROMORPHONE HYDROCHLORIDE 0.5 MG: 1 INJECTION, SOLUTION INTRAMUSCULAR; INTRAVENOUS; SUBCUTANEOUS at 09:33

## 2020-04-29 RX ADMIN — POTASSIUM CHLORIDE 20 MEQ: 1500 TABLET, EXTENDED RELEASE ORAL at 22:09

## 2020-04-29 RX ADMIN — SODIUM CHLORIDE: 9 INJECTION, SOLUTION INTRAVENOUS at 14:38

## 2020-04-29 RX ADMIN — LAMOTRIGINE 50 MG: 25 TABLET ORAL at 09:15

## 2020-04-29 RX ADMIN — POTASSIUM CHLORIDE 20 MEQ: 1500 TABLET, EXTENDED RELEASE ORAL at 09:16

## 2020-04-29 RX ADMIN — TROSPIUM CHLORIDE 20 MG: 20 TABLET, FILM COATED ORAL at 05:49

## 2020-04-29 RX ADMIN — MIDODRINE HYDROCHLORIDE 2.5 MG: 5 TABLET ORAL at 22:09

## 2020-04-29 RX ADMIN — SODIUM CHLORIDE 1000 ML: 9 INJECTION, SOLUTION INTRAVENOUS at 00:12

## 2020-04-29 RX ADMIN — PANTOPRAZOLE SODIUM 40 MG: 40 INJECTION, POWDER, FOR SOLUTION INTRAVENOUS at 09:15

## 2020-04-29 RX ADMIN — MIDODRINE HYDROCHLORIDE 2.5 MG: 5 TABLET ORAL at 09:16

## 2020-04-29 RX ADMIN — TROSPIUM CHLORIDE 20 MG: 20 TABLET, FILM COATED ORAL at 14:38

## 2020-04-29 RX ADMIN — SERTRALINE 200 MG: 100 TABLET, FILM COATED ORAL at 22:09

## 2020-04-29 ASSESSMENT — PAIN SCALES - GENERAL
PAINLEVEL_OUTOF10: 7
PAINLEVEL_OUTOF10: 0
PAINLEVEL_OUTOF10: 8

## 2020-04-29 NOTE — PROGRESS NOTES
RESPIRATORY THERAPY ASSESSMENT    Name:  Viraj Alvarez Record Number:  [id number]  Age: 71 y.o. Gender: female  : 1955  Today's Date:  2020  Room:  /5954-55    Assessment     Is the patient being admitted for a COPD or Asthma exacerbation? No   (If yes the patient will be seen every 4 hours for the first 24 hours and then reassessed)    Patient Admission Diagnosis      Allergies  Allergies   Allergen Reactions    Meloxicam Itching and Other (See Comments)     Tolerates Ketorolac/Bromfenac ophthalmic drops.  Benzoin Compound      Blisters from adhesive compound under steri strips after knee VAS      Lyrica [Pregabalin]      Keeps patient awake    Quetiapine Other (See Comments)     Other reaction(s): Other (See Comments)  Pt unable to recall reaction  Other reaction(s): Other (See Comments)      Quetiapine Fumarate      Other reaction(s): confused, dizzy  Other reaction(s): Unknown  seroquel      Seroquel [Quetiapine Fumarate] Other (See Comments)     Other reaction(s): confused, dizzy  Pt unable to recall reaction    Tizanidine      hallucinations      Adhesive Tape Rash     Mild dermatitis at site of paper tape, with history of previous skin rashes to tape.  STERI-STRIPS  Can use paper tape can not  use transpore    STERI-STRIPS     Mild dermatitis at site    Can use paper tape can not  use transpore    Amberderm Rash     Positive patch test results to [facility]      Linaclotide Rash    Other Rash     Can use paper tape can not  use transpore    Wound Dressings Rash     Positive patch test results to [facility]         Minimum Predicted Vital Capacity:               Actual Vital Capacity:                    Pulmonary History:bronchiectasis  Home Oxygen Therapy:  room air  Home Respiratory Therapy:Albuterol prn   Current Respiratory Therapy:  Albuterol prn          Respiratory Severity Index(RSI)   Patients with orders for inhalation medications, oxygen, or any convert left patellofemoral arthroplasty to left total knee replacement    PACEMAKER INSERTION  2011    Dr Mahoney/checked last week/told has 12 more yrs for this one/set at [address]      abdomen    SPINE SURGERY      stimulator    THROAT SURGERY      polyps removed    TONSILLECTOMY      UPPER GASTROINTESTINAL ENDOSCOPY      UPPER GASTROINTESTINAL ENDOSCOPY  05/03/2017       Level of Consciousness: Alert, Oriented, and Cooperative = 0    Level of Activity: Walking unassisted = 0    Respiratory Pattern: Regular Pattern; RR 8-20 = 0    Breath Sounds: Diminshed bilaterally and/or crackles = 2    Sputum   ,  ,    Cough: Strong, spontaneous, non-productive = 0    Vital Signs   BP (!) 115/58   Pulse 89   Temp 98.1 °F (36.7 °C) (Oral)   Resp 16   Ht 5' 1\" (1.549 m)   Wt 152 lb 14.4 oz (69.4 kg)   SpO2 96%   BMI 28.89 kg/m²   SPO2 (COPD values may differ): 90-91% on room air or greater than 92% on FiO2 24- 28% = 1    Peak Flow (asthma only): not applicable = 0    RSI: 0-4 = See once and convert to home regimen or discontinue        Plan       Goals: medication delivery, mobilize retained secretions, volume expansion and improve oxygenation    Patient/caregiver was educated on the proper method of use for Respiratory Care Devices:  Yes      Level of patient/caregiver understanding able to:   ? Verbalize understanding   ? Demonstrate understanding       ? Teach back        ? Needs reinforcement       ? No available caregiver               ? Other:     Response to education:  Very Good     Is patient being placed on Home Treatment Regimen? Yes     Does the patient have everything they need prior to discharge? Yes     Comments: pt assessed and chart reviewed    Plan of Care: albuterol prn    Electronically signed by Felipa Mims RCP on 4/29/2020 at 7:06 AM    Respiratory Protocol Guidelines     1.  Assessment and treatment by Respiratory Therapy will be initiated for medication and therapeutic be administered with a bronchodilator. 2. Discontinue if patient experiences worsened bronchospasm, or secretions have lessened to the point that the patient is able to clear them with a cough. Anti-inflammatory and Combination Medications:    1. If the patient lacks prior history of lung disease, is not using inhaled anti-inflammatory medication at home, and lacks wheezing by examination or by history for at least 24 hours, contact physician for possible discontinuation.

## 2020-04-29 NOTE — CONSULTS
Gastroenterology Consult Note    Patient:   Emmanuel Gutierrez   :    1955   Facility:   [facility]  Referring/PCP: Jeff Crum MD  Date:     2020  Consultant:   Joseluis Stark PA-C      Chief Complaint   Patient presents with    Chest Pain     chest pain started today at 1300 c/o abominal, chest pain, diarrhea, fevers and chills        History of Present illness   41-year-old female with a history of angina, anxiety, arthritis, CAD, chronic back pain, chronic constipation, GERD, hx gastric bypass surgery, HTN, HLD, NATHAN, and stress incontinence presented to the ED with chest pain, abdominal pain, and diarrhea x 5 days. She confirms multiple episodes of non-bloody diarrhea and 3 episodes of non-bloody, non-bilious vomiting. She woke up yesterday with worsening abdominal pain and mid-sternal chest pressure. She took nitro and aspirin without significant relief or improvement. She denies alcohol, tobacco, marijuana, and recreational drug use. She admits to [facility] abdominal pain, nausea, vomiting, diarrhea, and decreased appetite. She denies, hematemesis, hematochezia, melena, and recent medication changes. She follows Dr. Atul Andrews with GI. Her last EGD was 2019. Her last colonoscopy was 2020 positive for benign colon polyp.      Past Medical History:   Diagnosis Date    [facility] rest [facility])     Anxiety     Arthritis     hands and hip    Blood transfusion     after back surgery    Bradycardia     Bronchiectasis ([facility] [address] 2013    CAD (coronary artery disease)     Chronic back pain     Hyperlipidemia     Hypertension     Localized morphea     NATHAN treated with BiPAP     Pacemaker     Stress incontinence     Thyroid disease     hypothyroid    Trochanteric bursitis of left hip 2019     Past Surgical History:   Procedure Laterality Date    ABDOMEN SURGERY  11     REPAIR INCISIONAL HERNIA REDUCIBLE WITH POSSIBLE MESH    BACK SURGERY      x3    BACK SURGERY stimulator    BLADDER SUSPENSION      CARDIAC PACEMAKER PLACEMENT      CATARACT REMOVAL WITH IMPLANT Left 03/08/2018    PHACO EMULSIFICATION OF CATARACT WITH INTRAOCULAR LENS IMPLANT LEFT EYE    CERVICAL DISCECTOMY  6/2014    and fusion    CHOLECYSTECTOMY      COLONOSCOPY  2002 1/26/12    ENDOSCOPY, COLON, DIAGNOSTIC      EPIDURAL STEROID INJECTION Left 8/15/2019    LEFT KNEE GENICULAR NERVE BLOCK SITE CONFIRMED BY FLUOROSCOPY performed by Reji Curtis MD at [facility] Right 04/05/2018    cataract removal    FOOT SURGERY Right 12/6/12    arthroplasty    FOOT SURGERY Left 04/30/2015    FOOT SURGERY Left 4/30/15    GASTRIC BYPASS SURGERY  [id number]    HYSTERECTOMY      KNEE ARTHROSCOPY Left 10/3/2014    part.  medial & lateral meniscectomy, synovectomy plica exc    KNEE ARTHROSCOPY Right 10/13/15    partial medial meniscectomy, chondroplasty, partial lateral meniscectomy    NECK SURGERY      OTHER SURGICAL HISTORY  april 2013    removal spinal cord stimulater    OTHER SURGICAL HISTORY      bladder stimulator    OTHER SURGICAL HISTORY  08/30/2017    left patellofemoral arthroplasty    OTHER SURGICAL HISTORY  05/09/2018    convert left patellofemoral arthroplasty to left total knee replacement   Sita Plunkett  2011    Dr Mahoney/checked last week/told has 12 more yrs for this one/set at 60    SKIN BIOPSY      abdomen    SPINE SURGERY      stimulator    THROAT SURGERY      polyps removed    TONSILLECTOMY      UPPER GASTROINTESTINAL ENDOSCOPY      UPPER GASTROINTESTINAL ENDOSCOPY  05/03/2017       Social:   Social History     Tobacco Use    Smoking status: Never Smoker    Smokeless tobacco: Never Used   Substance Use Topics    Alcohol use: No     Alcohol/week: 0.0 standard drinks     Family:   Family History   Problem Relation Age of Onset    Heart Disease Father     Cancer Sister         multiple organs    Other Sister     Asthma Neg Hx     Diabetes Neg Place 1 tablet under the tongue every 5 minutes as needed for Chest pain. 25 tablet 3    Multiple Vitamin (THERA) TABS Take 1 tablet by mouth daily       clonazePAM (KLONOPIN) 1 MG tablet Take 1 mg by mouth 2 times daily as needed.  albuterol sulfate HFA (VENTOLIN HFA) 108 (90 Base) MCG/ACT inhaler Inhale 2 puffs into the lungs every 6 hours as needed for Wheezing or Shortness of Breath 1 Inhaler 6      Infusions:    sodium chloride 150 mL/hr at 04/29/20 0612     PRN Medications: albuterol sulfate HFA, clonazePAM, dicyclomine, nitroGLYCERIN, sodium chloride flush, magnesium sulfate, acetaminophen, HYDROmorphone **OR** HYDROmorphone, potassium chloride, ondansetron, oxyCODONE  Allergies: Allergies   Allergen Reactions    Meloxicam Itching and Other (See Comments)     Tolerates Ketorolac/Bromfenac ophthalmic drops.  Benzoin Compound      Blisters from adhesive compound under steri strips after knee VAS      Lyrica [Pregabalin]      Keeps patient awake    Quetiapine Other (See Comments)     Other reaction(s): Other (See Comments)  Pt unable to recall reaction  Other reaction(s): Other (See Comments)      Quetiapine Fumarate      Other reaction(s): confused, dizzy  Other reaction(s): Unknown  seroquel      Seroquel [Quetiapine Fumarate] Other (See Comments)     Other reaction(s): confused, dizzy  Pt unable to recall reaction    Tizanidine      hallucinations      Adhesive Tape Rash     Mild dermatitis at site of paper tape, with history of previous skin rashes to tape.  STERI-STRIPS  Can use paper tape can not  use transpore    STERI-STRIPS     Mild dermatitis at site    Can use paper tape can not  use transpore    Amberderm Rash     Positive patch test results to [facility]      Linaclotide Rash    Other Rash     Can use paper tape can not  use transpore    Wound Dressings Rash     Positive patch test results to [address]         ROS: Constitutional: negative for chills, fevers and noted       Nonspecific enhancement of mucosa of the common duct.  This could be   artifactual or a secondary sign of cholangitis.  Correlate with liver   function test.       Gas is seen within the bladder.  This could be due to underlying infection or   fistula to bowel.  Correlate with urinalysis.  Appearance is similar     US GALLBLADDER RUQ   Impression   1. Status post cholecystectomy. 2. Persistent finding of mild prominence of extrahepatic biliary tree. Similar finding was visualized on prior study of 09/09/2019. Renelda Card may be   physiologic associated with prior cholecystectomy as described above. Attending Supervising [de-identified] Attestation Statement  The patient is a 71 y.o. female. I have performed a history and physical examination of the patient. I discussed the case with my physician assistant Corey Ramesh PA-C    I reviewed the patient's Past Medical History, Past Surgical History, Medications, and Allergies.      Physical Exam:  Vitals:    04/28/20 2302 04/29/20 0054 04/29/20 0313 04/29/20 0900   BP: (!) 99/57 100/78 (!) 115/58 105/66   Pulse: 70 98 89 79   Resp:  16 16 14   Temp:  97.2 °F (36.2 °C) 98.1 °F (36.7 °C) 98 °F (36.7 °C)   TempSrc:  Oral Oral Oral   SpO2: 93% 94% 96% 98%   Weight:  152 lb 14.4 oz (69.4 kg)     Height:  5' 1\" (1.549 m)         Physical Examination: General appearance - alert, well appearing, and in no distress  Mental status - alert, oriented to person, place, and time  Eyes - pupils equal and reactive, extraocular eye movements intact  Neck - supple, no significant adenopathy  Chest - clear to auscultation, no wheezes, rales or rhonchi, symmetric air entry  Heart - normal rate, regular rhythm, normal S1, S2, no murmurs, rubs, clicks or gallops  Abdomen - soft, nontender, nondistended, no masses or organomegaly  Extremities - peripheral pulses normal, no pedal edema, no clubbing or cyanosis       Assessment:   69-year-old female with a history of angina,

## 2020-04-29 NOTE — FLOWSHEET NOTE
04/29/20 0313   Vital Signs   Temp 98.1 °F (36.7 °C)   Temp Source Oral   Pulse 89   Heart Rate Source Monitor   Resp 16   BP (!) 115/58   MAP (mmHg) 77   Oxygen Therapy   SpO2 96 %   O2 Device None (Room air)   Patient admitted to room 314-2  from the ED. Patient oriented to room, call light, bed rails, phone, lights and bathroom. Patient instructed about the schedule of the day including: vital sign frequency, lab draws, possible tests, frequency of MD and staff rounds, daily weights, I &O's and prescribed diet. Bed alarm in place, patient aware of placement and reason. Telemetry box in place, patient aware of placement and reason. Bed locked, in lowest position, side rails up 2/4, call light within reach. Recliner Assessment  Patient is able to demonstrated the ability to move from a reclining position to an upright position within the recliner. 4 Eyes Skin Assessment     The patient is being assess for   Admission    I agree that 2 RN's have performed a thorough Head to Toe Skin Assessment on the patient. ALL assessment sites listed below have been assessed. Areas assessed by both nurses:   [x]   Head, Face, and Ears   [x]   Shoulders, Back, and Chest, Abdomen  [x]   Arms, Elbows, and Hands   [x]   Coccyx, Sacrum, and Ischium  [x]   Legs, Feet, and Heels        Skin tear to L forearm, scattered bruising and abrasions. **SHARE this note so that the co-signing nurse is able to place an eSignature**    Co-signer eSignature: Electronically signed by Deena Cid RN on 4/29/20 at 3:29 AM EDT    Does the Patient have Skin Breakdown?   No          Van Prevention initiated:  Yes   Wound Care Orders initiated:  No      [facility] nurse consulted for Pressure Injury (Stage 3,4, Unstageable, DTI, NWPT, Complex wounds)and New or Established Ostomies:  yes     Primary Nurse eSignature: Electronically signed by Michi Schreiber RN on 4/29/20 at 3:23 AM EDT yes...

## 2020-04-29 NOTE — PROGRESS NOTES
Straight catheterization completed per orders and per protocol. Patient tolerated without complaint. 250 ml malodorous urine out. Call light in reach. Patient denies pain/discomfort or needs at this time. Will continue with P.O.C.

## 2020-04-29 NOTE — PROGRESS NOTES
Verified with Dr. Cory Pitt that straight catheterization should be ordered TID for patient (patient typically follows this routine at home and is independent with this). MRI called to verify that patient unable to have MRCP completed due to having rods in her back and pacemaker, order discontinued after updating hospitalist.     GI in to assess patient, will continue NPO sips/chips diet at this time.

## 2020-04-29 NOTE — PROGRESS NOTES
etiology   - recent stress in Dec 2019 - neg, Echo May 2019 - normal EF, underwent 6 minute walk test w/o any desats but rather tachycardia in 140s in Feb 2020    Normocytic Anemia  - Hgb 11.4   - Baseline hemoglobin around 13  - similar value to prior admission, recent iron studies in 10/2019 - no iron deficiency anemia  - will check B12 and folate, monitor      Hypothyroidism  - home dose of synthroid 88 mcg       GERD  - Protonix     History of POTS  - follows with Dr. Luciano Grand  - on Midodrine     Hx of Bradycardia  - s/p pacemaker     Severe Urgency  Neurogenic Bladder  - underwent bladder augmentation & continent diversion in 2017 at   - cont [place]     Former Smoker     Hx of MCI with Memory Loss  - episodic short term memory loss  - follows with Dr. Zamzam Khalil in Neurology  - on Aricept     Moderate NATHAN  - Followed by  sleep clinic/ENT post hypoglossal nerve stimulator in July 2018     Chronic Pain with Opioid Dependence  - PRN pain meds as above, hold home Percocet    DVT Prophylaxis: Lovenox  Diet: Diet NPO Effective Now Exceptions are: Ice Chips  Code Status: Full Code     ANNETTE Marquez.

## 2020-04-29 NOTE — H&P
Hospital Medicine History & Physical      PCP: Raffy Grider MD    Date of Service: Pt seen/examined on 4/28/20 and admitted on 4/28/20 to Inpatient    Chief Complaint   Patient presents with    Chest Pain     chest pain started today at 1300 c/o abominal, chest pain, diarrhea, fevers and chills       History Of Present Illness: The patient is a 71 y.o. female with PMH below, presents with abd pain, CP, N/V/D. Pt reports that she has had N/V/D and severe upper abd pain which have been progressive over the last 5 days. She reports that she has hx of chronic CP and that she is followed by Dr. David Mena. She notes that she has had substernal CP the last day which has been different than her chronic pain which is usually sharp. CP now more pressure and heavy sensation. She was found to have an elevated lipase. She denies hx of ETOH or of previous episodes of pancreatitis.      Past Medical History:        Diagnosis Date    Angina at rest [facility])     Anxiety     Arthritis     hands and hip    Blood transfusion     after back surgery    Bradycardia     Bronchiectasis ([address] 11/5/2013    CAD (coronary artery disease)     Chronic back pain     Hyperlipidemia     Hypertension     Localized morphea     NATHAN treated with BiPAP     Pacemaker     Stress incontinence     Thyroid disease     hypothyroid    Trochanteric bursitis of left hip 4/12/2019       Past Surgical History:        Procedure Laterality Date    ABDOMEN SURGERY  9/9/11     REPAIR INCISIONAL HERNIA REDUCIBLE WITH POSSIBLE MESH    BACK SURGERY      x3    BACK SURGERY      stimulator    BLADDER SUSPENSION      CARDIAC PACEMAKER PLACEMENT      CATARACT REMOVAL WITH IMPLANT Left 03/08/2018    PHACO EMULSIFICATION OF CATARACT WITH INTRAOCULAR LENS IMPLANT LEFT EYE    CERVICAL DISCECTOMY  6/2014    and fusion    CHOLECYSTECTOMY      COLONOSCOPY  2002 1/26/12    ENDOSCOPY, COLON, DIAGNOSTIC      EPIDURAL STEROID INJECTION Left Historical Provider, MD   pantoprazole (PROTONIX) 40 MG tablet TAKE 1 TABLET BY MOUTH DAILY 2/16/17   Historical Provider, MD   fluticasone (FLONASE) 50 MCG/ACT nasal spray 1 spray by Nasal route nightly. Patient taking differently: 1 spray by Nasal route as needed  4/9/15   Asya Gold MD   nitroGLYCERIN (NITROSTAT) 0.4 MG SL tablet Place 1 tablet under the tongue every 5 minutes as needed for Chest pain. 9/4/12   Felicity Ball MD   Multiple Vitamin (THERA) TABS Take 1 tablet by mouth daily     Historical Provider, MD       Allergies:  Meloxicam; Benzoin compound; Lyrica [pregabalin]; Quetiapine; Quetiapine fumarate; Seroquel [quetiapine fumarate]; Tizanidine; Adhesive tape; Amberderm; Linaclotide; Other; and Wound dressings    Social History:    TOBACCO:   reports that she has never smoked. She has never used smokeless tobacco.  ETOH:   reports no history of alcohol use. Family History:  Reviewed in detail and negative for DM, Early CAD, Cancer (except as below). Positive as follows:        Problem Relation Age of Onset    Heart Disease Father     Cancer Sister         multiple organs    Other Sister     Asthma Neg Hx     Diabetes Neg Hx     Emphysema Neg Hx     Heart Failure Neg Hx     Hypertension Neg Hx        REVIEW OF SYSTEMS:   Pertinent positives/negatives as follows: abd pain, CP, N/V/D, and as discussed in HPI, otherwise a complete ROS performed and all other systems are negative  PHYSICAL EXAM PERFORMED:    BP (!) 92/58   Pulse 92   Temp 97.9 °F (36.6 °C) (Oral)   Resp 15   Ht 5' 1\" (1.549 m)   Wt 150 lb (68 kg)   SpO2 97%   BMI 28.34 kg/m²     GEN:  A&Ox3, NAD. HEENT:  NC/AT,EOMI, semi dry MM, no erythema/exudates or visible masses. CVS:  Normal S1,S2. RRR. Without M/G/R.   LUNG:   CTA-B. no wheezes, rales or rhonchi  ABD:  Soft, ND, upper abd ttp, BS+ x4. Without G/R.  EXT: 2+ pulses, no c/c/e. Brisk cap refill. PSY:  Thought process intact, affect appropriate.   ARLETTE:  CN 4/28/2020  EXAMINATION: CTA OF THE CHEST, ABDOMEN AND PELVIS WITH CONTRAST, 4/28/2020 8:19 pm TECHNIQUE: CTA of the chest, abdomen and pelvis was performed after the administration of intravenous contrast.  Multiplanar reformatted images are provided for review. MIP images are provided for review. Dose modulation, iterative reconstruction, and/or weight based adjustment of the mA/kV was utilized to reduce the radiation dose to as low as reasonably achievable. COMPARISON: 04/06/2020 December 2019 HISTORY: ORDERING SYSTEM PROVIDED HISTORY: abd pain, chest pain, headache RO AAD TECHNOLOGIST PROVIDED HISTORY: Reason for exam:->abd pain, chest pain, headache RO AAD Reason for Exam: abd pain, chest pain, headache RO AAD Acuity: Acute Type of Exam: Initial FINDINGS: CTA CHEST: Streak artifact is seen from pacer. No aortic aneurysm. No intimal flap seen. No embolus is seen in the central, right, or left main pulmonary artery. Small hiatal hernia seen. There is nonspecific thickening at the GE junction No pneumonia. No edema. No pleural effusions. No pneumothorax Bandlike opacity seen in the right middle lobe. Bandlike opacity seen in the right lower lobe. Bandlike morphology favors atelectasis or scarring Spurring is seen in the spine. Postfusion changes are seen in the cervical spine CTA ABDOMEN: No splenomegaly. Low attenuation is seen throughout the liver. Gallbladder is surgically absent. There is questionable mucosal enhancement of the extrahepatic common duct. Fatty atrophy of the pancreas is seen. No peripancreatic fluid Right adrenal gland is normal.  Left adrenal gland is normal.  No hydronephrosis on right. No hydronephrosis on left No significant small bowel distention noted. Anastomotic staple line seen in the left lower quadrant. Bowel is patulous at the anastomotic site. Moderate stool is seen throughout the colon. No bowel obstruction noted No abdominal aortic aneurysm.   Celiac artery appears patent. Superior mesenteric artery appears patent. Renal arteries appear patent Spurring is seen in the spine. Postfusion changes are seen CTA PELVIS: Gas is seen within the bladder. There is a questionable fistulous tract connecting the bladder to bowel. No pelvic adenopathy     No aortic dissection noted Nonspecific enhancement of mucosa of the common duct. This could be artifactual or a secondary sign of cholangitis. Correlate with liver function test. Gas is seen within the bladder. This could be due to underlying infection or fistula to bowel. Correlate with urinalysis. Appearance is similar     EKG 12 Lead [[id number]]    Collected: 04/28/20 1732    Updated: 04/29/20 0755     Ventricular Rate 108 BPM    Atrial Rate 108 BPM    P-R Interval 130 ms    QRS Duration 80 ms    Q-T Interval 344 ms    QTc Calculation (Bazett) 460 ms    P Axis 56 degrees    R Axis 82 degrees    T Axis 75 degrees    Diagnosis Sinus tachycardiaNonspecific ST abnormalityNo previous ECGs availableConfirmed by DERRICK CABRERA MD ([id number]) on 4/29/2020 7:55:24 AM         CBC:  Recent Labs     04/28/20  1745   WBC 9.6   HGB 11.4*   HCT 34.9*           RENAL  Recent Labs     04/28/20  1820      K 3.3*      CO2 18*   BUN 16   CREATININE 0.6   GLUCOSE 131*     LFT'S:  Recent Labs     04/28/20  1820   *   ALT 97*   BILITOT 0.9   ALKPHOS 387*     CARDIAC ENZYMES:   Recent Labs     04/28/20  1820   TROPONINI <0.01     Lab Results   Component Value Date    PROBNP 296 (H) 04/28/2020    PROBNP 66 04/06/2020    PROBNP 235 (H) 12/19/2019     LACTIC ACID:  Recent Labs     04/28/20  1745   LACTA 1.8     PHYSICIAN CERTIFICATION  I certify that Son Pollock is expected to be hospitalized for 2 midnights based on the following assessment and plan:    ASSESSMENT/PLAN:  1. Acute pancreatitis, lipase 373. LFTs mildly elevated. Chk Tg level. Dr. Sarah Holt requested admit for MRCP, ordered. GI c/s.   IVF and PRN antiemetics,

## 2020-04-30 LAB
A/G RATIO: 1.1 (ref 1.1–2.2)
ALBUMIN SERPL-MCNC: 2.7 G/DL (ref 3.4–5)
ALP BLD-CCNC: 264 U/L (ref 40–129)
ALT SERPL-CCNC: 49 U/L (ref 10–40)
ANION GAP SERPL CALCULATED.3IONS-SCNC: 11 MMOL/L (ref 3–16)
AST SERPL-CCNC: 54 U/L (ref 15–37)
BASOPHILS ABSOLUTE: 0 K/UL (ref 0–0.2)
BASOPHILS RELATIVE PERCENT: 0.3 %
BILIRUB SERPL-MCNC: 0.3 MG/DL (ref 0–1)
BUN BLDV-MCNC: 10 MG/DL (ref 7–20)
CALCIUM SERPL-MCNC: 8 MG/DL (ref 8.3–10.6)
CHLORIDE BLD-SCNC: 111 MMOL/L (ref 99–110)
CO2: 17 MMOL/L (ref 21–32)
CREAT SERPL-MCNC: <0.5 MG/DL (ref 0.6–1.2)
EOSINOPHILS ABSOLUTE: 0.1 K/UL (ref 0–0.6)
EOSINOPHILS RELATIVE PERCENT: 2.1 %
GFR AFRICAN AMERICAN: >60
GFR NON-AFRICAN AMERICAN: >60
GLOBULIN: 2.4 G/DL
GLUCOSE BLD-MCNC: 118 MG/DL (ref 70–99)
GLUCOSE BLD-MCNC: 130 MG/DL (ref 70–99)
GLUCOSE BLD-MCNC: 57 MG/DL (ref 70–99)
GLUCOSE BLD-MCNC: 62 MG/DL (ref 70–99)
GLUCOSE BLD-MCNC: 78 MG/DL (ref 70–99)
GLUCOSE BLD-MCNC: 82 MG/DL (ref 70–99)
HCT VFR BLD CALC: 31.3 % (ref 36–48)
HEMOGLOBIN: 10 G/DL (ref 12–16)
LYMPHOCYTES ABSOLUTE: 1.3 K/UL (ref 1–5.1)
LYMPHOCYTES RELATIVE PERCENT: 19.8 %
MCH RBC QN AUTO: 31.6 PG (ref 26–34)
MCHC RBC AUTO-ENTMCNC: 32 G/DL (ref 31–36)
MCV RBC AUTO: 98.8 FL (ref 80–100)
MONOCYTES ABSOLUTE: 0.4 K/UL (ref 0–1.3)
MONOCYTES RELATIVE PERCENT: 6 %
NEUTROPHILS ABSOLUTE: 4.5 K/UL (ref 1.7–7.7)
NEUTROPHILS RELATIVE PERCENT: 71.8 %
PDW BLD-RTO: 14.6 % (ref 12.4–15.4)
PERFORMED ON: ABNORMAL
PERFORMED ON: NORMAL
PERFORMED ON: NORMAL
PLATELET # BLD: 193 K/UL (ref 135–450)
PMV BLD AUTO: 8.6 FL (ref 5–10.5)
POTASSIUM SERPL-SCNC: 4.1 MMOL/L (ref 3.5–5.1)
RBC # BLD: 3.16 M/UL (ref 4–5.2)
SODIUM BLD-SCNC: 139 MMOL/L (ref 136–145)
TOTAL PROTEIN: 5.1 G/DL (ref 6.4–8.2)
WBC # BLD: 6.3 K/UL (ref 4–11)

## 2020-04-30 PROCEDURE — 6360000002 HC RX W HCPCS: Performed by: INTERNAL MEDICINE

## 2020-04-30 PROCEDURE — 99232 SBSQ HOSP IP/OBS MODERATE 35: CPT | Performed by: INTERNAL MEDICINE

## 2020-04-30 PROCEDURE — 80053 COMPREHEN METABOLIC PANEL: CPT

## 2020-04-30 PROCEDURE — 2580000003 HC RX 258: Performed by: INTERNAL MEDICINE

## 2020-04-30 PROCEDURE — 6370000000 HC RX 637 (ALT 250 FOR IP): Performed by: INTERNAL MEDICINE

## 2020-04-30 PROCEDURE — 36415 COLL VENOUS BLD VENIPUNCTURE: CPT

## 2020-04-30 PROCEDURE — 85025 COMPLETE CBC W/AUTO DIFF WBC: CPT

## 2020-04-30 PROCEDURE — C9113 INJ PANTOPRAZOLE SODIUM, VIA: HCPCS | Performed by: INTERNAL MEDICINE

## 2020-04-30 PROCEDURE — 2060000000 HC ICU INTERMEDIATE R&B

## 2020-04-30 RX ORDER — DEXTROSE MONOHYDRATE 25 G/50ML
INJECTION, SOLUTION INTRAVENOUS
Status: DISPENSED
Start: 2020-04-30 | End: 2020-04-30

## 2020-04-30 RX ORDER — DEXTROSE AND SODIUM CHLORIDE 5; .45 G/100ML; G/100ML
INJECTION, SOLUTION INTRAVENOUS CONTINUOUS
Status: DISCONTINUED | OUTPATIENT
Start: 2020-04-30 | End: 2020-05-01

## 2020-04-30 RX ADMIN — SODIUM CHLORIDE: 9 INJECTION, SOLUTION INTRAVENOUS at 03:07

## 2020-04-30 RX ADMIN — OXYCODONE HYDROCHLORIDE 5 MG: 5 TABLET ORAL at 20:25

## 2020-04-30 RX ADMIN — OXYCODONE HYDROCHLORIDE 5 MG: 5 TABLET ORAL at 03:07

## 2020-04-30 RX ADMIN — CEFEPIME 2 G: 2 INJECTION, POWDER, FOR SOLUTION INTRAVENOUS at 23:32

## 2020-04-30 RX ADMIN — DONEPEZIL HYDROCHLORIDE 10 MG: 5 TABLET, FILM COATED ORAL at 20:25

## 2020-04-30 RX ADMIN — PANTOPRAZOLE SODIUM 40 MG: 40 INJECTION, POWDER, FOR SOLUTION INTRAVENOUS at 07:47

## 2020-04-30 RX ADMIN — MIDODRINE HYDROCHLORIDE 2.5 MG: 5 TABLET ORAL at 07:47

## 2020-04-30 RX ADMIN — CETIRIZINE HYDROCHLORIDE 5 MG: 10 TABLET ORAL at 07:47

## 2020-04-30 RX ADMIN — ENOXAPARIN SODIUM 40 MG: 40 INJECTION SUBCUTANEOUS at 07:45

## 2020-04-30 RX ADMIN — POTASSIUM CHLORIDE 20 MEQ: 1500 TABLET, EXTENDED RELEASE ORAL at 20:25

## 2020-04-30 RX ADMIN — CLONAZEPAM 1 MG: 1 TABLET ORAL at 13:50

## 2020-04-30 RX ADMIN — MIDODRINE HYDROCHLORIDE 2.5 MG: 5 TABLET ORAL at 20:24

## 2020-04-30 RX ADMIN — POTASSIUM CHLORIDE 20 MEQ: 1500 TABLET, EXTENDED RELEASE ORAL at 07:47

## 2020-04-30 RX ADMIN — ONDANSETRON HYDROCHLORIDE 4 MG: 2 INJECTION, SOLUTION INTRAMUSCULAR; INTRAVENOUS at 04:27

## 2020-04-30 RX ADMIN — TROSPIUM CHLORIDE 20 MG: 20 TABLET, FILM COATED ORAL at 06:22

## 2020-04-30 RX ADMIN — SERTRALINE 200 MG: 100 TABLET, FILM COATED ORAL at 20:24

## 2020-04-30 RX ADMIN — Medication 10 ML: at 07:46

## 2020-04-30 RX ADMIN — Medication 10 ML: at 20:25

## 2020-04-30 RX ADMIN — LEVOTHYROXINE SODIUM 88 MCG: 88 TABLET ORAL at 06:22

## 2020-04-30 RX ADMIN — SODIUM CHLORIDE: 9 INJECTION, SOLUTION INTRAVENOUS at 07:46

## 2020-04-30 RX ADMIN — LAMOTRIGINE 50 MG: 25 TABLET ORAL at 07:47

## 2020-04-30 RX ADMIN — CEFEPIME 2 G: 2 INJECTION, POWDER, FOR SOLUTION INTRAVENOUS at 13:32

## 2020-04-30 RX ADMIN — TROSPIUM CHLORIDE 20 MG: 20 TABLET, FILM COATED ORAL at 14:57

## 2020-04-30 RX ADMIN — MIDODRINE HYDROCHLORIDE 2.5 MG: 5 TABLET ORAL at 13:32

## 2020-04-30 RX ADMIN — DEXTROSE AND SODIUM CHLORIDE: 5; 450 INJECTION, SOLUTION INTRAVENOUS at 22:07

## 2020-04-30 RX ADMIN — DEXTROSE AND SODIUM CHLORIDE: 5; 450 INJECTION, SOLUTION INTRAVENOUS at 13:34

## 2020-04-30 ASSESSMENT — PAIN SCALES - GENERAL
PAINLEVEL_OUTOF10: 0
PAINLEVEL_OUTOF10: 8
PAINLEVEL_OUTOF10: 0
PAINLEVEL_OUTOF10: 8

## 2020-04-30 NOTE — PROGRESS NOTES
Pt is lying in bed with their eyes closed. Respirations are easy and even. Call light within reach bed in lowest position with the wheels locked. Will continue to monitor.  Eric Alvarado

## 2020-04-30 NOTE — PROGRESS NOTES
and unable to have MRCP due to having rods in her back and pacemaker. 4. Will ultimately need outpatient ERCP at  (Lap-assisted)  5.  Will follow    Alanna Hood MD       (O) [phone]          Alanna Hood MD  4/30/2020

## 2020-05-01 VITALS
HEIGHT: 61 IN | BODY MASS INDEX: 29.74 KG/M2 | OXYGEN SATURATION: 99 % | TEMPERATURE: 97.6 F | DIASTOLIC BLOOD PRESSURE: 73 MMHG | WEIGHT: 157.5 LBS | RESPIRATION RATE: 16 BRPM | SYSTOLIC BLOOD PRESSURE: 133 MMHG | HEART RATE: 65 BPM

## 2020-05-01 LAB
ANION GAP SERPL CALCULATED.3IONS-SCNC: 9 MMOL/L (ref 3–16)
BUN BLDV-MCNC: 3 MG/DL (ref 7–20)
CALCIUM SERPL-MCNC: 8.6 MG/DL (ref 8.3–10.6)
CHLORIDE BLD-SCNC: 108 MMOL/L (ref 99–110)
CO2: 16 MMOL/L (ref 21–32)
CREAT SERPL-MCNC: <0.5 MG/DL (ref 0.6–1.2)
GFR AFRICAN AMERICAN: >60
GFR NON-AFRICAN AMERICAN: >60
GLUCOSE BLD-MCNC: 120 MG/DL (ref 70–99)
GLUCOSE BLD-MCNC: 125 MG/DL (ref 70–99)
GLUCOSE BLD-MCNC: 87 MG/DL (ref 70–99)
GLUCOSE BLD-MCNC: 95 MG/DL (ref 70–99)
PERFORMED ON: ABNORMAL
PERFORMED ON: NORMAL
PERFORMED ON: NORMAL
POTASSIUM SERPL-SCNC: 4.5 MMOL/L (ref 3.5–5.1)
SODIUM BLD-SCNC: 133 MMOL/L (ref 136–145)

## 2020-05-01 PROCEDURE — 6360000002 HC RX W HCPCS: Performed by: INTERNAL MEDICINE

## 2020-05-01 PROCEDURE — 97530 THERAPEUTIC ACTIVITIES: CPT

## 2020-05-01 PROCEDURE — 97116 GAIT TRAINING THERAPY: CPT

## 2020-05-01 PROCEDURE — 6370000000 HC RX 637 (ALT 250 FOR IP): Performed by: INTERNAL MEDICINE

## 2020-05-01 PROCEDURE — 99239 HOSP IP/OBS DSCHRG MGMT >30: CPT | Performed by: INTERNAL MEDICINE

## 2020-05-01 PROCEDURE — 2580000003 HC RX 258: Performed by: INTERNAL MEDICINE

## 2020-05-01 PROCEDURE — 97162 PT EVAL MOD COMPLEX 30 MIN: CPT

## 2020-05-01 PROCEDURE — 80048 BASIC METABOLIC PNL TOTAL CA: CPT

## 2020-05-01 PROCEDURE — C9113 INJ PANTOPRAZOLE SODIUM, VIA: HCPCS | Performed by: INTERNAL MEDICINE

## 2020-05-01 PROCEDURE — 97166 OT EVAL MOD COMPLEX 45 MIN: CPT

## 2020-05-01 PROCEDURE — 36415 COLL VENOUS BLD VENIPUNCTURE: CPT

## 2020-05-01 PROCEDURE — 2500000003 HC RX 250 WO HCPCS: Performed by: INTERNAL MEDICINE

## 2020-05-01 RX ORDER — OXYCODONE HYDROCHLORIDE 5 MG/1
5 TABLET ORAL EVERY 6 HOURS PRN
Qty: 20 TABLET | Refills: 0 | Status: SHIPPED | OUTPATIENT
Start: 2020-05-01 | End: 2020-05-06

## 2020-05-01 RX ORDER — AMOXICILLIN AND CLAVULANATE POTASSIUM 500; 125 MG/1; MG/1
1 TABLET, FILM COATED ORAL 3 TIMES DAILY
Qty: 9 TABLET | Refills: 0 | Status: SHIPPED | OUTPATIENT
Start: 2020-05-01 | End: 2020-05-04

## 2020-05-01 RX ORDER — BUTALBITAL, ACETAMINOPHEN AND CAFFEINE 50; 325; 40 MG/1; MG/1; MG/1
1 TABLET ORAL EVERY 4 HOURS PRN
Status: DISCONTINUED | OUTPATIENT
Start: 2020-05-01 | End: 2020-05-01 | Stop reason: HOSPADM

## 2020-05-01 RX ADMIN — CEFEPIME 2 G: 2 INJECTION, POWDER, FOR SOLUTION INTRAVENOUS at 11:02

## 2020-05-01 RX ADMIN — Medication 10 ML: at 08:44

## 2020-05-01 RX ADMIN — ENOXAPARIN SODIUM 40 MG: 40 INJECTION SUBCUTANEOUS at 08:43

## 2020-05-01 RX ADMIN — PANTOPRAZOLE SODIUM 40 MG: 40 INJECTION, POWDER, FOR SOLUTION INTRAVENOUS at 08:43

## 2020-05-01 RX ADMIN — DEXTROSE AND SODIUM CHLORIDE: 5; 450 INJECTION, SOLUTION INTRAVENOUS at 04:22

## 2020-05-01 RX ADMIN — BUTALBITAL, ACETAMINOPHEN, AND CAFFEINE 1 TABLET: 50; 325; 40 TABLET ORAL at 08:49

## 2020-05-01 RX ADMIN — TROSPIUM CHLORIDE 20 MG: 20 TABLET, FILM COATED ORAL at 06:46

## 2020-05-01 RX ADMIN — HYDROMORPHONE HYDROCHLORIDE 1 MG: 1 INJECTION, SOLUTION INTRAMUSCULAR; INTRAVENOUS; SUBCUTANEOUS at 06:46

## 2020-05-01 RX ADMIN — CETIRIZINE HYDROCHLORIDE 5 MG: 10 TABLET ORAL at 08:43

## 2020-05-01 RX ADMIN — MIDODRINE HYDROCHLORIDE 2.5 MG: 5 TABLET ORAL at 08:45

## 2020-05-01 RX ADMIN — LAMOTRIGINE 50 MG: 25 TABLET ORAL at 08:43

## 2020-05-01 RX ADMIN — POTASSIUM CHLORIDE 20 MEQ: 1500 TABLET, EXTENDED RELEASE ORAL at 08:44

## 2020-05-01 RX ADMIN — MIDODRINE HYDROCHLORIDE 2.5 MG: 5 TABLET ORAL at 15:00

## 2020-05-01 RX ADMIN — HYDROMORPHONE HYDROCHLORIDE 1 MG: 1 INJECTION, SOLUTION INTRAMUSCULAR; INTRAVENOUS; SUBCUTANEOUS at 01:10

## 2020-05-01 RX ADMIN — LEVOTHYROXINE SODIUM 88 MCG: 88 TABLET ORAL at 06:46

## 2020-05-01 ASSESSMENT — PAIN SCALES - GENERAL
PAINLEVEL_OUTOF10: 8
PAINLEVEL_OUTOF10: 10
PAINLEVEL_OUTOF10: 9

## 2020-05-01 ASSESSMENT — PAIN DESCRIPTION - PAIN TYPE: TYPE: ACUTE PAIN

## 2020-05-01 ASSESSMENT — PAIN DESCRIPTION - DESCRIPTORS: DESCRIPTORS: ACHING;HEADACHE

## 2020-05-01 ASSESSMENT — PAIN DESCRIPTION - LOCATION: LOCATION: HEAD

## 2020-05-01 ASSESSMENT — PAIN DESCRIPTION - ONSET: ONSET: ON-GOING

## 2020-05-01 ASSESSMENT — PAIN DESCRIPTION - FREQUENCY: FREQUENCY: CONTINUOUS

## 2020-05-01 ASSESSMENT — PAIN - FUNCTIONAL ASSESSMENT: PAIN_FUNCTIONAL_ASSESSMENT: ACTIVITIES ARE NOT PREVENTED

## 2020-05-01 ASSESSMENT — PAIN DESCRIPTION - ORIENTATION: ORIENTATION: MID

## 2020-05-01 ASSESSMENT — PAIN DESCRIPTION - PROGRESSION: CLINICAL_PROGRESSION: GRADUALLY WORSENING

## 2020-05-01 NOTE — FLOWSHEET NOTE
04/30/20 2011   Vital Signs   Temp 97.8 °F (36.6 °C)   Temp Source Oral   Pulse 78   Heart Rate Source Monitor   Resp 16   BP (!) 170/79   MAP (mmHg) 109   Oxygen Therapy   SpO2 95 %   O2 Device Nasal cannula   Vital signs stable. Pt is alert and oriented and denies any pain or SOB at the moment. Nothing new noted on head to toe assessment. Pt is NSR on the monitor. Evening medication administration completed. D5 and half normal saline infusing at 150 ml/hr. Pt denies any further assistance at the moment. Will continue to monitor.

## 2020-05-01 NOTE — PROGRESS NOTES
Discharge paperwork reviewed with pt. Pt. Pina floyd. Pt. Denies any questions at this time. Transport here to take pt. Down to lobby to meet her ride. Pt. Stable when leaving the floor.  Taiwo Honeycutt RN

## 2020-05-01 NOTE — CARE COORDINATION
DISCHARGE ORDER  Date/Time 2020 4:01 PM  Completed by: Mulu Son, Case Management    Patient Name: Paty Plascencia    : 1955  Admitting Diagnosis: Idiopathic acute pancreatitis [K85.00]      Admit order Date and Status: 20 stable  (verify MD's last order for status of admission)      Noted discharge order. Confirmed discharge plan: Yes  with whom Pt and nursing  If pt confirmed DC plan does family need to be contacted by CM No   Discharge Plan[facility]        Reviewed chart. Role of discharge planner explained and patient verbalized understanding. Discharge order is noted. Has Home O2 in place on admit:  No  Informed of need to bring portable home O2 tank on day of discharge for nursing to connect prior to leaving:   Not Indicated  Verbalized agreement/Understanding:   Not Indicated  Pt is being d/c'd to home with Ty Escobar today. Discharge timeout done with nursing. All discharge needs and concerns addressed.

## 2020-05-01 NOTE — PROGRESS NOTES
Pt is lying in bed with their eyes closed. Respirations are easy and even. Call light within reach bed in lowest position with the wheels locked. Will continue to monitor.  rEic Alvarado

## 2020-05-01 NOTE — PROGRESS NOTES
oxyCODONE      Data:  CBC:   Recent Labs     04/28/20  1745 04/30/20  0426   WBC 9.6 6.3   HGB 11.4* 10.0*   HCT 34.9* 31.3*   MCV 96.6 98.8    193     BMP:   Recent Labs     04/28/20  1820 04/29/20  0624 04/30/20  0426    137 139   K 3.3* 3.4* 4.1    108 111*   CO2 18* 19* 17*   PHOS  --  2.5  --    BUN 16 15 10   CREATININE 0.6 <0.5* <0.5*     LIVER PROFILE:   Recent Labs     04/28/20  1820 04/29/20  0624 04/30/20  0426   * 95* 54*   ALT 97* 71* 49*   LIPASE 373.0*  --   --    BILITOT 0.9 0.5 0.3   ALKPHOS 387* 306* 264*     CULTURES    C diff toxin: pending     RADIOLOGY    US GALLBLADDER RUQ   Preliminary Result   1. Status post cholecystectomy. 2. Persistent finding of mild prominence of extrahepatic biliary tree. Similar finding was visualized on prior study of 09/09/2019. Finding may be   physiologic associated with prior cholecystectomy as described above. CTA CHEST ABDOMEN PELVIS W CONTRAST   Final Result   No aortic dissection noted      Nonspecific enhancement of mucosa of the common duct. This could be   artifactual or a secondary sign of cholangitis. Correlate with liver   function test.      Gas is seen within the bladder. This could be due to underlying infection or   fistula to bowel. Correlate with urinalysis. Appearance is similar         MRI ABDOMEN W WO CONTRAST MRCP    (Results Pending)     Assessment/Plan:    Acute Pancreatitis  Abdominal Pain  Elevated LFTs  - Lipase: 373, , ALT 97, AST: 164  - CT abdomen: nonspecific enhancement of mucosa of the common duct  - Admit to PCU, tele  - U/S GB - s/p cholecystectomy, persistent finding of mild prominence of extrahepatic biliary tree  - NPO, IVF, PRN pain meds  - GI consult  IV Cefepime  No clinical evidence of cholangitis  Cannot get MRCP. ERCP cannot be done due to altered bypass anatomy. LFTs declining. Abdominal pain is better.   Start clears   Advance to low fat diet     Diarrhea  She is on daily bactrim  No stools since admission  D/c C. Diff precautions    Hypokalemia  - 3.3 --> 3.4, mild  - on daily K supplement     Hypomagnesemia - Resolved   - 1.3 on arrival  - repleted and repeat 1.8    Nitrite Positive on UA  - no UTI symptoms      Chronic ROONEY  Chronic Sinus Tachycardia - with ambulation  - recent admission for similar complaints  - has undergone significant work up through her cardiologist and pulmonologist without a clear etiology   - recent stress in Dec 2019 - neg, Echo May 2019 - normal EF, underwent 6 minute walk test w/o any desats but rather tachycardia in 140s in Feb 2020    Normocytic Anemia  - Hgb 11.4   - Baseline hemoglobin around 13  - similar value to prior admission, recent iron studies in 10/2019 - no iron deficiency anemia  - will check B12 and folate, monitor      Hypothyroidism  - home dose of synthroid 88 mcg       GERD  - Protonix     History of POTS  - follows with Dr. Leonora Mattson  - on Midodrine     Hx of Bradycardia  - s/p pacemaker     Severe Urgency  Neurogenic Bladder  - underwent bladder augmentation & continent diversion in 2017 at   - [facility]     Former Smoker     Hx of MCI with Memory Loss  - episodic short term memory loss  - follows with Dr. Molly Lee in Neurology  - on Aricept     Moderate NATHAN  - Followed by  sleep clinic/ENT post hypoglossal nerve stimulator in July 2018     Chronic Pain with Opioid Dependence  - PRN pain meds as above, hold home Percocet    DVT Prophylaxis: Lovenox  Diet: DIET CLEAR LIQUID;  Code Status: Full Code      D/c home    ANNETTE Marquez.

## 2020-05-01 NOTE — PROGRESS NOTES
EOB  Static Standing: Good - ; CGA   Tolerance: CGA with Hurrycane on R; improved to SBA with B UE support on RW  Dynamic Standing: Not tested; N/A    Bed Mobility   Supine to Sit:   Modified Independent with HOB elevated (uses pillows at home)  Sit to Supine:  Not Tested  Rolling:   Not Tested  Scooting at EOB: Independent  Scooting to [facility]:  Not Tested    Transfer Training     Sit to stand:   CGA with Hurrycane  Stand to sit:   SBA from RW, with VC for hand placement  Bed to Chair:  Not Tested with use of N/A    Gait gait completed as indicated below  Distance:  130 ft  Deviations (firm surface/linoleum): decreased yordy, Increased JENISE, decreased head and trunk rotation, decreased arm swing on left (held rigid at side), decreased step length bilaterally and increased medial-lateral sway    Assistive Device Used:  gait belt and Hurrycane  Level of Assist: CGA  Comment:     Distance:  370 ft  Deviations (firm surface/linoleum): decreased yordy, decreased step length bilaterally and moderate-heavy use of UEs on walker    Assistive Device Used:  rolling walker (RW) and gait belt  Level of Assist: SBA  Comment: improved balance and confidence with use of RW vs Hurrycane, pt agreeable to using RW at home initially and progressing walking with home therapy    Stair Training deferred, pt does not have stairs in the home environment    Activity Tolerance   Pt completed therapy session with No nausea, dizziness, pain or SOB noted w/activity   Pt anticipated getting dizzy with activity throughout session, but did not report any dizziness during session  Sitting EOB SpO2: 97% on RA  HR: 83 bpm  BP: 135/83    Positioning Needs   Pt instructed and educated on use of call light to call for assist if desiring to get up or change position, call light handed to pt and needs within reach, Pt sitting up in chair and call light provided and all needs within reach   Chair alarm left off as pt has been completing a stand pivot to [facility]

## 2020-05-01 NOTE — PROGRESS NOTES
Bedside report and Pt care transferred to [facility]. Pt denies any assistance at this time.

## 2020-05-01 NOTE — PROGRESS NOTES
Inpatient Occupational Therapy  Evaluation and Treatment    Unit: PCU  Date:  5/1/2020  Patient Name:    Zina Little  Admitting diagnosis:  Idiopathic acute pancreatitis [K85.00]  Admit Date:  4/28/2020  Precautions/Restrictions/WB Status/ Lines/ Wounds/ Oxygen: fall risk, IV and bed/chair alarm    Treatment Time:  5038-4782  Treatment Number: 1   Billable Treatment Time: 28 minutes   Total Treatment Time:   38   minutes    Patient Goals for Therapy:  \" go home today \"      Discharge Recommendations: Home with initial 24/7 assist and home therapy  DME needs for discharge: has RW and BSC in storage, recommend dtr locating and setting up for use at home       Therapy recommendations for staff:   Assist of 1 (contact guard assist) with use of rolling walker (RW) and gait belt for all ambulation to/from bathroom  within halls    History of Present Illness: 71 y. o. female with PMH below, presents with abd pain, CP, N/V/D. She notes that she has had substernal CP the last day which has been different than her chronic pain which is usually sharp.  CP now more pressure and heavy sensation.    She was found to have an elevated lipase.  She denies hx of ETOH or of previous episodes of pancreatitis. Diarrhea (d/c c-diff precautions on 5/1). Per MD note: hx of MCI with memory loss (short term memory loss) and opioid dependence. PMH: angina, anxiety, arthritis, CAD, chronic back pain, chronic constipation, GERD, hx gastric bypass surgery, HTN, HLD, NATHAN, and stress incontinence, pacemaker, L TKA, R foot arthroplasty, multiple back surgeries (cervical fusion, placement/removal of stimulator)    Home Health S4 Level Recommendation:  Level 1 Standard  AM-PAC Score: AM-PAC Inpatient Daily Activity Raw Score: 20    Preadmission Environment  copied from 7/18 eval and updated this date  Pt.  Lives alone; her daughter is supportive but recently moved farther away [facility], patient lives in [place] got mad at me so she OTR/L 5650            If patient discharges from this facility prior to next visit, this note will serve as the Discharge Summary

## 2020-05-02 ENCOUNTER — CARE COORDINATION (OUTPATIENT)
Dept: CASE MANAGEMENT | Age: 65
End: 2020-05-02

## 2020-05-02 NOTE — CARE COORDINATION
[facility] Transitions Initial Follow Up Call    Call within 2 business days of discharge  :  Yes    Patient: Lieutenant Loving Patient : 1955   MRN: <[id number]>  Reason for Admission:   CP  Discharge Date: 20 RARS: Readmission Risk Score: 27      Last Discharge [facility]       Complaint Diagnosis Description Type Department Provider    20 Chest Pain Acute biliary pancreatitis, unspecified complication status . .. ED to Hosp-Admission (Discharged) (ADMITTED) Bismark Calhoun MD; Roberto Robles. .. Facility:   [facility]      Follow Up:  COVID-19 Initial follow up: Attempted patient contact. No answer to phone. Message left with CTN contact information with request for call back. Spoke with [facility]. Confirmed services and plan for [place] [id number] follow up.    Future Appointments   Date Time Provider Sandra Kingsley   2020  8:45 AM Manuel Grossman MD [facility]       Juwan Bocanegra RN

## 2020-05-04 ENCOUNTER — CARE COORDINATION (OUTPATIENT)
Dept: CASE MANAGEMENT | Age: 65
End: 2020-05-04

## 2020-05-04 ENCOUNTER — TELEPHONE (OUTPATIENT)
Dept: PHARMACY | Facility: CLINIC | Age: 65
End: 2020-05-04

## 2020-05-04 NOTE — LETTER
[address]  [address], [place] [address] [address]  Phone: [phone]  Fax: [address]  [place] [address]           05/05/20     Dear Geovanny Schrader,    We tried to reach you recently, after your hospital stay, to review your medications. I was unable to reach you on the telephone. We understand that medications can be confusing after a hospital stay. If you are interested in a pharmacist reviewing your medications, please call [phone] option #7. If we do not hear from you after 2 weeks, we will assume you do not have questions or concerns.          Sincerely,     Fernanda Pham, PharmD  [address]  Phone: [phone], option 7

## 2020-05-08 ENCOUNTER — CARE COORDINATION (OUTPATIENT)
Dept: CASE MANAGEMENT | Age: 65
End: 2020-05-08

## 2020-06-08 ENCOUNTER — HOSPITAL ENCOUNTER (OUTPATIENT)
Dept: CT IMAGING | Age: 65
Discharge: HOME | End: 2020-06-08
Payer: MEDICARE

## 2020-06-08 ENCOUNTER — HOSPITAL ENCOUNTER (OUTPATIENT)
Age: 65
Discharge: HOME | End: 2020-06-08
Payer: MEDICARE

## 2020-06-08 LAB
A/G RATIO: 1.6 (ref 1.1–2.2)
ALBUMIN SERPL-MCNC: 3.8 G/DL (ref 3.4–5)
ALP BLD-CCNC: 102 U/L (ref 40–129)
ALT SERPL-CCNC: 9 U/L (ref 10–40)
ANION GAP SERPL CALCULATED.3IONS-SCNC: 13 MMOL/L (ref 3–16)
AST SERPL-CCNC: 20 U/L (ref 15–37)
BILIRUB SERPL-MCNC: <0.2 MG/DL (ref 0–1)
BUN BLDV-MCNC: 14 MG/DL (ref 7–20)
CALCIUM SERPL-MCNC: 9.1 MG/DL (ref 8.3–10.6)
CHLORIDE BLD-SCNC: 104 MMOL/L (ref 99–110)
CO2: 23 MMOL/L (ref 21–32)
CREAT SERPL-MCNC: 0.7 MG/DL (ref 0.6–1.2)
GFR AFRICAN AMERICAN: >60
GFR NON-AFRICAN AMERICAN: >60
GLOBULIN: 2.4 G/DL
GLUCOSE BLD-MCNC: 92 MG/DL (ref 70–99)
HCT VFR BLD CALC: 34.7 % (ref 36–48)
HEMOGLOBIN: 11.2 G/DL (ref 12–16)
LIPASE: 13 U/L (ref 13–60)
MCH RBC QN AUTO: 30.2 PG (ref 26–34)
MCHC RBC AUTO-ENTMCNC: 32.3 G/DL (ref 31–36)
MCV RBC AUTO: 93.5 FL (ref 80–100)
PDW BLD-RTO: 14.4 % (ref 12.4–15.4)
PLATELET # BLD: 281 K/UL (ref 135–450)
PMV BLD AUTO: 7.1 FL (ref 5–10.5)
POTASSIUM SERPL-SCNC: 3.7 MMOL/L (ref 3.5–5.1)
RBC # BLD: 3.72 M/UL (ref 4–5.2)
SODIUM BLD-SCNC: 140 MMOL/L (ref 136–145)
TOTAL PROTEIN: 6.2 G/DL (ref 6.4–8.2)
TRIGL SERPL-MCNC: 102 MG/DL (ref 0–150)
WBC # BLD: 5 K/UL (ref 4–11)

## 2020-06-08 PROCEDURE — 6360000004 HC RX CONTRAST MEDICATION: Performed by: INTERNAL MEDICINE

## 2020-06-08 PROCEDURE — 74160 CT ABDOMEN W/CONTRAST: CPT

## 2020-06-08 PROCEDURE — 80053 COMPREHEN METABOLIC PANEL: CPT

## 2020-06-08 PROCEDURE — 85027 COMPLETE CBC AUTOMATED: CPT

## 2020-06-08 PROCEDURE — 83690 ASSAY OF LIPASE: CPT

## 2020-06-08 PROCEDURE — 36415 COLL VENOUS BLD VENIPUNCTURE: CPT

## 2020-06-08 PROCEDURE — 84478 ASSAY OF TRIGLYCERIDES: CPT

## 2020-06-08 RX ADMIN — IOHEXOL 50 ML: 240 INJECTION, SOLUTION INTRATHECAL; INTRAVASCULAR; INTRAVENOUS; ORAL at 07:56

## 2020-06-08 RX ADMIN — IOPAMIDOL 75 ML: 755 INJECTION, SOLUTION INTRAVENOUS at 07:56

## 2020-06-24 ENCOUNTER — NURSE ONLY (OUTPATIENT)
Dept: ORTHOPEDIC SURGERY | Age: 65
End: 2020-06-24
Payer: MEDICARE

## 2020-06-24 RX ORDER — METHYLPREDNISOLONE ACETATE 40 MG/ML
80 INJECTION, SUSPENSION INTRA-ARTICULAR; INTRALESIONAL; INTRAMUSCULAR; SOFT TISSUE ONCE
Status: COMPLETED | OUTPATIENT
Start: 2020-06-24 | End: 2020-06-24

## 2020-06-24 RX ORDER — BUPIVACAINE HYDROCHLORIDE 2.5 MG/ML
30 INJECTION, SOLUTION INFILTRATION; PERINEURAL ONCE
Status: COMPLETED | OUTPATIENT
Start: 2020-06-24 | End: 2020-06-24

## 2020-06-24 RX ORDER — LIDOCAINE HYDROCHLORIDE 10 MG/ML
20 INJECTION, SOLUTION INFILTRATION; PERINEURAL ONCE
Status: COMPLETED | OUTPATIENT
Start: 2020-06-24 | End: 2020-06-24

## 2020-06-24 RX ADMIN — METHYLPREDNISOLONE ACETATE 80 MG: 40 INJECTION, SUSPENSION INTRA-ARTICULAR; INTRALESIONAL; INTRAMUSCULAR; SOFT TISSUE at 10:59

## 2020-06-24 RX ADMIN — LIDOCAINE HYDROCHLORIDE 20 ML: 10 INJECTION, SOLUTION INFILTRATION; PERINEURAL at 10:59

## 2020-06-24 RX ADMIN — BUPIVACAINE HYDROCHLORIDE 75 MG: 2.5 INJECTION, SOLUTION INFILTRATION; PERINEURAL at 10:59

## 2020-06-28 ENCOUNTER — APPOINTMENT (OUTPATIENT)
Dept: GENERAL RADIOLOGY | Age: 65
End: 2020-06-28
Payer: MEDICARE

## 2020-06-28 ENCOUNTER — HOSPITAL ENCOUNTER (EMERGENCY)
Age: 65
Discharge: HOME | End: 2020-06-28
Payer: MEDICARE

## 2020-06-28 ENCOUNTER — APPOINTMENT (OUTPATIENT)
Dept: CT IMAGING | Age: 65
End: 2020-06-28
Payer: MEDICARE

## 2020-06-28 VITALS
DIASTOLIC BLOOD PRESSURE: 57 MMHG | SYSTOLIC BLOOD PRESSURE: 114 MMHG | OXYGEN SATURATION: 100 % | HEIGHT: 61 IN | TEMPERATURE: 98 F | RESPIRATION RATE: 12 BRPM | WEIGHT: 145 LBS | BODY MASS INDEX: 27.38 KG/M2 | HEART RATE: 72 BPM

## 2020-06-28 LAB
A/G RATIO: 1.7 (ref 1.1–2.2)
ALBUMIN SERPL-MCNC: 4 G/DL (ref 3.4–5)
ALP BLD-CCNC: 97 U/L (ref 40–129)
ALT SERPL-CCNC: 7 U/L (ref 10–40)
ANION GAP SERPL CALCULATED.3IONS-SCNC: 15 MMOL/L (ref 3–16)
AST SERPL-CCNC: 23 U/L (ref 15–37)
BACTERIA: ABNORMAL /HPF
BASOPHILS ABSOLUTE: 0 K/UL (ref 0–0.2)
BASOPHILS RELATIVE PERCENT: 0.8 %
BILIRUB SERPL-MCNC: <0.2 MG/DL (ref 0–1)
BILIRUBIN URINE: NEGATIVE
BLOOD, URINE: NEGATIVE
BUN BLDV-MCNC: 13 MG/DL (ref 7–20)
CALCIUM SERPL-MCNC: 9.5 MG/DL (ref 8.3–10.6)
CHLORIDE BLD-SCNC: 100 MMOL/L (ref 99–110)
CLARITY: CLEAR
CO2: 22 MMOL/L (ref 21–32)
COLOR: ABNORMAL
CREAT SERPL-MCNC: 0.6 MG/DL (ref 0.6–1.2)
EOSINOPHILS ABSOLUTE: 0.1 K/UL (ref 0–0.6)
EOSINOPHILS RELATIVE PERCENT: 1.7 %
EPITHELIAL CELLS, UA: ABNORMAL /HPF (ref 0–5)
GFR AFRICAN AMERICAN: >60
GFR NON-AFRICAN AMERICAN: >60
GLOBULIN: 2.3 G/DL
GLUCOSE BLD-MCNC: 133 MG/DL (ref 70–99)
GLUCOSE URINE: NEGATIVE MG/DL
HCT VFR BLD CALC: 34.7 % (ref 36–48)
HEMOGLOBIN: 11.2 G/DL (ref 12–16)
KETONES, URINE: NEGATIVE MG/DL
LACTIC ACID: 1.9 MMOL/L (ref 0.4–2)
LEUKOCYTE ESTERASE, URINE: NEGATIVE
LIPASE: 17 U/L (ref 13–60)
LYMPHOCYTES ABSOLUTE: 2.2 K/UL (ref 1–5.1)
LYMPHOCYTES RELATIVE PERCENT: 47.2 %
MCH RBC QN AUTO: 30.1 PG (ref 26–34)
MCHC RBC AUTO-ENTMCNC: 32.3 G/DL (ref 31–36)
MCV RBC AUTO: 93.3 FL (ref 80–100)
MICROSCOPIC EXAMINATION: YES
MONOCYTES ABSOLUTE: 0.4 K/UL (ref 0–1.3)
MONOCYTES RELATIVE PERCENT: 8.4 %
NEUTROPHILS ABSOLUTE: 1.9 K/UL (ref 1.7–7.7)
NEUTROPHILS RELATIVE PERCENT: 41.9 %
NITRITE, URINE: POSITIVE
PDW BLD-RTO: 15.4 % (ref 12.4–15.4)
PH UA: 8 (ref 5–8)
PLATELET # BLD: 268 K/UL (ref 135–450)
PMV BLD AUTO: 8 FL (ref 5–10.5)
POTASSIUM REFLEX MAGNESIUM: 3.7 MMOL/L (ref 3.5–5.1)
PRO-BNP: 139 PG/ML (ref 0–124)
PROTEIN UA: NEGATIVE MG/DL
RBC # BLD: 3.72 M/UL (ref 4–5.2)
RBC UA: ABNORMAL /HPF (ref 0–4)
SODIUM BLD-SCNC: 137 MMOL/L (ref 136–145)
SPECIFIC GRAVITY UA: 1.01 (ref 1–1.03)
TOTAL PROTEIN: 6.3 G/DL (ref 6.4–8.2)
TROPONIN: <0.01 NG/ML
URINE REFLEX TO CULTURE: ABNORMAL
URINE TYPE: ABNORMAL
UROBILINOGEN, URINE: 0.2 E.U./DL
WBC # BLD: 4.6 K/UL (ref 4–11)
WBC UA: ABNORMAL /HPF (ref 0–5)

## 2020-06-28 PROCEDURE — 96375 TX/PRO/DX INJ NEW DRUG ADDON: CPT

## 2020-06-28 PROCEDURE — 96374 THER/PROPH/DIAG INJ IV PUSH: CPT

## 2020-06-28 PROCEDURE — 99284 EMERGENCY DEPT VISIT MOD MDM: CPT

## 2020-06-28 PROCEDURE — 93005 ELECTROCARDIOGRAM TRACING: CPT | Performed by: NURSE PRACTITIONER

## 2020-06-28 PROCEDURE — 85025 COMPLETE CBC W/AUTO DIFF WBC: CPT

## 2020-06-28 PROCEDURE — 83690 ASSAY OF LIPASE: CPT

## 2020-06-28 PROCEDURE — 84484 ASSAY OF TROPONIN QUANT: CPT

## 2020-06-28 PROCEDURE — 81001 URINALYSIS AUTO W/SCOPE: CPT

## 2020-06-28 PROCEDURE — 80053 COMPREHEN METABOLIC PANEL: CPT

## 2020-06-28 PROCEDURE — 74177 CT ABD & PELVIS W/CONTRAST: CPT

## 2020-06-28 PROCEDURE — 83605 ASSAY OF LACTIC ACID: CPT

## 2020-06-28 PROCEDURE — 6360000002 HC RX W HCPCS: Performed by: NURSE PRACTITIONER

## 2020-06-28 PROCEDURE — 2580000003 HC RX 258: Performed by: NURSE PRACTITIONER

## 2020-06-28 PROCEDURE — 71046 X-RAY EXAM CHEST 2 VIEWS: CPT

## 2020-06-28 PROCEDURE — 83880 ASSAY OF NATRIURETIC PEPTIDE: CPT

## 2020-06-28 PROCEDURE — 6360000004 HC RX CONTRAST MEDICATION: Performed by: NURSE PRACTITIONER

## 2020-06-28 RX ORDER — FLUDROCORTISONE ACETATE 0.1 MG/1
0.1 TABLET ORAL 2 TIMES DAILY
COMMUNITY

## 2020-06-28 RX ORDER — MORPHINE SULFATE 4 MG/ML
4 INJECTION, SOLUTION INTRAMUSCULAR; INTRAVENOUS ONCE
Status: COMPLETED | OUTPATIENT
Start: 2020-06-28 | End: 2020-06-28

## 2020-06-28 RX ORDER — ONDANSETRON 2 MG/ML
4 INJECTION INTRAMUSCULAR; INTRAVENOUS ONCE
Status: COMPLETED | OUTPATIENT
Start: 2020-06-28 | End: 2020-06-28

## 2020-06-28 RX ORDER — 0.9 % SODIUM CHLORIDE 0.9 %
1000 INTRAVENOUS SOLUTION INTRAVENOUS ONCE
Status: COMPLETED | OUTPATIENT
Start: 2020-06-28 | End: 2020-06-28

## 2020-06-28 RX ADMIN — MORPHINE SULFATE 4 MG: 4 INJECTION, SOLUTION INTRAMUSCULAR; INTRAVENOUS at 14:44

## 2020-06-28 RX ADMIN — ONDANSETRON HYDROCHLORIDE 4 MG: 2 INJECTION, SOLUTION INTRAMUSCULAR; INTRAVENOUS at 14:44

## 2020-06-28 RX ADMIN — IOPAMIDOL 75 ML: 755 INJECTION, SOLUTION INTRAVENOUS at 15:16

## 2020-06-28 RX ADMIN — SODIUM CHLORIDE 1000 ML: 9 INJECTION, SOLUTION INTRAVENOUS at 14:45

## 2020-06-28 ASSESSMENT — PAIN DESCRIPTION - LOCATION
LOCATION: ABDOMEN
LOCATION: ABDOMEN

## 2020-06-28 ASSESSMENT — PAIN DESCRIPTION - FREQUENCY: FREQUENCY: CONTINUOUS

## 2020-06-28 ASSESSMENT — ENCOUNTER SYMPTOMS
VOMITING: 1
RHINORRHEA: 0
SORE THROAT: 0
COLOR CHANGE: 0
ABDOMINAL PAIN: 1
SHORTNESS OF BREATH: 0
NAUSEA: 1

## 2020-06-28 ASSESSMENT — PAIN SCALES - GENERAL
PAINLEVEL_OUTOF10: 4
PAINLEVEL_OUTOF10: 2
PAINLEVEL_OUTOF10: 6

## 2020-06-28 ASSESSMENT — PAIN - FUNCTIONAL ASSESSMENT: PAIN_FUNCTIONAL_ASSESSMENT: ACTIVITIES ARE NOT PREVENTED

## 2020-06-28 ASSESSMENT — PAIN DESCRIPTION - ONSET: ONSET: OTHER (COMMENT)

## 2020-06-28 ASSESSMENT — PAIN DESCRIPTION - DESCRIPTORS: DESCRIPTORS: ACHING;CONSTANT

## 2020-06-28 ASSESSMENT — PAIN DESCRIPTION - PAIN TYPE: TYPE: ACUTE PAIN

## 2020-06-28 NOTE — ED NOTES
Saline lock removed intact. IV site looked unremarkable. Pressure dressing applied. Discharge instructions reviewed with Ms. Pat Peck. She verbalized understanding. Copy of discharge instructions and prescriptions given. Ms. Pat Peck was discharged to home in good condition per personal vehicle, friend/family driving. She exited the ED without difficulty.         David Pedro RN  06/28/20 5472

## 2020-06-28 NOTE — ED PROVIDER NOTES
under the tongue every 5 minutes as needed for Chest pain. PANTOPRAZOLE (PROTONIX) 40 MG TABLET    TAKE 1 TABLET BY MOUTH DAILY    PLECANATIDE (TRULANCE) 3 MG TABS    Take by mouth    SENNOSIDES (SENNA LAX PO)    Take by mouth    SERTRALINE (ZOLOFT) 100 MG TABLET    Take 200 mg by mouth nightly     TRIAMCINOLONE (KENALOG) 0.1 % CREAM    Apply topically 2 times daily Apply topically 2 times daily. ALLERGIES     Meloxicam; Benzoin compound; Lyrica [pregabalin]; Quetiapine; Quetiapine fumarate; Seroquel [quetiapine fumarate]; Tizanidine; Adhesive tape; Amberderm; Linaclotide; Other; and Wound dressings    FAMILY HISTORY       Family History   Problem Relation Age of Onset    Heart Disease Father     Cancer Sister         multiple organs    Other Sister     Asthma Neg Hx     Diabetes Neg Hx     Emphysema Neg Hx     Heart Failure Neg Hx     Hypertension Neg Hx           SOCIAL HISTORY       Social History     Socioeconomic History    Marital status:       Spouse name: None    Number of children: None    Years of education: None    Highest education level: None   Occupational History    None   Social Needs    Financial resource strain: None    Food insecurity     Worry: None     Inability: None    Transportation needs     Medical: None     Non-medical: None   Tobacco Use    Smoking status: Never Smoker    Smokeless tobacco: Never Used   Substance and Sexual Activity    Alcohol use: No     Alcohol/week: 0.0 standard drinks    Drug use: No    Sexual activity: Yes     Partners: Female   Lifestyle    Physical activity     Days per week: None     Minutes per session: None    Stress: None   Relationships    Social connections     Talks on phone: None     Gets together: None     Attends Cheondoism service: None     Active member of club or organization: None     Attends meetings of clubs or organizations: None     Relationship status: None    Intimate partner violence     Fear of current multiple  prior CT scans.  Clinical correlation is recommended. Chest x-ray interpreted by radiologist for no radiographic evidence of acute pulmonary disease. Interpretation per the Radiologist below, if available at the time of this note:    CT ABDOMEN PELVIS W IV CONTRAST Additional Contrast? None   Final Result   No evidence of acute intra-abdominal pathology. There is again gas seen within the urinary bladder with possible fistulous   connection to bowel. However this is stable in appearance from multiple   prior CT scans. Clinical correlation is recommended. XR CHEST STANDARD (2 VW)   Final Result   No radiographic evidence of acute pulmonary disease. No results found. PROCEDURES   Unless otherwise noted below, none     Procedures     CRITICAL CARE TIME   N/A    CONSULTS:  None      EMERGENCYDEPARTMENT COURSE and DIFFERENTIAL DIAGNOSIS/MDM:   Vitals:    Vitals:    06/28/20 1344 06/28/20 1545   BP: 118/71 124/77   Pulse: 84 63   Resp: 22 12   Temp: 98 °F (36.7 °C)    TempSrc: Oral    SpO2: 100% 98%   Weight: 145 lb (65.8 kg)    Height: 5' 1\" (1.549 m)        Patient was given the following medications:  Medications   0.9 % sodium chloride bolus (0 mLs Intravenous Stopped 6/28/20 1617)   morphine sulfate (PF) injection 4 mg (4 mg Intravenous Given 6/28/20 1444)   ondansetron (ZOFRAN) injection 4 mg (4 mg Intravenous Given 6/28/20 1444)   iopamidol (ISOVUE-370) 76 % injection 75 mL (75 mLs Intravenous Given 6/28/20 1516)       Patient was seen and evaluated by myself. Patient here for generalized abdominal pain that she states started yesterday. Patient also reports nausea and vomiting. On exam she is awake and alert hemodynamically stable nontoxic in appearance. Patient reports that she does have a vesicostomy which she caths herself. She states that she has a bladder made from her intestines and follows up with Dr. Mik Linder.   Lab values have been reviewed and made to edit the dictations but occasionally words are mis-transcribed.)    MATIAS Champagne CNP (electronically signed)         MATIAS Champagne CNP  06/28/20 3180

## 2020-06-29 LAB
EKG ATRIAL RATE: 69 BPM
EKG DIAGNOSIS: NORMAL
EKG P AXIS: 50 DEGREES
EKG P-R INTERVAL: 148 MS
EKG Q-T INTERVAL: 424 MS
EKG QRS DURATION: 86 MS
EKG QTC CALCULATION (BAZETT): 454 MS
EKG R AXIS: 70 DEGREES
EKG T AXIS: 64 DEGREES
EKG VENTRICULAR RATE: 69 BPM

## 2020-06-29 PROCEDURE — 93010 ELECTROCARDIOGRAM REPORT: CPT | Performed by: INTERNAL MEDICINE

## 2020-07-20 NOTE — PLAN OF CARE
Admit PCU   Code blue called in radiology s/p epidural injection   Pulseless for ~15 seconds per radiology staff   No interventions, patient conscious and vomiting  Has pace maker, interrogated with no events   Echo, cardiology consult, trend troponin · 

## 2020-12-09 ENCOUNTER — TELEPHONE (OUTPATIENT)
Dept: ORTHOPEDIC SURGERY | Age: 65
End: 2020-12-09

## 2020-12-09 NOTE — TELEPHONE ENCOUNTER
General Question     Subject: CORTISONE INJECTION  Patient and /or Facility Request:Genny [facility]ai [address] Number: [phone]

## 2020-12-17 ENCOUNTER — NURSE ONLY (OUTPATIENT)
Dept: ORTHOPEDIC SURGERY | Age: 65
End: 2020-12-17
Payer: MEDICARE

## 2020-12-17 RX ORDER — LIDOCAINE HYDROCHLORIDE 10 MG/ML
20 INJECTION, SOLUTION INFILTRATION; PERINEURAL ONCE
Status: COMPLETED | OUTPATIENT
Start: 2020-12-17 | End: 2020-12-17

## 2020-12-17 RX ORDER — BUPIVACAINE HYDROCHLORIDE 2.5 MG/ML
30 INJECTION, SOLUTION INFILTRATION; PERINEURAL ONCE
Status: COMPLETED | OUTPATIENT
Start: 2020-12-17 | End: 2020-12-17

## 2020-12-17 RX ORDER — METHYLPREDNISOLONE ACETATE 40 MG/ML
80 INJECTION, SUSPENSION INTRA-ARTICULAR; INTRALESIONAL; INTRAMUSCULAR; SOFT TISSUE ONCE
Status: COMPLETED | OUTPATIENT
Start: 2020-12-17 | End: 2020-12-17

## 2020-12-17 RX ADMIN — BUPIVACAINE HYDROCHLORIDE 75 MG: 2.5 INJECTION, SOLUTION INFILTRATION; PERINEURAL at 08:35

## 2020-12-17 RX ADMIN — LIDOCAINE HYDROCHLORIDE 20 ML: 10 INJECTION, SOLUTION INFILTRATION; PERINEURAL at 08:35

## 2020-12-17 RX ADMIN — METHYLPREDNISOLONE ACETATE 80 MG: 40 INJECTION, SUSPENSION INTRA-ARTICULAR; INTRALESIONAL; INTRAMUSCULAR; SOFT TISSUE at 08:35

## 2020-12-17 NOTE — PROGRESS NOTES
Chief Complaint   Patient presents with    Injections     RIGHT KNEE CORTISONE INJ     Diagnosis: Right knee osteoarthritis    She returns to clinic today for repeat right knee cortisone injection. She received this injection back in June with good pain relief. Denies any new injury since last visit. Orders Placed This Encounter   Procedures    US ARTHR/ASP/INJ MAJOR JNT/BURSA RIGHT     Standing Status:   Future     Number of Occurrences:   1     Standing Expiration Date:   12/17/2021     Order Specific Question:   Reason for exam:     Answer:   PAIN     I discussed in detail the risks, benefits and complications of an injection which included but are not limited to infection, skin reactions, hot swollen joint, and anaphylaxis with the patient. The patient verbalized understanding and gave informed consent for the injection. The patient's knee was flexed to 90° and the skin prepped using sterile alcohol solution. A sterile 22-gauge needle was inserted into the knee and the mixture of 4 mL of 2% Carbocaine, 4 mL of 0.25% Marcaine, and 80 mg of Depo-Medrol was injected under sterile technique. The needle was withdrawn and the puncture site sealed with a Band-Aid. Technique: Under sterile conditions a [employer] ultrasound unit with a variable frequency (6.0-15.0 MHz) linear transducer was used to localize the placement of a 22-gauge needle into the right knee joint. Findings: Successful needle placement for knee injection. Final images were taken and saved for permanent record. The patient tolerated the injection well. The patient was instructed to call the office immediately if there is any pain, redness, warmth, fever, or chills.       Carol Essex, [facility] This dictation was performed with a verbal recognition program (DRAGON) and it was checked for errors. It is possible that there are still dictated errors within this office note. If so, please bring any errors to my attention for an addendum. All efforts were made to ensure that this office note is accurate.

## 2021-02-04 ENCOUNTER — HOSPITAL ENCOUNTER (OUTPATIENT)
Dept: MAMMOGRAPHY | Age: 66
Discharge: HOME | End: 2021-02-04
Payer: MEDICARE

## 2021-02-04 ENCOUNTER — HOSPITAL ENCOUNTER (OUTPATIENT)
Dept: ULTRASOUND IMAGING | Age: 66
Discharge: HOME | End: 2021-02-04
Payer: MEDICARE

## 2021-02-04 DIAGNOSIS — N64.4 BREAST PAIN, LEFT: ICD-10-CM

## 2021-02-04 DIAGNOSIS — R92.8 ABNORMAL MAMMOGRAM: ICD-10-CM

## 2021-02-04 PROCEDURE — G0279 TOMOSYNTHESIS, MAMMO: HCPCS

## 2021-02-04 PROCEDURE — 76642 ULTRASOUND BREAST LIMITED: CPT

## 2021-03-23 ENCOUNTER — OFFICE VISIT (OUTPATIENT)
Dept: ORTHOPEDIC SURGERY | Age: 66
End: 2021-03-23
Payer: MEDICARE

## 2021-03-23 VITALS — HEIGHT: 61 IN | BODY MASS INDEX: 27.38 KG/M2 | WEIGHT: 145 LBS

## 2021-03-23 DIAGNOSIS — M25.561 ACUTE PAIN OF RIGHT KNEE: ICD-10-CM

## 2021-03-23 DIAGNOSIS — M96.1 FAILED BACK SURGICAL SYNDROME: ICD-10-CM

## 2021-03-23 DIAGNOSIS — M17.11 OSTEOARTHRITIS OF RIGHT KNEE, UNSPECIFIED OSTEOARTHRITIS TYPE: Primary | ICD-10-CM

## 2021-03-23 DIAGNOSIS — M54.50 LOW BACK PAIN, UNSPECIFIED BACK PAIN LATERALITY, UNSPECIFIED CHRONICITY, UNSPECIFIED WHETHER SCIATICA PRESENT: ICD-10-CM

## 2021-03-23 PROCEDURE — 20610 DRAIN/INJ JOINT/BURSA W/O US: CPT | Performed by: ORTHOPAEDIC SURGERY

## 2021-03-23 PROCEDURE — 99203 OFFICE O/P NEW LOW 30 MIN: CPT | Performed by: ORTHOPAEDIC SURGERY

## 2021-03-23 PROCEDURE — L1812 KO ELASTIC W/JOINTS PRE OTS: HCPCS | Performed by: ORTHOPAEDIC SURGERY

## 2021-03-23 RX ORDER — BUPIVACAINE HYDROCHLORIDE 2.5 MG/ML
7 INJECTION, SOLUTION INFILTRATION; PERINEURAL ONCE
Status: COMPLETED | OUTPATIENT
Start: 2021-03-23 | End: 2021-03-23

## 2021-03-23 RX ORDER — METHYLPREDNISOLONE ACETATE 40 MG/ML
40 INJECTION, SUSPENSION INTRA-ARTICULAR; INTRALESIONAL; INTRAMUSCULAR; SOFT TISSUE ONCE
Status: COMPLETED | OUTPATIENT
Start: 2021-03-23 | End: 2021-03-23

## 2021-03-23 RX ORDER — GABAPENTIN 300 MG/1
CAPSULE ORAL
COMMUNITY
Start: 2021-03-18 | End: 2022-02-24

## 2021-03-23 RX ORDER — OXYCODONE AND ACETAMINOPHEN 10; 325 MG/1; MG/1
1 TABLET ORAL 4 TIMES DAILY PRN
COMMUNITY
End: 2021-03-29

## 2021-03-23 RX ADMIN — METHYLPREDNISOLONE ACETATE 40 MG: 40 INJECTION, SUSPENSION INTRA-ARTICULAR; INTRALESIONAL; INTRAMUSCULAR; SOFT TISSUE at 10:08

## 2021-03-23 RX ADMIN — BUPIVACAINE HYDROCHLORIDE 17.5 MG: 2.5 INJECTION, SOLUTION INFILTRATION; PERINEURAL at 10:08

## 2021-03-23 NOTE — PROGRESS NOTES
KNEE VISIT      HISTORY OF PRESENT ILLNESS    Cornell Zarco is a 76 y.o. female who presents for evaluation of right knee and her low back. Says her knee gets injection periodically but has had a recurrence of pain. She had a total knee replacement done several years ago by another practitioner notes also still hurts. This been evaluated in meniscus been told that she has what she has. The right knee though she been given injection for osteoarthritis and has had a recurrence of pain at this time. She also complains of low back pain with radiation to the left buttock consistent with sciatica. She had back surgery which is posterior instrumentation by another doctor who apparently says that there is nothing else that can be done. She says she has difficulty walking without ambulatory aids and feels like she is bent over and cannot straighten up when she walks. She grades her pain 9/10. ROS    Well-documented patient history form dated 3/23/2021  All other ROS negative except for above.     Past Surgical history    Past Surgical History:   Procedure Laterality Date    ABDOMEN SURGERY  9/9/11     REPAIR INCISIONAL HERNIA REDUCIBLE WITH POSSIBLE MESH    BACK SURGERY      x3    BACK SURGERY      stimulator    BLADDER SUSPENSION      CARDIAC PACEMAKER PLACEMENT      CATARACT REMOVAL WITH IMPLANT Left 03/08/2018    PHACO EMULSIFICATION OF CATARACT WITH INTRAOCULAR LENS IMPLANT LEFT EYE    CERVICAL DISCECTOMY  6/2014    and fusion    CHOLECYSTECTOMY      COLONOSCOPY  2002 1/26/12    ENDOSCOPY, COLON, DIAGNOSTIC      EPIDURAL STEROID INJECTION Left 8/15/2019    LEFT KNEE GENICULAR NERVE BLOCK SITE CONFIRMED BY FLUOROSCOPY performed by Mike Baldwin MD at [address] [place] [address] Right 04/05/2018    cataract removal    FOOT SURGERY Right 12/6/12    arthroplasty    FOOT SURGERY Left 04/30/2015    FOOT SURGERY Left 4/30/15    GASTRIC BYPASS SURGERY  9834,9506    HYSTERECTOMY      KNEE ARTHROSCOPY Left 10/3/2014    part. medial & lateral meniscectomy, synovectomy plica exc    KNEE ARTHROSCOPY Right 10/13/15    partial medial meniscectomy, chondroplasty, partial lateral meniscectomy    NECK SURGERY      OTHER SURGICAL HISTORY  april 2013    removal spinal cord stimulater    OTHER SURGICAL HISTORY      bladder stimulator    OTHER SURGICAL HISTORY  08/30/2017    left patellofemoral arthroplasty    OTHER SURGICAL HISTORY  05/09/2018    convert left patellofemoral arthroplasty to left total knee replacement   Carmen Lockwood  2011    Dr Mahoney/checked last week/told has 12 more yrs for this one/set at 60    SKIN BIOPSY      abdomen    SPINE SURGERY      stimulator    THROAT SURGERY      polyps removed    TONSILLECTOMY      UPPER GASTROINTESTINAL ENDOSCOPY      UPPER GASTROINTESTINAL ENDOSCOPY  05/03/2017       PAST MEDICAL    Past Medical History:   Diagnosis Date    Angina at rest [facility])     Anxiety     Arthritis     hands and hip    Blood transfusion     after back surgery    Bradycardia     Bronchiectasis ([facility] [address] 11/5/2013    CAD (coronary artery disease)     Chronic back pain     Hyperlipidemia     Hypertension     Localized morphea     NATHAN treated with BiPAP     Pacemaker     Stress incontinence     Thyroid disease     hypothyroid    Trochanteric bursitis of left hip 4/12/2019       Allergies    Allergies   Allergen Reactions    Meloxicam Itching and Other (See Comments)     Tolerates Ketorolac/Bromfenac ophthalmic drops.  Benzoin Compound      Blisters from adhesive compound under steri strips after knee VAS      Lyrica [Pregabalin]      Keeps patient awake    Quetiapine Other (See Comments)     Other reaction(s): Other (See Comments)  Pt unable to recall reaction  Other reaction(s):  Other (See Comments)      Quetiapine Fumarate      Other reaction(s): confused, dizzy  Other reaction(s): Unknown  seroquel      Seroquel [Quetiapine Fumarate] Other (See Comments)     Other reaction(s): confused, dizzy  Pt unable to recall reaction    Tizanidine      hallucinations      Adhesive Tape Rash     Mild dermatitis at site of paper tape, with history of previous skin rashes to tape. STERI-STRIPS  Can use paper tape can not  use transpore    STERI-STRIPS     Mild dermatitis at site    Can use paper tape can not  use transpore    Amberderm Rash     Positive patch test results to [facility]      Linaclotide Rash    Other Rash     Can use paper tape can not  use transpore    Wound Dressings Rash     Positive patch test results to [facility]         Meds    Current Outpatient Medications   Medication Sig Dispense Refill    oxyCODONE-acetaminophen (PERCOCET)  MG per tablet Take 1 tablet by mouth 4 times daily as needed.  gabapentin (NEURONTIN) 300 MG capsule TAKE 1 CAPSULE BY MOUTH EVERY TWELVE HOURS FOR 30 DAYS      fludrocortisone (FLORINEF) 0.1 MG tablet Take 0.1 mg by mouth 2 times daily      Sennosides (SENNA LAX PO) Take by mouth      clonazePAM (KLONOPIN) 1 MG tablet Take 1 mg by mouth 2 times daily as needed.  loratadine (CLARITIN) 10 MG capsule Take 10 mg by mouth daily      Biotin 1000 MCG CHEW Take by mouth      triamcinolone (KENALOG) 0.1 % cream Apply topically 2 times daily Apply topically 2 times daily.  Fesoterodine Fumarate ER (TOVIAZ) 8 MG TB24 Take by mouth      Plecanatide (TRULANCE) 3 MG TABS Take by mouth      midodrine (PROAMATINE) 2.5 MG tablet Take 2.5 mg by mouth 3 times daily      eszopiclone (LUNESTA) 2 MG TABS Take 2 mg by mouth nightly.       dicyclomine (BENTYL) 10 MG capsule Take 1 capsule by mouth every 6 hours as needed (cramps) 20 capsule 0    cyanocobalamin 1000 MCG tablet Take 2,500 mcg by mouth daily      lamoTRIgine (LAMICTAL PO) Take 50 mg by mouth daily      butalbital-acetaminophen-caffeine (FIORICET, ESGIC) -40 MG per tablet Take 1 tablet by mouth      albuterol sulfate HFA (VENTOLIN HFA) 108 (90 Base) MCG/ACT inhaler Inhale 2 puffs into the lungs every 6 hours as needed for Wheezing or Shortness of Breath 1 Inhaler 6    levothyroxine (SYNTHROID) 88 MCG tablet Take 88 mcg by mouth Daily      donepezil (ARICEPT) 10 MG tablet Take 1 tablet by mouth nightly 90 tablet 2    MONOJECT 60CC SYRINGE CATH TIP 60 ML MISC       KLOR-CON M20 20 MEQ extended release tablet Take 20 mEq by mouth 2 times daily       sertraline (ZOLOFT) 100 MG tablet Take 200 mg by mouth nightly       pantoprazole (PROTONIX) 40 MG tablet TAKE 1 TABLET BY MOUTH DAILY  0    fluticasone (FLONASE) 50 MCG/ACT nasal spray 1 spray by Nasal route nightly. (Patient taking differently: 1 spray by Nasal route as needed ) 1 Bottle 5    nitroGLYCERIN (NITROSTAT) 0.4 MG SL tablet Place 1 tablet under the tongue every 5 minutes as needed for Chest pain. 25 tablet 3    Multiple Vitamin (THERA) TABS Take 1 tablet by mouth daily        No current facility-administered medications for this visit. Social    Social History     Socioeconomic History    Marital status:       Spouse name: Not on file    Number of children: Not on file    Years of education: Not on file    Highest education level: Not on file   Occupational History    Not on file   Social Needs    Financial resource strain: Not on file    Food insecurity     Worry: Not on file     Inability: Not on file    Transportation needs     Medical: Not on file     Non-medical: Not on file   Tobacco Use    Smoking status: Never Smoker    Smokeless tobacco: Never Used   Substance and Sexual Activity    Alcohol use: No     Alcohol/week: 0.0 standard drinks    Drug use: No    Sexual activity: Yes     Partners: Female   Lifestyle    Physical activity     Days per week: Not on file     Minutes per session: Not on file    Stress: Not on file   Relationships    Social connections     Talks on phone: Not on file     Gets together: Not on file     Attends Jain service: Not on file     Active member of club or organization: Not on file     Attends meetings of clubs or organizations: Not on file     Relationship status: Not on file    Intimate partner violence     Fear of current or ex partner: Not on file     Emotionally abused: Not on file     Physically abused: Not on file     Forced sexual activity: Not on file   Other Topics Concern    Not on file   Social History Narrative    Not on file       Family HISTORY    Family History   Problem Relation Age of Onset    Heart Disease Father     Cancer Sister         multiple organs    Other Sister     Asthma Neg Hx     Diabetes Neg Hx     Emphysema Neg Hx     Heart Failure Neg Hx     Hypertension Neg Hx        PHYSICAL EXAM    Vital Signs:  Ht 5' 1\" (1.549 m)   Wt 145 lb (65.8 kg)   BMI 27.40 kg/m²   General Appearance:  Normal body habitus. Alert and oriented to person, place, and time. Affect:  Normal.   Gait: Antalgic. Good balance and coordination. Skin:  Intact. Sensation:  Intact. Strength:  Intact. Reflexes:  Intact. Pulses:  Intact. Right knee Exam:    Effusion: Negative    Range of Motion Right Left   Extension 0 0   Flexion 110 110     Provocative Test Right Left    Positive Negative Positive Negative   Anterior drawer [] [x] [] [x]   Lachman [] [x] [] [x]   Posterior drawer [] [x] [] [x]   Varus testing [] [x] [] [x]   Valgus testing [x] [] [] [x]   Joint line tenderness [x] [] [x] []     Additional Exam Comments: She has pain global but mostly in the medial side of her right knee. She has pain the lateral side of her left knee. She has no effusion or signs of infection or DVT in either extremity. She has some stretch signs some sciatic notch tenderness on the left but a well-healed scar. When she walks she is hunched over to a degree.       IMAGING STUDIES    X-rays 2 views lumbar spine reveals posterior spinous mentation with some degenerative changes seen about this area of fusion. IMPRESSION    Right knee pain secondary to osteoarthritis recurrent  Left knee pain status post total knee replacement  Lumbar spondylosis status post back surgery with failed back syndrome    PLAN      1. Conservative care options including physical therapy, NSAIDs, bracing, and activity modification were discussed. 2.  The indications for therapeutic injections were discussed. 3.  The indications for additional imaging studies were discussed. 4.  After considering the various options discussed, the patient elected to pursue a course that includes a cortisone injection right knee and referral to Shav[facility] Coleman to evaluate spine    Recommendation is for a cortisone injection into the right knee. After informed consent was received from the patient, the right knee was injected with 1 mL(40mg)Depo-Medrol and 4 mL of 0.25% Marcaine  in the syringe from an anterolateral joint line approach, using a 25-gauge needle, under sterile Betadine prep, using ethyl chloride as a topical refrigerant, for a diagnosis of osteoarthritis. The patient appeared to tolerate it well. The patient should return here periodically as needed. Encounter Diagnoses   Name Primary?     Low back pain, unspecified back pain laterality, unspecified chronicity, unspecified whether sciatica present     Acute pain of right knee     Osteoarthritis of right knee, unspecified osteoarthritis type Yes    Failed back surgical syndrome         Orders Placed This Encounter   Procedures    XR LUMBAR SPINE (2-3 VIEWS)     Standing Status:   Future     Number of Occurrences:   1     Standing Expiration Date:   3/23/2022   [address] [place] [address], Orthopedic Surgery, [facility]     Referral Priority:   Routine     Referral Type:   Eval and Treat     Referral Reason:   Specialty Services Required     Referred to Provider:   Shania Negron PA-C     Requested Specialty:   Physician Assistant     Number of Visits Requested:   1    [place] ARTHROCENTESIS ASPIR&/INJ MAJOR JT/BURSA W/O US    [employer] Hinged Knee WrapAround Brace     Patient was prescribed a [employer] Hinged Wrap Around Knee Brace. The right knee will require stabilization / immobilization from this semi-rigid / rigid orthosis to improve their function. The orthosis will assist in protecting the affected area, provide functional support and facilitate healing. The patient was educated and fit by a healthcare professional with expert knowledge and specialization in brace application while under the direct supervision of the treating physician. Verbal and written instructions for the use of and application of this item were provided. They were instructed to contact the office immediately should the brace result in increased pain, decreased sensation, increased swelling or worsening of the condition.

## 2021-03-29 ENCOUNTER — TELEPHONE (OUTPATIENT)
Dept: ORTHOPEDIC SURGERY | Age: 66
End: 2021-03-29

## 2021-03-29 ENCOUNTER — OFFICE VISIT (OUTPATIENT)
Dept: ORTHOPEDIC SURGERY | Age: 66
End: 2021-03-29
Payer: MEDICARE

## 2021-03-29 VITALS — HEIGHT: 61 IN | BODY MASS INDEX: 27.38 KG/M2 | WEIGHT: 145 LBS

## 2021-03-29 DIAGNOSIS — Z98.1 HISTORY OF LUMBAR FUSION: Primary | ICD-10-CM

## 2021-03-29 DIAGNOSIS — M51.36 DDD (DEGENERATIVE DISC DISEASE), LUMBAR: ICD-10-CM

## 2021-03-29 DIAGNOSIS — M54.16 LUMBAR RADICULITIS: ICD-10-CM

## 2021-03-29 DIAGNOSIS — S32.010A CLOSED COMPRESSION FRACTURE OF BODY OF L1 VERTEBRA (HCC): ICD-10-CM

## 2021-03-29 PROCEDURE — 99203 OFFICE O/P NEW LOW 30 MIN: CPT | Performed by: PHYSICIAN ASSISTANT

## 2021-03-29 RX ORDER — LAMOTRIGINE 100 MG/1
TABLET ORAL
COMMUNITY
Start: 2021-03-16 | End: 2022-02-24

## 2021-03-29 RX ORDER — MAGNESIUM HYDROXIDE 1200 MG/15ML
LIQUID ORAL
COMMUNITY
Start: 2021-03-01

## 2021-03-29 RX ORDER — ONDANSETRON 8 MG/1
8 TABLET, ORALLY DISINTEGRATING ORAL EVERY 8 HOURS PRN
COMMUNITY

## 2021-03-29 RX ORDER — OXYCODONE HYDROCHLORIDE 10 MG/1
TABLET ORAL
COMMUNITY
Start: 2021-02-25

## 2021-03-29 RX ORDER — CETIRIZINE HYDROCHLORIDE 10 MG/1
10 TABLET ORAL DAILY
COMMUNITY
End: 2022-03-18

## 2021-03-29 RX ORDER — NYSTATIN 100000 [USP'U]/G
POWDER TOPICAL
COMMUNITY
Start: 2021-02-03 | End: 2022-03-18

## 2021-03-29 RX ORDER — CLONAZEPAM 2 MG/1
TABLET ORAL
COMMUNITY
Start: 2021-03-12

## 2021-03-29 RX ORDER — MECLIZINE HCL 12.5 MG/1
25 TABLET ORAL 3 TIMES DAILY
Status: ON HOLD | COMMUNITY
End: 2021-04-14

## 2021-03-29 RX ORDER — MIDODRINE HYDROCHLORIDE 5 MG/1
TABLET ORAL
COMMUNITY
Start: 2021-01-11 | End: 2022-02-24

## 2021-03-29 RX ORDER — SULFAMETHOXAZOLE AND TRIMETHOPRIM 800; 160 MG/1; MG/1
TABLET ORAL
Status: ON HOLD | COMMUNITY
Start: 2021-02-15 | End: 2022-03-19 | Stop reason: HOSPADM

## 2021-03-29 NOTE — TELEPHONE ENCOUNTER
3/29/21 [facility] [place]  - NO PRECERT REQUIRED FOR PARTICIPATING PROVIDERS - PER RECORDING -   REF # [id number] -   MP

## 2021-03-29 NOTE — PROGRESS NOTES
New Patient: SPINE    3/29/2021     CHIEF COMPLAINT:    Chief Complaint   Patient presents with    Back Pain     NP LUMBAR PAIN REFERRED BY DR MELO       HISTORY OF PRESENT ILLNESS:              The patient is a 76 y.o. female extensive past medical history (below) including pacemaker, osteoporosis, CAD status post failed L2-S1 fusion referred by Dr. Lesley Lama for low back and left leg pain. She reports a several year history of chronic aching low back pain and a 1 month history of pain extending into the left buttock and lateral thigh. She has a history of a posterior spinal fusion with Dr. Clara Lowe years prior complicated by subsequent infection which required ICU stay. She has chronic aching low back pain with some worsening pain/numbness extending in the left buttock and lateral thigh. Her symptoms are constant; she reports some relief with reclining. Conservative care includes remote ROBERT's without benefit, prior spinal cord stimulator (failed & removed), pain management (Dr. Yumiko Umaña 10mg QID/Gabapentin), PT. she denies any improvement at this time. She has a long history of urinary dysfunction and prior bladder stimulator. She denies any recent bowel or bladder dysfunction or saddle anesthesia. She denies any new or progressive extremity weakness. No recent injury or trauma. She recently saw Dr. Harrison Barreto for knee pain and was referred here for spine consultation.     Past Medical History:   Diagnosis Date    Angina at rest [facility])     Anxiety     Arthritis     hands and hip    Blood transfusion     after back surgery    Bradycardia     Bronchiectasis ([facility] [address] 11/5/2013    CAD (coronary artery disease)     Chronic back pain     Hyperlipidemia     Hypertension     Localized morphea     NATHAN treated with BiPAP     Pacemaker     Stress incontinence     Thyroid disease     hypothyroid    Trochanteric bursitis of left hip 4/12/2019        Current/Past Treatment:   · Physical Therapy: yes  · Chiropractic:     · Injection:   ESIs past without benefit   Medications:            NSAIDS: allergy             Muscle relaxer:   Past            Steriods:   Past            Neuropathic medications:   Gabapentin 300 BID            Opioids: Oxycodone 10 4 times daily with Dr. Anjana Ferrari            Other:   · Surgery/Consult: S/p L2-S1 fusion Dr. Henri Link w/ subsequent infection years prior     Work Status/Functionality: disability     Past Medical History: Medical history form was reviewed today & scanned into the media tab  Past Medical History:   Diagnosis Date    Angina [facility] rest [facility])     Anxiety     Arthritis     hands and hip    Blood transfusion     after back surgery    Bradycardia     Bronchiectasis ([facility] [address] 11/5/2013    CAD (coronary artery disease)     Chronic back pain     Hyperlipidemia     Hypertension     Localized morphea     NATHAN treated with BiPAP     Pacemaker     Stress incontinence     Thyroid disease     hypothyroid    Trochanteric bursitis of left hip 4/12/2019      Past Surgical History:     Past Surgical History:   Procedure Laterality Date    ABDOMEN SURGERY  9/9/11     REPAIR INCISIONAL HERNIA REDUCIBLE WITH POSSIBLE MESH    BACK SURGERY      x3    BACK SURGERY      stimulator    BLADDER SUSPENSION      CARDIAC PACEMAKER PLACEMENT      CATARACT REMOVAL WITH IMPLANT Left 03/08/2018    PHACO EMULSIFICATION OF CATARACT WITH INTRAOCULAR LENS IMPLANT LEFT EYE    CERVICAL DISCECTOMY  6/2014    and fusion    CHOLECYSTECTOMY      COLONOSCOPY  2002    1/26/12    ENDOSCOPY, COLON, DIAGNOSTIC      EPIDURAL STEROID INJECTION Left 8/15/2019    LEFT KNEE GENICULAR NERVE BLOCK SITE CONFIRMED BY FLUOROSCOPY performed by Keenan Mike MD at [address] [place] [address] Right 04/05/2018    cataract removal    FOOT SURGERY Right 12/6/12    arthroplasty    FOOT SURGERY Left 04/30/2015    FOOT SURGERY Left 4/30/15    GASTRIC BYPASS SURGERY  2097,0197    HYSTERECTOMY  KNEE ARTHROSCOPY Left 10/3/2014    part. medial & lateral meniscectomy, synovectomy plica exc    KNEE ARTHROSCOPY Right 10/13/15    partial medial meniscectomy, chondroplasty, partial lateral meniscectomy    NECK SURGERY      OTHER SURGICAL HISTORY  april 2013    removal spinal cord stimulater    OTHER SURGICAL HISTORY      bladder stimulator    OTHER SURGICAL HISTORY  08/30/2017    left patellofemoral arthroplasty    OTHER SURGICAL HISTORY  05/09/2018    convert left patellofemoral arthroplasty to left total knee replacement   Lacey Cortez  2011    Dr Mahoney/checked last week/told has 12 more yrs for this one/set at 60    SKIN BIOPSY      abdomen    SPINE SURGERY      stimulator    THROAT SURGERY      polyps removed    TONSILLECTOMY      UPPER GASTROINTESTINAL ENDOSCOPY      UPPER GASTROINTESTINAL ENDOSCOPY  05/03/2017     Current Medications:     Current Outpatient Medications:     lamoTRIgine (LAMICTAL) 100 MG tablet, TAKE 1/2 TABLET BY MOUTH TWO TIMES A DAY, Disp: , Rfl:     oxyCODONE HCl (OXY-IR) 10 MG immediate release tablet, TAKE 1 TABLET BY MOUTH EVERY SIX HOURS AS NEEDED, Disp: , Rfl:     sulfamethoxazole-trimethoprim (BACTRIM DS;SEPTRA DS) 800-160 MG per tablet, TAKE 1 TABLET BY MOUTH TWO TIMES A DAY, Disp: , Rfl:     sodium chloride 0.9 % irrigation, , Disp: , Rfl:     ondansetron (ZOFRAN-ODT) 8 MG TBDP disintegrating tablet, Take 8 mg by mouth every 8 hours as needed, Disp: , Rfl:     nystatin (MYCOSTATIN) 155673 UNIT/GM powder, APPLY TWO TIMES A DAY    TO AFFECTED AREA, Disp: , Rfl:     midodrine (PROAMATINE) 5 MG tablet, TAKE 1 TABLET BY MOUTH THREE TIMES A DAY. generic for proamatine. , Disp: , Rfl:     meclizine (ANTIVERT) 12.5 MG tablet, Take 25 mg by mouth 3 times daily, Disp: , Rfl:     clonazePAM (KLONOPIN) 2 MG tablet, TAKE 1 TABLET BY MOUTH TWO TIMES A DAY, Disp: , Rfl:     cetirizine (ZYRTEC) 10 MG tablet, Take 10 mg by mouth daily, Disp: , Rfl:    gabapentin (NEURONTIN) 300 MG capsule, TAKE 1 CAPSULE BY MOUTH EVERY TWELVE HOURS FOR 30 DAYS, Disp: , Rfl:     fludrocortisone (FLORINEF) 0.1 MG tablet, Take 0.1 mg by mouth 2 times daily, Disp: , Rfl:     Sennosides (SENNA LAX PO), Take by mouth, Disp: , Rfl:     loratadine (CLARITIN) 10 MG capsule, Take 10 mg by mouth daily, Disp: , Rfl:     Biotin 1000 MCG CHEW, Take by mouth, Disp: , Rfl:     triamcinolone (KENALOG) 0.1 % cream, Apply topically 2 times daily Apply topically 2 times daily. , Disp: , Rfl:     Fesoterodine Fumarate ER (TOVIAZ) 8 MG TB24, Take by mouth, Disp: , Rfl:     eszopiclone (LUNESTA) 2 MG TABS, Take 2 mg by mouth nightly., Disp: , Rfl:     cyanocobalamin 1000 MCG tablet, Take 2,500 mcg by mouth daily, Disp: , Rfl:     albuterol sulfate HFA (VENTOLIN HFA) 108 (90 Base) MCG/ACT inhaler, Inhale 2 puffs into the lungs every 6 hours as needed for Wheezing or Shortness of Breath, Disp: 1 Inhaler, Rfl: 6    levothyroxine (SYNTHROID) 88 MCG tablet, Take 88 mcg by mouth Daily, Disp: , Rfl:     donepezil (ARICEPT) 10 MG tablet, Take 1 tablet by mouth nightly, Disp: 90 tablet, Rfl: 2    MONOJECT 60CC SYRINGE CATH TIP 60 ML MISC, , Disp: , Rfl:     KLOR-CON M20 20 MEQ extended release tablet, Take 20 mEq by mouth 2 times daily , Disp: , Rfl:     sertraline (ZOLOFT) 100 MG tablet, Take 200 mg by mouth nightly , Disp: , Rfl:     pantoprazole (PROTONIX) 40 MG tablet, TAKE 1 TABLET BY MOUTH DAILY, Disp: , Rfl: 0    fluticasone (FLONASE) 50 MCG/ACT nasal spray, 1 spray by Nasal route nightly.  (Patient taking differently: 1 spray by Nasal route as needed ), Disp: 1 Bottle, Rfl: 5    nitroGLYCERIN (NITROSTAT) 0.4 MG SL tablet, Place 1 tablet under the tongue every 5 minutes as needed for Chest pain., Disp: 25 tablet, Rfl: 3    Multiple Vitamin (THERA) TABS, Take 1 tablet by mouth daily , Disp: , Rfl:   Allergies:  Meloxicam, Acetaminophen, Benzoin compound, Lyrica [pregabalin], Quetiapine Strength testing is 5/5 in all muscle groups tested. No focal weakness  · Special Tests:   Straight leg raise and crossed SLR negative. Leg length and pelvis level.  0 out of 5 Augusto's signs. · Skin: There are no rashes, ulcerations or lesions. · Reflexes: Reflexes are symmetrically trace to 1+ at the patellar and ankle tendons. Clonus absent bilaterally at the feet. · Gait & station: Visibly antalgic forward flexed with cane  · Additional Examinations:   · RIGHT LOWER EXTREMITY: Inspection/examination of the right lower extremity does not show any tenderness, deformity or injury. Range of motion is full. There is no gross instability. There are no rashes, ulcerations or lesions. Strength and tone are normal.       LEFT LOWER EXTREMITY:  Inspection/examination of the left lower extremity does not show any tenderness, deformity or injury. Range of motion is full. There is no gross instability. There are no rashes, ulcerations or lesions. Strength and tone are normal.    Diagnostic Testin views lumbar spine show posterior fusion L2-S1, anterior compression deformity L1. Bones appear osteoporotic. No clear endplates L5    CT abdomen and pelvis 2020  Bones/Soft Tissues: Degenerative changes involve the thoracolumbar spine. The bones are demineralized.  Status post posterior decompression and   stabilization of L2-S1 with intervertebral body grafts.  No change in the old   compression fracture involving the superior endplate of T9.     CT abd/pelv   Osseous structures: Postoperative changes of lumbar fusion.       Impression:  1) Chronic LBP, 1mo left lumbar radiculitis   2) Status post fusion L2-S1, L1 compression deformity  3) Extensive past medical history detailed above; pacemaker  4) Prior ESIs, SCS failed  5) Pain mgt actively--Dr. Hung Mejia       Plan:   1) Lumbar CT scan   2) Quick draw brace PRN  3) Con't pain control w/Dr. Hung Mejia  4) She will call for L CT results and further recommendations         Procedures    Raheem Quick Draw Lumbar Brace     Patient was prescribed an [employer] Draw back brace. The lumbar spine will require stabilization / immobilization from this semi-rigid / rigid orthosis to improve their function. The orthosis will assist in protecting the affected area, provide functional support and facilitate healing. This orthosis is required for the following reasons:    Reduce pain by restricting mobility of the trunk  Facilitate healing following an injury to the spine or related soft tissues  Support weak spinal muscles      The patient was educated and fit by a healthcare professional with expert knowledge and specialization in brace application while under the direct supervision of the physician. Verbal and written instructions for the use of and application of this item were provided. They were instructed to contact the office immediately should the brace result in increased pain, decreased sensation, increased swelling or worsening of the condition.        Electronically signed by John Restrepo PA-C, MPAS on 3/29/2021 at 1:26 PM     John Restrepo PA-C, [address] [place] [address] Certified by the [facility]

## 2021-04-10 ENCOUNTER — HOSPITAL ENCOUNTER (OUTPATIENT)
Age: 66
Discharge: HOME | DRG: 315 | End: 2021-04-10
Payer: MEDICARE

## 2021-04-10 ENCOUNTER — HOSPITAL ENCOUNTER (OUTPATIENT)
Dept: GENERAL RADIOLOGY | Age: 66
Discharge: HOME | DRG: 315 | End: 2021-04-10
Payer: MEDICARE

## 2021-04-10 DIAGNOSIS — M25.512 LEFT SHOULDER PAIN, UNSPECIFIED CHRONICITY: ICD-10-CM

## 2021-04-10 PROCEDURE — 73030 X-RAY EXAM OF SHOULDER: CPT

## 2021-04-13 ENCOUNTER — APPOINTMENT (OUTPATIENT)
Dept: GENERAL RADIOLOGY | Age: 66
DRG: 315 | End: 2021-04-13
Payer: MEDICARE

## 2021-04-13 ENCOUNTER — HOSPITAL ENCOUNTER (INPATIENT)
Age: 66
LOS: 2 days | Discharge: HOME | DRG: 315 | End: 2021-04-15
Attending: EMERGENCY MEDICINE | Admitting: INTERNAL MEDICINE
Payer: MEDICARE

## 2021-04-13 ENCOUNTER — APPOINTMENT (OUTPATIENT)
Dept: CT IMAGING | Age: 66
DRG: 315 | End: 2021-04-13
Payer: MEDICARE

## 2021-04-13 DIAGNOSIS — N39.0 URINARY TRACT INFECTION ASSOCIATED WITH CYSTOSTOMY CATHETER, INITIAL ENCOUNTER (HCC): Primary | ICD-10-CM

## 2021-04-13 DIAGNOSIS — S01.81XA FACIAL LACERATION, INITIAL ENCOUNTER: ICD-10-CM

## 2021-04-13 DIAGNOSIS — R53.1 GENERALIZED WEAKNESS: ICD-10-CM

## 2021-04-13 DIAGNOSIS — T83.510A URINARY TRACT INFECTION ASSOCIATED WITH CYSTOSTOMY CATHETER, INITIAL ENCOUNTER (HCC): Primary | ICD-10-CM

## 2021-04-13 DIAGNOSIS — R55 SYNCOPE AND COLLAPSE: ICD-10-CM

## 2021-04-13 DIAGNOSIS — Z78.9 FAILURE OF OUTPATIENT TREATMENT: ICD-10-CM

## 2021-04-13 DIAGNOSIS — M79.5 FOREIGN BODY (FB) IN SOFT TISSUE: ICD-10-CM

## 2021-04-13 LAB
A/G RATIO: 1.4 (ref 1.1–2.2)
ALBUMIN SERPL-MCNC: 4 G/DL (ref 3.4–5)
ALP BLD-CCNC: 130 U/L (ref 40–129)
ALT SERPL-CCNC: 9 U/L (ref 10–40)
ANION GAP SERPL CALCULATED.3IONS-SCNC: 10 MMOL/L (ref 3–16)
AST SERPL-CCNC: 21 U/L (ref 15–37)
ATYPICAL LYMPHOCYTE RELATIVE PERCENT: 5 % (ref 0–6)
BACTERIA: ABNORMAL /HPF
BANDED NEUTROPHILS RELATIVE PERCENT: 1 % (ref 0–7)
BASOPHILS ABSOLUTE: 0.1 K/UL (ref 0–0.2)
BASOPHILS RELATIVE PERCENT: 1 %
BILIRUB SERPL-MCNC: <0.2 MG/DL (ref 0–1)
BILIRUBIN URINE: NEGATIVE
BLOOD, URINE: NEGATIVE
BUN BLDV-MCNC: 17 MG/DL (ref 7–20)
CALCIUM SERPL-MCNC: 9.7 MG/DL (ref 8.3–10.6)
CHLORIDE BLD-SCNC: 104 MMOL/L (ref 99–110)
CLARITY: CLEAR
CO2: 25 MMOL/L (ref 21–32)
COLOR: YELLOW
CREAT SERPL-MCNC: 0.7 MG/DL (ref 0.6–1.2)
EOSINOPHILS ABSOLUTE: 0.3 K/UL (ref 0–0.6)
EOSINOPHILS RELATIVE PERCENT: 6 %
EPITHELIAL CELLS, UA: ABNORMAL /HPF (ref 0–5)
GFR AFRICAN AMERICAN: >60
GFR NON-AFRICAN AMERICAN: >60
GLOBULIN: 2.8 G/DL
GLUCOSE BLD-MCNC: 98 MG/DL (ref 70–99)
GLUCOSE URINE: NEGATIVE MG/DL
HCT VFR BLD CALC: 38 % (ref 36–48)
HEMOGLOBIN: 12.6 G/DL (ref 12–16)
KETONES, URINE: NEGATIVE MG/DL
LACTIC ACID: 1.4 MMOL/L (ref 0.4–2)
LEUKOCYTE ESTERASE, URINE: ABNORMAL
LYMPHOCYTES ABSOLUTE: 3.2 K/UL (ref 1–5.1)
LYMPHOCYTES RELATIVE PERCENT: 52 %
MAGNESIUM: 1.9 MG/DL (ref 1.8–2.4)
MCH RBC QN AUTO: 31.1 PG (ref 26–34)
MCHC RBC AUTO-ENTMCNC: 33 G/DL (ref 31–36)
MCV RBC AUTO: 94.1 FL (ref 80–100)
MICROSCOPIC EXAMINATION: YES
MONOCYTES ABSOLUTE: 0.6 K/UL (ref 0–1.3)
MONOCYTES RELATIVE PERCENT: 11 %
MUCUS: ABNORMAL /LPF
NEUTROPHILS ABSOLUTE: 1.4 K/UL (ref 1.7–7.7)
NEUTROPHILS RELATIVE PERCENT: 24 %
NITRITE, URINE: POSITIVE
PDW BLD-RTO: 17.1 % (ref 12.4–15.4)
PH UA: 6.5 (ref 5–8)
PLATELET # BLD: 186 K/UL (ref 135–450)
PLATELET SLIDE REVIEW: ADEQUATE
PMV BLD AUTO: 7.4 FL (ref 5–10.5)
POTASSIUM SERPL-SCNC: 4.2 MMOL/L (ref 3.5–5.1)
PROTEIN UA: NEGATIVE MG/DL
RBC # BLD: 4.04 M/UL (ref 4–5.2)
RBC UA: ABNORMAL /HPF (ref 0–4)
SLIDE REVIEW: ABNORMAL
SODIUM BLD-SCNC: 139 MMOL/L (ref 136–145)
SPECIFIC GRAVITY UA: 1.02 (ref 1–1.03)
TOTAL PROTEIN: 6.8 G/DL (ref 6.4–8.2)
TROPONIN: <0.01 NG/ML
URINE TYPE: ABNORMAL
UROBILINOGEN, URINE: 0.2 E.U./DL
WBC # BLD: 5.7 K/UL (ref 4–11)
WBC UA: ABNORMAL /HPF (ref 0–5)

## 2021-04-13 PROCEDURE — 87077 CULTURE AEROBIC IDENTIFY: CPT

## 2021-04-13 PROCEDURE — 93005 ELECTROCARDIOGRAM TRACING: CPT | Performed by: EMERGENCY MEDICINE

## 2021-04-13 PROCEDURE — 87086 URINE CULTURE/COLONY COUNT: CPT

## 2021-04-13 PROCEDURE — 81001 URINALYSIS AUTO W/SCOPE: CPT

## 2021-04-13 PROCEDURE — 70496 CT ANGIOGRAPHY HEAD: CPT

## 2021-04-13 PROCEDURE — 73100 X-RAY EXAM OF WRIST: CPT

## 2021-04-13 PROCEDURE — 74176 CT ABD & PELVIS W/O CONTRAST: CPT

## 2021-04-13 PROCEDURE — 2060000000 HC ICU INTERMEDIATE R&B

## 2021-04-13 PROCEDURE — 87040 BLOOD CULTURE FOR BACTERIA: CPT

## 2021-04-13 PROCEDURE — 85025 COMPLETE CBC W/AUTO DIFF WBC: CPT

## 2021-04-13 PROCEDURE — 96375 TX/PRO/DX INJ NEW DRUG ADDON: CPT

## 2021-04-13 PROCEDURE — 99284 EMERGENCY DEPT VISIT MOD MDM: CPT

## 2021-04-13 PROCEDURE — 6360000002 HC RX W HCPCS: Performed by: EMERGENCY MEDICINE

## 2021-04-13 PROCEDURE — 83735 ASSAY OF MAGNESIUM: CPT

## 2021-04-13 PROCEDURE — 71045 X-RAY EXAM CHEST 1 VIEW: CPT

## 2021-04-13 PROCEDURE — 70450 CT HEAD/BRAIN W/O DYE: CPT

## 2021-04-13 PROCEDURE — 96361 HYDRATE IV INFUSION ADD-ON: CPT

## 2021-04-13 PROCEDURE — 70150 X-RAY EXAM OF FACIAL BONES: CPT

## 2021-04-13 PROCEDURE — 83605 ASSAY OF LACTIC ACID: CPT

## 2021-04-13 PROCEDURE — 96374 THER/PROPH/DIAG INJ IV PUSH: CPT

## 2021-04-13 PROCEDURE — 84484 ASSAY OF TROPONIN QUANT: CPT

## 2021-04-13 PROCEDURE — 12052 INTMD RPR FACE/MM 2.6-5.0 CM: CPT

## 2021-04-13 PROCEDURE — 87186 SC STD MICRODIL/AGAR DIL: CPT

## 2021-04-13 PROCEDURE — 6360000004 HC RX CONTRAST MEDICATION: Performed by: EMERGENCY MEDICINE

## 2021-04-13 PROCEDURE — 80053 COMPREHEN METABOLIC PANEL: CPT

## 2021-04-13 PROCEDURE — 2580000003 HC RX 258: Performed by: EMERGENCY MEDICINE

## 2021-04-13 PROCEDURE — 73030 X-RAY EXAM OF SHOULDER: CPT

## 2021-04-13 PROCEDURE — 73110 X-RAY EXAM OF WRIST: CPT

## 2021-04-13 RX ORDER — MORPHINE SULFATE 4 MG/ML
4 INJECTION, SOLUTION INTRAMUSCULAR; INTRAVENOUS ONCE
Status: COMPLETED | OUTPATIENT
Start: 2021-04-13 | End: 2021-04-13

## 2021-04-13 RX ORDER — 0.9 % SODIUM CHLORIDE 0.9 %
500 INTRAVENOUS SOLUTION INTRAVENOUS ONCE
Status: COMPLETED | OUTPATIENT
Start: 2021-04-13 | End: 2021-04-13

## 2021-04-13 RX ORDER — ONDANSETRON 2 MG/ML
4 INJECTION INTRAMUSCULAR; INTRAVENOUS ONCE
Status: COMPLETED | OUTPATIENT
Start: 2021-04-13 | End: 2021-04-13

## 2021-04-13 RX ORDER — 0.9 % SODIUM CHLORIDE 0.9 %
1000 INTRAVENOUS SOLUTION INTRAVENOUS ONCE
Status: COMPLETED | OUTPATIENT
Start: 2021-04-13 | End: 2021-04-13

## 2021-04-13 RX ORDER — KETOROLAC TROMETHAMINE 30 MG/ML
15 INJECTION, SOLUTION INTRAMUSCULAR; INTRAVENOUS ONCE
Status: COMPLETED | OUTPATIENT
Start: 2021-04-13 | End: 2021-04-13

## 2021-04-13 RX ADMIN — SODIUM CHLORIDE 500 ML: 9 INJECTION, SOLUTION INTRAVENOUS at 19:33

## 2021-04-13 RX ADMIN — MORPHINE SULFATE 4 MG: 4 INJECTION INTRAVENOUS at 19:33

## 2021-04-13 RX ADMIN — IOPAMIDOL 85 ML: 755 INJECTION, SOLUTION INTRAVENOUS at 19:05

## 2021-04-13 RX ADMIN — KETOROLAC TROMETHAMINE 15 MG: 30 INJECTION, SOLUTION INTRAMUSCULAR at 22:50

## 2021-04-13 RX ADMIN — ONDANSETRON 4 MG: 2 INJECTION INTRAMUSCULAR; INTRAVENOUS at 19:33

## 2021-04-13 RX ADMIN — SODIUM CHLORIDE 1000 ML: 9 INJECTION, SOLUTION INTRAVENOUS at 21:48

## 2021-04-13 ASSESSMENT — PAIN SCALES - GENERAL: PAINLEVEL_OUTOF10: 7

## 2021-04-13 ASSESSMENT — ENCOUNTER SYMPTOMS
NAUSEA: 0
SHORTNESS OF BREATH: 0
CHEST TIGHTNESS: 0
VOMITING: 0
EYE PAIN: 0
ABDOMINAL PAIN: 1
BACK PAIN: 1
COUGH: 0
SORE THROAT: 0
PHOTOPHOBIA: 0
EYE REDNESS: 0
DIARRHEA: 0

## 2021-04-13 NOTE — ED NOTES
Wound care completed to left wrist, right shoulder, cleaned and adaptic with wrap to areas. Cleaned right forehead and applied dressing to area until MD is able to clean out debris. Pt dtr at bedside.       Ghassan Leigh RN  04/13/21 [id number]

## 2021-04-13 NOTE — ED NOTES
Bed: 07  Expected date:   Expected time:   Means of arrival:   Comments:  ARCHANA Irizarry  04/13/21 [id number]

## 2021-04-13 NOTE — ED PROVIDER NOTES
[address] ED  EMERGENCY DEPARTMENT ENCOUNTER        Pt Name: Jess Herrera  MRN: [id number]  [place] 1955  Date of evaluation: 4/13/2021  Provider: Kenisha Padilla MD  PCP: Manjinder Platt MD      [address] [place] [address]       Chief Complaint   Patient presents with    Dizziness     pt c/o dizziness since she woke up around 0700 this am, pt states she felt light headed all day like she was going to pass out, pt states she took a klonopin to calm down and went to take the trash can out and she lost her balance and fell, pt has lac to head over right eye and abrasion to right shoulder and skin tear to LFA    Fall    Urinary Tract Infection     pt has been taking 2 different antibiotics for uti        HISTORY OFPRESENT ILLNESS   (Location/Symptom, Timing/Onset, Context/Setting, Quality, Duration, Modifying Factors,Severity)  Note limiting factors. Jess Herrera is a 76 y.o. female presenting today due to concern for dealing with a urinary tract infection over the last 6 days and recently being placed on Levaquin but having worsening increased frequency since being started on Levaquin along with generalized weakness. She states that today she woke up and has been feeling lightheaded since 7 AM to the point where she thought she could pass out. She started to feel better throughout the day but never totally back to normal and finally thought she was strong enough to take her garbage out this afternoon. She was taking her garbage down to the sidewalk just prior to arrival whenever she turned around and next thing she knew she was on the ground and had some head trauma. She is unsure if she had a syncopal event or just tripped. She denies any chest pain or shortness of breath. She does have some suprapubic discomfort and states she has to self cath herself with a suprapubic catheter. She denies any new back pain although does have some chronic thoracic discomfort.   She has some bilateral neck pain although denies any midline neck pain and states that is worse than normal.  No numbness or weakness on one side of the body. She did have a skin tear to the left forearm along with some lacerations to the right side of her face. She denies any visual changes. No trouble with bite. Her last tetanus shot was within the last 10 years. Due to severe lightheadedness and recent fall, she came by EMS for further evaluation. REVIEW OF SYSTEMS    (2-9 systems for level 4, 10 or more for level 5)     Review of Systems   Constitutional: Positive for fatigue. Negative for chills, diaphoresis and fever. HENT: Negative for congestion, dental problem and sore throat. Eyes: Negative for photophobia, pain, redness and visual disturbance. Respiratory: Negative for cough, chest tightness and shortness of breath. Cardiovascular: Negative for chest pain. Gastrointestinal: Positive for abdominal pain (lower). Negative for diarrhea, nausea and vomiting. Genitourinary: Positive for difficulty urinating (chronic) and frequency (for couple of days). Negative for flank pain. Musculoskeletal: Positive for back pain (chronic, no new changes), gait problem (uses cane) and neck pain (chronic, but worse on right side today). Negative for neck stiffness. Skin: Positive for wound. Neurological: Positive for syncope (possible), weakness (generalized), light-headedness and headaches (had headache prior to fall but worse after hitting head). Negative for dizziness (no vertigo), speech difficulty and numbness. Psychiatric/Behavioral: Negative for confusion. The patient is nervous/anxious. Positives and Pertinent negatives as per HPI.       PASTMEDICAL HISTORY     Past Medical History:   Diagnosis Date    Angina at rest [facility])     Anxiety     Arthritis     hands and hip    Blood transfusion     after back surgery    Bradycardia     Bronchiectasis ([facility] [address] 11/5/2013    CAD (coronary artery disease)  Chronic back pain     Hyperlipidemia     Hypertension     Localized morphea     NATHAN treated with BiPAP     Pacemaker     Stress incontinence     Thyroid disease     hypothyroid    Trochanteric bursitis of left hip 4/12/2019         SURGICAL HISTORY       Past Surgical History:   Procedure Laterality Date    ABDOMEN SURGERY  9/9/11     REPAIR INCISIONAL HERNIA REDUCIBLE WITH POSSIBLE MESH    BACK SURGERY      x3    BACK SURGERY      stimulator    BLADDER SUSPENSION      CARDIAC PACEMAKER PLACEMENT      CATARACT REMOVAL WITH IMPLANT Left 03/08/2018    PHACO EMULSIFICATION OF CATARACT WITH INTRAOCULAR LENS IMPLANT LEFT EYE    CERVICAL DISCECTOMY  6/2014    and fusion    CHOLECYSTECTOMY      COLONOSCOPY  2002 1/26/12    ENDOSCOPY, COLON, DIAGNOSTIC      EPIDURAL STEROID INJECTION Left 8/15/2019    LEFT KNEE GENICULAR NERVE BLOCK SITE CONFIRMED BY FLUOROSCOPY performed by Debra Younger MD at [facility] Right 04/05/2018    cataract removal    FOOT SURGERY Right 12/6/12    arthroplasty    FOOT SURGERY Left 04/30/2015    FOOT SURGERY Left 4/30/15    GASTRIC BYPASS SURGERY  [id number]    HYSTERECTOMY      KNEE ARTHROSCOPY Left 10/3/2014    part.  medial & lateral meniscectomy, synovectomy plica exc    KNEE ARTHROSCOPY Right 10/13/15    partial medial meniscectomy, chondroplasty, partial lateral meniscectomy    NECK SURGERY      OTHER SURGICAL HISTORY  april 2013    removal spinal cord stimulater    OTHER SURGICAL HISTORY      bladder stimulator    OTHER SURGICAL HISTORY  08/30/2017    left patellofemoral arthroplasty    OTHER SURGICAL HISTORY  05/09/2018    convert left patellofemoral arthroplasty to left total knee replacement   Nadege Dominguez  2011    Dr Mahoney/checked last week/told has 12 more yrs for this one/set at 60    SKIN BIOPSY      abdomen    SPINE SURGERY      stimulator    THROAT SURGERY      polyps removed    TONSILLECTOMY      UPPER GASTROINTESTINAL ENDOSCOPY      UPPER GASTROINTESTINAL ENDOSCOPY  05/03/2017         CURRENT MEDICATIONS       Previous Medications    ALBUTEROL SULFATE HFA (VENTOLIN HFA) 108 (90 BASE) MCG/ACT INHALER    Inhale 2 puffs into the lungs every 6 hours as needed for Wheezing or Shortness of Breath    BIOTIN 1000 MCG CHEW    Take by mouth    CETIRIZINE (ZYRTEC) 10 MG TABLET    Take 10 mg by mouth daily    CLONAZEPAM (KLONOPIN) 2 MG TABLET    TAKE 1 TABLET BY MOUTH TWO TIMES A DAY    CYANOCOBALAMIN 1000 MCG TABLET    Take 2,500 mcg by mouth daily    DONEPEZIL (ARICEPT) 10 MG TABLET    Take 1 tablet by mouth nightly    ESZOPICLONE (LUNESTA) 2 MG TABS    Take 2 mg by mouth nightly. FESOTERODINE FUMARATE ER (TOVIAZ) 8 MG TB24    Take by mouth    FLUDROCORTISONE (FLORINEF) 0.1 MG TABLET    Take 0.1 mg by mouth 2 times daily    FLUTICASONE (FLONASE) 50 MCG/ACT NASAL SPRAY    1 spray by Nasal route nightly. GABAPENTIN (NEURONTIN) 300 MG CAPSULE    TAKE 1 CAPSULE BY MOUTH EVERY TWELVE HOURS FOR 30 DAYS    KLOR-CON M20 20 MEQ EXTENDED RELEASE TABLET    Take 20 mEq by mouth 2 times daily     LAMOTRIGINE (LAMICTAL) 100 MG TABLET    TAKE 1/2 TABLET BY MOUTH TWO TIMES A DAY    LEVOTHYROXINE (SYNTHROID) 88 MCG TABLET    Take 88 mcg by mouth Daily    LORATADINE (CLARITIN) 10 MG CAPSULE    Take 10 mg by mouth daily    MECLIZINE (ANTIVERT) 12.5 MG TABLET    Take 25 mg by mouth 3 times daily    MIDODRINE (PROAMATINE) 5 MG TABLET    TAKE 1 TABLET BY MOUTH THREE TIMES A DAY. generic for proamatine. MONOJECT 60CC SYRINGE CATH TIP 60 ML MISC        MULTIPLE VITAMIN (THERA) TABS    Take 1 tablet by mouth daily     NITROGLYCERIN (NITROSTAT) 0.4 MG SL TABLET    Place 1 tablet under the tongue every 5 minutes as needed for Chest pain.     NYSTATIN (MYCOSTATIN) 783118 UNIT/GM POWDER    APPLY TWO TIMES A DAY    TO AFFECTED AREA    ONDANSETRON (ZOFRAN-ODT) 8 MG TBDP DISINTEGRATING TABLET    Take 8 mg by mouth every 8 hours as needed OXYCODONE HCL (OXY-IR) 10 MG IMMEDIATE RELEASE TABLET    TAKE 1 TABLET BY MOUTH EVERY SIX HOURS AS NEEDED    PANTOPRAZOLE (PROTONIX) 40 MG TABLET    TAKE 1 TABLET BY MOUTH DAILY    SENNOSIDES (SENNA LAX PO)    Take by mouth    SERTRALINE (ZOLOFT) 100 MG TABLET    Take 200 mg by mouth nightly     SODIUM CHLORIDE 0.9 % IRRIGATION        SULFAMETHOXAZOLE-TRIMETHOPRIM (BACTRIM DS;SEPTRA DS) 800-160 MG PER TABLET    TAKE 1 TABLET BY MOUTH TWO TIMES A DAY    TRIAMCINOLONE (KENALOG) 0.1 % CREAM    Apply topically 2 times daily Apply topically 2 times daily. ALLERGIES     Meloxicam, Acetaminophen, Benzoin compound, Lyrica [pregabalin], Quetiapine fumarate, Seroquel [quetiapine fumarate], Tizanidine, Adhesive tape, Amberderm, Linaclotide, Other, Quetiapine, and Wound dressings    FAMILY HISTORY       Family History   Problem Relation Age of Onset    Heart Disease Father     Cancer Sister         multiple organs    Other Sister     Asthma Neg Hx     Diabetes Neg Hx     Emphysema Neg Hx     Heart Failure Neg Hx     Hypertension Neg Hx           SOCIAL HISTORY       Social History     Socioeconomic History    Marital status:       Spouse name: None    Number of children: None    Years of education: None    Highest education level: None   Occupational History    None   Social Needs    Financial resource strain: None    Food insecurity     Worry: None     Inability: None    Transportation needs     Medical: None     Non-medical: None   Tobacco Use    Smoking status: Never Smoker    Smokeless tobacco: Never Used   Substance and Sexual Activity    Alcohol use: No     Alcohol/week: 0.0 standard drinks    Drug use: No    Sexual activity: Yes     Partners: Female   Lifestyle    Physical activity     Days per week: None     Minutes per session: None    Stress: None   Relationships    Social connections     Talks on phone: None     Gets together: None     Attends Bahai service: None     Active member of club or organization: None     Attends meetings of clubs or organizations: None     Relationship status: None    Intimate partner violence     Fear of current or ex partner: None     Emotionally abused: None     Physically abused: None     Forced sexual activity: None   Other Topics Concern    None   Social History Narrative    None       SCREENINGS                PHYSICAL EXAM    (up to 7 for level 4, 8 or more for level 5)     ED Triage Vitals   BP Temp Temp src Pulse Resp SpO2 Height Weight   -- -- -- -- -- -- -- --       Physical Exam  Vitals signs and nursing note reviewed. Constitutional:       General: She is awake. She is not in acute distress. Appearance: Normal appearance. She is well-developed, well-groomed and overweight. She is not ill-appearing, toxic-appearing or diaphoretic. Interventions: She is not intubated. HENT:      Head: Normocephalic. Abrasion (right cheek), contusion and laceration present. No raccoon eyes, Lozada's sign, masses, right periorbital erythema or left periorbital erythema. Hair is normal.      Jaw: There is normal jaw occlusion. No trismus, tenderness, swelling, pain on movement or malocclusion. Right Ear: External ear normal. No laceration, drainage, swelling or tenderness. No mastoid tenderness. Left Ear: External ear normal. No laceration, drainage, swelling or tenderness. No mastoid tenderness. Nose: No nasal deformity, septal deviation, signs of injury, laceration, nasal tenderness, mucosal edema, congestion or rhinorrhea. Right Nostril: No epistaxis or septal hematoma. Left Nostril: No epistaxis or septal hematoma. Right Sinus: Frontal sinus tenderness (at site of laceration) present. No maxillary sinus tenderness. Left Sinus: No maxillary sinus tenderness or frontal sinus tenderness. Mouth/Throat:      Lips: Pink. No lesions.       Mouth: Mucous membranes are moist. No injury, lacerations, oral lesions or angioedema. Dentition: No dental tenderness. Tongue: No lesions. Tongue does not deviate from midline. Palate: No mass and lesions. Pharynx: Oropharynx is clear. Uvula midline. No pharyngeal swelling, oropharyngeal exudate, posterior oropharyngeal erythema or uvula swelling. Eyes:      General: Lids are normal.         Right eye: No discharge. Left eye: No discharge. Extraocular Movements: Extraocular movements intact. Right eye: Normal extraocular motion and no nystagmus. Left eye: Normal extraocular motion and no nystagmus. Pupils: Pupils are equal, round, and reactive to light. Pupils are equal.      Right eye: Pupil is round, reactive and not sluggish. Left eye: Pupil is round, reactive and not sluggish. Neck:      Musculoskeletal: Full passive range of motion without pain, normal range of motion and neck supple. Normal range of motion. Muscular tenderness (bilateral bilateral) present. No edema, erythema, neck rigidity, crepitus, injury, pain with movement, torticollis or spinous process tenderness. Vascular: No JVD. Trachea: Trachea and phonation normal. No tracheal tenderness or tracheal deviation. Cardiovascular:      Rate and Rhythm: Normal rate and regular rhythm. Pulses: Normal pulses. Radial pulses are 2+ on the right side and 2+ on the left side. Pulmonary:      Effort: Pulmonary effort is normal. No tachypnea, bradypnea, accessory muscle usage, prolonged expiration, respiratory distress or retractions. She is not intubated. Breath sounds: Normal breath sounds and air entry. No stridor, decreased air movement or transmitted upper airway sounds. No decreased breath sounds, wheezing, rhonchi or rales. Chest:      Chest wall: No tenderness. Abdominal:      General: Abdomen is flat. Bowel sounds are normal. There is no distension. Palpations: Abdomen is soft. Abdomen is not rigid. Tenderness:  There is abdominal tenderness (moderate) in the periumbilical area and suprapubic area. There is no right CVA tenderness, left CVA tenderness, guarding or rebound. Negative signs include Fraser's sign and McBurney's sign. Musculoskeletal:         General: No swelling or deformity. Right shoulder: She exhibits normal range of motion, no tenderness, no bony tenderness and no deformity. Left shoulder: She exhibits decreased range of motion (chronic per patient), tenderness and pain. She exhibits no bony tenderness. Right elbow: She exhibits normal range of motion. No tenderness found. Left elbow: She exhibits normal range of motion. No tenderness found. Right wrist: She exhibits normal range of motion, no tenderness and no bony tenderness. Left wrist: She exhibits tenderness, bony tenderness and laceration (skin tear). She exhibits normal range of motion, no swelling, no effusion, no crepitus and no deformity. Right hip: She exhibits normal range of motion, normal strength, no tenderness and no bony tenderness. Left hip: She exhibits normal range of motion, normal strength, no tenderness and no bony tenderness. Right knee: She exhibits normal range of motion and no bony tenderness. No tenderness found. Left knee: She exhibits normal range of motion (slow with ROM but ROM intact). No tenderness found. Right ankle: She exhibits normal range of motion. No tenderness. Left ankle: She exhibits normal range of motion. No tenderness. Cervical back: She exhibits tenderness and pain. She exhibits normal range of motion, no bony tenderness, no swelling, no edema, no deformity, no laceration and no spasm. Thoracic back: She exhibits pain. She exhibits normal range of motion, no tenderness, no bony tenderness, no swelling, no edema, no deformity, no laceration and no spasm.       Lumbar back: She exhibits normal range of motion, no tenderness, no bony tenderness, no swelling, no edema, no deformity, no laceration, no pain and no spasm. Back:         Arms:       Right lower leg: No edema. Left lower leg: No edema. Skin:     General: Skin is warm and dry. Capillary Refill: Capillary refill takes less than 2 seconds. Coloration: Skin is not ashen, cyanotic, jaundiced or pale. Findings: Abrasion (right maxillary region), bruising, ecchymosis and laceration (right forehead, skin tear to left distal forearm) present. No erythema or rash. Neurological:      General: No focal deficit present. Mental Status: She is alert and oriented to person, place, and time. Mental status is at baseline. GCS: GCS eye subscore is 4. GCS verbal subscore is 5. GCS motor subscore is 6. Cranial Nerves: No cranial nerve deficit, dysarthria or facial asymmetry. Sensory: Sensation is intact. No sensory deficit. Motor: Motor function is intact. No weakness, tremor, atrophy, abnormal muscle tone or seizure activity. Psychiatric:         Attention and Perception: Attention normal.         Mood and Affect: Mood and affect normal.         Speech: Speech normal. Speech is not slurred. Behavior: Behavior is cooperative.              DIAGNOSTIC RESULTS   :    Labs Reviewed   CBC WITH AUTO DIFFERENTIAL - Abnormal; Notable for the following components:       Result Value    RDW 17.1 (*)     Neutrophils Absolute 1.4 (*)     All other components within normal limits    Narrative:     Performed at:  [facility] [address],  [place], [facility]   Phone [phone]   COMPREHENSIVE METABOLIC PANEL - Abnormal; Notable for the following components:    Alkaline Phosphatase 130 (*)     ALT 9 (*)     All other components within normal limits    Narrative:     Performed at:  [facility] [address],  [place], [facility]   Phone [phone]   URINALYSIS - Abnormal; Notable for the following components:    Nitrite, Urine POSITIVE (*)     Leukocyte Esterase, Urine SMALL (*)     All other components within normal limits    Narrative:     Performed at:  [facility] [address],  [place], [facility]   Phone [phone]   MICROSCOPIC URINALYSIS - Abnormal; Notable for the following components:    Mucus, UA 1+ (*)     WBC, UA 6-9 (*)     Bacteria, UA 1+ (*)     All other components within normal limits    Narrative:     Performed at:  [facility]) - [facility] [address],  [place], [facility]   Phone [phone]   CULTURE, URINE   CULTURE, BLOOD 2   CULTURE, BLOOD 1   COVID-19 & INFLUENZA COMBO   LACTIC ACID, PLASMA    Narrative:     Performed at:  [place] [address],  [place], [facility]   Phone [phone]   TROPONIN    Narrative:     Performed at:  [place] [address],  [place], [facility]   Phone [phone]   MAGNESIUM    Narrative:     Performed at:  [facility]) [place] [address],  [place], [facility]   Phone [phone]       All other labs were within normal range or not returned asof this dictation. EKG: All EKG's are interpreted by the Emergency Department Physician who either signs or Co-signs this chart in the absence of a cardiologist.    The Ekg interpreted by me shows  normal sinus rhythm with a rate of 64  Axis is   Normal  QTc is  normal  Intervals and Durations are unremarkable.       ST Segments: no acute change and nonspecific changes  No significant change from prior EKG dated - 6/28/20  No STEMI           RADIOLOGY:   Non-plain film images such as CT, Ultrasound and MRI are read by the radiologist. Parisa Gums images are visualized and preliminarily interpreted by the  ED Provider with the belowfindings:        Interpretation per the Radiologist below, if available at the time of this note:    XR WRIST LEFT (2 VIEWS)   Final Result   Previously noted foreign bodies are no longer seen. No acute osseous   abnormality in the left wrist.         XR FACIAL BONES (MIN 3 VIEWS )   Final Result   Foreign body seen on prior CT are not identified by radiograph. CTA HEAD NECK W CONTRAST   Final Result   No hemodynamically significant stenosis involving the head and neck arteries. CT ABDOMEN PELVIS WO CONTRAST Additional Contrast? None   Final Result   1. No parenchymal injury appreciated on this unenhanced exam.  No acute   fracture as described. 2. Other findings as described. CT HEAD WO CONTRAST   Final Result   No acute hemorrhage or midline shift. Right frontal scalp laceration containing foreign bodies. Other findings as described. XR WRIST LEFT (MIN 3 VIEWS)   Final Result   No acute fracture or dislocation. Questionable foreign bodies. XR SHOULDER LEFT (MIN 2 VIEWS)   Final Result   No gross fracture. XR CHEST PORTABLE   Final Result   No displaced rib fracture. No pneumothorax. Enlarged cardiomediastinal silhouette. CTA chest would better evaluate if   indicated. PROCEDURES   Unless otherwise noted below, none     Lac Repair    Date/Time: 4/13/2021 11:48 PM  Performed by: Pebbles North MD  Authorized by: Pebbles North MD     Consent:     Consent obtained:  Verbal    Consent given by:  Patient    Risks discussed:  Infection, pain, retained foreign body, poor wound healing, poor cosmetic result and need for additional repair    Alternatives discussed:  No treatment  Anesthesia (see MAR for exact dosages): Anesthesia method:  Local infiltration    Local anesthetic:  Lidocaine 2% WITH epi  Laceration details:     Location:  Face    Face location:  Forehead    Length (cm):  3.5    Depth (mm):  4  Repair type:     Repair type:   Intermediate  Pre-procedure details:     Preparation:  Patient was prepped and draped in usual sterile fashion and imaging obtained to evaluate for foreign bodies  Exploration:     Hemostasis achieved with:  Direct pressure    Wound exploration: wound explored through full range of motion and entire depth of wound probed and visualized      Wound extent: foreign bodies/material (3 removed from forehead laceration)      Wound extent: no fascia violation noted, no underlying fracture noted and no vascular damage noted      Foreign bodies/material:  3 pieces of gravel removed    Contaminated: yes    Treatment:     Area cleansed with:  Saline, Hibiclens and soap and water    Amount of cleaning:  Standard    Irrigation solution:  Sterile saline    Irrigation method:  Syringe    Visualized foreign bodies/material removed: yes    Skin repair:     Repair method:  Sutures    Suture size:  5-0    Suture material:  Nylon    Suture technique:  Simple interrupted    Number of sutures:  5  Approximation:     Approximation:  Close  Post-procedure details:     Dressing:  Open (no dressing)    Patient tolerance of procedure: Tolerated well, no immediate complications        CRITICAL CARE TIME   Time: 40 minutes   Includes repeat examinations, speaking with consultants, lab interpretation, charting, treating for hypotension requiring IV fluids that improved following this    Excludes separate billable procedures. Patient at risk for serious decompensation if not treated for this life-threatening condition. CONSULTS: Spoke with Dr. Kathy Morales at [phone] for admission.     IP CONSULT TO HOSPITALIST    EMERGENCY DEPARTMENT COURSE and DIFFERENTIAL DIAGNOSIS/MDM:   Vitals:    Vitals:    04/13/21 1804 04/13/21 1807 04/13/21 2042   BP: (!) 144/77 (!) 146/70 111/60   Pulse: 72 69 64   Resp: 18 13    Temp: 97.8 °F (36.6 °C)     TempSrc: Oral     SpO2: 96% 96% 97%   Weight: 150 lb (68 kg)     Height: 5' 2\" (1.575 m)         Patient was given the following medications:  Medications   cefTRIAXone (ROCEPHIN) 1000 mg IVPB in 50 mL D5W minibag (has no administration in time range)   0.9 % sodium chloride bolus (0 mLs Intravenous Stopped 4/13/21 2333)   morphine sulfate (PF) injection 4 mg (4 mg Intravenous Given 4/13/21 1933)   ondansetron (ZOFRAN) injection 4 mg (4 mg Intravenous Given 4/13/21 1933)   iopamidol (ISOVUE-370) 76 % injection 85 mL (85 mLs Intravenous Given 4/13/21 1905)   0.9 % sodium chloride bolus (0 mLs Intravenous Stopped 4/13/21 2333)   ketorolac (TORADOL) injection 15 mg (15 mg Intravenous Given 4/13/21 2250)     Patient was evaluated due to concern for feeling lightheaded all day but recently being treated for a urinary tract infection with additional Levaquin on top of her chronic antibiotic for frequent UTIs. She had possible tripping versus syncopal event and had obvious trauma to the head and therefore CT of the head was obtained. Since she did have a headache prior to syncope, I also ordered a CTA of the head and neck to ensure no sign of severe atherosclerosis or high risk for stroke although at this time she has no focal neuro deficits and story not suggestive of TIA/stroke. CT of the head was also obtained to rule out intracranial hemorrhage. Otherwise, due to worsening lightheadedness with concern for patient failing outpatient antibiotics, she will need further evaluation in the hospital with antibiotics. I am concerned about her lower abdominal pain and therefore CT of the abdomen was obtained to assess for any concern for possible abscess or urinary tract obstruction. CT of the abdomen did not show any sign of urinary tract obstruction or pyelonephritis. She was started on Rocephin due to concern for failed outpatient treatment with UTI. Laceration was repaired in the ED by me. She was hypotensive briefly in the ED but improved with IV fluids. She will need further evaluation in the hospital due to concern for syncope along with concern for UTI.   She was stable for the floor with telemetry. The patient tolerated their visit well. The patient and / or the family were informed of the results of any tests, a time was given to answer questions. FINAL IMPRESSION      1. Urinary tract infection associated with cystostomy catheter, initial encounter ([facility] [address]    2. Failure of outpatient treatment    3. Syncope and collapse    4. Facial laceration, initial encounter    5. Foreign body (FB) in soft tissue    6. Generalized weakness          DISPOSITION/PLAN   DISPOSITION  -decision to admit      PATIENT REFERRED TO:  No follow-up provider specified.     DISCHARGEMEDICATIONS:  New Prescriptions    No medications on file       DISCONTINUED MEDICATIONS:  Discontinued Medications    No medications on file              (Please note that portions of this note were completed with a voicerecognition program.  Efforts were made to edit the dictations but occasionally words are mis-transcribed.)    Stacie Pinto MD (electronically signed)            Stacie Pinto MD  04/13/21 [id number]

## 2021-04-14 ENCOUNTER — APPOINTMENT (OUTPATIENT)
Dept: GENERAL RADIOLOGY | Age: 66
DRG: 315 | End: 2021-04-14
Payer: MEDICARE

## 2021-04-14 PROBLEM — T83.510A URINARY TRACT INFECTION ASSOCIATED WITH CYSTOSTOMY CATHETER (HCC): Status: ACTIVE | Noted: 2018-07-12

## 2021-04-14 PROBLEM — G90.A POTS (POSTURAL ORTHOSTATIC TACHYCARDIA SYNDROME): Status: ACTIVE | Noted: 2019-12-04

## 2021-04-14 LAB
A/G RATIO: 1.2 (ref 1.1–2.2)
ALBUMIN SERPL-MCNC: 3 G/DL (ref 3.4–5)
ALP BLD-CCNC: 109 U/L (ref 40–129)
ALT SERPL-CCNC: 8 U/L (ref 10–40)
ANION GAP SERPL CALCULATED.3IONS-SCNC: 10 MMOL/L (ref 3–16)
AST SERPL-CCNC: 19 U/L (ref 15–37)
BASOPHILS ABSOLUTE: 0 K/UL (ref 0–0.2)
BASOPHILS RELATIVE PERCENT: 0.9 %
BILIRUB SERPL-MCNC: <0.2 MG/DL (ref 0–1)
BUN BLDV-MCNC: 12 MG/DL (ref 7–20)
CALCIUM SERPL-MCNC: 8.2 MG/DL (ref 8.3–10.6)
CHLORIDE BLD-SCNC: 109 MMOL/L (ref 99–110)
CO2: 20 MMOL/L (ref 21–32)
CREAT SERPL-MCNC: 0.6 MG/DL (ref 0.6–1.2)
EKG ATRIAL RATE: 64 BPM
EKG DIAGNOSIS: NORMAL
EKG P AXIS: 57 DEGREES
EKG P-R INTERVAL: 156 MS
EKG Q-T INTERVAL: 438 MS
EKG QRS DURATION: 90 MS
EKG QTC CALCULATION (BAZETT): 451 MS
EKG R AXIS: 73 DEGREES
EKG T AXIS: 47 DEGREES
EKG VENTRICULAR RATE: 64 BPM
EOSINOPHILS ABSOLUTE: 0.2 K/UL (ref 0–0.6)
EOSINOPHILS RELATIVE PERCENT: 3.7 %
GFR AFRICAN AMERICAN: >60
GFR NON-AFRICAN AMERICAN: >60
GLOBULIN: 2.5 G/DL
GLUCOSE BLD-MCNC: 68 MG/DL (ref 70–99)
HCT VFR BLD CALC: 34.9 % (ref 36–48)
HEMOGLOBIN: 11.3 G/DL (ref 12–16)
INFLUENZA A: NOT DETECTED
INFLUENZA B: NOT DETECTED
LYMPHOCYTES ABSOLUTE: 2.2 K/UL (ref 1–5.1)
LYMPHOCYTES RELATIVE PERCENT: 45.1 %
MCH RBC QN AUTO: 31.4 PG (ref 26–34)
MCHC RBC AUTO-ENTMCNC: 32.4 G/DL (ref 31–36)
MCV RBC AUTO: 96.9 FL (ref 80–100)
MONOCYTES ABSOLUTE: 0.4 K/UL (ref 0–1.3)
MONOCYTES RELATIVE PERCENT: 9.1 %
NEUTROPHILS ABSOLUTE: 2 K/UL (ref 1.7–7.7)
NEUTROPHILS RELATIVE PERCENT: 41.2 %
PDW BLD-RTO: 17.6 % (ref 12.4–15.4)
PLATELET # BLD: 146 K/UL (ref 135–450)
PMV BLD AUTO: 7.9 FL (ref 5–10.5)
POTASSIUM REFLEX MAGNESIUM: 4.4 MMOL/L (ref 3.5–5.1)
RBC # BLD: 3.6 M/UL (ref 4–5.2)
SARS-COV-2 RNA, RT PCR: NOT DETECTED
SODIUM BLD-SCNC: 139 MMOL/L (ref 136–145)
TOTAL PROTEIN: 5.5 G/DL (ref 6.4–8.2)
TROPONIN: <0.01 NG/ML
TSH REFLEX: 4.2 UIU/ML (ref 0.27–4.2)
WBC # BLD: 4.9 K/UL (ref 4–11)

## 2021-04-14 PROCEDURE — 85025 COMPLETE CBC W/AUTO DIFF WBC: CPT

## 2021-04-14 PROCEDURE — 84484 ASSAY OF TROPONIN QUANT: CPT

## 2021-04-14 PROCEDURE — 97166 OT EVAL MOD COMPLEX 45 MIN: CPT

## 2021-04-14 PROCEDURE — 84443 ASSAY THYROID STIM HORMONE: CPT

## 2021-04-14 PROCEDURE — 6360000002 HC RX W HCPCS: Performed by: INTERNAL MEDICINE

## 2021-04-14 PROCEDURE — 80053 COMPREHEN METABOLIC PANEL: CPT

## 2021-04-14 PROCEDURE — 6370000000 HC RX 637 (ALT 250 FOR IP): Performed by: INTERNAL MEDICINE

## 2021-04-14 PROCEDURE — 97535 SELF CARE MNGMENT TRAINING: CPT

## 2021-04-14 PROCEDURE — 36415 COLL VENOUS BLD VENIPUNCTURE: CPT

## 2021-04-14 PROCEDURE — 6370000000 HC RX 637 (ALT 250 FOR IP): Performed by: PHYSICIAN ASSISTANT

## 2021-04-14 PROCEDURE — 2580000003 HC RX 258: Performed by: INTERNAL MEDICINE

## 2021-04-14 PROCEDURE — 2580000003 HC RX 258: Performed by: EMERGENCY MEDICINE

## 2021-04-14 PROCEDURE — 99232 SBSQ HOSP IP/OBS MODERATE 35: CPT | Performed by: PHYSICIAN ASSISTANT

## 2021-04-14 PROCEDURE — 6360000002 HC RX W HCPCS: Performed by: EMERGENCY MEDICINE

## 2021-04-14 PROCEDURE — 97530 THERAPEUTIC ACTIVITIES: CPT

## 2021-04-14 PROCEDURE — 2060000000 HC ICU INTERMEDIATE R&B

## 2021-04-14 PROCEDURE — 71046 X-RAY EXAM CHEST 2 VIEWS: CPT

## 2021-04-14 PROCEDURE — 87636 SARSCOV2 & INF A&B AMP PRB: CPT

## 2021-04-14 PROCEDURE — 99223 1ST HOSP IP/OBS HIGH 75: CPT | Performed by: INTERNAL MEDICINE

## 2021-04-14 PROCEDURE — 2580000003 HC RX 258: Performed by: PHYSICIAN ASSISTANT

## 2021-04-14 PROCEDURE — 97161 PT EVAL LOW COMPLEX 20 MIN: CPT

## 2021-04-14 PROCEDURE — 93010 ELECTROCARDIOGRAM REPORT: CPT | Performed by: INTERNAL MEDICINE

## 2021-04-14 RX ORDER — LEVOFLOXACIN 500 MG/1
500 TABLET, FILM COATED ORAL DAILY
Status: DISCONTINUED | OUTPATIENT
Start: 2021-04-14 | End: 2021-04-15 | Stop reason: HOSPADM

## 2021-04-14 RX ORDER — LEVOTHYROXINE SODIUM 88 UG/1
88 TABLET ORAL DAILY
Status: DISCONTINUED | OUTPATIENT
Start: 2021-04-14 | End: 2021-04-15 | Stop reason: HOSPADM

## 2021-04-14 RX ORDER — 0.9 % SODIUM CHLORIDE 0.9 %
1000 INTRAVENOUS SOLUTION INTRAVENOUS ONCE
Status: COMPLETED | OUTPATIENT
Start: 2021-04-14 | End: 2021-04-14

## 2021-04-14 RX ORDER — NITROGLYCERIN 0.4 MG/1
0.4 TABLET SUBLINGUAL EVERY 5 MIN PRN
Status: DISCONTINUED | OUTPATIENT
Start: 2021-04-14 | End: 2021-04-15 | Stop reason: HOSPADM

## 2021-04-14 RX ORDER — MAGNESIUM SULFATE 1 G/100ML
1000 INJECTION INTRAVENOUS PRN
Status: DISCONTINUED | OUTPATIENT
Start: 2021-04-14 | End: 2021-04-15 | Stop reason: HOSPADM

## 2021-04-14 RX ORDER — OXYCODONE HYDROCHLORIDE 5 MG/1
5 TABLET ORAL EVERY 6 HOURS PRN
Status: DISCONTINUED | OUTPATIENT
Start: 2021-04-14 | End: 2021-04-14

## 2021-04-14 RX ORDER — LANOLIN ALCOHOL/MO/W.PET/CERES
6 CREAM (GRAM) TOPICAL NIGHTLY PRN
Status: DISCONTINUED | OUTPATIENT
Start: 2021-04-14 | End: 2021-04-15 | Stop reason: HOSPADM

## 2021-04-14 RX ORDER — POLYETHYLENE GLYCOL 3350 17 G/17G
17 POWDER, FOR SOLUTION ORAL DAILY PRN
Status: DISCONTINUED | OUTPATIENT
Start: 2021-04-14 | End: 2021-04-15 | Stop reason: HOSPADM

## 2021-04-14 RX ORDER — SERTRALINE HYDROCHLORIDE 100 MG/1
200 TABLET, FILM COATED ORAL NIGHTLY
Status: DISCONTINUED | OUTPATIENT
Start: 2021-04-14 | End: 2021-04-15 | Stop reason: HOSPADM

## 2021-04-14 RX ORDER — OXYCODONE HYDROCHLORIDE 5 MG/1
10 TABLET ORAL EVERY 6 HOURS PRN
Status: DISCONTINUED | OUTPATIENT
Start: 2021-04-14 | End: 2021-04-14

## 2021-04-14 RX ORDER — PANTOPRAZOLE SODIUM 40 MG/1
40 TABLET, DELAYED RELEASE ORAL DAILY
Status: DISCONTINUED | OUTPATIENT
Start: 2021-04-14 | End: 2021-04-15 | Stop reason: HOSPADM

## 2021-04-14 RX ORDER — TROSPIUM CHLORIDE 20 MG/1
20 TABLET, FILM COATED ORAL
Status: DISCONTINUED | OUTPATIENT
Start: 2021-04-14 | End: 2021-04-15 | Stop reason: HOSPADM

## 2021-04-14 RX ORDER — ALBUTEROL SULFATE 90 UG/1
2 AEROSOL, METERED RESPIRATORY (INHALATION) EVERY 6 HOURS PRN
Status: DISCONTINUED | OUTPATIENT
Start: 2021-04-14 | End: 2021-04-15 | Stop reason: HOSPADM

## 2021-04-14 RX ORDER — LAMOTRIGINE 25 MG/1
50 TABLET ORAL 2 TIMES DAILY
Status: DISCONTINUED | OUTPATIENT
Start: 2021-04-14 | End: 2021-04-15 | Stop reason: HOSPADM

## 2021-04-14 RX ORDER — CLONAZEPAM 1 MG/1
1 TABLET ORAL 2 TIMES DAILY
Status: DISCONTINUED | OUTPATIENT
Start: 2021-04-14 | End: 2021-04-14

## 2021-04-14 RX ORDER — ONDANSETRON 2 MG/ML
4 INJECTION INTRAMUSCULAR; INTRAVENOUS EVERY 6 HOURS PRN
Status: DISCONTINUED | OUTPATIENT
Start: 2021-04-14 | End: 2021-04-15 | Stop reason: HOSPADM

## 2021-04-14 RX ORDER — OXYCODONE HYDROCHLORIDE 5 MG/1
5 TABLET ORAL EVERY 6 HOURS PRN
Status: DISCONTINUED | OUTPATIENT
Start: 2021-04-14 | End: 2021-04-15 | Stop reason: HOSPADM

## 2021-04-14 RX ORDER — SODIUM CHLORIDE 9 MG/ML
25 INJECTION, SOLUTION INTRAVENOUS PRN
Status: DISCONTINUED | OUTPATIENT
Start: 2021-04-14 | End: 2021-04-15 | Stop reason: HOSPADM

## 2021-04-14 RX ORDER — DONEPEZIL HYDROCHLORIDE 5 MG/1
10 TABLET, FILM COATED ORAL NIGHTLY
Status: DISCONTINUED | OUTPATIENT
Start: 2021-04-14 | End: 2021-04-15 | Stop reason: HOSPADM

## 2021-04-14 RX ORDER — POTASSIUM CHLORIDE 7.45 MG/ML
10 INJECTION INTRAVENOUS PRN
Status: DISCONTINUED | OUTPATIENT
Start: 2021-04-14 | End: 2021-04-15 | Stop reason: HOSPADM

## 2021-04-14 RX ORDER — POTASSIUM CHLORIDE 20 MEQ/1
40 TABLET, EXTENDED RELEASE ORAL PRN
Status: DISCONTINUED | OUTPATIENT
Start: 2021-04-14 | End: 2021-04-15 | Stop reason: HOSPADM

## 2021-04-14 RX ORDER — SODIUM CHLORIDE 9 MG/ML
INJECTION, SOLUTION INTRAVENOUS CONTINUOUS
Status: DISCONTINUED | OUTPATIENT
Start: 2021-04-14 | End: 2021-04-15

## 2021-04-14 RX ORDER — SODIUM CHLORIDE 0.9 % (FLUSH) 0.9 %
5-40 SYRINGE (ML) INJECTION PRN
Status: DISCONTINUED | OUTPATIENT
Start: 2021-04-14 | End: 2021-04-15 | Stop reason: HOSPADM

## 2021-04-14 RX ORDER — SODIUM CHLORIDE 0.9 % (FLUSH) 0.9 %
5-40 SYRINGE (ML) INJECTION EVERY 12 HOURS SCHEDULED
Status: DISCONTINUED | OUTPATIENT
Start: 2021-04-14 | End: 2021-04-15 | Stop reason: HOSPADM

## 2021-04-14 RX ORDER — FLUDROCORTISONE ACETATE 0.1 MG/1
0.1 TABLET ORAL 2 TIMES DAILY
Status: DISCONTINUED | OUTPATIENT
Start: 2021-04-14 | End: 2021-04-15 | Stop reason: HOSPADM

## 2021-04-14 RX ORDER — CETIRIZINE HYDROCHLORIDE 10 MG/1
10 TABLET ORAL DAILY
Status: DISCONTINUED | OUTPATIENT
Start: 2021-04-14 | End: 2021-04-15 | Stop reason: HOSPADM

## 2021-04-14 RX ORDER — CLONAZEPAM 1 MG/1
2 TABLET ORAL 2 TIMES DAILY
Status: DISCONTINUED | OUTPATIENT
Start: 2021-04-14 | End: 2021-04-15 | Stop reason: HOSPADM

## 2021-04-14 RX ORDER — GABAPENTIN 300 MG/1
300 CAPSULE ORAL 2 TIMES DAILY
Status: DISCONTINUED | OUTPATIENT
Start: 2021-04-14 | End: 2021-04-15 | Stop reason: HOSPADM

## 2021-04-14 RX ORDER — MIDODRINE HYDROCHLORIDE 5 MG/1
5 TABLET ORAL
Status: DISCONTINUED | OUTPATIENT
Start: 2021-04-14 | End: 2021-04-15 | Stop reason: HOSPADM

## 2021-04-14 RX ADMIN — MIDODRINE HYDROCHLORIDE 5 MG: 5 TABLET ORAL at 17:25

## 2021-04-14 RX ADMIN — GABAPENTIN 300 MG: 300 CAPSULE ORAL at 08:57

## 2021-04-14 RX ADMIN — FLUDROCORTISONE ACETATE 0.1 MG: 0.1 TABLET ORAL at 02:45

## 2021-04-14 RX ADMIN — TROSPIUM CHLORIDE 20 MG: 20 TABLET, FILM COATED ORAL at 18:18

## 2021-04-14 RX ADMIN — Medication 10 ML: at 22:16

## 2021-04-14 RX ADMIN — PANTOPRAZOLE SODIUM 40 MG: 40 TABLET, DELAYED RELEASE ORAL at 08:57

## 2021-04-14 RX ADMIN — LAMOTRIGINE 50 MG: 25 TABLET ORAL at 02:45

## 2021-04-14 RX ADMIN — SERTRALINE 200 MG: 100 TABLET, FILM COATED ORAL at 22:15

## 2021-04-14 RX ADMIN — FLUDROCORTISONE ACETATE 0.1 MG: 0.1 TABLET ORAL at 22:16

## 2021-04-14 RX ADMIN — CLONAZEPAM 1 MG: 1 TABLET ORAL at 02:45

## 2021-04-14 RX ADMIN — SODIUM CHLORIDE 1000 ML: 9 INJECTION, SOLUTION INTRAVENOUS at 12:41

## 2021-04-14 RX ADMIN — LEVOTHYROXINE SODIUM 88 MCG: 88 TABLET ORAL at 06:48

## 2021-04-14 RX ADMIN — GABAPENTIN 300 MG: 300 CAPSULE ORAL at 02:45

## 2021-04-14 RX ADMIN — TROSPIUM CHLORIDE 20 MG: 20 TABLET, FILM COATED ORAL at 06:48

## 2021-04-14 RX ADMIN — LEVOFLOXACIN 500 MG: 500 TABLET, FILM COATED ORAL at 11:31

## 2021-04-14 RX ADMIN — CETIRIZINE HYDROCHLORIDE 10 MG: 10 TABLET ORAL at 08:57

## 2021-04-14 RX ADMIN — OXYCODONE 10 MG: 5 TABLET ORAL at 10:23

## 2021-04-14 RX ADMIN — FLUDROCORTISONE ACETATE 0.1 MG: 0.1 TABLET ORAL at 08:57

## 2021-04-14 RX ADMIN — MIDODRINE HYDROCHLORIDE 5 MG: 5 TABLET ORAL at 08:56

## 2021-04-14 RX ADMIN — DONEPEZIL HYDROCHLORIDE 10 MG: 5 TABLET, FILM COATED ORAL at 22:16

## 2021-04-14 RX ADMIN — Medication 10 ML: at 08:56

## 2021-04-14 RX ADMIN — OXYCODONE 5 MG: 5 TABLET ORAL at 22:16

## 2021-04-14 RX ADMIN — ENOXAPARIN SODIUM 40 MG: 40 INJECTION SUBCUTANEOUS at 11:31

## 2021-04-14 RX ADMIN — CLONAZEPAM 1 MG: 1 TABLET ORAL at 08:57

## 2021-04-14 RX ADMIN — CEFTRIAXONE SODIUM 1000 MG: 1 INJECTION, POWDER, FOR SOLUTION INTRAMUSCULAR; INTRAVENOUS at 00:04

## 2021-04-14 RX ADMIN — LAMOTRIGINE 50 MG: 25 TABLET ORAL at 08:57

## 2021-04-14 RX ADMIN — SODIUM CHLORIDE: 9 INJECTION, SOLUTION INTRAVENOUS at 02:45

## 2021-04-14 RX ADMIN — CLONAZEPAM 2 MG: 1 TABLET ORAL at 22:16

## 2021-04-14 RX ADMIN — MIDODRINE HYDROCHLORIDE 5 MG: 5 TABLET ORAL at 11:31

## 2021-04-14 RX ADMIN — LAMOTRIGINE 50 MG: 25 TABLET ORAL at 22:16

## 2021-04-14 ASSESSMENT — PAIN SCALES - GENERAL
PAINLEVEL_OUTOF10: 8
PAINLEVEL_OUTOF10: 8
PAINLEVEL_OUTOF10: 6

## 2021-04-14 ASSESSMENT — PAIN DESCRIPTION - PAIN TYPE: TYPE: ACUTE PAIN

## 2021-04-14 ASSESSMENT — PAIN DESCRIPTION - ONSET
ONSET: ON-GOING
ONSET: ON-GOING

## 2021-04-14 ASSESSMENT — PAIN DESCRIPTION - FREQUENCY: FREQUENCY: INTERMITTENT

## 2021-04-14 ASSESSMENT — PAIN DESCRIPTION - LOCATION: LOCATION: HEAD

## 2021-04-14 NOTE — H&P
Hospital Medicine History & Physical      PCP: Leslie Juan MD    Date of Service: Pt seen/examined on 4/13/21 and admitted on 4/13/21 to Inpatient    Chief Complaint   Patient presents with    Dizziness     pt c/o dizziness since she woke up around 0700 this am, pt states she felt light headed all day like she was going to pass out, pt states she took a klonopin to calm down and went to take the trash can out and she lost her balance and fell, pt has lac to head over right eye and abrasion to right shoulder and skin tear to LFA    Fall    Urinary Tract Infection     pt has been taking 2 different antibiotics for uti        History Of Present Illness: The patient is a 76 y.o. female with PMH below, presents with syncopal episode/lightheaededness, head injury/pain, L wrist injury, urinary frequency/urgency, generalized weakness. Pt reports that her PCP has been titrating her for UTI for the last 6 days or so. She has problems w/ UTI's. She is normally on Bactrim but her PCP added Levaquin and she has not improved since. She reports that she has felt really weak over this time, however, she felt a little better and went to take the trash out. She reports that she awoke in the drive way. She is unsure if she tripped or passed out but thinks the latter. She injured her R head/face and L wrist.  She notes that she has been lightheaded since she woke this am.  She also took Klonopin 2mg (her normall BID dose) tablet prior to taking out the trash bc she thought her lightheadedness was related to anxiety. She is on several sedating Rx.       Past Medical History:        Diagnosis Date    Angina at rest [facility])     Anxiety     Arthritis     hands and hip    Blood transfusion     after back surgery    Bradycardia     Bronchiectasis ([facility] [address] 11/5/2013    CAD (coronary artery disease)     Chronic back pain     Hyperlipidemia     Hypertension     Localized morphea     NATHAN treated with BiPAP     Pacemaker     Stress incontinence     Thyroid disease     hypothyroid    Trochanteric bursitis of left hip 4/12/2019       Past Surgical History:        Procedure Laterality Date    ABDOMEN SURGERY  9/9/11     REPAIR INCISIONAL HERNIA REDUCIBLE WITH POSSIBLE MESH    BACK SURGERY      x3    BACK SURGERY      stimulator    BLADDER SUSPENSION      CARDIAC PACEMAKER PLACEMENT      CATARACT REMOVAL WITH IMPLANT Left 03/08/2018    PHACO EMULSIFICATION OF CATARACT WITH INTRAOCULAR LENS IMPLANT LEFT EYE    CERVICAL DISCECTOMY  6/2014    and fusion    CHOLECYSTECTOMY      COLONOSCOPY  2002    1/26/12    ENDOSCOPY, COLON, DIAGNOSTIC      EPIDURAL STEROID INJECTION Left 8/15/2019    LEFT KNEE GENICULAR NERVE BLOCK SITE CONFIRMED BY FLUOROSCOPY performed by Delilah Castelan MD at [facility] [facility] Right 04/05/2018    cataract removal    FOOT SURGERY Right 12/6/12    arthroplasty    FOOT SURGERY Left 04/30/2015    FOOT SURGERY Left 4/30/15    GASTRIC BYPASS SURGERY  [id number]    HYSTERECTOMY      KNEE ARTHROSCOPY Left 10/3/2014    part.  medial & lateral meniscectomy, synovectomy plica exc    KNEE ARTHROSCOPY Right 10/13/15    partial medial meniscectomy, chondroplasty, partial lateral meniscectomy    NECK SURGERY      OTHER SURGICAL HISTORY  april 2013    removal spinal cord stimulater    OTHER SURGICAL HISTORY      bladder stimulator    OTHER SURGICAL HISTORY  08/30/2017    left patellofemoral arthroplasty    OTHER SURGICAL HISTORY  05/09/2018    convert left patellofemoral arthroplasty to left total knee replacement   Tianna Pizano  2011    Dr Mahoney/checked last week/told has 12 more yrs for this one/set at 60    SKIN BIOPSY      abdomen    SPINE SURGERY      stimulator    THROAT SURGERY      polyps removed    TONSILLECTOMY      UPPER GASTROINTESTINAL ENDOSCOPY      UPPER GASTROINTESTINAL ENDOSCOPY  05/03/2017       Medications Prior to Admission:    Prior to Admission by mouth nightly. Historical Provider, MD   cyanocobalamin 1000 MCG tablet Take 2,500 mcg by mouth daily    Historical Provider, MD   albuterol sulfate HFA (VENTOLIN HFA) 108 (90 Base) MCG/ACT inhaler Inhale 2 puffs into the lungs every 6 hours as needed for Wheezing or Shortness of Breath 11/12/19   Marquis Andrea MD   levothyroxine (SYNTHROID) 88 MCG tablet Take 88 mcg by mouth Daily    Historical Provider, MD   donepezil (ARICEPT) 10 MG tablet Take 1 tablet by mouth nightly 2/11/19   Hardik Yang MD   MONOJECT 60CC SYRINGE CATH TIP 60 ML MISC  3/7/18   Historical Provider, MD   KLOR-CON M20 20 MEQ extended release tablet Take 20 mEq by mouth 2 times daily  3/19/18   Historical Provider, MD   sertraline (ZOLOFT) 100 MG tablet Take 200 mg by mouth nightly     Historical Provider, MD   pantoprazole (PROTONIX) 40 MG tablet TAKE 1 TABLET BY MOUTH DAILY 2/16/17   Historical Provider, MD   fluticasone (FLONASE) 50 MCG/ACT nasal spray 1 spray by Nasal route nightly. Patient taking differently: 1 spray by Nasal route as needed  4/9/15   Yrn Lloyd MD   nitroGLYCERIN (NITROSTAT) 0.4 MG SL tablet Place 1 tablet under the tongue every 5 minutes as needed for Chest pain. 9/4/12   Sharona Hernandez MD   Multiple Vitamin (THERA) TABS Take 1 tablet by mouth daily     Historical Provider, MD       Allergies:  Meloxicam, Acetaminophen, Benzoin compound, Lyrica [pregabalin], Quetiapine fumarate, Seroquel [quetiapine fumarate], Tizanidine, Adhesive tape, Amberderm, Linaclotide, Other, Quetiapine, and Wound dressings    Social History:    TOBACCO:   reports that she has never smoked. She has never used smokeless tobacco.  ETOH:   reports no history of alcohol use. Family History:  Reviewed in detail and negative for DM, Early CAD, Cancer (except as below).  Positive as follows:        Problem Relation Age of Onset    Heart Disease Father     Cancer Sister         multiple organs    Other Sister     Asthma Neg Hx     Diabetes Neg Hx     Emphysema Neg Hx     Heart Failure Neg Hx     Hypertension Neg Hx        REVIEW OF SYSTEMS:   Pertinent positives/negatives as follows: syncopal episode/lightheaededness, head injury/pain, L wrist injury, urinary frequency/urgency, generalized weakness, and as discussed in HPI, otherwise a complete ROS performed and all other systems are negative  PHYSICAL EXAM PERFORMED:    /60   Pulse 64   Temp 97.8 °F (36.6 °C) (Oral)   Resp 13   Ht 5' 2\" (1.575 m)   Wt 150 lb (68 kg)   SpO2 97%   BMI 27.44 kg/m²     GEN:  A&Ox3, NAD. HEENT:  NC/AT,EOMI, MMM, no erythema/exudates or visible masses. CVS:  Normal S1,S2. RRR. Without M/G/R.   LUNG:   CTA-B. no wheezes, rales or rhonchi  ABD:  Soft, ND/NT, BS+ x4. Without G/R.  EXT: 2+ pulses, no c/c/e. Brisk cap refill. PSY:  Thought process intact, affect appropriate. ARLETTE:  CN III-XII intact, moves all 4 spontaneously, sensory grossly intact. SKIN: No rash or lesions on visible skin. Above photo copied from Dr. Raffaele Carlisle note. Chart review shows recent radiographs:  Ct Abdomen Pelvis Wo Contrast Additional Contrast? None    Result Date: 4/13/2021  EXAMINATION: CT OF THE ABDOMEN AND PELVIS WITHOUT CONTRAST 4/13/2021 6:59 pm TECHNIQUE: CT of the abdomen and pelvis was performed without the administration of intravenous contrast. Multiplanar reformatted images are provided for review. Dose modulation, iterative reconstruction, and/or weight based adjustment of the mA/kV was utilized to reduce the radiation dose to as low as reasonably achievable. COMPARISON: CT abdomen pelvis 06/28/2020.  HISTORY: ORDERING SYSTEM PROVIDED HISTORY: lower abdominal pain, fall TECHNOLOGIST PROVIDED HISTORY: Reason for exam:->lower abdominal pain, fall Additional Contrast?->None Decision Support Exception->Emergency Medical Condition (MA) Reason for Exam: lower abdominal pain, fall Acuity: Acute Type of Exam: Initial FINDINGS: Lower Chest: Lung bases demonstrate no focal consolidation or pleural effusion. Organs: Evaluation of the parenchymal organs is limited due to lack of intravenous contrast. Liver: Unremarkable on this unenhanced exam. Spleen: Unremarkable on this unenhanced exam. Pancreas: No inflammatory changes appreciated on this unenhanced exam. Adrenal glands: Unremarkable on this unenhanced exam. Kidneys: Unremarkable on this unenhanced exam. No renal, ureteral, or bladder calculi. Gallbladder: Not visualized and may be surgically absent. GI/Bowel: Evaluation of the bowel and mesentery is limited due to lack of enteric contrast. Visualized esophagus/stomach: Postsurgical changes along the gastric body. Small bowel: No dilated small bowel. Large bowel: Scattered colonic diverticula without adjacent stranding. Large stool throughout the colon. Appendix: Not visualized. Postsurgical changes along the cecum. Pelvis: Bladder is incompletely distended. Air noted in the bladder lumen. Bladder dome appears contiguous with right lower quadrant ostomy. Uterus and ovaries not visualized. Peritoneum/Retroperitoneum: Adenopathy: Suboptimal evaluation for adenopathy due to lack of intravenous and enteric contrast. Abdominal aorta: No abdominal aortic aneurysm. Free fluid/air: No free fluid. No free air. Bones/Soft Tissues: Scattered degenerative changes noted in the visualized spine with grade 1 spondylolistheses. L2 through S1 fusion. No acute fracture or dislocation in the visualized pelvis. No acute fracture in the visualized spine on this nondedicated exam.     1. No parenchymal injury appreciated on this unenhanced exam.  No acute fracture as described. 2. Other findings as described. Xr Facial Bones (min 3 Views )    Result Date: 4/13/2021  EXAMINATION: 2 XRAY VIEWS OF THE FACIAL BONES 4/13/2021 9:17 pm COMPARISON: CT head same day.  HISTORY: ORDERING SYSTEM PROVIDED HISTORY: check to see if FBs to right forehead are gone TECHNOLOGIST PROVIDED HISTORY: Reason for exam:->check to see if FBs to right forehead are gone Reason for Exam: check to see if FBs to right forehead are gone, pt. fell and has a laceration on the right side of her forehead which had peices of rocks in it, checking to see if it is all out. Acuity: Acute Type of Exam: Initial FINDINGS: Foreign body seen on prior CT are not identified by radiograph. No displaced nasal bone fracture. Foreign body seen on prior CT are not identified by radiograph. Xr Lumbar Spine (2-3 Views)    Result Date: 3/23/2021  Radiology exam is complete. No Radiologist dictation. Please follow up with ordering provider. Xr Wrist Left (2 Views)    Result Date: 4/13/2021  EXAMINATION: 3 XRAY VIEWS OF THE LEFT WRIST 4/13/2021 9:51 pm COMPARISON: Earlier same day. HISTORY: ORDERING SYSTEM PROVIDED HISTORY: left wrist FB, see if gone TECHNOLOGIST PROVIDED HISTORY: Reason for exam:->left wrist FB, see if gone Reason for Exam: see if fb is gone FINDINGS: Frontal, lateral and oblique views. Redemonstration of medial wrist/distal forearm laceration. Previously noted foreign bodies are no longer present. No acute fracture or malalignment. Suggestion of osteopenia. No destructive osseous lesion. .     Previously noted foreign bodies are no longer seen. No acute osseous abnormality in the left wrist.     Xr Wrist Left (min 3 Views)    Result Date: 4/13/2021  EXAMINATION: 3 XRAY VIEWS OF THE LEFT WRIST 4/13/2021 6:49 pm COMPARISON: None.  HISTORY: ORDERING SYSTEM PROVIDED HISTORY: fall, left wrist pain TECHNOLOGIST PROVIDED HISTORY: Reason for exam:->fall, left wrist pain Reason for Exam: Dizziness (pt c/o dizziness since she woke up around 0700 this am, pt states she felt light headed all day like she was going to pass out, pt states she took a klonopin to calm down and went to take the trash can out and she lost her balance and fell, pt has lac to head over right eye and abrasion to right shoulder and skin tear mastoid air cells. Left mastoid air cells appear clear. SOFT TISSUES/SKULL: No depressed calvarial fracture. Laceration to the right frontal scalp with foreign bodies measuring up to 0.5 cm. Right frontal scalp edema. No acute hemorrhage or midline shift. Right frontal scalp laceration containing foreign bodies. Other findings as described. Xr Shoulder Left (min 2 Views)    Result Date: 4/13/2021  EXAMINATION: THREE XRAY VIEWS OF THE LEFT SHOULDER 4/13/2021 6:49 pm COMPARISON: 04/10/2021 HISTORY: ORDERING SYSTEM PROVIDED HISTORY: fall, left shoulder pain TECHNOLOGIST PROVIDED HISTORY: Reason for exam:->fall, left shoulder pain Reason for Exam: Dizziness (pt c/o dizziness since she woke up around 0700 this am, pt states she felt light headed all day like she was going to pass out, pt states she took a klonopin to calm down and went to take the trash can out and she lost her balance and fell, pt has lac to head over right eye and abrasion to right shoulder and skin tear to LFA) Acuity: Acute Type of Exam: Initial FINDINGS: Dual lead pacemaker in the left chest wall. Cervical fusion hardware. At the left shoulder, no gross fracture. No dislocation. Acromioclavicular joint is not widened. No gross fracture. Xr Shoulder Left (min 2 Views)    Result Date: 4/11/2021  EXAMINATION: THREE XRAY VIEWS OF THE LEFT SHOULDER 4/10/2021 1:56 pm COMPARISON: None. HISTORY: ORDERING SYSTEM PROVIDED HISTORY: Left shoulder pain, unspecified chronicity TECHNOLOGIST PROVIDED HISTORY: Reason for Exam: Lt. shoulder pain Acuity: Chronic Type of Exam: Ongoing FINDINGS: There is no evidence of fracture, malalignment, or other acute osseous abnormality. No acute osseous abnormality. Xr Chest Portable    Result Date: 4/13/2021  EXAMINATION: ONE XRAY VIEW OF THE CHEST 4/13/2021 6:49 pm COMPARISON: Chest x-ray 06/28/2020.  HISTORY: ORDERING SYSTEM PROVIDED HISTORY: fall TECHNOLOGIST PROVIDED HISTORY: Reason for exam:->fall Reason for Exam: Dizziness (pt c/o dizziness since she woke up around 0700 this am, pt states she felt light headed all day like she was going to pass out, pt states she took a klonopin to calm down and went to take the trash can out and she lost her balance and fell, pt has lac to head over right eye and abrasion to right shoulder and skin tear to LFA) Acuity: Acute Type of Exam: Initial FINDINGS: Cardiomediastinal silhouette is enlarged. Lungs demonstrate no focal consolidation or pleural effusion. No pneumothorax appreciated on this projection. No displaced rib fracture identified on this projection. Cervical spine fusion. Transvenous pacer. Vagal nerve stimulator. No displaced rib fracture. No pneumothorax. Enlarged cardiomediastinal silhouette. CTA chest would better evaluate if indicated. Cta Head Neck W Contrast    Result Date: 4/13/2021  EXAMINATION: CTA OF THE HEAD AND NECK WITH CONTRAST 4/13/2021 7:06 pm: TECHNIQUE: CTA of the head and neck was performed with the administration of intravenous contrast. Multiplanar reformatted images are provided for review. MIP images are provided for review. Stenosis of the internal carotid arteries measured using NASCET criteria. Dose modulation, iterative reconstruction, and/or weight based adjustment of the mA/kV was utilized to reduce the radiation dose to as low as reasonably achievable. COMPARISON: None.  HISTORY: ORDERING SYSTEM PROVIDED HISTORY: fall, headache, head trauma, possible syncope TECHNOLOGIST PROVIDED HISTORY: Reason for exam:->fall, headache, head trauma, possible syncope Decision Support Exception->Emergency Medical Condition (MA) Reason for Exam: Dizziness (pt c/o dizziness since she woke up around 0700 this am, pt states she felt light headed all day like she was going to pass out, pt states she took a klonopin to calm down and went to take the trash can out and she lost her balance and fell, pt has lac to head over right eye and abrasion to right shoulder and skin tear to LFA Acuity: Acute Type of Exam: Initial FINDINGS: CTA NECK: AORTIC ARCH/ARCH VESSELS: No dissection or arterial injury. No significant stenosis of the brachiocephalic or subclavian arteries. CAROTID ARTERIES: No dissection, arterial injury, or hemodynamically significant stenosis by NASCET criteria. VERTEBRAL ARTERIES: No dissection, arterial injury, or significant stenosis. SOFT TISSUES: The lung apices are clear. No cervical or superior mediastinal lymphadenopathy. The larynx and pharynx are unremarkable. No acute abnormality of the salivary and thyroid glands. BONES: No acute osseous abnormality. Postsurgical changes related to anterior cervical discectomy and hardware fusion of C4 through C7. CTA HEAD: ANTERIOR CIRCULATION: No significant stenosis of the intracranial internal carotid, anterior cerebral, or middle cerebral arteries. No aneurysm. POSTERIOR CIRCULATION: No significant stenosis of the vertebral, basilar, or posterior cerebral arteries. No aneurysm. A left-sided persistent primitive trigeminal artery is present, a normal variant. OTHER: No dural venous sinus thrombosis on this non-dedicated study. BRAIN: No mass effect or midline shift. No extra-axial fluid collection. The gray-white differentiation is maintained. No hemodynamically significant stenosis involving the head and neck arteries.      4/14/2021  9:17 AM - Sergio, Tjh Incoming Muse Results    Component Value Ref Range & Units Status Collected Lab   Ventricular Rate 64  BPM Final 04/13/2021  6:26 PM 14   Atrial Rate 64  BPM Final 04/13/2021  6:26 PM 14   P-R Interval 156  ms Final 04/13/2021  6:26 PM 14   QRS Duration 90  ms Final 04/13/2021  6:26 PM 14   Q-T Interval 438  ms Final 04/13/2021  6:26 PM 14   QTc Calculation (Bazett) 451  ms Final 04/13/2021  6:26 PM 14   P Axis 57  degrees Final 04/13/2021  6:26 PM 14   R Axis 73  degrees Final 04/13/2021  6:26 PM 14   T Axis 47  degrees Final 04/13/2021  6:26 PM 14   Diagnosis   Final 04/13/2021  6:26 PM 14   Normal sinus rhythmNonspecific ST & T wave changesNo previous ECGs availableConfirmed by Winsome Larsen MD, DERRICK ([id number]) on 4/14/2021 9:17:19 AM         CBC:  Recent Labs     04/13/21  1835   WBC 5.7   HGB 12.6   HCT 38.0           RENAL  Recent Labs     04/13/21  1835      K 4.2      CO2 25   BUN 17   CREATININE 0.7   GLUCOSE 98     LFT'S:  Recent Labs     04/13/21 1835   AST 21   ALT 9*   BILITOT <0.2   ALKPHOS 130*     CARDIAC ENZYMES:   Recent Labs     04/13/21 1835   TROPONINI <0.01     Lab Results   Component Value Date    PROBNP 139 (H) 06/28/2020    PROBNP 296 (H) 04/28/2020    PROBNP 66 04/06/2020       LACTIC ACID:  Recent Labs     04/13/21 1835   LACTA 1.4       U/A:  Recent Labs     04/13/21  2216   LEUKOCYTESUR SMALL*   BACTERIA 1+*   WBCUA 6-9*   COLORU Yellow   RBCUA 0-2   MUCUS 1+*   CLARITYU Clear   SPECGRAV 1.020   BLOODU Negative   GLUCOSEU Negative       PHYSICIAN CERTIFICATION  I certify that Viral Zurita is expected to be hospitalized for 2 midnights based on the following assessment and plan:    ASSESSMENT/PLAN:  1. Syncope and collapse, chk orthos, echo, Cards c/s, tele, trops. Hx of POTS/vasovagal.  Pt is also on several sedating Rx, reduced some of those. 2. Head and L wrist/forearm injury 2/2 fall. Dressings/repair in ED. 3. UTI, acute on chronic/recurrent. On Bactrim at baseline and recently added Levaquin at home. DVT Prophylaxis: Lx  Diet: NPO for cards c/s  Code Status: Full Code   PT/OT Eval Status: Will order if needed and as patient condition allows  Dispo - Admit to inpatient PCU    Tanika Camarena MD    Thank you Rick Yoo MD for the opportunity to be involved in this patient's care. If you have any questions or concerns please feel free to contact me via the [employer] at [phone]. This chart was generated using the [address] dictation system.  I created this record but it may contain dictation errors given the limitations of this technology.

## 2021-04-14 NOTE — CONSULTS
Hyperlipidemia, Hypertension, Localized morphea, NATHAN treated with BiPAP, Pacemaker, Stress incontinence, Thyroid disease, and Trochanteric bursitis of left hip. Surgical History:   has a past surgical history that includes Gastric bypass surgery ([id number]); Cholecystectomy; Tonsillectomy; Hysterectomy; back surgery; skin biopsy; Spine surgery; Colonoscopy (2002); Pacemaker insertion (2011); back surgery; Foot surgery (Right, 12/6/12); Throat surgery; bladder suspension; other surgical history (april 2013); Abdomen surgery (9/9/11); other surgical history; Cervical discectomy (6/2014); Knee arthroscopy (Left, 10/3/2014); Endoscopy, colon, diagnostic; Upper gastrointestinal endoscopy; Neck surgery; Foot surgery (Left, 04/30/2015); Foot surgery (Left, 4/30/15); Knee arthroscopy (Right, 10/13/15); Upper gastrointestinal endoscopy (05/03/2017); other surgical history (08/30/2017); Cataract removal with implant (Left, 03/08/2018); eye surgery (Right, 04/05/2018); other surgical history (05/09/2018); epidural steroid injection (Left, 8/15/2019); and Cardiac pacemaker placement. Social History:   She is disabled,  2 grown children lives alone in [place],.. She  reports that she has never smoked. She has never used smokeless tobacco. She reports that she does not drink alcohol or use drugs. Family History: Dad heart dz  family history includes Cancer in her sister; Heart Disease in her father; Other in her sister. Home Medications:  Were reviewed and are listed in nursing record. and/or listed below  Prior to Admission medications    Medication Sig Start Date End Date Taking?  Authorizing Provider   lamoTRIgine (LAMICTAL) 100 MG tablet TAKE 1/2 TABLET BY MOUTH TWO TIMES A DAY 3/16/21  Yes Historical Provider, MD   oxyCODONE HCl (OXY-IR) 10 MG immediate release tablet TAKE 1 TABLET BY MOUTH EVERY SIX HOURS AS NEEDED 2/25/21  Yes Historical Provider, MD   sulfamethoxazole-trimethoprim (BACTRIM DS;SEPTRA DS) 800-160 MG per tablet TAKE 1 TABLET BY MOUTH TWO TIMES A DAY 2/15/21  Yes Historical Provider, MD   midodrine (PROAMATINE) 5 MG tablet TAKE 1 TABLET BY MOUTH THREE TIMES A DAY. generic for proamatine. 1/11/21  Yes Historical Provider, MD   clonazePAM (KLONOPIN) 2 MG tablet TAKE 1 TABLET BY MOUTH TWO TIMES A DAY 3/12/21  Yes Historical Provider, MD   cetirizine (ZYRTEC) 10 MG tablet Take 10 mg by mouth daily   Yes Historical Provider, MD   gabapentin (NEURONTIN) 300 MG capsule TAKE 1 CAPSULE BY MOUTH EVERY TWELVE HOURS FOR 30 DAYS 3/18/21  Yes Historical Provider, MD   fludrocortisone (FLORINEF) 0.1 MG tablet Take 0.1 mg by mouth 2 times daily   Yes Historical Provider, MD   Biotin 1000 MCG CHEW Take by mouth   Yes Historical Provider, MD   Fesoterodine Fumarate ER (TOVIAZ) 8 MG TB24 Take by mouth   Yes Historical Provider, MD   eszopiclone (LUNESTA) 2 MG TABS Take 2 mg by mouth nightly. Yes Historical Provider, MD   cyanocobalamin 1000 MCG tablet Take 2,500 mcg by mouth daily   Yes Historical Provider, MD   levothyroxine (SYNTHROID) 88 MCG tablet Take 88 mcg by mouth Daily   Yes Historical Provider, MD   donepezil (ARICEPT) 10 MG tablet Take 1 tablet by mouth nightly 2/11/19  Yes MD GEOFF Goldberg-CON M20 20 MEQ extended release tablet Take 20 mEq by mouth 2 times daily  3/19/18  Yes Historical Provider, MD   sertraline (ZOLOFT) 100 MG tablet Take 200 mg by mouth nightly    Yes Historical Provider, MD   pantoprazole (PROTONIX) 40 MG tablet TAKE 1 TABLET BY MOUTH DAILY 2/16/17  Yes Historical Provider, MD   fluticasone (FLONASE) 50 MCG/ACT nasal spray 1 spray by Nasal route nightly.   Patient taking differently: 1 spray by Nasal route as needed  4/9/15  Yes Liz Cid MD   Multiple Vitamin (THERA) TABS Take 1 tablet by mouth daily    Yes Historical Provider, MD   sodium chloride 0.9 % irrigation  3/1/21   Historical Provider, MD   ondansetron (ZOFRAN-ODT) 8 MG TBDP disintegrating tablet Take 8 mg by mouth every 8 hours as needed    Historical Provider, MD   nystatin (MYCOSTATIN) 754078 UNIT/GM powder APPLY TWO TIMES A DAY    TO AFFECTED AREA 2/3/21   Historical Provider, MD   Sennosides (SENNA LAX PO) Take by mouth    Historical Provider, MD   triamcinolone (KENALOG) 0.1 % cream Apply topically 2 times daily Apply topically 2 times daily.     Historical Provider, MD Lennox Anaya CATH TIP 61 ML [id number] [facility]  3/7/18   Historical Provider, MD        Allergies:  Meloxicam, Acetaminophen, Benzoin compound, Lyrica [pregabalin], Quetiapine fumarate, Seroquel [quetiapine fumarate], Tizanidine, Adhesive tape, Amberderm, Linaclotide, Other, Quetiapine, and Wound dressings     Review of Systems:   12 point ROS negative in all areas as listed below except as in [place]  Constitutional, EENT, Cardiovascular, pulmonary, GI, , Musculoskeletal, skin, neurological, hematological, endocrine, Psychiatric    Physical Examination:    Vitals:    04/14/21 0827   BP: (!) 83/56   Pulse: 60   Resp: 16   Temp: 96.6 °F (35.9 °C)   SpO2: 97%    Weight: 165 lb 4.8 oz (75 kg)         General Appearance:  Alert, cooperative, no distress, appears frail and stated age   Head:  Normocephalic, without obvious abnormality, atraumatic   Eyes:  PERRL, conjunctiva/corneas clear       Nose: Nares normal, no drainage or sinus tenderness   Throat: Lips, mucosa, and tongue normal   Neck: Supple, symmetrical, trachea midline, no adenopathy, thyroid: not enlarged, symmetric, no tenderness/mass/nodules, no carotid bruit or JVD       Lungs:   Clear to auscultation bilaterally, respirations unlabored   Chest Wall:  No tenderness or deformity   Heart:  Regular rate and rhythm, S1, S2 normal, no murmur, rub or gallop   Abdomen:   Soft, non-tender, bowel sounds active all four quadrants,  no masses, no organomegaly           Extremities: Extremities normal, atraumatic, no cyanosis or edema   Pulses: 2+ and symmetric   Skin: Skin color, texture, turgor normal, no rashes or lesions   Pysch: Normal mood and affect   Neurologic: Normal gross motor and sensory exam.         Labs  CBC:   Lab Results   Component Value Date    WBC 5.7 2021    RBC 4.04 2021    HGB 12.6 2021    HCT 38.0 2021    MCV 94.1 2021    RDW 17.1 2021     2021     CMP:    Lab Results   Component Value Date     2021    K 4.2 2021    K 3.7 2020     2021    CO2 25 2021    BUN 17 2021    CREATININE 0.7 2021    GFRAA >60 2021    GFRAA >60 2013    AGRATIO 1.4 2021    LABGLOM >60 2021    GLUCOSE 98 2021    PROT 6.8 2021    PROT 6.6 2012    CALCIUM 9.7 2021    BILITOT <0.2 2021    ALKPHOS 130 2021    AST 21 2021    ALT 9 2021     PT/INR:  No results found for: PTINR  Lab Results   Component Value Date    CKTOTAL 31 2015    TROPONINI <0.01 2021       EK21 I have reviewed EKG with the following interpretation: Impression: See HPI Normal sinus rhythm Nonspecific ST & T wave changes No previous ECGs available Confirmed by DERRICK CABRERA MD ([id number]) on 2021 9:17:19 AM     CXR 21  Cardiomediastinal silhouette is enlarged. Lungs demonstrate no focal consolidation or pleural effusion. No pneumothorax appreciated on this projection. No displaced rib fracture identified on this projection. Cervical spine fusion. Transvenous pacer. Vagal nerve stimulator       3/2/2021 ECG - sinus tachycardia (I have personally reviewed the ECG)   2016 Echo - EF 60, mild TR  2016 Lexiscan nuclear stress - EF 80, normal perfusion   Dual chamber Medtronic PPM  ECHO 16  Summary:  Overall left ventricular ejection fraction is estimated to be 55-60%. There is a pacemaker lead in the right ventricle. The left ventricular wall motion is normal.  Mild tricuspid regurgitation is present.   There is trace mitral regurgitation. ECHO 5/24/19  Summary   Normal left ventricular systolic function with a visually estimated ejection   fraction of 55%. No regional wall motion abnormalities are seen. Normal left ventricular diastolic filling pressure. No significant valvular   heart disease. The right ventricle is dilated with impaired systolic function. Systolic pulmonary artery pressure (SPAP) is normal and estimated at 21 mmHg   (right atrial pressure 8 mmHg). Lexiscan 12/31/19  Interpetation Summary:  The LV EF is 88%  Normal global and regional wall motion in all territories. o Negative ECG for ischemia with pharmocologic stress. o No significant areas of ischemia identified with pharmocologic stress.     o Nuclear stress image findings indicate low risk for future ischemic event . Assessment:  Selvin Fuller is a 76 y.o. patient who follows with Dr. Radha Tavera for her cardiology care [address] [place] [address] 3/02/21  She has PMH chronic UTI, urostomy tube for straight cath,  vasovagal syncope, POTS and a dual chamber PPM. Interrogation of her device in March showed normal function and no significant arrhythmias. She atrially paces 50% of the time. She is taking midodrine and florinef. She was having episodes of severe near syncope and at her last cards appt they increased her midodrine. This has improved her symptoms by 80%. Note ECHO 5/31/16 EF 55-60% mild TR, trace MR. Most recent ECHO 5/24/19 EF 55% No regional wall motion abnormalities. Most recent Lexiscan stress test 12/31/19  EF is 88% no ischemia    Now presents with fall and injury. She has been treated for UTI x 1 week with levaquin. Reported going to take garbage out and put can down and then fell to ground. She is not sure how fall happened and denies premonitory symptoms. She remembers fall and then seeing blood but no other details. Admit EKG revealed NSR 64bpm; Nonspecific ST & T wave change (no change 6/20).  CXR nothing acute; troponins neg x 2; flu and rapid covid negative. Note low BP 80-90's SBP and given NS IVF boluses and continuous gtt. She reports not eating much (bowl cereal breakfast, frozen dinner, and PB or cheese cracker). Tries to drink diet coke and water. Note orthostatics negative. Diagnosis of near syncope and hypotension in older female with UTI and hx vasovagal syncope, POTS, and . She remembers falling to ground and ? LOC. Possibly related to combination of poor po intake, polypharmacy (pain med, klonopin, gabapentin, lunesta), and UTI. Recs:  1. Try to d/c or decrease dosing of meds that produce hypotension. 2. Encourage po intake. 3. IVF for hydration. 4. Continue florinef and midodrine  5. Follow serial BP  6. Treat UTI as appropriate. 7. No need for cardiac testing at this time. Possibly home tomorrow if BP improved and clinically improved. Patient Active Problem List   Diagnosis    Chest pain    Failed back surgical syndrome    GERD (gastroesophageal reflux disease)    Esophageal dysmotility    Bronchiectasis ([facility])    Anxiety & depression    Hypothyroidism    S/P gastric bypass    Osteoporosis    Obesity (BMI 30-39. 9)    NATHAN (obstructive sleep apnea)    Essential tremor    HTN (hypertension), benign    Dyslipidemia    Primary insomnia    Status post total left knee replacement    MCI (mild cognitive impairment) with memory loss    Chronic bilateral low back pain without sciatica    Osteoarthritis of right knee    Complicated UTI (urinary tract infection)    S/P placement of hypoglossal cranial nerve stimulator      History of bladder augmentation    Urinary retention    CAD (coronary artery disease)    Pacemaker    Polyuria    Diarrhea    Hx of Enterococcus UTI    Sepsis ([facility])    Effusion of prosthetic L knee joint    Dysthymia    TIA involving left internal carotid artery    Trochanteric bursitis of left hip    Eyelash ptosis, left    MG (myasthenia gravis) ([facility])    Bilateral carotid artery stenosis    Syncope and collapse    Acute pain of right knee    Orthostatic hypotension    Polypharmacy    Shortness of breath    PAT (paroxysmal atrial tachycardia) (H[facility])    Lightheadedness    Acute biliary pancreatitis    Elevated LFTs    S/P placement of cardiac pacemaker       Thank you for allowing to us to participate in the care or Selvin Layman. Further evaluation will be based upon the patient's clinical course and testing results.

## 2021-04-14 NOTE — PROGRESS NOTES
Assessment completed, Neuro WNL, orthostatics completed. Pt educated on NPO and cardiology consult. IVF infusing via PIV.

## 2021-04-14 NOTE — PROGRESS NOTES
Progress Note    Admit Date:  4/13/2021    65F with POTS, h/o vasovagal syncope, chronic pain, poor mobility at baseline who presents after a fall with head laceration while walking to take her trash cans to the curb on 4/13 evening. Subjective:  Ms. Florina Smith complains of some mild pain right forehead where she has a laceration. Objective:   Patient Vitals for the past 4 hrs:   BP Temp Temp src Pulse Resp SpO2   04/14/21 0827 (!) 83/56 96.6 °F (35.9 °C) Oral 60 16 97 %            Intake/Output Summary (Last 24 hours) at 4/14/2021 0932  Last data filed at 4/14/2021 0842  Gross per 24 hour   Intake 1992 ml   Output 1050 ml   Net 942 ml       Physical Exam:    Gen: No distress. Alert. Laceration with 5 sutures in place right side of forehead   Mild hematoma surrounding   Eyes: PERRL. No sclera icterus. No conjunctival injection. ENT: No discharge. Pharynx clear. Neck: No JVD. Trachea midline. Resp: No accessory muscle use. No crackles. No wheezes. No rhonchi. CV: Regular rate. Regular rhythm. No murmur. No rub. No edema. Capillary Refill: Brisk,< 3 seconds   Peripheral Pulses: +2 palpable, equal bilaterally   GI: Non-tender. Non-distended. Normal bowel sounds. Urostomy stoma noted- clean stoma   Skin: Warm and dry. No nodule on exposed extremities. No rash on exposed extremities. Forehead lac  Skin tear posterior left forearm   M/S: No cyanosis. No joint deformity. No clubbing. Neuro: Awake. Grossly nonfocal    Psych: Oriented x 3. No anxiety or agitation.      Scheduled Meds:   clonazePAM  1 mg Oral BID    donepezil  10 mg Oral Nightly    trospium  20 mg Oral BID AC    fludrocortisone  0.1 mg Oral BID    gabapentin  300 mg Oral BID    lamoTRIgine  50 mg Oral BID    levothyroxine  88 mcg Oral Daily    midodrine  5 mg Oral TID WC    miconazole   Topical BID    pantoprazole  40 mg Oral Daily    sertraline  200 mg Oral Nightly    sodium chloride flush  5-40 mL Intravenous 2 times per day    enoxaparin  40 mg Subcutaneous Daily    cetirizine  10 mg Oral Daily       Continuous Infusions:   sodium chloride 75 mL/hr at 04/14/21 0245    sodium chloride         PRN Meds:  albuterol sulfate HFA, melatonin, nitroGLYCERIN, oxyCODONE, sodium chloride flush, sodium chloride, potassium chloride **OR** potassium alternative oral replacement **OR** potassium chloride, magnesium sulfate, polyethylene glycol, ondansetron      Data:  CBC:   Recent Labs     04/13/21  1835   WBC 5.7   HGB 12.6   HCT 38.0   MCV 94.1        BMP:   Recent Labs     04/13/21  1835      K 4.2      CO2 25   BUN 17   CREATININE 0.7     LIVER PROFILE:   Recent Labs     04/13/21  1835   AST 21   ALT 9*   BILITOT <0.2   ALKPHOS 130*     PT/INR: No results for input(s): PROTIME, INR in the last 72 hours. CULTURES  Blood: pending  Urine: pending      RADIOLOGY  XR WRIST LEFT (2 VIEWS)   Final Result   Previously noted foreign bodies are no longer seen. No acute osseous   abnormality in the left wrist.         XR FACIAL BONES (MIN 3 VIEWS )   Final Result   Foreign body seen on prior CT are not identified by radiograph. CTA HEAD NECK W CONTRAST   Final Result   No hemodynamically significant stenosis involving the head and neck arteries. CT ABDOMEN PELVIS WO CONTRAST Additional Contrast? None   Final Result   1. No parenchymal injury appreciated on this unenhanced exam.  No acute   fracture as described. 2. Other findings as described. CT HEAD WO CONTRAST   Final Result   No acute hemorrhage or midline shift. Right frontal scalp laceration containing foreign bodies. Other findings as described. XR WRIST LEFT (MIN 3 VIEWS)   Final Result   No acute fracture or dislocation. Questionable foreign bodies. XR SHOULDER LEFT (MIN 2 VIEWS)   Final Result   No gross fracture. XR CHEST PORTABLE   Final Result   No displaced rib fracture. No pneumothorax. Enlarged cardiomediastinal silhouette. CTA chest would better evaluate if   indicated. Assessment/Plan:    #Mechanical fall   - admitted with concern for syncope- based on my discussion with patient it seems she had a mechanical fall   - She has limited mobility at baseline, last evening she took her trash cans to her curb (a task usually completed by her neighbors) when she fell striking her head on the concrete. She remembers the fall, but is unsure why she fell. She thinks her cane was caught on something on the driveway  - PT OT and CM consult - she lives at home alone- she refuses to go to SNF if recommended but would be agreeable to home therapy     #Closed head injury   #Head laceration   - CT head neg for traumatic intracranial abnormality  - forehead lac with sutures placed 4/13. Sutures should be removed on 4/18/21. She reports that she is up to date on her tetanus shot     #Left wrist skin tear  - wound care per RN  - no fx on XR    #POTS  #Vasovagal syncope   - She is not orthostatic but she is hypotensive. Seems asymptomatic with this   - Cont florinef 0.1 mg BID and midodrine 5 mg TID as she takes at home (midodrine dose increased to 5 mg recently per cardio OV notes)  - recommend she try compression stockings- she is hesitant as does not like the way they feel. She will try them during hospitalization  - Cont IVF for now, if BP remains borderline we can increase her midodrine     #Bradycardia with PPM in place  - f/w Dr. Sean Velasquez (per OV 3/2021- PPM interrogation at that time was unremarkable)  - Cardio consulted     #Neurogenic bladder   #Urostomy with chronic UTI   -patient f/w Dr. Emigdio Sparks urology. She self straight catheterizes using her urostomy stoma. She noticed some green discharged and frequent urge to self cath - she called uro who placed her on Levaquin 500 mg daily for 7 days. She started this rx on 4/10/2021. She states her symptoms have since improved.   There are 6-8 WBC on UA. I do not think that this indicates a failure of antibiotic. Will continue Levaquin D#5/7 and hold her home daily macrobid for now    - Urine cx was sent from ER    #Abnormal CXR  - portable CXR showed an enlarged cardiomediastinal silhouette. Check 2view    #Hypothyroidism   - cont synthroid 88 mcg daily  - check TSH- pending     #Chronic pain syndrome with opiate dependence   - cont oxycodone 10 mg q6 hours PRN, gabapentin 300 mg BID  (confirmed on [facility]). She f/w pain management for these prescriptions     #Depression and anxiety   - cont Klonopin 2 mg BID- verified on [facility]  - Cont Lamictal 50 mg BID and Zoloft 200 mg QHS  - managed by PCP    #Insomnia  - she takes Lunesta 2 mg QHS- not ordered     #Polypharmacy  - Patient on multiple sedating and habit forming medications which I think are likely contributing to her presentation   - I urged her to discuss her multiple medications with her PCP as I think this would greatly effect her ability to function at home, increase her BP, and likely help her complaint of fatigue     DVT Prophylaxis: Lovenox   Diet: Diet NPO Effective Now Exceptions are: Ice Chips, Sips of Water with Meds  Code Status: Full Code    Dispo: PT OT eval. Cont IVF and monitor BP.   Likely dc tomorrow    Pallavi Reis PA-C  4/14/2021 9:37 AM

## 2021-04-14 NOTE — PROGRESS NOTES
She denies any new back pain although does have some chronic thoracic discomfort. She has some bilateral neck pain although denies any midline neck pain and states that is worse than normal.  No numbness or weakness on one side of the body. She did have a skin tear to the left forearm along with some lacerations to the right side of her face. She denies any visual changes. No trouble with bite. Her last tetanus shot was within the last 10 years. Due to severe lightheadedness and recent fall, she came by EMS for further evaluation.  - PT OT and CM consult - she lives at home alone- she refuses to go to SNF if recommended but would be agreeable to home therapy  - CT head neg for traumatic intracranial abnormality  - forehead lac with sutures placed . Sutures should be removed on 21. PMH: POTS, vasovagal syncope, chronic pain, neurogenic bladder, depression, anxiety, insomia    Home Health S4 Level Recommendation:  Level 3 Safety  AM-PAC Mobility Score    AM-PAC Inpatient Mobility Raw Score : 15       Preadmission Environment    Pt. Lives Alone with dog (Max, able to let him out into the back yard)   Neighbor checks on her every day  Home environment:  one story home  Steps to enter first floor: No steps  Bathroom: walk in shower, grab bars and shower chair, comfort height toilet with grab bars  Equipment owned: RW, rollator, shower chair/bench, SPC, pulse ox and reacher   Pt sleeps in flat bed    Preadmission Status:  Pt. Able to drive: Yes - only short distances  Pt Fully independent with ADLs: Yes  Pt.  Required assistance from family for: Cleaning    HHA assists with cleaning every other week   MOW 5 days a week  Pt. independent for transfers and gait and walked with [address] [address]    Uses Rollator in the home on days she feels bad   History of falls Yes - reason for admission, reports multiple other falls     Pain   Yes  Location: head  Ratin /10  Pain Medicine Status: Received pain med prior to tx    Cognition    A&O Person, Place and Time (reads day off board), Situation   Able to follow 2 step commands    Subjective  Patient lying supine in bed with no family present. Pt agreeable to this PT eval & tx. Upper Extremity ROM/Strength  Please see OT evaluation. Lower Extremity ROM / Strength   AROM WFL: Yes  ROM limitations: lacks ~8 deg knee extension bilaterally    Strength Assessment (measured on a 0-5 scale):  R LE   Quad   4+   Ant Tib  4+   Hamstring 4+   Iliopsoas 4-  L LE  Quad   4   Ant Tib  4+   Hamstring 4   Iliopsoas 4-    Lower Extremity Sensation    WFL    Lower Extremity Proprioception:   WFL    Coordination and Tone  WFL    Balance  Sitting:  Good - ; Supervision  Comments: 10 minutes total at EOB with frequent use of B UE support    Standing: Fair +; CGA-Min A  Comments: 2 minutes with unilateral UE support on SPC, strong posterior lean against bed    Bed Mobility   Supine to Sit:    SBA with HOB slightly elevated, use of rail and cues for sequencing  Sit to Supine:   SBA with use of rail  Rolling:   SBA with use of rail and cues for sequencing  Scooting in sitting: SBA  Scooting in supine:  Not Tested    Transfer Training     Sit to stand:   CGA  Stand to sit:   CGA  Bed to Chair:   Not Tested with use of N/A    Gait gait deferred due to dizziness and low BP with standing; pt ambulated 0 ft. Stair Training deferred, pt does not have stairs in home environment    Activity Tolerance   Pt completed therapy session with Dizziness noted with standing, resolved with return to sitting and then supine   Supine (at rest) SpO2: 100% on RA  HR: 61 bpm  BP: 88/51  Sitting EOB   HR:  73 bpm  BP: 92/63    After 5 minutes 100/71  Standing   HR: 81 bpm  BP: 90/56   Pt then returned to bed    Positioning Needs   Pt in bed, alarm set, positioned in proper neutral alignment and pressure relief provided.    Call light provided and all needs within reach    Exercises Initiated  all completed bilaterally unless indicated  Ankle Pumps x 10 reps    Other  See OT note for assist with lower body dressing and ADLs at [facility]    Patient/Family Education   Pt educated on role of inpatient PT, POC, importance of continued activity, DC recommendations, safety awareness, transfer techniques, pacing activity and calling for assist with mobility. Assessment  Pt seen for Physical Therapy evaluation in acute care setting. Pt demonstrated decreased Activity tolerance, Balance, Safety and Strength as well as decreased independence with Ambulation, Bed Mobility  and Transfers. Pt continues to be moderately orthostatic with sit<>stand transfers. Pt reported dizziness only with standing activities. Pt lives alone and family is unable to provide 24/7 assistance. At this time pt is requiring assist of 1 for all out of bed mobility and is at a high risk of falling again should she return home alone. Recommending continued skilled physical therapy to safely progress activity tolerance, balance, transfers, and ambulation prior to return home. Recommending SNF upon discharge as patient functioning well below baseline, demonstrates good rehab potential and unable to return home due to limited or no family support, home environment not conducive to patient recovery and limited safety awareness. Goals : To be met in 3 visits:  1). Independent with LE Ex x 10 reps  2). Stand x5 minutes with unilateral UE support and no more than SBA in order to complete ADLs    To be met in 6 visits:  1). Supine to/from sit: Supervision  2). Sit to/from stand: Supervision  3). Bed to chair: Supervision with LRAD  4). Gait: Ambulate 50 ft.  with  Supervision and use of LRAD (least restrictive assistive device)  5).   Tolerate B LE exercises 3 sets of 10-15 reps    Rehabilitation Potential: Good  Strengths for achieving goals include:   Family Support and Pt cooperative   Barriers to achieving goals include:    Weakness    Plan    To be

## 2021-04-14 NOTE — PROGRESS NOTES
Inpatient Occupational Therapy  Evaluation and Treatment    Unit: PCU  Date:  4/14/2021  Patient Name:    Nimco Yanes  Admitting diagnosis:  Syncope and collapse [R55]  Admit Date:  4/13/2021  Precautions/RestrictioNS   Fall risk, Bed/chair alarm, Lines -IV and Telemetry, BP runs low, pacemaker    Time 7476-6878  Treatment Number: 1   Timed code treatment minutes 32 minutes   Total Treatment minutes:   42   minutes    Patient Goals for Therapy:  \" go home  \"      Discharge Recommendations: SNF  DME needs for discharge: defer to facility       Therapy recommendations for staff:   Assist of 1 with use of Single point cane  for all transfers to/from [facility]  to/from chair    History of Present Illness:   (Per ED provider note on 4/13/21)  65 y.o. female presenting today due to concern for dealing with a urinary tract infection over the last 6 days and recently being placed on Levaquin but having worsening increased frequency since being started on Levaquin along with generalized weakness.  She states that today she woke up and has been feeling lightheaded since 7 AM to the point where she thought she could pass out.  She started to feel better throughout the day but never totally back to normal and finally thought she was strong enough to take her garbage out this afternoon.  She was taking her garbage down to the sidewalk just prior to arrival whenever she turned around and next thing she knew she was on the ground and had some head trauma. Robyn Pearce is unsure if she had a syncopal event or just tripped. Robyn Pearce denies any chest pain or shortness of breath.  She does have some suprapubic discomfort and states she has to self cath herself with a suprapubic catheter.  She denies any new back pain although does have some chronic thoracic discomfort.  She has some bilateral neck pain although denies any midline neck pain and states that is worse than normal.  No numbness or weakness on one side of the body.  She did have a skin tear to the left forearm along with some lacerations to the right side of her face.  She denies any visual changes.  No trouble with bite. Massimo Forbes last tetanus shot was within the last 10 years.  Due to severe lightheadedness and recent fall, she came by EMS for further evaluation.     Home Health S4 Level Recommendation:  NA  AM-PAC Score:      Preadmission Environment    Pt. Lives Alone with dog (Max, able to let him out into the back yard)             Neighbor checks on her every day  Home environment:            one story home  Steps to enter first floor: No steps  Bathroom: walk in shower, grab bars and shower chair, comfort height toilet with grab bars  Equipment owned: RW, rollator, shower chair/bench, SPC, pulse ox and reacher   Pt sleeps in flat bed     Preadmission Status:  Pt. Able to drive: Yes - only short distances  Pt Fully independent with ADLs: Yes  Pt. Required assistance from family for: Cleaning              HHA assists with cleaning every other week             MOW 5 days a week  Pt. independent for transfers and gait and walked with [employer]              Uses Rollator in the home on days she feels bad   History of falls          Yes - reason for admission, reports multiple other falls      Pain  Yes  Ratin/10  Location:h3  h4  qda e  Pain Medicine Status: Received pain med prior to tx      Cognition    A&O Person, Place and Time (reads day off board), Situation   Able to follow 2 step commands    Subjective  Patient lying supine in bed with no family present. Pt agreeable to this OT eval & tx.      Upper Extremity ROM:    WFL    Upper Extremity Strength:    WFL     Upper Extremity Sensation    Diminished    Upper Extremity Proprioception:  WFL    Coordination and Tone  Diminished- shaking in UEs     Balance  Functional Sitting Balance:  WFL  Functional Standing Balance:Diminished    Bed mobility:    Supine to sit:   SBA  Sit to supine:   SBA  Rolling:    Not Tested  Scooting in sitting:  SBA  Scooting to head of bed:   Not Tested    Bridging:   Not Tested    Transfers:    Sit to stand:  CGA  Stand to sit:  CGA  Bed to chair:   Not Tested  Standard toilet: Not Tested  Bed to [facility]:  Not Tested    Dressing:      UE:   Not Tested  LE:    SBA to doff and don socks     Bathing:    UE:  Not Tested  LE:  Not Tested    Eating:   Not Tested    Toileting:  Not Tested    Activity Tolerance   Pt completed therapy session with fatigue, decreased endurance, dizziness when standing   Supine (at rest) SpO2: 100% on RA  HR: 61 bpm  BP: 88/51  Siting 92/63 HR 73   BP sitting 100/71 HR 78  BP standing 90/56   Positioning Needs:   Pt in bed, alarm set, positioned in proper neutral alignment and pressure relief provided. Exercise / Activities Initiated:   N/A    Patient/Family Education:   Role of OT    Assessment of Deficits: Pt seen for Occupational therapy evaluation in acute care setting. Pt demonstrated decreased Activity tolerance, ADLs, Balance , Bed mobility and Transfers. Pt functioning below baseline and will likely benefit from skilled occupational therapy services to maximize safety and independence. Goal(s) : To be met in 3 Visits:  1). Bed to toilet/BSC: SBA with appropriate AD    To be met in 5 Visits:  1). Supine to/from Sit:  Independent  2). Upper Body Bathing:   Independent  3). Lower Body Bathing:   SBA  4). Upper Body Dressing:  Independent  5). Lower Body Dressing:  SBA  6). Pt to demonstrate UE exs x 15 reps with minimal cues    Rehabilitation Potential:  Good for goals listed above. Strengths for achieving goals include: Pt cooperative  Barriers to achieving goals include:  Complexity of condition     Plan: To be seen 3-5 x/wk while in acute care setting for therapeutic exercises, bed mobility, transfers, dressing, bathing, family/patient education, ADL/IADL retraining, energy conservation training.        Rita Hawkins OTR/L [id number]        If patient discharges from this facility prior to next visit, this note will serve as the Discharge Summary

## 2021-04-14 NOTE — PROGRESS NOTES
Patient admitted to room 326 from ED. Patient oriented to room, call light, bed rails, phone, lights and bathroom. Patient instructed about the schedule of the day including: vital sign frequency, lab draws, possible tests, frequency of MD and staff rounds, daily weights, I &O's and prescribed diet. The bed alarm in place, patient aware of placement and reason. Telemetry box in place, patient aware of placement and reason. Bed locked, in lowest position, side rails up 2/4, call light within reach. Recliner Assessment  Patient is not able to demonstrated the ability to move from a reclining position to an upright position within the recliner. however patient is alert, oriented and able to provide informed consent    4 Eyes Skin Assessment     The patient is being assess for   Admission    I agree that 2 RN's have performed a thorough Head to Toe Skin Assessment on the patient. ALL assessment sites listed below have been assessed. Areas assessed by both nurses:   [x]   Head, Face, and Ears   [x]   Shoulders, Back, and Chest, Abdomen  [x]   Arms, Elbows, and Hands   [x]   Coccyx, Sacrum, and Ischium  [x]   Legs, Feet, and Heels        Abrasion and laceration to L wrist and R forehead, scattered. Urostomy to JONATHAN. **SHARE this note so that the co-signing nurse is able to place an eSignature**    Co-signer eSignature: {Esignature:[id number]}    Does the Patient have Skin Breakdown?   No          Van Prevention initiated:  No   Wound Care Orders initiated:  No      [facility] nurse consulted for Pressure Injury (Stage 3,4, Unstageable, DTI, NWPT, Complex wounds)and New or Established Ostomies:  No      Primary Nurse eSignature: Electronically signed by Fuentes Millan RN on 4/14/21 at 2:36 AM EDT

## 2021-04-14 NOTE — ACP (ADVANCE CARE PLANNING)
Advance Care Planning   Healthcare Decision Maker:    Primary Decision Maker: Bud Antonio - Child - [phone]    Click here to complete Healthcare Decision Makers including selection of the Healthcare Decision Maker Relationship (ie \"Primary\"). Pt wants her daughter Sendy Hatch to be her primary decision maker if she is unable to make her medical decisions. She is aware that without POA papers in place, it would fall to her next of kin and she stated agreement.  Pt aware to notify staff if she wants assistance with POA papers while in the hospital

## 2021-04-14 NOTE — PROGRESS NOTES
RESPIRATORY THERAPY ASSESSMENT    Name:  Viraj Alvarez Record Number:  [id number]  Age: 76 y.o. Gender: female  : 1955  Today's Date:  2021  Room:  \[facility]\""/0326-01    Assessment     Is the patient being admitted for a COPD or Asthma exacerbation? No   (If yes the patient will be seen every 4 hours for the first 24 hours and then reassessed)    Patient Admission Diagnosis      Allergies  Allergies   Allergen Reactions    Meloxicam Itching and Other (See Comments)     Tolerates Ketorolac/Bromfenac ophthalmic drops.  Acetaminophen Other (See Comments)     Unable to take due to liver  Unable to take due to liver  Unable to take due to liver  Unable to take due to liver      Benzoin Compound      Blisters from adhesive compound under steri strips after knee VAS      Lyrica [Pregabalin]      Keeps patient awake    Quetiapine Fumarate      Other reaction(s): confused, dizzy  Other reaction(s): Unknown  seroquel      Seroquel [Quetiapine Fumarate] Other (See Comments)     Other reaction(s): confused, dizzy  Pt unable to recall reaction    Tizanidine      hallucinations      Adhesive Tape Rash     Mild dermatitis at site of paper tape, with history of previous skin rashes to tape. STERI-STRIPS  Can use paper tape can not  use transpore    STERI-STRIPS     Mild dermatitis at site    Can use paper tape can not  use transpore    Amberderm Rash     Positive patch test results to [facility]    Positive patch test results to [facility]  Positive patch test results to [facility]    Positive patch test results to [facility]  Positive patch test results to [facility]    Linaclotide Rash    Other Rash     Can use paper tape can not  use transpore    Quetiapine Other (See Comments)     Other reaction(s): Other (See Comments)  Pt unable to recall reaction  Other reaction(s):  Other (See Comments)    Pt unable to recall reaction  Pt unable to recall reaction  Made her dizzy and confused   Other reaction(s): confused, dizzy  Pt unable to recall reaction  Other reaction(s): confused, dizzy  Other reaction(s): Unknown  seroquel  Other reaction(s): Other (See Comments)  Pt unable to recall reaction  Other reaction(s): Other (See Comments)    Wound Dressings Rash     Positive patch test results to [facility]         Minimum Predicted Vital Capacity:               Actual Vital Capacity:                    Pulmonary History: ángel  Home Oxygen Therapy:  room air  Home Respiratory Therapy:None   Current Respiratory Therapy:  Albuterol 2 puffs Q6PRN. Respiratory Severity Index(RSI)   Patients with orders for inhalation medications, oxygen, or any therapeutic treatment modality will be placed on Respiratory Protocol. They will be assessed with the first treatment and at least every 72 hours thereafter. The following severity scale will be used to determine frequency of treatment intervention.     Smoking History: No Smoking History = 0    Social History  Social History     Tobacco Use    Smoking status: Never Smoker    Smokeless tobacco: Never Used   Substance Use Topics    Alcohol use: No     Alcohol/week: 0.0 standard drinks    Drug use: No       Recent Surgical History: None = 0  Past Surgical History  Past Surgical History:   Procedure Laterality Date    ABDOMEN SURGERY  9/9/11     REPAIR INCISIONAL HERNIA REDUCIBLE WITH POSSIBLE MESH    BACK SURGERY      x3    BACK SURGERY      stimulator    BLADDER SUSPENSION      CARDIAC PACEMAKER PLACEMENT      CATARACT REMOVAL WITH IMPLANT Left 03/08/2018    PHACO EMULSIFICATION OF CATARACT WITH INTRAOCULAR LENS IMPLANT LEFT EYE    CERVICAL DISCECTOMY  6/2014    and fusion    CHOLECYSTECTOMY      COLONOSCOPY  2002 1/26/12    ENDOSCOPY, COLON, DIAGNOSTIC      EPIDURAL STEROID INJECTION Left 8/15/2019    LEFT KNEE GENICULAR NERVE BLOCK SITE CONFIRMED BY FLUOROSCOPY performed by Quan Talley MD at [address] [place] [address] SURGERY Right 04/05/2018    cataract removal    FOOT SURGERY Right 12/6/12    arthroplasty    FOOT SURGERY Left 04/30/2015    FOOT SURGERY Left 4/30/15    GASTRIC BYPASS SURGERY  3548,2005    HYSTERECTOMY      KNEE ARTHROSCOPY Left 10/3/2014    part. medial & lateral meniscectomy, synovectomy plica exc    KNEE ARTHROSCOPY Right 10/13/15    partial medial meniscectomy, chondroplasty, partial lateral meniscectomy    NECK SURGERY      OTHER SURGICAL HISTORY  april 2013    removal spinal cord stimulater    OTHER SURGICAL HISTORY      bladder stimulator    OTHER SURGICAL HISTORY  08/30/2017    left patellofemoral arthroplasty    OTHER SURGICAL HISTORY  05/09/2018    convert left patellofemoral arthroplasty to left total knee replacement   Brianna OrtegaYamileth  2011    Dr Mahoney/checked last week/told has 12 more yrs for this one/set at 60    SKIN BIOPSY      abdomen    SPINE SURGERY      stimulator    THROAT SURGERY      polyps removed    TONSILLECTOMY      UPPER GASTROINTESTINAL ENDOSCOPY      UPPER GASTROINTESTINAL ENDOSCOPY  05/03/2017       Level of Consciousness: Alert, Oriented, and Cooperative = 0    Level of Activity: Walking with assistance = 1    Respiratory Pattern: Regular Pattern; RR 8-20 = 0    Breath Sounds: Diminshed bilaterally and/or crackles = 2    Sputum   ,  ,    Cough: Strong, spontaneous, non-productive = 0    Vital Signs   BP 95/64   Pulse 73   Temp 98 °F (36.7 °C) (Oral)   Resp 16   Ht 5' 2\" (1.575 m)   Wt 165 lb 4.8 oz (75 kg)   SpO2 98%   BMI 30.23 kg/m²   SPO2 (COPD values may differ): Greater than or equal to 92% on room air = 0    Peak Flow (asthma only): not applicable = 0    RSI: 0-4 = See once and convert to home regimen or discontinue        Plan       Goals: medication delivery    Patient/caregiver was educated on the proper method of use for Respiratory Care Devices:        Level of patient/caregiver understanding able to:   ? Verbalize understanding   ? Demonstrate understanding       ? Teach back        ? Needs reinforcement       ? No available caregiver               ? Other:     Response to education:       Is patient being placed on Home Treatment Regimen? No     Does the patient have everything they need prior to discharge? NA     Comments: Chart reviewed and patient assessed. Plan of Care: Albuterol 2puffs Q6PRN. Electronically signed by Alannah East RCP on 4/14/2021 at 4:14 AM    Respiratory Protocol Guidelines     1. Assessment and treatment by Respiratory Therapy will be initiated for medication and therapeutic interventions upon initiation of aerosolized medication. 2. Physician will be contacted for respiratory rate (RR) greater than 35 breaths per minute. Therapy will be held for heart rate (HR) greater than 140 beats per minute, pending direction from physician. 3. Bronchodilators will be administered via Metered Dose Inhaler (MDI) with spacer when the following criteria are met:  a. Alert and cooperative     b. HR < 140 bpm  c. RR < 30 bpm                d. Can demonstrate a 2-3 second inspiratory hold  4. Bronchodilators will be administered via Hand Held Nebulizer [facility]) to patients when ANY of the following criteria are met  a. Incognizant or uncooperative          b. Patients treated with HHN at Home        c. Unable to demonstrate proper use of MDI with spacer     d. RR > 30 bpm   5. Bronchodilators will be delivered via Metered Dose Inhaler (MDI), HHN, Aerogen to intubated patients on mechanical ventilation. 6. Inhalation medication orders will be delivered and/or substituted as outlined below. Aerosolized Medications Ordering and Administration Guidelines:    1. All Medications will be ordered by a physician, and their frequency and/or modality will be adjusted as defined by the patients Respiratory Severity Index (RSI) score.   2. If the patient does not have documented COPD, consider discontinuing anticholinergics when RSI is less than 9.  3. If the bronchospasm worsens (increased RSI), then the bronchodilator frequency can be increased to a maximum of every 4 hours. If greater than every 4 hours is required, the physician will be contacted. 4. If the bronchospasm improves, the frequency of the bronchodilator can be decreased, based on the patient's RSI, but not less than home treatment regimen frequency. 5. Bronchodilator(s) will be discontinued if patient has a RSI less than 9 and has received no scheduled or as needed treatment for 72  Hrs. Patients Ordered on a Mucolytic Agent:    1. Must always be administered with a bronchodilator. 2. Discontinue if patient experiences worsened bronchospasm, or secretions have lessened to the point that the patient is able to clear them with a cough. Anti-inflammatory and Combination Medications:    1. If the patient lacks prior history of lung disease, is not using inhaled anti-inflammatory medication at home, and lacks wheezing by examination or by history for at least 24 hours, contact physician for possible discontinuation.

## 2021-04-14 NOTE — FLOWSHEET NOTE
04/14/21 0827   Vitals   Temp 96.6 °F (35.9 °C)   Temp Source Oral   Pulse 60   Heart Rate Source Monitor   Resp 16   BP (!) 83/56   BP Location Left upper arm   Orthostatic B/P and Pulse? Yes   Blood Pressure Lying 85/46   Pulse Lying 60 PER MINUTE   Blood Pressure Sitting 101/53   Pulse Sitting 60 PER MINUTE   Blood Pressure Standing 93/62   Pulse Standing 94 PER MINUTE   Level of Consciousness Alert (0)   MEWS Score 2   Oxygen Therapy   SpO2 97 %   O2 Device None (Room air)     Pt awake, alert. Oriented X4. Neuro check completed WNL. Orthostatics VS positive. PAin states headacxhe rates 8/10 to Rside of head r/t fall. Scattered brusing and abrasions. Cardio consult- NPO    Per pt pacemaker interrogation 2 weeks prior and WNL. Shift assessment complete. RR easy unlabored. Heart Pace Rhythm. No edema. Bowel sounds active. Urostomy- dry dressing. [place] patent. Bed alarm. All light in reach.

## 2021-04-15 VITALS
WEIGHT: 169.5 LBS | OXYGEN SATURATION: 97 % | HEART RATE: 77 BPM | HEIGHT: 62 IN | TEMPERATURE: 97 F | SYSTOLIC BLOOD PRESSURE: 110 MMHG | BODY MASS INDEX: 31.19 KG/M2 | RESPIRATION RATE: 16 BRPM | DIASTOLIC BLOOD PRESSURE: 67 MMHG

## 2021-04-15 LAB
ORGANISM: ABNORMAL
URINE CULTURE, ROUTINE: ABNORMAL

## 2021-04-15 PROCEDURE — 6370000000 HC RX 637 (ALT 250 FOR IP): Performed by: PHYSICIAN ASSISTANT

## 2021-04-15 PROCEDURE — 97110 THERAPEUTIC EXERCISES: CPT

## 2021-04-15 PROCEDURE — 6360000002 HC RX W HCPCS: Performed by: INTERNAL MEDICINE

## 2021-04-15 PROCEDURE — 99232 SBSQ HOSP IP/OBS MODERATE 35: CPT | Performed by: NURSE PRACTITIONER

## 2021-04-15 PROCEDURE — 97530 THERAPEUTIC ACTIVITIES: CPT

## 2021-04-15 PROCEDURE — 2580000003 HC RX 258: Performed by: PHYSICIAN ASSISTANT

## 2021-04-15 PROCEDURE — 2580000003 HC RX 258: Performed by: INTERNAL MEDICINE

## 2021-04-15 PROCEDURE — 94761 N-INVAS EAR/PLS OXIMETRY MLT: CPT

## 2021-04-15 PROCEDURE — 6370000000 HC RX 637 (ALT 250 FOR IP): Performed by: INTERNAL MEDICINE

## 2021-04-15 PROCEDURE — 99239 HOSP IP/OBS DSCHRG MGMT >30: CPT | Performed by: PHYSICIAN ASSISTANT

## 2021-04-15 RX ORDER — LEVOFLOXACIN 500 MG/1
500 TABLET, FILM COATED ORAL DAILY
Qty: 5 TABLET | Refills: 0 | Status: SHIPPED | OUTPATIENT
Start: 2021-04-16 | End: 2021-04-21

## 2021-04-15 RX ADMIN — LEVOFLOXACIN 500 MG: 500 TABLET, FILM COATED ORAL at 08:11

## 2021-04-15 RX ADMIN — SODIUM CHLORIDE: 9 INJECTION, SOLUTION INTRAVENOUS at 10:34

## 2021-04-15 RX ADMIN — FLUDROCORTISONE ACETATE 0.1 MG: 0.1 TABLET ORAL at 08:11

## 2021-04-15 RX ADMIN — Medication 10 ML: at 08:13

## 2021-04-15 RX ADMIN — LAMOTRIGINE 50 MG: 25 TABLET ORAL at 08:10

## 2021-04-15 RX ADMIN — LEVOTHYROXINE SODIUM 88 MCG: 88 TABLET ORAL at 05:27

## 2021-04-15 RX ADMIN — OXYCODONE 5 MG: 5 TABLET ORAL at 05:27

## 2021-04-15 RX ADMIN — PANTOPRAZOLE SODIUM 40 MG: 40 TABLET, DELAYED RELEASE ORAL at 08:10

## 2021-04-15 RX ADMIN — MIDODRINE HYDROCHLORIDE 5 MG: 5 TABLET ORAL at 11:40

## 2021-04-15 RX ADMIN — CETIRIZINE HYDROCHLORIDE 10 MG: 10 TABLET ORAL at 08:10

## 2021-04-15 RX ADMIN — MIDODRINE HYDROCHLORIDE 5 MG: 5 TABLET ORAL at 08:11

## 2021-04-15 RX ADMIN — CLONAZEPAM 2 MG: 1 TABLET ORAL at 08:11

## 2021-04-15 RX ADMIN — TROSPIUM CHLORIDE 20 MG: 20 TABLET, FILM COATED ORAL at 05:27

## 2021-04-15 RX ADMIN — SODIUM CHLORIDE: 9 INJECTION, SOLUTION INTRAVENOUS at 03:00

## 2021-04-15 RX ADMIN — ENOXAPARIN SODIUM 40 MG: 40 INJECTION SUBCUTANEOUS at 08:13

## 2021-04-15 RX ADMIN — OXYCODONE 5 MG: 5 TABLET ORAL at 11:40

## 2021-04-15 ASSESSMENT — PAIN DESCRIPTION - PROGRESSION: CLINICAL_PROGRESSION: NOT CHANGED

## 2021-04-15 ASSESSMENT — PAIN - FUNCTIONAL ASSESSMENT
PAIN_FUNCTIONAL_ASSESSMENT: ACTIVITIES ARE NOT PREVENTED
PAIN_FUNCTIONAL_ASSESSMENT: ACTIVITIES ARE NOT PREVENTED

## 2021-04-15 ASSESSMENT — PAIN SCALES - GENERAL
PAINLEVEL_OUTOF10: 2
PAINLEVEL_OUTOF10: 8

## 2021-04-15 ASSESSMENT — PAIN DESCRIPTION - DESCRIPTORS
DESCRIPTORS: HEADACHE
DESCRIPTORS: DISCOMFORT

## 2021-04-15 ASSESSMENT — PAIN DESCRIPTION - DIRECTION: RADIATING_TOWARDS: NON

## 2021-04-15 NOTE — PROGRESS NOTES
Shift assessment complete. Call light and bedside table within reach.  Electronically signed by La Nena Arteaga RN on 4/14/2021 at 10:15 PM

## 2021-04-15 NOTE — PROGRESS NOTES
[facility]     Cardiology                                     Progress Note    Admission date:  2021    Reason for follow up visit: hypotension    HPI/CC: Harrison Ramirez was admitted on 2021 with a reported mechanical fall. Cardiology following for hypotension. Has also been treated for a head injury and UTI. Rhythm has been sinus. Subjective: She complains of forehead pain. Denies chest pain, palpitations, shortness of breath, and dizziness. Vitals:  Blood pressure 110/67, pulse 77, temperature 97 °F (36.1 °C), temperature source Oral, resp. rate 16, height 5' 2\" (1.575 m), weight 169 lb 8 oz (76.9 kg), SpO2 97 %, not currently breastfeeding.   Temp  Av.1 °F (36.2 °C)  Min: 96.5 °F (35.8 °C)  Max: 98 °F (36.7 °C)  Pulse  Av.2  Min: 60  Max: 106  BP  Min: 85/53  Max: 110/67  SpO2  Av.5 %  Min: 96 %  Max: 100 %    24 hour I/O    Intake/Output Summary (Last 24 hours) at 4/15/2021 1256  Last data filed at 4/15/2021 0819  Gross per 24 hour   Intake 4100 ml   Output 1250 ml   Net 2850 ml     Current Facility-Administered Medications   Medication Dose Route Frequency Provider Last Rate Last Admin    albuterol sulfate  (90 Base) MCG/ACT inhaler 2 puff  2 puff Inhalation Q6H PRN Damaris Brooks MD        donepezil (ARICEPT) tablet 10 mg  10 mg Oral Nightly Damairs Brooks MD   10 mg at 216    melatonin tablet 6 mg  6 mg Oral Nightly PRN Damaris Brooks MD        [facility]) tablet 20 mg  20 mg Oral BID AC Damaris Brooks MD   20 mg at 04/15/21 0527    fludrocortisone (FLORINEF) tablet 0.1 mg  0.1 mg Oral BID Damaris Brooks MD   0.1 mg at 04/15/21 187    [Held by provider] gabapentin (NEURONTIN) capsule 300 mg  300 mg Oral BID Damaris Brooks MD   300 mg at 21 0857    lamoTRIgine (LAMICTAL) tablet 50 mg  50 mg Oral BID Damaris Brooks MD   50 mg at 04/15/21 0810    levothyroxine (SYNTHROID) tablet 88 mcg  88 mcg Oral Daily Kela Ahumada, MD   88 mcg at 04/15/21 0527    midodrine (PROAMATINE) tablet 5 mg  5 mg Oral TID WC Kela Ahumada, MD   5 mg at 04/15/21 1140    nitroGLYCERIN (NITROSTAT) SL tablet 0.4 mg  0.4 mg Sublingual Q5 Min PRN Kela Ahumada, MD        miconazole (MICOTIN) 2 % powder   Topical BID Kela Ahumada, MD        pantoprazole (PROTONIX) tablet 40 mg  40 mg Oral Daily Kela Ahumada, MD   40 mg at 04/15/21 0810    sertraline (ZOLOFT) tablet 200 mg  200 mg Oral Nightly Kela Ahumada, MD   200 mg at 04/14/21 2215    0.9 % sodium chloride infusion   Intravenous Continuous Pasqual Agent, PA-C 125 mL/hr at 04/15/21 1034 New Bag at 04/15/21 1034    sodium chloride flush 0.9 % injection 5-40 mL  5-40 mL Intravenous 2 times per day Kela Ahumada, MD   10 mL at 04/15/21 0813    sodium chloride flush 0.9 % injection 5-40 mL  5-40 mL Intravenous PRN Kela Ahumada, MD        0.9 % sodium chloride infusion  25 mL Intravenous PRN Kela Ahumada, MD        potassium chloride Eddi Don M) extended release tablet 40 mEq  40 mEq Oral PRN Kela Ahumada, MD        Or    potassium bicarb-citric acid (EFFER-K) effervescent tablet 40 mEq  40 mEq Oral PRN Kela Ahumada, MD        Or   [facility] potassium chloride 10 mEq/100 mL IVPB (Peripheral Line)  10 mEq Intravenous PRN Kela Ahumada, MD        magnesium sulfate 1000 mg in dextrose 5% 100 mL IVPB  1,000 mg Intravenous PRN Kela Ahumada, MD        enoxaparin (LOVENOX) injection 40 mg  40 mg Subcutaneous Daily Kela Ahumada, MD   40 mg at 04/15/21 0813    polyethylene glycol (GLYCOLAX) packet 17 g  17 g Oral Daily PRN Kela Ahumada, MD        ondansetron [facility]) injection 4 mg  4 mg Intravenous Q6H PRN Kela Ahumada, MD        cetirizine (ZYRTEC) tablet 10 mg  10 mg Oral Daily Kela Ahumada, MD   10 mg at 04/15/21 0810    clonazePAM (KLONOPIN) tablet 2 mg  2 mg Oral BID Eulalia Luu PA-C   2 mg at 04/15/21 8966    levoFLOXacin (LEVAQUIN) tablet 500 mg  500 mg Oral Daily Sheridan BloodDANIAL   500 mg at 04/15/21 1310    oxyCODONE (ROXICODONE) immediate release tablet 5 mg  5 mg Oral Q6H PRN Vanesa Goodpasture, PA-C   5 mg at 04/15/21 1140       Objective:     Telemetry monitor: sinus    Physical Exam:  Constitutional and general appearance: alert, cooperative, no distress, appears older than stated age and pale  HEENT: PERRL, no cervical lymphadenopathy. No masses palpable. Normal oral mucosa  Respiratory:  · Normal excursion and expansion without use of accessory muscles  · Resp auscultation: Normal breath sounds without wheezing, rhonchi, and rales  Cardiovascular:  · The apical impulse is not displaced  · Heart tones are crisp and normal. regular S1 and S2.  · Jugular venous pulsation Normal  · The carotid upstroke is normal in amplitude and contour without delay or bruit  · Peripheral pulses are symmetrical and full   Abdomen:  · No masses or tenderness  · Bowel sounds present  Extremities:  ·  No cyanosis or clubbing  ·  No lower extremity edema  ·  Skin: warm and dry; + bandage to right forehead   Neurological:  · Alert and oriented  · Moves all extremities well  · No abnormalities of mood, affect, memory, mentation, or behavior are noted    Data    Echo 5/24/2019:  Normal left ventricular systolic function with a visually estimated ejection fraction of 55%. No regional wall motion abnormalities are seen. Normal left ventricular diastolic filling pressure. No significant valvular  heart disease. The right ventricle is dilated with impaired systolic function. Systolic pulmonary artery pressure (SPAP) is normal and estimated at 21 mmHg (right atrial pressure 8 mmHg). All labs and testing reviewed.   Lab Review     Renal Profile:   Lab Results   Component Value Date    CREATININE 0.6 04/14/2021    BUN 12 04/14/2021     04/14/2021    K 4.4 04/14/2021     04/14/2021    CO2 20 04/14/2021     CBC:    Lab Results   Component Value Date    WBC 4.9 04/14/2021    RBC 3.60 04/14/2021    HGB 11.3 04/14/2021    HCT 34.9 04/14/2021    MCV 96.9 04/14/2021    RDW 17.6 04/14/2021     04/14/2021     BNP:    Lab Results   Component Value Date    BNP 94 08/10/2011     Fasting Lipid Panel:    Lab Results   Component Value Date    CHOL 249 12/04/2019    HDL 59 12/04/2019    HDL 40 08/11/2011    TRIG 102 06/08/2020     Cardiac Enzymes:  CK/MbTroponin  Lab Results   Component Value Date    CKTOTAL 31 07/24/2015    TROPONINI <0.01 04/14/2021     PT/ INR   Lab Results   Component Value Date    INR 1.04 07/12/2018    INR 1.39 05/14/2018    INR 1.29 05/13/2018    PROTIME 11.9 07/12/2018    PROTIME 15.7 05/14/2018    PROTIME 14.6 05/13/2018     PTT No results found for: PTT   Lab Results   Component Value Date    MG 1.90 04/13/2021      Lab Results   Component Value Date    TSH 0.46 04/06/2020       Assessment:  Hypotension in the setting of UTI and poor po intake    -improved   Reported sinus node dysfunction   -s/p dual chamber pacemaker implant in 2011   -followed by Dr. Di Reed  History of syncope  History of POTS and OH  Chronic pain   Hypothyroidism  Neurogenic bladder with urostomy   Acute on chronic UTI  Mechanical fall with head laceration  Polypharmacy     Plan:   1. Continue Florinef and midodrine (can increase to 10mg po TID if needed)  2. Stop IVF  3. Antibiotics per primary     Cardiology will sign off but remains available if needed. Patient will follow up with [facility] cardiology.     MATIAS Wheeler-CNP  [facility]  [phone]

## 2021-04-15 NOTE — PROGRESS NOTES
Shift assessment complete, morning medications given, patient resting with no complaints of pain, will continue to monitor.  Wale Asher RN

## 2021-04-15 NOTE — PROGRESS NOTES
End of shift report given to [facility]. Pt in stable condition, care transferred at this time.  Electronically signed by Vivian Cody RN on 4/15/2021 at 7:54 AM

## 2021-04-15 NOTE — PROGRESS NOTES
Patient educated on discharge instructions as well as new medications use, dosage, administration and possible side effects. Patient verified knowledge. IV removed without difficulty and dry dressing in place. Telemetry monitor removed and returned to [address] [place] [address]. Pt left facility in stable condition to home with all of their personal belongings.  Marycarmen Castillo RN

## 2021-04-15 NOTE — DISCHARGE SUMMARY
Name:  Yamel Licona  Room:  /1578-38  MRN:    [id number]    Discharge Summary      This discharge summary is in conjunction with a complete physical exam done on the day of discharge. Discharging Provider : Ygnacio Boxer PA-C    Admit: 4/13/2021  Discharge:  4/15/2021    HPI taken from admission H&P:    The patient is a 76 y.o. female with PMH below, presents with syncopal episode/lightheaededness, head injury/pain, L wrist injury, urinary frequency/urgency, generalized weakness. Pt reports that her PCP has been titrating her for UTI for the last 6 days or so. She has problems w/ UTI's. She is normally on Bactrim but her PCP added Levaquin and she has not improved since. She reports that she has felt really weak over this time, however, she felt a little better and went to take the trash out. She reports that she awoke in the drive way. She is unsure if she tripped or passed out but thinks the latter. She injured her R head/face and L wrist.  She notes that she has been lightheaded since she woke this am.  She also took Klonopin 2mg (her normall BID dose) tablet prior to taking out the trash bc she thought her lightheadedness was related to anxiety. She is on several sedating Rx.       Diagnoses this Admission and Hospital Course     #Mechanical fall   - admitted with concern for syncope- based on my discussion with patient it seems she had a mechanical fall   - She has limited mobility at baseline, last evening she took her trash cans to her curb (a task usually completed by her neighbors) when she fell striking her head on the concrete. She remembers the fall, but is unsure why she fell.   She thinks her cane was caught on something on the driveway  - PT OT and CM consult - she lives at home alone- she refuses to go to SNF if recommended but would be agreeable to home therapy      #Closed head injury   #Head laceration   - CT head neg for traumatic intracranial abnormality  - forehead lac with sutures placed 4/13. Sutures should be removed on 4/18/21. She reports that she is up to date on her tetanus shot      #Left wrist skin tear  - wound care per RN  - no fx on XR     #POTS  #Vasovagal syncope   - She is not orthostatic but she is hypotensive. Seems asymptomatic with this. I suspect related to poor PO intake and multiple sedating meds   - Cont florinef 0.1 mg BID and midodrine 5 mg TID as she takes at home (midodrine dose increased to 5 mg recently per cardio OV notes)  - recommend she try compression stockings- she is hesitant as does not like the way they feel. She tried them while in hospital   - BP improved with IVF. Hold off on increasing midodrine now, see PCP in follow up      #Bradycardia with PPM in place  - f/w Dr. Negar Marroquin (per OV 3/2021- PPM interrogation at that time was unremarkable)  - Cardio consult appreciated      #Neurogenic bladder   #Urostomy with chronic UTI   -patient f/w Dr. Julio Collazo urology. She self straight catheterizes using her urostomy stoma. She noticed some green discharged and frequent urge to self cath - she called uro who placed her on Levaquin 500 mg daily for 7 days. She started this rx on 4/10/2021. She states her symptoms have since resolved. There are 6-8 WBC on UA. I do not think that this indicates a failure of antibiotic. Will continue Levaquin D#5/7  - after discharge the urine culture sent from ER grew E Coli MDRO. She had resolution of UTI symptoms prior to this culture being sent, therefore I think this is indicative of colonization rather than acute infection. She can f/u with her urologist to further discuss. I tried to call her day after dc to discuss, she did not answer, I left a voicemail requesting a return call      #Abnormal CXR- resolved   - portable CXR showed an enlarged cardiomediastinal silhouette.   Checked 2view- NORMAL      #Hypothyroidism   - cont synthroid 88 mcg daily  - check TSH- 4.2     #Chronic pain syndrome with opiate Final Result   1. No parenchymal injury appreciated on this unenhanced exam.  No acute   fracture as described. 2. Other findings as described. CT HEAD WO CONTRAST   Final Result   No acute hemorrhage or midline shift. Right frontal scalp laceration containing foreign bodies. Other findings as described. XR WRIST LEFT (MIN 3 VIEWS)   Final Result   No acute fracture or dislocation. Questionable foreign bodies. XR SHOULDER LEFT (MIN 2 VIEWS)   Final Result   No gross fracture. XR CHEST PORTABLE   Final Result   No displaced rib fracture. No pneumothorax. Enlarged cardiomediastinal silhouette. CTA chest would better evaluate if   indicated. Discharge Medications     Medication List      START taking these medications    levoFLOXacin 500 MG tablet  Commonly known as: LEVAQUIN  Take 1 tablet by mouth daily for 5 days        CHANGE how you take these medications    fluticasone 50 MCG/ACT nasal spray  Commonly known as: FLONASE  1 spray by Nasal route nightly.   What changed:   · when to take this  · reasons to take this        CONTINUE taking these medications    Biotin 1000 MCG Chew     cetirizine 10 MG tablet  Commonly known as: ZYRTEC     clonazePAM 2 MG tablet  Commonly known as: KLONOPIN     cyanocobalamin 1000 MCG tablet     donepezil 10 MG tablet  Commonly known as: ARICEPT  Take 1 tablet by mouth nightly     fludrocortisone 0.1 MG tablet  Commonly known as: FLORINEF     gabapentin 300 MG capsule  Commonly known as: NEURONTIN     Klor-Con M20 20 MEQ extended release tablet  Generic drug: potassium chloride     lamoTRIgine 100 MG tablet  Commonly known as: LAMICTAL     levothyroxine 88 MCG tablet  Commonly known as: SYNTHROID     Lunesta 2 MG Tabs  Generic drug: eszopiclone     midodrine 5 MG tablet  Commonly known as: PROAMATINE     MONOJECT 60CC SYRINGE CATH TIP 60 ML Misc  Generic drug: Syringe (Disposable)     nystatin 683092 UNIT/GM powder  Commonly known as: MYCOSTATIN     ondansetron 8 MG Tbdp disintegrating tablet  Commonly known as: ZOFRAN-ODT     oxyCODONE HCl 10 MG immediate release tablet  Commonly known as: OXY-IR     pantoprazole 40 MG tablet  Commonly known as: PROTONIX     SENNA LAX PO     sertraline 100 MG tablet  Commonly known as: ZOLOFT     sodium chloride 0.9 % irrigation     sulfamethoxazole-trimethoprim 800-160 MG per tablet  Commonly known as: BACTRIM DS;SEPTRA DS     thera/beta-carotene Tabs     Toviaz 8 MG Tb24  Generic drug: Fesoterodine Fumarate ER     triamcinolone 0.1 % cream  Commonly known as: KENALOG        STOP taking these medications    Claritin 10 MG capsule  Generic drug: loratadine           Where to Get Your Medications      These medications were sent to [facility]  [address]    Phone: [phone]   · levoFLOXacin 500 MG tablet           Discharged in stable condition to home (SNF recommended but she denied)    Follow Up:   Follow up with PCP in 1 week - suture removal 4/18    [facility] nilo Waite PA-C  4/16/2021 3:51 PM

## 2021-04-15 NOTE — CARE COORDINATION
DISCHARGE ORDER  Date/Time 4/15/2021 3:07 PM  Completed by: Ramon Pepper, Case Management    Patient Name: Domenic Pacheco      : 1955  Admitting Diagnosis: Syncope and collapse [R55]      Admit order Date and Status:2021 inpt  (verify MD's last order for status of admission)      Noted discharge order. If applicable PT/OT recommendation at Discharge: SNF  DME recommendation by PT/OT:n/a  Confirmed discharge plan  (pt): Yes  with whom_____________pt_  If pt confirmed DC plan does family need to be contacted by CM No if yes who__n/a____  Discharge Plan: Reviewed chart. Role of discharge planner explained and patient verbalized understanding. Discharge order is noted. Pt is being d/c'd to home today. Pt's O2 sats are 97% on RA. Pt is aware that PT/OT recommend SNF and she states that she did not want a SNF and would seun [facility]. Pt states that she would like [facility]. CM called Celine with [facility] and she will pull the info from [facility]. No further discharge needs needed or noted. Reviewed chart. Role of discharge planner explained and patient verbalized understanding. Discharge order is noted. Has Home O2 in place on admit:  Yes  Informed of need to bring portable home O2 tank on day of discharge for nursing to connect prior to leaving:   Yes  Verbalized agreement/Understanding:   Yes    Discharge timeout done with Porsche Hoover RN. All discharge needs and concerns addressed.

## 2021-04-15 NOTE — DISCHARGE INSTR - COC
convert left patellofemoral arthroplasty to left total knee replacement    PACEMAKER INSERTION  2011    Dr Mahoney/checked last week/told has 12 more yrs for this one/set at 60    SKIN BIOPSY      abdomen    SPINE SURGERY      stimulator    THROAT SURGERY      polyps removed    TONSILLECTOMY      UPPER GASTROINTESTINAL ENDOSCOPY      UPPER GASTROINTESTINAL ENDOSCOPY  05/03/2017       Immunization History:   Immunization History   Administered Date(s) Administered    Hepatitis A Adult (Havrix, Vaqta) 10/03/2019    Hepatitis B 10/03/2019, 11/07/2019    Influenza A ([id number]) Vaccine PF IM 11/09/2009    Influenza Vaccine, unspecified formulation 10/01/2016, 10/01/2017, 10/31/2017    Influenza Virus Vaccine 10/01/2014, 11/01/2014, 11/01/2014, 10/01/2015, 10/01/2016, 11/07/2016, 11/07/2016, 10/01/2017, 10/31/2017, 09/19/2018    Influenza Whole 10/20/2013, 10/01/2014, 10/01/2015    Influenza, Quadv, IM, PF (6 mo and older Fluzone, Flulaval, Fluarix, and 3 yrs and older Afluria) 11/09/2016, 09/19/2018, 10/03/2019    Pneumococcal Conjugate 7-valent (Prevnar7) 10/01/2014    Pneumococcal Polysaccharide ([id number]) 09/19/2018    Tdap (Boostrix, Adacel) 04/11/2016    Zoster Recombinant (Shingrix) 09/19/2018, 11/30/2018       Active Problems:  Patient Active Problem List   Diagnosis Code    Chest pain R07.9    Failed back surgical syndrome M96.1    GERD (gastroesophageal reflux disease) K21.9    Esophageal dysmotility K22.4    Bronchiectasis (HCC) J47.9    Anxiety & depression F41.9    Hypothyroidism E03.9    S/P gastric bypass Z98.84    Osteoporosis M81.0    Obesity (BMI 30-39. 9) E66.9    NATHAN (obstructive sleep apnea) G47.33    Generalized weakness R53.1    Essential tremor G25.0    HTN (hypertension), benign I10    Dyslipidemia E78.5    Primary insomnia F51.01    Status post total left knee replacement Z96.652    MCI (mild cognitive impairment) with memory loss G31.84    Chronic bilateral low back pain without sciatica M54.5, G89.29    Osteoarthritis of right knee M17.11    Urinary tract infection associated with cystostomy catheter ([facility]) T83.510A, N39.0    S/P placement of hypoglossal cranial nerve stimulator   Z96.89    History of bladder augmentation Z98.890    Urinary retention R33.9    CAD (coronary artery disease) I25.10    Pacemaker Z95.0    Polyuria R35.8    Diarrhea R19.7    Hx of Enterococcus UTI N39.0, B95.2    Sepsis ([facility]) A41.9    Effusion of prosthetic L knee joint M25.462    Dysthymia F34.1    TIA involving left internal carotid artery G45.1    Trochanteric bursitis of left hip M70.62    Eyelash ptosis, left H02.402    MG (myasthenia gravis) ([facility]) G70.00    Bilateral carotid artery stenosis I65.23    Near syncope R55    Acute pain of right knee M25.561    POTS (postural orthostatic tachycardia syndrome) I49.8    Polypharmacy Z79.899    Shortness of breath R06.02    PAT (paroxysmal atrial tachycardia) ([facility]) I47.1    Lightheadedness R42    Acute biliary pancreatitis K85.10    Elevated LFTs R79.89    S/P placement of cardiac pacemaker Z95.0    Neurogenic bladder N31.9    Closed head injury S09.90XA    Laceration of forehead S01.81XA       Isolation/Infection:   Isolation          No Isolation        Patient Infection Status     Infection Onset Added Last Indicated Last Indicated By Review Planned Expiration Resolved Resolved By    None active    Resolved    COVID-19 Rule Out 04/13/21 04/13/21 04/13/21 COVID-19 (Ordered)   04/14/21 Lex Gregorio RN    C-diff Rule Out 04/29/20 04/29/20 04/29/20 Gastrointestinal Panel, Molecular (Ordered)   04/30/20 Lex Gregorio RN          Nurse Assessment:  Last Vital Signs: /67   Pulse 77   Temp 97 °F (36.1 °C) (Oral)   Resp 16   Ht 5' 2\" (1.575 m)   Wt 169 lb 8 oz (76.9 kg)   SpO2 97%   BMI 31.00 kg/m²     Last documented pain score (0-10 scale): Pain Level: 2  Last Weight:   Wt Readings from Last 1 Encounters: 04/15/21 169 lb 8 oz (76.9 kg)     Mental Status:  {IP PT MENTAL STATUS:48899}    IV Access:  { DANIELLE IV ACCESS:198363702}    Nursing Mobility/ADLs:  Walking   {CHP DME QEGV:824426190}  Transfer  {CHP DME VVMJ:867985192}  Bathing  {CHP DME GAYB:690479781}  Dressing  {CHP DME HQQX:939759332}  Toileting  {CHP DME BRUP:642220854}  Feeding  {CHP DME LPDH:703263392}  Med Admin  {CHP DME PHRM:415219913}  Med Delivery   { DANIELLE MED Delivery:601896066}    Wound Care Documentation and Therapy:  Incision 10/13/15 Knee Anterior (Active)   Number of days: 2011       Wound 04/14/21 Face Right Right Eyebrow (Active)   Wound Cleansed Cleansed with saline 04/15/21 0819   Dressing/Treatment Petroleum impregnated gauze;Tape change 04/15/21 0819   Number of days: 0        Elimination:  Continence:   · Bowel: {YES / KS:06136}  · Bladder: {YES / SB:12216}  Urinary Catheter: {Urinary Catheter:601109768}   Colostomy/Ileostomy/Ileal Conduit: {YES / UU:41677}  Urostomy-Stoma  Assessment: Pale  Urostomy-Mucocutaneous Junction: Intact  Urostomy-Peristomal Assessment: Pale    Date of Last BM: ***    Intake/Output Summary (Last 24 hours) at 4/15/2021 1504  Last data filed at 4/15/2021 1412  Gross per 24 hour   Intake 4160 ml   Output 2450 ml   Net 1710 ml     I/O last 3 completed shifts:   In: 4160 [P.O.:955; I.V.:3205]  Out: 2450 [Urine:2450]    Safety Concerns:     [address] [employer] Safety Concerns:254843568}    Impairments/Disabilities:      [address] [employer] Impairments/Disabilities:356984103}    Nutrition Therapy:  Current Nutrition Therapy:   [address] [employer] Diet List:648996496}    Routes of Feeding: {CHP DME Other Feedings:684173399}  Liquids: {Slp liquid thickness:78345}  Daily Fluid Restriction: {CHP DME Yes amt example:081752425}  Last Modified Barium Swallow with Video (Video Swallowing Test): {Done Not Done HVQV:375125887}    Treatments at the Time of Hospital Discharge:   Respiratory Treatments: ***  Oxygen Therapy:  {Therapy; copd oxygen:04558}  Ventilator: [address] CC Vent LLDY:260173650}    Rehab Therapies: Physical Therapy, Occupational Therapy and SN  Weight Bearing Status/Restrictions: [address] CC Weight Bearin}  Other Medical Equipment (for information only, NOT a DME order):  {EQUIPMENT:514945158}  Other Treatments: ***    Patient's personal belongings (please select all that are sent with patient):  {P DME Belongings:213064016}    RN SIGNATURE:  {Esignature:511737344}    CASE MANAGEMENT/SOCIAL WORK SECTION    Inpatient Status Date: 2021    Readmission Risk Assessment Score:  Readmission Risk              Risk of Unplanned Readmission:        19           Discharging to Facility/ Agency   Name: [facility]  Address:  Phone: [phone]  Fax: [phone]        / signature: Electronically signed by Gilma Cuellar RN on 4/15/21 at 3:05 PM EDT    PHYSICIAN SECTION    Prognosis: {Prognosis:5396064848}    Condition at Discharge: [address] Patient Condition:700509872}    Rehab Potential (if transferring to Rehab): {Prognosis:3664848878}    Recommended Labs or Other Treatments After Discharge: ***    Physician Certification: I certify the above information and transfer of Nimco Yanes  is necessary for the continuing treatment of the diagnosis listed and that she requires Home Care for less 30 days.      Update Admission H&P: {CHP DME Changes in LYXZ:196567687}    PHYSICIAN SIGNATURE:  Electronically signed by Renee Hager MD/Rocio Hdz RN on 4/15/21 at 3:05 PM EDT

## 2021-04-16 ENCOUNTER — CARE COORDINATION (OUTPATIENT)
Dept: CASE MANAGEMENT | Age: 66
End: 2021-04-16

## 2021-04-16 NOTE — CARE COORDINATION
Patient contacted regarding Rosalio Graves. Call within 2 business days of discharge: Yes  Discharge Date: 4/15/21   RARS: Readmission Risk Score: 19       Discussed COVID-19 related testing which was available at this time. COVID-19 and Influenza A&B Not Detected 2021. Patient informed of results, if available? Yes  Patient completed Graciella Belling 2-step COVID vaccination on 3/18/2021. Patient has following risk factors of: CAD, NATHAN on BiPAP. Care Transition Nurse (CTN) contacted the patient by telephone to perform post discharge assessment. Verified name and  with patient as identifiers. Provided introduction to self, and explanation of the CTN role, and reason for call due to risk factors for infection and/or exposure to COVID-19. Symptoms reviewed with patient who verbalized the following symptoms: no new symptoms and no worsening symptoms. Due to no new or worsening symptoms encounter was not routed to provider for escalation. CTN reviewed discharge instructions, medical action plan and red flags such as increased shortness of breath, increasing fever and signs of decompensation with patient who verbalized understanding. Reviewed and educated patient on any new and changed medications related to discharge diagnosis. Was patient discharged with a pulse oximeter? Has one at home prior to admission - has not been checking. Denies any issues with respiratory status at this time. Discussed exposure protocols and quarantine with CDC Guidelines What to do if you are sick with coronavirus disease .  Patient was given an opportunity for questions and concerns. The patient can contact the [facility] exposure line [phone], local [facility] department [place] [facility]: ([phone]) and/or the PCP office for questions related to their healthcare.       Advance Care Planning:   Does patient have an Advance Directive: decision maker updated      Discussed follow-up appointments. If no appointment was previously scheduled, appointment scheduling offered: Yes  [facility] follow up appointment(s): No future appointments. Non-[facility] follow up appointment(s): Dr Brent Beebe on 4/19/2021 for removal of stitches and OV on 4/28/2021    Non-face-to-face services provided:  Obtained and reviewed discharge summary and/or continuity of care documents  Education of patient/family/caregiver/guardian to support self-management-s/s monitor/report  Assessment and support for treatment adherence and medication management-med review completed    States [facility] is coming this afternoon. Patient states her daughter has been arguing with home care on the phone all morning and that the agency is sending a nurse with a deputy this afternoon because they think she is being held hostage by daughter. She denies being held hostage. She states she feels safe and is in no harm. Her primary decision maker was updated to her \"best friend\" [employer]. She states Gina has \"all the legal paperwork\". She is tolerating PO. Denies fever, chills, cough, SOB, cp, palpitations. She c/o headache but otherwise feels well. She takes oxy for her headache and cannot take Tylenol. She denies needs going into the weekend. CTN provided contact information for future needs. Plan for follow up call in 5-10 days based on severity of symptoms and risk factors.     Carine Quinn RN  Care Transitions Nurse  [phone] mobile

## 2021-04-17 LAB
BLOOD CULTURE, ROUTINE: NORMAL
CULTURE, BLOOD 2: NORMAL

## 2021-04-22 ENCOUNTER — CARE COORDINATION (OUTPATIENT)
Dept: CASE MANAGEMENT | Age: 66
End: 2021-04-22

## 2021-04-22 NOTE — CARE COORDINATION
[address] Transitions Follow Up Call    2021    Patient: Keiry Coates  Patient : 1955   MRN: [id number]  Reason for Admission: fall with injury  Discharge Date: 4/15/21 RARS: Readmission Risk Score: 23    COVID-19 and Influenza A&B Not Detected 2021. Patient informed of results, if available? Yes  Patient completed Shari Pizarro 2-step COVID vaccination on 3/18/2021. Spoke with: Keiry Coates (patient)    Had stitches removed earlier this week. Asks about dressing on arm from skin tear. States it is falling off. It is a white dressing with small holes all over it. Does not sound like Mepilex or gauze and tape. It does not have a date written on it. It does have dried blood in it. There is no active bleeding. It is not painful, warm or edematous. She did not allow HC to come into the home so that is not an option. States she is having surgery in [facility] [employer] sometime in May and that is why she refused HC. States the dressing is from where she tore her skin when she fell in gravel. States it was rinsed and pieced together like a puzzle in the ED by the RN and MD. Per chart review, dressing is adaptic with wrap. She states there is no wrap. CTN reviewed DC instructions and did not see dressing change order. CTN will ask PCP office to contact patient with instructions on wound care. Patient confirms she has another appt on 2021 with PCP. She requests number for financial counselor at [facility] and that was provided to her also at this time. She denies other concerns. Outreach to Robert Durán MD office (spoke with Davon Pillai). She transferred me to their new triage nurse (Jeane who states is MA). Request for recommendation to patient on wound care to L wrist/forearm. She will discuss with Senia PATTERSON who saw patient earlier this week for removal of stitches and they will notify patient. Follow Up  No future appointments.     Toyin Machuca RN

## 2021-04-29 ENCOUNTER — CARE COORDINATION (OUTPATIENT)
Dept: CASE MANAGEMENT | Age: 66
End: 2021-04-29

## 2021-04-29 NOTE — CARE COORDINATION
Patient resolved from the [address] Transitions episode on 4/29/2021. Unable to reach patient by phone at this time. Message left requesting return call. No further outreach scheduled with this CTN. Episode of Care resolved. Patient has this CTN contact information if future needs arise. Kerline Antonio RN  Care Transition Nurse  [phone] mobile    No future appointments.

## 2021-05-05 NOTE — ED NOTES
1:03 PM 5/5/21  Pt called after receiving letter; wrong abx. Talked to Dr Elyssa Robles and phoned into [address] 100 mg BID for 7 days.  RAGHAV Cruz RN  05/05/21 0997

## 2021-06-03 NOTE — H&P
Mother, [place], told patient heading to [place] FOOT SURGERY Left 4/30/15    GASTRIC BYPASS SURGERY  [id number]    HYSTERECTOMY      KNEE ARTHROSCOPY Left 10/3/2014    part. medial & lateral meniscectomy, synovectomy plica exc    KNEE ARTHROSCOPY Right 10/13/15    partial medial meniscectomy, chondroplasty, partial lateral meniscectomy    NECK SURGERY      OTHER SURGICAL HISTORY  april 2013    removal spinal cord stimulater    OTHER SURGICAL HISTORY      bladder stimulator    OTHER SURGICAL HISTORY  08/30/2017    left patellofemoral arthroplasty    OTHER SURGICAL HISTORY  05/09/2018    convert left patellofemoral arthroplasty to left total knee replacement   Karla Arreola  2011    Dr Mahoney/checked last week/told has 12 more yrs for this one/set at 60    SKIN BIOPSY      abdomen    SPINE SURGERY      stimulator    THROAT SURGERY      polyps removed    TONSILLECTOMY      UPPER GASTROINTESTINAL ENDOSCOPY      UPPER GASTROINTESTINAL ENDOSCOPY  05/03/2017       Medications Prior to Admission:      Prior to Admission medications    Medication Sig Start Date End Date Taking? Authorizing Provider   oxyCODONE-acetaminophen (PERCOCET)  MG per tablet Take  tablets by mouth every 4 hours as needed. 5/22/19  Yes Historical Provider, MD   levothyroxine (SYNTHROID) 88 MCG tablet Take 88 mcg by mouth Daily   Yes Historical Provider, MD   nitrofurantoin (MACRODANTIN) 50 MG capsule Take 50 mg by mouth   Yes Historical Provider, MD   gabapentin (NEURONTIN) 400 MG capsule Take 400 mg by mouth 3 times daily. Yes Historical Provider, MD   donepezil (ARICEPT) 10 MG tablet Take 1 tablet by mouth nightly 2/11/19  Yes Orly Bess MD   primidone (MYSOLINE) 50 MG tablet TAKE 2 TABLETS BY MOUTH TWICE DAILY 1/23/19  Yes Orly Bess MD   levofloxacin (LEVAQUIN) 500 MG tablet Take one tablet prior to [employer].  pt. takes PRN 10/3/18  Yes Historical Provider, MD   diltiazem (CARDIZEM LA) 180 MG TB24 extended release tablet Take 180 mg by mouth 10/23/18  Yes Historical Provider, MD   cephALEXin (KEFLEX) 500 MG capsule Take 500 mg by mouth 3 times daily   Yes Historical Provider, MD Ballard Dexter CATH TIP 60 ML [address] [facility]  3/7/18  Yes Historical Provider, MD   KLOR-CON M20 20 MEQ extended release tablet Take 20 mEq by mouth 2 times daily  3/19/18  Yes Historical Provider, MD   Water For Irrigation, Sterile (STERILE WATER FOR IRRIGATION) 2 times daily  1/23/18  Yes Historical Provider, MD   ranitidine (ZANTAC) 150 MG tablet Take 150 mg by mouth 2 times daily   Yes Historical Provider, MD   sertraline (ZOLOFT) 100 MG tablet Take 200 mg by mouth nightly    Yes Historical Provider, MD   pantoprazole (PROTONIX) 40 MG tablet TAKE 1 TABLET BY MOUTH DAILY 2/16/17  Yes Historical Provider, MD   loratadine (CLARITIN) 10 MG tablet Take 10 mg by mouth daily   Yes Historical Provider, MD   fluticasone (FLONASE) 50 MCG/ACT nasal spray 1 spray by Nasal route nightly. Patient taking differently: 1 spray by Nasal route as needed  4/9/15  Yes Marlene Boeck, MD   nitroGLYCERIN (NITROSTAT) 0.4 MG SL tablet Place 1 tablet under the tongue every 5 minutes as needed for Chest pain. 9/4/12  Yes Jamie Welch MD   Multiple Vitamin (THERA) TABS Take 1 tablet by mouth daily    Yes Historical Provider, MD   clonazepam (KLONOPIN) 1 MG tablet Take 1 tablet by mouth 3 times daily as needed for Anxiety for 21 doses. 12/3/11  Yes Kathalene Phoenix, MD       Allergies:  Meloxicam; Benzoin compound; Lyrica [pregabalin]; Quetiapine; Quetiapine fumarate; Seroquel [quetiapine fumarate]; Tizanidine; Amberderm; Other; Tape [adhesive tape]; and Wound dressings    Social History:      The patient currently livesalone    TOBACCO:   reports that she has never smoked. She has never used smokeless tobacco.  ETOH:   reports that she does not drink alcohol. Family History:       Reviewed in detail and negative for DM, CAD, Cancer, CVA.  Positive as follows:        Problem Relation Age of Onset    Heart Disease Father     Cancer Sister         multiple organs    Other Sister     Asthma Neg Hx     Diabetes Neg Hx     Emphysema Neg Hx     Heart Failure Neg Hx     Hypertension Neg Hx        REVIEW OF SYSTEMS:   Pertinent positives as noted in the HPI. All other systems reviewed and negative. PHYSICAL EXAM PERFORMED:    /61   Pulse 62   Temp 97.1 °F (36.2 °C) (Oral)   Resp 16   Ht 5' 1\" (1.549 m)   Wt 150 lb 1.6 oz (68.1 kg)   SpO2 96%   BMI 28.36 kg/m²     General appearance:  No apparent distress, appears stated age and cooperative. HEENT:  Normal cephalic, atraumatic without obvious deformity. Pupils equal, round, and reactive to light. Extra ocular muscles intact. Conjunctivae/corneas clear. Neck: Supple, with full range of motion. No jugular venous distention. Trachea midline. Respiratory:  Normal respiratory effort. Clear to auscultation, bilaterally without Rales/Wheezes/Rhonchi. Cardiovascular:  Regular rate and rhythm with normal S1/S2 without murmurs, rubs or gallops. Abdomen: Soft, non-tender, non-distended with normal bowel sounds. Musculoskeletal:  No clubbing, cyanosis or edema bilaterally. Full range of motion without deformity. Skin: Skin color, texture, turgor normal.  No rashes or lesions. Neurologic:  Neurovascularly intact without any focal sensory/motor deficits.  Cranial nerves: II-XII intact, grossly non-focal.  Psychiatric:  Alert and oriented, thought content appropriate, normal insight  Capillary Refill: Brisk,< 3 seconds   Peripheral Pulses: +2 palpable, equal bilaterally       Labs:     Recent Labs     05/24/19  1036 05/25/19  0441   WBC 10.3 6.2   HGB 14.6 14.0   HCT 43.9 41.4    201     Recent Labs     05/24/19  1036 05/25/19  0441    138   K 4.0 4.3    107   CO2 24 19*   BUN 14 9   CREATININE 0.7 <0.5*   CALCIUM 10.0 9.2     Recent Labs     05/24/19  1036   AST 44*   ALT 38   BILITOT 0.3   ALKPHOS 124     No results for [facility] [phone]

## 2021-06-25 ENCOUNTER — TELEPHONE (OUTPATIENT)
Dept: ORTHOPEDIC SURGERY | Age: 66
End: 2021-06-25

## 2021-06-25 ENCOUNTER — HOSPITAL ENCOUNTER (OUTPATIENT)
Age: 66
Discharge: HOME | End: 2021-06-25
Payer: MEDICARE

## 2021-06-25 ENCOUNTER — HOSPITAL ENCOUNTER (OUTPATIENT)
Dept: GENERAL RADIOLOGY | Age: 66
Discharge: HOME | End: 2021-06-25
Payer: MEDICARE

## 2021-06-25 DIAGNOSIS — R07.89 PAIN, CHEST WALL: ICD-10-CM

## 2021-06-25 PROCEDURE — 71046 X-RAY EXAM CHEST 2 VIEWS: CPT

## 2021-06-29 ENCOUNTER — TELEPHONE (OUTPATIENT)
Dept: ORTHOPEDIC SURGERY | Age: 66
End: 2021-06-29

## 2021-07-01 ENCOUNTER — APPOINTMENT (OUTPATIENT)
Dept: GENERAL RADIOLOGY | Age: 66
End: 2021-07-01
Payer: MEDICARE

## 2021-07-01 ENCOUNTER — APPOINTMENT (OUTPATIENT)
Dept: CT IMAGING | Age: 66
End: 2021-07-01
Payer: MEDICARE

## 2021-07-01 ENCOUNTER — HOSPITAL ENCOUNTER (EMERGENCY)
Age: 66
Discharge: ANOTHER HOSPITAL | End: 2021-07-02
Attending: STUDENT IN AN ORGANIZED HEALTH CARE EDUCATION/TRAINING PROGRAM
Payer: MEDICARE

## 2021-07-01 DIAGNOSIS — N30.00 ACUTE CYSTITIS WITHOUT HEMATURIA: ICD-10-CM

## 2021-07-01 DIAGNOSIS — K56.609 SBO (SMALL BOWEL OBSTRUCTION) (HCC): Primary | ICD-10-CM

## 2021-07-01 LAB
A/G RATIO: 1.4 (ref 1.1–2.2)
ALBUMIN SERPL-MCNC: 4.5 G/DL (ref 3.4–5)
ALP BLD-CCNC: 138 U/L (ref 40–129)
ALT SERPL-CCNC: 12 U/L (ref 10–40)
AMORPHOUS: ABNORMAL /HPF
ANION GAP SERPL CALCULATED.3IONS-SCNC: 15 MMOL/L (ref 3–16)
AST SERPL-CCNC: 27 U/L (ref 15–37)
BACTERIA: ABNORMAL /HPF
BASOPHILS ABSOLUTE: 0.1 K/UL (ref 0–0.2)
BASOPHILS RELATIVE PERCENT: 0.7 %
BILIRUB SERPL-MCNC: 0.3 MG/DL (ref 0–1)
BILIRUBIN URINE: NEGATIVE
BLOOD, URINE: NEGATIVE
BUN BLDV-MCNC: 17 MG/DL (ref 7–20)
CALCIUM SERPL-MCNC: 10 MG/DL (ref 8.3–10.6)
CHLORIDE BLD-SCNC: 100 MMOL/L (ref 99–110)
CLARITY: CLEAR
CO2: 21 MMOL/L (ref 21–32)
COLOR: YELLOW
CREAT SERPL-MCNC: 0.7 MG/DL (ref 0.6–1.2)
CRYSTALS, UA: ABNORMAL /HPF
EOSINOPHILS ABSOLUTE: 0 K/UL (ref 0–0.6)
EOSINOPHILS RELATIVE PERCENT: 0.6 %
EPITHELIAL CELLS, UA: ABNORMAL /HPF (ref 0–5)
GFR AFRICAN AMERICAN: >60
GFR NON-AFRICAN AMERICAN: >60
GLOBULIN: 3.3 G/DL
GLUCOSE BLD-MCNC: 169 MG/DL (ref 70–99)
GLUCOSE URINE: NEGATIVE MG/DL
HCT VFR BLD CALC: 43.2 % (ref 36–48)
HEMOGLOBIN: 14.4 G/DL (ref 12–16)
KETONES, URINE: NEGATIVE MG/DL
LACTIC ACID, SEPSIS: 1.1 MMOL/L (ref 0.4–1.9)
LACTIC ACID, SEPSIS: 2 MMOL/L (ref 0.4–1.9)
LEUKOCYTE ESTERASE, URINE: NEGATIVE
LIPASE: 15 U/L (ref 13–60)
LYMPHOCYTES ABSOLUTE: 1.7 K/UL (ref 1–5.1)
LYMPHOCYTES RELATIVE PERCENT: 21.7 %
MCH RBC QN AUTO: 32.3 PG (ref 26–34)
MCHC RBC AUTO-ENTMCNC: 33.3 G/DL (ref 31–36)
MCV RBC AUTO: 97.1 FL (ref 80–100)
MICROSCOPIC EXAMINATION: YES
MONOCYTES ABSOLUTE: 0.3 K/UL (ref 0–1.3)
MONOCYTES RELATIVE PERCENT: 3.8 %
MUCUS: ABNORMAL /LPF
NEUTROPHILS ABSOLUTE: 5.6 K/UL (ref 1.7–7.7)
NEUTROPHILS RELATIVE PERCENT: 73.2 %
NITRITE, URINE: POSITIVE
PDW BLD-RTO: 13.5 % (ref 12.4–15.4)
PH UA: 6.5 (ref 5–8)
PLATELET # BLD: 236 K/UL (ref 135–450)
PMV BLD AUTO: 7.4 FL (ref 5–10.5)
POTASSIUM REFLEX MAGNESIUM: 4.8 MMOL/L (ref 3.5–5.1)
PROTEIN UA: ABNORMAL MG/DL
RBC # BLD: 4.45 M/UL (ref 4–5.2)
RBC UA: ABNORMAL /HPF (ref 0–4)
SARS-COV-2, NAAT: NOT DETECTED
SODIUM BLD-SCNC: 136 MMOL/L (ref 136–145)
SPECIFIC GRAVITY UA: 1.01 (ref 1–1.03)
TOTAL PROTEIN: 7.8 G/DL (ref 6.4–8.2)
TROPONIN: <0.01 NG/ML
URINE TYPE: ABNORMAL
UROBILINOGEN, URINE: 1 E.U./DL
WBC # BLD: 7.7 K/UL (ref 4–11)
WBC UA: ABNORMAL /HPF (ref 0–5)

## 2021-07-01 PROCEDURE — 99285 EMERGENCY DEPT VISIT HI MDM: CPT

## 2021-07-01 PROCEDURE — 96375 TX/PRO/DX INJ NEW DRUG ADDON: CPT

## 2021-07-01 PROCEDURE — 87086 URINE CULTURE/COLONY COUNT: CPT

## 2021-07-01 PROCEDURE — 80053 COMPREHEN METABOLIC PANEL: CPT

## 2021-07-01 PROCEDURE — 6360000004 HC RX CONTRAST MEDICATION: Performed by: STUDENT IN AN ORGANIZED HEALTH CARE EDUCATION/TRAINING PROGRAM

## 2021-07-01 PROCEDURE — 96365 THER/PROPH/DIAG IV INF INIT: CPT

## 2021-07-01 PROCEDURE — 83690 ASSAY OF LIPASE: CPT

## 2021-07-01 PROCEDURE — 71045 X-RAY EXAM CHEST 1 VIEW: CPT

## 2021-07-01 PROCEDURE — 6360000002 HC RX W HCPCS: Performed by: STUDENT IN AN ORGANIZED HEALTH CARE EDUCATION/TRAINING PROGRAM

## 2021-07-01 PROCEDURE — 85025 COMPLETE CBC W/AUTO DIFF WBC: CPT

## 2021-07-01 PROCEDURE — 81001 URINALYSIS AUTO W/SCOPE: CPT

## 2021-07-01 PROCEDURE — 93005 ELECTROCARDIOGRAM TRACING: CPT | Performed by: STUDENT IN AN ORGANIZED HEALTH CARE EDUCATION/TRAINING PROGRAM

## 2021-07-01 PROCEDURE — 74177 CT ABD & PELVIS W/CONTRAST: CPT

## 2021-07-01 PROCEDURE — 2580000003 HC RX 258: Performed by: STUDENT IN AN ORGANIZED HEALTH CARE EDUCATION/TRAINING PROGRAM

## 2021-07-01 PROCEDURE — 84484 ASSAY OF TROPONIN QUANT: CPT

## 2021-07-01 PROCEDURE — 87635 SARS-COV-2 COVID-19 AMP PRB: CPT

## 2021-07-01 PROCEDURE — 83605 ASSAY OF LACTIC ACID: CPT

## 2021-07-01 RX ORDER — CEPHALEXIN 500 MG/1
500 CAPSULE ORAL DAILY
Status: ON HOLD | COMMUNITY
End: 2022-06-13 | Stop reason: HOSPADM

## 2021-07-01 RX ORDER — MORPHINE SULFATE 4 MG/ML
4 INJECTION, SOLUTION INTRAMUSCULAR; INTRAVENOUS ONCE
Status: COMPLETED | OUTPATIENT
Start: 2021-07-01 | End: 2021-07-01

## 2021-07-01 RX ORDER — 0.9 % SODIUM CHLORIDE 0.9 %
1000 INTRAVENOUS SOLUTION INTRAVENOUS ONCE
Status: COMPLETED | OUTPATIENT
Start: 2021-07-01 | End: 2021-07-01

## 2021-07-01 RX ORDER — ONDANSETRON 2 MG/ML
4 INJECTION INTRAMUSCULAR; INTRAVENOUS EVERY 6 HOURS PRN
Status: DISCONTINUED | OUTPATIENT
Start: 2021-07-01 | End: 2021-07-02 | Stop reason: HOSPADM

## 2021-07-01 RX ORDER — BIOTIN 1 MG
TABLET ORAL DAILY
COMMUNITY

## 2021-07-01 RX ADMIN — IOPAMIDOL 75 ML: 755 INJECTION, SOLUTION INTRAVENOUS at 21:32

## 2021-07-01 RX ADMIN — MORPHINE SULFATE 4 MG: 4 INJECTION, SOLUTION INTRAMUSCULAR; INTRAVENOUS at 20:42

## 2021-07-01 RX ADMIN — ONDANSETRON HYDROCHLORIDE 4 MG: 2 INJECTION, SOLUTION INTRAMUSCULAR; INTRAVENOUS at 20:43

## 2021-07-01 RX ADMIN — SODIUM CHLORIDE 1000 ML: 9 INJECTION, SOLUTION INTRAVENOUS at 20:42

## 2021-07-01 RX ADMIN — CEFTRIAXONE SODIUM 1000 MG: 1 INJECTION, POWDER, FOR SOLUTION INTRAMUSCULAR; INTRAVENOUS at 22:48

## 2021-07-01 ASSESSMENT — ENCOUNTER SYMPTOMS
SHORTNESS OF BREATH: 0
ABDOMINAL PAIN: 1
PHOTOPHOBIA: 0
VOMITING: 1
NAUSEA: 1
RHINORRHEA: 0
COUGH: 0
SORE THROAT: 0
BACK PAIN: 0

## 2021-07-01 ASSESSMENT — PAIN DESCRIPTION - PAIN TYPE: TYPE: ACUTE PAIN

## 2021-07-01 ASSESSMENT — PAIN SCALES - GENERAL
PAINLEVEL_OUTOF10: 10
PAINLEVEL_OUTOF10: 7

## 2021-07-01 ASSESSMENT — PAIN DESCRIPTION - LOCATION: LOCATION: ABDOMEN

## 2021-07-02 VITALS
DIASTOLIC BLOOD PRESSURE: 52 MMHG | HEART RATE: 75 BPM | BODY MASS INDEX: 31.1 KG/M2 | SYSTOLIC BLOOD PRESSURE: 106 MMHG | TEMPERATURE: 97.9 F | HEIGHT: 62 IN | RESPIRATION RATE: 14 BRPM | WEIGHT: 169 LBS | OXYGEN SATURATION: 91 %

## 2021-07-02 LAB
EKG ATRIAL RATE: 75 BPM
EKG DIAGNOSIS: NORMAL
EKG P AXIS: 20 DEGREES
EKG P-R INTERVAL: 136 MS
EKG Q-T INTERVAL: 410 MS
EKG QRS DURATION: 84 MS
EKG QTC CALCULATION (BAZETT): 457 MS
EKG R AXIS: 80 DEGREES
EKG T AXIS: 50 DEGREES
EKG VENTRICULAR RATE: 75 BPM

## 2021-07-02 PROCEDURE — 96376 TX/PRO/DX INJ SAME DRUG ADON: CPT

## 2021-07-02 PROCEDURE — 93010 ELECTROCARDIOGRAM REPORT: CPT | Performed by: INTERNAL MEDICINE

## 2021-07-02 PROCEDURE — 6360000002 HC RX W HCPCS: Performed by: STUDENT IN AN ORGANIZED HEALTH CARE EDUCATION/TRAINING PROGRAM

## 2021-07-02 RX ADMIN — ONDANSETRON HYDROCHLORIDE 4 MG: 2 INJECTION, SOLUTION INTRAMUSCULAR; INTRAVENOUS at 00:55

## 2021-07-02 NOTE — ED NOTES
Bed: 02  Expected date:   Expected time:   Means of arrival:   Comments:  EMS     Gris Mann RN  07/01/21 2018

## 2021-07-02 NOTE — ED NOTES
Patient going to bed [phone] at [facility]. Call report to [phone]. waiting squsanford Pacheco  07/02/21 0026

## 2021-07-02 NOTE — ED TRIAGE NOTES
Chief Complaint   Patient presents with    Emesis     pt in via EMS for c/o n/v for the past day. Pt has not been drinking as much as normal.  Pt states tried to take zofran at home but couldn't keep it down. EMS unable to get IV access. VSS per EMS with HR in the 80s.

## 2021-07-02 NOTE — ED NOTES
Xray ordered for NGT placement. Placement checked by air auscultation/stethoscope and pt returned 100 ml of yellow/brown liquid from NGT.        Lupe Page RN  07/01/21 9921

## 2021-07-02 NOTE — ED NOTES
Assisted pt with straight cath through abd opening. Pt with return of bright orange urine with visible mucus. Pt reports she took pyridium a day or two ago.       Briana Diop RN  07/01/21 6914

## 2021-07-02 NOTE — ED PROVIDER NOTES
[address] ED  EMERGENCY DEPARTMENT ENCOUNTER      Pt Name: Joby Caceres  MRN: [id number]  [place] 1955  Date of evaluation: 7/1/2021  Provider: Ama Ovalles, [address] [place] [address]       Chief Complaint   Patient presents with    Emesis     pt in via EMS for c/o n/v for the past day. Pt has not been drinking as much as normal.  Pt states tried to take zofran at home but couldn't keep it down. EMS unable to get IV access. VSS per EMS with HR in the 80s. hx bowel reconstruction         HISTORY OF PRESENT ILLNESS   (Location/Symptom, Timing/Onset, Context/Setting, Quality, Duration, Modifying Factors, Severity)  Note limiting factors. Joby Caceres is a 76 y.o. female CAD hypertension hyperlipidemia who presents to the emergency department complaining of complaining of generalized abdominal pain, nausea vomiting. Patient reports a complicated past surgical history with bowel reconstruction is well as bowel pouch for bladder reconstruction. States that she has been unable to keep down p.o. intake she tried Zofran at home but could not keep the medication down. Arrives of vomiting, bilious. Mild abdominal distention, generalized abdominal pain described as cramping, severe at times. No radiation to back. No chest pain no shortness of breath no coughing however she states that when she vomits she feels like she is choking on sputum at times. She reports decreased p.o. intake due to illness. Symptoms have been ongoing for the last few days. Chronic history of alternating diarrhea and constipation. Nursing Notes were reviewed.     PAST MEDICAL HISTORY     Past Medical History:   Diagnosis Date    Angina at rest [facility])     Anxiety     Arthritis     hands and hip    Blood transfusion     after back surgery    Bradycardia     Bronchiectasis ([facility] [address] 11/05/2013    CAD (coronary artery disease)     Chronic back pain     Hyperlipidemia     Hypertension     Localized morphea  Multiple drug resistant organism (MDRO) culture positive 04/13/2021    E.COLI-URINE    NATHAN treated with BiPAP     Pacemaker     Stress incontinence     Thyroid disease     hypothyroid    Trochanteric bursitis of left hip 04/12/2019         SURGICAL HISTORY       Past Surgical History:   Procedure Laterality Date    ABDOMEN SURGERY  9/9/11     REPAIR INCISIONAL HERNIA REDUCIBLE WITH POSSIBLE MESH    BACK SURGERY      x3    BACK SURGERY      stimulator    BLADDER SUSPENSION      CARDIAC PACEMAKER PLACEMENT      CATARACT REMOVAL WITH IMPLANT Left 03/08/2018    PHACO EMULSIFICATION OF CATARACT WITH INTRAOCULAR LENS IMPLANT LEFT EYE    CERVICAL DISCECTOMY  6/2014    and fusion    CHOLECYSTECTOMY      COLONOSCOPY  2002 1/26/12    ENDOSCOPY, COLON, DIAGNOSTIC      EPIDURAL STEROID INJECTION Left 8/15/2019    LEFT KNEE GENICULAR NERVE BLOCK SITE CONFIRMED BY FLUOROSCOPY performed by Cesar Jain MD at [facility] Right 04/05/2018    cataract removal    FOOT SURGERY Right 12/6/12    arthroplasty    FOOT SURGERY Left 04/30/2015    FOOT SURGERY Left 4/30/15    GASTRIC BYPASS SURGERY  [id number]    HYSTERECTOMY      KNEE ARTHROSCOPY Left 10/3/2014    part.  medial & lateral meniscectomy, synovectomy plica exc    KNEE ARTHROSCOPY Right 10/13/15    partial medial meniscectomy, chondroplasty, partial lateral meniscectomy    NECK SURGERY      OTHER SURGICAL HISTORY  april 2013    removal spinal cord stimulater    OTHER SURGICAL HISTORY      bladder stimulator    OTHER SURGICAL HISTORY  08/30/2017    left patellofemoral arthroplasty    OTHER SURGICAL HISTORY  05/09/2018    convert left patellofemoral arthroplasty to left total knee replacement   Lily Campos  2011    Dr Mahoney/checked last week/told has 12 more yrs for this one/set at 60    SKIN BIOPSY      abdomen    SPINE SURGERY      stimulator    THROAT SURGERY      polyps removed    TONSILLECTOMY      UPPER GASTROINTESTINAL ENDOSCOPY      UPPER GASTROINTESTINAL ENDOSCOPY  05/03/2017         CURRENT MEDICATIONS       Previous Medications    BIOTIN 1000 MCG TABS    Take by mouth daily    CEPHALEXIN (KEFLEX) 500 MG CAPSULE    Take 500 mg by mouth 3 times daily    CETIRIZINE (ZYRTEC) 10 MG TABLET    Take 10 mg by mouth daily    CLONAZEPAM (KLONOPIN) 2 MG TABLET    TAKE 1 TABLET BY MOUTH TWO TIMES A DAY    CYANOCOBALAMIN 1000 MCG TABLET    Take 2,500 mcg by mouth daily    DONEPEZIL (ARICEPT) 10 MG TABLET    Take 1 tablet by mouth nightly    ESZOPICLONE (LUNESTA) 2 MG TABS    Take 2 mg by mouth nightly. FESOTERODINE FUMARATE ER (TOVIAZ) 8 MG TB24    Take by mouth daily     FLUDROCORTISONE (FLORINEF) 0.1 MG TABLET    Take 0.1 mg by mouth 2 times daily    FLUTICASONE (FLONASE) 50 MCG/ACT NASAL SPRAY    1 spray by Nasal route nightly. GABAPENTIN (NEURONTIN) 300 MG CAPSULE    TAKE 1 CAPSULE BY MOUTH EVERY TWELVE HOURS FOR 30 DAYS    KLOR-CON M20 20 MEQ EXTENDED RELEASE TABLET    Take 20 mEq by mouth 2 times daily     LAMOTRIGINE (LAMICTAL) 100 MG TABLET    TAKE 1/2 TABLET BY MOUTH TWO TIMES A DAY    LEVOTHYROXINE (SYNTHROID) 88 MCG TABLET    Take 88 mcg by mouth Daily    MIDODRINE (PROAMATINE) 5 MG TABLET    TAKE 1 TABLET BY MOUTH THREE TIMES A DAY. generic for proamatine.     MONOJECT 60CC SYRINGE CATH TIP 60 ML MISC        MULTIPLE VITAMIN (THERA) TABS    Take 1 tablet by mouth daily     NYSTATIN (MYCOSTATIN) 879939 UNIT/GM POWDER    APPLY TWO TIMES A DAY    TO AFFECTED AREA    ONDANSETRON (ZOFRAN-ODT) 8 MG TBDP DISINTEGRATING TABLET    Take 8 mg by mouth every 8 hours as needed    OXYCODONE HCL (OXY-IR) 10 MG IMMEDIATE RELEASE TABLET    TAKE 1 TABLET BY MOUTH EVERY SIX HOURS AS NEEDED    PANTOPRAZOLE (PROTONIX) 40 MG TABLET    TAKE 1 TABLET BY MOUTH DAILY    SENNOSIDES (SENNA LAX PO)    Take by mouth daily     SERTRALINE (ZOLOFT) 100 MG TABLET    Take 200 mg by mouth nightly     SODIUM CHLORIDE 0.9 % IRRIGATION SULFAMETHOXAZOLE-TRIMETHOPRIM (BACTRIM DS;SEPTRA DS) 800-160 MG PER TABLET    TAKE 1 TABLET BY MOUTH TWO TIMES A DAY    TRIAMCINOLONE (KENALOG) 0.1 % CREAM    Apply topically 2 times daily Apply topically 2 times daily. ALLERGIES     Meloxicam, Acetaminophen, Benzoin compound, Lyrica [pregabalin], Quetiapine fumarate, Seroquel [quetiapine fumarate], Tizanidine, Adhesive tape, Amberderm, Linaclotide, Other, Quetiapine, and Wound dressings    FAMILY HISTORY       Family History   Problem Relation Age of Onset    Heart Disease Father     Cancer Sister         multiple organs    Other Sister     Asthma Neg Hx     Diabetes Neg Hx     Emphysema Neg Hx     Heart Failure Neg Hx     Hypertension Neg Hx           SOCIAL HISTORY       Social History     Socioeconomic History    Marital status:      Spouse name: None    Number of children: None    Years of education: None    Highest education level: None   Occupational History    None   Tobacco Use    Smoking status: Never Smoker    Smokeless tobacco: Never Used   Vaping Use    Vaping Use: Never used   Substance and Sexual Activity    Alcohol use: No     Alcohol/week: 0.0 standard drinks    Drug use: No    Sexual activity: Yes     Partners: Female   Other Topics Concern    None   Social History Narrative    None     Social Determinants of Health     Financial Resource Strain:     Difficulty of Paying Living Expenses:    Food Insecurity:     Worried About Running Out of Food in the Last Year:     Ran Out of Food in the Last Year:    Transportation Needs:     Lack of Transportation (Medical):      Lack of Transportation (Non-Medical):    Physical Activity:     Days of Exercise per Week:     Minutes of Exercise per Session:    Stress:     Feeling of Stress :    Social Connections:     Frequency of Communication with Friends and Family:     Frequency of Social Gatherings with Friends and Family:     Attends [employer] Services:     Active Member of Clubs or Organizations:     Attends Club or Organization Meetings:     Marital Status:    Intimate Partner Violence:     Fear of Current or Ex-Partner:     Emotionally Abused:     Physically Abused:     Sexually Abused:        SCREENINGS        Rhodes Coma Scale  Eye Opening: Spontaneous  Best Verbal Response: Oriented  Best Motor Response: Obeys commands  Taras Coma Scale Score: 15                   REVIEW OF SYSTEMS    (2-9 systems for level 4, 10 or more for level 5)   Review of Systems   Constitutional: Negative for chills and fever. HENT: Negative for congestion, rhinorrhea and sore throat. Eyes: Negative for photophobia and visual disturbance. Respiratory: Negative for cough and shortness of breath. Cardiovascular: Negative for chest pain, palpitations and leg swelling. Gastrointestinal: Positive for abdominal pain, nausea and vomiting. Genitourinary: Positive for decreased urine volume. Musculoskeletal: Negative for back pain, neck pain and neck stiffness. Skin: Negative for rash. Allergic/Immunologic: Negative for environmental allergies. Hematological: Negative for adenopathy. Psychiatric/Behavioral: Negative for confusion. PHYSICAL EXAM    (up to 7 for level 4, 8 or more for level 5)     ED Triage Vitals   BP Temp Temp src Pulse Resp SpO2 Height Weight   -- -- -- -- -- -- -- --       Physical Exam  Constitutional:       General: She is not in acute distress. Appearance: She is not diaphoretic. HENT:      Head: Normocephalic and atraumatic. Eyes:      Pupils: Pupils are equal, round, and reactive to light. Neck:      Trachea: No tracheal deviation. Cardiovascular:      Rate and Rhythm: Normal rate and regular rhythm. Pulmonary:      Effort: Pulmonary effort is normal. No respiratory distress. Abdominal:      General: There is no distension. Palpations: Abdomen is soft. Musculoskeletal:         General: Normal range of motion. screen cardiac however to have less concern for ACS without chest pain. EKG nonspecific sinus rhythm with a rate of 75, troponin less than 0.01    Lactic acid 2.0 receiving fluids will trend, no leukocytosis H&H stable. Renal function within normal limits. CT abdomen reviewed showing small bowel obstruction with multiple transition points effectively closed-loop obstruction, GEN surge was paged      10:57 PM EDT Case discussed with our on-call general surgeon Dr. Ace Silva, due to patient's extensive surgical history, she does follow-up with [facility] physicians, and her ileal conduit, is recommended we transfer to [facility] for management. Patient was signed out to overnight physician pending conversation with general surgery at [address] time was 0 minutes, excluding separately reportable procedures. There was a high probability of clinically significant/life threatening deterioration in the patient's condition which required my urgent intervention. Clinical concern   Intervention     CONSULTS:  IP CONSULT TO GENERAL SURGERY    PROCEDURES:  Unless otherwise noted below, none     Procedures        FINAL IMPRESSION      1. SBO (small bowel obstruction) ([address]    2. Acute cystitis without hematuria          DISPOSITION/PLAN   DISPOSITION  xfer      PATIENT REFERRED TO:  No follow-up provider specified. DISCHARGE MEDICATIONS:  New Prescriptions    No medications on file     Controlled Substances Monitoring:     RX Monitoring 10/25/2019   Periodic Controlled Substance Monitoring Possible medication side effects, risk of tolerance/dependence & alternative treatments discussed. ;No signs of potential drug abuse or diversion identified. ;Assessed functional status. ;Obtaining appropriate analgesic effect of treatment.        (Please note that portions of this note were completed with a voice recognition program.  Efforts were made to edit the dictations but occasionally words are mis-transcribed.)    Bianca Martinez DO (electronically signed)  Attending Emergency Physician            Bianca Martinez DO  07/01/21 [id number]

## 2021-07-04 LAB
ORGANISM: ABNORMAL
URINE CULTURE, ROUTINE: ABNORMAL

## 2021-08-02 ENCOUNTER — OFFICE VISIT (OUTPATIENT)
Dept: ORTHOPEDIC SURGERY | Age: 66
End: 2021-08-02
Payer: MEDICARE

## 2021-08-02 VITALS — BODY MASS INDEX: 31.1 KG/M2 | WEIGHT: 169 LBS | HEIGHT: 62 IN

## 2021-08-02 DIAGNOSIS — M17.11 PRIMARY OSTEOARTHRITIS OF RIGHT KNEE: Primary | ICD-10-CM

## 2021-08-02 PROCEDURE — 20610 DRAIN/INJ JOINT/BURSA W/O US: CPT | Performed by: ORTHOPAEDIC SURGERY

## 2021-08-02 RX ORDER — METHYLPREDNISOLONE ACETATE 40 MG/ML
40 INJECTION, SUSPENSION INTRA-ARTICULAR; INTRALESIONAL; INTRAMUSCULAR; SOFT TISSUE ONCE
Status: COMPLETED | OUTPATIENT
Start: 2021-08-02 | End: 2021-08-02

## 2021-08-02 RX ORDER — BUPIVACAINE HYDROCHLORIDE 2.5 MG/ML
7 INJECTION, SOLUTION INFILTRATION; PERINEURAL ONCE
Status: COMPLETED | OUTPATIENT
Start: 2021-08-02 | End: 2021-08-02

## 2021-08-02 RX ADMIN — BUPIVACAINE HYDROCHLORIDE 17.5 MG: 2.5 INJECTION, SOLUTION INFILTRATION; PERINEURAL at 09:08

## 2021-08-02 RX ADMIN — METHYLPREDNISOLONE ACETATE 40 MG: 40 INJECTION, SUSPENSION INTRA-ARTICULAR; INTRALESIONAL; INTRAMUSCULAR; SOFT TISSUE at 09:08

## 2021-08-06 ENCOUNTER — OFFICE VISIT (OUTPATIENT)
Dept: ORTHOPEDIC SURGERY | Age: 66
End: 2021-08-06
Payer: MEDICARE

## 2021-08-06 VITALS — BODY MASS INDEX: 31.1 KG/M2 | HEIGHT: 62 IN | WEIGHT: 169 LBS

## 2021-08-06 DIAGNOSIS — M51.36 DDD (DEGENERATIVE DISC DISEASE), LUMBAR: Primary | ICD-10-CM

## 2021-08-06 DIAGNOSIS — M54.50 LOW BACK PAIN, UNSPECIFIED BACK PAIN LATERALITY, UNSPECIFIED CHRONICITY, UNSPECIFIED WHETHER SCIATICA PRESENT: ICD-10-CM

## 2021-08-06 DIAGNOSIS — M54.16 LUMBAR RADICULITIS: ICD-10-CM

## 2021-08-06 PROCEDURE — 99214 OFFICE O/P EST MOD 30 MIN: CPT | Performed by: PHYSICIAN ASSISTANT

## 2021-08-06 PROCEDURE — L0462 TLSO 3MOD SACRO-SCAP PRE: HCPCS | Performed by: PHYSICIAN ASSISTANT

## 2021-08-06 NOTE — PROGRESS NOTES
FOLLOW UP: SPINE    8/6/2021     CHIEF COMPLAINT:    Chief Complaint   Patient presents with    Back Pain     lumbar, 7/10       HISTORY OF PRESENT ILLNESS:              The patient is a 76 y.o. female last seen in March here for follow-up, extensive past medical history (below) including pacemaker, osteoporosis, CAD status post failed L2-S1 fusion referred by here to f/u for low back and left leg pain. She reports a several year history of chronic aching LBP and a 6 month history of pain extending into the left buttock and lateral thigh with chronic leg numbness. She has a history of a posterior spinal fusion with Dr. Ammy Ireland years prior complicated by subsequent infection which required ICU stay. Her symptoms are constant; she reports some relief with reclining. Conservative care includes remote ROBERT's without benefit, prior spinal cord stimulator (failed & removed), pain management (Dr. Alvin Wagner 10mg QID/Gabapentin), PT. last office visit lumbar CT scan was ordered which she did not proceed with. She was provided a QuickDraw brace which she returned but is now wishing to get a brace. She denies any significant improvement at this time. She has a long history of urinary dysfunction and prior bladder stimulator. She is also dealing with multiple GI and bowel issues currently which have been chronic she has a GI specialist through [address] [place] [address].  She denies any new or progressive extremity weakness.      Past Medical History:   Diagnosis Date    Angina at rest [facility])     Anxiety     Arthritis     hands and hip    Blood transfusion     after back surgery    Bradycardia     Bronchiectasis ([facility] [address] 11/05/2013    CAD (coronary artery disease)     Chronic back pain     Hyperlipidemia     Hypertension     Localized morphea     Multiple drug resistant organism (MDRO) culture positive 04/13/2021    E.COLI-URINE    NATHAN treated with BiPAP     Pacemaker     Stress incontinence     Thyroid disease hypothyroid    Trochanteric bursitis of left hip 04/12/2019        Current/Past Treatment:   · Physical Therapy: yes  · Chiropractic:     · Injection:   ESIs past without benefit   Medications:            NSAIDS: allergy             Muscle relaxer:   Past            Steriods:   Past            Neuropathic medications:   Gabapentin 300 BID            Opioids: Oxycodone 10 4 times daily with Dr. Hyacinth Corey            Other:   · Surgery/Consult: S/p L2-S1 fusion Dr. Bernard Brown w/ subsequent infection years prior     Work Status/Functionality: disability     Past Medical History: Medical history form was reviewed today & scanned into the media tab  Past Medical History:   Diagnosis Date    Angina at rest [facility])     Anxiety     Arthritis     hands and hip    Blood transfusion     after back surgery    Bradycardia     Bronchiectasis ([address] 11/05/2013    CAD (coronary artery disease)     Chronic back pain     Hyperlipidemia     Hypertension     Localized morphea     Multiple drug resistant organism (MDRO) culture positive 04/13/2021    E.COLI-URINE    NATHAN treated with BiPAP     Pacemaker     Stress incontinence     Thyroid disease     hypothyroid    Trochanteric bursitis of left hip 04/12/2019      Past Surgical History:     Past Surgical History:   Procedure Laterality Date    ABDOMEN SURGERY  9/9/11     REPAIR INCISIONAL HERNIA REDUCIBLE WITH POSSIBLE MESH    BACK SURGERY      x3    BACK SURGERY      stimulator    BLADDER SUSPENSION      CARDIAC PACEMAKER PLACEMENT      CATARACT REMOVAL WITH IMPLANT Left 03/08/2018    PHACO EMULSIFICATION OF CATARACT WITH INTRAOCULAR LENS IMPLANT LEFT EYE    CERVICAL DISCECTOMY  6/2014    and fusion    CHOLECYSTECTOMY      COLONOSCOPY  2002 1/26/12    ENDOSCOPY, COLON, DIAGNOSTIC      EPIDURAL STEROID INJECTION Left 8/15/2019    LEFT KNEE GENICULAR NERVE BLOCK SITE CONFIRMED BY FLUOROSCOPY performed by Alessandra Narvaez MD at [address] [place] [address] 04/05/2018    cataract removal    FOOT SURGERY Right 12/6/12    arthroplasty    FOOT SURGERY Left 04/30/2015    FOOT SURGERY Left 4/30/15    GASTRIC BYPASS SURGERY  [id number]    HYSTERECTOMY      KNEE ARTHROSCOPY Left 10/3/2014    part.  medial & lateral meniscectomy, synovectomy plica exc    KNEE ARTHROSCOPY Right 10/13/15    partial medial meniscectomy, chondroplasty, partial lateral meniscectomy    NECK SURGERY      OTHER SURGICAL HISTORY  april 2013    removal spinal cord stimulater    OTHER SURGICAL HISTORY      bladder stimulator    OTHER SURGICAL HISTORY  08/30/2017    left patellofemoral arthroplasty    OTHER SURGICAL HISTORY  05/09/2018    convert left patellofemoral arthroplasty to left total knee replacement   [place]  2011    Dr Mahoney/checked last week/told has 12 more yrs for this one/set at 60    SKIN BIOPSY      abdomen    SPINE SURGERY      stimulator    THROAT SURGERY      polyps removed    TONSILLECTOMY      UPPER GASTROINTESTINAL ENDOSCOPY      UPPER GASTROINTESTINAL ENDOSCOPY  05/03/2017     Current Medications:     Current Outpatient Medications:     Biotin 1000 MCG TABS, Take by mouth daily, Disp: , Rfl:     cephALEXin (KEFLEX) 500 MG capsule, Take 500 mg by mouth 3 times daily, Disp: , Rfl:     lamoTRIgine (LAMICTAL) 100 MG tablet, TAKE 1/2 TABLET BY MOUTH TWO TIMES A DAY, Disp: , Rfl:     oxyCODONE HCl (OXY-IR) 10 MG immediate release tablet, TAKE 1 TABLET BY MOUTH EVERY SIX HOURS AS NEEDED, Disp: , Rfl:     sulfamethoxazole-trimethoprim (BACTRIM DS;SEPTRA DS) 800-160 MG per tablet, TAKE 1 TABLET BY MOUTH TWO TIMES A DAY, Disp: , Rfl:     sodium chloride 0.9 % irrigation, , Disp: , Rfl:     ondansetron (ZOFRAN-ODT) 8 MG TBDP disintegrating tablet, Take 8 mg by mouth every 8 hours as needed, Disp: , Rfl:     nystatin (MYCOSTATIN) 935055 UNIT/GM powder, APPLY TWO TIMES A DAY    TO AFFECTED AREA, Disp: , Rfl:     midodrine (PROAMATINE) 5 MG tablet, TAKE 1 smokeless tobacco. She reports that she does not drink alcohol and does not use drugs. Family History:   Family History   Problem Relation Age of Onset    Heart Disease Father     Cancer Sister         multiple organs    Other Sister     Asthma Neg Hx     Diabetes Neg Hx     Emphysema Neg Hx     Heart Failure Neg Hx     Hypertension Neg Hx        REVIEW OF SYSTEMS: Full ROS reviewed & scanned into chart  CONSTITUTIONAL: Denies current fevers   SKIN: Denies skin conditions, skin cancer      PHYSICAL EXAM:    Vitals: Height 5' 2.01\" (1.575 m), weight 169 lb (76.7 kg), not currently breastfeeding. GENERAL EXAM:  · General Apparence: Patient is adequately groomed with no evidence of malnutrition. · Orientation: The patient is oriented to time, place and person. · Mood & Affect:The patient's mood and affect are appropriate   · Lymphatic: The lymphatic examination bilaterally reveals all areas to be without enlargement or induration  · Sensation: Sensation is intact without deficit    LUMBAR/SACRAL EXAMINATION:  · Inspection: Local inspection shows no step-off or bruising. Kyphosis     · Palpation:   No evidence of tenderness at the midline    · Range of Motion: Able to sit forward flexed  · Strength:   Strength testing is 5/5 in all muscle groups tested. No focal weakness  · Special Tests:   Straight leg raise and crossed SLR negative. Leg length and pelvis level.  0 out of 5 Augusto's signs. · Skin: There are no rashes, ulcerations or lesions. · Reflexes: Reflexes are symmetrically trace to 1+ at the patellar and ankle tendons. Clonus absent bilaterally at the feet. · Gait & station: Visibly antalgic forward flexed with cane  · Additional Examinations:   · RIGHT LOWER EXTREMITY: Inspection/examination of the right lower extremity does not show any tenderness, deformity or injury. Range of motion is full. There is no gross instability. There are no rashes, ulcerations or lesions.  Strength and tone are normal.       LEFT LOWER EXTREMITY:  Inspection/examination of the left lower extremity does not show any tenderness, deformity or injury. Range of motion is full. There is no gross instability. There are no rashes, ulcerations or lesions. Strength and tone are normal.    Diagnostic Testing:   I reviewed a CT of her abdomen and pelvis from July 2021. There is a T9 compression deformity which is stable compared to 2020 CT abdomen and pelvis      Bones/Soft Tissues: Scattered degenerative changes noted in the visualized   spine with spondylolistheses. L2 through S1 fusion. 2 views lumbar spine show posterior fusion L2-S1, anterior compression deformity L1. Bones appear osteoporotic. No clear endplates L5    CT abdomen and pelvis June 2020  Bones/Soft Tissues: Degenerative changes involve the thoracolumbar spine. The bones are demineralized.  Status post posterior decompression and   stabilization of L2-S1 with intervertebral body grafts.  No change in the old   compression fracture involving the superior endplate of T9.     CT abd/pelv   Osseous structures: Postoperative changes of lumbar fusion. Impression:  1) Chronic LBP, 6mo left lumbar radiculitis   2) Status post fusion L2-S1, L1 compression deformity  3) Extensive past medical history detailed above; pacemaker  4) Prior ESIs, SCS failed  5) Pain mgt actively--Dr. Fabby Curry       Plan:   1) We had a long discussion. She did not proceed with lumbar CT scan as previously discussed. We did review her CT from July of her abdomen and pelvis. She is currently wishing to hold off on any further imaging at this time    2) brace: TLSO PRN    3) Con't pain mgt and care with Dr. Fabby Curry     4) May call if wishing to proceed w/CT Lumbar         Procedures    [place] TLSO     Patient was prescribed an [address]   This orthosis is required for the following reasons:    Reduce pain by restricting mobility of the trunk  Facilitate healing following an injury to the spine or related soft tissues  Support weak spinal muscles    The prefabricated orthosis was modified in the following manner to provide a customizable fit for the patient at the time of delivery. Identification of appropriate positioning and alignment of anatomical landmarks  Manual adjustment to bend, mold, shape, and/or contour components and reassemble orthosis  Trimming of straps, guides, panels, inserts, and/or other materials and reassemble orthosis    The patient was educated and fit by a healthcare professional with expert knowledge and specialization in brace application while under the direct supervision of the treating physician. Verbal and written instructions for the use of and application of this item were provided. They were instructed to contact the office immediately should the brace result in increased pain, decreased sensation, increased swelling or worsening of the condition.          Electronically signed by Ramon Finch PA-C MPAS on 8/6/2021 at 9:02 AM     Ramon Finch PA-C, [facility]  Board Certified by the [facility]

## 2021-08-10 ENCOUNTER — TELEPHONE (OUTPATIENT)
Dept: ORTHOPEDIC SURGERY | Age: 66
End: 2021-08-10

## 2021-08-10 NOTE — TELEPHONE ENCOUNTER
8/10/21 [facility] [place]  - NO PRECERT REQUIRED FOR PARTICIPATING PROVIDERS - PER RECORDING -   REF # [id number]  -   MP

## 2021-09-02 ENCOUNTER — TELEPHONE (OUTPATIENT)
Dept: ORTHOPEDIC SURGERY | Age: 66
End: 2021-09-02

## 2021-09-02 DIAGNOSIS — M17.11 PRIMARY OSTEOARTHRITIS OF RIGHT KNEE: Primary | ICD-10-CM

## 2021-09-02 NOTE — TELEPHONE ENCOUNTER
General Question     Subject: REQUEST FOR GEL INJECTION  Patient and /or Facility Request: Tin Haley  Contact Number: [phone]    PATIENT WOULD LIKE FOR THE REQUEST TO BE SUBMITTED FOR HER GEL INJECTION    PLEASE CALL BACK AT THE ABOVE NUMBER

## 2021-09-14 ENCOUNTER — OFFICE VISIT (OUTPATIENT)
Dept: ORTHOPEDIC SURGERY | Age: 66
End: 2021-09-14
Payer: MEDICARE

## 2021-09-14 VITALS — BODY MASS INDEX: 31.1 KG/M2 | HEIGHT: 62 IN | WEIGHT: 169 LBS

## 2021-09-14 DIAGNOSIS — M17.11 PRIMARY OSTEOARTHRITIS OF RIGHT KNEE: Primary | ICD-10-CM

## 2021-09-14 DIAGNOSIS — M96.1 FAILED BACK SURGICAL SYNDROME: ICD-10-CM

## 2021-09-14 PROCEDURE — L0642 LO SAG RI AN/POS PNL PRE OTS: HCPCS | Performed by: ORTHOPAEDIC SURGERY

## 2021-09-14 PROCEDURE — 20610 DRAIN/INJ JOINT/BURSA W/O US: CPT | Performed by: ORTHOPAEDIC SURGERY

## 2021-09-14 RX ORDER — BUPIVACAINE HYDROCHLORIDE 2.5 MG/ML
7 INJECTION, SOLUTION INFILTRATION; PERINEURAL ONCE
Status: COMPLETED | OUTPATIENT
Start: 2021-09-14 | End: 2021-09-14

## 2021-09-14 RX ORDER — METHYLPREDNISOLONE ACETATE 40 MG/ML
40 INJECTION, SUSPENSION INTRA-ARTICULAR; INTRALESIONAL; INTRAMUSCULAR; SOFT TISSUE ONCE
Status: COMPLETED | OUTPATIENT
Start: 2021-09-14 | End: 2021-09-14

## 2021-09-14 RX ADMIN — BUPIVACAINE HYDROCHLORIDE 17.5 MG: 2.5 INJECTION, SOLUTION INFILTRATION; PERINEURAL at 11:14

## 2021-09-14 RX ADMIN — METHYLPREDNISOLONE ACETATE 40 MG: 40 INJECTION, SUSPENSION INTRA-ARTICULAR; INTRALESIONAL; INTRAMUSCULAR; SOFT TISSUE at 11:14

## 2021-09-14 NOTE — PROGRESS NOTES
Recommendation is for viscosupplementation using Gel-One . The Right knee was injected with 3.0 ml of Gel-One from an anterolateral joint line approach using a 25-gauge needle under sterile Betadine prep, using ethyl chloride as a topical refrigerant, for a diagnosis of osteoarthritis. The patient appeared to tolerate it well. The patient should return here in 2 months. Recommendation is for a cortisone injection into the right knee. After informed consent was received from the patient, the right knee was injected with 1 mL(40mg)Depo-Medrol and 4 mL of 0.25% Marcaine  in the syringe from an anterolateral joint line approach, using a 25-gauge needle, under sterile Betadine prep, using ethyl chloride as a topical refrigerant, for a diagnosis of osteoarthritis. The patient appeared to tolerate it well. The patient should return here periodically as needed. She additionally has had a longstanding issue with chronic failed back syndrome. She was offered a back brace recently and at this time has desired to have it applied. Encounter Diagnoses   Name Primary?  Primary osteoarthritis of right knee Yes    Failed back surgical syndrome           Procedures    MI ARTHROCENTESIS ASPIR&/INJ MAJOR JT/BURSA W/O US    Breg Quick Draw Lumbar Brace     Patient was prescribed a Breg Quick Draw Lumbar Brace. The lumbar spine will require stabilization / immobilization from this semi-rigid / rigid orthosis to improve their function. The orthosis will assist in protecting the affected area, provide functional support and facilitate healing. This orthosis is required for the following reasons:    Facilitate healing following a surgical procedure on the spine or related soft tissue    The patient was educated and fit by a healthcare professional with expert knowledge and specialization in brace application while under the direct supervision of the physician.   Verbal and written instructions for the use of and application of this item were provided. They were instructed to contact the office immediately should the brace result in increased pain, decreased sensation, increased swelling or worsening of the condition.

## 2021-11-02 ENCOUNTER — TELEPHONE (OUTPATIENT)
Dept: ORTHOPEDIC SURGERY | Age: 66
End: 2021-11-02

## 2021-11-02 NOTE — TELEPHONE ENCOUNTER
General Question     Subject: GEL INJECTIONS  Patient and /or Facility Request: PATIENT REC'D LAST GEL  INJ 9-14-21 MARLENA INJ 8/2/21 AND SHE WANTS TO MAKE AN APPT TO GET THEM AGAIN. I TOLD HER IT WAS 6 MONTHS FOR GEL AND 3 MONTHS FOR MARLENA.  STATES SOMEONE TOLD HER 2..WOULD LIKE SOMEONE FROM THE OFFICE TO GIVE HER A CALL  Contact Number: [phone]

## 2021-11-09 ENCOUNTER — HOSPITAL ENCOUNTER (OUTPATIENT)
Age: 66
Discharge: HOME | End: 2021-11-09
Payer: MEDICARE

## 2021-11-09 ENCOUNTER — HOSPITAL ENCOUNTER (OUTPATIENT)
Dept: GENERAL RADIOLOGY | Age: 66
Discharge: HOME | End: 2021-11-09
Payer: MEDICARE

## 2021-11-09 DIAGNOSIS — R07.89 PAIN, CHEST WALL: ICD-10-CM

## 2021-11-09 PROCEDURE — 71110 X-RAY EXAM RIBS BIL 3 VIEWS: CPT

## 2021-11-09 PROCEDURE — 73000 X-RAY EXAM OF COLLAR BONE: CPT

## 2021-12-13 ENCOUNTER — HOSPITAL ENCOUNTER (OUTPATIENT)
Dept: GENERAL RADIOLOGY | Age: 66
Discharge: HOME | End: 2021-12-13
Payer: MEDICARE

## 2021-12-13 DIAGNOSIS — N95.1 MENOPAUSAL STATE: ICD-10-CM

## 2021-12-13 PROCEDURE — 77080 DXA BONE DENSITY AXIAL: CPT

## 2022-01-27 ENCOUNTER — OFFICE VISIT (OUTPATIENT)
Dept: ENT CLINIC | Age: 67
End: 2022-01-27
Payer: MEDICARE

## 2022-01-27 VITALS
TEMPERATURE: 97.9 F | SYSTOLIC BLOOD PRESSURE: 131 MMHG | WEIGHT: 170.8 LBS | BODY MASS INDEX: 31.43 KG/M2 | HEART RATE: 98 BPM | HEIGHT: 62 IN | DIASTOLIC BLOOD PRESSURE: 82 MMHG

## 2022-01-27 DIAGNOSIS — R49.0 DYSPHONIA: ICD-10-CM

## 2022-01-27 DIAGNOSIS — J38.7 PRESBYLARYNGES: ICD-10-CM

## 2022-01-27 DIAGNOSIS — J31.2 CHRONIC SORE THROAT: Primary | ICD-10-CM

## 2022-01-27 PROCEDURE — 99203 OFFICE O/P NEW LOW 30 MIN: CPT | Performed by: OTOLARYNGOLOGY

## 2022-01-27 PROCEDURE — 31575 DIAGNOSTIC LARYNGOSCOPY: CPT | Performed by: OTOLARYNGOLOGY

## 2022-01-27 NOTE — PROGRESS NOTES
Anna Marie Rodriguez 89, [address], [address] [place] [address]  P: [phone]       Patient     [facility]  1955    ChiefComplaint     Chief Complaint   Patient presents with   Sismaria g Anderson     States her voice keeps changing.  Pharyngitis     Throat hurts all of the time, has been going on for about 2 years, is getting worse       History of Present Illness     Candi Morrell is a 8year-old female here today for evaluation of chronic left-sided sore throat that has been present x2 years. Reports burning sensation that is there constantly only on the left side. Has been to multiple providers and no etiology has been found. No significant history of acid reflux. Onset did happen after inspire implantation approximately 4 years ago.     Past Medical History     Past Medical History:   Diagnosis Date    Angina at rest [facility])     Anxiety     Arthritis     hands and hip    Blood transfusion     after back surgery    Bradycardia     Bronchiectasis ([facility] [address] 11/05/2013    CAD (coronary artery disease)     Chronic back pain     Hyperlipidemia     Hypertension     Localized morphea     Multiple drug resistant organism (MDRO) culture positive 04/13/2021    E.COLI-URINE    NATHAN treated with BiPAP     Pacemaker     Stress incontinence     Thyroid disease     hypothyroid    Trochanteric bursitis of left hip 04/12/2019       Past Surgical History     Past Surgical History:   Procedure Laterality Date    ABDOMEN SURGERY  9/9/11     REPAIR INCISIONAL HERNIA REDUCIBLE WITH POSSIBLE MESH    BACK SURGERY      x3    BACK SURGERY      stimulator    BLADDER SUSPENSION      CARDIAC PACEMAKER PLACEMENT      CATARACT REMOVAL WITH IMPLANT Left 03/08/2018    PHACO EMULSIFICATION OF CATARACT WITH INTRAOCULAR LENS IMPLANT LEFT EYE    CERVICAL DISCECTOMY  6/2014    and fusion    CHOLECYSTECTOMY      COLONOSCOPY  2002 1/26/12    ENDOSCOPY, COLON, DIAGNOSTIC      EPIDURAL STEROID INJECTION Left 8/15/2019    LEFT KNEE GENICULAR NERVE BLOCK SITE CONFIRMED BY FLUOROSCOPY performed by Joann Xiong MD at [address] Right 04/05/2018    cataract removal    FOOT SURGERY Right 12/6/12    arthroplasty    FOOT SURGERY Left 04/30/2015    FOOT SURGERY Left 4/30/15    GASTRIC BYPASS SURGERY  [id number]    HYSTERECTOMY      KNEE ARTHROSCOPY Left 10/3/2014    part. medial & lateral meniscectomy, synovectomy plica exc    KNEE ARTHROSCOPY Right 10/13/15    partial medial meniscectomy, chondroplasty, partial lateral meniscectomy    NECK SURGERY      OTHER SURGICAL HISTORY  april 2013    removal spinal cord stimulater    OTHER SURGICAL HISTORY      bladder stimulator    OTHER SURGICAL HISTORY  08/30/2017    left patellofemoral arthroplasty    OTHER SURGICAL HISTORY  05/09/2018    convert left patellofemoral arthroplasty to left total knee replacement   Vibha Fails  2011    Dr Mahoney/checked last week/told has 12 more yrs for this one/set at 60    SKIN BIOPSY      abdomen    SPINE SURGERY      stimulator    THROAT SURGERY      polyps removed    TONSILLECTOMY      UPPER GASTROINTESTINAL ENDOSCOPY      UPPER GASTROINTESTINAL ENDOSCOPY  05/03/2017       Family History     Family History   Problem Relation Age of Onset    Heart Disease Father     Cancer Sister         multiple organs    Other Sister     Asthma Neg Hx     Diabetes Neg Hx     Emphysema Neg Hx     Heart Failure Neg Hx     Hypertension Neg Hx        Social History     Social History     Tobacco Use    Smoking status: Never Smoker    Smokeless tobacco: Never Used   Vaping Use    Vaping Use: Never used   Substance Use Topics    Alcohol use: No     Alcohol/week: 0.0 standard drinks    Drug use: No        Allergies     Allergies   Allergen Reactions    Meloxicam Itching and Other (See Comments)     Tolerates Ketorolac/Bromfenac ophthalmic drops.     Acetaminophen Other (See Comments)     Unable to take due to liver  Unable to take due to liver  Unable to take due to liver  Unable to take due to liver      Benzoin Compound      Blisters from adhesive compound under steri strips after knee VAS      Lyrica [Pregabalin]      Keeps patient awake    Quetiapine Fumarate      Other reaction(s): confused, dizzy  Other reaction(s): Unknown  seroquel      Seroquel [Quetiapine Fumarate] Other (See Comments)     Other reaction(s): confused, dizzy  Pt unable to recall reaction    Tizanidine      hallucinations      Adhesive Tape Rash     Mild dermatitis at site of paper tape, with history of previous skin rashes to tape. STERI-STRIPS  Can use paper tape can not  use transpore    STERI-STRIPS     Mild dermatitis at site    Can use paper tape can not  use transpore    Balsam [place] Oil Rash     Positive patch test results to [facility]    Positive patch test results to [facility]  Positive patch test results to [facility]    Positive patch test results to [facility]  Positive patch test results to [facility]    Linaclotide Rash    Other Rash     Can use paper tape can not  use transpore    Quetiapine Other (See Comments)     Other reaction(s): Other (See Comments)  Pt unable to recall reaction  Other reaction(s): Other (See Comments)    Pt unable to recall reaction  Pt unable to recall reaction  Made her dizzy and confused   Other reaction(s): confused, dizzy  Pt unable to recall reaction  Other reaction(s): confused, dizzy  Other reaction(s): Unknown  seroquel  Other reaction(s): Other (See Comments)  Pt unable to recall reaction  Other reaction(s):  Other (See Comments)    Wound Dressings Rash     Positive patch test results to [facility]         Medications     Current Outpatient Medications   Medication Sig Dispense Refill    Biotin 1000 MCG TABS Take by mouth daily      cephALEXin (KEFLEX) 500 MG capsule Take 500 mg by mouth 3 times daily      lamoTRIgine (LAMICTAL) 100 MG tablet TAKE 1/2 TABLET BY MOUTH TWO TIMES A DAY      oxyCODONE HCl (OXY-IR) 10 MG immediate release tablet TAKE 1 TABLET BY MOUTH EVERY SIX HOURS AS NEEDED      sulfamethoxazole-trimethoprim (BACTRIM DS;SEPTRA DS) 800-160 MG per tablet TAKE 1 TABLET BY MOUTH TWO TIMES A DAY      sodium chloride 0.9 % irrigation       ondansetron (ZOFRAN-ODT) 8 MG TBDP disintegrating tablet Take 8 mg by mouth every 8 hours as needed      nystatin (MYCOSTATIN) 273738 UNIT/GM powder APPLY TWO TIMES A DAY    TO AFFECTED AREA      midodrine (PROAMATINE) 5 MG tablet TAKE 1 TABLET BY MOUTH THREE TIMES A DAY. generic for proamatine.  clonazePAM (KLONOPIN) 2 MG tablet TAKE 1 TABLET BY MOUTH TWO TIMES A DAY      cetirizine (ZYRTEC) 10 MG tablet Take 10 mg by mouth daily      gabapentin (NEURONTIN) 300 MG capsule TAKE 1 CAPSULE BY MOUTH EVERY TWELVE HOURS FOR 30 DAYS      fludrocortisone (FLORINEF) 0.1 MG tablet Take 0.1 mg by mouth 2 times daily      Sennosides (SENNA LAX PO) Take by mouth daily       triamcinolone (KENALOG) 0.1 % cream Apply topically 2 times daily Apply topically 2 times daily.  Fesoterodine Fumarate ER (TOVIAZ) 8 MG TB24 Take by mouth daily       eszopiclone (LUNESTA) 2 MG TABS Take 2 mg by mouth nightly.  cyanocobalamin 1000 MCG tablet Take 2,500 mcg by mouth daily      levothyroxine (SYNTHROID) 88 MCG tablet Take 88 mcg by mouth Daily      donepezil (ARICEPT) 10 MG tablet Take 1 tablet by mouth nightly 90 tablet 2    MONOJECT 60CC SYRINGE CATH TIP 60 ML [facility]       KLOR-CON M20 20 MEQ extended release tablet Take 20 mEq by mouth 2 times daily       sertraline (ZOLOFT) 100 MG tablet Take 200 mg by mouth nightly       pantoprazole (PROTONIX) 40 MG tablet TAKE 1 TABLET BY MOUTH DAILY  0    fluticasone (FLONASE) 50 MCG/ACT nasal spray 1 spray by Nasal route nightly.  (Patient taking differently: 1 spray by Nasal route as needed ) 1 Bottle 5    Multiple Vitamin (THERA) TABS Take 1 tablet by mouth daily No current facility-administered medications for this visit. Review of Systems     Review of Systems   Constitutional: Negative for appetite change, chills, fatigue, fever and unexpected weight change. HENT: Positive for sore throat and voice change. Negative for congestion, ear discharge, ear pain, facial swelling, hearing loss, nosebleeds, postnasal drip, sinus pressure, sneezing, tinnitus and trouble swallowing. Eyes: Negative for itching. Respiratory: Negative for apnea, cough and shortness of breath. Endocrine: Negative for cold intolerance and heat intolerance. Musculoskeletal: Negative for myalgias and neck pain. Skin: Negative for rash. Allergic/Immunologic: Negative for environmental allergies. Neurological: Negative for dizziness and headaches. Psychiatric/Behavioral: Negative for confusion, decreased concentration and sleep disturbance. PhysicalExam     Vitals:    01/27/22 1404   BP: 131/82   Site: Left Upper Arm   Position: Sitting   Cuff Size: Medium Adult   Pulse: 98   Temp: 97.9 °F (36.6 °C)   TempSrc: Infrared   Weight: 170 lb 12.8 oz (77.5 kg)   Height: 5' 2\" (1.575 m)       Physical Exam  Constitutional:       General: She is not in acute distress. Appearance: She is well-developed. HENT:      Head: Normocephalic and atraumatic. Right Ear: Tympanic membrane, ear canal and external ear normal. No drainage. No middle ear effusion. Tympanic membrane is not bulging. Tympanic membrane has normal mobility. Left Ear: Tympanic membrane, ear canal and external ear normal. No drainage. No middle ear effusion. Tympanic membrane is not bulging. Tympanic membrane has normal mobility. Nose: No mucosal edema or rhinorrhea. Mouth/Throat:      Lips: Pink. Mouth: Mucous membranes are moist.      Tongue: No lesions. Palate: No mass. Pharynx: Uvula midline. Eyes:      Pupils: Pupils are equal, round, and reactive to light.    Neck: Thyroid: No thyroid mass or thyromegaly. Trachea: Trachea and phonation normal.   Cardiovascular:      Pulses: Normal pulses. Pulmonary:      Effort: Pulmonary effort is normal. No accessory muscle usage or respiratory distress. Breath sounds: No stridor. Musculoskeletal:      Cervical back: Full passive range of motion without pain. Lymphadenopathy:      Head:      Right side of head: No submental or submandibular adenopathy. Left side of head: No submental or submandibular adenopathy. Cervical: No cervical adenopathy. Right cervical: No superficial, deep or posterior cervical adenopathy. Left cervical: No superficial, deep or posterior cervical adenopathy. Skin:     General: Skin is warm and dry. Neurological:      Mental Status: She is alert and oriented to person, place, and time. Cranial Nerves: No cranial nerve deficit. Coordination: Coordination normal.      Gait: Gait normal.   Psychiatric:         Thought Content: Thought content normal.           Procedure     Flexible Laryngoscopy    Pre op: chronic sore throat  Post op: same  Procedure : Flexible Laryngoscopy  Surgeon: Luke Ruiz DO  Anesthesia: Afrin with 4% lidocaine  Indication: Laryngeal mirror examination was not tolerated due to gag reflex  Description:  The scope was passed along the floor of the right naris to the level of the larynx. There was no evidence of concerning masses or lesions of the base of tongue, vallecula, epiglottis, aryepiglottic folds, arytenoids, false vocal folds, true vocal folds, or pyriform sinuses. True vocal folds exhibited symmetric motion bilaterally without evidence of paralysis or paresis. The scope was removed. The patient tolerated the procedure without difficulty. There were no complications. 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   Provided reassurance no evidence of mass or lesion causing throat pain      Assessment and Plan     1. 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 Dysphonia  -Secondary to above    Return as needed    Jennifer Leigh DO  1/28/22      Portions of this note were dictated using Dragon.  There may be linguistic errors secondary to the use of this program.

## 2022-01-28 ASSESSMENT — ENCOUNTER SYMPTOMS
APNEA: 0
SORE THROAT: 1
SHORTNESS OF BREATH: 0
TROUBLE SWALLOWING: 0
COUGH: 0
FACIAL SWELLING: 0
SINUS PRESSURE: 0
VOICE CHANGE: 1
EYE ITCHING: 0

## 2022-02-08 ENCOUNTER — TELEPHONE (OUTPATIENT)
Dept: MAMMOGRAPHY | Age: 67
End: 2022-02-08

## 2022-02-08 ENCOUNTER — HOSPITAL ENCOUNTER (OUTPATIENT)
Dept: MAMMOGRAPHY | Age: 67
Discharge: HOME | End: 2022-02-08
Payer: MEDICARE

## 2022-02-08 DIAGNOSIS — Z12.39 SCREENING BREAST EXAMINATION: ICD-10-CM

## 2022-02-08 PROCEDURE — 77063 BREAST TOMOSYNTHESIS BI: CPT

## 2022-02-17 ENCOUNTER — OFFICE VISIT (OUTPATIENT)
Dept: ENT CLINIC | Age: 67
End: 2022-02-17
Payer: MEDICARE

## 2022-02-17 ENCOUNTER — TELEPHONE (OUTPATIENT)
Dept: ENT CLINIC | Age: 67
End: 2022-02-17

## 2022-02-17 VITALS — BODY MASS INDEX: 31.28 KG/M2 | HEIGHT: 62 IN | WEIGHT: 170 LBS | TEMPERATURE: 97 F

## 2022-02-17 DIAGNOSIS — H90.3 ASYMMETRIC SNHL (SENSORINEURAL HEARING LOSS): ICD-10-CM

## 2022-02-17 DIAGNOSIS — H90.3 SENSORINEURAL HEARING LOSS (SNHL) OF BOTH EARS: ICD-10-CM

## 2022-02-17 DIAGNOSIS — H93.13 TINNITUS OF BOTH EARS: ICD-10-CM

## 2022-02-17 DIAGNOSIS — G47.33 OSA (OBSTRUCTIVE SLEEP APNEA): Primary | ICD-10-CM

## 2022-02-17 PROCEDURE — 99214 OFFICE O/P EST MOD 30 MIN: CPT | Performed by: OTOLARYNGOLOGY

## 2022-02-17 ASSESSMENT — ENCOUNTER SYMPTOMS
SINUS PRESSURE: 0
EYE ITCHING: 0
COUGH: 0
APNEA: 0
VOICE CHANGE: 0
SHORTNESS OF BREATH: 0
FACIAL SWELLING: 0
TROUBLE SWALLOWING: 0
SORE THROAT: 0

## 2022-02-17 NOTE — TELEPHONE ENCOUNTER
[facility]. The office did not answer. LVM asking them to call back. Provided them with the patients last name and the initial of the patients first name to call in regards to. We need a copy of the patients hearing test faxed to our office.

## 2022-02-17 NOTE — PROGRESS NOTES
Anna Marie Rodriguez 89, [address], [address] [place], [place] [address]roro Staton  P: [phone]       Patient     Heriberto Guardian  1955    ChiefComplaint     Chief Complaint   Patient presents with    Tinnitus     States her ears keeps ringing, not having any vertigo.  Other     Discuss inspire. History of Present Illness     Mikki Sharma is a 57-year-old female here today for evaluation of bilateral tinnitus and asymmetric hearing loss. States she is obtaining [facility] and was told she needed medical clearance prior to this. Hearing loss has been ongoing for several years. No family history of early onset hearing loss. History of head trauma. States she is unable to have MRI as she has an old pacemaker. She is interested in starting using her inspire, was hoping to establish with me as per patient [facility] told her they no longer program needs.       Past Medical History     Past Medical History:   Diagnosis Date    Angina at rest [facility])     Anxiety     Arthritis     hands and hip    Blood transfusion     after back surgery    Bradycardia     Bronchiectasis ([address] 11/05/2013    CAD (coronary artery disease)     Chronic back pain     Hyperlipidemia     Hypertension     Localized morphea     Multiple drug resistant organism (MDRO) culture positive 04/13/2021    E.COLI-URINE    NATHAN treated with BiPAP     Pacemaker     Stress incontinence     Thyroid disease     hypothyroid    Trochanteric bursitis of left hip 04/12/2019       Past Surgical History     Past Surgical History:   Procedure Laterality Date    ABDOMEN SURGERY  9/9/11     REPAIR INCISIONAL HERNIA REDUCIBLE WITH POSSIBLE MESH    BACK SURGERY      x3    BACK SURGERY      stimulator    BLADDER SUSPENSION      CARDIAC PACEMAKER PLACEMENT      CATARACT REMOVAL WITH IMPLANT Left 03/08/2018    PHACO EMULSIFICATION OF CATARACT WITH INTRAOCULAR LENS IMPLANT LEFT EYE    Reactions    Meloxicam Itching and Other (See Comments)     Tolerates Ketorolac/Bromfenac ophthalmic drops.  Acetaminophen Other (See Comments)     Unable to take due to liver  Unable to take due to liver  Unable to take due to liver  Unable to take due to liver      Benzoin Compound      Blisters from adhesive compound under steri strips after knee VAS      Lyrica [Pregabalin]      Keeps patient awake    Quetiapine Fumarate      Other reaction(s): confused, dizzy  Other reaction(s): Unknown  seroquel      Seroquel [Quetiapine Fumarate] Other (See Comments)     Other reaction(s): confused, dizzy  Pt unable to recall reaction    Tizanidine      hallucinations      Adhesive Tape Rash     Mild dermatitis at site of paper tape, with history of previous skin rashes to tape. STERI-STRIPS  Can use paper tape can not  use transpore    STERI-STRIPS     Mild dermatitis at site    Can use paper tape can not  use transpore    Balsam [place] Oil Rash     Positive patch test results to [facility]    Positive patch test results to [facility]  Positive patch test results to [facility]    Positive patch test results to [facility]  Positive patch test results to [facility]    Linaclotide Rash    Other Rash     Can use paper tape can not  use transpore    Quetiapine Other (See Comments)     Other reaction(s): Other (See Comments)  Pt unable to recall reaction  Other reaction(s): Other (See Comments)    Pt unable to recall reaction  Pt unable to recall reaction  Made her dizzy and confused   Other reaction(s): confused, dizzy  Pt unable to recall reaction  Other reaction(s): confused, dizzy  Other reaction(s): Unknown  seroquel  Other reaction(s): Other (See Comments)  Pt unable to recall reaction  Other reaction(s):  Other (See Comments)    Wound Dressings Rash     Positive patch test results to [facility]         Medications     Current Outpatient Medications   Medication Sig Dispense Refill  Biotin 1000 MCG TABS Take by mouth daily      cephALEXin (KEFLEX) 500 MG capsule Take 500 mg by mouth 3 times daily      oxyCODONE HCl (OXY-IR) 10 MG immediate release tablet TAKE 1 TABLET BY MOUTH EVERY SIX HOURS AS NEEDED      sulfamethoxazole-trimethoprim (BACTRIM DS;SEPTRA DS) 800-160 MG per tablet TAKE 1 TABLET BY MOUTH TWO TIMES A DAY      sodium chloride 0.9 % irrigation       ondansetron (ZOFRAN-ODT) 8 MG TBDP disintegrating tablet Take 8 mg by mouth every 8 hours as needed      nystatin (MYCOSTATIN) 151246 UNIT/GM powder APPLY TWO TIMES A DAY    TO AFFECTED AREA      clonazePAM (KLONOPIN) 2 MG tablet TAKE 1 TABLET BY MOUTH TWO TIMES A DAY      cetirizine (ZYRTEC) 10 MG tablet Take 10 mg by mouth daily      fludrocortisone (FLORINEF) 0.1 MG tablet Take 0.1 mg by mouth 2 times daily      Sennosides (SENNA LAX PO) Take by mouth daily       triamcinolone (KENALOG) 0.1 % cream Apply topically 2 times daily Apply topically 2 times daily.  Fesoterodine Fumarate ER (TOVIAZ) 8 MG TB24 Take by mouth daily       cyanocobalamin 1000 MCG tablet Take 2,500 mcg by mouth daily      levothyroxine (SYNTHROID) 88 MCG tablet Take 88 mcg by mouth Daily      donepezil (ARICEPT) 10 MG tablet Take 1 tablet by mouth nightly 90 tablet 2    MONOJECT 60CC SYRINGE CATH TIP 60 ML [facility]       KLOR-CON M20 20 MEQ extended release tablet Take 20 mEq by mouth 2 times daily       pantoprazole (PROTONIX) 40 MG tablet TAKE 1 TABLET BY MOUTH DAILY  0    fluticasone (FLONASE) 50 MCG/ACT nasal spray 1 spray by Nasal route nightly. (Patient taking differently: 1 spray by Nasal route as needed ) 1 Bottle 5    Multiple Vitamin (THERA) TABS Take 1 tablet by mouth daily       lamoTRIgine (LAMICTAL) 100 MG tablet TAKE 1/2 TABLET BY MOUTH TWO TIMES A DAY      midodrine (PROAMATINE) 5 MG tablet TAKE 1 TABLET BY MOUTH THREE TIMES A DAY. generic for proamatine.       gabapentin (NEURONTIN) 300 MG capsule TAKE 1 CAPSULE BY MOUTH EVERY TWELVE HOURS FOR 30 DAYS      eszopiclone (LUNESTA) 2 MG TABS Take 2 mg by mouth nightly.  sertraline (ZOLOFT) 100 MG tablet Take 200 mg by mouth nightly        No current facility-administered medications for this visit. Review of Systems     Review of Systems   Constitutional: Negative for appetite change, chills, fatigue, fever and unexpected weight change. HENT: Positive for hearing loss and tinnitus. Negative for congestion, ear discharge, ear pain, facial swelling, nosebleeds, postnasal drip, sinus pressure, sneezing, sore throat, trouble swallowing and voice change. Eyes: Negative for itching. Respiratory: Negative for apnea, cough and shortness of breath. Endocrine: Negative for cold intolerance and heat intolerance. Musculoskeletal: Negative for myalgias and neck pain. Skin: Negative for rash. Allergic/Immunologic: Negative for environmental allergies. Neurological: Negative for dizziness and headaches. Psychiatric/Behavioral: Negative for confusion, decreased concentration and sleep disturbance. PhysicalExam     Vitals:    02/17/22 0840   Temp: 97 °F (36.1 °C)   TempSrc: Infrared   Weight: 170 lb (77.1 kg)   Height: 5' 2\" (1.575 m)       Physical Exam  Constitutional:       General: She is not in acute distress. Appearance: She is well-developed. HENT:      Head: Normocephalic and atraumatic. Right Ear: Tympanic membrane, ear canal and external ear normal. No drainage. No middle ear effusion. Tympanic membrane is not bulging. Tympanic membrane has normal mobility. Left Ear: Tympanic membrane, ear canal and external ear normal. No drainage. No middle ear effusion. Tympanic membrane is not bulging. Tympanic membrane has normal mobility. Nose: No mucosal edema or rhinorrhea. Mouth/Throat:      Lips: Pink. Mouth: Mucous membranes are moist.      Tongue: No lesions. Palate: No mass. Pharynx: Uvula midline.    Eyes:

## 2022-02-24 ENCOUNTER — TELEPHONE (OUTPATIENT)
Dept: ENT CLINIC | Age: 67
End: 2022-02-24

## 2022-02-24 NOTE — TELEPHONE ENCOUNTER
Voicemail left asking patient to return call. [facility] There is an asymmetry present in the high frequencies. Patient can either follow-up in 1 year with repeat audio or have CT temporal bone for further evaluation.

## 2022-02-28 ENCOUNTER — TELEPHONE (OUTPATIENT)
Dept: ENT CLINIC | Age: 67
End: 2022-02-28

## 2022-02-28 DIAGNOSIS — H90.3 ASYMMETRIC SNHL (SENSORINEURAL HEARING LOSS): Primary | ICD-10-CM

## 2022-02-28 NOTE — TELEPHONE ENCOUNTER
Patient called in wanting to have  CT temporal bone for further evaluation    Patient last office visit  2/17/2022    Patient call back number [phone]

## 2022-03-07 ENCOUNTER — HOSPITAL ENCOUNTER (OUTPATIENT)
Age: 67
Discharge: HOME | End: 2022-03-07
Payer: MEDICARE

## 2022-03-07 ENCOUNTER — HOSPITAL ENCOUNTER (OUTPATIENT)
Dept: CT IMAGING | Age: 67
Discharge: HOME | End: 2022-03-07
Payer: MEDICARE

## 2022-03-07 DIAGNOSIS — H90.3 ASYMMETRIC SNHL (SENSORINEURAL HEARING LOSS): ICD-10-CM

## 2022-03-07 LAB
CREAT SERPL-MCNC: 0.8 MG/DL (ref 0.6–1.2)
GFR AFRICAN AMERICAN: >60
GFR NON-AFRICAN AMERICAN: >60

## 2022-03-07 PROCEDURE — 70482 CT ORBIT/EAR/FOSSA W/O&W/DYE: CPT

## 2022-03-07 PROCEDURE — 36415 COLL VENOUS BLD VENIPUNCTURE: CPT

## 2022-03-07 PROCEDURE — 6360000004 HC RX CONTRAST MEDICATION: Performed by: OTOLARYNGOLOGY

## 2022-03-07 PROCEDURE — 82565 ASSAY OF CREATININE: CPT

## 2022-03-07 RX ADMIN — IOPAMIDOL 75 ML: 755 INJECTION, SOLUTION INTRAVENOUS at 07:53

## 2022-03-18 ENCOUNTER — APPOINTMENT (OUTPATIENT)
Dept: GENERAL RADIOLOGY | Age: 67
End: 2022-03-18
Payer: MEDICARE

## 2022-03-18 ENCOUNTER — HOSPITAL ENCOUNTER (OUTPATIENT)
Age: 67
Setting detail: OBSERVATION
Discharge: HOME | End: 2022-03-19
Attending: EMERGENCY MEDICINE | Admitting: INTERNAL MEDICINE
Payer: MEDICARE

## 2022-03-18 ENCOUNTER — APPOINTMENT (OUTPATIENT)
Dept: CT IMAGING | Age: 67
End: 2022-03-18
Payer: MEDICARE

## 2022-03-18 DIAGNOSIS — R06.02 SOB (SHORTNESS OF BREATH) ON EXERTION: Primary | ICD-10-CM

## 2022-03-18 DIAGNOSIS — S22.070A CLOSED WEDGE COMPRESSION FRACTURE OF T9 VERTEBRA, INITIAL ENCOUNTER (HCC): ICD-10-CM

## 2022-03-18 DIAGNOSIS — R55 SYNCOPE, NEAR: ICD-10-CM

## 2022-03-18 LAB
A/G RATIO: 1.9 (ref 1.1–2.2)
ALBUMIN SERPL-MCNC: 4.2 G/DL (ref 3.4–5)
ALP BLD-CCNC: 81 U/L (ref 40–129)
ALT SERPL-CCNC: 21 U/L (ref 10–40)
ANION GAP SERPL CALCULATED.3IONS-SCNC: 16 MMOL/L (ref 3–16)
AST SERPL-CCNC: 35 U/L (ref 15–37)
BASE EXCESS VENOUS: -1.7 MMOL/L (ref -3–3)
BASOPHILS ABSOLUTE: 0 K/UL (ref 0–0.2)
BASOPHILS RELATIVE PERCENT: 0.3 %
BILIRUB SERPL-MCNC: <0.2 MG/DL (ref 0–1)
BILIRUBIN URINE: NEGATIVE
BLOOD, URINE: NEGATIVE
BUN BLDV-MCNC: 19 MG/DL (ref 7–20)
CALCIUM SERPL-MCNC: 9.5 MG/DL (ref 8.3–10.6)
CARBOXYHEMOGLOBIN: 1.7 % (ref 0–1.5)
CHLORIDE BLD-SCNC: 104 MMOL/L (ref 99–110)
CLARITY: CLEAR
CO2: 21 MMOL/L (ref 21–32)
COLOR: YELLOW
CREAT SERPL-MCNC: 0.8 MG/DL (ref 0.6–1.2)
D DIMER: 398 NG/ML DDU (ref 0–229)
EOSINOPHILS ABSOLUTE: 0 K/UL (ref 0–0.6)
EOSINOPHILS RELATIVE PERCENT: 0.1 %
GFR AFRICAN AMERICAN: >60
GFR NON-AFRICAN AMERICAN: >60
GLUCOSE BLD-MCNC: 117 MG/DL (ref 70–99)
GLUCOSE URINE: NEGATIVE MG/DL
HCO3 VENOUS: 22.3 MMOL/L (ref 23–29)
HCT VFR BLD CALC: 35.1 % (ref 36–48)
HEMOGLOBIN: 11.3 G/DL (ref 12–16)
INFLUENZA A: NOT DETECTED
INFLUENZA B: NOT DETECTED
KETONES, URINE: NEGATIVE MG/DL
LEUKOCYTE ESTERASE, URINE: NEGATIVE
LYMPHOCYTES ABSOLUTE: 1.2 K/UL (ref 1–5.1)
LYMPHOCYTES RELATIVE PERCENT: 18.5 %
MCH RBC QN AUTO: 26.6 PG (ref 26–34)
MCHC RBC AUTO-ENTMCNC: 32.1 G/DL (ref 31–36)
MCV RBC AUTO: 82.9 FL (ref 80–100)
METHEMOGLOBIN VENOUS: 0.2 %
MICROSCOPIC EXAMINATION: NORMAL
MONOCYTES ABSOLUTE: 0.3 K/UL (ref 0–1.3)
MONOCYTES RELATIVE PERCENT: 4.4 %
NEUTROPHILS ABSOLUTE: 4.9 K/UL (ref 1.7–7.7)
NEUTROPHILS RELATIVE PERCENT: 76.7 %
NITRITE, URINE: NEGATIVE
O2 SAT, VEN: 97 %
O2 THERAPY: ABNORMAL
PCO2, VEN: 35.1 MMHG (ref 40–50)
PDW BLD-RTO: 17 % (ref 12.4–15.4)
PH UA: 6 (ref 5–8)
PH VENOUS: 7.42 (ref 7.35–7.45)
PLATELET # BLD: 265 K/UL (ref 135–450)
PMV BLD AUTO: 7.6 FL (ref 5–10.5)
PO2, VEN: 83.3 MMHG (ref 25–40)
POTASSIUM REFLEX MAGNESIUM: 4.2 MMOL/L (ref 3.5–5.1)
PRO-BNP: 151 PG/ML (ref 0–124)
PROCALCITONIN: 0.05 NG/ML (ref 0–0.15)
PROTEIN UA: NEGATIVE MG/DL
RBC # BLD: 4.23 M/UL (ref 4–5.2)
SARS-COV-2 RNA, RT PCR: NOT DETECTED
SODIUM BLD-SCNC: 141 MMOL/L (ref 136–145)
SPECIFIC GRAVITY UA: 1.01 (ref 1–1.03)
TCO2 CALC VENOUS: 23 MMOL/L
TOTAL PROTEIN: 6.4 G/DL (ref 6.4–8.2)
TROPONIN: <0.01 NG/ML
URINE REFLEX TO CULTURE: NORMAL
URINE TYPE: NORMAL
UROBILINOGEN, URINE: 0.2 E.U./DL
WBC # BLD: 6.4 K/UL (ref 4–11)

## 2022-03-18 PROCEDURE — 6370000000 HC RX 637 (ALT 250 FOR IP): Performed by: NURSE PRACTITIONER

## 2022-03-18 PROCEDURE — 82803 BLOOD GASES ANY COMBINATION: CPT

## 2022-03-18 PROCEDURE — 99285 EMERGENCY DEPT VISIT HI MDM: CPT

## 2022-03-18 PROCEDURE — 84145 PROCALCITONIN (PCT): CPT

## 2022-03-18 PROCEDURE — 83880 ASSAY OF NATRIURETIC PEPTIDE: CPT

## 2022-03-18 PROCEDURE — 36415 COLL VENOUS BLD VENIPUNCTURE: CPT

## 2022-03-18 PROCEDURE — 71046 X-RAY EXAM CHEST 2 VIEWS: CPT

## 2022-03-18 PROCEDURE — 80053 COMPREHEN METABOLIC PANEL: CPT

## 2022-03-18 PROCEDURE — 81003 URINALYSIS AUTO W/O SCOPE: CPT

## 2022-03-18 PROCEDURE — 6360000004 HC RX CONTRAST MEDICATION: Performed by: NURSE PRACTITIONER

## 2022-03-18 PROCEDURE — 93005 ELECTROCARDIOGRAM TRACING: CPT | Performed by: NURSE PRACTITIONER

## 2022-03-18 PROCEDURE — 71260 CT THORAX DX C+: CPT

## 2022-03-18 PROCEDURE — 84484 ASSAY OF TROPONIN QUANT: CPT

## 2022-03-18 PROCEDURE — 87636 SARSCOV2 & INF A&B AMP PRB: CPT

## 2022-03-18 PROCEDURE — 85379 FIBRIN DEGRADATION QUANT: CPT

## 2022-03-18 PROCEDURE — 85025 COMPLETE CBC W/AUTO DIFF WBC: CPT

## 2022-03-18 RX ORDER — ESZOPICLONE 3 MG/1
TABLET, FILM COATED ORAL
COMMUNITY
Start: 2022-03-14

## 2022-03-18 RX ORDER — DESVENLAFAXINE 25 MG/1
50 TABLET, EXTENDED RELEASE ORAL DAILY
COMMUNITY
Start: 2022-01-11

## 2022-03-18 RX ORDER — IBUPROFEN 400 MG/1
400 TABLET ORAL ONCE
Status: COMPLETED | OUTPATIENT
Start: 2022-03-18 | End: 2022-03-18

## 2022-03-18 RX ORDER — 0.9 % SODIUM CHLORIDE 0.9 %
1000 INTRAVENOUS SOLUTION INTRAVENOUS ONCE
Status: COMPLETED | OUTPATIENT
Start: 2022-03-18 | End: 2022-03-19

## 2022-03-18 RX ORDER — GABAPENTIN 600 MG/1
TABLET ORAL
COMMUNITY
Start: 2022-03-07

## 2022-03-18 RX ORDER — MIDODRINE HYDROCHLORIDE 10 MG/1
TABLET ORAL
COMMUNITY
Start: 2022-01-27

## 2022-03-18 RX ORDER — ALENDRONATE SODIUM 35 MG/1
TABLET ORAL
COMMUNITY
Start: 2022-03-15

## 2022-03-18 RX ORDER — SODIUM FLUORIDE 1.1 G/100G
GEL, DENTIFRICE ORAL
COMMUNITY
Start: 2022-02-09

## 2022-03-18 RX ORDER — METOPROLOL SUCCINATE 25 MG/1
TABLET, EXTENDED RELEASE ORAL
Status: ON HOLD | COMMUNITY
Start: 2022-03-18 | End: 2022-06-13 | Stop reason: HOSPADM

## 2022-03-18 RX ORDER — FAMOTIDINE 20 MG/1
TABLET, FILM COATED ORAL DAILY
COMMUNITY

## 2022-03-18 RX ORDER — LEVOFLOXACIN 500 MG/1
TABLET, FILM COATED ORAL
Status: ON HOLD | COMMUNITY
Start: 2022-03-15 | End: 2022-03-19 | Stop reason: HOSPADM

## 2022-03-18 RX ORDER — MIDAZOLAM HYDROCHLORIDE 1 MG/ML
INJECTION INTRAMUSCULAR; INTRAVENOUS
Status: DISPENSED
Start: 2022-03-18 | End: 2022-03-19

## 2022-03-18 RX ORDER — RIMEGEPANT SULFATE 75 MG/75MG
75 TABLET, ORALLY DISINTEGRATING ORAL
COMMUNITY
Start: 2022-03-18

## 2022-03-18 RX ORDER — ROSUVASTATIN CALCIUM 10 MG/1
TABLET, COATED ORAL
COMMUNITY
Start: 2022-01-11

## 2022-03-18 RX ADMIN — IBUPROFEN 400 MG: 400 TABLET, FILM COATED ORAL at 22:01

## 2022-03-18 RX ADMIN — IOPAMIDOL 85 ML: 755 INJECTION, SOLUTION INTRAVENOUS at 21:32

## 2022-03-18 ASSESSMENT — ENCOUNTER SYMPTOMS
FACIAL SWELLING: 0
SORE THROAT: 0
NAUSEA: 0
ABDOMINAL PAIN: 0
VOMITING: 0
COLOR CHANGE: 0
SHORTNESS OF BREATH: 1
RHINORRHEA: 0

## 2022-03-18 ASSESSMENT — HEART SCORE: ECG: 0

## 2022-03-18 ASSESSMENT — PAIN SCALES - GENERAL: PAINLEVEL_OUTOF10: 10

## 2022-03-18 ASSESSMENT — PAIN DESCRIPTION - PAIN TYPE: TYPE: ACUTE PAIN

## 2022-03-19 ENCOUNTER — APPOINTMENT (OUTPATIENT)
Dept: NUCLEAR MEDICINE | Age: 67
End: 2022-03-19
Payer: MEDICARE

## 2022-03-19 VITALS
RESPIRATION RATE: 16 BRPM | BODY MASS INDEX: 30.36 KG/M2 | TEMPERATURE: 97.2 F | HEIGHT: 62 IN | WEIGHT: 165 LBS | OXYGEN SATURATION: 97 % | HEART RATE: 72 BPM | DIASTOLIC BLOOD PRESSURE: 86 MMHG | SYSTOLIC BLOOD PRESSURE: 154 MMHG

## 2022-03-19 LAB
A/G RATIO: 1.9 (ref 1.1–2.2)
ALBUMIN SERPL-MCNC: 4.2 G/DL (ref 3.4–5)
ALP BLD-CCNC: 82 U/L (ref 40–129)
ALT SERPL-CCNC: 21 U/L (ref 10–40)
ANION GAP SERPL CALCULATED.3IONS-SCNC: 14 MMOL/L (ref 3–16)
AST SERPL-CCNC: 32 U/L (ref 15–37)
BILIRUB SERPL-MCNC: 0.3 MG/DL (ref 0–1)
BUN BLDV-MCNC: 13 MG/DL (ref 7–20)
CALCIUM SERPL-MCNC: 9.1 MG/DL (ref 8.3–10.6)
CHLORIDE BLD-SCNC: 103 MMOL/L (ref 99–110)
CHOLESTEROL, TOTAL: 186 MG/DL (ref 0–199)
CO2: 22 MMOL/L (ref 21–32)
CREAT SERPL-MCNC: 0.6 MG/DL (ref 0.6–1.2)
EKG ATRIAL RATE: 75 BPM
EKG DIAGNOSIS: NORMAL
EKG P-R INTERVAL: 150 MS
EKG Q-T INTERVAL: 396 MS
EKG QRS DURATION: 84 MS
EKG QTC CALCULATION (BAZETT): 442 MS
EKG R AXIS: 143 DEGREES
EKG T AXIS: 155 DEGREES
EKG VENTRICULAR RATE: 75 BPM
GFR AFRICAN AMERICAN: >60
GFR NON-AFRICAN AMERICAN: >60
GLUCOSE BLD-MCNC: 119 MG/DL (ref 70–99)
HCT VFR BLD CALC: 35.7 % (ref 36–48)
HDLC SERPL-MCNC: 72 MG/DL (ref 40–60)
HEMOGLOBIN: 11.5 G/DL (ref 12–16)
LDL CHOLESTEROL CALCULATED: 102 MG/DL
LV EF: 58 %
LV EF: 76 %
LVEF MODALITY: NORMAL
LVEF MODALITY: NORMAL
MCH RBC QN AUTO: 26.5 PG (ref 26–34)
MCHC RBC AUTO-ENTMCNC: 32.3 G/DL (ref 31–36)
MCV RBC AUTO: 81.9 FL (ref 80–100)
PDW BLD-RTO: 16.3 % (ref 12.4–15.4)
PLATELET # BLD: 247 K/UL (ref 135–450)
PMV BLD AUTO: 8 FL (ref 5–10.5)
POTASSIUM REFLEX MAGNESIUM: 3.6 MMOL/L (ref 3.5–5.1)
RBC # BLD: 4.36 M/UL (ref 4–5.2)
SODIUM BLD-SCNC: 139 MMOL/L (ref 136–145)
TOTAL PROTEIN: 6.4 G/DL (ref 6.4–8.2)
TRIGL SERPL-MCNC: 61 MG/DL (ref 0–150)
TROPONIN: <0.01 NG/ML
VLDLC SERPL CALC-MCNC: 12 MG/DL
WBC # BLD: 5.6 K/UL (ref 4–11)

## 2022-03-19 PROCEDURE — A9502 TC99M TETROFOSMIN: HCPCS | Performed by: INTERNAL MEDICINE

## 2022-03-19 PROCEDURE — 2580000003 HC RX 258: Performed by: NURSE PRACTITIONER

## 2022-03-19 PROCEDURE — 84484 ASSAY OF TROPONIN QUANT: CPT

## 2022-03-19 PROCEDURE — 99220 PR INITIAL OBSERVATION CARE/DAY 70 MINUTES: CPT | Performed by: INTERNAL MEDICINE

## 2022-03-19 PROCEDURE — 80061 LIPID PANEL: CPT

## 2022-03-19 PROCEDURE — 93306 TTE W/DOPPLER COMPLETE: CPT

## 2022-03-19 PROCEDURE — G0378 HOSPITAL OBSERVATION PER HR: HCPCS

## 2022-03-19 PROCEDURE — 85027 COMPLETE CBC AUTOMATED: CPT

## 2022-03-19 PROCEDURE — 3430000000 HC RX DIAGNOSTIC RADIOPHARMACEUTICAL: Performed by: INTERNAL MEDICINE

## 2022-03-19 PROCEDURE — 80053 COMPREHEN METABOLIC PANEL: CPT

## 2022-03-19 PROCEDURE — 78452 HT MUSCLE IMAGE SPECT MULT: CPT

## 2022-03-19 PROCEDURE — 99214 OFFICE O/P EST MOD 30 MIN: CPT | Performed by: INTERNAL MEDICINE

## 2022-03-19 PROCEDURE — 6360000002 HC RX W HCPCS: Performed by: INTERNAL MEDICINE

## 2022-03-19 PROCEDURE — 96360 HYDRATION IV INFUSION INIT: CPT

## 2022-03-19 PROCEDURE — 93017 CV STRESS TEST TRACING ONLY: CPT

## 2022-03-19 PROCEDURE — 93010 ELECTROCARDIOGRAM REPORT: CPT | Performed by: INTERNAL MEDICINE

## 2022-03-19 PROCEDURE — 36415 COLL VENOUS BLD VENIPUNCTURE: CPT

## 2022-03-19 PROCEDURE — 96361 HYDRATE IV INFUSION ADD-ON: CPT

## 2022-03-19 PROCEDURE — 2580000003 HC RX 258: Performed by: INTERNAL MEDICINE

## 2022-03-19 RX ORDER — SODIUM CHLORIDE 0.9 % (FLUSH) 0.9 %
5-40 SYRINGE (ML) INJECTION EVERY 12 HOURS SCHEDULED
Status: DISCONTINUED | OUTPATIENT
Start: 2022-03-19 | End: 2022-03-19 | Stop reason: HOSPADM

## 2022-03-19 RX ORDER — POTASSIUM CHLORIDE 20 MEQ/1
20 TABLET, EXTENDED RELEASE ORAL 2 TIMES DAILY WITH MEALS
Status: DISCONTINUED | OUTPATIENT
Start: 2022-03-19 | End: 2022-03-19 | Stop reason: HOSPADM

## 2022-03-19 RX ORDER — FLUDROCORTISONE ACETATE 0.1 MG/1
0.1 TABLET ORAL 2 TIMES DAILY
Status: DISCONTINUED | OUTPATIENT
Start: 2022-03-19 | End: 2022-03-19 | Stop reason: HOSPADM

## 2022-03-19 RX ORDER — ASPIRIN 81 MG/1
81 TABLET, CHEWABLE ORAL DAILY
Status: DISCONTINUED | OUTPATIENT
Start: 2022-03-20 | End: 2022-03-19 | Stop reason: HOSPADM

## 2022-03-19 RX ORDER — SODIUM CHLORIDE 9 MG/ML
25 INJECTION, SOLUTION INTRAVENOUS PRN
Status: DISCONTINUED | OUTPATIENT
Start: 2022-03-19 | End: 2022-03-19 | Stop reason: HOSPADM

## 2022-03-19 RX ORDER — M-VIT,TX,IRON,MINS/CALC/FOLIC 27MG-0.4MG
1 TABLET ORAL DAILY
Status: DISCONTINUED | OUTPATIENT
Start: 2022-03-19 | End: 2022-03-19 | Stop reason: HOSPADM

## 2022-03-19 RX ORDER — CLONAZEPAM 1 MG/1
2 TABLET ORAL 2 TIMES DAILY
Status: DISCONTINUED | OUTPATIENT
Start: 2022-03-19 | End: 2022-03-19 | Stop reason: HOSPADM

## 2022-03-19 RX ORDER — DESVENLAFAXINE 50 MG/1
50 TABLET, EXTENDED RELEASE ORAL DAILY
Status: DISCONTINUED | OUTPATIENT
Start: 2022-03-19 | End: 2022-03-19 | Stop reason: HOSPADM

## 2022-03-19 RX ORDER — NITROGLYCERIN 0.4 MG/1
0.4 TABLET SUBLINGUAL EVERY 5 MIN PRN
Status: DISCONTINUED | OUTPATIENT
Start: 2022-03-19 | End: 2022-03-19 | Stop reason: HOSPADM

## 2022-03-19 RX ORDER — SODIUM CHLORIDE 0.9 % (FLUSH) 0.9 %
5-40 SYRINGE (ML) INJECTION PRN
Status: DISCONTINUED | OUTPATIENT
Start: 2022-03-19 | End: 2022-03-19 | Stop reason: HOSPADM

## 2022-03-19 RX ORDER — ONDANSETRON 2 MG/ML
4 INJECTION INTRAMUSCULAR; INTRAVENOUS EVERY 6 HOURS PRN
Status: DISCONTINUED | OUTPATIENT
Start: 2022-03-19 | End: 2022-03-19 | Stop reason: HOSPADM

## 2022-03-19 RX ORDER — METOPROLOL SUCCINATE 25 MG/1
25 TABLET, EXTENDED RELEASE ORAL DAILY
Status: DISCONTINUED | OUTPATIENT
Start: 2022-03-19 | End: 2022-03-19 | Stop reason: HOSPADM

## 2022-03-19 RX ORDER — ROSUVASTATIN CALCIUM 10 MG/1
10 TABLET, COATED ORAL DAILY
Status: DISCONTINUED | OUTPATIENT
Start: 2022-03-19 | End: 2022-03-19 | Stop reason: HOSPADM

## 2022-03-19 RX ORDER — CHOLECALCIFEROL (VITAMIN D3) 125 MCG
2000 CAPSULE ORAL DAILY
Status: DISCONTINUED | OUTPATIENT
Start: 2022-03-19 | End: 2022-03-19 | Stop reason: HOSPADM

## 2022-03-19 RX ORDER — TROSPIUM CHLORIDE 20 MG/1
20 TABLET, FILM COATED ORAL
Status: DISCONTINUED | OUTPATIENT
Start: 2022-03-19 | End: 2022-03-19 | Stop reason: HOSPADM

## 2022-03-19 RX ORDER — SODIUM CHLORIDE 9 MG/ML
INJECTION, SOLUTION INTRAVENOUS CONTINUOUS
Status: DISCONTINUED | OUTPATIENT
Start: 2022-03-19 | End: 2022-03-19 | Stop reason: HOSPADM

## 2022-03-19 RX ORDER — LEVOTHYROXINE SODIUM 88 UG/1
88 TABLET ORAL DAILY
Status: DISCONTINUED | OUTPATIENT
Start: 2022-03-19 | End: 2022-03-19 | Stop reason: HOSPADM

## 2022-03-19 RX ORDER — MIDODRINE HYDROCHLORIDE 10 MG/1
10 TABLET ORAL
Status: DISCONTINUED | OUTPATIENT
Start: 2022-03-19 | End: 2022-03-19 | Stop reason: HOSPADM

## 2022-03-19 RX ORDER — ONDANSETRON 4 MG/1
4 TABLET, ORALLY DISINTEGRATING ORAL EVERY 8 HOURS PRN
Status: DISCONTINUED | OUTPATIENT
Start: 2022-03-19 | End: 2022-03-19 | Stop reason: HOSPADM

## 2022-03-19 RX ORDER — OXYCODONE HYDROCHLORIDE 5 MG/1
10 TABLET ORAL EVERY 6 HOURS PRN
Status: DISCONTINUED | OUTPATIENT
Start: 2022-03-19 | End: 2022-03-19 | Stop reason: HOSPADM

## 2022-03-19 RX ORDER — FAMOTIDINE 20 MG/1
20 TABLET, FILM COATED ORAL NIGHTLY
Status: DISCONTINUED | OUTPATIENT
Start: 2022-03-19 | End: 2022-03-19 | Stop reason: HOSPADM

## 2022-03-19 RX ORDER — GABAPENTIN 300 MG/1
600 CAPSULE ORAL EVERY 8 HOURS
Status: DISCONTINUED | OUTPATIENT
Start: 2022-03-19 | End: 2022-03-19 | Stop reason: HOSPADM

## 2022-03-19 RX ORDER — POLYETHYLENE GLYCOL 3350 17 G/17G
17 POWDER, FOR SOLUTION ORAL DAILY PRN
Status: DISCONTINUED | OUTPATIENT
Start: 2022-03-19 | End: 2022-03-19 | Stop reason: HOSPADM

## 2022-03-19 RX ORDER — PANTOPRAZOLE SODIUM 40 MG/1
40 TABLET, DELAYED RELEASE ORAL
Status: DISCONTINUED | OUTPATIENT
Start: 2022-03-19 | End: 2022-03-19 | Stop reason: HOSPADM

## 2022-03-19 RX ADMIN — SODIUM CHLORIDE: 9 INJECTION, SOLUTION INTRAVENOUS at 04:36

## 2022-03-19 RX ADMIN — SODIUM CHLORIDE 1000 ML: 9 INJECTION, SOLUTION INTRAVENOUS at 00:16

## 2022-03-19 RX ADMIN — REGADENOSON 0.4 MG: 0.08 INJECTION, SOLUTION INTRAVENOUS at 10:15

## 2022-03-19 RX ADMIN — TETROFOSMIN 11 MILLICURIE: 1.38 INJECTION, POWDER, LYOPHILIZED, FOR SOLUTION INTRAVENOUS at 08:40

## 2022-03-19 RX ADMIN — TETROFOSMIN 31.9 MILLICURIE: 1.38 INJECTION, POWDER, LYOPHILIZED, FOR SOLUTION INTRAVENOUS at 10:15

## 2022-03-19 NOTE — H&P
Combined History and Physical and Discharge Summary    Chief Complaint   Patient presents with    Shortness of Breath     pt states she has been having SOB that has been getting worse for the past few weeks, today she couldn't take it anymore, she talked to her PCP and he told her to go to ER, she has pacemaker         HISTORY OF PRESENT ILLNESS:     The patient is a pleasant 57-year-old white female with a history of anxiety and depression, hypertension, POTS, GERD, hyperlipidemia, status post pacemaker who came to the emergency room with a 2-week history of exertional dyspnea lightheadedness and chest pain. Symptoms worse yesterday. This morning she denies any chest pain. She denies any respiratory symptoms. Any GI symptoms. Patient is allergic to meloxicam, acetaminophen, benzoin compound, lyrica [pregabalin], quetiapine fumarate, seroquel [quetiapine fumarate], tizanidine, adhesive tape, balsam, balsam peru-castor oil, castor oil, linaclotide, other, quetiapine, and wound dressings.     Past Medical History:   Diagnosis Date    Angina at rest [facility])     Anxiety     Arthritis     hands and hip    Blood transfusion     after back surgery    Bradycardia     Bronchiectasis ([facility] [address] 11/05/2013    Chronic back pain     Hyperlipidemia     Hypertension     Localized morphea     Multiple drug resistant organism (MDRO) culture positive 04/13/2021    E.COLI-URINE    NATHAN treated with BiPAP     Pacemaker     Stress incontinence     Thyroid disease     hypothyroid    Trochanteric bursitis of left hip 04/12/2019       Past Surgical History:   Procedure Laterality Date    ABDOMEN SURGERY  09/09/2011     REPAIR INCISIONAL HERNIA REDUCIBLE WITH POSSIBLE MESH    BACK SURGERY      x3    BACK SURGERY      stimulator    BLADDER SUSPENSION      CARDIAC PACEMAKER PLACEMENT  2011    Heart doctor at [address] [place] [address] 1330 Left 03/08/2018    PHACO EMULSIFICATION OF CATARACT WITH INTRAOCULAR LENS IMPLANT LEFT EYE    CERVICAL DISCECTOMY  06/2014    and fusion    CHOLECYSTECTOMY      COLONOSCOPY  2002    1/26/12    ENDOSCOPY, COLON, DIAGNOSTIC      EPIDURAL STEROID INJECTION Left 08/15/2019    LEFT KNEE GENICULAR NERVE BLOCK SITE CONFIRMED BY FLUOROSCOPY performed by Ruby Soliz MD at [address] [facility] Right 04/05/2018    cataract removal    FOOT SURGERY Right 12/06/2012    arthroplasty    FOOT SURGERY Left 04/30/2015    FOOT SURGERY Left 04/30/2015    GASTRIC BYPASS SURGERY  0892,[id number]    HYSTERECTOMY      KNEE ARTHROSCOPY Left 10/03/2014    part.  medial & lateral meniscectomy, synovectomy plica exc    KNEE ARTHROSCOPY Right 10/13/2015    partial medial meniscectomy, chondroplasty, partial lateral meniscectomy    NECK SURGERY      OTHER SURGICAL HISTORY  04/2013    removal spinal cord stimulater    OTHER SURGICAL HISTORY      bladder stimulator    OTHER SURGICAL HISTORY  08/30/2017    left patellofemoral arthroplasty    OTHER SURGICAL HISTORY  05/09/2018    convert left patellofemoral arthroplasty to left total knee replacement    PACEMAKER INSERTION  2011    Dr Mahoney/checked last week/told has 12 more yrs for this one/set at 60    SKIN BIOPSY      abdomen    SPINE SURGERY      stimulator    THROAT SURGERY      polyps removed    TONSILLECTOMY      UPPER GASTROINTESTINAL ENDOSCOPY      UPPER GASTROINTESTINAL ENDOSCOPY  05/03/2017       Scheduled Meds:   clonazePAM  2 mg Oral BID    cyanocobalamin  2,000 mcg Oral Daily    desvenlafaxine succinate  50 mg Oral Daily    famotidine  20 mg Oral Nightly    trospium  20 mg Oral BID AC    fludrocortisone  0.1 mg Oral BID    gabapentin  600 mg Oral Q8H    levothyroxine  88 mcg Oral Daily    potassium chloride  20 mEq Oral BID WC    metoprolol succinate  25 mg Oral Daily    midodrine  10 mg Oral TID WC    therapeutic multivitamin-minerals  1 tablet Oral Daily    pantoprazole  40 mg Oral QAM AC    rosuvastatin  10 mg Oral Daily    sodium chloride flush  5-40 mL IntraVENous 2 times per day    [START ON 3/20/2022] aspirin  81 mg Oral Daily    enoxaparin  40 mg SubCUTAneous Daily    midazolam           Continuous Infusions:   sodium chloride      sodium chloride 75 mL/hr at 03/19/22 0436       PRN Meds:  oxyCODONE HCl, sodium chloride flush, sodium chloride, ondansetron **OR** ondansetron, polyethylene glycol, regadenoson, perflutren lipid microspheres, nitroGLYCERIN       reports that she has never smoked. She has never used smokeless tobacco.    Family History   Problem Relation Age of Onset    Heart Disease Father     Cancer Sister         multiple organs    Other Sister     Asthma Neg Hx     Diabetes Neg Hx     Emphysema Neg Hx     Heart Failure Neg Hx     Hypertension Neg Hx        Social History     Socioeconomic History    Marital status:      Spouse name: None    Number of children: None    Years of education: None    Highest education level: None   Occupational History    None   Tobacco Use    Smoking status: Never Smoker    Smokeless tobacco: Never Used   Vaping Use    Vaping Use: Never used   Substance and Sexual Activity    Alcohol use: No     Alcohol/week: 0.0 standard drinks    Drug use: No    Sexual activity: Yes     Partners: Female   Other Topics Concern    None   Social History Narrative    None     Social Determinants of Health     Financial Resource Strain:     Difficulty of Paying Living Expenses: Not on file   Food Insecurity:     Worried About Running Out of Food in the Last Year: Not on file    Raj of Food in the Last Year: Not on file   Transportation Needs:     Lack of Transportation (Medical): Not on file    Lack of Transportation (Non-Medical):  Not on file   Physical Activity:     Days of Exercise per Week: Not on file    Minutes of Exercise per Session: Not on file   Stress:     Feeling of Stress : Not on file   Social Connections:     Frequency of Communication with Friends and Family: Not on file    Frequency of Social Gatherings with Friends and Family: Not on file    Attends Jehovah's witness Services: Not on file    Active Member of Clubs or Organizations: Not on file    Attends Club or Organization Meetings: Not on file    Marital Status: Not on file   Intimate Partner Violence:     Fear of Current or Ex-Partner: Not on file    Emotionally Abused: Not on file    Physically Abused: Not on file    Sexually Abused: Not on file   Housing Stability:     Unable to Pay for Housing in the Last Year: Not on file    Number of Jibrendanmofatoumata in the Last Year: Not on file    Unstable Housing in the Last Year: Not on file     REVIEW OF SYSTEMS:   Constitutional: Negative for fever   HENT: Negative for sore throat   Eyes: Negative for redness   Respiratory: + for dyspnea, -cough   Cardiovascular: + for chest pain   Gastrointestinal: Negative for vomiting, diarrhea   Genitourinary: Negative for hematuria   Musculoskeletal: Negative for arthralgias   Skin: Negative for rash   Neurological: Negative for syncope   Hematological: Negative for adenopathy   Psychiatric/Behavorial: Negative for anxiety    VS:   BP (!) 168/84   Pulse 70   Temp 96.8 °F (36 °C) (Oral)   Resp 14   Ht 5' 2\" (1.575 m)   Wt 165 lb (74.8 kg)   SpO2 97%   BMI 30.18 kg/m²     Gen: No distress. Alert. Eyes: PERRL. No sclera icterus. No conjunctival injection. ENT: No discharge. Pharynx clear. Neck: Trachea midline. Normal thyroid. Resp: No accessory muscle use. No crackles. No wheezes. No rhonchi. No dullness on percussion. CV: Regular rate. Regular rhythm. No murmur or rub. No edema. GI: Non-tender. Non-distended. No masses. No organomegaly. Normal bowel sounds. No hernia. Supra pubic urostomy  Skin: Warm and dry. No nodule on exposed extremities. No rash on exposed extremities. Lymph: No cervical LAD. No supraclavicular LAD. M/S: No cyanosis. No joint deformity.  No clubbing. Neuro: Awake. Reflexes 2+ symmetric bilaterally. Moves all 4 extremities, non focal  Psych: Oriented x 3. No anxiety or agitation. CBC:   Recent Labs     03/18/22 1947 03/19/22  0449   WBC 6.4 5.6   HGB 11.3* 11.5*   HCT 35.1* 35.7*   MCV 82.9 81.9    247     BMP:   Recent Labs     03/18/22 1947 03/19/22  0450    139   K 4.2 3.6    103   CO2 21 22   BUN 19 13   CREATININE 0.8 0.6     LIVER PROFILE:   Recent Labs     03/18/22 1947 03/19/22  0450   AST 35 32   ALT 21 21   BILITOT <0.2 0.3   ALKPHOS 81 82     Results for Alex Mcnair (MRN [id number]) as of 3/19/2022 08:05   Ref. Range 7/1/2021 20:43 3/18/2022 19:47 3/19/2022 00:06 3/19/2022 04:49 3/19/2022 06:51   Pro-BNP Latest Ref Range: 0 - 124 pg/mL  151 (H)      Troponin Latest Ref Range: <0.01 ng/mL <0.01 <0.01 <0.01 <0.01 <0.01     UA:  Recent Labs     03/18/22  2216   COLORU Yellow   PHUR 6.0   CLARITYU Clear   SPECGRAV 1.010   LEUKOCYTESUR Negative   UROBILINOGEN 0.2   BILIRUBINUR Negative   BLOODU Negative   GLUCOSEU Negative     CT CHEST PULMONARY EMBOLISM W CONTRAST   Final Result   No evidence of a pulmonary embolus. Mildly dilated and atherosclerotic thoracic aorta with no aneurysm or   dissection and unremarkable mediastinum. Mild bibasilar linear atelectasis vs early infiltrates or scarring. Suggest   follow-up with serial chest x-rays. Mild compression fracture of T9 which may be chronic. Recommend clinical   follow-up. Status post cholecystectomy and previous gastric bypass surgery.          XR CHEST (2 VW)   Final Result   Mild bibasilar discoid atelectasis or scarring which is less prominent         NM Cardiac Stress Test Nuclear Imaging    (Results Pending)   Stress test 3/19/2022  Conclusions        Summary    *No EKG evidence for ischemia with lexiscan    *Normal LV size and function with EF of 76%    *SPECT imaging with homogeneous tracer distribution with stress and rest.      Impression    *Normal LV function    *Normal myocardial perfusion         Previous study    Stress test  Study Date: 12/31/2019 10:26 AM     Interpetation Summary:   The LV EF is 88%   Normal global and regional wall motion in all territories.        o Negative ECG for ischemia with pharmocologic stress.      o No significant areas of ischemia identified with pharmocologic stress.      o Nuclear stress image findings indicate low risk for future ischemic        event . ASSESSMENT:    Principal Problem:    Chest pain  Active Problems:    GERD (gastroesophageal reflux disease)    Anxiety & depression    HTN (hypertension), benign    Dyslipidemia    Pacemaker  Resolved Problems:    * No resolved hospital problems. *      PLAN:    #Chest pain, like in the sense of exertional dyspnea. No prior history of coronary artery disease. Lifelong non-smoker. Symptoms somewhat atypical.  She has a pacer for history of bradycardia. No prior history of TIA. Serial troponins are negative. EKG showed no acute changes. Cardiology consultation. Nuclear stress test.    #Hypertension. Blood pressure was elevated at the time of admission. Monitor. #Patient has history of POTS. She is on Florinef and midodrine. She takes Toprol-XL. #Suprapubic urostomy. She self caths herself. #Status post pacemaker. Outpatient follow-up. #Anxiety and depression. She takes Pristiq at home. #Dyslipidemia. On Crestor at home. #Lovenox for DVT prophylaxis. Non cardiac chest pain. Elizabeth  for discharge. IMPORTANT: Please note that some portions of this note may have been created using Dragon voice recognition software. Some \"sound-alike\" and totally wrong word substitutions may have taken place due to known inherent limitations of any such software, including this voice recognition software. In spite of efforts to eliminate such errors, some may not have been corrected.  So please read the note with this in mind and recognize such mistakes and understand the correct version using the  context. If there are still uncertainties in the mind of the medical provider reading this note about any aspect of the note, the provider can feel free to contact me. Thanks.        Dionne Iniguez MD 3/19/2022 8:08 AM

## 2022-03-19 NOTE — DISCHARGE SUMMARY
Non cardiac chest pain. Medication List      CHANGE how you take these medications    fluticasone 50 MCG/ACT nasal spray  Commonly known as: FLONASE  1 spray by Nasal route nightly.   What changed:   · when to take this  · reasons to take this        CONTINUE taking these medications    alendronate 35 MG tablet  Commonly known as: FOSAMAX     Biotin 1000 MCG Tabs     cephALEXin 500 MG capsule  Commonly known as: KEFLEX     clonazePAM 2 MG tablet  Commonly known as: KLONOPIN     cyanocobalamin 1000 MCG tablet     Denta 5000 Plus 1.1 % Crea  Generic drug: SODIUM FLUORIDE (DENTAL GEL)     desvenlafaxine succinate 25 MG Tb24 extended release tablet  Commonly known as: PRISTIQ     eszopiclone 3 MG Tabs  Commonly known as: LUNESTA     fludrocortisone 0.1 MG tablet  Commonly known as: FLORINEF     gabapentin 600 MG tablet  Commonly known as: NEURONTIN     Klor-Con M20 20 MEQ extended release tablet  Generic drug: potassium chloride     levothyroxine 88 MCG tablet  Commonly known as: SYNTHROID     metoprolol succinate 25 MG extended release tablet  Commonly known as: TOPROL XL     midodrine 10 MG tablet  Commonly known as: PROAMATINE     MONOJECT 60CC SYRINGE CATH TIP 60 ML Misc  Generic drug: Syringe (Disposable)     Nurtec 75 MG Tbdp  Generic drug: Rimegepant Sulfate     ondansetron 8 MG Tbdp disintegrating tablet  Commonly known as: ZOFRAN-ODT     oxyCODONE HCl 10 MG immediate release tablet  Commonly known as: OXY-IR     pantoprazole 40 MG tablet  Commonly known as: PROTONIX     Pepcid 20 MG tablet  Generic drug: famotidine     rosuvastatin 10 MG tablet  Commonly known as: CRESTOR     SENNA LAX PO     sodium chloride 0.9 % irrigation     thera/beta-carotene Tabs     Toviaz 8 MG Tb24  Generic drug: Fesoterodine Fumarate ER     triamcinolone 0.1 % cream  Commonly known as: KENALOG        STOP taking these medications    levoFLOXacin 500 MG tablet  Commonly known as: LEVAQUIN     sulfamethoxazole-trimethoprim 800-160 MG per tablet  Commonly known as: BACTRIM DS;SEPTRA DS               Discharged in stable condition      Nikki Mena MD 3/19/2022 2:34 PM

## 2022-03-19 NOTE — PROGRESS NOTES
Patient educated on discharge instructions as well as new medications use, dosage, administration and possible side effects. Patient verified knowledge. IV removed without difficulty and dry dressing in place. Telemetry monitor removed and returned to [facility]. Pt left facility in stable condition to Home with all of their personal belongings.

## 2022-03-19 NOTE — PROGRESS NOTES
Vitals stable. IV removed without difficulty. Patient self catherized her Suprapubic Urostomy. Her output was 800 mL. Patient preparing for discharge.

## 2022-03-19 NOTE — PROGRESS NOTES
Patient admitted to room 301 from er. Patient oriented to room, call light, bed rails, phone, lights and bathroom. Patient instructed about the schedule of the day including: vital sign frequency, lab draws, possible tests, frequency of MD and staff rounds, daily weights, I &O's and prescribed diet. bed alarm in place, patient aware of placement and reason. Telemetry box in place, patient aware of placement and reason. Bed locked, in lowest position, side rails up 2/4, call light within reach. Recliner Assessment  Patient is able to demonstrate the ability to move from a reclining position to an upright position within the recliner. 4 Eyes Skin Assessment     The patient is being assess for   Admission    I agree that 2 RN's have performed a thorough Head to Toe Skin Assessment on the patient. ALL assessment sites listed below have been assessed. Areas assessed for pressure by both nurses:   [x]   Head, Face, and Ears   [x]   Shoulders, Back, and Chest, Abdomen  [x]   Arms, Elbows, and Hands   [x]   Coccyx, Sacrum, and Ischium  [x]   Legs, Feet, and Heels        Skin Assessed Under all Medical Devices by both nurses:  scattered bruising and abrasions              All Mepilex Borders were peeled back and area peeked at by both nurses:  No: na  Please list where Mepilex Borders are located:  na             **SHARE this note so that the co-signing nurse is able to place an eSignature**    Co-signer eSignature: Electronically signed by Tanvir Lockwood RN on 3/19/22 at 5:26 AM EDT    Does the Patient have Skin Breakdown related to pressure?   No     (Insert Photo here)         Van Prevention initiated:  No   Wound Care Orders initiated:  No      [facility] nurse consulted for Pressure Injury (Stage 3,4, Unstageable, DTI, NWPT, Complex wounds)and New or Established Ostomies:  No      Primary Nurse eSignature: Electronically signed by Miguel Rankin RN on 3/19/22 at 5:23 AM EDT

## 2022-03-19 NOTE — ED NOTES
9102- Call placed to transfer center to [facility] at [facility]- Dr. Clarisa Metz with Cardiology at [facility] returned call and spoke with Ghassan Pichardo  03/18/22 [id number]

## 2022-03-19 NOTE — PROGRESS NOTES
Handoff report given to [facility], Care transferred.

## 2022-03-19 NOTE — ED PROVIDER NOTES
[address]        Pt Name: Enrico Cruz  MRN: [id number]  [place] 1955  Dateof evaluation: 3/18/2022  Provider: MATIAS Paz - CNP  PCP: Shreyas Meier MD  ED Attending: No att. providers found    [address] [place] [address]       Chief Complaint   Patient presents with    Shortness of Breath     pt states she has been having SOB that has been getting worse for the past few weeks, today she couldn't take it anymore, she talked to her PCP and he told her to go to ER, she has pacemaker        HISTORY OF PRESENTILLNESS   (Location/Symptom, Timing/Onset, Context/Setting, Quality, Duration, Modifying Factors, Severity)  Note limiting factors. Enrico Cruz is a 78 y.o. female for shortness of breath with exertion. Onset was the past few weeks however has gotten worse in the last day. Context includes patient states that she has been having increased shortness of breath with exertion and dizziness. Patient reports that she does have a pacemaker in place that was placed 11 years ago it is a Medtronic. Patient denies any chest pain currently but states she has had intermittent chest pain. She denies any fevers nausea vomiting or diarrhea. Patient is on any blood thinners. She does follow with cardiology at [facility].  Patient requested [facility] however EMS brought her to [facility]. Alleviating factors include nothing. Aggravating factors include nothing. Pain is 3/10. Nothing has been used for pain today. Nursing Notes were all reviewed and agreed with or any disagreements were addressed  in the HPI. REVIEW OF SYSTEMS    (2-9 systems for level 4, 10 or more for level 5)     Review of Systems   Constitutional: Negative for activity change, appetite change and fever. HENT: Negative for congestion, facial swelling, rhinorrhea and sore throat. Eyes: Negative for visual disturbance. Respiratory: Positive for shortness of breath. Cardiovascular: Positive for chest pain. Gastrointestinal: Negative for abdominal pain, nausea and vomiting. Genitourinary: Negative for difficulty urinating. Musculoskeletal: Negative for arthralgias and myalgias. Skin: Negative for color change and rash. Neurological: Negative for dizziness and light-headedness. Psychiatric/Behavioral: Negative for agitation. All other systems reviewed and are negative. Positives and Pertinent negatives as per HPI. Except as noted above in the ROS, all other systems were reviewed and negative.        PAST MEDICAL HISTORY     Past Medical History:   Diagnosis Date    Angina at rest [facility])     Anxiety     Arthritis     hands and hip    Blood transfusion     after back surgery    Bradycardia     Bronchiectasis ([facility] [address] 11/05/2013    Chronic back pain     Hyperlipidemia     Hypertension     Localized morphea     Multiple drug resistant organism (MDRO) culture positive 04/13/2021    E.COLI-URINE    NATHAN treated with BiPAP     Pacemaker     Stress incontinence     Thyroid disease     hypothyroid    Trochanteric bursitis of left hip 04/12/2019         SURGICAL HISTORY       Past Surgical History:   Procedure Laterality Date    ABDOMEN SURGERY  09/09/2011     REPAIR INCISIONAL HERNIA REDUCIBLE WITH POSSIBLE MESH    BACK SURGERY      x3    BACK SURGERY      stimulator    BLADDER SUSPENSION      CARDIAC PACEMAKER PLACEMENT  2011    Heart doctor at [address] [place] [address] Left 03/08/2018    PHACO EMULSIFICATION OF CATARACT WITH INTRAOCULAR LENS IMPLANT LEFT EYE    CERVICAL DISCECTOMY  06/2014    and fusion    CHOLECYSTECTOMY      COLONOSCOPY  2002 1/26/12    ENDOSCOPY, COLON, DIAGNOSTIC      EPIDURAL STEROID INJECTION Left 08/15/2019    LEFT KNEE GENICULAR NERVE BLOCK SITE CONFIRMED BY FLUOROSCOPY performed by Antelmo Levy MD at [address] [place] [address] Right 04/05/2018    cataract removal    FOOT SURGERY Right 12/06/2012    arthroplasty    FOOT SURGERY Left 04/30/2015    FOOT SURGERY Left 04/30/2015    GASTRIC BYPASS SURGERY  9915,6515    HYSTERECTOMY      KNEE ARTHROSCOPY Left 10/03/2014    part.  medial & lateral meniscectomy, synovectomy plica exc    KNEE ARTHROSCOPY Right 10/13/2015    partial medial meniscectomy, chondroplasty, partial lateral meniscectomy    NECK SURGERY      OTHER SURGICAL HISTORY  04/2013    removal spinal cord stimulater    OTHER SURGICAL HISTORY      bladder stimulator    OTHER SURGICAL HISTORY  08/30/2017    left patellofemoral arthroplasty    OTHER SURGICAL HISTORY  05/09/2018    convert left patellofemoral arthroplasty to left total knee replacement   Kelsi Light  2011    Dr Mahoney/checked last week/told has 12 more yrs for this one/set at 60    SKIN BIOPSY      abdomen    SPINE SURGERY      stimulator    THROAT SURGERY      polyps removed   [address] ENDOSCOPY      UPPER GASTROINTESTINAL ENDOSCOPY  05/03/2017         CURRENT MEDICATIONS       Discharge Medication List as of 3/19/2022  3:44 PM      CONTINUE these medications which have NOT CHANGED    Details   desvenlafaxine succinate (PRISTIQ) 25 MG TB24 extended release tablet Take 50 mg by mouth dailyHistorical Med      Rimegepant Sulfate (NURTEC) 75 MG TBDP Take 75 mg by mouth Med called in today, pt does not have it pharmacy states not available until MondayHistorical Med      famotidine (PEPCID) 20 MG tablet Take by mouth dailyHistorical Med      alendronate (FOSAMAX) 35 MG tablet TAKE 1 TABLET BY MOUTH ONE TIME A WEEK IN THE MORNING AT LEAST 30 MIN BEFORE FIRST FOOD, BEVERAGE, OR MED OF DAYHistorical Med      eszopiclone (LUNESTA) 3 MG TABS TAKE 1 TABLET BY MOUTH AT BEDTIMEHistorical Med      gabapentin (NEURONTIN) 600 MG tablet TAKE 1 TABLET BY MOUTH EVERY EIGHT HOURSHistorical Med      metoprolol succinate (TOPROL XL) 25 MG extended release tablet Med called in today, pt does not have it pharmacy states not available until MondayHistorical Med      midodrine (PROAMATINE) 10 MG tablet TAKE 1 TABLET BY MOUTH THREE TIMES A DAYHistorical Med      rosuvastatin (CRESTOR) 10 MG tablet TAKE 1 TABLET BY MOUTH EVERY DAYHistorical Med      DENTA 5000 PLUS 1.1 % CREA FLOSS THEN BRUSH WITH PEA-SIZED AMOUNT FOR 2 MINUTES TWICE DAILY, SPIT AND DO NOT RINSE OR DRINK FOR 30 MINUTES, DAWHistorical Med      Biotin 1000 MCG TABS Take by mouth dailyHistorical Med      cephALEXin (KEFLEX) 500 MG capsule Take 500 mg by mouth daily Historical Med      oxyCODONE HCl (OXY-IR) 10 MG immediate release tablet TAKE 1 TABLET BY MOUTH EVERY SIX HOURS AS NEEDEDHistorical Med      sodium chloride 0.9 % irrigation Historical Med      ondansetron (ZOFRAN-ODT) 8 MG TBDP disintegrating tablet Take 8 mg by mouth every 8 hours as neededHistorical Med      clonazePAM (KLONOPIN) 2 MG tablet TAKE 1 TABLET BY MOUTH TWO TIMES A DAYHistorical Med      fludrocortisone (FLORINEF) 0.1 MG tablet Take 0.1 mg by mouth 2 times dailyHistorical Med      Sennosides (SENNA LAX PO) Take by mouth daily Historical Med      triamcinolone (KENALOG) 0.1 % cream Apply topically 2 times daily Apply topically 2 times daily. , Topical, 2 TIMES DAILY, Historical Med      Fesoterodine Fumarate ER (TOVIAZ) 8 MG TB24 Take by mouth daily Historical Med      cyanocobalamin 1000 MCG tablet Take 2,500 mcg by mouth dailyHistorical Med      levothyroxine (SYNTHROID) 88 MCG tablet Take 88 mcg by mouth DailyHistorical Med      MONOJECT 60CC SYRINGE CATH TIP 60 ML MISC 3 times daily Starting Wed 3/7/2018, SIMIN, Historical MedWound/hole on abd, uses to keep clean      KLOR-CON M20 20 MEQ extended release tablet Take 20 mEq by mouth 2 times daily , DAWHistorical Med      pantoprazole (PROTONIX) 40 MG tablet TAKE 1 TABLET BY MOUTH DAILY, R-0      fluticasone (FLONASE) 50 MCG/ACT nasal spray 1 spray by Nasal route nightly., Disp-1 Bottle, R-5      Multiple Vitamin (THERA) TABS Take 1 tablet by mouth daily                ALLERGIES     Meloxicam, Acetaminophen, Benzoin compound, Lyrica [pregabalin], Quetiapine fumarate, Seroquel [quetiapine fumarate], Tizanidine, Adhesive tape, Balsam, Balsam peru-castor oil, Castor oil, Linaclotide, Other, Quetiapine, and Wound dressings    FAMILY HISTORY       Family History   Problem Relation Age of Onset    Heart Disease Father     Cancer Sister         multiple organs    Other Sister     Asthma Neg Hx     Diabetes Neg Hx     Emphysema Neg Hx     Heart Failure Neg Hx     Hypertension Neg Hx           SOCIAL HISTORY       Social History     Socioeconomic History    Marital status:      Spouse name: None    Number of children: None    Years of education: None    Highest education level: None   Occupational History    None   Tobacco Use    Smoking status: Never Smoker    Smokeless tobacco: Never Used   Vaping Use    Vaping Use: Never used   Substance and Sexual Activity    Alcohol use: No     Alcohol/week: 0.0 standard drinks    Drug use: No    Sexual activity: Yes     Partners: Female   Other Topics Concern    None   Social History Narrative    None     Social Determinants of Health     Financial Resource Strain:     Difficulty of Paying Living Expenses: Not on file   Food Insecurity:     Worried About Running Out of Food in the Last Year: Not on file    Raj of Food in the Last Year: Not on file   Transportation Needs:     Lack of Transportation (Medical): Not on file    Lack of Transportation (Non-Medical):  Not on file   Physical Activity:     Days of Exercise per Week: Not on file    Minutes of Exercise per Session: Not on file   Stress:     Feeling of Stress : Not on file   Social Connections:     Frequency of Communication with Friends and Family: Not on file    Frequency of Social Gatherings with Friends and Family: Not on file    Attends Jewish Services: Not on file   [employer] of Clubs or Organizations: Not on file    Attends Club or Organization Meetings: Not on file    Marital Status: Not on file   Intimate Partner Violence:     Fear of Current or Ex-Partner: Not on file    Emotionally Abused: Not on file    Physically Abused: Not on file    Sexually Abused: Not on file   Housing Stability:     Unable to Pay for Housing in the Last Year: Not on file    Number of [place] in the Last Year: Not on file    Unstable Housing in the Last Year: Not on file       SCREENINGS    [place]ir Coma Scale  Eye Opening: Spontaneous  Best Verbal Response: Oriented  Best Motor Response: Obeys commands  Taras Coma Scale Score: 15 Heart Score for chest pain patients  History: Moderately Suspicious  ECG: Normal  Patient Age: > 65 years  *Risk factors for Atherosclerotic disease: Obesity,Hypertension,Hypercholesterolemia  Risk Factors: > 3 Risk factors or history of atherosclerotic disease*  Troponin: < 1X normal limit  Heart Score Total: 5      PHYSICAL EXAM  (up to 7 for level 4, 8 or more for level 5)     ED Triage Vitals [03/18/22 1913]   BP Temp Temp Source Pulse Resp SpO2 Height Weight   (!) 174/99 97.8 °F (36.6 °C) Oral 78 20 96 % 5' 2\" (1.575 m) 165 lb (74.8 kg)       Physical Exam  Constitutional:       Appearance: She is well-developed. HENT:      Head: Normocephalic and atraumatic. Cardiovascular:      Rate and Rhythm: Normal rate. Pulmonary:      Effort: Pulmonary effort is normal. No respiratory distress. Chest:      Chest wall: No tenderness. Abdominal:      General: There is no distension. Palpations: Abdomen is soft. Tenderness: There is no abdominal tenderness. Musculoskeletal:         General: Normal range of motion. Cervical back: Normal range of motion. Skin:     General: Skin is warm and dry. Neurological:      Mental Status: She is alert and oriented to person, place, and time.          DIAGNOSTIC RESULTS   LABS:    Labs Reviewed   CBC WITH AUTO DIFFERENTIAL - Abnormal; Notable for the following components:       Result Value    Hemoglobin 11.3 (*)     Hematocrit 35.1 (*)     RDW 17.0 (*)     All other components within normal limits   COMPREHENSIVE METABOLIC PANEL W/ REFLEX TO MG FOR LOW K - Abnormal; Notable for the following components:    Glucose 117 (*)     All other components within normal limits   BRAIN NATRIURETIC PEPTIDE - Abnormal; Notable for the following components:    Pro- (*)     All other components within normal limits   BLOOD GAS, VENOUS - Abnormal; Notable for the following components:    pCO2, Russell 35.1 (*)     pO2, Russell 83.3 (*)     HCO3, Venous 22.3 (*)     Carboxyhemoglobin 1.7 (*)     All other components within normal limits   D-DIMER, QUANTITATIVE - Abnormal; Notable for the following components:    D-Dimer, Quant 398 (*)     All other components within normal limits   CBC - Abnormal; Notable for the following components:    Hemoglobin 11.5 (*)     Hematocrit 35.7 (*)     RDW 16.3 (*)     All other components within normal limits    Narrative:     Collection has been rescheduled by [employer] at 03/19/2022 04:04 Reason:   0400 & 0600 labs drawn   COMPREHENSIVE METABOLIC PANEL W/ REFLEX TO MG FOR LOW K - Abnormal; Notable for the following components:    Glucose 119 (*)     All other components within normal limits    Narrative:     Collection has been rescheduled by [employer] at 03/19/2022 04:04 Reason:   0400 & 0600 labs drawn   LIPID PANEL - Abnormal; Notable for the following components:    HDL 72 (*)     LDL Calculated 102 (*)     All other components within normal limits    Narrative:     Collection has been rescheduled by [employer] at 03/19/2022 04:04 Reason:   0400 & 0600 labs drawn   COVID-19 & INFLUENZA COMBO   PROCALCITONIN   URINALYSIS WITH REFLEX TO CULTURE   TROPONIN   TROPONIN   TROPONIN    Narrative:     Collection has been rescheduled by [employer] at 03/19/2022 04:04 Reason:   0400 & 0600 labs drawn   TROPONIN    Narrative: Collection has been rescheduled by Cassia Cartagena at 03/19/2022 04:04 Reason:   0400 & 0600 labs drawn       All other labs werewithin normal range or not returned as of this dictation. EKG: All EKG's are interpreted by the Emergency Department Physician who either signs or Co-signs this chart in the absence of a cardiologist.  Please see their note for interpretation of EKG. RADIOLOGY:     X-ray interpreted by radiologist for  Impression:    Mild bibasilar discoid atelectasis or scarring which is less prominent             CT the chest pulmonary embolism with contrast interpreted by radiologist for    Impression:    No evidence of a pulmonary embolus. Mildly dilated and atherosclerotic thoracic aorta with no aneurysm or   dissection and unremarkable mediastinum. Mild bibasilar linear atelectasis vs early infiltrates or scarring.  Suggest   follow-up with serial chest x-rays. Mild compression fracture of T9 which may be chronic.  Recommend clinical   follow-up. Status post cholecystectomy and previous gastric bypass surgery. Interpretation per the Radiologist below, if available at the time of this note:    NM Cardiac Stress Test Nuclear Imaging   Final Result      CT CHEST PULMONARY EMBOLISM W CONTRAST   Final Result   No evidence of a pulmonary embolus. Mildly dilated and atherosclerotic thoracic aorta with no aneurysm or   dissection and unremarkable mediastinum. Mild bibasilar linear atelectasis vs early infiltrates or scarring. Suggest   follow-up with serial chest x-rays. Mild compression fracture of T9 which may be chronic. Recommend clinical   follow-up. Status post cholecystectomy and previous gastric bypass surgery. XR CHEST (2 VW)   Final Result   Mild bibasilar discoid atelectasis or scarring which is less prominent           No results found.       PROCEDURES   Unless otherwise noted below, none     Procedures     CRITICAL CARE TIME N/A    CONSULTS:  IP CONSULT TO CARDIOLOGY  IP CONSULT TO HOSPITALIST  IP CONSULT TO CARDIOLOGY      EMERGENCYDEPARTMENT COURSE and DIFFERENTIAL DIAGNOSIS/MDM:   Vitals:    Vitals:    03/19/22 0330 03/19/22 0430 03/19/22 0815 03/19/22 1345   BP: (!) 179/103 (!) 168/84 (!) 166/101 (!) 154/86   Pulse: 97 70 75 72   Resp: 16 14 16 16   Temp: 96.8 °F (36 °C) 96.8 °F (36 °C) 96.8 °F (36 °C) 97.2 °F (36.2 °C)   TempSrc: Oral Oral Oral Oral   SpO2: 97%  98% 97%   Weight:       Height:           Patient was given the following medications:  Medications   midazolam (VERSED) 2 MG/2ML injection (  Not Given 3/18/22 2332)   iopamidol (ISOVUE-370) 76 % injection 85 mL (85 mLs IntraVENous Given 3/18/22 2132)   0.9 % sodium chloride bolus (0 mLs IntraVENous Stopped 3/19/22 0128)   ibuprofen (ADVIL;MOTRIN) tablet 400 mg (400 mg Oral Given 3/18/22 2201)   regadenoson (LEXISCAN) injection 0.4 mg (0.4 mg IntraVENous Given 3/19/22 1015)   technetium tetrofosmin (Tc-MYOVIEW) injection 11 millicurie (11 millicuries IntraVENous Given 3/19/22 0840)   technetium tetrofosmin (Tc-MYOVIEW) injection 93.4 millicurie (66.3 millicuries IntraVENous Given 3/19/22 1015)       Patient was seen evaluated by myself and Dr. Fabiola Landaverde.  Patient here for complaints of shortness of breath with exertion. Patient also complains of dizziness. She does have a pacemaker that is in place that she has had for the last 11 years that is a Medtronic. Patient follows with [facility]ology at [facility].  She denies any fevers nausea vomiting diarrhea. She does not take any blood thinners or aspirin. Patient reports that she has had intermittent brief chest discomfort and increasing shortness of breath particularly with exertion over the last 2 years however the last few days has become worse. Patient also reports that she has become dizzy to the point of feeling as if she was going to pass out.   On exam she is awake and alert hemodynamically stable nontoxic in appearance. Lab values have been reviewed and interpreted. Chest x-ray and CT pulmonary embolism were obtained and reviewed. Patient incidentally was found to have a compression fracture at T9 which was age-indeterminate. Consult was placed to the patient's cardiologist to [facility].  Patient did have a heart score of 5. Patient did have 2 - troponins. I did discuss the case with Dr. Jesus Langford from [facility] who stated that she had a negative stress test in 2019 and he was not suspicious that this was cardiac in nature-but then states we are evaluating the patient and should make a final decision. He recommended discharge home and follow-up outpatient. Consult was placed to [facility] for cardiac work-up due to heart score of 5 and near syncopal event do not feel it is appropriate to discharge the patient. Consult was placed to hospitalist for admission. The patient tolerated their visit well. I have evaluated this patient. My supervising physician was available for consultation. The patient and / or the family were informed of the results of any tests, a time was given to answer questions, a plan was proposed and they agreed with plan. FINAL IMPRESSION      1. SOB (shortness of breath) on exertion    2. Closed wedge compression fracture of T9 vertebra, initial encounter ([facility] [address]    3.  Syncope, near          DISPOSITION/PLAN   DISPOSITION Admitted 03/19/2022 02:36:47 AM      PATIENT REFERRED TO:  Paty Fuentes MD  [address] [place] [address] [address] [facility]  [phone]    In 1 week        DISCHARGE MEDICATIONS:  Discharge Medication List as of 3/19/2022  3:44 PM          DISCONTINUED MEDICATIONS:  Discharge Medication List as of 3/19/2022  3:44 PM      STOP taking these medications       levoFLOXacin (LEVAQUIN) 500 MG tablet Comments:   Reason for Stopping:         sulfamethoxazole-trimethoprim (BACTRIM DS;SEPTRA DS) 800-160 MG per tablet Comments:   Reason for Stopping: nystatin (MYCOSTATIN) 528412 UNIT/GM powder Comments:   Reason for Stopping:         cetirizine (ZYRTEC) 10 MG tablet Comments:   Reason for Stopping:         donepezil (ARICEPT) 10 MG tablet Comments:   Reason for Stopping:                      (Please note that portions of this note were completed with a voice recognition program.  Efforts were made to edit the dictations but occasionally words are mis-transcribed.)    MATIAS Delatorre CNP (electronically signed)         MATIAS Delatorre CNP  03/19/22 [place] CNP  03/19/22 [place], APRN - CNP  03/23/22 [id number]

## 2022-03-19 NOTE — PROGRESS NOTES
Gina PADILLA contacted per Pt request. Patient had me ask her to bring in home Cath supplies and copy of legal POA paperwork.

## 2022-03-19 NOTE — ED NOTES
Emptied bladder with 14 fr cath via Urostomy stoma. 500 ml clear urine out. Pt denies c/o at this time.       Joanne Moise  03/19/22 1670

## 2022-03-19 NOTE — PROGRESS NOTES
Cardiology Consult has been called to Dr. Kade Lim on 3/19/21. Spoke with answering service.  6:09 AM    Yi Arthur, RN  3/19/2022

## 2022-03-19 NOTE — CONSULTS
[facility]  [phone]        Reason for Consultation/Chief Complaint: \"I have been having  Chest pain dizziness and shortness of breath . \"    History of Present Illness:  Alexis Richter is a 78 y.o. patient who presented to the hospital with complaints of CP, SOB and dizziness for last 3 yrs. Symptoms are worse now little activity brings it on. She has poor description of these symptoms. Pain is left anterior and calls it little angina. No syncope. She has pacemaker for 11 yrs due to recurrent syncope. She has low BP. I have been asked to provide consultation regarding further management and testing. 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 Surgical History:   has a past surgical history that includes Gastric bypass surgery (4599,4255); Cholecystectomy; Tonsillectomy; Hysterectomy; back surgery; skin biopsy; Spine surgery; Colonoscopy (2002); Pacemaker insertion (2011); back surgery; Foot surgery (Right, 12/06/2012); Throat surgery; bladder suspension; other surgical history (04/2013); Abdomen surgery (09/09/2011); other surgical history; Cervical discectomy (06/2014); Knee arthroscopy (Left, 10/03/2014); Endoscopy, colon, diagnostic; Upper gastrointestinal endoscopy; Neck surgery; Foot surgery (Left, 04/30/2015); Foot surgery (Left, 04/30/2015); Knee arthroscopy (Right, 10/13/2015); Upper gastrointestinal endoscopy (05/03/2017); other surgical history (08/30/2017);  Cataract removal with implant (Left, 03/08/2018); eye surgery (Right, 04/05/2018); other surgical history (05/09/2018); epidural steroid injection (Left, 08/15/2019); and Cardiac pacemaker placement (2011). Social History:   reports that she has never smoked. She has never used smokeless tobacco. She reports that she does not drink alcohol and does not use drugs. Family History:  family history includes Cancer in her sister; Heart Disease in her father; Other in her sister. Home Medications:  Were reviewed and are listed in nursing record. and/or listed below  Prior to Admission medications    Medication Sig Start Date End Date Taking?  Authorizing Provider   desvenlafaxine succinate (PRISTIQ) 25 MG TB24 extended release tablet Take 50 mg by mouth daily 1/11/22  Yes Historical Provider, MD   Rimegepant Sulfate (NURTEC) 75 MG TBDP Take 75 mg by mouth Med called in today, pt does not have it \bc pharmacy states not available until Monday 3/18/22  Yes Historical Provider, MD   famotidine (PEPCID) 20 MG tablet Take by mouth daily    Historical Provider, MD   alendronate (FOSAMAX) 35 MG tablet TAKE 1 TABLET BY MOUTH ONE TIME A WEEK IN THE MORNING AT LEAST 30 MIN BEFORE FIRST FOOD, BEVERAGE, OR MED OF DAY 3/15/22   Historical Provider, MD   eszopiclone (LUNESTA) 3 MG TABS TAKE 1 TABLET BY MOUTH AT BEDTIME 3/14/22   Historical Provider, MD   gabapentin (NEURONTIN) 600 MG tablet TAKE 1 TABLET BY MOUTH EVERY EIGHT HOURS 3/7/22   Historical Provider, MD   levoFLOXacin (LEVAQUIN) 500 MG tablet TAKE 1 TABLET BY MOUTH EVERY DAY 3/15/22   Historical Provider, MD   metoprolol succinate (TOPROL XL) 25 MG extended release tablet Med called in today, pt does not have it \bc pharmacy states not available until Monday 3/18/22   Historical Provider, MD   midodrine (PROAMATINE) 10 MG tablet TAKE 1 TABLET BY MOUTH THREE TIMES A DAY 1/27/22   Historical Provider, MD   rosuvastatin (CRESTOR) 10 MG tablet TAKE 1 TABLET BY MOUTH EVERY DAY 1/11/22   Historical Provider, MD   DENTA 5000 PLUS 1.1 % CREA FLOSS THEN BRUSH WITH PEA-SIZED AMOUNT FOR 2 MINUTES TWICE DAILY, SPIT AND DO NOT RINSE OR DRINK FOR 30 MINUTES 2/9/22   Historical Provider, MD   Biotin 1000 MCG TABS Take by mouth daily    Historical Provider, MD   cephALEXin (KEFLEX) 500 MG capsule Take 500 mg by mouth daily     Historical Provider, MD   oxyCODONE HCl (OXY-IR) 10 MG immediate release tablet TAKE 1 TABLET BY MOUTH EVERY SIX HOURS AS NEEDED 2/25/21   Historical Provider, MD   sulfamethoxazole-trimethoprim (BACTRIM DS;SEPTRA DS) 800-160 MG per tablet TAKE 1 TABLET BY MOUTH TWO TIMES A DAY 2/15/21   Historical Provider, MD   sodium chloride 0.9 % irrigation  3/1/21   Historical Provider, MD   ondansetron (ZOFRAN-ODT) 8 MG TBDP disintegrating tablet Take 8 mg by mouth every 8 hours as needed    Historical Provider, MD   clonazePAM (KLONOPIN) 2 MG tablet TAKE 1 TABLET BY MOUTH TWO TIMES A DAY 3/12/21   Historical Provider, MD   fludrocortisone (FLORINEF) 0.1 MG tablet Take 0.1 mg by mouth 2 times daily    Historical Provider, MD   Sennosides (SENNA LAX PO) Take by mouth daily     Historical Provider, MD   triamcinolone (KENALOG) 0.1 % cream Apply topically 2 times daily Apply topically 2 times daily. Historical Provider, MD   Fesoterodine Fumarate ER (TOVIAZ) 8 MG TB24 Take by mouth daily     Historical Provider, MD   cyanocobalamin 1000 MCG tablet Take 2,500 mcg by mouth daily    Historical Provider, MD   levothyroxine (SYNTHROID) 88 MCG tablet Take 88 mcg by mouth Daily    Historical Provider, MD Alyssa Booker CATH TIP 60 ML MISC by Other route in the morning, at noon, and at bedtime Wound/hole on abd, uses to keep clean 3/7/18   Historical Provider, MD   KLOR-CON M20 20 MEQ extended release tablet Take 20 mEq by mouth 2 times daily  3/19/18   Historical Provider, MD   pantoprazole (PROTONIX) 40 MG tablet TAKE 1 TABLET BY MOUTH DAILY 2/16/17   Historical Provider, MD   fluticasone (FLONASE) 50 MCG/ACT nasal spray 1 spray by Nasal route nightly.   Patient taking differently: 1 spray by Nasal route as needed  4/9/15   Yamileth Estrada MD   Multiple Vitamin (THERA) TABS Take 1 tablet by mouth daily     Historical Provider, MD        Allergies:  Meloxicam, Acetaminophen, Benzoin compound, Lyrica [pregabalin], Quetiapine fumarate, Seroquel [quetiapine fumarate], Tizanidine, Adhesive tape, Balsam, Balsam peru-castor oil, Castor oil, Linaclotide, Other, Quetiapine, and Wound dressings     Review of Systems:   12 point ROS negative in all areas as listed below except as in [place]  Constitutional, EENT,  pulmonary, GI, , Musculoskeletal, skin, neurological, hematological, endocrine, Psychiatric    Physical Examination:    Vitals:    03/19/22 0430   BP: (!) 168/84   Pulse: 70   Resp: 14   Temp: 96.8 °F (36 °C)   SpO2:     Weight: 165 lb (74.8 kg)         General Appearance:  Alert, cooperative, no distress, appears stated age   Head:  Normocephalic, without obvious abnormality, atraumatic   Eyes:  PERRL, conjunctiva/corneas clear       Nose: Nares normal, no drainage or sinus tenderness   Throat: Lips, mucosa, and tongue normal   Neck: Supple, symmetrical, trachea midline, no adenopathy, thyroid: not enlarged, symmetric, no tenderness/mass/nodules, no carotid bruit or JVD       Lungs:   Clear to auscultation bilaterally, respirations unlabored   Chest Wall:  No tenderness or deformity   Heart:  Regular rate and rhythm, S1, S2 normal, no murmur, rub or gallop   Abdomen:   Soft, non-tender, bowel sounds active all four quadrants,  no masses, no organomegaly    Cystostomy tube in place       Extremities: Extremities normal, atraumatic, no cyanosis or edema   Pulses: 2+ DP  and symmetric   Skin: Skin color, texture, turgor normal, no rashes or lesions   Pysch: Normal mood and affect   Neurologic: Normal gross motor and sensory exam.         Labs  CBC:   Lab Results   Component Value Date    WBC 5.6 03/19/2022    RBC 4.36 03/19/2022    HGB 11.5 03/19/2022    HCT 35.7 03/19/2022    MCV 81.9 03/19/2022    RDW 16.3 03/19/2022  03/19/2022     CMP:    Lab Results   Component Value Date     03/19/2022    K 3.6 03/19/2022     03/19/2022    CO2 22 03/19/2022    BUN 13 03/19/2022    CREATININE 0.6 03/19/2022    GFRAA >60 03/19/2022    GFRAA >60 05/13/2013    AGRATIO 1.9 03/19/2022    LABGLOM >60 03/19/2022    GLUCOSE 119 03/19/2022    PROT 6.4 03/19/2022    PROT 6.6 09/03/2012    CALCIUM 9.1 03/19/2022    BILITOT 0.3 03/19/2022    ALKPHOS 82 03/19/2022    AST 32 03/19/2022    ALT 21 03/19/2022     PT/INR:  No results found for: PTINR  Lab Results   Component Value Date    CKTOTAL 31 07/24/2015    TROPONINI <0.01 03/19/2022       EKG:  I have reviewed EKG with the following interpretation:  Impression:    Normal sinus rhythmRight axis (leads not placed correctly)   deviationInferior infarct , age undeterminedAbnormal ECGWhen compared with ECG of 01-JUL-2021 21:04,Inferior infarct is now PresentT wave inversion now evident in Lateral leads P    Normal sinus rhythmNonspecific T wave abnormalityAbnormal ECGWhen compared with ECG of 13-APR-2021 18:26,Nonspecific T wave abnormality now evident in Anterior leadsConfirmed by Ghazala Smyth MD, [address] ([id number]) on 7/2/2021 7:54:29 AM      CTPA 3.18.22  Impression:  No evidence of a pulmonary embolus. Mildly dilated and atherosclerotic thoracic aorta with no aneurysm or   dissection and unremarkable mediastinum. Mild bibasilar linear atelectasis vs early infiltrates or scarring. Suggest   follow-up with serial chest x-rays. Mild compression fracture of T9 which may be chronic. Recommend clinical   follow-up. Status post cholecystectomy and previous gastric bypass surgery. C xray 3.18.22  the lung bases which are less prominent. Impression:  Mild bibasilar discoid atelectasis or scarring which is less prominent  Assessment  1. Chest pain dizziness and shortness of breath  2. Pacemaker for syncope  3. H/o TIA recovered  4. Frequent falls  5. S/p gastric bypass   6. hosp group   If stress test is neg then consider angiography    I will address the patient's cardiac risk factors and adjusted pharmacologic treatment as needed. In addition, I have reinforced the need for patient directed risk factor modification. Thank you for allowing to us to participate in the cora or [place]. Further evaluation will be based upon the patient's clinical course and testing results. All questions and concerns were addressed to the patient/family. Alternatives to my treatment were discussed. The note was completed using EMR. Every effort was made to ensure accuracy; however, inadvertent computerized transcription errors may be present.

## 2022-03-19 NOTE — PROGRESS NOTES
Admission assessment completed. VSS room air, A/O x4 denies any needs at this time. Call light in reach, will monitor, bed alarm on.          03/19/22 0568   Vital Signs   Orthostatic B/P and Pulse?  Yes   Blood Pressure Lying 150/80   Pulse Lying 66 PER MINUTE   Blood Pressure Sitting 155/106   Pulse Sitting 94 PER MINUTE   Blood Pressure Standing 139/87   Pulse Standing 72 PER MINUTE   Level of Consciousness Alert (0)

## 2022-03-19 NOTE — PROGRESS NOTES
Shift assessment completed. See flow sheet. Medications held for Stress Test. Patient was catherized via Supra pubic urostomy. Output 350 mL. Vitals stable. Call light within reach. Patient leaving room for Stress test at this time.

## 2022-03-19 NOTE — PLAN OF CARE
78 yoF p/w CP, ROONEY, lightheadedness. Has been present for sometime but significantly worse over last 2 days.

## 2022-03-23 NOTE — ED PROVIDER NOTES
any questions or concerns please feel free to contact the dictating provider for clarification.          Kody NovoaFayette Medical Centerdonnell  03/23/22 1200

## 2022-04-05 ENCOUNTER — HOSPITAL ENCOUNTER (OUTPATIENT)
Dept: GENERAL RADIOLOGY | Age: 67
Discharge: HOME | End: 2022-04-05
Payer: MEDICARE

## 2022-04-05 ENCOUNTER — HOSPITAL ENCOUNTER (OUTPATIENT)
Age: 67
Discharge: HOME | End: 2022-04-05
Payer: MEDICARE

## 2022-04-05 DIAGNOSIS — R93.89 ABNORMAL X-RAY: ICD-10-CM

## 2022-04-05 PROCEDURE — 71046 X-RAY EXAM CHEST 2 VIEWS: CPT

## 2022-05-26 ENCOUNTER — HOSPITAL ENCOUNTER (OUTPATIENT)
Dept: PULMONOLOGY | Age: 67
Discharge: HOME | End: 2022-05-26
Payer: MEDICARE

## 2022-05-26 VITALS — OXYGEN SATURATION: 99 %

## 2022-05-26 DIAGNOSIS — R06.02 SOB (SHORTNESS OF BREATH) ON EXERTION: ICD-10-CM

## 2022-05-26 LAB
DLCO %PRED: 57 %
DLCO PRED: NORMAL
DLCO/VA %PRED: NORMAL
DLCO/VA PRED: NORMAL
DLCO/VA: NORMAL
DLCO: NORMAL
EXPIRATORY TIME-POST: NORMAL
EXPIRATORY TIME: NORMAL
FEF 25-75% %CHNG: NORMAL
FEF 25-75% %PRED-POST: NORMAL
FEF 25-75% %PRED-PRE: NORMAL
FEF 25-75% PRED: NORMAL
FEF 25-75%-POST: NORMAL
FEF 25-75%-PRE: NORMAL
FEV1 %PRED-POST: 117 %
FEV1 %PRED-PRE: 112 %
FEV1 PRED: NORMAL
FEV1-POST: NORMAL
FEV1-PRE: NORMAL
FEV1/FVC %PRED-POST: NORMAL
FEV1/FVC %PRED-PRE: NORMAL
FEV1/FVC PRED: NORMAL
FEV1/FVC-POST: 84 %
FEV1/FVC-PRE: 82 %
FVC %PRED-POST: NORMAL
FVC %PRED-PRE: NORMAL
FVC PRED: NORMAL
FVC-POST: NORMAL
FVC-PRE: NORMAL
GAW %PRED: NORMAL
GAW PRED: NORMAL
GAW: NORMAL
IC %PRED: NORMAL
IC PRED: NORMAL
IC: NORMAL
MEP: NORMAL
MIP: NORMAL
MVV %PRED-PRE: NORMAL
MVV PRED: NORMAL
MVV-PRE: NORMAL
PEF %PRED-POST: NORMAL
PEF %PRED-PRE: NORMAL
PEF PRED: NORMAL
PEF%CHNG: NORMAL
PEF-POST: NORMAL
PEF-PRE: NORMAL
RAW %PRED: NORMAL
RAW PRED: NORMAL
RAW: NORMAL
RV %PRED: NORMAL
RV PRED: NORMAL
RV: NORMAL
SVC %PRED: NORMAL
SVC PRED: NORMAL
SVC: NORMAL
TLC %PRED: 92 %
TLC PRED: NORMAL
TLC: NORMAL
VA %PRED: NORMAL
VA PRED: NORMAL
VA: NORMAL
VTG %PRED: NORMAL
VTG PRED: NORMAL
VTG: NORMAL

## 2022-05-26 PROCEDURE — 94640 AIRWAY INHALATION TREATMENT: CPT

## 2022-05-26 PROCEDURE — 94760 N-INVAS EAR/PLS OXIMETRY 1: CPT

## 2022-05-26 PROCEDURE — 6370000000 HC RX 637 (ALT 250 FOR IP): Performed by: FAMILY MEDICINE

## 2022-05-26 PROCEDURE — 94726 PLETHYSMOGRAPHY LUNG VOLUMES: CPT

## 2022-05-26 PROCEDURE — 94060 EVALUATION OF WHEEZING: CPT

## 2022-05-26 PROCEDURE — 94729 DIFFUSING CAPACITY: CPT

## 2022-05-26 RX ORDER — ALBUTEROL SULFATE 90 UG/1
4 AEROSOL, METERED RESPIRATORY (INHALATION) ONCE
Status: COMPLETED | OUTPATIENT
Start: 2022-05-26 | End: 2022-05-26

## 2022-05-26 RX ADMIN — Medication 4 PUFF: at 08:01

## 2022-05-26 ASSESSMENT — PULMONARY FUNCTION TESTS
FEV1_PERCENT_PREDICTED_PRE: 112
FEV1/FVC_PRE: 82
FEV1_PERCENT_PREDICTED_POST: 117
FEV1/FVC_POST: 84

## 2022-06-06 PROCEDURE — 94729 DIFFUSING CAPACITY: CPT | Performed by: INTERNAL MEDICINE

## 2022-06-06 PROCEDURE — 94060 EVALUATION OF WHEEZING: CPT | Performed by: INTERNAL MEDICINE

## 2022-06-06 PROCEDURE — 94726 PLETHYSMOGRAPHY LUNG VOLUMES: CPT | Performed by: INTERNAL MEDICINE

## 2022-06-10 ENCOUNTER — APPOINTMENT (OUTPATIENT)
Dept: CT IMAGING | Age: 67
DRG: 312 | End: 2022-06-10
Payer: MEDICARE

## 2022-06-10 ENCOUNTER — APPOINTMENT (OUTPATIENT)
Dept: GENERAL RADIOLOGY | Age: 67
DRG: 312 | End: 2022-06-10
Payer: MEDICARE

## 2022-06-10 ENCOUNTER — APPOINTMENT (OUTPATIENT)
Dept: VASCULAR LAB | Age: 67
DRG: 312 | End: 2022-06-10
Payer: MEDICARE

## 2022-06-10 ENCOUNTER — HOSPITAL ENCOUNTER (INPATIENT)
Age: 67
LOS: 3 days | Discharge: HOME HEALTH | DRG: 312 | End: 2022-06-13
Attending: EMERGENCY MEDICINE | Admitting: INTERNAL MEDICINE
Payer: MEDICARE

## 2022-06-10 DIAGNOSIS — I95.1 ORTHOSTATIC HYPOTENSION: Primary | ICD-10-CM

## 2022-06-10 DIAGNOSIS — E86.0 DEHYDRATION: ICD-10-CM

## 2022-06-10 LAB
A/G RATIO: 1.5 (ref 1.1–2.2)
ALBUMIN SERPL-MCNC: 4 G/DL (ref 3.4–5)
ALP BLD-CCNC: 71 U/L (ref 40–129)
ALT SERPL-CCNC: 17 U/L (ref 10–40)
ANION GAP SERPL CALCULATED.3IONS-SCNC: 12 MMOL/L (ref 3–16)
AST SERPL-CCNC: 31 U/L (ref 15–37)
BACTERIA: ABNORMAL /HPF
BASOPHILS ABSOLUTE: 0.1 K/UL (ref 0–0.2)
BASOPHILS RELATIVE PERCENT: 1 %
BILIRUB SERPL-MCNC: <0.2 MG/DL (ref 0–1)
BILIRUBIN URINE: NEGATIVE
BLOOD, URINE: ABNORMAL
BUN BLDV-MCNC: 18 MG/DL (ref 7–20)
CALCIUM SERPL-MCNC: 9.5 MG/DL (ref 8.3–10.6)
CHLORIDE BLD-SCNC: 105 MMOL/L (ref 99–110)
CLARITY: CLEAR
CO2: 24 MMOL/L (ref 21–32)
COLOR: YELLOW
CREAT SERPL-MCNC: 1 MG/DL (ref 0.6–1.2)
EKG ATRIAL RATE: 67 BPM
EKG DIAGNOSIS: NORMAL
EKG P AXIS: 34 DEGREES
EKG P-R INTERVAL: 150 MS
EKG Q-T INTERVAL: 422 MS
EKG QRS DURATION: 86 MS
EKG QTC CALCULATION (BAZETT): 445 MS
EKG R AXIS: 53 DEGREES
EKG T AXIS: 48 DEGREES
EKG VENTRICULAR RATE: 67 BPM
EOSINOPHILS ABSOLUTE: 0.2 K/UL (ref 0–0.6)
EOSINOPHILS RELATIVE PERCENT: 2.4 %
GFR AFRICAN AMERICAN: >60
GFR NON-AFRICAN AMERICAN: 55
GLUCOSE BLD-MCNC: 89 MG/DL (ref 70–99)
GLUCOSE URINE: NEGATIVE MG/DL
HCT VFR BLD CALC: 37 % (ref 36–48)
HEMOGLOBIN: 11.9 G/DL (ref 12–16)
INFLUENZA A: NOT DETECTED
INFLUENZA B: NOT DETECTED
KETONES, URINE: NEGATIVE MG/DL
LACTIC ACID, SEPSIS: 0.7 MMOL/L (ref 0.4–1.9)
LACTIC ACID, SEPSIS: 1.5 MMOL/L (ref 0.4–1.9)
LEUKOCYTE ESTERASE, URINE: NEGATIVE
LYMPHOCYTES ABSOLUTE: 2.6 K/UL (ref 1–5.1)
LYMPHOCYTES RELATIVE PERCENT: 39.9 %
MAGNESIUM: 2 MG/DL (ref 1.8–2.4)
MCH RBC QN AUTO: 27.4 PG (ref 26–34)
MCHC RBC AUTO-ENTMCNC: 32.2 G/DL (ref 31–36)
MCV RBC AUTO: 85.1 FL (ref 80–100)
MICROSCOPIC EXAMINATION: YES
MONOCYTES ABSOLUTE: 0.7 K/UL (ref 0–1.3)
MONOCYTES RELATIVE PERCENT: 10 %
MUCUS: ABNORMAL /LPF
NEUTROPHILS ABSOLUTE: 3.1 K/UL (ref 1.7–7.7)
NEUTROPHILS RELATIVE PERCENT: 46.7 %
NITRITE, URINE: NEGATIVE
PDW BLD-RTO: 18.3 % (ref 12.4–15.4)
PH UA: 6 (ref 5–8)
PLATELET # BLD: 233 K/UL (ref 135–450)
PMV BLD AUTO: 7.7 FL (ref 5–10.5)
POTASSIUM REFLEX MAGNESIUM: 4.3 MMOL/L (ref 3.5–5.1)
PROTEIN UA: ABNORMAL MG/DL
RBC # BLD: 4.35 M/UL (ref 4–5.2)
RBC UA: ABNORMAL /HPF (ref 0–4)
SARS-COV-2 RNA, RT PCR: NOT DETECTED
SODIUM BLD-SCNC: 141 MMOL/L (ref 136–145)
SPECIFIC GRAVITY UA: 1.02 (ref 1–1.03)
TOTAL PROTEIN: 6.7 G/DL (ref 6.4–8.2)
TROPONIN: <0.01 NG/ML
TROPONIN: <0.01 NG/ML
TSH REFLEX FT4: 0.09 UIU/ML (ref 0.27–4.2)
URINE REFLEX TO CULTURE: YES
URINE TYPE: ABNORMAL
UROBILINOGEN, URINE: 0.2 E.U./DL
WBC # BLD: 6.6 K/UL (ref 4–11)
WBC UA: ABNORMAL /HPF (ref 0–5)

## 2022-06-10 PROCEDURE — 82746 ASSAY OF FOLIC ACID SERUM: CPT

## 2022-06-10 PROCEDURE — 36415 COLL VENOUS BLD VENIPUNCTURE: CPT

## 2022-06-10 PROCEDURE — 82607 VITAMIN B-12: CPT

## 2022-06-10 PROCEDURE — 84439 ASSAY OF FREE THYROXINE: CPT

## 2022-06-10 PROCEDURE — 93880 EXTRACRANIAL BILAT STUDY: CPT

## 2022-06-10 PROCEDURE — 83735 ASSAY OF MAGNESIUM: CPT

## 2022-06-10 PROCEDURE — 99223 1ST HOSP IP/OBS HIGH 75: CPT

## 2022-06-10 PROCEDURE — 87086 URINE CULTURE/COLONY COUNT: CPT

## 2022-06-10 PROCEDURE — 87636 SARSCOV2 & INF A&B AMP PRB: CPT

## 2022-06-10 PROCEDURE — 96361 HYDRATE IV INFUSION ADD-ON: CPT

## 2022-06-10 PROCEDURE — 2580000003 HC RX 258

## 2022-06-10 PROCEDURE — G0378 HOSPITAL OBSERVATION PER HR: HCPCS

## 2022-06-10 PROCEDURE — 99285 EMERGENCY DEPT VISIT HI MDM: CPT

## 2022-06-10 PROCEDURE — 2580000003 HC RX 258: Performed by: EMERGENCY MEDICINE

## 2022-06-10 PROCEDURE — 81001 URINALYSIS AUTO W/SCOPE: CPT

## 2022-06-10 PROCEDURE — 6360000004 HC RX CONTRAST MEDICATION

## 2022-06-10 PROCEDURE — 80053 COMPREHEN METABOLIC PANEL: CPT

## 2022-06-10 PROCEDURE — 1200000000 HC SEMI PRIVATE

## 2022-06-10 PROCEDURE — 93010 ELECTROCARDIOGRAM REPORT: CPT | Performed by: INTERNAL MEDICINE

## 2022-06-10 PROCEDURE — 83605 ASSAY OF LACTIC ACID: CPT

## 2022-06-10 PROCEDURE — 84443 ASSAY THYROID STIM HORMONE: CPT

## 2022-06-10 PROCEDURE — 85025 COMPLETE CBC W/AUTO DIFF WBC: CPT

## 2022-06-10 PROCEDURE — 96360 HYDRATION IV INFUSION INIT: CPT

## 2022-06-10 PROCEDURE — 93005 ELECTROCARDIOGRAM TRACING: CPT

## 2022-06-10 PROCEDURE — 84484 ASSAY OF TROPONIN QUANT: CPT

## 2022-06-10 PROCEDURE — 71045 X-RAY EXAM CHEST 1 VIEW: CPT

## 2022-06-10 PROCEDURE — 71260 CT THORAX DX C+: CPT

## 2022-06-10 PROCEDURE — 6370000000 HC RX 637 (ALT 250 FOR IP)

## 2022-06-10 RX ORDER — CHOLECALCIFEROL (VITAMIN D3) 125 MCG
2500 CAPSULE ORAL DAILY
Status: DISCONTINUED | OUTPATIENT
Start: 2022-06-10 | End: 2022-06-13 | Stop reason: HOSPADM

## 2022-06-10 RX ORDER — SODIUM CHLORIDE 9 MG/ML
INJECTION, SOLUTION INTRAVENOUS CONTINUOUS
Status: DISCONTINUED | OUTPATIENT
Start: 2022-06-10 | End: 2022-06-12

## 2022-06-10 RX ORDER — SODIUM CHLORIDE 0.9 % (FLUSH) 0.9 %
5-40 SYRINGE (ML) INJECTION EVERY 12 HOURS SCHEDULED
Status: DISCONTINUED | OUTPATIENT
Start: 2022-06-10 | End: 2022-06-13 | Stop reason: HOSPADM

## 2022-06-10 RX ORDER — ONDANSETRON 4 MG/1
4 TABLET, ORALLY DISINTEGRATING ORAL EVERY 8 HOURS PRN
Status: DISCONTINUED | OUTPATIENT
Start: 2022-06-10 | End: 2022-06-13 | Stop reason: HOSPADM

## 2022-06-10 RX ORDER — DOXAZOSIN 2 MG/1
2 TABLET ORAL DAILY
Status: DISCONTINUED | OUTPATIENT
Start: 2022-06-10 | End: 2022-06-13 | Stop reason: HOSPADM

## 2022-06-10 RX ORDER — 0.9 % SODIUM CHLORIDE 0.9 %
1000 INTRAVENOUS SOLUTION INTRAVENOUS ONCE
Status: COMPLETED | OUTPATIENT
Start: 2022-06-10 | End: 2022-06-10

## 2022-06-10 RX ORDER — ONDANSETRON 2 MG/ML
4 INJECTION INTRAMUSCULAR; INTRAVENOUS EVERY 6 HOURS PRN
Status: DISCONTINUED | OUTPATIENT
Start: 2022-06-10 | End: 2022-06-13 | Stop reason: HOSPADM

## 2022-06-10 RX ORDER — POTASSIUM CHLORIDE 20 MEQ/1
40 TABLET, EXTENDED RELEASE ORAL PRN
Status: DISCONTINUED | OUTPATIENT
Start: 2022-06-10 | End: 2022-06-13 | Stop reason: HOSPADM

## 2022-06-10 RX ORDER — LEVOTHYROXINE SODIUM 88 UG/1
88 TABLET ORAL DAILY
Status: DISCONTINUED | OUTPATIENT
Start: 2022-06-10 | End: 2022-06-13 | Stop reason: HOSPADM

## 2022-06-10 RX ORDER — SODIUM CHLORIDE 9 MG/ML
INJECTION, SOLUTION INTRAVENOUS PRN
Status: DISCONTINUED | OUTPATIENT
Start: 2022-06-10 | End: 2022-06-13 | Stop reason: HOSPADM

## 2022-06-10 RX ORDER — PANTOPRAZOLE SODIUM 40 MG/1
40 TABLET, DELAYED RELEASE ORAL DAILY
Status: DISCONTINUED | OUTPATIENT
Start: 2022-06-10 | End: 2022-06-13 | Stop reason: HOSPADM

## 2022-06-10 RX ORDER — ENOXAPARIN SODIUM 100 MG/ML
40 INJECTION SUBCUTANEOUS DAILY
Status: DISCONTINUED | OUTPATIENT
Start: 2022-06-10 | End: 2022-06-13 | Stop reason: HOSPADM

## 2022-06-10 RX ORDER — POLYETHYLENE GLYCOL 3350 17 G/17G
17 POWDER, FOR SOLUTION ORAL DAILY PRN
Status: DISCONTINUED | OUTPATIENT
Start: 2022-06-10 | End: 2022-06-13 | Stop reason: HOSPADM

## 2022-06-10 RX ORDER — CLONAZEPAM 1 MG/1
2 TABLET ORAL 2 TIMES DAILY
Status: DISCONTINUED | OUTPATIENT
Start: 2022-06-10 | End: 2022-06-13 | Stop reason: HOSPADM

## 2022-06-10 RX ORDER — POTASSIUM CHLORIDE 20 MEQ/1
20 TABLET, EXTENDED RELEASE ORAL 2 TIMES DAILY
Status: DISCONTINUED | OUTPATIENT
Start: 2022-06-10 | End: 2022-06-12

## 2022-06-10 RX ORDER — SULFAMETHOXAZOLE AND TRIMETHOPRIM 800; 160 MG/1; MG/1
1 TABLET ORAL DAILY
COMMUNITY

## 2022-06-10 RX ORDER — SODIUM CHLORIDE 0.9 % (FLUSH) 0.9 %
10 SYRINGE (ML) INJECTION PRN
Status: DISCONTINUED | OUTPATIENT
Start: 2022-06-10 | End: 2022-06-13 | Stop reason: HOSPADM

## 2022-06-10 RX ORDER — NYSTATIN 100000 [USP'U]/G
POWDER TOPICAL DAILY PRN
Status: ON HOLD | COMMUNITY
End: 2022-06-13 | Stop reason: HOSPADM

## 2022-06-10 RX ORDER — DESVENLAFAXINE SUCCINATE 50 MG/1
50 TABLET, EXTENDED RELEASE ORAL DAILY
Status: DISCONTINUED | OUTPATIENT
Start: 2022-06-10 | End: 2022-06-13 | Stop reason: HOSPADM

## 2022-06-10 RX ORDER — POTASSIUM CHLORIDE 7.45 MG/ML
10 INJECTION INTRAVENOUS PRN
Status: DISCONTINUED | OUTPATIENT
Start: 2022-06-10 | End: 2022-06-13 | Stop reason: HOSPADM

## 2022-06-10 RX ORDER — OXYCODONE HYDROCHLORIDE 5 MG/1
10 TABLET ORAL EVERY 6 HOURS PRN
Status: DISCONTINUED | OUTPATIENT
Start: 2022-06-10 | End: 2022-06-13 | Stop reason: HOSPADM

## 2022-06-10 RX ORDER — TROSPIUM CHLORIDE 20 MG/1
20 TABLET, FILM COATED ORAL NIGHTLY
Status: DISCONTINUED | OUTPATIENT
Start: 2022-06-10 | End: 2022-06-13 | Stop reason: HOSPADM

## 2022-06-10 RX ORDER — FAMOTIDINE 20 MG/1
20 TABLET, FILM COATED ORAL DAILY
Status: DISCONTINUED | OUTPATIENT
Start: 2022-06-10 | End: 2022-06-13 | Stop reason: HOSPADM

## 2022-06-10 RX ORDER — ACETAMINOPHEN 325 MG/1
650 TABLET ORAL EVERY 6 HOURS PRN
Status: DISCONTINUED | OUTPATIENT
Start: 2022-06-10 | End: 2022-06-13 | Stop reason: HOSPADM

## 2022-06-10 RX ORDER — MAGNESIUM SULFATE 1 G/100ML
1000 INJECTION INTRAVENOUS PRN
Status: DISCONTINUED | OUTPATIENT
Start: 2022-06-10 | End: 2022-06-13 | Stop reason: HOSPADM

## 2022-06-10 RX ORDER — ALENDRONATE SODIUM 35 MG/1
35 TABLET ORAL WEEKLY
Status: DISCONTINUED | OUTPATIENT
Start: 2022-06-10 | End: 2022-06-10

## 2022-06-10 RX ORDER — MIDODRINE HYDROCHLORIDE 10 MG/1
10 TABLET ORAL 3 TIMES DAILY
Status: DISCONTINUED | OUTPATIENT
Start: 2022-06-10 | End: 2022-06-13 | Stop reason: HOSPADM

## 2022-06-10 RX ORDER — ACETAMINOPHEN 650 MG/1
650 SUPPOSITORY RECTAL EVERY 6 HOURS PRN
Status: DISCONTINUED | OUTPATIENT
Start: 2022-06-10 | End: 2022-06-13 | Stop reason: HOSPADM

## 2022-06-10 RX ORDER — TERAZOSIN 2 MG/1
2 CAPSULE ORAL NIGHTLY
Status: ON HOLD | COMMUNITY
End: 2023-05-22 | Stop reason: HOSPADM

## 2022-06-10 RX ORDER — ROSUVASTATIN CALCIUM 10 MG/1
10 TABLET, COATED ORAL DAILY
Status: DISCONTINUED | OUTPATIENT
Start: 2022-06-10 | End: 2022-06-13 | Stop reason: HOSPADM

## 2022-06-10 RX ORDER — GABAPENTIN 300 MG/1
600 CAPSULE ORAL 3 TIMES DAILY
Status: DISCONTINUED | OUTPATIENT
Start: 2022-06-10 | End: 2022-06-13 | Stop reason: HOSPADM

## 2022-06-10 RX ADMIN — SODIUM CHLORIDE: 9 INJECTION, SOLUTION INTRAVENOUS at 18:17

## 2022-06-10 RX ADMIN — SODIUM CHLORIDE 1000 ML: 9 INJECTION, SOLUTION INTRAVENOUS at 12:00

## 2022-06-10 RX ADMIN — CLONAZEPAM 2 MG: 1 TABLET ORAL at 21:32

## 2022-06-10 RX ADMIN — MIDODRINE HYDROCHLORIDE 10 MG: 10 TABLET ORAL at 21:32

## 2022-06-10 RX ADMIN — GABAPENTIN 600 MG: 300 CAPSULE ORAL at 21:31

## 2022-06-10 RX ADMIN — IOPAMIDOL 85 ML: 755 INJECTION, SOLUTION INTRAVENOUS at 16:55

## 2022-06-10 RX ADMIN — PANTOPRAZOLE SODIUM 40 MG: 40 TABLET, DELAYED RELEASE ORAL at 21:31

## 2022-06-10 RX ADMIN — TROSPIUM CHLORIDE 20 MG: 20 TABLET, FILM COATED ORAL at 21:32

## 2022-06-10 RX ADMIN — POTASSIUM CHLORIDE 20 MEQ: 1500 TABLET, EXTENDED RELEASE ORAL at 21:32

## 2022-06-10 ASSESSMENT — PAIN SCALES - GENERAL
PAINLEVEL_OUTOF10: 6
PAINLEVEL_OUTOF10: 0

## 2022-06-10 ASSESSMENT — PAIN DESCRIPTION - ORIENTATION: ORIENTATION: RIGHT;UPPER

## 2022-06-10 ASSESSMENT — PAIN DESCRIPTION - LOCATION: LOCATION: ABDOMEN

## 2022-06-10 ASSESSMENT — PAIN - FUNCTIONAL ASSESSMENT: PAIN_FUNCTIONAL_ASSESSMENT: 0-10

## 2022-06-10 NOTE — ED NOTES
Writer spoke with rep for [employer] regarding patient's pacemaker, they report no recent episodes. Since pacemaker has been cleared in January 2022 there have been 9 high ventricular rate episodes and the last one was in April. It is reported to be functioning as programmed and patient's heart is functioning on its own approx 79% of the time. Dr. Sabrina Coker aware.       Arely Vides, RN  06/10/22 [id number]

## 2022-06-10 NOTE — H&P
Hospital Medicine History & Physical      PCP: Keanu Santoro MD    Date of Admission: 6/10/2022    Date of Service: Pt seen/examined on 6/10/2022     Chief Complaint:    Chief Complaint   Patient presents with    Dizziness     Patient arrived via EMS, she had driven to a store and got dizzy, drove home and called squad. Patient says she has been dizzy for years and that no one has ever done anything about it. Says she may have Ha Disease. Says she takes meds for low AND high BP. History Of Present Illness: The patient is a 78 y.o. female with pmhx of bradycardia s/p pacemaker, POTS, HTN, HLD, NATHAN, hypothyroidism, chronic back pain who presented to [facility] ED with complaint of dizziness and pre-syncope. This has been a chronic issue for the patient, she has been experiencing these episodes for the past year, pretty much on a daily basis she reports. She reports that whenever she gets out of bed and begins to walk she feels short of breath, light-headed, and dizzy. She has to sit down in order for symptoms to improve. She has had some falls in the past but not recently. She came into the ED today because she was at a pool shop walking around and felt light-headed and dizzy, a  had to help her sit down and called 911. Does also report on and off headaches but she has history of chronic migraines. Denies any vision changes, palpitations, chest pain. No fever, chills, chest pain, nausea, vomiting, diarrhea, numbness, or tingling. She has chronic leg and back pain with neuropathy so she has difficulty walking, she uses a walker or cane to ambulate, she is not very mobile at home. She says she has been staying hydrated at home and eating appropriate amount of food. She has been taking all of her medicines.      She follows with cardiology at [id number] [facility].   She has had cardiac workup in 3/2022  stress test negative, echo with grade I diastolic dysfunction, CT head unremarkable, she has had CTA head and neck last year. Neurology was recommending MRI for further workup. Past Medical History:        Diagnosis Date    Angina at rest [facility])     Anxiety     Arthritis     hands and hip    Blood transfusion     after back surgery    Bradycardia     Bronchiectasis ([address] 11/05/2013    Chronic back pain     Hyperlipidemia     Hypertension     Localized morphea     Multiple drug resistant organism (MDRO) culture positive 04/13/2021    E.COLI-URINE    NATHAN treated with BiPAP     Pacemaker     Stress incontinence     Thyroid disease     hypothyroid    Trochanteric bursitis of left hip 04/12/2019       Past Surgical History:        Procedure Laterality Date    ABDOMEN SURGERY  09/09/2011     REPAIR INCISIONAL HERNIA REDUCIBLE WITH POSSIBLE MESH    BACK SURGERY      x3    BACK SURGERY      stimulator    BLADDER SUSPENSION      CARDIAC PACEMAKER PLACEMENT  2011    Heart doctor at [address] [place] [address] Left 03/08/2018    PHACO EMULSIFICATION OF CATARACT WITH INTRAOCULAR LENS IMPLANT LEFT EYE    CERVICAL DISCECTOMY  06/2014    and fusion    CHOLECYSTECTOMY      COLONOSCOPY  2002 1/26/12    ENDOSCOPY, COLON, DIAGNOSTIC      EPIDURAL STEROID INJECTION Left 08/15/2019    LEFT KNEE GENICULAR NERVE BLOCK SITE CONFIRMED BY FLUOROSCOPY performed by Barbie Zuniga MD at [address] [place] [address] Right 04/05/2018    cataract removal    FOOT SURGERY Right 12/06/2012    arthroplasty    FOOT SURGERY Left 04/30/2015    FOOT SURGERY Left 04/30/2015    GASTRIC BYPASS SURGERY  1626,8151    HYSTERECTOMY (CERVIX STATUS UNKNOWN)      KNEE ARTHROSCOPY Left 10/03/2014    part.  medial & lateral meniscectomy, synovectomy plica exc    KNEE ARTHROSCOPY Right 10/13/2015    partial medial meniscectomy, chondroplasty, partial lateral meniscectomy    NECK SURGERY      OTHER SURGICAL HISTORY  04/2013    removal spinal cord stimulater    OTHER SURGICAL HISTORY      bladder stimulator  OTHER SURGICAL HISTORY  08/30/2017    left patellofemoral arthroplasty    OTHER SURGICAL HISTORY  05/09/2018    convert left patellofemoral arthroplasty to left total knee replacement   Cy Annette  2011    Dr Mahoney/checked last week/told has 12 more yrs for this one/set at 60    SKIN BIOPSY      abdomen    SPINE SURGERY      stimulator    THROAT SURGERY      polyps removed    TONSILLECTOMY      UPPER GASTROINTESTINAL ENDOSCOPY      UPPER GASTROINTESTINAL ENDOSCOPY  05/03/2017       Medications Prior to Admission:    Prior to Admission medications    Medication Sig Start Date End Date Taking? Authorizing Provider   terazosin (HYTRIN) 2 MG capsule Take 2 mg by mouth nightly   Yes Historical Provider, MD   nystatin (MYCOSTATIN) 615552 UNIT/GM powder Apply topically daily as needed Apply topically 4 times daily.    Yes Historical Provider, MD   sulfamethoxazole-trimethoprim (BACTRIM DS;SEPTRA DS) 800-160 MG per tablet Take 1 tablet by mouth daily   Yes Historical Provider, MD   famotidine (PEPCID) 20 MG tablet Take by mouth daily    Historical Provider, MD   alendronate (FOSAMAX) 35 MG tablet TAKE 1 TABLET BY MOUTH ONE TIME A WEEK IN THE MORNING AT LEAST 30 MIN BEFORE FIRST FOOD, BEVERAGE, OR MED OF DAY 3/15/22   Historical Provider, MD   desvenlafaxine succinate (PRISTIQ) 25 MG TB24 extended release tablet Take 50 mg by mouth daily 1/11/22   Historical Provider, MD   eszopiclone (LUNESTA) 3 MG TABS TAKE 1 TABLET BY MOUTH AT BEDTIME 3/14/22   Historical Provider, MD   gabapentin (NEURONTIN) 600 MG tablet TAKE 1 TABLET BY MOUTH EVERY EIGHT HOURS 3/7/22   Historical Provider, MD   metoprolol succinate (TOPROL XL) 25 MG extended release tablet Med called in today, pt does not have it \bc pharmacy states not available until Monday 3/18/22   Historical Provider, MD   midodrine (PROAMATINE) 10 MG tablet TAKE 1 TABLET BY MOUTH THREE TIMES A DAY 1/27/22   Historical Provider, MD   Rimegepant Sulfate (NURTEC) 75 MG TBDP Take 75 mg by mouth Med called in today, pt does not have it \bc pharmacy states not available until Monday 3/18/22   Historical Provider, MD   rosuvastatin (CRESTOR) 10 MG tablet TAKE 1 TABLET BY MOUTH EVERY DAY 1/11/22   Historical Provider, MD   DENTA 5000 PLUS 1.1 % CREA FLOSS THEN BRUSH WITH PEA-SIZED AMOUNT FOR 2 MINUTES TWICE DAILY, SPIT AND DO NOT RINSE OR DRINK FOR 30 MINUTES 2/9/22   Historical Provider, MD   Biotin 1000 MCG TABS Take by mouth daily    Historical Provider, MD   cephALEXin (KEFLEX) 500 MG capsule Take 500 mg by mouth daily     Historical Provider, MD   oxyCODONE HCl (OXY-IR) 10 MG immediate release tablet TAKE 1 TABLET BY MOUTH EVERY SIX HOURS AS NEEDED 2/25/21   Historical Provider, MD   sodium chloride 0.9 % irrigation  3/1/21   Historical Provider, MD   ondansetron (ZOFRAN-ODT) 8 MG TBDP disintegrating tablet Take 8 mg by mouth every 8 hours as needed    Historical Provider, MD   clonazePAM (KLONOPIN) 2 MG tablet TAKE 1 TABLET BY MOUTH TWO TIMES A DAY 3/12/21   Historical Provider, MD   fludrocortisone (FLORINEF) 0.1 MG tablet Take 0.1 mg by mouth 2 times daily  Patient not taking: Reported on 6/10/2022    Historical Provider, MD   Sennosides (SENNA LAX PO) Take by mouth daily     Historical Provider, MD   triamcinolone (KENALOG) 0.1 % cream Apply topically 2 times daily Apply topically 2 times daily.     Historical Provider, MD   Fesoterodine Fumarate ER (TOVIAZ) 8 MG TB24 Take by mouth daily     Historical Provider, MD   cyanocobalamin 1000 MCG tablet Take 2,500 mcg by mouth daily    Historical Provider, MD   levothyroxine (SYNTHROID) 88 MCG tablet Take 88 mcg by mouth Daily    Historical Provider, MD   [place] CATH TIP 60 ML MISC by Other route in the morning, at noon, and at bedtime Wound/hole on abd, uses to keep clean 3/7/18   Historical Provider, MD   KLOR-CON M20 20 MEQ extended release tablet Take 20 mEq by mouth 2 times daily  3/19/18 Historical Provider, MD   pantoprazole (PROTONIX) 40 MG tablet TAKE 1 TABLET BY MOUTH DAILY 2/16/17   Historical Provider, MD   fluticasone (FLONASE) 50 MCG/ACT nasal spray 1 spray by Nasal route nightly. Patient taking differently: 1 spray by Nasal route as needed  4/9/15   Daphnie Vera MD   Multiple Vitamin (THERA) TABS Take 1 tablet by mouth daily     Historical Provider, MD       Allergies:  Meloxicam, Acetaminophen, Benzoin compound, Lyrica [pregabalin], Quetiapine fumarate, Seroquel [quetiapine fumarate], Tizanidine, Adhesive tape, Balsam, Balsam peru-castor oil, Castor oil, Linaclotide, Other, Quetiapine, and Wound dressings    Social History:  The patient currently lives at home alone     TOBACCO:   reports that she has never smoked. She has never used smokeless tobacco.  ETOH:   reports no history of alcohol use. Family History:   Positive as follows:        Problem Relation Age of Onset    Heart Disease Father     Cancer Sister         multiple organs    Other Sister     Asthma Neg Hx     Diabetes Neg Hx     Emphysema Neg Hx     Heart Failure Neg Hx     Hypertension Neg Hx        REVIEW OF SYSTEMS:       Constitutional: Negative for fever, + weakness   HENT: Negative for sore throat   Eyes: Negative for redness   Respiratory: Negative  for  Cough, + dyspnea   Cardiovascular: Negative for chest pain   Gastrointestinal: Negative for vomiting, diarrhea   Genitourinary: Negative for hematuria   Musculoskeletal: + arthralgias  Skin: Negative for rash   Neurological: Negative for syncope, + dizziness   Hematological: Negative for adenopathy   Psychiatric/Behavorial: Negative for anxiety    PHYSICAL EXAM:    /66   Pulse 61   Temp 97.7 °F (36.5 °C) (Oral)   Resp 12   Ht 5' 2\" (1.575 m)   Wt 171 lb (77.6 kg)   SpO2 100%   BMI 31.28 kg/m²     Gen: No distress. Alert. Chronically ill appearing female   Eyes: PERRL. No sclera icterus. No conjunctival injection. .   Neck: No JVD.   No Carotid Bruit. Trachea midline. Resp: No accessory muscle use. No crackles. No wheezes. No rhonchi. CV: Regular rate. Regular rhythm. No murmur. No rub. No edema. Peripheral Pulses: +2 palpable, equal bilaterally   GI: Non-tender. Non-distended. No masses. No organomegaly. Normal bowel sounds. No hernia. + Suprapubic catheter, no surrounding erythema or discharge   Skin: Warm and dry. No nodule on exposed extremities. No rash on exposed extremities. M/S: No cyanosis. No joint deformity. No clubbing. + 4/5 strength of upper and lower extremities bilaterally, sensation intact and equal, pulses 2+  Neuro: Awake. Grossly nonfocal. No aphasia or dysarthria   Psych: Oriented x 3. No anxiety or agitation.      Lab Results   Component Value Date    WBC 6.6 06/10/2022    HGB 11.9 (L) 06/10/2022    HCT 37.0 06/10/2022    MCV 85.1 06/10/2022     06/10/2022     Lab Results   Component Value Date     06/10/2022    K 4.3 06/10/2022     06/10/2022    CO2 24 06/10/2022    BUN 18 06/10/2022    CREATININE 1.0 06/10/2022    GLUCOSE 89 06/10/2022    CALCIUM 9.5 06/10/2022    PROT 6.7 06/10/2022    LABALBU 4.0 06/10/2022    BILITOT <0.2 06/10/2022    ALKPHOS 71 06/10/2022    AST 31 06/10/2022    ALT 17 06/10/2022    LABGLOM 55 (A) 06/10/2022    GFRAA >60 06/10/2022    AGRATIO 1.5 06/10/2022    GLOB 3.3 07/01/2021         CARDIAC ENZYMES  Recent Labs     06/10/22  1146   TROPONINI <0.01       U/A:    Lab Results   Component Value Date    NITRITE NEG 12/29/2011    COLORU Yellow 03/18/2022    WBCUA 10-20 07/01/2021    RBCUA 3-4 07/01/2021    MUCUS 1+ 07/01/2021    BACTERIA 1+ 07/01/2021    CLARITYU Clear 03/18/2022    SPECGRAV 1.010 03/18/2022    LEUKOCYTESUR Negative 03/18/2022    BLOODU Negative 03/18/2022    GLUCOSEU Negative 03/18/2022    GLUCOSEU NEGATIVE 08/10/2011    AMORPHOUS Rare 07/01/2021       CULTURES  COVID and Flu not detected     EKG:  Electronic atrial pacemakerNo previous ECGs availableConfirmed by Hunter Serrano MD, [address] [place] [address] ([id number]) on 6/10/2022 2:57:06 PM    RADIOLOGY  XR CHEST PORTABLE   Final Result   Mild left basilar atelectasis.              ASSESSMENT/PLAN:    #Orthostatic hypotension   #Vasovagal syncope   #POTS  -like cause of pts symptoms   -+ orthostatics, SBP dropped to 60s  -HR stable   -holding toprol   -continue midodrine   -does not take florinef, unsure why?  -compression stockings  -IVF   -repeat orthostatics  -fall precautions       #Dizziness   #Hypotension   #Pre-syncope   -2/2 to above most likely   -CT head done in march of this year, negative, CTA done in past   -CTA chest pending  -carotid US pending   -pt is on multiple sedating meds, could be contributing   -needs to follow up with neurology outpatient     #HX of Bradycardia   -s/p pacemaker   -HR stable today     #Neurogenic bladder with enterovesical fistula to augmented bladder   #Suprapubic urostomy   -self caths herself   -on sanctura     #HLD  -on statin     #Anxiety   #Depression   -continue pristiq     #Osteoporosis  -on fosamax     #Chronic back pain   #Cervical stenosis with radiculopathy   #DDD  #Fibromyalgia   -on oxycodone, gabapentin     #Hx of gastric bypass     #Chronic migraines  -continue home regimen     #Hypothyroidism   -continue synthroid  -TSH pending     #GERD   -continue PPI and pepcid     #Vitamin B12 deficiency   -continue B12     DVT Prophylaxis: lovenox   Diet: No diet orders on file  Code Status: Prior    AIMEE Tadeo  06/10/22  3:05 PM

## 2022-06-10 NOTE — ED NOTES
Attempted to straight cath patient from suprapubic catheter, no urine was able to be obtained. Dr. Chris Pugh aware, will give fluids and try again.       Shani Yancey RN  06/10/22 1526

## 2022-06-10 NOTE — ED NOTES
Report given to 2 Francesca; pt left ED in stable condition.  /74   Pulse 62   Temp 97.7 °F (36.5 °C) (Oral)   Resp 11   Ht 5' 2\" (1.575 m)   Wt 171 lb (77.6 kg)   SpO2 100%   BMI 31.28 kg/m²        Alfonzo RAGHAV Rankin  06/10/22 0590

## 2022-06-10 NOTE — ED PROVIDER NOTES
[facility]      CHIEF COMPLAINT  Dizziness (Patient arrived via EMS, she had driven to a store and got dizzy, drove home and called squad. Patient says she has been dizzy for years and that no one has ever done anything about it. Says she may have Ha Disease. Says she takes meds for low AND high BP. )       HISTORY OF PRESENT ILLNESS  Kennedy Gil is a 78 y.o. female with a past medical history of POTS, chronic urostomy, and pacemaker who presents to the ED complaining of dizziness. The patient states that she has been feeling lightheaded and has had orthostatic hypotension for a while. However, she states that over the past few months it has significantly worsened. She states that this morning she drove to a store, was dizzy at the time and bystanders wanted to call EMS but the patient stated that she wanted to sit for a few minutes and see if the dizziness passed. She states that she was persistently dizzy, typically she only has dizziness when she stands up but it has been persistent today. She describes it as a lightheadedness, no vertigo or room spinning sensation. The patient does have a pacemaker. She follows with cardiology at [address] [place] [address].  She states she recently started a medication a few months ago she does not remember the name. She does take midodrine daily and she took her dose this morning. She denies any chest pain throughout these episodes. She did have a normal stress test this past March. She denies fever, chills, abdominal pain, vomiting or diarrhea. She did have a urethral dilatation yesterday. No other complaints, modifying factors or associated symptoms. I have reviewed the following from the nursing documentation.     Past Medical History:   Diagnosis Date    Angina at rest [facility])     Anxiety     Arthritis     hands and hip    Blood transfusion     after back surgery    Bradycardia     Bronchiectasis ([facility] [address] 11/05/2013    Chronic back pain     Hyperlipidemia     Hypertension     Localized morphea     Multiple drug resistant organism (MDRO) culture positive 04/13/2021    E.COLI-URINE    NATHAN treated with BiPAP     Pacemaker     Stress incontinence     Thyroid disease     hypothyroid    Trochanteric bursitis of left hip 04/12/2019     Past Surgical History:   Procedure Laterality Date    ABDOMEN SURGERY  09/09/2011     REPAIR INCISIONAL HERNIA REDUCIBLE WITH POSSIBLE MESH    BACK SURGERY      x3    BACK SURGERY      stimulator    BLADDER SUSPENSION      CARDIAC PACEMAKER PLACEMENT  2011    Heart doctor at [address] [facility] [address] Left 03/08/2018    PHACO EMULSIFICATION OF CATARACT WITH INTRAOCULAR LENS IMPLANT LEFT EYE    CERVICAL DISCECTOMY  06/2014    and fusion    CHOLECYSTECTOMY      COLONOSCOPY  2002 1/26/12    ENDOSCOPY, COLON, DIAGNOSTIC      EPIDURAL STEROID INJECTION Left 08/15/2019    LEFT KNEE GENICULAR NERVE BLOCK SITE CONFIRMED BY FLUOROSCOPY performed by Kateryna Tompkins MD at [facility] Right 04/05/2018    cataract removal    FOOT SURGERY Right 12/06/2012    arthroplasty    FOOT SURGERY Left 04/30/2015    FOOT SURGERY Left 04/30/2015    GASTRIC BYPASS SURGERY  [id number]    HYSTERECTOMY (CERVIX STATUS UNKNOWN)      KNEE ARTHROSCOPY Left 10/03/2014    part.  medial & lateral meniscectomy, synovectomy plica exc    KNEE ARTHROSCOPY Right 10/13/2015    partial medial meniscectomy, chondroplasty, partial lateral meniscectomy    NECK SURGERY      OTHER SURGICAL HISTORY  04/2013    removal spinal cord stimulater    OTHER SURGICAL HISTORY      bladder stimulator    OTHER SURGICAL HISTORY  08/30/2017    left patellofemoral arthroplasty    OTHER SURGICAL HISTORY  05/09/2018    convert left patellofemoral arthroplasty to left total knee replacement   [facility] [place]  2011    Dr Mahoney/checked last week/told has 12 more yrs for this one/set at 60    SKIN BIOPSY      abdomen    SPINE SURGERY      stimulator    THROAT SURGERY      polyps removed    TONSILLECTOMY      UPPER GASTROINTESTINAL ENDOSCOPY      UPPER GASTROINTESTINAL ENDOSCOPY  05/03/2017     Family History   Problem Relation Age of Onset    Heart Disease Father     Cancer Sister         multiple organs    Other Sister     Asthma Neg Hx     Diabetes Neg Hx     Emphysema Neg Hx     Heart Failure Neg Hx     Hypertension Neg Hx      Social History     Socioeconomic History    Marital status:      Spouse name: Not on file    Number of children: Not on file    Years of education: Not on file    Highest education level: Not on file   Occupational History    Not on file   Tobacco Use    Smoking status: Never Smoker    Smokeless tobacco: Never Used   Vaping Use    Vaping Use: Never used   Substance and Sexual Activity    Alcohol use: No     Alcohol/week: 0.0 standard drinks    Drug use: No    Sexual activity: Yes     Partners: Female   Other Topics Concern    Not on file   Social History Narrative    Not on file     Social Determinants of Health     Financial Resource Strain:     Difficulty of Paying Living Expenses: Not on file   Food Insecurity:     Worried About Running Out of Food in the Last Year: Not on file    Raj of Food in the Last Year: Not on file   Transportation Needs:     Lack of Transportation (Medical): Not on file    Lack of Transportation (Non-Medical):  Not on file   Physical Activity:     Days of Exercise per Week: Not on file    Minutes of Exercise per Session: Not on file   Stress:     Feeling of Stress : Not on file   Social Connections:     Frequency of Communication with Friends and Family: Not on file    Frequency of Social Gatherings with Friends and Family: Not on file    Attends Voodoo Services: Not on file    Active Member of Clubs or Organizations: Not on file    Attends Club or Organization Meetings: Not on file    Marital Status: Not on file   Intimate Partner Violence:     Fear of Current or Ex-Partner: Not on file    Emotionally Abused: Not on file    Physically Abused: Not on file    Sexually Abused: Not on file   Housing Stability:     Unable to Pay for Housing in the Last Year: Not on file    Number of Places Lived in the Last Year: Not on file    Unstable Housing in the Last Year: Not on file     Current Facility-Administered Medications   Medication Dose Route Frequency Provider Last Rate Last Admin    sodium chloride flush 0.9 % injection 5-40 mL  5-40 mL IntraVENous 2 times per day AIMEE Bernstein        sodium chloride flush 0.9 % injection 10 mL  10 mL IntraVENous PRN AIMEE Bernstein        0.9 % sodium chloride infusion   IntraVENous PRN AIMEE Bernstein        potassium chloride (KLOR-CON M) extended release tablet 40 mEq  40 mEq Oral PRN AIMEE Bernstein        Or    potassium bicarb-citric acid (EFFER-K) effervescent tablet 40 mEq  40 mEq Oral PRN AIMEE Bernstein        Or    potassium chloride 10 mEq/100 mL IVPB (Peripheral Line)  10 mEq IntraVENous PRN AIMEE Bernstein        magnesium sulfate 1000 mg in dextrose 5% 100 mL IVPB  1,000 mg IntraVENous PRN AIMEE Bernstein        enoxaparin (LOVENOX) injection 40 mg  40 mg SubCUTAneous Daily AIMEE Bernstein        ondansetron (ZOFRAN-ODT) disintegrating tablet 4 mg  4 mg Oral Q8H PRN AIMEE Bernstein        Or    ondansetron (ZOFRAN) injection 4 mg  4 mg IntraVENous Q6H PRN AIMEE Bernstein        polyethylene glycol (GLYCOLAX) packet 17 g  17 g Oral Daily PRN AIMEE Bernstein        acetaminophen (TYLENOL) tablet 650 mg  650 mg Oral Q6H PRN AIMEE Bernstein        Or    acetaminophen (TYLENOL) suppository 650 mg  650 mg Rectal Q6H PRN AIMEE Bernstein        0.9 % sodium chloride infusion   IntraVENous Continuous AIMEE Gamboa 100 mL/hr at 06/10/22 1817 New Bag at 06/10/22 1817    doxazosin (CARDURA) tablet 2 mg  2 mg Oral Daily AIMEE Sahni        rosuvastatin (CRESTOR) tablet 10 mg  10 mg Oral Daily Elisabeth Sahni        pantoprazole (PROTONIX) tablet 40 mg  40 mg Oral Daily AIMEE Sahni   40 mg at 06/10/22 2131    vitamin B-12 (CYANOCOBALAMIN) tablet 2,500 mcg  2,500 mcg Oral Daily AIMEE Sahni        clonazePAM (KLONOPIN) tablet 2 mg  2 mg Oral BID AIMEE Sahni   2 mg at 06/10/22 2132    desvenlafaxine succinate (PRISTIQ) extended release tablet 50 mg  50 mg Oral Daily AIMEE Sahni        famotidine (PEPCID) tablet 20 mg  20 mg Oral Daily AIMEE Sahni        trospium (SANCTURA) tablet 20 mg  20 mg Oral Nightly AIMEE Gamboa   20 mg at 06/10/22 2132    gabapentin (NEURONTIN) capsule 600 mg  600 mg Oral TID AIMEE Sahni   600 mg at 06/10/22 2131    levothyroxine (SYNTHROID) tablet 88 mcg  88 mcg Oral Daily AIMEE Sahni        midodrine (PROAMATINE) tablet 10 mg  10 mg Oral TID AIMEE Sahni   10 mg at 06/10/22 2132    potassium chloride (KLOR-CON M) extended release tablet 20 mEq  20 mEq Oral BID AIMEE Sahni   20 mEq at 06/10/22 2132    oxyCODONE (ROXICODONE) immediate release tablet 10 mg  10 mg Oral Q6H PRN AIMEE Sahni         Allergies   Allergen Reactions    Meloxicam Itching and Other (See Comments)     Tolerates Ketorolac/Bromfenac ophthalmic drops.  Acetaminophen Other (See Comments)     Unable to take due to liver  Unable to take due to liver  Unable to take due to liver  Unable to take due to liver      Benzoin Compound      Blisters from adhesive compound under steri strips after knee VAS      Lyrica [Pregabalin]      Keeps patient awake    Quetiapine Fumarate      Other reaction(s): confused, dizzy  Other reaction(s): Unknown  seroquel      Seroquel [Quetiapine Fumarate] Other (See Comments)     Other reaction(s): confused, dizzy  Pt unable to recall reaction    Tizanidine      hallucinations    Other reaction(s):  Other (See Comments)    Adhesive Tape Rash     Mild dermatitis at site of paper tape, with history of previous skin rashes to tape. STERI-STRIPS  Can use paper tape can not  use transpore    STERI-STRIPS     Mild dermatitis at site    Can use paper tape can not  use transpore    Balsam Rash     Other reaction(s): Rash    Balsam [place] [employer] Rash     Positive patch test results to [facility]    Positive patch test results to [facility]  Positive patch test results to [facility]    Positive patch test results to [facility]  Positive patch test results to [facility]    [employer] Rash     Other reaction(s): Rash    Linaclotide Rash    Other Rash     Can use paper tape can not  use transpore    Quetiapine Other (See Comments)     Other reaction(s): Other (See Comments)  Pt unable to recall reaction  Other reaction(s): Other (See Comments)    Pt unable to recall reaction  Pt unable to recall reaction  Made her dizzy and confused   Other reaction(s): confused, dizzy  Pt unable to recall reaction  Other reaction(s): confused, dizzy  Other reaction(s): Unknown  seroquel  Other reaction(s): Other (See Comments)  Pt unable to recall reaction  Other reaction(s): Other (See Comments)    Wound Dressings Rash     Positive patch test results to [facility]  10 systems reviewed, pertinent positives per HPI otherwise noted to be negative. PHYSICAL EXAM  /70   Pulse 61   Temp (!) 96.6 °F (35.9 °C) (Oral)   Resp 14   Ht 5' 2\" (1.575 m)   Wt 171 lb (77.6 kg)   SpO2 98%   BMI 31.28 kg/m²    Physical exam:  General appearance: awake and cooperative. no distress. Chronically ill appearing. Skin: Warm and dry. No rashes or lesions. HENT: Normocephalic. Atraumatic. Mucus membranes are dry. Neck: supple  Eyes: ROLA. EOM intact. Heart: RRR. No murmurs. Lungs: Respirations unlabored. CTAB. No wheezes, rales, or rhonchi.  Good air exchange  Abdomen: urostomy stoma present with clean bandage no erythema. No tenderness. Soft. Non distended. No peritoneal signs. Musculoskeletal: No extremity edema. Compartments soft. No deformity. No tenderness in the extremities. All extremities neurovascularly intact. Radial, Dp, and PT pulses +2/4 bilaterally  Neurological: Alert and oriented. No focal deficits. No aphasia or dysarthria. Psychiatric: Normal mood and affect. LABS  I have reviewed all labs for this visit.    Results for orders placed or performed during the hospital encounter of 06/10/22   COVID-19 & Influenza Combo    Specimen: Nasopharyngeal Swab   Result Value Ref Range    SARS-CoV-2 RNA, RT PCR NOT DETECTED NOT DETECTED    INFLUENZA A NOT DETECTED NOT DETECTED    INFLUENZA B NOT DETECTED NOT DETECTED   CBC with Auto Differential   Result Value Ref Range    WBC 6.6 4.0 - 11.0 K/uL    RBC 4.35 4.00 - 5.20 M/uL    Hemoglobin 11.9 (L) 12.0 - 16.0 g/dL    Hematocrit 37.0 36.0 - 48.0 %    MCV 85.1 80.0 - 100.0 fL    MCH 27.4 26.0 - 34.0 pg    MCHC 32.2 31.0 - 36.0 g/dL    RDW 18.3 (H) 12.4 - 15.4 %    Platelets 634 356 - 249 K/uL    MPV 7.7 5.0 - 10.5 fL    Neutrophils % 46.7 %    Lymphocytes % 39.9 %    Monocytes % 10.0 %    Eosinophils % 2.4 %    Basophils % 1.0 %    Neutrophils Absolute 3.1 1.7 - 7.7 K/uL    Lymphocytes Absolute 2.6 1.0 - 5.1 K/uL    Monocytes Absolute 0.7 0.0 - 1.3 K/uL    Eosinophils Absolute 0.2 0.0 - 0.6 K/uL    Basophils Absolute 0.1 0.0 - 0.2 K/uL   Comprehensive Metabolic Panel w/ Reflex to MG   Result Value Ref Range    Sodium 141 136 - 145 mmol/L    Potassium reflex Magnesium 4.3 3.5 - 5.1 mmol/L    Chloride 105 99 - 110 mmol/L    CO2 24 21 - 32 mmol/L    Anion Gap 12 3 - 16    Glucose 89 70 - 99 mg/dL    BUN 18 7 - 20 mg/dL    CREATININE 1.0 0.6 - 1.2 mg/dL    GFR Non-African American 55 (A) >60    GFR African American >60 >60    Calcium 9.5 8.3 - 10.6 mg/dL    Total Protein 6.7 6.4 - 8.2 g/dL    Albumin 4.0 3.4 - 5.0 g/dL acceptable range  Intervals and Durations are unremarkable. ST Segments: normal  No significant change from prior EKG dated 3/18/22      RADIOLOGY  XR CHEST PORTABLE    Result Date: 6/10/2022  EXAMINATION: ONE XRAY VIEW OF THE CHEST 6/10/2022 11:39 am COMPARISON: 04/05/2022 HISTORY: ORDERING SYSTEM PROVIDED HISTORY: dizziness TECHNOLOGIST PROVIDED HISTORY: Reason for exam:->dizziness Reason for Exam: Dizziness (Patient arrived via EMS, she had driven to a store and got dizzy, drove home and called squad. Patient says she has been dizzy for years and that no one has ever done anything about it. Says she may have Ha Disease. Says she takes meds for low AND high BP. ) FINDINGS: Anterior fusion plate in the lower cervical spine and surgical staples and suture in the epigastric region are noted. There is a stimulator device projecting over the right chest wall, with lead progressing to the right neck. There is also a inferior stimulator wire. Left subclavian cardiac pacemaker with 2 lead wires is present. The cardiac silhouette, mediastinal hilar contours are normal.  There is mild streak like linear opacity in the left lung base. No pleural effusion or pneumothorax is identified. Mild left basilar atelectasis. CT CHEST PULMONARY EMBOLISM W CONTRAST    Result Date: 6/10/2022  EXAMINATION: CTA OF THE CHEST 6/10/2022 4:55 pm TECHNIQUE: CTA of the chest was performed after the administration of intravenous contrast.  Multiplanar reformatted images are provided for review. MIP images are provided for review. Automated exposure control, iterative reconstruction, and/or weight based adjustment of the mA/kV was utilized to reduce the radiation dose to as low as reasonably achievable.  COMPARISON: 03/18/2022 HISTORY: ORDERING SYSTEM PROVIDED HISTORY: dyspnea, pre-syncope TECHNOLOGIST PROVIDED HISTORY: Reason for exam:->dyspnea, pre-syncope Reason for Exam: BP regulation issues   fainting episodes FINDINGS: Pulmonary Arteries: Pulmonary arteries are adequately opacified for evaluation. No evidence of intraluminal filling defect to suggest pulmonary embolism. Main pulmonary artery is normal in caliber. Mediastinum: No evidence of mediastinal lymphadenopathy. The heart and pericardium demonstrate no acute abnormality. There is no acute abnormality of the thoracic aorta. There is a right pacemaker in place appears in good position. Lungs/pleura: There are hazy subpleural and linear opacities along the lung bases posteriorly which is more prominent. No effusion is seen and there is no pulmonary nodule or mass. There is a single mild compression fracture along the lower thoracic spine which is unchanged. Upper Abdomen: There are surgical sutures seen along the body of the stomach extending distally consistent with previous gastric bypass surgery. The adrenals are normal and the visualized upper abdomen is unremarkable Soft Tissues/Bones: No acute bone or soft tissue abnormality. No evidence of a pulmonary embolus. Mildly dilated and atherosclerotic thoracic aorta with no aneurysm or dissection. Mild chronic obstructive lung changes with mild bibasilar atelectasis vs early infiltrates or scarring which is more prominent. Suggest follow-up with serial chest x-rays Previous gastric bypass surgery which is unchanged Single compression fracture lower thoracic spine which is unchanged.      VL DUP CAROTID BILATERAL    Result Date: 6/10/2022  Vascular Carotid Procedure -- PRELIMINARY SONOGRAPHER REPORT --   Demographics   Patient Name       Alivia Mayo   Date of Study      06/10/2022         Gender              Female   Patient Number     [id number]         Date of Birth       1955   Visit Number       [id number]          Age                 78 year(s)   Accession Number   [id number]         Room Number         95   Corporate ID       [id number]             Sonographer         Terri Evans FRANCISCA, +---------------+----+----+-----+----+ ! Prox CCA       !54. 9!17. 6! 60   !0.68! +---------------+----+----+-----+----+ ! Mid CCA        !47  !18. 4!60   !0.61! +---------------+----+----+-----+----+ ! Dist CCA       !39.9!19. 3!60   !0.52! +---------------+----+----+-----+----+ ! Prox ICA       !40  !18. 8!60   !0.53! +---------------+----+----+-----+----+ ! Mid ICA        !57. 2!56. 1! 60   !0.56! +---------------+----+----+-----+----+ ! Dist ICA       ! 102 !45. 5!45   !0.55! +---------------+----+----+-----+----+ ! Prox ECA       !44.5!    !60   !    ! +---------------+----+----+-----+----+ ! Vertebral      !31.6!11. 9!52   !0.62! +---------------+----+----+-----+----+ ! Prox Subclavian!64.3! ! 28   !    ! +---------------+----+----+-----+----+   - There is antegrade vertebral flow noted on the left side. - Additional Measurements:ICAPSV/CCAPSV 2.17. ICAEDV/CCAEDV 2.59. No results found. Is this patient to be included in the SEP-1 Core Measure due to severe sepsis or septic shock? No   Exclusion criteria - the patient is NOT to be included for SEP-1 Core Measure due to: Infection is not suspected        ED COURSE/MDM  Patient seen and evaluated. Old records reviewed. Labs and imaging reviewed and results discussed with patient. The patient is a 39-year-old female presenting with dizziness. She has a cell blood pressure on arrival at 103/59, on orthostatics with standing she is 65 systolic. Vital signs are otherwise within normal limits. She appears chronically ill, has had issues with hypotension and is on midodrine. She is also on a beta-blocker that she takes at night. She states the dizziness has been worsening recently. Her work-up shows an EKG that is nonischemic and troponin is negative. She has a normal lactate no sign of infection, I do not suspect urinary infection as she has chronic UTI and colonization from her urostomy. She does have decreased GFR, likely from dehydration.   She is given fluids and blood pressure did improve, but still soft and the patient is still symptomatic. Given her profound drop in systolic blood pressure with standing, she will be admitted for further treatment and observation.     During the patient's ED course, the patient was given:  Medications   sodium chloride flush 0.9 % injection 5-40 mL (5 mLs IntraVENous Not Given 6/10/22 2134)   sodium chloride flush 0.9 % injection 10 mL (has no administration in time range)   0.9 % sodium chloride infusion (has no administration in time range)   potassium chloride (KLOR-CON M) extended release tablet 40 mEq (has no administration in time range)     Or   potassium bicarb-citric acid (EFFER-K) effervescent tablet 40 mEq (has no administration in time range)     Or   potassium chloride 10 mEq/100 mL IVPB (Peripheral Line) (has no administration in time range)   magnesium sulfate 1000 mg in dextrose 5% 100 mL IVPB (has no administration in time range)   enoxaparin (LOVENOX) injection 40 mg (40 mg SubCUTAneous Not Given 6/10/22 1806)   ondansetron (ZOFRAN-ODT) disintegrating tablet 4 mg (has no administration in time range)     Or   ondansetron (ZOFRAN) injection 4 mg (has no administration in time range)   polyethylene glycol (GLYCOLAX) packet 17 g (has no administration in time range)   acetaminophen (TYLENOL) tablet 650 mg (has no administration in time range)     Or   acetaminophen (TYLENOL) suppository 650 mg (has no administration in time range)   0.9 % sodium chloride infusion ( IntraVENous New Bag 6/10/22 1817)   doxazosin (CARDURA) tablet 2 mg (2 mg Oral Not Given 6/10/22 1803)   rosuvastatin (CRESTOR) tablet 10 mg (10 mg Oral Not Given 6/10/22 1801)   pantoprazole (PROTONIX) tablet 40 mg (40 mg Oral Given 6/10/22 2131)   vitamin B-12 (CYANOCOBALAMIN) tablet 2,500 mcg (2,500 mcg Oral Not Given 6/10/22 1801)   clonazePAM (KLONOPIN) tablet 2 mg (2 mg Oral Given 6/10/22 2132)   desvenlafaxine succinate (PRISTIQ) extended release tablet 50 mg (50 mg Oral Not Given 6/10/22 1804)   famotidine (PEPCID) tablet 20 mg (20 mg Oral Not Given 6/10/22 1800)   trospium (SANCTURA) tablet 20 mg (20 mg Oral Given 6/10/22 2132)   gabapentin (NEURONTIN) capsule 600 mg (600 mg Oral Given 6/10/22 2131)   levothyroxine (SYNTHROID) tablet 88 mcg (88 mcg Oral Not Given 6/10/22 1802)   midodrine (PROAMATINE) tablet 10 mg (10 mg Oral Given 6/10/22 2132)   potassium chloride (KLOR-CON M) extended release tablet 20 mEq (20 mEq Oral Given 6/10/22 2132)   oxyCODONE (ROXICODONE) immediate release tablet 10 mg (has no administration in time range)   0.9 % sodium chloride bolus (0 mLs IntraVENous Stopped 6/10/22 1433)   iopamidol (ISOVUE-370) 76 % injection 85 mL (85 mLs IntraVENous Given 6/10/22 1655)        CLINICAL IMPRESSION  1. Orthostatic hypotension    2. Dehydration        Blood pressure 103/70, pulse 61, temperature (!) 96.6 °F (35.9 °C), temperature source Oral, resp. rate 14, height 5' 2\" (1.575 m), weight 171 lb (77.6 kg), SpO2 98 %, not currently breastfeeding. Patient was given scripts for the following medications. I counseled patient how to take these medications. Current Discharge Medication List          Follow-up with:  No follow-up provider specified. DISCLAIMER: This chart was created using Dragon dictation software. Efforts were made by me to ensure accuracy, however some errors may be present due to limitations of this technology and occasionally words are not transcribed correctly.        Kody NovoaLakeland Community Hospitaldonnell  06/10/22 5018

## 2022-06-10 NOTE — PROGRESS NOTES
PHARMACY NOTE    Tana Schaumann was ordered the oral bisphosphonate, Alendronate(Fosamax). Oral Bisphosphonates have an increased risk of serious GI events if the strict dosing instructions are not followed. Per the FDA labeling, oral bisphosphonates must be taken at least 30 min (60 min for ibandronate) before the first food, beverage or medication of the day. Patients MUST also avoid lying down for at least 30 min (60 minutes for ibandronate) after taking the oral bisphosphonate. Because these strict dosage instructions are difficult to follow in many hospitalized patients, orders for oral bisphosphonate therapy will be discontinued per the [facility]. Consider risk versus benefits when resuming the oral bisphosphonate at discharge.      Prince Dwain RPh 6/10/2022 4:34 PM

## 2022-06-10 NOTE — ED NOTES
1353- Perfect serve sent to hospitalist  1400- Sheryle Dus called back, spoke to Dr. Jocelyne Vasquez  06/10/22 830 [facility]  06/10/22 1401

## 2022-06-11 LAB
ANION GAP SERPL CALCULATED.3IONS-SCNC: 13 MMOL/L (ref 3–16)
BASOPHILS ABSOLUTE: 0.1 K/UL (ref 0–0.2)
BASOPHILS RELATIVE PERCENT: 1.1 %
BUN BLDV-MCNC: 16 MG/DL (ref 7–20)
CALCIUM SERPL-MCNC: 8.6 MG/DL (ref 8.3–10.6)
CHLORIDE BLD-SCNC: 110 MMOL/L (ref 99–110)
CO2: 18 MMOL/L (ref 21–32)
CREAT SERPL-MCNC: 0.8 MG/DL (ref 0.6–1.2)
EOSINOPHILS ABSOLUTE: 0.1 K/UL (ref 0–0.6)
EOSINOPHILS RELATIVE PERCENT: 2.8 %
FOLATE: >20 NG/ML (ref 4.78–24.2)
GFR AFRICAN AMERICAN: >60
GFR NON-AFRICAN AMERICAN: >60
GLUCOSE BLD-MCNC: 79 MG/DL (ref 70–99)
HCT VFR BLD CALC: 34.9 % (ref 36–48)
HEMOGLOBIN: 10.9 G/DL (ref 12–16)
LYMPHOCYTES ABSOLUTE: 2.6 K/UL (ref 1–5.1)
LYMPHOCYTES RELATIVE PERCENT: 51.1 %
MCH RBC QN AUTO: 27.9 PG (ref 26–34)
MCHC RBC AUTO-ENTMCNC: 31.4 G/DL (ref 31–36)
MCV RBC AUTO: 89 FL (ref 80–100)
MONOCYTES ABSOLUTE: 0.5 K/UL (ref 0–1.3)
MONOCYTES RELATIVE PERCENT: 10.7 %
NEUTROPHILS ABSOLUTE: 1.7 K/UL (ref 1.7–7.7)
NEUTROPHILS RELATIVE PERCENT: 34.3 %
PDW BLD-RTO: 18.9 % (ref 12.4–15.4)
PLATELET # BLD: 155 K/UL (ref 135–450)
PMV BLD AUTO: 7.6 FL (ref 5–10.5)
POTASSIUM REFLEX MAGNESIUM: 4.5 MMOL/L (ref 3.5–5.1)
RBC # BLD: 3.92 M/UL (ref 4–5.2)
SODIUM BLD-SCNC: 141 MMOL/L (ref 136–145)
T4 FREE: 1.5 NG/DL (ref 0.9–1.8)
TROPONIN: <0.01 NG/ML
URINE CULTURE, ROUTINE: NORMAL
VITAMIN B-12: >2000 PG/ML (ref 211–911)
WBC # BLD: 5 K/UL (ref 4–11)

## 2022-06-11 PROCEDURE — 6370000000 HC RX 637 (ALT 250 FOR IP)

## 2022-06-11 PROCEDURE — 1200000000 HC SEMI PRIVATE

## 2022-06-11 PROCEDURE — 99232 SBSQ HOSP IP/OBS MODERATE 35: CPT | Performed by: INTERNAL MEDICINE

## 2022-06-11 PROCEDURE — 6370000000 HC RX 637 (ALT 250 FOR IP): Performed by: INTERNAL MEDICINE

## 2022-06-11 PROCEDURE — 96372 THER/PROPH/DIAG INJ SC/IM: CPT

## 2022-06-11 PROCEDURE — 2580000003 HC RX 258

## 2022-06-11 PROCEDURE — G0378 HOSPITAL OBSERVATION PER HR: HCPCS

## 2022-06-11 PROCEDURE — 6360000002 HC RX W HCPCS

## 2022-06-11 PROCEDURE — 96361 HYDRATE IV INFUSION ADD-ON: CPT

## 2022-06-11 PROCEDURE — 85025 COMPLETE CBC W/AUTO DIFF WBC: CPT

## 2022-06-11 PROCEDURE — 36415 COLL VENOUS BLD VENIPUNCTURE: CPT

## 2022-06-11 PROCEDURE — 80048 BASIC METABOLIC PNL TOTAL CA: CPT

## 2022-06-11 RX ORDER — FLUDROCORTISONE ACETATE 0.1 MG/1
0.1 TABLET ORAL 2 TIMES DAILY
Status: DISCONTINUED | OUTPATIENT
Start: 2022-06-11 | End: 2022-06-13 | Stop reason: HOSPADM

## 2022-06-11 RX ADMIN — CYANOCOBALAMIN TAB 500 MCG 2500 MCG: 500 TAB at 09:58

## 2022-06-11 RX ADMIN — DESVENLAFAXINE SUCCINATE 50 MG: 50 TABLET, FILM COATED, EXTENDED RELEASE ORAL at 09:59

## 2022-06-11 RX ADMIN — PANTOPRAZOLE SODIUM 40 MG: 40 TABLET, DELAYED RELEASE ORAL at 09:58

## 2022-06-11 RX ADMIN — ROSUVASTATIN CALCIUM 10 MG: 10 TABLET, FILM COATED ORAL at 09:58

## 2022-06-11 RX ADMIN — OXYCODONE 10 MG: 5 TABLET ORAL at 14:31

## 2022-06-11 RX ADMIN — POTASSIUM CHLORIDE 20 MEQ: 1500 TABLET, EXTENDED RELEASE ORAL at 22:03

## 2022-06-11 RX ADMIN — FLUDROCORTISONE ACETATE 0.1 MG: 0.1 TABLET ORAL at 18:10

## 2022-06-11 RX ADMIN — SODIUM CHLORIDE: 9 INJECTION, SOLUTION INTRAVENOUS at 15:58

## 2022-06-11 RX ADMIN — POTASSIUM CHLORIDE 20 MEQ: 1500 TABLET, EXTENDED RELEASE ORAL at 09:58

## 2022-06-11 RX ADMIN — GABAPENTIN 600 MG: 300 CAPSULE ORAL at 14:27

## 2022-06-11 RX ADMIN — LEVOTHYROXINE SODIUM 88 MCG: 88 TABLET ORAL at 06:08

## 2022-06-11 RX ADMIN — TROSPIUM CHLORIDE 20 MG: 20 TABLET, FILM COATED ORAL at 22:04

## 2022-06-11 RX ADMIN — ENOXAPARIN SODIUM 40 MG: 100 INJECTION SUBCUTANEOUS at 09:59

## 2022-06-11 RX ADMIN — CLONAZEPAM 2 MG: 1 TABLET ORAL at 22:04

## 2022-06-11 RX ADMIN — DOXAZOSIN 2 MG: 2 TABLET ORAL at 09:58

## 2022-06-11 RX ADMIN — FAMOTIDINE 20 MG: 20 TABLET ORAL at 09:58

## 2022-06-11 RX ADMIN — SODIUM CHLORIDE: 9 INJECTION, SOLUTION INTRAVENOUS at 04:23

## 2022-06-11 RX ADMIN — CLONAZEPAM 2 MG: 1 TABLET ORAL at 09:59

## 2022-06-11 RX ADMIN — GABAPENTIN 600 MG: 300 CAPSULE ORAL at 22:04

## 2022-06-11 RX ADMIN — MIDODRINE HYDROCHLORIDE 10 MG: 10 TABLET ORAL at 14:27

## 2022-06-11 RX ADMIN — GABAPENTIN 600 MG: 300 CAPSULE ORAL at 09:58

## 2022-06-11 RX ADMIN — MIDODRINE HYDROCHLORIDE 10 MG: 10 TABLET ORAL at 09:58

## 2022-06-11 RX ADMIN — MIDODRINE HYDROCHLORIDE 10 MG: 10 TABLET ORAL at 22:03

## 2022-06-11 ASSESSMENT — PAIN DESCRIPTION - LOCATION
LOCATION: BACK
LOCATION: HEAD;BACK

## 2022-06-11 ASSESSMENT — PAIN DESCRIPTION - DESCRIPTORS: DESCRIPTORS: ACHING

## 2022-06-11 ASSESSMENT — PAIN - FUNCTIONAL ASSESSMENT: PAIN_FUNCTIONAL_ASSESSMENT: PREVENTS OR INTERFERES SOME ACTIVE ACTIVITIES AND ADLS

## 2022-06-11 ASSESSMENT — PAIN SCALES - GENERAL
PAINLEVEL_OUTOF10: 0
PAINLEVEL_OUTOF10: 8
PAINLEVEL_OUTOF10: 2
PAINLEVEL_OUTOF10: 0
PAINLEVEL_OUTOF10: 8

## 2022-06-11 ASSESSMENT — PAIN DESCRIPTION - ORIENTATION: ORIENTATION: LOWER

## 2022-06-11 NOTE — PROGRESS NOTES
Physical/Occupational Therapy  Received PT/OT consult. Patient reports increased dizziness,headache this date. Will hold and re-assess 6/12.     Wilfred Kessler, PT #[id number]

## 2022-06-11 NOTE — PROGRESS NOTES
IM Progress Note    Admit Date:  6/10/2022     Patient with POTS   Recurrent issues with orthostatic hypotension dizziness and falls    Subjective:  Ms. Amna Perez continues to feel very dizzy and lightheaded when up. She is very frustrated that there is no solution for her blood pressure issues    Objective:   BP (!) 94/52   Pulse 73   Temp 97.7 °F (36.5 °C) (Oral)   Resp 16   Ht 5' 2\" (1.575 m)   Wt 171 lb (77.6 kg)   SpO2 100%   BMI 31.28 kg/m²     Intake/Output Summary (Last 24 hours) at 6/11/2022 1748  Last data filed at 6/11/2022 0150  Gross per 24 hour   Intake 480 ml   Output 300 ml   Net 180 ml         Physical Exam:  Gen: No distress. Alert. Chronically ill appearing female   Eyes: PERRL. No sclera icterus. No conjunctival injection. .   Neck: No JVD. No Carotid Bruit. Trachea midline. Resp: No accessory muscle use. No crackles. No wheezes. No rhonchi. CV: Regular rate. Regular rhythm. No murmur. No rub. No edema. Peripheral Pulses: +2 palpable, equal bilaterally   GI: Non-tender. Non-distended. No masses. No organomegaly. Normal bowel sounds. No hernia. + Suprapubic catheter, no surrounding erythema or discharge   Skin: Warm and dry. No nodule on exposed extremities. No rash on exposed extremities. M/S: No cyanosis. No joint deformity. No clubbing. + 4/5 strength of upper and lower extremities bilaterally, sensation intact and equal, pulses 2+  Neuro: Awake. Grossly nonfocal. No aphasia or dysarthria   Psych: Oriented x 3. No anxiety or agitation.          Medications:   Scheduled Meds:   sodium chloride flush  5-40 mL IntraVENous 2 times per day    enoxaparin  40 mg SubCUTAneous Daily    doxazosin  2 mg Oral Daily    rosuvastatin  10 mg Oral Daily    pantoprazole  40 mg Oral Daily    cyanocobalamin  2,500 mcg Oral Daily    clonazePAM  2 mg Oral BID    desvenlafaxine succinate  50 mg Oral Daily    famotidine  20 mg Oral Daily    trospium  20 mg Oral Nightly    gabapentin  600 mg Oral TID    levothyroxine  88 mcg Oral Daily    midodrine  10 mg Oral TID    potassium chloride  20 mEq Oral BID       Continuous Infusions:   sodium chloride      sodium chloride 100 mL/hr at 06/11/22 1558       Data:  CBC:   Recent Labs     06/10/22  1146 06/11/22  0514   WBC 6.6 5.0   RBC 4.35 3.92*   HGB 11.9* 10.9*   HCT 37.0 34.9*   MCV 85.1 89.0   RDW 18.3* 18.9*    155     BMP:   Recent Labs     06/10/22  1146 06/11/22  0514    141   K 4.3 4.5    110   CO2 24 18*   BUN 18 16   CREATININE 1.0 0.8     BNP: No results for input(s): BNP in the last 72 hours. PT/INR: No results for input(s): PROTIME, INR in the last 72 hours. APTT: No results for input(s): APTT in the last 72 hours. CARDIAC ENZYMES:   Recent Labs     06/10/22  1146 06/10/22  1644 06/10/22  2311   TROPONINI <0.01 <0.01 <0.01     FASTING LIPID PANEL:  Lab Results   Component Value Date    CHOL 186 03/19/2022    HDL 72 (H) 03/19/2022    TRIG 61 03/19/2022     LIVER PROFILE:   Recent Labs     06/10/22  1146   AST 31   ALT 17   BILITOT <0.2   ALKPHOS 71          Cultures  COVID not detected  Influenza a and B not detected  Urine cultures negative    Radiology  CT CHEST PULMONARY EMBOLISM W CONTRAST   Final Result   No evidence of a pulmonary embolus. Mildly dilated and atherosclerotic thoracic aorta with no aneurysm or   dissection. Mild chronic obstructive lung changes with mild bibasilar atelectasis vs   early infiltrates or scarring which is more prominent. Suggest follow-up   with serial chest x-rays      Previous gastric bypass surgery which is unchanged      Single compression fracture lower thoracic spine which is unchanged. VL DUP CAROTID BILATERAL         XR CHEST PORTABLE   Final Result   Mild left basilar atelectasis.                Assessment:  Principal Problem:    Orthostatic hypotension  Active Problems:    GERD (gastroesophageal reflux disease)    Anxiety & depression    Hypothyroidism S/P gastric bypass    Osteoporosis    NATHAN (obstructive sleep apnea)    HTN (hypertension), benign    Primary insomnia    Chronic bilateral low back pain without sciatica    History of bladder augmentation    Pacemaker    S/P placement of cardiac pacemaker  Resolved Problems:    * No resolved hospital problems. *      Plan:     #Orthostatic hypotension   #Vasovagal syncope   #POTS  -like cause of pts symptoms   -+ orthostatics, SBP dropped to 60s  -HR stable   -holding toprol   -continue midodrine 10 mg 3 times daily  -Patient states that she was on Florinef at one-point; but was taken off of it by her cardiologist.   We will resume Florinef 0.1 mg twice daily  -compression stockings ordered. Patient however states that she cannot tolerate it.  -Continue IVF   -Monitor orthostatic vital signs  -fall precautions   -She sees a cardiologist  At [facility] for POTS. Would like a cardiology opinion here. Consult cardiology     #Dizziness   #Hypotension   #Pre-syncope   -2/2 to above most likely   -CT head done in march of this year, negative, CTA done in past   -CTA chest -no PE  -carotid US results pending   -pt is on multiple sedating meds, could be contributing   -needs to follow up with neurology outpatient    She is on midodrine, will try Florinef.     #HX of Bradycardia   -s/p pacemaker   -HR stable today      #Neurogenic bladder with enterovesical fistula to augmented bladder   #Suprapubic urostomy   -self caths herself   -on sanctura      #HLD  -on statin      #Anxiety   #Depression   -continue pristiq      #Osteoporosis  -on fosamax      #Chronic back pain   #Cervical stenosis with radiculopathy   #DDD  #Fibromyalgia   -on oxycodone, gabapentin      #Hx of gastric bypass      #Chronic migraines  -continue home regimen      #Hypothyroidism   -continue synthroid  -TSH pending      #GERD   -continue PPI and pepcid      #Vitamin B12 deficiency   -continue B12      DVT Prophylaxis: lovenox   ADULT DIET;  Regular   Full Code        Mckayla Patel MD   6/11/2022 5:48 PM

## 2022-06-11 NOTE — PROGRESS NOTES
Inpatient Physical Therapy Evaluation and Treatment    Unit: [address]  Date:  6/12/2022  Patient Name:    Boris Moses  Admitting diagnosis:  Orthostatic hypotension [I95.1]  Admit Date:  6/10/2022  Precautions/Restrictions/WB Status/ Lines/ Wounds/ Oxygen: Fall risk, Bed/chair alarm, Lines -IV, Telemetry and Isolation Precautions: Contact (MDRO)  Pacemaker, suprapubic urostomy    Treatment Time:  10:45-11:35  Treatment Number:  1   Timed Code Treatment Minutes: 40 minutes  Total Treatment Minutes:  50  minutes    Patient Goals for Therapy: \" to feel better \"          Discharge Recommendations: TBD  DME needs for discharge: Needs Met       Therapy recommendation for EMS Transport: requires transport by cot due to due to unstable BP response to activity at this time    Therapy recommendations for staff:   Assist of 1 with use of rolling walker (RW) and gait belt for all transfers to/from [facility]  to/from chair- stand pivot ,monitoring BP     History Of Present Illness:     Bontempo: \"The patient is a 78 y.o. female with pmhx of bradycardia s/p pacemaker, POTS, HTN, HLD, NATHAN, hypothyroidism, chronic back pain who presented to [facility] ED with complaint of dizziness and pre-syncope. This has been a chronic issue for the patient, she has been experiencing these episodes for the past year, pretty much on a daily basis she reports. She reports that whenever she gets out of bed and begins to walk she feels short of breath, light-headed, and dizzy. She has to sit down in order for symptoms to improve. She has had some falls in the past but not recently. She came into the ED today because she was at a pool shop walking around and felt light-headed and dizzy, a  had to help her sit down and called 911. Does also report on and off headaches but she has history of chronic migraines. Denies any vision changes, palpitations, chest pain. No fever, chills, chest pain, nausea, vomiting, diarrhea, numbness, or tingling. She has chronic leg and back pain with neuropathy so she has difficulty walking, she uses a walker or cane to ambulate, she is not very mobile at home. She says she has been staying hydrated at home and eating appropriate amount of food. She has been taking all of her medicines.      She follows with cardiology at [id number] [facility].   She has had cardiac workup in 3/2022  stress test negative, echo with grade I diastolic dysfunction, CT head unremarkable, she has had CTA head and neck last year. Neurology was recommending MRI for further workup. \"     Per cardiology 6/12/22:    \"Plan:   Patient has orthostatic intolerance for years. She states that she had a tilt table test many years ago (results not available to me). Reviewing chart from [facility] states that she did not meet criteria for POTS per Dr. Santa Law. She has had stress test and echocardiogram recently  Her Florinef has been discontinued by Dr. Santa Law. It has been resumed in this hospitalization. She is also on midodrine and adjust the dose based on her blood pressure. Check orthostatic blood pressures  PT/OT  Discussed her condition and all questions were answered. Following recommendations discussed with patient:              Make all postural changes from lying to sitting or sitting to standing slowly. Drink to 2.0 -2.5 L of fluids per day. With bad symptoms, drink 500 cc of water quickly. This will result in an increased blood pressure within 5 minutes of drinking the water. The effect will last up to one hour and may improve orthostatic intolerance. Increase sodium in the diet to 3 - 5 g per day. Avoid large meals which can cause low blood pressure during digestion. Avoid alcohol. Perform lower extremity exercises to improve strength of the leg muscles. Raise the head of the bed by 6 to 10 inches.               Use custom fitted elastic support stockings.      She benefits from referral to autonomic dysfunction clinics at [facility] or [facility]. She will discuss it with her cardiologist.      Follow up with Dr. Maral Zurita for pacemaker checkup. She has recently checked her pacemaker.      No further testing recommended. She has follow up with Dr. Maral Zurita.      No further recommendations. Will sign off.      Discussed with nursing staff. \"   Home Health S4 Level Recommendation:  Level 3 Safety  AM-PAC Mobility Score       AM-PAC Inpatient Mobility without Stair Climbing Raw Score : 15    Preadmission Environment    Pt. Lives Alone  Home environment:  one story home  Steps to enter first floor: ramp  Steps to second floor: N/A  Bathroom: walk in shower, comfort height toilet, grab bars and shower chiar with back,hand held shower  Equipment owned: cane (hurricane), walker (Rollator), wheelchair (manual w/c), BSC, oxygen equipment (CPAP), AD for ADLs (reacher) and life alert, compression socks-itchiness/rash with traditional ,cotton were too heavy    Preadmission Status:  Pt. Able to drive: Yes  Pt Fully independent with ADLs: Yes  Pt sleeps in adjustable bed  Pt. Required assistance from family for: Cleaning every other week  Pt. independent for transfers and gait and walked with rollator in the home, cane in the community, uses power scooter at grocery store  History of falls Yes, in last 6 months 3 x has landed on floor, multiple near falls, able to find seated position    Pain   Yes  Location: head and neck ache  Ratin /10  Pain Medicine Status: Received pain med prior to tx    Cognition    A&O Person, Place, Time and Situation   Able to follow 2 step commands    Subjective  Patient lying supine in bed with no family present. Pt agreeable to this PT eval & tx. Upper Extremity ROM/Strength  Please see OT evaluation. Lower Extremity ROM / Strength   AROM WFL: Yes  ROM limitations:     Strength not formally assessed with mmt.   and [facility] Extremity Sensation    NT    Lower Extremity Proprioception:   NT    Coordination and Tone  NT    Balance  Sitting:  Normal; Independent  Comments: no LOB,dizzy    Standing: Good ; CGA  Comments: RW and gait belt    Bed Mobility   Supine to Sit:    Independent,HOB elevated  Sit to Supine:   Independent  Rolling:   Independent  Scooting in sitting: Independent  Scooting in supine:  Not Tested    Transfer Training     Sit to stand:   CGA  Stand to sit:   CGA  Bed to Chair:   Not Tested with use of N/A    Gait limited assessment due to symptoms of dizziness. Patient was able to side step to [facility] with CGA and RW      Activity Tolerance   Pt completed therapy session with Dizziness noted with postionial changes, worse in standing   BP HR SpO2 Patient Comments   Supine, at rest 138/69 69bpm 100% on  Mild dizziness   Seated at /70 bpm  Dizziness increased   EOB#2 138/76 85bpm     Standing 101/59 123bpm  Feels shaky   #2,stood ~ 2.5 minutes 101/70 138  Dizzy,would sit down at home   Immediately semi fowlers 161/77 87  Symptoms improved   #2 141/73 78        Positioning Needs   Pt in bed, alarm set, positioned in proper neutral alignment and pressure relief provided. Call light provided and all needs within reach  Provided ice pack to neck for comfort    Other  Nursing staff aware of outcome of assessment    Patient/Family Education   Pt educated on role of inpatient PT, POC, importance of continued activity, and reporting of symptoms     Assessment  Pt seen for Physical Therapy evaluation in acute care setting. Pt demonstrated decreased Activity tolerance, Balance and Safety as well as decreased independence with Ambulation and Transfers. Patient will benefit from skilled PT to maximize safety with functional mobility, and monitoring of vital signs with activity. Activity progression is limited by symptoms and orthostatic BP changes  DC plans are to be determined based on ongoing functional assessement.     Goals :   To be met in 3 visits:  1). Independent with LE Ex x 10 reps  2.) Bed to chair: Independent with adequate BP response    To be met in 6 visits:  1). Supine to/from sit: Independent  2). Sit to/from stand: Independent  3). Bed to chair: Independent  4). Gait: Ambulate 125 ft.   with  SBA and use of LRAD (least restrictive assistive device)  5). Tolerate B LE exercises 3 sets of 10-15 reps      Rehabilitation Potential: Good  Strengths for achieving goals include:   Pt motivated, PLOF and Pt cooperative   Barriers to achieving goals include:    Complexity of condition    Plan    To be seen 3-5 x / week  while in acute care setting for therapeutic exercises, bed mobility, transfers, progressive gait training, balance training, and family/patient education. Signature: Taiwo Rausch, PT #[id number]     If patient discharges from this facility prior to next visit, this note will serve as the Discharge Summary.

## 2022-06-11 NOTE — PROGRESS NOTES
pt has been doing well this shift. Pt has a urostomy in which she straight caths herself. Pt does have a pace maker. Pt is alert and oriented.

## 2022-06-11 NOTE — FLOWSHEET NOTE
AM assessment completed. See flowsheet. A/Ox4. LCTAB. Medications taken without difficulty. BS active x4. No c/o n/v. Patient reports diarrhea x1 this morning. Cont of B&B. No edema noted. Bed locked and in low position.  Call light in reach.     06/11/22 0945   Vital Signs   Temp (!) 96.6 °F (35.9 °C)   Temp Source Oral   Heart Rate 79   Heart Rate Source Monitor   Resp 16   BP (!) 103/58   BP Location Right upper arm   BP Method Automatic   MAP (Calculated) 73   Patient Position High fowlers   Level of Consciousness Alert (0)   MEWS Score 1   Pain Assessment   Pain Assessment 0-10   Pain Level 8   Pain Location Back   Oxygen Therapy   SpO2 98 %   O2 Device None (Room air)

## 2022-06-11 NOTE — FLOWSHEET NOTE
06/10/22 2105   Vital Signs   Temp (!) 96.6 °F (35.9 °C)   Temp Source Oral   Heart Rate 61   Heart Rate Source Monitor   Resp 14   /70   BP Location Right lower arm   BP Method Automatic   MAP (Calculated) 81   Patient Position Semi fowlers   Level of Consciousness Alert (0)   MEWS Score 0   Pain Assessment   Pain Assessment None - Denies Pain   Pain Level 0   Oxygen Therapy   SpO2 98 %   O2 Device None (Room air)   HS assessment completed, see flow sheet. Pt is alert and oriented. Vital signs are WNL. Respirations are even & easy. No complaints voiced. Pt denies needs at this time. SR up x 2, and bed in low position. Call light is within reach. Bedside Mobility Assessment Tool (BMAT):     Assessment Level 1- Sit and Shake    1. From a semi-reclined position, ask patient to sit up and rotate to a seated position at the side of the bed. Can use the bedrail. 2. Ask patient to reach out and grab your hand and shake making sure patient reaches across his/her midline. Pass- Patient is able to come to a seated position, maintain core strength. Maintains seated balance while reaching across midline. Move on to Assessment Level 2. Assessment Level 2- Stretch and Point   1. With patient in seated position at the side of the bed, have patient place both feet on the floor (or stool) with knees no higher than hips. 2. Ask patient to stretch one leg and straighten the knee, then bend the ankle/flex and point the toes. If appropriate, repeat with the other leg. Pass- Patient is able to demonstrate appropriate quad strength on intended weight bearing limb(s). Move onto Assessment Level 3. Assessment Level 3- Stand   1. Ask patient to elevate off the bed or chair (seated to standing) using an assistive device (cane, bedrail). 2. Patient should be able to raise buttocks off be and hold for a count of five. May repeat once. Fail- Patient unable to demonstrate standing stability.  Patient is MOBILITY LEVEL 3. Assessment Level 4- Walk   1. Ask patient to march in place at bedside. 2. Then ask patient to advance step and return each foot. Some medical conditions may render a patient from stepping backwards, use your best clinical judgement. Fail- Patient not able to complete tasks OR requires use of assistive device. Patient is MOBILITY LEVEL 3. Mobility Level- 2    Patient is not able to demonstrate the ability to move from a reclining position to an upright position within the recliner due to [id number]. .

## 2022-06-11 NOTE — PROGRESS NOTES
Bedside Mobility Assessment Tool (BMAT):     Assessment Level 1- Sit and Shake    1. From a semi-reclined position, ask patient to sit up and rotate to a seated position at the side of the bed. Can use the bedrail. 2. Ask patient to reach out and grab your hand and shake making sure patient reaches across his/her midline. Fail- Patient is unable to perform tasks, patient is MOBILITY LEVEL 1.     Mobility Level- 1

## 2022-06-12 LAB
ANION GAP SERPL CALCULATED.3IONS-SCNC: 10 MMOL/L (ref 3–16)
BASOPHILS ABSOLUTE: 0 K/UL (ref 0–0.2)
BASOPHILS RELATIVE PERCENT: 0.7 %
BUN BLDV-MCNC: 14 MG/DL (ref 7–20)
CALCIUM SERPL-MCNC: 8.3 MG/DL (ref 8.3–10.6)
CHLORIDE BLD-SCNC: 114 MMOL/L (ref 99–110)
CO2: 16 MMOL/L (ref 21–32)
CREAT SERPL-MCNC: 0.6 MG/DL (ref 0.6–1.2)
EOSINOPHILS ABSOLUTE: 0.1 K/UL (ref 0–0.6)
EOSINOPHILS RELATIVE PERCENT: 2.3 %
GFR AFRICAN AMERICAN: >60
GFR NON-AFRICAN AMERICAN: >60
GLUCOSE BLD-MCNC: 86 MG/DL (ref 70–99)
HCT VFR BLD CALC: 37.5 % (ref 36–48)
HEMOGLOBIN: 10.7 G/DL (ref 12–16)
LYMPHOCYTES ABSOLUTE: 2.1 K/UL (ref 1–5.1)
LYMPHOCYTES RELATIVE PERCENT: 58.2 %
MCH RBC QN AUTO: 27.4 PG (ref 26–34)
MCHC RBC AUTO-ENTMCNC: 28.4 G/DL (ref 31–36)
MCV RBC AUTO: 96.5 FL (ref 80–100)
MONOCYTES ABSOLUTE: 0.3 K/UL (ref 0–1.3)
MONOCYTES RELATIVE PERCENT: 8.3 %
NEUTROPHILS ABSOLUTE: 1.1 K/UL (ref 1.7–7.7)
NEUTROPHILS RELATIVE PERCENT: 30.5 %
PDW BLD-RTO: 20.4 % (ref 12.4–15.4)
PLATELET # BLD: 32 K/UL (ref 135–450)
PMV BLD AUTO: 6.5 FL (ref 5–10.5)
POTASSIUM REFLEX MAGNESIUM: 5.4 MMOL/L (ref 3.5–5.1)
RBC # BLD: 3.89 M/UL (ref 4–5.2)
SODIUM BLD-SCNC: 140 MMOL/L (ref 136–145)
WBC # BLD: 3.7 K/UL (ref 4–11)

## 2022-06-12 PROCEDURE — 96361 HYDRATE IV INFUSION ADD-ON: CPT

## 2022-06-12 PROCEDURE — 97535 SELF CARE MNGMENT TRAINING: CPT

## 2022-06-12 PROCEDURE — 99232 SBSQ HOSP IP/OBS MODERATE 35: CPT | Performed by: INTERNAL MEDICINE

## 2022-06-12 PROCEDURE — 80048 BASIC METABOLIC PNL TOTAL CA: CPT

## 2022-06-12 PROCEDURE — G0378 HOSPITAL OBSERVATION PER HR: HCPCS

## 2022-06-12 PROCEDURE — 97166 OT EVAL MOD COMPLEX 45 MIN: CPT

## 2022-06-12 PROCEDURE — 97530 THERAPEUTIC ACTIVITIES: CPT

## 2022-06-12 PROCEDURE — 85025 COMPLETE CBC W/AUTO DIFF WBC: CPT

## 2022-06-12 PROCEDURE — 97162 PT EVAL MOD COMPLEX 30 MIN: CPT

## 2022-06-12 PROCEDURE — 2580000003 HC RX 258

## 2022-06-12 PROCEDURE — 1200000000 HC SEMI PRIVATE

## 2022-06-12 PROCEDURE — 99223 1ST HOSP IP/OBS HIGH 75: CPT | Performed by: INTERNAL MEDICINE

## 2022-06-12 PROCEDURE — 6370000000 HC RX 637 (ALT 250 FOR IP)

## 2022-06-12 PROCEDURE — 36415 COLL VENOUS BLD VENIPUNCTURE: CPT

## 2022-06-12 PROCEDURE — 6370000000 HC RX 637 (ALT 250 FOR IP): Performed by: INTERNAL MEDICINE

## 2022-06-12 RX ORDER — SODIUM CHLORIDE 1 G/1
1 TABLET ORAL
Status: DISCONTINUED | OUTPATIENT
Start: 2022-06-12 | End: 2022-06-13 | Stop reason: HOSPADM

## 2022-06-12 RX ADMIN — FLUDROCORTISONE ACETATE 0.1 MG: 0.1 TABLET ORAL at 08:57

## 2022-06-12 RX ADMIN — GABAPENTIN 600 MG: 300 CAPSULE ORAL at 15:21

## 2022-06-12 RX ADMIN — LEVOTHYROXINE SODIUM 88 MCG: 88 TABLET ORAL at 06:26

## 2022-06-12 RX ADMIN — FAMOTIDINE 20 MG: 20 TABLET ORAL at 08:57

## 2022-06-12 RX ADMIN — GABAPENTIN 600 MG: 300 CAPSULE ORAL at 20:27

## 2022-06-12 RX ADMIN — FLUDROCORTISONE ACETATE 0.1 MG: 0.1 TABLET ORAL at 20:28

## 2022-06-12 RX ADMIN — CLONAZEPAM 2 MG: 1 TABLET ORAL at 20:27

## 2022-06-12 RX ADMIN — DESVENLAFAXINE SUCCINATE 50 MG: 50 TABLET, FILM COATED, EXTENDED RELEASE ORAL at 12:01

## 2022-06-12 RX ADMIN — GABAPENTIN 600 MG: 300 CAPSULE ORAL at 08:57

## 2022-06-12 RX ADMIN — PANTOPRAZOLE SODIUM 40 MG: 40 TABLET, DELAYED RELEASE ORAL at 08:57

## 2022-06-12 RX ADMIN — TROSPIUM CHLORIDE 20 MG: 20 TABLET, FILM COATED ORAL at 20:28

## 2022-06-12 RX ADMIN — CLONAZEPAM 2 MG: 1 TABLET ORAL at 08:57

## 2022-06-12 RX ADMIN — Medication 10 ML: at 20:28

## 2022-06-12 RX ADMIN — SODIUM CHLORIDE TAB 1 GM 1 G: 1 TAB at 17:12

## 2022-06-12 RX ADMIN — ROSUVASTATIN CALCIUM 10 MG: 10 TABLET, FILM COATED ORAL at 08:57

## 2022-06-12 RX ADMIN — MIDODRINE HYDROCHLORIDE 10 MG: 10 TABLET ORAL at 08:57

## 2022-06-12 RX ADMIN — OXYCODONE 10 MG: 5 TABLET ORAL at 06:28

## 2022-06-12 RX ADMIN — MIDODRINE HYDROCHLORIDE 10 MG: 10 TABLET ORAL at 20:28

## 2022-06-12 RX ADMIN — OXYCODONE 10 MG: 5 TABLET ORAL at 20:29

## 2022-06-12 RX ADMIN — MIDODRINE HYDROCHLORIDE 10 MG: 10 TABLET ORAL at 15:21

## 2022-06-12 RX ADMIN — DOXAZOSIN 2 MG: 2 TABLET ORAL at 08:57

## 2022-06-12 RX ADMIN — CYANOCOBALAMIN TAB 500 MCG 2500 MCG: 500 TAB at 08:57

## 2022-06-12 ASSESSMENT — PAIN DESCRIPTION - LOCATION
LOCATION: NECK;HEAD
LOCATION: NECK;HEAD;BACK

## 2022-06-12 ASSESSMENT — PAIN SCALES - GENERAL
PAINLEVEL_OUTOF10: 8
PAINLEVEL_OUTOF10: 0
PAINLEVEL_OUTOF10: 9
PAINLEVEL_OUTOF10: 7

## 2022-06-12 ASSESSMENT — PAIN - FUNCTIONAL ASSESSMENT: PAIN_FUNCTIONAL_ASSESSMENT: PREVENTS OR INTERFERES SOME ACTIVE ACTIVITIES AND ADLS

## 2022-06-12 ASSESSMENT — PAIN DESCRIPTION - DESCRIPTORS
DESCRIPTORS: ACHING
DESCRIPTORS: ACHING;DISCOMFORT

## 2022-06-12 ASSESSMENT — PAIN DESCRIPTION - ONSET: ONSET: ON-GOING

## 2022-06-12 ASSESSMENT — PAIN DESCRIPTION - FREQUENCY: FREQUENCY: INTERMITTENT

## 2022-06-12 ASSESSMENT — PAIN DESCRIPTION - PAIN TYPE: TYPE: CHRONIC PAIN;ACUTE PAIN

## 2022-06-12 NOTE — PROGRESS NOTES
IM Progress Note    Admit Date:  6/10/2022     Patient with POTS   Recurrent issues with orthostatic hypotension dizziness and falls    Subjective:  Ms. Kathleen Rice seen     Patient continues to feel dizzy. Still orthostatic. Appreciate cardiology recommendations. .      Objective:   BP (!) 121/57   Pulse 69   Temp 97.5 °F (36.4 °C) (Oral)   Resp 16   Ht 5' 2\" (1.575 m)   Wt 171 lb (77.6 kg)   SpO2 100%   BMI 31.28 kg/m²       Intake/Output Summary (Last 24 hours) at 6/12/2022 1530  Last data filed at 6/12/2022 1413  Gross per 24 hour   Intake 2570.28 ml   Output 350 ml   Net 2220.28 ml         Physical Exam:  Gen: No distress. Alert. Chronically ill appearing female   Eyes: PERRL. No sclera icterus. No conjunctival injection. .   Neck: No JVD. No Carotid Bruit. Trachea midline. Resp: No accessory muscle use. No crackles. No wheezes. No rhonchi. CV: Regular rate. Regular rhythm. No murmur. No rub. No edema. Peripheral Pulses: +2 palpable, equal bilaterally   GI: Non-tender. Non-distended. No masses. No organomegaly. Normal bowel sounds. No hernia. + Suprapubic catheter, no surrounding erythema or discharge   Skin: Warm and dry. No nodule on exposed extremities. No rash on exposed extremities. M/S: No cyanosis. No joint deformity. No clubbing. + 4/5 strength of upper and lower extremities bilaterally, sensation intact and equal, pulses 2+  Neuro: Awake. Grossly nonfocal. No aphasia or dysarthria   Psych: Oriented x 3. No anxiety or agitation.          Medications:   Scheduled Meds:   fludrocortisone  0.1 mg Oral BID    sodium chloride flush  5-40 mL IntraVENous 2 times per day    enoxaparin  40 mg SubCUTAneous Daily    doxazosin  2 mg Oral Daily    rosuvastatin  10 mg Oral Daily    pantoprazole  40 mg Oral Daily    cyanocobalamin  2,500 mcg Oral Daily    clonazePAM  2 mg Oral BID    desvenlafaxine succinate  50 mg Oral Daily    famotidine  20 mg Oral Daily    trospium  20 mg Oral Nightly    gabapentin  600 mg Oral TID    levothyroxine  88 mcg Oral Daily    midodrine  10 mg Oral TID       Continuous Infusions:   sodium chloride         Data:  CBC:   Recent Labs     06/10/22  1146 06/11/22  0514 06/12/22  0527   WBC 6.6 5.0 3.7*   RBC 4.35 3.92* 3.89*   HGB 11.9* 10.9* 10.7*   HCT 37.0 34.9* 37.5   MCV 85.1 89.0 96.5   RDW 18.3* 18.9* 20.4*    155 32*     BMP:   Recent Labs     06/10/22  1146 06/11/22  0514 06/12/22  0527    141 140   K 4.3 4.5 5.4*    110 114*   CO2 24 18* 16*   BUN 18 16 14   CREATININE 1.0 0.8 0.6     BNP: No results for input(s): BNP in the last 72 hours. PT/INR: No results for input(s): PROTIME, INR in the last 72 hours. APTT: No results for input(s): APTT in the last 72 hours. CARDIAC ENZYMES:   Recent Labs     06/10/22  1146 06/10/22  1644 06/10/22  2311   TROPONINI <0.01 <0.01 <0.01     FASTING LIPID PANEL:  Lab Results   Component Value Date    CHOL 186 03/19/2022    HDL 72 (H) 03/19/2022    TRIG 61 03/19/2022     LIVER PROFILE:   Recent Labs     06/10/22  1146   AST 31   ALT 17   BILITOT <0.2   ALKPHOS 71          Cultures  COVID not detected  Influenza a and B not detected  Urine cultures negative    Radiology  CT CHEST PULMONARY EMBOLISM W CONTRAST   Final Result   No evidence of a pulmonary embolus. Mildly dilated and atherosclerotic thoracic aorta with no aneurysm or   dissection. Mild chronic obstructive lung changes with mild bibasilar atelectasis vs   early infiltrates or scarring which is more prominent. Suggest follow-up   with serial chest x-rays      Previous gastric bypass surgery which is unchanged      Single compression fracture lower thoracic spine which is unchanged. VL DUP CAROTID BILATERAL         XR CHEST PORTABLE   Final Result   Mild left basilar atelectasis.             carotid doppler  WNL       Assessment:  Principal Problem:    Orthostatic hypotension  Active Problems:    GERD (gastroesophageal reflux disease)    Anxiety & depression    Hypothyroidism    S/P gastric bypass    Osteoporosis    NATHAN (obstructive sleep apnea)    HTN (hypertension), benign    Primary insomnia    Chronic bilateral low back pain without sciatica    History of bladder augmentation    Pacemaker    S/P placement of cardiac pacemaker  Resolved Problems:    * No resolved hospital problems. *      Plan:     #Orthostatic hypotension   #Vasovagal syncope   #POTS  -like cause of pts symptoms   -+ orthostatics, SBP dropped to 60s  -HR stable   -holding toprol   -continue midodrine 10 mg 3 times daily  -Patient states that she was on Florinef at one-point; but was taken off of it by her cardiologist.   We will resume Florinef 0.1 mg twice daily  -compression stockings ordered. Patient however states that she cannot tolerate it. Causes itching / rash  - hydrated with IVF    - add  NaCl tabs   -Monitor orthostatic vital signs  -fall precautions   -She sees a cardiologist  At [facility] for POTS. Would like a cardiology opinion here. Consulted cardiology here. Appreciate recommendations      #Dizziness   #Hypotension   #Pre-syncope   -2/2 to above most likely   -CT head done in march of this year, negative, CTA done in past   -CTA chest -no PE  -carotid US  Normal   -pt is on multiple sedating meds, could be contributing   -needs to follow up with neurology outpatient    She is on midodrine, added Florinef. Add sodium chloride tabs.    cont PT, OT     #HX of Bradycardia   -s/p pacemaker   -HR stable today      #Neurogenic bladder with enterovesical fistula to augmented bladder   #Suprapubic urostomy   -self caths herself   -on sanctura      #HLD  -on statin      #Anxiety   #Depression   -continue pristiq      #Osteoporosis  -on fosamax      #Chronic back pain   #Cervical stenosis with radiculopathy   #DDD  #Fibromyalgia   -on oxycodone, gabapentin      #Hx of gastric bypass      #Chronic migraines  -continue home regimen      #Hypothyroidism   -continue synthroid     #GERD   -continue PPI and pepcid      #Vitamin B12 deficiency   -continue B12     # Hyperkalemia  - DC PO Kdur     DVT Prophylaxis: lovenox   ADULT DIET;  Regular   Full Code        Harry Feliz MD   6/12/2022 3:30 PM

## 2022-06-12 NOTE — CONSULTS
[facility]   Cardiology Consultation   Date: 6/12/2022  Reason for Consultation: Jarred Ivan Requesting Physician: Claudene Rose, MD   Chief Complaint   Patient presents with    Dizziness     Patient arrived via EMS, she had driven to a store and got dizzy, drove home and called squad. Patient says she has been dizzy for years and that no one has ever done anything about it. Says she may have Ha Disease. Says she takes meds for low AND high BP. CC: Dizziness    HPI: Gregg Maya is a 78 y.o. female history of syncope, POTS?, NATHAN . Chronic migraine headaches, neurogenic bladder with enterovesical fistula, fibromyalgia, radiculopathy and s/p dual chamber pacemaker (Medtronic Adapta) in 2011. Patient was walking to the store when felt lightheadedness and dizziness and felt that she might faint. Ask for help and she was taken back to her vehicle and her dizziness and lightheadedness resolved. She has history of orthostatic intolerance and ? POTS for years. She seen several cardiologist for condition in the past.  She has a dual-chamber Medtronic pacemaker implanted in 2011. She follows up with Dr. Saadia Dejesus at [facility].  Recently she was told to stop her Florinef. She has been taking midodrine and Florinef for years. She described her episodes when she stands up and feel dizzy and lightheaded and presyncopal.  Sitting down prevent her from having syncope. She had compression stocking in the past but is no longer using them. Taking midodrine for her orthostatic intolerance. Assessment:   Orthostatic intolerance  ? POTS  History of gastric bypass  HTN  S/p dual-chamber Medtronic pacemaker in 2011  Enterovesical fistula    Plan:   Patient has orthostatic intolerance for years. She states that she had a tilt table test many years ago (results not available to me).   Reviewing chart from [facility] states that she did not meet criteria for POTS per  Sonido Patel. She has had stress test and echocardiogram recently  Her Florinef has been discontinued by Dr. Sonido Patel. It has been resumed in this hospitalization. She is also on midodrine and adjust the dose based on her blood pressure. Check orthostatic blood pressures  PT/OT  Discussed her condition and all questions were answered. Following recommendations discussed with patient:    Make all postural changes from lying to sitting or sitting to standing slowly. Drink to 2.0 -2.5 L of fluids per day. With bad symptoms, drink 500 cc of water quickly. This will result in an increased blood pressure within 5 minutes of drinking the water. The effect will last up to one hour and may improve orthostatic intolerance. Increase sodium in the diet to 3 - 5 g per day. Avoid large meals which can cause low blood pressure during digestion. Avoid alcohol. Perform lower extremity exercises to improve strength of the leg muscles. Raise the head of the bed by 6 to 10 inches. Use custom fitted elastic support stockings. She benefits from referral to autonomic dysfunction clinics at [facility] or [facility] clinic. She will discuss it with her cardiologist.     Follow up with Dr. Sonido Patel for pacemaker checkup. She has recently checked her pacemaker. No further testing recommended. She has follow up with Dr. Sonido Patel. No further recommendations. Will sign off. Discussed with nursing staff.      Active Hospital Problems    Diagnosis Date Noted    Orthostatic hypotension [I95.1] 06/10/2022     Priority: Medium    S/P placement of cardiac pacemaker [Z95.0]     Pacemaker [Z95.0] 07/13/2018    History of bladder augmentation [Z98.890] 07/12/2018    Chronic bilateral low back pain without sciatica [M54.50, G89.29] 04/25/2018    HTN (hypertension), benign [I10] 06/13/2017    Primary insomnia [F51.01] 06/13/2017    NATHAN (obstructive sleep apnea) [G47.33] 01/26/2016    Osteoporosis [M81.0] 2014    S/P gastric bypass [Z98.84] 2014    Hypothyroidism [E03.9] 2014    Anxiety & depression [F41.9]     GERD (gastroesophageal reflux disease) [K21.9] 2013       Diagnostic studies:     Echocardiogram 3/19/2022    Conclusions      Summary    Left ventricular systolic function is normal with ejection fraction    estimated at 55-60 %.    No regional wall motion abnormalities are noted.    Grade I diastolic dysfunction with normal filling pressure.    The right atrium is mildly dilated.    Normal systolic pulmonary artery pressure (SPAP) estimated at 33 mmHg (RA    pressure 8 mmHg).    Pacemaker / ICD lead was visualized in the right atrium and right ventricle.    2019 55% EF trace TR SPAP 21 mmHg. 3/19/2022 Lexiscan: No evidence for ischemia     I independently reviewed the cardiac diagnostic studies, ECG and relevant imaging studies. Lab Results   Component Value Date    LVEF 76 2022     Lab Results   Component Value Date    TSHFT4 0.09 (L) 06/10/2022    TSH 0.46 2020       Physical Examination:  Vitals:    22 0302   BP: (!) 91/44   Pulse: 62   Resp: 16   Temp: 97.9 °F (36.6 °C)   SpO2: 98%      In: 2570.3 [P.O.:240; I.V.:2330.3]  Out: -    Wt Readings from Last 3 Encounters:   06/10/22 171 lb (77.6 kg)   22 165 lb (74.8 kg)   22 170 lb (77.1 kg)     Temp  Av.5 °F (36.4 °C)  Min: 96.6 °F (35.9 °C)  Max: 97.9 °F (36.6 °C)  Pulse  Av.8  Min: 62  Max: 79  BP  Min: 91/44  Max: 110/68  SpO2  Av.5 %  Min: 94 %  Max: 100 %    Intake/Output Summary (Last 24 hours) at 2022 0721  Last data filed at 2022 7828  Gross per 24 hour   Intake 2570.28 ml   Output --   Net 2570.28 ml         I independently reviewed all cardiac tracing from cardiac telemetry. · Constitutional: Oriented. No distress. · Head: Normocephalic and atraumatic.    · Mouth/Throat: Oropharynx is clear and moist.   · Eyes: Conjunctivae normal. EOM are normal. · Neck: Neck supple. No JVD present. · Cardiovascular: Normal rate, regular rhythm, S1&S2. · Pulmonary/Chest: Bilateral respiratory sounds. No rhonchi. · Abdominal: Soft. No tenderness. · Musculoskeletal: No tenderness. No edema    · Lymphadenopathy: Has no cervical adenopathy. · Neurological: Alert and oriented. Follows command, No Gross deficit   · Skin: Skin is warm, No rash noted. · Psychiatric: Has a normal behavior       Scheduled Meds:   fludrocortisone  0.1 mg Oral BID    sodium chloride flush  5-40 mL IntraVENous 2 times per day    enoxaparin  40 mg SubCUTAneous Daily    doxazosin  2 mg Oral Daily    rosuvastatin  10 mg Oral Daily    pantoprazole  40 mg Oral Daily    cyanocobalamin  2,500 mcg Oral Daily    clonazePAM  2 mg Oral BID    desvenlafaxine succinate  50 mg Oral Daily    famotidine  20 mg Oral Daily    trospium  20 mg Oral Nightly    gabapentin  600 mg Oral TID    levothyroxine  88 mcg Oral Daily    midodrine  10 mg Oral TID    potassium chloride  20 mEq Oral BID     Continuous Infusions:   sodium chloride      sodium chloride 100 mL/hr at 06/11/22 1817     PRN Meds:.sodium chloride flush, sodium chloride, potassium chloride **OR** potassium alternative oral replacement **OR** potassium chloride, magnesium sulfate, ondansetron **OR** ondansetron, polyethylene glycol, acetaminophen **OR** acetaminophen, oxyCODONE HCl     Review of System:  [x] Full ROS obtained and negative except as mentioned in HPI    Prior to Admission medications    Medication Sig Start Date End Date Taking? Authorizing Provider   terazosin (HYTRIN) 2 MG capsule Take 2 mg by mouth nightly   Yes Historical Provider, MD   nystatin (MYCOSTATIN) 896878 UNIT/GM powder Apply topically daily as needed Apply topically 4 times daily.    Yes Historical Provider, MD   sulfamethoxazole-trimethoprim (BACTRIM DS;SEPTRA DS) 800-160 MG per tablet Take 1 tablet by mouth daily   Yes Historical Provider, MD famotidine (PEPCID) 20 MG tablet Take by mouth daily    Historical Provider, MD   alendronate (FOSAMAX) 35 MG tablet TAKE 1 TABLET BY MOUTH ONE TIME A WEEK IN THE MORNING AT LEAST 30 MIN BEFORE FIRST FOOD, BEVERAGE, OR MED OF DAY 3/15/22   Historical Provider, MD   desvenlafaxine succinate (PRISTIQ) 25 MG TB24 extended release tablet Take 50 mg by mouth daily 1/11/22   Historical Provider, MD   eszopiclone (LUNESTA) 3 MG TABS TAKE 1 TABLET BY MOUTH AT BEDTIME 3/14/22   Historical Provider, MD   gabapentin (NEURONTIN) 600 MG tablet TAKE 1 TABLET BY MOUTH EVERY EIGHT HOURS 3/7/22   Historical Provider, MD   metoprolol succinate (TOPROL XL) 25 MG extended release tablet Med called in today, pt does not have it \bc pharmacy states not available until Monday 3/18/22   Historical Provider, MD   midodrine (PROAMATINE) 10 MG tablet TAKE 1 TABLET BY MOUTH THREE TIMES A DAY 1/27/22   Historical Provider, MD   Rimegepant Sulfate (NURTEC) 75 MG TBDP Take 75 mg by mouth Med called in today, pt does not have it \bc pharmacy states not available until Monday 3/18/22   Historical Provider, MD   rosuvastatin (CRESTOR) 10 MG tablet TAKE 1 TABLET BY MOUTH EVERY DAY 1/11/22   Historical Provider, MD   DENTA 5000 PLUS 1.1 % CREA FLOSS THEN BRUSH WITH PEA-SIZED AMOUNT FOR 2 MINUTES TWICE DAILY, SPIT AND DO NOT RINSE OR DRINK FOR 30 MINUTES 2/9/22   Historical Provider, MD   Biotin 1000 MCG TABS Take by mouth daily    Historical Provider, MD   cephALEXin (KEFLEX) 500 MG capsule Take 500 mg by mouth daily     Historical Provider, MD   oxyCODONE HCl (OXY-IR) 10 MG immediate release tablet TAKE 1 TABLET BY MOUTH EVERY SIX HOURS AS NEEDED 2/25/21   Historical Provider, MD   sodium chloride 0.9 % irrigation  3/1/21   Historical Provider, MD   ondansetron (ZOFRAN-ODT) 8 MG TBDP disintegrating tablet Take 8 mg by mouth every 8 hours as needed    Historical Provider, MD   clonazePAM (KLONOPIN) 2 MG tablet TAKE 1 TABLET BY MOUTH TWO TIMES A DAY 3/12/21   Historical Provider, MD   fludrocortisone (FLORINEF) 0.1 MG tablet Take 0.1 mg by mouth 2 times daily  Patient not taking: Reported on 6/10/2022    Historical Provider, MD   Sennosides (SENNA LAX PO) Take by mouth daily     Historical Provider, MD   triamcinolone (KENALOG) 0.1 % cream Apply topically 2 times daily Apply topically 2 times daily. Historical Provider, MD   Fesoterodine Fumarate ER (TOVIAZ) 8 MG TB24 Take by mouth daily     Historical Provider, MD   cyanocobalamin 1000 MCG tablet Take 2,500 mcg by mouth daily    Historical Provider, MD   levothyroxine (SYNTHROID) 88 MCG tablet Take 88 mcg by mouth Daily    Historical Provider, MD Love Gonzalez CATH TIP 60 ML MISC by Other route in the morning, at noon, and at bedtime Wound/hole on abd, uses to keep clean 3/7/18   Historical Provider, MD   KLOR-CON M20 20 MEQ extended release tablet Take 20 mEq by mouth 2 times daily  3/19/18   Historical Provider, MD   pantoprazole (PROTONIX) 40 MG tablet TAKE 1 TABLET BY MOUTH DAILY 2/16/17   Historical Provider, MD   fluticasone (FLONASE) 50 MCG/ACT nasal spray 1 spray by Nasal route nightly.   Patient taking differently: 1 spray by Nasal route as needed  4/9/15   Evon Villagran MD   Multiple Vitamin (THERA) TABS Take 1 tablet by mouth daily     Historical Provider, MD       Past Medical History:   Diagnosis Date    Angina at rest [facility])     Anxiety     Arthritis     hands and hip    Blood transfusion     after back surgery    Bradycardia     Bronchiectasis ([address] 11/05/2013    Chronic back pain     Hyperlipidemia     Hypertension     Localized morphea     Multiple drug resistant organism (MDRO) culture positive 04/13/2021    E.COLI-URINE    NATHAN treated with BiPAP     Pacemaker     Stress incontinence     Thyroid disease     hypothyroid    Trochanteric bursitis of left hip 04/12/2019        Past Surgical History:   Procedure Laterality Date    ABDOMEN SURGERY  09/09/2011 REPAIR INCISIONAL HERNIA REDUCIBLE WITH POSSIBLE MESH    BACK SURGERY      x3    BACK SURGERY      stimulator    BLADDER SUSPENSION      CARDIAC PACEMAKER PLACEMENT  2011    Heart doctor at [address] [facility] [address] Left 03/08/2018    PHACO EMULSIFICATION OF CATARACT WITH INTRAOCULAR LENS IMPLANT LEFT EYE    CERVICAL DISCECTOMY  06/2014    and fusion    CHOLECYSTECTOMY      COLONOSCOPY  2002 1/26/12    ENDOSCOPY, COLON, DIAGNOSTIC      EPIDURAL STEROID INJECTION Left 08/15/2019    LEFT KNEE GENICULAR NERVE BLOCK SITE CONFIRMED BY FLUOROSCOPY performed by Abiel Myers MD at [address] [facility] Right 04/05/2018    cataract removal    FOOT SURGERY Right 12/06/2012    arthroplasty    FOOT SURGERY Left 04/30/2015    FOOT SURGERY Left 04/30/2015    GASTRIC BYPASS SURGERY  2273,3115    HYSTERECTOMY (CERVIX STATUS UNKNOWN)      KNEE ARTHROSCOPY Left 10/03/2014    part. medial & lateral meniscectomy, synovectomy plica exc    KNEE ARTHROSCOPY Right 10/13/2015    partial medial meniscectomy, chondroplasty, partial lateral meniscectomy    NECK SURGERY      OTHER SURGICAL HISTORY  04/2013    removal spinal cord stimulater    OTHER SURGICAL HISTORY      bladder stimulator    OTHER SURGICAL HISTORY  08/30/2017    left patellofemoral arthroplasty    OTHER SURGICAL HISTORY  05/09/2018    convert left patellofemoral arthroplasty to left total knee replacement   [facility]  2011    Dr Mahoney/checked last week/told has 12 more yrs for this one/set at 60    SKIN BIOPSY      abdomen    SPINE SURGERY      stimulator    THROAT SURGERY      polyps removed    TONSILLECTOMY      UPPER GASTROINTESTINAL ENDOSCOPY      UPPER GASTROINTESTINAL ENDOSCOPY  05/03/2017       Allergies   Allergen Reactions    Meloxicam Itching and Other (See Comments)     Tolerates Ketorolac/Bromfenac ophthalmic drops.     Acetaminophen Other (See Comments)     Unable to take due to liver  Unable to take due to liver  Unable to take due to liver  Unable to take due to liver      Benzoin Compound      Blisters from adhesive compound under steri strips after knee VAS      Lyrica [Pregabalin]      Keeps patient awake    Quetiapine Fumarate      Other reaction(s): confused, dizzy  Other reaction(s): Unknown  seroquel      Seroquel [Quetiapine Fumarate] Other (See Comments)     Other reaction(s): confused, dizzy  Pt unable to recall reaction    Tizanidine      hallucinations    Other reaction(s): Other (See Comments)    Adhesive Tape Rash     Mild dermatitis at site of paper tape, with history of previous skin rashes to tape. STERI-STRIPS  Can use paper tape can not  use transpore    STERI-STRIPS     Mild dermatitis at site    Can use paper tape can not  use transpore    Balsam Rash     Other reaction(s): Rash    Balsam [place] [employer] Rash     Positive patch test results to [facility]    Positive patch test results to [facility]  Positive patch test results to [facility]    Positive patch test results to [facility]  Positive patch test results to [facility]    QXL [employer] Rash     Other reaction(s): Rash    Linaclotide Rash    Other Rash     Can use paper tape can not  use transpore    Quetiapine Other (See Comments)     Other reaction(s): Other (See Comments)  Pt unable to recall reaction  Other reaction(s): Other (See Comments)    Pt unable to recall reaction  Pt unable to recall reaction  Made her dizzy and confused   Other reaction(s): confused, dizzy  Pt unable to recall reaction  Other reaction(s): confused, dizzy  Other reaction(s): Unknown  seroquel  Other reaction(s): Other (See Comments)  Pt unable to recall reaction  Other reaction(s): Other (See Comments)    Wound Dressings Rash     Positive patch test results to [facility]         Social History:  Reviewed. reports that she has never smoked.  She has never used smokeless tobacco. She reports that she does not drink alcohol and does not use drugs. Family History:  Reviewed. Reviewed. No family history of SCD. Relevant and available labs, and cardiovascular diagnostics reviewed. Reviewed. Recent Labs     06/10/22  1146 06/11/22  0514 06/12/22 0527    141 140   K 4.3 4.5 5.4*    110 114*   CO2 24 18* 16*   BUN 18 16 14   CREATININE 1.0 0.8 0.6     Recent Labs     06/10/22  1146 06/11/22 0514 06/12/22 0527   WBC 6.6 5.0 3.7*   HGB 11.9* 10.9* 10.7*   HCT 37.0 34.9* 37.5   MCV 85.1 89.0 96.5    155 32*     Estimated Creatinine Clearance: 88 mL/min (based on SCr of 0.6 mg/dL). Lab Results   Component Value Date    BNP 94 08/10/2011       I independently reviewed all cardiac tracing from cardiac telemetry. I independently reviewed relevant and available cardiac diagnostic tests ECG, CXR, Echo, Stress test, Device interrogation, Holter, CT scan. Outside medical records via Care everywhere reviewed and summarized in H&P above. Complex medical condition with multiple medical problems affecting prognosis and outcome of interventions    All questions and concerns were addressed to the patient/family. Alternatives to my treatment were discussed. I have discussed the above stated plan and the patient verbalized understanding and agreed with the plan. NOTE: This report was transcribed using voice recognition software. Every effort was made to ensure accuracy, however, inadvertent computerized transcription errors may be present.      DOUG [facility], MD, MPH  [address]   Office: [phone]  Fax: [phone]

## 2022-06-12 NOTE — PLAN OF CARE
Problem: Discharge Planning  Goal: Discharge to home or other facility with appropriate resources  Outcome: Progressing     Problem: Safety - Adult  Goal: Free from fall injury  Outcome: Progressing     Problem: ABCDS Injury Assessment  Goal: Absence of physical injury  Outcome: Progressing     Problem: Pain  Goal: Verbalizes/displays adequate comfort level or baseline comfort level  Outcome: Progressing     Problem: Skin/Tissue Integrity  Goal: Absence of new skin breakdown  Description: 1. Monitor for areas of redness and/or skin breakdown  2. Assess vascular access sites hourly  3. Every 4-6 hours minimum:  Change oxygen saturation probe site  4. Every 4-6 hours:  If on nasal continuous positive airway pressure, respiratory therapy assess nares and determine need for appliance change or resting period.   Outcome: Progressing

## 2022-06-12 NOTE — CARE COORDINATION
Case Management Assessment  Initial Evaluation      Patient Name: Tana Schaumann  YOB: 1955  Diagnosis: Orthostatic hypotension [I95.1]  Date / Time: 6/10/2022 10:52 AM    Admission status/Date: Inpatient 6/10/2022  Chart Reviewed: Yes      Patient Interviewed: Yes   Family Interviewed:  No      Hospitalization in the last 30 days:  No      Health Care Decision Maker :   [phone]      Who do you trust or have selected to make healthcare decisions for you    Met with: Patient at bedside. Current PCP: Cristina Capone MD    Financial  [employer] MCR  Precert required for SNF : Y         3 night stay required - N    ADLS  Support Systems/Care Needs: friends  Transportation: self    Meal Preparation: self    Housing  Living Arrangements: Lives in ranch alone Steps: 0 ROWAN  Intent for return to present living arrangements: Yes  Identified Issues: Awaiting therapy evals    Home Care Information  Active with Home Health Care : No Agency:(Services)  Type of Home Care Services: Housekeeping,PT,OT  Passport/Waiver : No  :                      Phone Number:    Passport/Waiver Services: n/a           Durable Medical Equiptment   DME Provider: n/a   Equipment:   Walker_x__Cane_x__RTS_x__ BSC___Shower Chair_x__Hospital Bed___W/C____Other________  02 at ____Liter(s)---wears(frequency)_______ HHN ___ CPAP___ BiPap___   N/A____      Home O2 Use :  No    If No for home O2---if presently on O2 during hospitalization:  No  if yes CM to follow for potential DC O2 need  Informed of need for care provider to bring portable home O2 tank on day of discharge for nursing to connect prior to leaving:   Not Indicated  Verbalized agreement/Understanding:   Not Indicated    Community Service Affiliation  Dialysis:  No    · Agency:  · Location:  · Dialysis Schedule:  · Phone:   · Fax: Other Community Services: [address] [place] [address] ([address] [place] [address]).  Has monthly appt.     DISCHARGE PLAN: Explained Case Management role/services. CM reviewed chart and met with patient at bedside to discuss discharge needs and plan. Patient lives in ranch-style home alone. States IPTA and active . Uses walker with ambulation. [facility] provide housekeeping services every other week. Plans to return home at discharge. CM to follow for therapy recommendations.     Aditi Medina RN

## 2022-06-12 NOTE — FLOWSHEET NOTE
06/11/22 2200   Vital Signs   Temp 97.6 °F (36.4 °C)   Temp Source Oral   Heart Rate 73   Heart Rate Source Monitor   Resp 17   /68   BP Location Right upper arm   MAP (Calculated) 82   Patient Position Semi fowlers   Level of Consciousness Alert (0)   MEWS Score 1   Pain Assessment   Pain Assessment None - Denies Pain   Pain Level 0   Oxygen Therapy   SpO2 94 %   O2 Device None (Room air)   HS assessment completed, see flow sheet. Pt is alert and oriented. Vital signs are WNL. Respirations are even & easy. No complaints voiced. Pt denies needs at this time. SR up x 2, and bed in low position. Call light is within reach. Bedside Mobility Assessment Tool (BMAT):     Assessment Level 1- Sit and Shake    1. From a semi-reclined position, ask patient to sit up and rotate to a seated position at the side of the bed. Can use the bedrail. 2. Ask patient to reach out and grab your hand and shake making sure patient reaches across his/her midline. Pass- Patient is able to come to a seated position, maintain core strength. Maintains seated balance while reaching across midline. Move on to Assessment Level 2. Assessment Level 2- Stretch and Point   1. With patient in seated position at the side of the bed, have patient place both feet on the floor (or stool) with knees no higher than hips. 2. Ask patient to stretch one leg and straighten the knee, then bend the ankle/flex and point the toes. If appropriate, repeat with the other leg. Fail- Patient is unable to complete task. Patient is MOBILITY LEVEL 2. Assessment Level 3- Stand   1. Ask patient to elevate off the bed or chair (seated to standing) using an assistive device (cane, bedrail). 2. Patient should be able to raise buttocks off be and hold for a count of five. May repeat once. Fail- Patient unable to demonstrate standing stability. Patient is MOBILITY LEVEL 3. Assessment Level 4- Walk   1.  Ask patient to march in place at

## 2022-06-12 NOTE — FLOWSHEET NOTE
AM assessment completed. See flowsheet. A/Ox4. LCTAB. Medications taken without difficulty. BS active x4. No c/o n/v/d this shift. Patient self catheterizes through suprapubic catheter site without difficulty. No edema noted. Bed locked and in low position. Bed alarm on.  Call light in reach.     06/12/22 0845   Vital Signs   Temp 97 °F (36.1 °C)   Temp Source Oral   Heart Rate 97   Heart Rate Source Monitor   Resp 16   /67   BP Location Right upper arm   BP Method Automatic   MAP (Calculated) 82.33   Patient Position High fowlers   Level of Consciousness Alert (0)   MEWS Score 1   Pain Assessment   Pain Assessment 0-10   Pain Level 7   Pain Location Neck;Head   Pain Descriptors Aching   Opioid-Induced Sedation   POSS Score 1   Oxygen Therapy   SpO2 95 %   O2 Device None (Room air)

## 2022-06-12 NOTE — PLAN OF CARE
Problem: Safety - Adult  Goal: Free from fall injury  6/12/2022 1945 by Jose Maria Bone RN  Outcome: Progressing  6/12/2022 1605 by Eric Ribeiro RN  Outcome: Progressing     Problem: ABCDS Injury Assessment  Goal: Absence of physical injury  6/12/2022 1945 by Jose Maria Bone RN  Outcome: Progressing  6/12/2022 1605 by Eric Ribeiro RN  Outcome: Progressing     Problem: Pain  Goal: Verbalizes/displays adequate comfort level or baseline comfort level  6/12/2022 1945 by Jose Maria Bone RN  Outcome: Progressing  6/12/2022 1605 by Eric Ribeiro RN  Outcome: Progressing     Problem: Skin/Tissue Integrity  Goal: Absence of new skin breakdown  Description: 1. Monitor for areas of redness and/or skin breakdown  2. Assess vascular access sites hourly  3. Every 4-6 hours minimum:  Change oxygen saturation probe site  4. Every 4-6 hours:  If on nasal continuous positive airway pressure, respiratory therapy assess nares and determine need for appliance change or resting period.   6/12/2022 1945 by Jose Maria Bone RN  Outcome: Progressing  6/12/2022 1605 by Eric Ribeiro RN  Outcome: Progressing

## 2022-06-12 NOTE — PROGRESS NOTES
Bedside Mobility Assessment Tool (BMAT):     Assessment Level 1- Sit and Shake    1. From a semi-reclined position, ask patient to sit up and rotate to a seated position at the side of the bed. Can use the bedrail. 2. Ask patient to reach out and grab your hand and shake making sure patient reaches across his/her midline. Pass- Patient is able to come to a seated position, maintain core strength. Maintains seated balance while reaching across midline. Move on to Assessment Level 2. Assessment Level 2- Stretch and Point   1. With patient in seated position at the side of the bed, have patient place both feet on the floor (or stool) with knees no higher than hips. 2. Ask patient to stretch one leg and straighten the knee, then bend the ankle/flex and point the toes. If appropriate, repeat with the other leg. Fail- Patient is unable to complete task. Patient is MOBILITY LEVEL 2.        Mobility Level- 2

## 2022-06-12 NOTE — PROGRESS NOTES
Inpatient Occupational Therapy  Evaluation and Treatment    Unit: [facility]  Date:  6/12/2022  Patient Name:    Meli Giang  Admitting diagnosis:  Orthostatic hypotension [I95.1]  Admit Date:  6/10/2022  Precautions/Restrictions/WB Status/ Lines/ Wounds/ Oxygen: Fall risk, Bed/chair alarm, Lines -IV, Telemetry and Isolation Precautions: Contact (MDRO)  Pacemaker, suprapubic urostomy    Treatment Time:  9397-9401  Treatment Number: 1     Billable Treatment Time: 40 minutes   Total Treatment Time:   50   minutes    Patient Goals for Therapy:  \" to feel better \"      Discharge Recommendations: TBD  DME needs for discharge: needs met       Therapy recommendations for staff:  Assist of 1 with use of rolling walker (RW) and gait belt for all transfers to/from [facility]  to/from chair- stand pivot ,monitoring BP    History of Present Illness:  [place]: \"The patient is B 64 y.o. female with pmhx of bradycardia s/p pacemaker, POTS, HTN, HLD, NATHAN, hypothyroidism, chronic back pain who presented to [facility] ED with complaint of dizziness and pre-syncope. This has been a chronic issue for the patient, she has been experiencing these episodes for the past year, pretty much on a daily basis she reports. She reports that whenever she gets out of bed and begins to walk she feels short of breath, light-headed, and dizzy. She has to sit down in order for symptoms to improve. She has had some falls in the past but not recently.  She came into the ED today because she was at a pool shop walking around and felt light-headed and dizzy, a  had to help her sit down and called 911. Does also report on and off headaches but she has history of chronic migraines. Denies any vision changes, palpitations, chest pain. No fever, chills, chest pain, nausea, vomiting, diarrhea, numbness, or tingling.   She has chronic leg and back pain with neuropathy so she has difficulty walking, she uses a walker or cane to ambulate, she is not very mobile at home. She says she has been staying hydrated at home and eating appropriate amount of food. She has been taking all of her medicines.      She follows with cardiology at [facility].   She has had cardiac workup in 3/2022  stress test negative, echo with grade I diastolic dysfunction, CT head unremarkable, she has had CTA head and neck last year. Neurology was recommending MRI for further workup. \"      Per cardiology 6/12/22:     \"Plan:   Patient has orthostatic intolerance for years. Gustavo Ibrahim states that she had a tilt table test many years ago (results not available to me). Reviewing chart from [facility] states that she did not meet criteria for POTS per Dr. Laila Carty. She has had stress test and echocardiogram recently  Her Florinef has been discontinued by Dr. Naylor Squibb has been resumed in this hospitalization. She is also on midodrine and adjust the dose based on her blood pressure. Check orthostatic blood pressures  PT/OT  Discussed her condition and all questions were answered. Following recommendations discussed with patient:              JOFI all postural changes from lying to sitting or sitting to standing slowly.              Drink to 2.0 -2.5 L of fluids per day. With bad symptoms, drink 500 cc of water quickly. This will result in an increased blood pressure within 5 minutes of drinking the water. The effect will last up to one hour and may improve orthostatic intolerance.              Increase sodium in the diet to 3 - 5 g per day.              Avoid large meals which can cause low blood pressure during digestion.              Avoid alcohol.              Perform lower extremity exercises to improve strength of the leg muscles.               Raise the head of the bed by 6 to 10 inches.              Use custom fitted elastic support stockings.      She benefits from referral to autonomic dysfunction clinics at [facility] or [facility]. Gustavo Ibrahim will discuss it with her cardiologist.      Follow up with Dr. Makenzie Shen for pacemaker checkup. She has recently checked her pacemaker.      No further testing recommended. She has follow up with Dr. Makenzie Shen.      No further recommendations. Will sign off.      Discussed with nursing staff. \"     Home Health S4 Level Recommendation:  Level 1 Standard    AM-PAC Score: AM-PAC Inpatient Daily Activity Raw Score: 19  Pt scored a 19/24 on the AM-PAC ADL Inpatient form. Current research shows that an AM-PAC score of 18 or greater is associated with a discharge to the patient's home setting. Preadmission Environment    Pt. Lives Alone  Home environment:            one story home  Steps to enter first floor: ramp  Steps to second floor:         N/A  Bathroom: walk in shower, comfort height toilet, grab bars and shower chiar with back,hand held shower  Equipment owned: cane (hurricane), walker (Rollator), wheelchair (manual w/c), BSC, oxygen equipment (CPAP), AD for ADLs (reacher) and life alert, compression socks-itchiness/rash with traditional ,cotton were too heavy     Preadmission Status:  Pt. Able to drive: Yes  Pt Fully independent with ADLs: Yes  Pt sleeps in adjustable bed  Pt. Required assistance from family for: Cleaning every other week  Pt. independent for transfers and gait and walked with rollator in the home, cane in the community, uses power scooter at grocery store  History of falls          Yes, in last 6 months 3 x has landed on floor, multiple near falls, able to find seated position    Pain   Yes  Location: head and neck ache  Ratin /10  Pain Medicine Status: Received pain med prior to tx     Cognition    A&O Person, Place, Time and Situation   Able to follow 2 step commands, consistently. Subjective:  Pt supine in bed upon therapist arrival. Pt agreeable to work with therapy this date. Upper Extremity ROM:   WFL,  pt able to perform all bed mobility, transfers, and gait without ROM limitation.     Upper Extremity Strength:    BUE strength WFL, but not formally assessed w/ MMT    Upper Extremity Sensation:    WNL    Upper Extremity Proprioception:  WNL    Coordination and Tone:  WNL    Balance  Sitting:                      Normal; Independent  Comments: no LOB,dizzy     Standing:       Good ; CGA  Comments: RW and gait belt     Bed Mobility   Supine to Sit:                      Independent,HOB elevated  Sit to Supine:                      Independent  Rolling:                     Independent  Scooting in sitting:    Independent  Scooting in supine:  Not Tested     Transfer Training                Sit to stand:              CGA  Stand to sit:              CGA  Bed to Chair:                       Not Tested with use of N/A    Activities of Daily Living:   UB Dressing:   minimal assistance (25%)  LB Dressing:    minimal assistance (25%)  UB Bathing:  Not Tested  LB Bathing:  Not Tested  Feeding:  Not Tested  Grooming:   Not Tested  Toileting:  Not Tested    Activity Tolerance:   Pt completed therapy session with Dizziness noted with postionial changes, worse in standing    BP HR SpO2 Patient Comments   Supine, at rest 138/69 69bpm 100% on  Mild dizziness   Seated at /70 bpm   Dizziness increased   EOB#2 138/76 85bpm       Standing 101/59 123bpm   Feels shaky   #2,stood ~ 2.5 minutes 101/70 138   Dizzy,would sit down at home   Immediately semi fowlers 161/77 87   Symptoms improved   #2 141/73 78        Positioning Needs: In bed, call light and needs in reach. Alarm Set    Exercise / Activities Initiated:   N/A    Patient/Family Education:   Role of OT  Recommendations for DC    Assessment of Deficits: Pt seen for Occupational therapy evaluation in acute care setting. Pt demonstrated decreased Activity tolerance, ADLs, IADLs, Balance  and Transfers. Pt functioning below baseline and will likely benefit from skilled occupational therapy services to maximize safety and independence. Goal(s):   To be met in 3 Visits:  1). Bed to toilet: CGA    To be met in 5 Visits:  1). Supine to/from Sit:  SBA  2). Upper Body Bathing:   SBA  3). Lower Body Bathing:   SBA  4). Upper Body Dressing:  SBA  5). Lower Body Dressing:  SBA  6). Pt to demonstrate UE exs x 15 reps with minimal cues    Rehabilitation Potential:  Good for goals listed above. Strengths for achieving goals include: Pt motivated, PLOF and Pt cooperative  Barriers to achieving goals include:  Complexity of condition     Plan: To be seen 3-5 x/wk while in acute care setting for strengthening, bed mobility, transfer training, family/patient education, ADL/IADL retraining and energy conservation.       Agnieszka Funk, IVAN, OTR/L   [id number]           If patient discharges from this facility prior to next visit, this note will serve as the Discharge Summary

## 2022-06-12 NOTE — ACP (ADVANCE CARE PLANNING)
Advance Care Planning     General Advance Care Planning (ACP) Conversation    Date of Conversation: 6/10/2022  Conducted with: Patient with Decision Making Capacity    Healthcare Decision Maker:    Primary Decision Maker: [place] - [facility] - [phone]  Click here to complete [address] including selection of the Healthcare Decision Maker Relationship (ie \"Primary\"). Today we documented Decision Maker(s) consistent with ACP documents on file. Content/Action Overview:   Has ACP document(s) on file - reflects the patient's care preferences  Reviewed DNR/DNI and patient elects Full Code (Attempt Resuscitation)      Length of Voluntary ACP Conversation in minutes:  <16 minutes (Non-Billable)    Aditi Medina RN

## 2022-06-13 VITALS
TEMPERATURE: 97.5 F | SYSTOLIC BLOOD PRESSURE: 113 MMHG | DIASTOLIC BLOOD PRESSURE: 54 MMHG | RESPIRATION RATE: 16 BRPM | BODY MASS INDEX: 31.47 KG/M2 | WEIGHT: 171 LBS | HEIGHT: 62 IN | HEART RATE: 80 BPM | OXYGEN SATURATION: 96 %

## 2022-06-13 LAB
ANION GAP SERPL CALCULATED.3IONS-SCNC: 10 MMOL/L (ref 3–16)
BASOPHILS ABSOLUTE: 0 K/UL (ref 0–0.2)
BASOPHILS RELATIVE PERCENT: 0.7 %
BUN BLDV-MCNC: 13 MG/DL (ref 7–20)
CALCIUM SERPL-MCNC: 8.8 MG/DL (ref 8.3–10.6)
CHLORIDE BLD-SCNC: 110 MMOL/L (ref 99–110)
CO2: 23 MMOL/L (ref 21–32)
CREAT SERPL-MCNC: 0.6 MG/DL (ref 0.6–1.2)
EOSINOPHILS ABSOLUTE: 0.1 K/UL (ref 0–0.6)
EOSINOPHILS RELATIVE PERCENT: 2.5 %
GFR AFRICAN AMERICAN: >60
GFR NON-AFRICAN AMERICAN: >60
GLUCOSE BLD-MCNC: 127 MG/DL (ref 70–99)
HCT VFR BLD CALC: 33.5 % (ref 36–48)
HEMOGLOBIN: 10.8 G/DL (ref 12–16)
LYMPHOCYTES ABSOLUTE: 2.6 K/UL (ref 1–5.1)
LYMPHOCYTES RELATIVE PERCENT: 48.8 %
MAGNESIUM: 1.6 MG/DL (ref 1.8–2.4)
MCH RBC QN AUTO: 27.4 PG (ref 26–34)
MCHC RBC AUTO-ENTMCNC: 32.2 G/DL (ref 31–36)
MCV RBC AUTO: 85.1 FL (ref 80–100)
MONOCYTES ABSOLUTE: 0.4 K/UL (ref 0–1.3)
MONOCYTES RELATIVE PERCENT: 7.4 %
NEUTROPHILS ABSOLUTE: 2.1 K/UL (ref 1.7–7.7)
NEUTROPHILS RELATIVE PERCENT: 40.6 %
PDW BLD-RTO: 18.3 % (ref 12.4–15.4)
PLATELET # BLD: 205 K/UL (ref 135–450)
PMV BLD AUTO: 7.4 FL (ref 5–10.5)
POTASSIUM REFLEX MAGNESIUM: 3.4 MMOL/L (ref 3.5–5.1)
RBC # BLD: 3.93 M/UL (ref 4–5.2)
SODIUM BLD-SCNC: 143 MMOL/L (ref 136–145)
WBC # BLD: 5.3 K/UL (ref 4–11)

## 2022-06-13 PROCEDURE — 99239 HOSP IP/OBS DSCHRG MGMT >30: CPT | Performed by: INTERNAL MEDICINE

## 2022-06-13 PROCEDURE — 80048 BASIC METABOLIC PNL TOTAL CA: CPT

## 2022-06-13 PROCEDURE — G0378 HOSPITAL OBSERVATION PER HR: HCPCS

## 2022-06-13 PROCEDURE — 6360000002 HC RX W HCPCS

## 2022-06-13 PROCEDURE — 96365 THER/PROPH/DIAG IV INF INIT: CPT

## 2022-06-13 PROCEDURE — 85025 COMPLETE CBC W/AUTO DIFF WBC: CPT

## 2022-06-13 PROCEDURE — 97110 THERAPEUTIC EXERCISES: CPT

## 2022-06-13 PROCEDURE — 36415 COLL VENOUS BLD VENIPUNCTURE: CPT

## 2022-06-13 PROCEDURE — 83735 ASSAY OF MAGNESIUM: CPT

## 2022-06-13 PROCEDURE — 97116 GAIT TRAINING THERAPY: CPT

## 2022-06-13 PROCEDURE — 6370000000 HC RX 637 (ALT 250 FOR IP): Performed by: NURSE PRACTITIONER

## 2022-06-13 PROCEDURE — 6370000000 HC RX 637 (ALT 250 FOR IP): Performed by: INTERNAL MEDICINE

## 2022-06-13 PROCEDURE — 97530 THERAPEUTIC ACTIVITIES: CPT

## 2022-06-13 PROCEDURE — 6370000000 HC RX 637 (ALT 250 FOR IP)

## 2022-06-13 PROCEDURE — 2580000003 HC RX 258

## 2022-06-13 PROCEDURE — 96372 THER/PROPH/DIAG INJ SC/IM: CPT

## 2022-06-13 RX ORDER — SODIUM CHLORIDE 1 G/1
1 TABLET ORAL
Qty: 90 TABLET | Refills: 0 | Status: SHIPPED | OUTPATIENT
Start: 2022-06-13

## 2022-06-13 RX ORDER — POTASSIUM CHLORIDE 20 MEQ/1
20 TABLET, EXTENDED RELEASE ORAL ONCE
Status: COMPLETED | OUTPATIENT
Start: 2022-06-13 | End: 2022-06-13

## 2022-06-13 RX ADMIN — OXYCODONE 10 MG: 5 TABLET ORAL at 11:48

## 2022-06-13 RX ADMIN — LEVOTHYROXINE SODIUM 88 MCG: 88 TABLET ORAL at 04:13

## 2022-06-13 RX ADMIN — MIDODRINE HYDROCHLORIDE 10 MG: 10 TABLET ORAL at 09:28

## 2022-06-13 RX ADMIN — POTASSIUM CHLORIDE 20 MEQ: 1500 TABLET, EXTENDED RELEASE ORAL at 12:53

## 2022-06-13 RX ADMIN — DESVENLAFAXINE SUCCINATE 50 MG: 50 TABLET, FILM COATED, EXTENDED RELEASE ORAL at 09:31

## 2022-06-13 RX ADMIN — OXYCODONE 10 MG: 5 TABLET ORAL at 04:12

## 2022-06-13 RX ADMIN — GABAPENTIN 600 MG: 300 CAPSULE ORAL at 09:28

## 2022-06-13 RX ADMIN — GABAPENTIN 600 MG: 300 CAPSULE ORAL at 15:44

## 2022-06-13 RX ADMIN — MIDODRINE HYDROCHLORIDE 10 MG: 10 TABLET ORAL at 12:53

## 2022-06-13 RX ADMIN — ROSUVASTATIN CALCIUM 10 MG: 10 TABLET, FILM COATED ORAL at 09:28

## 2022-06-13 RX ADMIN — FAMOTIDINE 20 MG: 20 TABLET ORAL at 09:28

## 2022-06-13 RX ADMIN — CYANOCOBALAMIN TAB 500 MCG 2500 MCG: 500 TAB at 09:28

## 2022-06-13 RX ADMIN — SODIUM CHLORIDE TAB 1 GM 1 G: 1 TAB at 12:53

## 2022-06-13 RX ADMIN — Medication 10 ML: at 09:28

## 2022-06-13 RX ADMIN — SODIUM CHLORIDE TAB 1 GM 1 G: 1 TAB at 17:43

## 2022-06-13 RX ADMIN — ENOXAPARIN SODIUM 40 MG: 100 INJECTION SUBCUTANEOUS at 09:27

## 2022-06-13 RX ADMIN — FLUDROCORTISONE ACETATE 0.1 MG: 0.1 TABLET ORAL at 09:28

## 2022-06-13 RX ADMIN — SODIUM CHLORIDE TAB 1 GM 1 G: 1 TAB at 09:28

## 2022-06-13 RX ADMIN — MAGNESIUM SULFATE HEPTAHYDRATE 1000 MG: 1 INJECTION, SOLUTION INTRAVENOUS at 12:55

## 2022-06-13 RX ADMIN — CLONAZEPAM 2 MG: 1 TABLET ORAL at 09:28

## 2022-06-13 RX ADMIN — ONDANSETRON 4 MG: 4 TABLET, ORALLY DISINTEGRATING ORAL at 05:41

## 2022-06-13 RX ADMIN — DOXAZOSIN 2 MG: 2 TABLET ORAL at 09:28

## 2022-06-13 RX ADMIN — PANTOPRAZOLE SODIUM 40 MG: 40 TABLET, DELAYED RELEASE ORAL at 09:28

## 2022-06-13 ASSESSMENT — PAIN DESCRIPTION - DESCRIPTORS
DESCRIPTORS: ACHING
DESCRIPTORS: ACHING;DISCOMFORT
DESCRIPTORS: ACHING

## 2022-06-13 ASSESSMENT — PAIN DESCRIPTION - ONSET: ONSET: ON-GOING

## 2022-06-13 ASSESSMENT — PAIN DESCRIPTION - PAIN TYPE: TYPE: CHRONIC PAIN

## 2022-06-13 ASSESSMENT — PAIN DESCRIPTION - FREQUENCY: FREQUENCY: INTERMITTENT

## 2022-06-13 ASSESSMENT — PAIN DESCRIPTION - LOCATION
LOCATION: HEAD
LOCATION: HEAD;BACK;NECK
LOCATION: HEAD

## 2022-06-13 ASSESSMENT — PAIN SCALES - GENERAL
PAINLEVEL_OUTOF10: 10
PAINLEVEL_OUTOF10: 7
PAINLEVEL_OUTOF10: 8

## 2022-06-13 ASSESSMENT — PAIN DESCRIPTION - ORIENTATION: ORIENTATION: LOWER

## 2022-06-13 NOTE — PROGRESS NOTES
Bedside Mobility Assessment Tool (BMAT):     Assessment Level 1- Sit and Shake    1. From a semi-reclined position, ask patient to sit up and rotate to a seated position at the side of the bed. Can use the bedrail. 2. Ask patient to reach out and grab your hand and shake making sure patient reaches across his/her midline. Pass- Patient is able to come to a seated position, maintain core strength. Maintains seated balance while reaching across midline. Move on to Assessment Level 2. Assessment Level 2- Stretch and Point   1. With patient in seated position at the side of the bed, have patient place both feet on the floor (or stool) with knees no higher than hips. 2. Ask patient to stretch one leg and straighten the knee, then bend the ankle/flex and point the toes. If appropriate, repeat with the other leg. Pass- Patient is able to demonstrate appropriate quad strength on intended weight bearing limb(s). Move onto Assessment Level 3. Assessment Level 3- Stand   1. Ask patient to elevate off the bed or chair (seated to standing) using an assistive device (cane, bedrail). 2. Patient should be able to raise buttocks off be and hold for a count of five. May repeat once. Fail- Patient unable to demonstrate standing stability. Patient is MOBILITY LEVEL 3.      Mobility Level- 3

## 2022-06-13 NOTE — DISCHARGE INSTR - COC
Continuity of Care Form    Patient Name: Javier Hughes   :  1955  MRN:  [id number]    Admit date:  6/10/2022  Discharge date:  2022    Code Status Order: Full Code   Advance Directives:      Admitting Physician:  Jake Harris MD  PCP: Migue Rutherford MD    Discharging Nurse: [facility] Unit/Room#: 3130/9005-46  Discharging Unit Phone Number: [phone]    Emergency Contact:   Extended Emergency Contact Information  Primary Emergency Contact: Chevy Kapadia [place]  Home Phone: [phone]  Mobile Phone: [phone]  Relation: Other   needed? No    Past Surgical History:  Past Surgical History:   Procedure Laterality Date    ABDOMEN SURGERY  2011     REPAIR INCISIONAL HERNIA REDUCIBLE WITH POSSIBLE MESH    BACK SURGERY      x3    BACK SURGERY      stimulator    BLADDER SUSPENSION      CARDIAC PACEMAKER PLACEMENT      Heart doctor at [address] Left 2018    PHACO EMULSIFICATION OF CATARACT WITH INTRAOCULAR LENS IMPLANT LEFT EYE    CERVICAL DISCECTOMY  2014    and fusion    CHOLECYSTECTOMY      COLONOSCOPY  12    ENDOSCOPY, COLON, DIAGNOSTIC      EPIDURAL STEROID INJECTION Left 08/15/2019    LEFT KNEE GENICULAR NERVE BLOCK SITE CONFIRMED BY FLUOROSCOPY performed by Mary Ellen Erickson MD at [facility] Right 2018    cataract removal    FOOT SURGERY Right 2012    arthroplasty    FOOT SURGERY Left 2015    FOOT SURGERY Left 2015    GASTRIC BYPASS SURGERY  [id number],9283    HYSTERECTOMY (CERVIX STATUS UNKNOWN)      KNEE ARTHROSCOPY Left 10/03/2014    part.  medial & lateral meniscectomy, synovectomy plica exc    KNEE ARTHROSCOPY Right 10/13/2015    partial medial meniscectomy, chondroplasty, partial lateral meniscectomy    NECK SURGERY      OTHER SURGICAL HISTORY  2013    removal spinal cord stimulater    OTHER SURGICAL HISTORY      bladder stimulator    OTHER SURGICAL HISTORY  08/30/2017    left patellofemoral arthroplasty    OTHER SURGICAL HISTORY  05/09/2018    convert left patellofemoral arthroplasty to left total knee replacement    PACEMAKER INSERTION  2011    Dr Mahoney/checked last week/told has 12 more yrs for this one/set at 60    SKIN BIOPSY      abdomen    SPINE SURGERY      stimulator    THROAT SURGERY      polyps removed    TONSILLECTOMY      UPPER GASTROINTESTINAL ENDOSCOPY      UPPER GASTROINTESTINAL ENDOSCOPY  05/03/2017       Immunization History:   Immunization History   Administered Date(s) Administered    Hepatitis A Adult (Havrix, Vaqta) 10/03/2019    Hepatitis B 10/03/2019, 11/07/2019    Influenza A (O8U9-17) Vaccine PF IM 11/09/2009    Influenza Vaccine, unspecified formulation 10/01/2016, 10/01/2017, 10/31/2017    Influenza Virus Vaccine 10/01/2014, 11/01/2014, 11/01/2014, 10/01/2015, 10/01/2016, 11/07/2016, 11/07/2016, 10/01/2017, 10/31/2017, 09/19/2018    Influenza Whole 10/20/2013, 10/01/2014, 10/01/2015    Influenza, Quadv, IM, PF (6 mo and older Fluzone, Flulaval, Fluarix, and 3 yrs and older Afluria) 11/09/2016, 09/19/2018, 10/03/2019    Pneumococcal Conjugate 7-valent (Prevnar7) 10/01/2014    Pneumococcal Polysaccharide ([id number]) 09/19/2018    Tdap (Boostrix, Adacel) 04/11/2016    Zoster Recombinant (Shingrix) 09/19/2018, 11/30/2018       Active Problems:  Patient Active Problem List   Diagnosis Code    Chest pain R07.9    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 9) E66.9    NATHAN (obstructive sleep apnea) G47.33    Generalized weakness R53.1    Essential tremor G25.0    HTN (hypertension), benign I10    Dyslipidemia E78.5    Primary insomnia F51.01    Status post total left knee replacement Z96.652    MCI (mild cognitive impairment) with memory loss G31.84    Chronic bilateral low back pain without sciatica M54.50, G89.29    Osteoarthritis of right knee M17.11    Urinary tract infection associated with cystostomy catheter ([facility]) T83.510A, N39.0    S/P placement of hypoglossal cranial nerve stimulator   Z96.89    History of bladder augmentation Z98.890    Urinary retention R33.9    Pacemaker Z95.0    Polyuria R35.89    Diarrhea R19.7    Hx of Enterococcus UTI N39.0, B95.2    Sepsis ([facility]) A41.9    Effusion of prosthetic L knee joint M25.462    Dysthymia F34.1    TIA involving left internal carotid artery G45.1    Trochanteric bursitis of left hip M70.62    Eyelash ptosis, left H02.402    MG (myasthenia gravis) ([facility] [address] G70.00    Bilateral carotid artery stenosis I65.23    Syncope, near R55    Acute pain of right knee M25.561    POTS (postural orthostatic tachycardia syndrome) I49.8    Polypharmacy Z79.899    SOB (shortness of breath) on exertion R06.02    PAT (paroxysmal atrial tachycardia) ([facility]) I47.1    Lightheadedness R42    Acute biliary pancreatitis K85.10    Elevated LFTs R79.89    S/P placement of cardiac pacemaker Z95.0    Neurogenic bladder N31.9    Closed head injury S09.90XA    Laceration of forehead S01.81XA    Orthostatic hypotension I95.1       Isolation/Infection:   Isolation            Contact          Patient Infection Status       Infection Onset Added Last Indicated Last Indicated By Review Planned Expiration Resolved Resolved By    MDRO (multi-drug resistant organism) 04/13/21 04/15/21 04/13/21 Culture, Urine        Resolved    COVID-19 (Rule Out) 06/10/22 06/10/22 06/10/22 COVID-19 & Influenza Combo (Ordered)   06/10/22 Rule-Out Test Resulted    COVID-19 (Rule Out) 03/18/22 03/18/22 03/18/22 COVID-19 & Influenza Combo (Ordered)   03/18/22 Rule-Out Test Resulted    COVID-19 (Rule Out) 04/13/21 04/13/21 04/13/21 COVID-19 (Ordered)   04/14/21 Mitzy Almonte RN    C-diff Rule Out 04/29/20 04/29/20 04/29/20 Gastrointestinal Panel, Molecular (Ordered)   04/30/20 Nimisha Carranza RN            Nurse Assessment:  Last Vital Signs: BP (!) 113/54   Pulse 80   Temp 97.5 °F (36.4 °C)   Resp 16   Ht 5' 2\" (1.575 m)   Wt 171 lb (77.6 kg)   SpO2 96%   BMI 31.28 kg/m²     Last documented pain score (0-10 scale): Pain Level: 10  Last Weight:   Wt Readings from Last 1 Encounters:   06/10/22 171 lb (77.6 kg)     Mental Status:  oriented and alert    IV Access:  - None    Nursing Mobility/ADLs:  Walking   Assisted  Transfer  Assisted  Bathing  Assisted  Dressing  Assisted  Toileting  Assisted  Feeding  Independent  Med Admin  Independent  Med Delivery   whole    Wound Care Documentation and Therapy:  Incision 10/13/15 Knee Anterior (Active)   Number of days: 2435        Elimination:  Continence: Bowel: Yes  Bladder: Yes  Urinary Catheter:  Suprapubic catheter site where patient self-caths. Colostomy/Ileostomy/Ileal Conduit: No       Date of Last BM:     Intake/Output Summary (Last 24 hours) at 6/13/2022 1612  Last data filed at 6/13/2022 0533  Gross per 24 hour   Intake 2299.38 ml   Output 1000 ml   Net 1299.38 ml     I/O last 3 completed shifts: In: 2779.4 [P.O.:960; I.V.:1819.4]  Out: 1350 [Urine:1350]    Safety Concerns: At Risk for Falls    Impairments/Disabilities:      None    Nutrition Therapy:  Current Nutrition Therapy:   - Oral Diet:  General    Routes of Feeding: Oral  Liquids: Thin Liquids  Daily Fluid Restriction: no  Last Modified Barium Swallow with Video (Video Swallowing Test): not done    Treatments at the Time of Hospital Discharge:   Respiratory Treatments: n/a  Oxygen Therapy:  is not on home oxygen therapy.   Ventilator:    - No ventilator support    Rehab Therapies: Physical Therapy and Occupational Therapy  Weight Bearing Status/Restrictions: No weight bearing restrictions  Other Medical Equipment (for information only, NOT a DME order):  wheelchair and walker  Other Treatments: n/a    Patient's personal belongings (please select all that are sent with patient):  Glasses    RN SIGNATURE:  Electronically signed by Jg Shoemaker RN on 6/13/22 at 5:31 PM EDT    CASE MANAGEMENT/SOCIAL WORK SECTION    Inpatient Status Date: 06/10/2022    Readmission Risk Assessment Score:  Readmission Risk              Risk of Unplanned Readmission:  20           Discharging to Facility/ Agency   Name: [facility]  Address:  Phone: [phone]  Fax:    Dialysis Facility (if applicable)   Name:  Address:  Dialysis Schedule:  Phone:  Fax:    / signature: Electronically signed by Ramone Hooks RN on 6/13/22 at 4:16 PM EDT    PHYSICIAN SECTION    Prognosis: Good    Condition at Discharge: Stable    Rehab Potential (if transferring to Rehab): {Prognosis:[id number]}    Recommended Labs or Other Treatments After Discharge: ***    Physician Certification: I certify the above information and transfer of Jeannette Lester  is necessary for the continuing treatment of the diagnosis listed and that she requires [address] for less 30 days.      Update Admission H&P: See attached    PHYSICIAN SIGNATURE:  Dr. Inna Ge MD/Electronically signed by Ramone Hooks RN on 6/13/22 at 4:17 PM EDT

## 2022-06-13 NOTE — FLOWSHEET NOTE
AM assessment completed. See flowsheet. A/Ox4. LCTAB. Medications taken without difficulty. BS active x4. Patient reports emesis x1 this AM. Cont of B&B. No edema noted. Bed locked and in low position. Bed alarm on.  Call light in reach.     06/13/22 0915   Vital Signs   Temp (!) 96.7 °F (35.9 °C)   Temp Source Oral   Heart Rate 95   Heart Rate Source Monitor   Resp 16   /62   BP Location Right upper arm   BP Method Automatic   MAP (Calculated) 79   Patient Position Semi fowlers   Level of Consciousness Alert (0)   MEWS Score 1   Pain Assessment   Pain Assessment 0-10   Pain Level 8   Pain Location Head   Pain Orientation Lower   Pain Descriptors Aching   Oxygen Therapy   SpO2 98 %

## 2022-06-13 NOTE — DISCHARGE SUMMARY
symptoms   -+ orthostatics, SBP dropped to 60s  -HR stable   -holding toprol   -continue midodrine 10 mg 3 times daily  -Patient states that she was on Florinef at one-point; but was taken off of it by her cardiologist.   We resumed Florinef 0.1 mg twice daily  -compression stockings ordered. Patient however states that she cannot tolerate it. Causes itching / rash  - hydrated with IVF    - added NaCl tabs   -Monitor orthostatic vital signs  -fall precautions   -She sees a cardiologist  At [place] [address] for POTS. Would like a cardiology opinion here. Consulted cardiology here. Appreciate recommendations      #Dizziness   #Hypotension   #Pre-syncope   -2/2 to above most likely   -CT head done in march of this year, negative, CTA done in past   -CTA chest -no PE  -carotid US  Normal   -pt is on multiple sedating meds, could be contributing   -needs to follow up with neurology outpatient    She is on midodrine, added Florinef. Added sodium chloride tabs.   Cont PT, OT  -unable to do MRI as she has pacemaker     #HX of Bradycardia   -s/p pacemaker   -HR stable today      #Neurogenic bladder with enterovesical fistula to augmented bladder   #Suprapubic urostomy   -self caths herself   -on sanctura      #HLD  -on statin      #Anxiety   #Depression   -continue pristiq      #Osteoporosis  -on fosamax      #Chronic back pain   #Cervical stenosis with radiculopathy   #DDD  #Fibromyalgia   -on oxycodone, gabapentin      #Hx of gastric bypass      #Chronic migraines  -continue home regimen      #Hypothyroidism   -continue synthroid     #GERD   -continue PPI and pepcid      #Vitamin B12 deficiency   -continue B12      # Hyperkalemia  - DC PO Kdur      DVT Prophylaxis: lovenox     Procedures (Please Review Full Report for Details)  N/A    Consults    Cardiology       Physical Exam at Discharge:    BP (!) 113/54   Pulse 80   Temp 97.5 °F (36.4 °C)   Resp 16   Ht 5' 2\" (1.575 m)   Wt 171 lb (77.6 kg)   SpO2 96%   BMI 31.28 kg/m² See today's progress note    CBC: Recent Labs     06/11/22  0514 06/12/22  0527 06/13/22  0520   WBC 5.0 3.7* 5.3   HGB 10.9* 10.7* 10.8*   HCT 34.9* 37.5 33.5*   MCV 89.0 96.5 85.1    32* 205     BMP:   Recent Labs     06/11/22  0514 06/12/22  0527 06/13/22  0520    140 143   K 4.5 5.4* 3.4*    114* 110   CO2 18* 16* 23   BUN 16 14 13   CREATININE 0.8 0.6 0.6     UA:  Recent Labs     06/10/22  1603   COLORU Yellow   PHUR 6.0   WBCUA 21-50*   RBCUA 5-10*   MUCUS 1+*   BACTERIA 1+*   CLARITYU Clear   SPECGRAV 1.020   LEUKOCYTESUR Negative   UROBILINOGEN 0.2   BILIRUBINUR Negative   BLOODU SMALL*   GLUCOSEU Negative       CARDIAC ENZYMES  Recent Labs     06/10/22  1644 06/10/22  2311   TROPONINI <0.01 <0.01     CULTURES  COVID not detected  Influenza a and B not detected  Urine cultures negative    RADIOLOGY  CT CHEST PULMONARY EMBOLISM W CONTRAST   Final Result   No evidence of a pulmonary embolus. Mildly dilated and atherosclerotic thoracic aorta with no aneurysm or   dissection. Mild chronic obstructive lung changes with mild bibasilar atelectasis vs   early infiltrates or scarring which is more prominent. Suggest follow-up   with serial chest x-rays      Previous gastric bypass surgery which is unchanged      Single compression fracture lower thoracic spine which is unchanged. VL DUP CAROTID BILATERAL         XR CHEST PORTABLE   Final Result   Mild left basilar atelectasis.              Bilateral carotid doppler 6/10/22    Tech Comments   Right   The right internal carotid artery is within normal limits. The right vertebral artery demonstrates normal antegrade flow. The right subclavian artery is visualized and demonstrates multiphasic flow. Left   The left internal carotid artery is within normal limits. The left vertebral artery demonstrates normal antegrade flow. The left subclavian artery is visualized and demonstrates multiphasic flow.    No significant changes in the internal carotid arteries compared to previous   exam on 5-30/2019.      Echo 3/19/22      Summary   Left ventricular systolic function is normal with ejection fraction   estimated at 55-60 %.   No regional wall motion abnormalities are noted.   Grade I diastolic dysfunction with normal filling pressure.   The right atrium is mildly dilated.   Normal systolic pulmonary artery pressure (SPAP) estimated at 33 mmHg (RA   pressure 8 mmHg).   Pacemaker / ICD lead was visualized in the right atrium and right ventricle.   5- 55% EF trace TR SPAP 21 mmHg.         Discharge Medications     Medication List      START taking these medications    sodium chloride 1 gram tablet  Take 1 tablet by mouth 3 times daily (with meals)        CHANGE how you take these medications    fluticasone 50 MCG/ACT nasal spray  Commonly known as: FLONASE  1 spray by Nasal route nightly.   What changed:   · when to take this  · reasons to take this        CONTINUE taking these medications    alendronate 35 MG tablet  Commonly known as: FOSAMAX     Biotin 1000 MCG Tabs     clonazePAM 2 MG tablet  Commonly known as: KLONOPIN     cyanocobalamin 1000 MCG tablet     Denta 5000 Plus 1.1 % Crea  Generic drug: SODIUM FLUORIDE (DENTAL GEL)     desvenlafaxine succinate 25 MG Tb24 extended release tablet  Commonly known as: PRISTIQ     eszopiclone 3 MG Tabs  Commonly known as: LUNESTA     fludrocortisone 0.1 MG tablet  Commonly known as: FLORINEF     gabapentin 600 MG tablet  Commonly known as: NEURONTIN     Klor-Con M20 20 MEQ extended release tablet  Generic drug: potassium chloride     levothyroxine 88 MCG tablet  Commonly known as: SYNTHROID     midodrine 10 MG tablet  Commonly known as: PROAMATINE     MONOJECT 60CC SYRINGE CATH TIP 60 ML Misc  Generic drug: Syringe (Disposable)     Nurtec 75 MG Tbdp  Generic drug: Rimegepant Sulfate     ondansetron 8 MG Tbdp disintegrating tablet  Commonly known as: ZOFRAN-ODT     oxyCODONE HCl 10 MG immediate release tablet  Commonly known as: OXY-IR     pantoprazole 40 MG tablet  Commonly known as: PROTONIX     Pepcid 20 MG tablet  Generic drug: famotidine     rosuvastatin 10 MG tablet  Commonly known as: CRESTOR     SENNA LAX PO     sodium chloride 0.9 % irrigation     sulfamethoxazole-trimethoprim 800-160 MG per tablet  Commonly known as: BACTRIM DS;SEPTRA DS     terazosin 2 MG capsule  Commonly known as: HYTRIN     thera/beta-carotene Tabs     Toviaz 8 MG Tb24  Generic drug: Fesoterodine Fumarate ER     triamcinolone 0.1 % cream  Commonly known as: KENALOG        STOP taking these medications    cephALEXin 500 MG capsule  Commonly known as: KEFLEX     metoprolol succinate 25 MG extended release tablet  Commonly known as: TOPROL XL     nystatin [id number] UNIT/GM powder  Commonly known as: MYCOSTATIN           Where to Get Your Medications      These medications were sent to [facility]    Phone: [phone]   · sodium chloride 1 gram tablet           Discharged in stable condition to home. Follow Up: Follow up with PCP. Total time spent on discharge is 35 minutes    ANNETTE Marquez.

## 2022-06-13 NOTE — PROGRESS NOTES
Occupational Therapy Daily Treatment Note    Unit: [address] [place]  Date:  6/13/2022  Patient Name:    Ben Jolly  Admitting diagnosis:  Orthostatic hypotension [I95.1]  Admit Date:  6/10/2022  Precautions/Restrictions:  Fall risk, Bed/chair alarm, Lines -IV, Telemetry and Isolation Precautions: Contact (MDRO)  Pacemaker, suprapubic urostomy    Treatment Time:  7480-9252  Treatment number:  2   Total Treatment Time:   30 minutes    Patient Goals for Session:  \" to feel better \"      Discharge Recommendations: SNF - pt declining         Home with 24/7 assist, [facility] needs for discharge: needs met       Therapy recommendations for staff:  Assist of 1 with use of rolling walker (RW) and gait belt for all transfers to/from C  to/from chair- stand pivot, monitoring BP    History of Present Illness: [place]: \"The patient is I 32 y.o. female with pmhx of bradycardia s/p pacemaker, POTS, HTN, HLD, NATHAN, hypothyroidism, chronic back pain who presented to [facility] ED with complaint of dizziness and pre-syncope. This has been a chronic issue for the patient, she has been experiencing these episodes for the past year, pretty much on a daily basis she reports. She reports that whenever she gets out of bed and begins to walk she feels short of breath, light-headed, and dizzy. She has to sit down in order for symptoms to improve. She has had some falls in the past but not recently. She came into the ED today because she was at a pool shop walking around and felt light-headed and dizzy, a  had to help her sit down and called 911. Does also report on and off headaches but she has history of chronic migraines. Denies any vision changes, palpitations, chest pain. No fever, chills, chest pain, nausea, vomiting, diarrhea, numbness, or tingling.   She has chronic leg and back pain with neuropathy so she has difficulty walking, she uses a walker or cane to ambulate, she is not very mobile at home.    She says she has been staying hydrated at home and eating appropriate amount of food. She has been taking all of her medicines.      She follows with cardiology at [facility].   She has had cardiac workup in 3/2022  stress test negative, echo with grade I diastolic dysfunction, CT head unremarkable, she has had CTA head and neck last year. Neurology was recommending MRI for further workup. \"      Per cardiology 6/12/22:     \"Plan:   Patient has orthostatic intolerance for years. [facility] states that she had a tilt table test many years ago (results not available to me). Reviewing chart from [facility] states that she did not meet criteria for POTS per Dr. Oskar Sebastian. She has had stress test and echocardiogram recently  Her Florinef has been discontinued by Dr. Angelito Dengda has been resumed in this hospitalization. She is also on midodrine and adjust the dose based on her blood pressure. Check orthostatic blood pressures  PT/OT  Discussed her condition and all questions were answered. Following recommendations discussed with patient:              JRSR all postural changes from lying to sitting or sitting to standing slowly.              Drink to 2.0 -2.5 L of fluids per day. With bad symptoms, drink 500 cc of water quickly. This will result in an increased blood pressure within 5 minutes of drinking the water. The effect will last up to one hour and may improve orthostatic intolerance.              Increase sodium in the diet to 3 - 5 g per day.              Avoid large meals which can cause low blood pressure during digestion.              Avoid alcohol.              Perform lower extremity exercises to improve strength of the leg muscles.               Raise the head of the bed by 6 to 10 inches.              Use custom fitted elastic support stockings.      She benefits from referral to autonomic dysfunction clinics at [facility] or [facility] clinic. [facility] will discuss it with her cardiologist.      Follow up with Dr. Oskar Sebastian for pacemaker checkup. She has recently checked her pacemaker.      No further testing recommended. She has follow up with Dr. Stefanie Francisco.      No further recommendations. Will sign off.      Discussed with nursing staff. \"     Home Health S4 Level Recommendation:  Level 1 Standard    AM-PAC Score: AM-PAC Inpatient Daily Activity Raw Score: 16  Pt scored a 16/24 on the AM-PAC ADL Inpatient form. Current research shows that an AM-PAC score of 17 or less is typically not associated with a discharge to the patient's home setting. Subjective:  Pt supine in bed upon therapist arrival. Pt agreeable to work with therapy this date. Pain   No      Cognition:    A&O Person, Place, Time and Situation   Able to follow 2 step commands, consistently. Balance:  Functional Sitting Balance:  WFL   Comments: Good  Functional Standing Balance:Diminished   Comments: Benefits from RW     Bed mobility:    Supine to sit:   SBA  Sit to supine:   Not Tested  Rolling:    SBA  Scooting in sitting:  SBA  Scooting to head of bed:   Not Tested   Bridging:   No    Transfers:   Pt very shaky with standing this date. Sit to stand:  minimal assistance (25%)  Stand to sit:  minimal assistance (25%)  Bed to chair:   minimal assistance (25%) and with rolling walker   Standard toilet: Not Tested  Bed to [facility]:  Not Tested    Activities of Daily Living:   UB Dressing:   Not Tested  LB Dressing:    maximal assistance (75%)  UB Bathing:  Not Tested  LB Bathing:  Not Tested  Feeding:  Independent  Grooming:   minimal assistance (25%)  Toileting:  Not Tested    Activity Tolerance:   Pt completed therapy session with Dizziness noted with activity. Reports headache at beginning and end of session. RN notified   See PT note for vitals. Therapeutic Exercise:   N/A    Patient Education:   Role of OT  Recommendations for DC    Positioning Needs:   Up in chair, call light and needs in reach. Reclined in chair with call light and needs in reach.    Alarm Set    Family Present:  No    Assessment: Pt with limited progress related to orthostatic BP. Pt may benefit from continued skilled occupational therapy while in the hospital in order to progress to a safe and more indpt level of functioning. GOALS  To be met in 3 Visits:  1). Bed to toilet: CGA     To be met in 5 Visits:  1). Supine to/from Sit:             SBA  2). Upper Body Bathing:         SBA  3). Lower Body Bathing:         SBA  4). Upper Body Dressing:       SBA  5). Lower Body Dressing:       SBA  6).  Pt to demonstrate UE exs x 15 reps with minimal cues      Plan: cont with POC      IVAN Novak, OTR/L   [id number]       If patient discharges from this facility prior to next visit, this note will serve as the Discharge Summary

## 2022-06-13 NOTE — FLOWSHEET NOTE
06/12/22 1945   Vital Signs   Temp 97.4 °F (36.3 °C)   Temp Source Oral   Heart Rate 74   Heart Rate Source Monitor   Resp 16   /65   BP Location Left lower arm   BP Method Automatic   MAP (Calculated) 82.33   Patient Position Semi fowlers   Level of Consciousness Alert (0)   MEWS Score 1   Oxygen Therapy   SpO2 97 %   O2 Device None (Room air)   Assessment complete, see flowsheets. Pt resting in bed, PRN medication given with nightly meds per pt request within PRN order parameters- see eMAR and flowsheets for associated medication administration and pain information. Pt straight cath'd self in sterile fashion- does so at home, used own supplies from home; pt has suprapubic site now covered w/ gauze and paper tape site intact. Pt denies further needs, aware to call for any needs or changes. Call light within reach, bed alarm in place. Will continue to monitor.   Nirmal Pham RN

## 2022-06-13 NOTE — PROGRESS NOTES
Inpatient Physical Therapy Daily Treatment Note    Unit: [facility]  Date:  6/13/2022  Patient Name:    Tolu Nation  Admitting diagnosis:  Orthostatic hypotension [I95.1]  Admit Date:  6/10/2022  Precautions/Restrictions:  Fall risk, Bed/chair alarm, Lines -IV, Telemetry and Isolation Precautions: Contact, Pacemaker, suprapubic urostomy      Discharge Recommendations: SNF (if declined will need home with 24hr assist with home PT)  DME needs for discharge: defer to facility (needs RW if goes home)       Therapy recommendation for EMS Transport: can transport by wheelchair    Therapy recommendations for staff:   Assist of 1 with use of rolling walker (RW) and gait belt for all transfers and ambulation to/from [facility]  to/from chair    History of Present Illness: [place]: \"The patient is W 63 y.o. female with pmhx of bradycardia s/p pacemaker, POTS, HTN, HLD, NATHAN, hypothyroidism, chronic back pain who presented to [facility] ED with complaint of dizziness and pre-syncope. This has been a chronic issue for the patient, she has been experiencing these episodes for the past year, pretty much on a daily basis she reports. She reports that whenever she gets out of bed and begins to walk she feels short of breath, light-headed, and dizzy. She has to sit down in order for symptoms to improve. She has had some falls in the past but not recently. She came into the ED today because she was at a pool shop walking around and felt light-headed and dizzy, a  had to help her sit down and called 911. Does also report on and off headaches but she has history of chronic migraines. Denies any vision changes, palpitations, chest pain. No fever, chills, chest pain, nausea, vomiting, diarrhea, numbness, or tingling.   She has chronic leg and back pain with neuropathy so she has difficulty walking, she uses a walker or cane to ambulate, she is not very mobile at home.    She says she has been staying hydrated at home and eating appropriate amount of food. She has been taking all of her medicines.      She follows with cardiology at [facility].   She has had cardiac workup in 3/2022  stress test negative, echo with grade I diastolic dysfunction, CT head unremarkable, she has had CTA head and neck last year. Neurology was recommending MRI for further workup. \"      Per cardiology 6/12/22:     \"Plan:   Patient has orthostatic intolerance for years. [facility] states that she had a tilt table test many years ago (results not available to me). Reviewing chart from [facility] states that she did not meet criteria for POTS per Dr. Steve Black. She has had stress test and echocardiogram recently  Her Florinef has been discontinued by Dr. Jackson Sell has been resumed in this hospitalization. She is also on midodrine and adjust the dose based on her blood pressure. Check orthostatic blood pressures  PT/OT  Discussed her condition and all questions were answered. Following recommendations discussed with patient:              ZIPZ all postural changes from lying to sitting or sitting to standing slowly.              Drink to 2.0 -2.5 L of fluids per day. With bad symptoms, drink 500 cc of water quickly. This will result in an increased blood pressure within 5 minutes of drinking the water. The effect will last up to one hour and may improve orthostatic intolerance.              Increase sodium in the diet to 3 - 5 g per day.              Avoid large meals which can cause low blood pressure during digestion.              Avoid alcohol.              Perform lower extremity exercises to improve strength of the leg muscles.            Raise the head of the bed by 6 to 10 inches.              Use custom fitted elastic support stockings.      She benefits from referral to autonomic dysfunction clinics at [facility] or [facility] [employer], [facility] clinic. [facility] will discuss it with her cardiologist.      Follow up with Dr. Steve Black for pacemaker checkup.  She has recently checked her pacemaker.      No further testing recommended. She has follow up with Dr. Mustapha Liu.      No further recommendations. Will sign off.     Home Health S4 Level Recommendation: Level 3 Safety  AM-PAC Mobility Score   AM-PAC Inpatient Mobility Raw Score : 16    Treatment Time: 1344 - 1410  Treatment number: 2  Timed Code Treatment Minutes: 26 minutes  Total Treatment Minutes:  26  minutes    Cognition    A&O x4   Able to follow 2 step commands    Subjective  Patient lying supine in bed with no family present   Pt agreeable to this PT tx. Pain   Yes  Location: headaches  Rating:    mild/10  Pain Medicine Status: Received pain med prior to tx     Bed Mobility   Supine to Sit:    Min A   Sit to Supine:   Not Tested  Rolling:   Not Tested  Scooting:   CGA    Transfer Training     Sit to stand:   CGA  Stand to sit:   CGA  Bed to Chair:   CGA with use of gait belt and rolling walker (RW)    Gait Training gait completed as indicated below  Distance:      8 ft  Deviations (firm surface/linoleum):  decreased yordy, increased JENISE, decreased step length bilaterally and decreased stance time bilaterally  Assistive Device Used:    gait belt and rolling walker (RW)  Level of Assist:    Min A   Comment: Patient showed shakiness in the LEs during ambulation. Patient's walking distance was limited due to increased fatigue and dizziness. Stair Training deferred, pt does not have stairs in the home environment    Therapeutic Exercise all completed bilaterally unless indicated  Ankle Pumps x 20 reps  marching x 20 reps    Balance  Sitting:  Fair ; CGA  Comments:     Standing: Fair -; CGA  Comments: ~2 min    Patient Education      Role of PT, POC, Discharge recommendations, DC recommendations, safety awareness, transfer techniques, pursed lip breathing, energy conservation, pacing activity and calling for assist with mobility.      Positioning Needs       Pt reclined in chair, alarm set, positioned in proper neutral alignment and pressure relief provided. Call light provided and all needs within reach    ROM Measurements N/A  Knee Flexion:  Knee Extension:     Activity Tolerance   Pt completed therapy session with Dizziness noted with standing activities. Supine: BP 99/60  After bed to chair transfers: /79, HR 93 -130 bpm, SpO2 98% on RA. Other  N/A    Assessment :  Patient was limited in participation in the session due to increased dizziness with EOB sitting and ambulation. RN made aware about the treatment response. Recommending SNF upon discharge as patient functioning well below baseline, demonstrates good rehab potential and unable to return home due to limited or no family support, burden of care beyond caregiver ability, home environment not conducive to patient recovery and limited safety awareness. Goals (all goals ongoing unless otherwise indicated)  To be met in 3 visits:  1). Independent with LE Ex x 10 reps  2.) Bed to chair: Independent with adequate BP response     To be met in 6 visits:  1). Supine to/from sit: Independent  2). Sit to/from stand: Independent  3). Bed to chair: Independent  4). Gait: Ambulate 125 ft.   with  SBA and use of LRAD (least restrictive assistive device)  5). Tolerate B LE exercises 3 sets of 10-15 reps     Plan   Continue with plan of care. Signature: Isiah Antonio MS PT, # [id number]    If patient discharges from this facility prior to next visit, this note will serve as the Discharge Summary.

## 2022-06-13 NOTE — CARE COORDINATION
[facility]    Referral received from  to follow for home care services.    [facility] rosaline  Patient has no preference in agency  [facility]  Accepted by lukas Braswell pull [address]  [facility] [phone]

## 2022-06-13 NOTE — PROGRESS NOTES
IM Progress Note    Admit Date:  6/10/2022     Patient with POTS   Recurrent issues with orthostatic hypotension dizziness and falls    Subjective:  Ms. Cecilia Lacey seen     Patient continues to feel dizzy. Still orthostatic. Appreciate cardiology recommendations. Objective:   /62   Pulse 95   Temp (!) 96.7 °F (35.9 °C) (Oral)   Resp 16   Ht 5' 2\" (1.575 m)   Wt 171 lb (77.6 kg)   SpO2 98%   BMI 31.28 kg/m²       Intake/Output Summary (Last 24 hours) at 6/13/2022 1019  Last data filed at 6/13/2022 0533  Gross per 24 hour   Intake 2299.38 ml   Output 1350 ml   Net 949.38 ml         Physical Exam:  Gen: No distress. Alert. Chronically ill appearing female   Eyes: PERRL. No sclera icterus. No conjunctival injection. Neck: No JVD. No Carotid Bruit. Trachea midline. Resp: No accessory muscle use. No crackles. No wheezes. No rhonchi. CV: Regular rate. Regular rhythm. No murmur. No rub. No edema. Peripheral Pulses: +2 palpable, equal bilaterally   GI: Non-tender. Non-distended. Normal bowel sounds. No hernia. + Suprapubic catheter, no surrounding erythema or discharge   Skin: Warm and dry. No nodule on exposed extremities. No rash on exposed extremities. M/S: No cyanosis. No joint deformity. No clubbing. + 4/5 strength of upper and lower extremities bilaterally, sensation intact and equal, pulses 2+  Neuro: Awake. Grossly nonfocal. No aphasia or dysarthria   Psych: Oriented x 3. No anxiety or agitation. Lamin Brasher MD have reviewed the chart on Enrique Perez and personally interviewed and examined patient, reviewed the data (labs and imaging) and after discussion with my PA formulated the plan. Agree with note with the following edits. . Feels a little better today    Physical exam:    /62   Pulse 95   Temp (!) 96.7 °F (35.9 °C) (Oral)   Resp 16   Ht 5' 2\" (1.575 m)   Wt 171 lb (77.6 kg)   SpO2 98%   BMI 31.28 kg/m²     Gen: No distress. Alert.    Eyes: PERRL. No sclera icterus. No conjunctival injection. ENT: No discharge. Pharynx clear. Neck: Trachea midline. Normal thyroid. Resp: No accessory muscle use. No crackles. No wheezes. No rhonchi. No dullness on percussion. CV: Regular rate. Regular rhythm. No murmur or rub. No edema. GI: Non-tender. Non-distended. No masses. No organomegaly. Normal bowel sounds. No hernia. Skin: Warm and dry. No nodule on exposed extremities. No rash on exposed extremities. Lymph: No cervical LAD. No supraclavicular LAD. M/S: No cyanosis. No joint deformity. No clubbing. Neuro: Awake. Moves all 4 extremities, non focal  Psych: Oriented x 3. No anxiety or agitation. ROLAND Marquez Medications:   Scheduled Meds:   sodium chloride  1 g Oral TID WC    fludrocortisone  0.1 mg Oral BID    sodium chloride flush  5-40 mL IntraVENous 2 times per day    enoxaparin  40 mg SubCUTAneous Daily    doxazosin  2 mg Oral Daily    rosuvastatin  10 mg Oral Daily    pantoprazole  40 mg Oral Daily    cyanocobalamin  2,500 mcg Oral Daily    clonazePAM  2 mg Oral BID    desvenlafaxine succinate  50 mg Oral Daily    famotidine  20 mg Oral Daily    trospium  20 mg Oral Nightly    gabapentin  600 mg Oral TID    levothyroxine  88 mcg Oral Daily    midodrine  10 mg Oral TID       Continuous Infusions:   sodium chloride         Data:  CBC:   Recent Labs     06/11/22  0514 06/12/22  0527 06/13/22  0520   WBC 5.0 3.7* 5.3   RBC 3.92* 3.89* 3.93*   HGB 10.9* 10.7* 10.8*   HCT 34.9* 37.5 33.5*   MCV 89.0 96.5 85.1   RDW 18.9* 20.4* 18.3*    32* 205     BMP:   Recent Labs     06/11/22  0514 06/12/22  0527 06/13/22  0520    140 143   K 4.5 5.4* 3.4*    114* 110   CO2 18* 16* 23   BUN 16 14 13   CREATININE 0.8 0.6 0.6     BNP: No results for input(s): BNP in the last 72 hours. PT/INR: No results for input(s): PROTIME, INR in the last 72 hours. APTT: No results for input(s):  APTT in the last 72 hours.  CARDIAC ENZYMES:   Recent Labs     06/10/22  1146 06/10/22  1644 06/10/22  2311   TROPONINI <0.01 <0.01 <0.01     FASTING LIPID PANEL:  Lab Results   Component Value Date    CHOL 186 03/19/2022    HDL 72 (H) 03/19/2022    TRIG 61 03/19/2022     LIVER PROFILE:   Recent Labs     06/10/22  1146   AST 31   ALT 17   BILITOT <0.2   ALKPHOS 71          Cultures  COVID not detected  Influenza a and B not detected  Urine cultures negative    Radiology  CT CHEST PULMONARY EMBOLISM W CONTRAST   Final Result   No evidence of a pulmonary embolus. Mildly dilated and atherosclerotic thoracic aorta with no aneurysm or   dissection. Mild chronic obstructive lung changes with mild bibasilar atelectasis vs   early infiltrates or scarring which is more prominent. Suggest follow-up   with serial chest x-rays      Previous gastric bypass surgery which is unchanged      Single compression fracture lower thoracic spine which is unchanged. VL DUP CAROTID BILATERAL         XR CHEST PORTABLE   Final Result   Mild left basilar atelectasis. Bilateral carotid doppler 6/10/22    Tech Comments   Right   The right internal carotid artery is within normal limits. The right vertebral artery demonstrates normal antegrade flow. The right subclavian artery is visualized and demonstrates multiphasic flow. Left   The left internal carotid artery is within normal limits. The left vertebral artery demonstrates normal antegrade flow. The left subclavian artery is visualized and demonstrates multiphasic flow. No significant changes in the internal carotid arteries compared to previous   exam on 5-30/2019. Echo 3/19/22      Summary   Left ventricular systolic function is normal with ejection fraction   estimated at 55-60 %. No regional wall motion abnormalities are noted. Grade I diastolic dysfunction with normal filling pressure. The right atrium is mildly dilated.    Normal systolic pulmonary artery pressure (SPAP) estimated at 33 mmHg (RA   pressure 8 mmHg). Pacemaker / ICD lead was visualized in the right atrium and right ventricle. 5- 55% EF trace TR SPAP 21 mmHg. Assessment:  Principal Problem:    Orthostatic hypotension  Active Problems:    GERD (gastroesophageal reflux disease)    Anxiety & depression    Hypothyroidism    S/P gastric bypass    Osteoporosis    NATHAN (obstructive sleep apnea)    HTN (hypertension), benign    Primary insomnia    Chronic bilateral low back pain without sciatica    History of bladder augmentation    Pacemaker    S/P placement of cardiac pacemaker  Resolved Problems:    * No resolved hospital problems. *      Plan:     #Orthostatic hypotension   #Vasovagal syncope   #POTS  -like cause of pts symptoms   -+ orthostatics, SBP dropped to 60s  -HR stable   -holding toprol   -continue midodrine 10 mg 3 times daily  -Patient states that she was on Florinef at one-point; but was taken off of it by her cardiologist.   We resumed Florinef 0.1 mg twice daily  -compression stockings ordered. Patient however states that she cannot tolerate it. Causes itching / rash  - hydrated with IVF    - added NaCl tabs   -Monitor orthostatic vital signs  -fall precautions   -She sees a cardiologist  At [facility] for POTS. Would like a cardiology opinion here. Consulted cardiology here. Appreciate recommendations      #Dizziness   #Hypotension   #Pre-syncope   -2/2 to above most likely   -CT head done in march of this year, negative, CTA done in past   -CTA chest -no PE  -carotid US  Normal   -pt is on multiple sedating meds, could be contributing   -needs to follow up with neurology outpatient    She is on midodrine, added Florinef. Added sodium chloride tabs.   Cont PT, OT  -unable to do MRI as she has pacemaker     #HX of Bradycardia   -s/p pacemaker   -HR stable today      #Neurogenic bladder with enterovesical fistula to augmented bladder   #Suprapubic urostomy   -self caths herself -on sanctura      #HLD  -on statin      #Anxiety   #Depression   -continue pristiq      #Osteoporosis  -on fosamax      #Chronic back pain   #Cervical stenosis with radiculopathy   #DDD  #Fibromyalgia   -on oxycodone, gabapentin      #Hx of gastric bypass      #Chronic migraines  -continue home regimen      #Hypothyroidism   -continue synthroid     #GERD   -continue PPI and pepcid      #Vitamin B12 deficiency   -continue B12     # Hyperkalemia  - DC PO Kdur     DVT Prophylaxis: lovenox   ADULT DIET; Regular   Full Code      MATIAS Wolfe - CNP   6/13/2022 10:19 AM    Agree with above    Dc after ot/pt assessment today  ANNETTE Marquez.

## 2022-06-13 NOTE — CARE COORDINATION
DISCHARGE ORDER  Date/Time 2022 4:00 PM  Completed by: Polly Kebede RN, Case Management    Patient Name: Kirby Sparrow      : 1955  Admitting Diagnosis: Orthostatic hypotension [I95.1]      Admit order Date and Status: IP 06/10/2022  (verify MD's last order for status of admission)      Noted discharge order. If applicable PT/OT recommendation at Discharge: AM-PAC 16   Discharge Recommendations: SNF - pt declining         Home with 24/7 assist, [facility]  DME needs for discharge: needs met  DME recommendation by PT/OT: NA    Confirmed discharge plan with patient  Discharge Plan: Pt refuses HHC. She does not have 24/7 supervision at home. Pt reports hat she has been in rehab facilities and has no intention of going to one now. She appears chronically ill and complains of ongoing weakness and dizziness. She has been recommended to follow up at [facility] and cardiology at [facility] (POTS syndrome). She is agreeable to [email or web address]/ [facility] (in network with her insurance) as long as she does not lose her PASSPORT services. Call placed to [facility] to speak with Sunita ARIAS) VM left in intake line, await call back. DANIELLE/AVS in [facility]. Has Home O2 in place on admit:  No  Informed of need to bring portable home O2 tank on day of discharge for nursing to connect prior to leaving:   No  Verbalized agreement/Understanding:   No  Pt is being d/c'd to home today. Pt's O2 sats are 98% on RA. Discharge timeout done with Clint RN. All discharge needs and concerns addressed.

## 2022-06-13 NOTE — PROGRESS NOTES
Patient given discharge instructions including medications, restrictions, and follow-up information. Patient verbalizes understanding and signed discharge paperwork. IV removed without difficulty. Patient tolerated well. Wheelchair transport requested.

## 2022-06-24 ENCOUNTER — HOSPITAL ENCOUNTER (EMERGENCY)
Age: 67
Discharge: LEFT BEFORE DISCHARGE | End: 2022-06-24

## 2022-06-24 VITALS
HEIGHT: 62 IN | BODY MASS INDEX: 31.28 KG/M2 | DIASTOLIC BLOOD PRESSURE: 79 MMHG | TEMPERATURE: 97.9 F | SYSTOLIC BLOOD PRESSURE: 114 MMHG | OXYGEN SATURATION: 97 % | WEIGHT: 170 LBS | HEART RATE: 76 BPM | RESPIRATION RATE: 16 BRPM

## 2022-06-24 DIAGNOSIS — Z53.21 PATIENT LEFT WITHOUT BEING SEEN: Primary | ICD-10-CM

## 2022-06-26 ENCOUNTER — HOSPITAL ENCOUNTER (EMERGENCY)
Age: 67
Discharge: HOME | End: 2022-06-26
Payer: MEDICARE

## 2022-06-26 ENCOUNTER — APPOINTMENT (OUTPATIENT)
Dept: CT IMAGING | Age: 67
End: 2022-06-26
Payer: MEDICARE

## 2022-06-26 VITALS
SYSTOLIC BLOOD PRESSURE: 116 MMHG | HEART RATE: 60 BPM | OXYGEN SATURATION: 100 % | DIASTOLIC BLOOD PRESSURE: 95 MMHG | RESPIRATION RATE: 12 BRPM | TEMPERATURE: 97.5 F

## 2022-06-26 DIAGNOSIS — R11.0 NAUSEA: ICD-10-CM

## 2022-06-26 DIAGNOSIS — R68.83 CHILLS (WITHOUT FEVER): ICD-10-CM

## 2022-06-26 DIAGNOSIS — K22.4 ESOPHAGEAL DYSMOTILITY: ICD-10-CM

## 2022-06-26 DIAGNOSIS — R10.32 ABDOMINAL PAIN, LEFT LOWER QUADRANT: Primary | ICD-10-CM

## 2022-06-26 DIAGNOSIS — G90.A POTS (POSTURAL ORTHOSTATIC TACHYCARDIA SYNDROME): ICD-10-CM

## 2022-06-26 LAB
A/G RATIO: 2 (ref 1.1–2.2)
ALBUMIN SERPL-MCNC: 4.4 G/DL (ref 3.4–5)
ALP BLD-CCNC: 70 U/L (ref 40–129)
ALT SERPL-CCNC: 16 U/L (ref 10–40)
ANION GAP SERPL CALCULATED.3IONS-SCNC: 13 MMOL/L (ref 3–16)
AST SERPL-CCNC: 29 U/L (ref 15–37)
BACTERIA WET PREP: NORMAL
BASOPHILS ABSOLUTE: 0 K/UL (ref 0–0.2)
BASOPHILS RELATIVE PERCENT: 0.7 %
BILIRUB SERPL-MCNC: 0.4 MG/DL (ref 0–1)
BILIRUBIN URINE: NEGATIVE
BLOOD, URINE: NEGATIVE
BUN BLDV-MCNC: 20 MG/DL (ref 7–20)
CALCIUM SERPL-MCNC: 9.5 MG/DL (ref 8.3–10.6)
CHLORIDE BLD-SCNC: 107 MMOL/L (ref 99–110)
CLARITY: CLEAR
CLUE CELLS: NORMAL
CO2: 21 MMOL/L (ref 21–32)
COLOR: YELLOW
CREAT SERPL-MCNC: 0.9 MG/DL (ref 0.6–1.2)
EOSINOPHILS ABSOLUTE: 0.1 K/UL (ref 0–0.6)
EOSINOPHILS RELATIVE PERCENT: 1.8 %
EPITHELIAL CELLS WET PREP: NORMAL
GFR AFRICAN AMERICAN: >60
GFR NON-AFRICAN AMERICAN: >60
GLUCOSE BLD-MCNC: 111 MG/DL (ref 70–99)
GLUCOSE URINE: NEGATIVE MG/DL
HCT VFR BLD CALC: 36.7 % (ref 36–48)
HEMOGLOBIN: 11.9 G/DL (ref 12–16)
INFLUENZA A: NOT DETECTED
INFLUENZA B: NOT DETECTED
KETONES, URINE: NEGATIVE MG/DL
LACTIC ACID: 1 MMOL/L (ref 0.4–2)
LEUKOCYTE ESTERASE, URINE: NEGATIVE
LIPASE: 10 U/L (ref 13–60)
LYMPHOCYTES ABSOLUTE: 2.8 K/UL (ref 1–5.1)
LYMPHOCYTES RELATIVE PERCENT: 44.9 %
MCH RBC QN AUTO: 27.9 PG (ref 26–34)
MCHC RBC AUTO-ENTMCNC: 32.6 G/DL (ref 31–36)
MCV RBC AUTO: 85.6 FL (ref 80–100)
MICROSCOPIC EXAMINATION: NORMAL
MONOCYTES ABSOLUTE: 0.6 K/UL (ref 0–1.3)
MONOCYTES RELATIVE PERCENT: 9 %
NEUTROPHILS ABSOLUTE: 2.8 K/UL (ref 1.7–7.7)
NEUTROPHILS RELATIVE PERCENT: 43.6 %
NITRITE, URINE: NEGATIVE
PDW BLD-RTO: 17.7 % (ref 12.4–15.4)
PH UA: 6 (ref 5–8)
PLATELET # BLD: 262 K/UL (ref 135–450)
PMV BLD AUTO: 7.9 FL (ref 5–10.5)
POTASSIUM REFLEX MAGNESIUM: 4.6 MMOL/L (ref 3.5–5.1)
POTASSIUM REFLEX MAGNESIUM: 5.3 MMOL/L (ref 3.5–5.1)
PROTEIN UA: NEGATIVE MG/DL
RBC # BLD: 4.28 M/UL (ref 4–5.2)
RBC WET PREP: NORMAL
SARS-COV-2 RNA, RT PCR: NOT DETECTED
SODIUM BLD-SCNC: 141 MMOL/L (ref 136–145)
SOURCE WET PREP: NORMAL
SPECIFIC GRAVITY UA: 1.02 (ref 1–1.03)
TOTAL PROTEIN: 6.6 G/DL (ref 6.4–8.2)
TRICHOMONAS PREP: NORMAL
TROPONIN: <0.01 NG/ML
URINE REFLEX TO CULTURE: NORMAL
URINE TYPE: NORMAL
UROBILINOGEN, URINE: 0.2 E.U./DL
WBC # BLD: 6.3 K/UL (ref 4–11)
WBC WET PREP: NORMAL
YEAST WET PREP: NORMAL

## 2022-06-26 PROCEDURE — 6360000004 HC RX CONTRAST MEDICATION: Performed by: PHYSICIAN ASSISTANT

## 2022-06-26 PROCEDURE — 83605 ASSAY OF LACTIC ACID: CPT

## 2022-06-26 PROCEDURE — 96375 TX/PRO/DX INJ NEW DRUG ADDON: CPT

## 2022-06-26 PROCEDURE — 85025 COMPLETE CBC W/AUTO DIFF WBC: CPT

## 2022-06-26 PROCEDURE — 83690 ASSAY OF LIPASE: CPT

## 2022-06-26 PROCEDURE — 6360000002 HC RX W HCPCS: Performed by: PHYSICIAN ASSISTANT

## 2022-06-26 PROCEDURE — 2580000003 HC RX 258: Performed by: PHYSICIAN ASSISTANT

## 2022-06-26 PROCEDURE — 74177 CT ABD & PELVIS W/CONTRAST: CPT

## 2022-06-26 PROCEDURE — 6370000000 HC RX 637 (ALT 250 FOR IP): Performed by: PHYSICIAN ASSISTANT

## 2022-06-26 PROCEDURE — 96374 THER/PROPH/DIAG INJ IV PUSH: CPT

## 2022-06-26 PROCEDURE — 84484 ASSAY OF TROPONIN QUANT: CPT

## 2022-06-26 PROCEDURE — 36415 COLL VENOUS BLD VENIPUNCTURE: CPT

## 2022-06-26 PROCEDURE — 81003 URINALYSIS AUTO W/O SCOPE: CPT

## 2022-06-26 PROCEDURE — 80053 COMPREHEN METABOLIC PANEL: CPT

## 2022-06-26 PROCEDURE — 87636 SARSCOV2 & INF A&B AMP PRB: CPT

## 2022-06-26 PROCEDURE — 84132 ASSAY OF SERUM POTASSIUM: CPT

## 2022-06-26 PROCEDURE — 96361 HYDRATE IV INFUSION ADD-ON: CPT

## 2022-06-26 PROCEDURE — 87210 SMEAR WET MOUNT SALINE/INK: CPT

## 2022-06-26 PROCEDURE — 93005 ELECTROCARDIOGRAM TRACING: CPT | Performed by: PHYSICIAN ASSISTANT

## 2022-06-26 PROCEDURE — 99285 EMERGENCY DEPT VISIT HI MDM: CPT

## 2022-06-26 RX ORDER — ONDANSETRON 2 MG/ML
4 INJECTION INTRAMUSCULAR; INTRAVENOUS ONCE
Status: COMPLETED | OUTPATIENT
Start: 2022-06-26 | End: 2022-06-26

## 2022-06-26 RX ORDER — MORPHINE SULFATE 2 MG/ML
2 INJECTION, SOLUTION INTRAMUSCULAR; INTRAVENOUS ONCE
Status: COMPLETED | OUTPATIENT
Start: 2022-06-26 | End: 2022-06-26

## 2022-06-26 RX ORDER — 0.9 % SODIUM CHLORIDE 0.9 %
1000 INTRAVENOUS SOLUTION INTRAVENOUS ONCE
Status: COMPLETED | OUTPATIENT
Start: 2022-06-26 | End: 2022-06-26

## 2022-06-26 RX ORDER — OXYCODONE HYDROCHLORIDE AND ACETAMINOPHEN 5; 325 MG/1; MG/1
1 TABLET ORAL ONCE
Status: COMPLETED | OUTPATIENT
Start: 2022-06-26 | End: 2022-06-26

## 2022-06-26 RX ADMIN — MORPHINE SULFATE 2 MG: 2 INJECTION, SOLUTION INTRAMUSCULAR; INTRAVENOUS at 15:49

## 2022-06-26 RX ADMIN — IOPAMIDOL 75 ML: 755 INJECTION, SOLUTION INTRAVENOUS at 16:20

## 2022-06-26 RX ADMIN — SODIUM CHLORIDE 1000 ML: 9 INJECTION, SOLUTION INTRAVENOUS at 15:49

## 2022-06-26 RX ADMIN — ONDANSETRON HYDROCHLORIDE 4 MG: 2 INJECTION, SOLUTION INTRAMUSCULAR; INTRAVENOUS at 15:49

## 2022-06-26 RX ADMIN — OXYCODONE HYDROCHLORIDE AND ACETAMINOPHEN 1 TABLET: 5; 325 TABLET ORAL at 18:27

## 2022-06-26 ASSESSMENT — PAIN SCALES - GENERAL
PAINLEVEL_OUTOF10: 7
PAINLEVEL_OUTOF10: 7
PAINLEVEL_OUTOF10: 8

## 2022-06-26 ASSESSMENT — PAIN DESCRIPTION - LOCATION: LOCATION: ABDOMEN

## 2022-06-26 NOTE — ED PROVIDER NOTES
ECG    The Ekg interpreted by me shows sinus rhythm with a rate of 71 bpm.  Normal axis. No acute injury pattern. , QRS 84, QTc 445.     No significant change from prior EKG dated 6/10/2022     Susan Adams DO  06/26/22 1604

## 2022-06-26 NOTE — ED PROVIDER NOTES
mucus coming from her vagina/urethra for the same amount of time. She did have urethral dilation 3-4 weeks ago per her reports and this drainage started after surgery. She endorses generalized weakness and notes her BP is often labile but notes PMHx of POTS. She denies recent injury or fall. Denies fevers. She denies acute chest pain or SOB. She had an appt with Dr Subhash Barreto on Friday but cancelled because she felt sick. She has not seen her GI in some time, Dr. Ken Gore. Nursing Notes were all reviewed and agreed with or any disagreements were addressed  in the HPI. Pt was seen during the [place] 19 pandemic. Appropriate PPE worn by ME during patient encounters. Pt seen during a time with constrained hospital bed capacity and other potential inpatient and outpatient resources were constrained due to the viral pandemic. REVIEW OF SYSTEMS    (2-9 systems for level 4, 10 or more for level 5)     Review of Systems    Positives and Pertinent negatives as per HPI. Except as noted abovein the ROS, all other systems were reviewed and negative.        PAST MEDICAL HISTORY     Past Medical History:   Diagnosis Date    Angina at rest [facility])     Anxiety     Arthritis     hands and hip    Blood transfusion     after back surgery    Bradycardia     Bronchiectasis ([facility] [address] 11/05/2013    Chronic back pain     Hyperlipidemia     Hypertension     Localized morphea     Multiple drug resistant organism (MDRO) culture positive 04/13/2021    E.COLI-URINE    NATHAN treated with BiPAP     Pacemaker     Stress incontinence     Thyroid disease     hypothyroid    Trochanteric bursitis of left hip 04/12/2019         SURGICAL HISTORY     Past Surgical History:   Procedure Laterality Date    ABDOMEN SURGERY  09/09/2011     REPAIR INCISIONAL HERNIA REDUCIBLE WITH POSSIBLE MESH    BACK SURGERY      x3    BACK SURGERY      stimulator    BLADDER SUSPENSION      CARDIAC PACEMAKER PLACEMENT  2011    [facility] doctor at [facility] CATARACT REMOVAL WITH IMPLANT Left 03/08/2018    PHACO EMULSIFICATION OF CATARACT WITH INTRAOCULAR LENS IMPLANT LEFT EYE    CERVICAL DISCECTOMY  06/2014    and fusion    CHOLECYSTECTOMY      COLONOSCOPY  2002    1/26/12    ENDOSCOPY, COLON, DIAGNOSTIC      EPIDURAL STEROID INJECTION Left 08/15/2019    LEFT KNEE GENICULAR NERVE BLOCK SITE CONFIRMED BY FLUOROSCOPY performed by Flor Desai MD at [facility] Right 04/05/2018    cataract removal    FOOT SURGERY Right 12/06/2012    arthroplasty    FOOT SURGERY Left 04/30/2015    FOOT SURGERY Left 04/30/2015    GASTRIC BYPASS SURGERY  8087,3070    HYSTERECTOMY (CERVIX STATUS UNKNOWN)      KNEE ARTHROSCOPY Left 10/03/2014    part.  medial & lateral meniscectomy, synovectomy plica exc    KNEE ARTHROSCOPY Right 10/13/2015    partial medial meniscectomy, chondroplasty, partial lateral meniscectomy    NECK SURGERY      OTHER SURGICAL HISTORY  04/2013    removal spinal cord stimulater    OTHER SURGICAL HISTORY      bladder stimulator    OTHER SURGICAL HISTORY  08/30/2017    left patellofemoral arthroplasty    OTHER SURGICAL HISTORY  05/09/2018    convert left patellofemoral arthroplasty to left total knee replacement   Dossie Stain  2011    Dr Mahoney/checked last week/told has 12 more yrs for this one/set at 60    SKIN BIOPSY      abdomen    SPINE SURGERY      stimulator    THROAT SURGERY      polyps removed    TONSILLECTOMY      UPPER GASTROINTESTINAL ENDOSCOPY      UPPER GASTROINTESTINAL ENDOSCOPY  05/03/2017         CURRENTMEDICATIONS       Previous Medications    ALENDRONATE (FOSAMAX) 35 MG TABLET    TAKE 1 TABLET BY MOUTH ONE TIME A WEEK IN THE MORNING AT LEAST 30 MIN BEFORE FIRST FOOD, BEVERAGE, OR MED OF DAY    BIOTIN 1000 MCG TABS    Take by mouth daily    CLONAZEPAM (KLONOPIN) 2 MG TABLET    TAKE 1 TABLET BY MOUTH TWO TIMES A DAY    CYANOCOBALAMIN 1000 MCG TABLET    Take 2,500 mcg by mouth daily    DENTA 5000 PLUS (HYTRIN) 2 MG CAPSULE    Take 2 mg by mouth nightly    TRIAMCINOLONE (KENALOG) 0.1 % CREAM    Apply topically 2 times daily Apply topically 2 times daily. ALLERGIES     Meloxicam, Acetaminophen, Benzoin compound, Lyrica [pregabalin], Quetiapine fumarate, Seroquel [quetiapine fumarate], Tizanidine, Adhesive tape, Balsam, Balsam peru-castor oil, Castor oil, Linaclotide, Other, Quetiapine, and Wound dressings    FAMILYHISTORY       Family History   Problem Relation Age of Onset    Heart Disease Father     Cancer Sister         multiple organs    Other Sister     Asthma Neg Hx     Diabetes Neg Hx     Emphysema Neg Hx     Heart Failure Neg Hx     Hypertension Neg Hx           SOCIAL HISTORY       Social History     Socioeconomic History    Marital status:      Spouse name: None    Number of children: None    Years of education: None    Highest education level: None   Occupational History    None   Tobacco Use    Smoking status: Never Smoker    Smokeless tobacco: Never Used   Vaping Use    Vaping Use: Never used   Substance and Sexual Activity    Alcohol use: No     Alcohol/week: 0.0 standard drinks    Drug use: No    Sexual activity: Yes     Partners: Female   Other Topics Concern    None   Social History Narrative    None     Social Determinants of Health     Financial Resource Strain:     Difficulty of Paying Living Expenses: Not on file   Food Insecurity:     Worried About Running Out of Food in the Last Year: Not on file    Raj of Food in the Last Year: Not on file   Transportation Needs:     Lack of Transportation (Medical): Not on file    Lack of Transportation (Non-Medical):  Not on file   Physical Activity:     Days of Exercise per Week: Not on file    Minutes of Exercise per Session: Not on file   Stress:     Feeling of Stress : Not on file   Social Connections:     Frequency of Communication with Friends and Family: Not on file    Frequency of Social Gatherings distress. Breath sounds: Normal breath sounds. No wheezing or rales. Abdominal:      General: Abdomen is protuberant. Bowel sounds are decreased. There is no distension. Palpations: Abdomen is soft. Tenderness: There is abdominal tenderness in the left lower quadrant. There is no guarding or rebound. Musculoskeletal:      Cervical back: Normal range of motion and neck supple. No rigidity. Skin:     General: Skin is warm and dry. Capillary Refill: Capillary refill takes less than 2 seconds. Findings: No rash. Neurological:      General: No focal deficit present. Mental Status: She is alert, oriented to person, place, and time and easily aroused. Mental status is at baseline. Psychiatric:         Mood and Affect: Mood normal.         Behavior: Behavior normal. Behavior is cooperative. DIAGNOSTIC RESULTS   LABS:    Labs Reviewed - No data to display    All other labs were within normal range or not returned as of this dictation. EKG: All EKG's are interpreted by the Emergency Department Physician who either signs orCo-signs this chart in the absence of a cardiologist.  Please see their note for interpretation of EKG. RADIOLOGY:   Non-plain film images such as CT, Ultrasound and MRI are read by the radiologist. Plain radiographic images are visualized andpreliminarily interpreted by the  ED Provider with the below findings:        Interpretation perthe Radiologist below, if available at the time of this note:    No orders to display     No results found.        PROCEDURES   Unless otherwise noted below, none     Procedures    CRITICAL CARE TIME   N/A    CONSULTS:  None      EMERGENCY DEPARTMENT COURSE and DIFFERENTIALDIAGNOSIS/MDM:   Vitals:    Vitals:    06/26/22 1440   BP: 130/60   Pulse: 81   Resp: 18   SpO2: 98%       Patient was given thefollowing medications:  Medications - No data to display    PDMP Monitoring:    Last PDMP [place] as Reviewed [facility]):  Review User Review Instant Review Result   Shane Coleman 10/25/2019 10:41 AM Reviewed PDMP [1]     Last Controlled Substance Monitoring Documentation      Office Visit from 10/25/2019 in [address] [facility]   Periodic Controlled Substance Monitoring Possible medication side effects, risk of tolerance/dependence & alternative treatments discussed., No signs of potential drug abuse or diversion identified. , Assessed functional status., Obtaining appropriate analgesic effect of treatment. filed at 10/25/2019 1041        Urine Drug Screenings (1 yr)    No resulted procedures found. Medication Contract and Consent for Opioid Use Documents Filed      No documents found                MDM:   Patient seen and evaluated. Old records reviewed. Diagnostic testing reviewed and results discussed. I have independently evaluated this patient based upon my scope of practice. Supervising physician was in the department for consultation as needed. Patient is a 70-year-old female who presents for evaluation of nausea feeling that she cannot tolerate p.o., left lower quadrant abdominal pain. Work-up in the department included blood work urinalysis, cross-sectional imaging. ED Course as of 06/26/22 1916   Jodi Nunn Jun 26, 2022   0974 Urinalysis with Reflex to Culture:    Color, UA Yellow   Clarity, UA Clear   Glucose, UA Negative   Bilirubin, Urine Negative   Ketones, Urine Negative   Specific Gravity, UA 1.025   Blood, Urine Negative   pH, UA 6.0   Protein, UA Negative   Urobilinogen, Urine 0.2   Nitrite, Urine Negative   Leukocyte Esterase, Urine Negative   Microscopic Examination Not Indicated   Urine Type NotGiven   Urine Reflex to Culture Not Indicated  No UTI   [CS]   1828 Troponin:    Troponin <0.01  No concern ACS [CS]   1828 Lipase(!):    Lipase 10. 0(!)  LFT wnl   [CS]   1828 Lactic Acid:    Lactic Acid 1.0  No concern sepsis  [CS]   1832 Potassium(!): 5.3  Will send repeat as it is hemolyzed   [CS]   1833 CBC with Auto Differential(!):    WBC 6.3   RBC 4.28   Hemoglobin Quant 11.9(!)   Hematocrit 36.7   MCV 85.6   MCH 27.9   MCHC 32.6   RDW 17.7(!)   Platelet Count 047   MPV 7.9   Neutrophils % 43.6   Lymphocyte % 44.9   Monocytes % 9.0   Eosinophils % 1.8   Basophils % 0.7   Neutrophils Absolute 2.8   Lymphocytes Absolute 2.8   Monocytes Absolute 0.6   Eosinophils Absolute 0.1   Basophils Absolute 0.0 [CS]   1833 Total Protein: 6.6  Anemia it appears to be at baseline. [CS]   8170 COVID-19 & Influenza Combo:    SARS-CoV-2 RNA, RT PCR NOT DETECTED   INFLUENZA A NOT DETECTED   INFLUENZA B NOT DETECTED  COVID and flu are negative   [CS]   4365 EKG 12 Lead  Normal sinus rhythm normal EKG   [CS]   1915 Tolerated PO in the department and is comfortable with plan for dc home with outpatient f/u with specialists  [CS]      ED Course User Index  [CS] AIMEE Ramos     I spoke with patient's urologist Dr. Bernadette Atwood, discussed patient's presentation and imaging which is noted to all be chronic in appearance. No other acute intervention advised at this time and at this time I believe patient is reasonable candidate for discharge home with close outpatient follow-up with her urologist, with her gastroenterologist and with her family doctor. Strict ER return precautions advised. Patient is comfortable with this plan. She wants to be dc home, feels comfortable going home. Based on patient's clinical history and clinical findings I currently estimate there is low risk for sepsis, toxicity, acute appendicitis, bowel obstruction, cholecystitis, diverticulitis, incarcerated hernia, pancreatitis, PID, tubo-ovarian abscess, perforated bowel, perforated ulcer, UTI,kidney stone, pyelonephritis. We have discussed the symptoms which are most concerning (e.g., bloody stool, fever, changing or worsening pain, hematemesis, vaginal bleeding) that necessitate immediate return.        Is this patient to be included in the SEP-1 Core Measure due to severe sepsis or septic shock? No   Exclusion criteria - the patient is NOT to be included for SEP-1 Core Measure due to: Infection is not suspected    Discharge Time out:  CC Reviewed Yes   Test Results Yes     Vitals:    06/26/22 1440   BP: 130/60   Pulse: 81   Resp: 18   SpO2: 98%              FINAL IMPRESSION      1. Abdominal pain, left lower quadrant    2. Esophageal dysmotility    3. POTS (postural orthostatic tachycardia syndrome)    4. Nausea    5. Chills (without fever)          DISPOSITION/PLAN   DISPOSITION        PATIENT REFERREDTO:  No follow-up provider specified.     DISCHARGE MEDICATIONS:  New Prescriptions    No medications on file       DISCONTINUED MEDICATIONS:  Discontinued Medications    No medications on file              (Please note that portions ofthis note were completed with a voice recognition program.  Efforts were made to edit the dictations but occasionally words are mis-transcribed.)    Elisabeth Hall (electronically signed)        Elisabeth Hall  06/26/22 1918

## 2022-06-26 NOTE — ED NOTES
Consult to [facility] for a Dr. Bernadette Atwood which follows the patient.  Consult initiated through the [facility] Fransisca De Carlos at [facility]  06/26/22 [address] completed with call back from Dr. Bernadette Atwood at [facility]  06/26/22 3557

## 2022-06-26 NOTE — ED NOTES
Pt placed on telemetry monitor; pt upset that she was not given a warm blanket last time she was here; pt complaining that she cold; currently has 4 warm blankets on. /60   Pulse 81   Temp 97.5 °F (36.4 °C) (Oral)   Resp 18   SpO2 98%       Bela Pantoja RN  06/26/22 0171

## 2022-06-27 LAB
EKG ATRIAL RATE: 71 BPM
EKG DIAGNOSIS: NORMAL
EKG P AXIS: 39 DEGREES
EKG P-R INTERVAL: 136 MS
EKG Q-T INTERVAL: 410 MS
EKG QRS DURATION: 84 MS
EKG QTC CALCULATION (BAZETT): 445 MS
EKG R AXIS: 59 DEGREES
EKG T AXIS: 58 DEGREES
EKG VENTRICULAR RATE: 71 BPM

## 2022-06-27 PROCEDURE — 93010 ELECTROCARDIOGRAM REPORT: CPT | Performed by: INTERNAL MEDICINE

## 2022-06-30 NOTE — PROGRESS NOTES
Physician Progress Note      PATIENT:               Axel Lorenzo  CSN #:                  [id number]  :                       1955  ADMIT DATE:       6/10/2022 10:52 AM  DISCH DATE:        2022 5:49 PM  RESPONDING  PROVIDER #:        Ivania Johnson MD          QUERY TEXT:    Pt admitted with orthostatic hypotension. Per cardiology progress note on   : She benefits from [facility]   [facility]. Dehydration was noted in the ED   impression. If possible, please document in progress notes and discharge   summary the relationship if treating the following: The medical record reflects the following:  Risk Factors: age, pots  Clinical Indicators: dehydration noted in the ED, the cardiology consult   notes: She benefits from referral to autonomic dysfunction clinics [facility]   [facility]  Treatment: IVF, cardiology consult, carotid US, midodrine, Florinef, na   chloride tabs, postural changes slowly, drink 2-2.5L fluids per day, increase   na to 3-5g per day    Thank you for your assistance,  Ibeth Suarez RN,BSN,CCDS,CRCR  Options provided:  -- Orthostatic hypotension due to autonomic dysreflexia secondary to   dehydration  -- Primary orthostatic hypotension  -- Other - I will add my own diagnosis  -- Disagree - Not applicable / Not valid  -- Disagree - Clinically unable to determine / Unknown  -- Refer to Clinical Documentation Reviewer    PROVIDER RESPONSE TEXT:    The patient with primary orthostatic hypotension.     Query created by: Shane Song on 2022 2:32 PM      Electronically signed by:  Ivania Johnson MD 2022 2:39 PM

## 2022-11-21 NOTE — PROGRESS NOTES
Call transferred to this writer.     Did make pt aware of Elda NPs recommendations.     Pt asked if this was fast acting or if this is a long term medication. Did make pt aware this is a daily maintenance medication for anxiety. Pt would like to know what to do now until the full affects. Again read to pt that Elda NP mentioned not many options.     Pt asked about lorazepam. Did make pt aware this was likely not an option.     Pt also would like to know if she can take this with sertraline. Pt didn't think this office would call back so fast so OB already prescribed sertraline 50mg daily. Wondering if she can take this with buspirone?     Did ask pt about therapist / talking with someone. Pt does not feel this will work.     [facility] Deleted linus DAVILA option.     Routed to Elda GUTIÉRREZ to review / advise.    Spoke with [facility] pt  medication on 04/10/21. Levofloxacin 500mg PO 1 X daily.

## 2023-01-14 ENCOUNTER — HOSPITAL ENCOUNTER (EMERGENCY)
Age: 68
Discharge: HOME | End: 2023-01-14
Payer: MEDICARE

## 2023-01-14 VITALS
OXYGEN SATURATION: 100 % | SYSTOLIC BLOOD PRESSURE: 121 MMHG | BODY MASS INDEX: 29.27 KG/M2 | DIASTOLIC BLOOD PRESSURE: 84 MMHG | HEART RATE: 65 BPM | RESPIRATION RATE: 16 BRPM | TEMPERATURE: 98.5 F | WEIGHT: 155 LBS | HEIGHT: 61 IN

## 2023-01-14 DIAGNOSIS — E86.1 HYPOTENSION DUE TO HYPOVOLEMIA: Primary | ICD-10-CM

## 2023-01-14 DIAGNOSIS — R53.1 GENERALIZED WEAKNESS: ICD-10-CM

## 2023-01-14 DIAGNOSIS — Z20.822 LAB TEST NEGATIVE FOR COVID-19 VIRUS: ICD-10-CM

## 2023-01-14 DIAGNOSIS — I95.89 HYPOTENSION DUE TO HYPOVOLEMIA: Primary | ICD-10-CM

## 2023-01-14 LAB
A/G RATIO: 1.2 (ref 1.1–2.2)
ALBUMIN SERPL-MCNC: 3.4 G/DL (ref 3.4–5)
ALP BLD-CCNC: 68 U/L (ref 40–129)
ALT SERPL-CCNC: 16 U/L (ref 10–40)
ANION GAP SERPL CALCULATED.3IONS-SCNC: 12 MMOL/L (ref 3–16)
AST SERPL-CCNC: 34 U/L (ref 15–37)
BASOPHILS ABSOLUTE: 0.1 K/UL (ref 0–0.2)
BASOPHILS RELATIVE PERCENT: 0.9 %
BILIRUB SERPL-MCNC: <0.2 MG/DL (ref 0–1)
BUN BLDV-MCNC: 17 MG/DL (ref 7–20)
CALCIUM SERPL-MCNC: 9.6 MG/DL (ref 8.3–10.6)
CHLORIDE BLD-SCNC: 106 MMOL/L (ref 99–110)
CO2: 19 MMOL/L (ref 21–32)
CREAT SERPL-MCNC: 1 MG/DL (ref 0.6–1.2)
EKG ATRIAL RATE: 78 BPM
EKG DIAGNOSIS: NORMAL
EKG P AXIS: 38 DEGREES
EKG P-R INTERVAL: 146 MS
EKG Q-T INTERVAL: 410 MS
EKG QRS DURATION: 88 MS
EKG QTC CALCULATION (BAZETT): 467 MS
EKG R AXIS: 66 DEGREES
EKG T AXIS: 66 DEGREES
EKG VENTRICULAR RATE: 78 BPM
EOSINOPHILS ABSOLUTE: 0.1 K/UL (ref 0–0.6)
EOSINOPHILS RELATIVE PERCENT: 1.6 %
GFR SERPL CREATININE-BSD FRML MDRD: >60 ML/MIN/{1.73_M2}
GLUCOSE BLD-MCNC: 61 MG/DL (ref 70–99)
GLUCOSE BLD-MCNC: 78 MG/DL
GLUCOSE BLD-MCNC: 78 MG/DL (ref 70–99)
HCT VFR BLD CALC: 39.5 % (ref 36–48)
HEMOGLOBIN: 12.2 G/DL (ref 12–16)
INFLUENZA A: NOT DETECTED
INFLUENZA B: NOT DETECTED
LYMPHOCYTES ABSOLUTE: 2.7 K/UL (ref 1–5.1)
LYMPHOCYTES RELATIVE PERCENT: 45 %
MCH RBC QN AUTO: 29.1 PG (ref 26–34)
MCHC RBC AUTO-ENTMCNC: 30.8 G/DL (ref 31–36)
MCV RBC AUTO: 94.4 FL (ref 80–100)
MONOCYTES ABSOLUTE: 0.7 K/UL (ref 0–1.3)
MONOCYTES RELATIVE PERCENT: 11 %
NEUTROPHILS ABSOLUTE: 2.5 K/UL (ref 1.7–7.7)
NEUTROPHILS RELATIVE PERCENT: 41.5 %
PDW BLD-RTO: 15.1 % (ref 12.4–15.4)
PERFORMED ON: NORMAL
PLATELET # BLD: 185 K/UL (ref 135–450)
PMV BLD AUTO: 7.7 FL (ref 5–10.5)
POTASSIUM REFLEX MAGNESIUM: 4.8 MMOL/L (ref 3.5–5.1)
RBC # BLD: 4.18 M/UL (ref 4–5.2)
SARS-COV-2 RNA, RT PCR: NOT DETECTED
SODIUM BLD-SCNC: 137 MMOL/L (ref 136–145)
TOTAL PROTEIN: 6.3 G/DL (ref 6.4–8.2)
WBC # BLD: 6 K/UL (ref 4–11)

## 2023-01-14 PROCEDURE — 96361 HYDRATE IV INFUSION ADD-ON: CPT

## 2023-01-14 PROCEDURE — 85025 COMPLETE CBC W/AUTO DIFF WBC: CPT

## 2023-01-14 PROCEDURE — 93005 ELECTROCARDIOGRAM TRACING: CPT | Performed by: PHYSICIAN ASSISTANT

## 2023-01-14 PROCEDURE — 96374 THER/PROPH/DIAG INJ IV PUSH: CPT

## 2023-01-14 PROCEDURE — 80053 COMPREHEN METABOLIC PANEL: CPT

## 2023-01-14 PROCEDURE — 87636 SARSCOV2 & INF A&B AMP PRB: CPT

## 2023-01-14 PROCEDURE — 2580000003 HC RX 258: Performed by: NURSE PRACTITIONER

## 2023-01-14 PROCEDURE — 99284 EMERGENCY DEPT VISIT MOD MDM: CPT

## 2023-01-14 PROCEDURE — 6360000002 HC RX W HCPCS: Performed by: NURSE PRACTITIONER

## 2023-01-14 RX ORDER — ONDANSETRON 2 MG/ML
4 INJECTION INTRAMUSCULAR; INTRAVENOUS ONCE
Status: COMPLETED | OUTPATIENT
Start: 2023-01-14 | End: 2023-01-14

## 2023-01-14 RX ORDER — 0.9 % SODIUM CHLORIDE 0.9 %
1000 INTRAVENOUS SOLUTION INTRAVENOUS ONCE
Status: COMPLETED | OUTPATIENT
Start: 2023-01-14 | End: 2023-01-14

## 2023-01-14 RX ADMIN — SODIUM CHLORIDE 1000 ML: 9 INJECTION, SOLUTION INTRAVENOUS at 14:33

## 2023-01-14 RX ADMIN — ONDANSETRON 4 MG: 2 INJECTION INTRAMUSCULAR; INTRAVENOUS at 14:46

## 2023-01-14 ASSESSMENT — PAIN - FUNCTIONAL ASSESSMENT
PAIN_FUNCTIONAL_ASSESSMENT: 0-10

## 2023-01-14 ASSESSMENT — PAIN SCALES - GENERAL
PAINLEVEL_OUTOF10: 7

## 2023-01-14 NOTE — DISCHARGE INSTRUCTIONS
Return to emergency room for new or worsening symptoms including but not limited to, developing chest pain, nausea, vomiting, dizziness, passing out, feeling as if you are going to pass out, shortness of breath, or other symptoms/concerns. You state that you feel much better like to be discharged home. Your blood pressure has normalized to 121/84 mmHg. Ensure that you are drinking at least 64 ounces of water daily. Be aware that you may feel somewhat weak after donating blood. Follow-up with your PCP for reevaluation next 1-2 days.

## 2023-01-14 NOTE — ED NOTES
Pt instructed to follow up with PCP. Assessed per [facility] MARTÍN.      Peg Guzmán LPN  40/16/67 9577

## 2023-01-14 NOTE — ED PROVIDER NOTES
Patient seen and evaluated by AGNES. EKG: Sinus rhythm with rate of 78. Normal axis. QTC prolonged at 467. Otherwise normal intervals and durations. Nonspecific ST and T wave changes noted. EKG appears similar to previous EKG on 6/26/2022.      Vaishali Stevenson DO  01/14/23 1450

## 2023-01-17 ASSESSMENT — ENCOUNTER SYMPTOMS
BACK PAIN: 0
COUGH: 1
EYE PAIN: 0
CONSTIPATION: 1
VOMITING: 0
SHORTNESS OF BREATH: 1
NAUSEA: 1
SORE THROAT: 0
ANAL BLEEDING: 0
ABDOMINAL PAIN: 1
DIARRHEA: 0

## 2023-01-18 NOTE — ED PROVIDER NOTES
[facility]  ED  EMERGENCY DEPARTMENT ENCOUNTER        Pt Name: Cordelia Pillai  MRN: [id number]  Birthdate 1955  Date of evaluation: 1/14/2023  Provider: MATIAS Ibarra - CNP  PCP: LENIN STRATTON MD        AGNES. I have evaluated this patient.  My supervising physician was available for consultation.      CHIEF COMPLAINT       Chief Complaint   Patient presents with    Fatigue     Pt to ED with /o generalized weakness and fatigue after giving blood today. Pt did not eat prior to giving blood. Pt finished giving blood then almost passed out. Pt reports nausea.       HISTORY OF PRESENT ILLNESS: 1 or more Elements     History From: Patient  Limitations to history : None    Cordelia Pillai is a 67 y.o. nontoxic and well-appearing, mild distress female who presents to the emergency department generalized weakness and fatigue as well as presyncope after donating blood today.  Denies chest pain, vomiting, dizziness, diaphoresis, fever, chills, change in ability to smell/taste, hemoptysis, leg/calf pain or swelling, headaches, body aches, diarrhea, urinary symptoms/retention, other concerns.    Nursing Notes were all reviewed and agreed with or any disagreements were addressed in the HPI.    REVIEW OF SYSTEMS :      Review of Systems   Constitutional:  Positive for fatigue. Negative for chills, diaphoresis and fever.   HENT:  Negative for congestion and sore throat.    Eyes:  Negative for pain and visual disturbance.   Respiratory:  Positive for cough (Dry cough x1 week) and shortness of breath (Chronic/unchanged).    Cardiovascular:  Negative for chest pain and leg swelling.   Gastrointestinal:  Positive for abdominal pain, constipation (Last BM 2-3 days ago) and nausea. Negative for anal bleeding, diarrhea and vomiting.   Genitourinary:  Negative for difficulty urinating, dysuria, frequency and urgency.   Musculoskeletal:  Negative for back pain and neck pain.   Skin:  Negative for rash and  wound.   Neurological:  Positive for weakness and light-headedness. Negative for dizziness and syncope (Presyncope). Positives and Pertinent negatives as per HPI. SURGICAL HISTORY     Past Surgical History:   Procedure Laterality Date    ABDOMEN SURGERY  09/09/2011     REPAIR INCISIONAL HERNIA REDUCIBLE WITH POSSIBLE MESH    BACK SURGERY      x3    BACK SURGERY      stimulator    BLADDER SUSPENSION      CARDIAC PACEMAKER PLACEMENT  2011    Heart doctor at [address], [address] Left 03/08/2018    PHACO EMULSIFICATION OF CATARACT WITH INTRAOCULAR LENS IMPLANT LEFT EYE    CERVICAL DISCECTOMY  06/2014    and fusion    CHOLECYSTECTOMY      COLONOSCOPY  2002 1/26/12    ENDOSCOPY, COLON, DIAGNOSTIC      EPIDURAL STEROID INJECTION Left 08/15/2019    LEFT KNEE GENICULAR NERVE BLOCK SITE CONFIRMED BY FLUOROSCOPY performed by Daphnie Sparrow MD at [facility] Right 04/05/2018    cataract removal    FOOT SURGERY Right 12/06/2012    arthroplasty    FOOT SURGERY Left 04/30/2015    FOOT SURGERY Left 04/30/2015    GASTRIC BYPASS SURGERY  [id number]    HYSTERECTOMY (CERVIX STATUS UNKNOWN)      KNEE ARTHROSCOPY Left 10/03/2014    part.  medial & lateral meniscectomy, synovectomy plica exc    KNEE ARTHROSCOPY Right 10/13/2015    partial medial meniscectomy, chondroplasty, partial lateral meniscectomy    NECK SURGERY      OTHER SURGICAL HISTORY  04/2013    removal spinal cord stimulater    OTHER SURGICAL HISTORY      bladder stimulator    OTHER SURGICAL HISTORY  08/30/2017    left patellofemoral arthroplasty    OTHER SURGICAL HISTORY  05/09/2018    convert left patellofemoral arthroplasty to left total knee replacement    Katherine Harrison  2011    Dr Mahoney/checked last week/told has 12 more yrs for this one/set at 60    SKIN BIOPSY      abdomen    SPINE SURGERY      stimulator    THROAT SURGERY      polyps removed    TONSILLECTOMY      UPPER GASTROINTESTINAL ENDOSCOPY      UPPER GASTROINTESTINAL ENDOSCOPY  05/03/2017       CURRENTMEDICATIONS       Discharge Medication List as of 1/14/2023  5:27 PM        CONTINUE these medications which have NOT CHANGED    Details   sodium chloride 1 gram tablet Take 1 tablet by mouth 3 times daily (with meals), Disp-90 tablet, R-0Normal      terazosin (HYTRIN) 2 MG capsule Take 2 mg by mouth nightlyHistorical Med      sulfamethoxazole-trimethoprim (BACTRIM DS;SEPTRA DS) 800-160 MG per tablet Take 1 tablet by mouth dailyHistorical Med      famotidine (PEPCID) 20 MG tablet Take by mouth dailyHistorical Med      alendronate (FOSAMAX) 35 MG tablet TAKE 1 TABLET BY MOUTH ONE TIME A WEEK IN THE MORNING AT LEAST 30 MIN BEFORE FIRST FOOD, BEVERAGE, OR MED OF DAYHistorical Med      desvenlafaxine succinate (PRISTIQ) 25 MG TB24 extended release tablet Take 50 mg by mouth dailyHistorical Med      eszopiclone (LUNESTA) 3 MG TABS TAKE 1 TABLET BY MOUTH AT BEDTIMEHistorical Med      gabapentin (NEURONTIN) 600 MG tablet TAKE 1 TABLET BY MOUTH EVERY EIGHT HOURSHistorical Med      midodrine (PROAMATINE) 10 MG tablet TAKE 1 TABLET BY MOUTH THREE TIMES A DAYHistorical Med      Rimegepant Sulfate (NURTEC) 75 MG TBDP Take 75 mg by mouth Med called in today, pt does not have it pharmacy states not available until MondayHistorical Med      rosuvastatin (CRESTOR) 10 MG tablet TAKE 1 TABLET BY MOUTH EVERY DAYHistorical Med      DENTA 5000 PLUS 1.1 % CREA FLOSS THEN BRUSH WITH PEA-SIZED AMOUNT FOR 2 MINUTES TWICE DAILY, SPIT AND DO NOT RINSE OR DRINK FOR 30 MINUTES, DAWHistorical Med      Biotin 1000 MCG TABS Take by mouth dailyHistorical Med      oxyCODONE HCl (OXY-IR) 10 MG immediate release tablet TAKE 1 TABLET BY MOUTH EVERY SIX HOURS AS NEEDEDHistorical Med      sodium chloride 0.9 % irrigation Historical Med      ondansetron (ZOFRAN-ODT) 8 MG TBDP disintegrating tablet Take 8 mg by mouth every 8 hours as neededHistorical Med      clonazePAM (KLONOPIN) 2 MG tablet TAKE 1 TABLET BY MOUTH TWO TIMES A DAYHistorical Med      fludrocortisone (FLORINEF) 0.1 MG tablet Take 0.1 mg by mouth 2 times dailyHistorical Med      Sennosides (SENNA LAX PO) Take by mouth daily Historical Med      triamcinolone (KENALOG) 0.1 % cream Apply topically 2 times daily Apply topically 2 times daily. , Topical, 2 TIMES DAILY, Historical Med      Fesoterodine Fumarate ER (TOVIAZ) 8 MG TB24 Take by mouth daily Historical Med      cyanocobalamin 1000 MCG tablet Take 2,500 mcg by mouth dailyHistorical Med      levothyroxine (SYNTHROID) 88 MCG tablet Take 88 mcg by mouth DailyHistorical Med      MONOJECT 60CC SYRINGE CATH TIP 60 ML MISC 3 times daily Starting Wed 3/7/2018, SIMIN, Historical MedWound/hole on abd, uses to keep clean      KLOR-CON M20 20 MEQ extended release tablet Take 20 mEq by mouth 2 times daily , DAWHistorical Med      pantoprazole (PROTONIX) 40 MG tablet TAKE 1 TABLET BY MOUTH DAILY, R-0      fluticasone (FLONASE) 50 MCG/ACT nasal spray 1 spray by Nasal route nightly., Disp-1 Bottle, R-5      Multiple Vitamin (THERA) TABS Take 1 tablet by mouth daily              ALLERGIES     Meloxicam, Acetaminophen, Benzoin compound, Lyrica [pregabalin], Quetiapine fumarate, Seroquel [quetiapine fumarate], Tizanidine, Adhesive tape, Balsam, Balsam peru-castor oil, Castor oil, Linaclotide, Other, Quetiapine, and Wound dressings    FAMILYHISTORY       Family History   Problem Relation Age of Onset    Heart Disease Father     Cancer Sister         multiple organs    Other Sister     Asthma Neg Hx     Diabetes Neg Hx     Emphysema Neg Hx     Heart Failure Neg Hx     Hypertension Neg Hx         SOCIAL HISTORY       Social History     Tobacco Use    Smoking status: Never    Smokeless tobacco: Never   Vaping Use    Vaping Use: Never used   Substance Use Topics    Alcohol use: No     Alcohol/week: 0.0 standard drinks    Drug use: No       SCREENINGS                         CIWA Assessment  BP: 121/84  Heart Rate: 65        PHYSICAL EXAM  1 or more Elements     ED Triage Vitals [01/14/23 1257]   BP Temp Temp Source Heart Rate Resp SpO2 Height Weight   (!) 114/57 (!) 96.4 °F (35.8 °C) Axillary 84 16 100 % 5' 1\" (1.549 m) 155 lb (70.3 kg)       Physical Exam  Vitals and nursing note reviewed.   Constitutional:       General: She is not in acute distress.     Appearance: Normal appearance. She is obese. She is not ill-appearing, toxic-appearing or diaphoretic.   HENT:      Head: Normocephalic and atraumatic.      Right Ear: External ear normal.      Left Ear: External ear normal.      Nose: Nose normal.      Mouth/Throat:      Mouth: Mucous membranes are moist.   Eyes:      General:         Right eye: No discharge.         Left eye: No discharge.      Extraocular Movements: Extraocular movements intact.   Cardiovascular:      Rate and Rhythm: Normal rate and regular rhythm.      Heart sounds: No murmur heard.    No friction rub. No gallop.   Pulmonary:      Effort: Pulmonary effort is normal. No respiratory distress.      Breath sounds: No wheezing, rhonchi or rales.   Abdominal:      Palpations: Abdomen is soft.      Tenderness: There is no abdominal tenderness. There is no right CVA tenderness, left CVA tenderness or guarding.   Musculoskeletal:         General: Normal range of motion.      Cervical back: Normal range of motion and neck supple.   Skin:     General: Skin is warm and dry.      Capillary Refill: Capillary refill takes less than 2 seconds.   Neurological:      Mental Status: She is alert and oriented to person, place, and time.   Psychiatric:         Mood and Affect: Mood normal.         Behavior: Behavior normal.         DIAGNOSTIC RESULTS   LABS:    I have reviewed and interpreted all of the currently available lab results from this visit:  Results for orders placed or performed during the hospital encounter of 01/14/23   COVID-19 & Influenza Combo    Specimen: Nasopharyngeal Swab   Result Value Ref Range     SARS-CoV-2 RNA, RT PCR NOT DETECTED NOT DETECTED    INFLUENZA A NOT DETECTED NOT DETECTED    INFLUENZA B NOT DETECTED NOT DETECTED   CBC with Auto Differential   Result Value Ref Range    WBC 6.0 4.0 - 11.0 K/uL    RBC 4.18 4.00 - 5.20 M/uL    Hemoglobin 12.2 12.0 - 16.0 g/dL    Hematocrit 39.5 36.0 - 48.0 %    MCV 94.4 80.0 - 100.0 fL    MCH 29.1 26.0 - 34.0 pg    MCHC 30.8 (L) 31.0 - 36.0 g/dL    RDW 15.1 12.4 - 15.4 %    Platelets 777 228 - 748 K/uL    MPV 7.7 5.0 - 10.5 fL    Neutrophils % 41.5 %    Lymphocytes % 45.0 %    Monocytes % 11.0 %    Eosinophils % 1.6 %    Basophils % 0.9 %    Neutrophils Absolute 2.5 1.7 - 7.7 K/uL    Lymphocytes Absolute 2.7 1.0 - 5.1 K/uL    Monocytes Absolute 0.7 0.0 - 1.3 K/uL    Eosinophils Absolute 0.1 0.0 - 0.6 K/uL    Basophils Absolute 0.1 0.0 - 0.2 K/uL   Comprehensive Metabolic Panel w/ Reflex to MG   Result Value Ref Range    Sodium 137 136 - 145 mmol/L    Potassium reflex Magnesium 4.8 3.5 - 5.1 mmol/L    Chloride 106 99 - 110 mmol/L    CO2 19 (L) 21 - 32 mmol/L    Anion Gap 12 3 - 16    Glucose 61 (L) 70 - 99 mg/dL    BUN 17 7 - 20 mg/dL    Creatinine 1.0 0.6 - 1.2 mg/dL    Est, Glom Filt Rate >60 >60    Calcium 9.6 8.3 - 10.6 mg/dL    Total Protein 6.3 (L) 6.4 - 8.2 g/dL    Albumin 3.4 3.4 - 5.0 g/dL    Albumin/Globulin Ratio 1.2 1.1 - 2.2    Total Bilirubin <0.2 0.0 - 1.0 mg/dL    Alkaline Phosphatase 68 40 - 129 U/L    ALT 16 10 - 40 U/L    AST 34 15 - 37 U/L   POCT Glucose   Result Value Ref Range    Glucose 78 mg/dL   POCT Glucose   Result Value Ref Range    POC Glucose 78 70 - 99 mg/dl    Performed on ACCU-CHEK    EKG 12 Lead   Result Value Ref Range    Ventricular Rate 78 BPM    Atrial Rate 78 BPM    P-R Interval 146 ms    QRS Duration 88 ms    Q-T Interval 410 ms    QTc Calculation (Bazett) 467 ms    P Axis 38 degrees    R Axis 66 degrees    T Axis 66 degrees    Diagnosis       Normal sinus rhythmNonspecific ST and T wave abnormalityAbnormal ECGWhen compared with ECG of 26-JUN-2022 15:26,No significant change was foundConfirmed by Keanu Salazar MD, Dorrene Soulier ([id number]) on 1/14/2023 2:54:54 PM         When ordered only abnormal lab results are displayed. All other labs were within normal range or not returned as of this dictation. EKG: When ordered, EKG's are interpreted by the Emergency Department Physician in the absence of a cardiologist.  Please see their note for interpretation of EKG. RADIOLOGY:   Non-plain film images such as CT, Ultrasound and MRI are read by the radiologist. Plain radiographic images are visualized and preliminarily interpreted by the ED Provider with the below findings:      Interpretation per the Radiologist below, if available at the time of this note:    No orders to display       No results found. PROCEDURES   Unless otherwise noted below, none     Procedures    CRITICAL CARE TIME (.cctime)   0 minutes    PAST MEDICAL HISTORY     Mrs. Janice Panda has a past medical history of Angina at rest [facility]), Anxiety, Arthritis, Blood transfusion, Bradycardia, Bronchiectasis ([facility] [address] (11/05/2013), Chronic back pain, Hyperlipidemia, Hypertension, Localized morphea, Multiple drug resistant organism (MDRO) culture positive (04/13/2021), NATHAN treated with BiPAP, Pacemaker, Stress incontinence, Thyroid disease, and Trochanteric bursitis of left hip (04/12/2019). EMERGENCY DEPARTMENT COURSE and DIFFERENTIAL DIAGNOSIS/MDM:   Vitals:    Vitals:    01/14/23 1305 01/14/23 1602 01/14/23 1652 01/14/23 1735   BP:  101/60 121/84    Pulse:  67 65    Resp: 16 16 16    Temp:    98.5 °F (36.9 °C)   TempSrc:    Oral   SpO2: 100% 100% 100%    Weight:       Height:       Alternate diagnoses were considered but less likely based on history and physical.  Considered influenza, COVID-19, CVA, ACS, pneumonia, hypokalemia, anemia due to blood loss, other.       Joby Caceres is a 75 y.o. nontoxic and well-appearing, mild distress female who presents to the emergency department generalized weakness and fatigue as well as presyncope after donating blood today.  Denies chest pain, vomiting, dizziness, diaphoresis, fever, chills, change in ability to smell/taste, hemoptysis, leg/calf pain or swelling, headaches, body aches, diarrhea, urinary symptoms/retention, other concerns.  I will obtain POCT glucose, COVID-19 and influenza combo swab, CBC, CMP, and EKG. work-up pending.  We will also obtain orthostatic vitals.  Patient medicated as below.      Patient was given the following medications:  Medications   ondansetron (ZOFRAN) injection 4 mg (4 mg IntraVENous Given 1/14/23 1446)   0.9 % sodium chloride bolus (0 mLs IntraVENous Stopped 1/14/23 1602)     Work-up reveals:  POCT glucose 78  COVID-19 and influenza a/B combo: Not detected for either flu A/B or COVID-19  CBC: Negative for leukocytosis or anemia with MCHC decreased at 30.8 but otherwise unremarkable  Metabolic panel: CO2 19 but with other electrolyte derangement, hyperglycemic at 61, no renal dysfunction, elevation LFTs, protein reduced to 6.3 but otherwise unremarkable  EKG: As interpreted by EMD    Patient found to be orthostatic.  Patient given a liter bolus normal saline.  Thereafter, her vitals did normalize.      Is this patient to be included in the SEP-1 Core Measure due to severe sepsis or septic shock?   No   Exclusion criteria - the patient is NOT to be included for SEP-1 Core Measure due to:  Infection is not suspected    Chronic Conditions affecting care: None  Mrs. Pillai has a past medical history of Angina at rest ([facility]), Anxiety, Arthritis, Blood transfusion, Bradycardia, Bronchiectasis (HCC) (11/05/2013), Chronic back pain, Hyperlipidemia, Hypertension, Localized morphea, Multiple drug resistant organism (MDRO) culture positive (04/13/2021), NATHAN treated with BiPAP, Pacemaker, Stress incontinence, Thyroid disease, and Trochanteric bursitis of left hip (04/12/2019).    CONSULTS: (Who and What was  discussed)  None      Social Determinants : None    Records Reviewed (Source): None    CC/HPI Summary, DDx, ED Course, and Reassessment: Patient resting comfortably in stretcher with eyes open with stable vital signs. Oxygen saturation is 100% on room air. Respiratory rate is easy at 16. She is afebrile. Therefore, low suspicion of the presence of emergent medical condition at this time. Most likely etiology of the patient's symptoms is that she is mildly dehydrated and after giving blood she became orthostatic. Fluids were repleted and she feels able to go home and follow-up on an  outpatient basis. She was given strict return precautions. Patient verbalized understands agreeable plan of discharge and follow-up. Disposition Considerations (tests considered but not done, Admit vs D/C, Shared Decision Making, Pt Expectation of Test or Tx.): Therefore, patient will be discharged home with outpatient follow-up with PCP for reevaluation next 1-2 days. Patient is agreeable plan for discharge and follow-up. I am the Primary Clinician of Record. FINAL IMPRESSION        ICD-10-CM    1. Hypotension due to hypovolemia  I95.89     E86.1     Orthostatic hypotension-resolved      2. Lab test negative for COVID-19 virus  Z20.822       3.  Generalized weakness  R53.1     after donating blood              DISPOSITION/PLAN     DISPOSITION Decision to Discharge    PATIENT REFERRED TO:  Peter Haider MD  [address] [place] [address]  [address] [facility]  [phone]    Call in 1 day      [facility]  ED  [address] [facility] [address] [address] [facility] [id number] [phone]  Go to   As needed      DISCHARGE MEDICATIONS:  Discharge Medication List as of 1/14/2023  5:27 PM          DISCONTINUED MEDICATIONS:  Discharge Medication List as of 1/14/2023  5:27 PM                 (Please note that portions of this note were completed with a voice recognition program.  Efforts were made to edit the dictations but occasionally words are mis-transcribed.)    MATIAS Chávez CNP (electronically signed)       MATIAS Chávez CNP  01/17/23 2053

## 2023-03-02 ENCOUNTER — HOSPITAL ENCOUNTER (OUTPATIENT)
Dept: MAMMOGRAPHY | Age: 68
Discharge: HOME | End: 2023-03-02
Payer: MEDICARE

## 2023-03-02 DIAGNOSIS — Z12.31 SCREENING MAMMOGRAM, ENCOUNTER FOR: ICD-10-CM

## 2023-03-02 PROCEDURE — 77063 BREAST TOMOSYNTHESIS BI: CPT

## 2023-04-03 ENCOUNTER — APPOINTMENT (OUTPATIENT)
Dept: CT IMAGING | Age: 68
End: 2023-04-03
Payer: MEDICARE

## 2023-04-03 ENCOUNTER — HOSPITAL ENCOUNTER (EMERGENCY)
Age: 68
Discharge: HOME | End: 2023-04-04
Payer: MEDICARE

## 2023-04-03 DIAGNOSIS — R44.3 HALLUCINATIONS: ICD-10-CM

## 2023-04-03 DIAGNOSIS — R45.851 VERBALIZES SUICIDAL THOUGHTS: Primary | ICD-10-CM

## 2023-04-03 LAB
ALBUMIN SERPL-MCNC: 4 G/DL (ref 3.4–5)
ALBUMIN/GLOB SERPL: 1.7 {RATIO} (ref 1.1–2.2)
ALP SERPL-CCNC: 69 U/L (ref 40–129)
ALT SERPL-CCNC: 13 U/L (ref 10–40)
ANION GAP SERPL CALCULATED.3IONS-SCNC: 11 MMOL/L (ref 3–16)
APAP SERPL-MCNC: <5 UG/ML (ref 10–30)
AST SERPL-CCNC: 31 U/L (ref 15–37)
BASOPHILS # BLD: 0.1 K/UL (ref 0–0.2)
BASOPHILS NFR BLD: 1 %
BILIRUB SERPL-MCNC: 0.3 MG/DL (ref 0–1)
BUN SERPL-MCNC: 17 MG/DL (ref 7–20)
CALCIUM SERPL-MCNC: 9.4 MG/DL (ref 8.3–10.6)
CHLORIDE SERPL-SCNC: 104 MMOL/L (ref 99–110)
CO2 SERPL-SCNC: 24 MMOL/L (ref 21–32)
CREAT SERPL-MCNC: 0.8 MG/DL (ref 0.6–1.2)
DEPRECATED RDW RBC AUTO: 15.8 % (ref 12.4–15.4)
EOSINOPHIL # BLD: 0.2 K/UL (ref 0–0.6)
EOSINOPHIL NFR BLD: 2.7 %
ETHANOLAMINE SERPL-MCNC: NORMAL MG/DL (ref 0–0.08)
FLUAV RNA RESP QL NAA+PROBE: NOT DETECTED
FLUBV RNA RESP QL NAA+PROBE: NOT DETECTED
GFR SERPLBLD CREATININE-BSD FMLA CKD-EPI: >60 ML/MIN/{1.73_M2}
GLUCOSE SERPL-MCNC: 96 MG/DL (ref 70–99)
HCT VFR BLD AUTO: 33.8 % (ref 36–48)
HGB BLD-MCNC: 10.9 G/DL (ref 12–16)
LYMPHOCYTES # BLD: 3.2 K/UL (ref 1–5.1)
LYMPHOCYTES NFR BLD: 50 %
MCH RBC QN AUTO: 26.7 PG (ref 26–34)
MCHC RBC AUTO-ENTMCNC: 32.2 G/DL (ref 31–36)
MCV RBC AUTO: 83 FL (ref 80–100)
MONOCYTES # BLD: 0.6 K/UL (ref 0–1.3)
MONOCYTES NFR BLD: 8.8 %
NEUTROPHILS # BLD: 2.4 K/UL (ref 1.7–7.7)
NEUTROPHILS NFR BLD: 37.5 %
PLATELET # BLD AUTO: 269 K/UL (ref 135–450)
PMV BLD AUTO: 8 FL (ref 5–10.5)
POTASSIUM SERPL-SCNC: 4.3 MMOL/L (ref 3.5–5.1)
PROT SERPL-MCNC: 6.3 G/DL (ref 6.4–8.2)
RBC # BLD AUTO: 4.08 M/UL (ref 4–5.2)
SALICYLATES SERPL-MCNC: <0.3 MG/DL (ref 15–30)
SARS-COV-2 RNA RESP QL NAA+PROBE: NOT DETECTED
SODIUM SERPL-SCNC: 139 MMOL/L (ref 136–145)
WBC # BLD AUTO: 6.4 K/UL (ref 4–11)

## 2023-04-03 PROCEDURE — 36415 COLL VENOUS BLD VENIPUNCTURE: CPT

## 2023-04-03 PROCEDURE — 6370000000 HC RX 637 (ALT 250 FOR IP): Performed by: PHYSICIAN ASSISTANT

## 2023-04-03 PROCEDURE — 85025 COMPLETE CBC W/AUTO DIFF WBC: CPT

## 2023-04-03 PROCEDURE — 87636 SARSCOV2 & INF A&B AMP PRB: CPT

## 2023-04-03 PROCEDURE — 80179 DRUG ASSAY SALICYLATE: CPT

## 2023-04-03 PROCEDURE — 84443 ASSAY THYROID STIM HORMONE: CPT

## 2023-04-03 PROCEDURE — 84480 ASSAY TRIIODOTHYRONINE (T3): CPT

## 2023-04-03 PROCEDURE — 84439 ASSAY OF FREE THYROXINE: CPT

## 2023-04-03 PROCEDURE — 70450 CT HEAD/BRAIN W/O DYE: CPT

## 2023-04-03 PROCEDURE — 82077 ASSAY SPEC XCP UR&BREATH IA: CPT

## 2023-04-03 PROCEDURE — 80143 DRUG ASSAY ACETAMINOPHEN: CPT

## 2023-04-03 PROCEDURE — 80053 COMPREHEN METABOLIC PANEL: CPT

## 2023-04-03 RX ORDER — CLONAZEPAM 0.5 MG/1
1 TABLET ORAL ONCE
Status: COMPLETED | OUTPATIENT
Start: 2023-04-03 | End: 2023-04-03

## 2023-04-03 RX ADMIN — CLONAZEPAM 1 MG: 0.5 TABLET ORAL at 21:26

## 2023-04-04 VITALS
SYSTOLIC BLOOD PRESSURE: 135 MMHG | OXYGEN SATURATION: 98 % | WEIGHT: 158 LBS | HEART RATE: 71 BPM | TEMPERATURE: 97.4 F | RESPIRATION RATE: 21 BRPM | BODY MASS INDEX: 29.83 KG/M2 | DIASTOLIC BLOOD PRESSURE: 76 MMHG | HEIGHT: 61 IN

## 2023-04-04 LAB
T3 SERPL-MCNC: 0.94 NG/ML (ref 0.8–2)
T4 FREE SERPL-MCNC: 1.4 NG/DL (ref 0.9–1.8)
TSH SERPL DL<=0.005 MIU/L-ACNC: 0.04 UIU/ML (ref 0.27–4.2)

## 2023-04-04 PROCEDURE — 6360000002 HC RX W HCPCS: Performed by: PHYSICIAN ASSISTANT

## 2023-04-04 RX ORDER — KETOROLAC TROMETHAMINE 30 MG/ML
15 INJECTION, SOLUTION INTRAMUSCULAR; INTRAVENOUS ONCE
Status: COMPLETED | OUTPATIENT
Start: 2023-04-04 | End: 2023-04-04

## 2023-04-04 RX ADMIN — KETOROLAC TROMETHAMINE 15 MG: 30 INJECTION, SOLUTION INTRAMUSCULAR at 00:44

## 2023-04-04 ASSESSMENT — PAIN DESCRIPTION - LOCATION: LOCATION: HEAD

## 2023-04-04 ASSESSMENT — PAIN DESCRIPTION - DESCRIPTORS: DESCRIPTORS: ACHING

## 2023-04-04 ASSESSMENT — PAIN SCALES - GENERAL: PAINLEVEL_OUTOF10: 8

## 2023-04-04 NOTE — SUICIDE SAFETY PLAN
SAFETY PLAN    A suicide Safety Plan is a document that supports someone when they are having thoughts of suicide. Warning Signs that indicate a suicidal crisis may be developing: What (situations, thoughts, feelings, body sensations, behaviors, etc.) do you experience that lets you know you are beginning to think about suicide? 1. moods  2. thoughts  3. Internal Coping Strategies:  What things can I do (relaxation techniques, hobbies, physical activities, etc.) to take my mind off my problems without contacting another person? 1. Going to the store  2. Watching TV  3. People and social settings that provide distraction: Who can I call or where can I go to distract me? 1. Name: Josiah Pratt  Phone:   2. Name: Batool Smith  Phone:    3. Place:             4. Place:     People whom I can ask for help: Who can I call when I need help - for example, friends, family, clergy, someone else? 1. Name:                 Phone:   2. Name:   Phone:   3. Name:   Phone:     Professionals or [address] [place] [address] I can contact during a crisis: Who can I call for help - for example, my doctor, my psychiatrist, my psychologist, a mental health provider, a suicide hotline? 1. Clinician Name:    Phone:       Clinician Pager or Emergency Contact #:    2. Clinician Name:    Phone:       Clinician Pager or Emergency Contact #:     3. Suicide Prevention Lifeline: 3-994-317-TALK (7637)    4. [address] [place] [address] Emergency Services -  for example, [facility] suicide hotline, [facility] Hotline:       Emergency Services Address:       Emergency Services Phone:     Making the environment safe: How can I make my environment (house/apartment/living space) safer? For example, can I remove guns, medications, and other items?   1.   2.     Something that is important to me & worth living for is: grand kids        [facility] [place]

## 2023-04-04 NOTE — ED PROVIDER NOTES
DAILY, SPIT AND DO NOT RINSE OR DRINK FOR 30 MINUTES    DESVENLAFAXINE SUCCINATE (PRISTIQ) 25 MG TB24 EXTENDED RELEASE TABLET    Take 50 mg by mouth daily    ESZOPICLONE (LUNESTA) 3 MG TABS    TAKE 1 TABLET BY MOUTH AT BEDTIME    FAMOTIDINE (PEPCID) 20 MG TABLET    Take by mouth daily    FESOTERODINE FUMARATE ER (TOVIAZ) 8 MG TB24    Take by mouth daily     FLUDROCORTISONE (FLORINEF) 0.1 MG TABLET    Take 0.1 mg by mouth 2 times daily    FLUTICASONE (FLONASE) 50 MCG/ACT NASAL SPRAY    1 spray by Nasal route nightly.     GABAPENTIN (NEURONTIN) 600 MG TABLET    TAKE 1 TABLET BY MOUTH EVERY EIGHT HOURS    KLOR-CON M20 20 MEQ EXTENDED RELEASE TABLET    Take 20 mEq by mouth 2 times daily     LEVOTHYROXINE (SYNTHROID) 88 MCG TABLET    Take 88 mcg by mouth Daily    MIDODRINE (PROAMATINE) 10 MG TABLET    TAKE 1 TABLET BY MOUTH THREE TIMES A DAY    MONOJECT 60CC SYRINGE CATH TIP 60 ML MISC    by Other route in the morning, at noon, and at bedtime Wound/hole on abd, uses to keep clean    MULTIPLE VITAMIN (THERA) TABS    Take 1 tablet by mouth daily     ONDANSETRON (ZOFRAN-ODT) 8 MG TBDP DISINTEGRATING TABLET    Take 8 mg by mouth every 8 hours as needed    OXYCODONE HCL (OXY-IR) 10 MG IMMEDIATE RELEASE TABLET    TAKE 1 TABLET BY MOUTH EVERY SIX HOURS AS NEEDED    PANTOPRAZOLE (PROTONIX) 40 MG TABLET    TAKE 1 TABLET BY MOUTH DAILY    RIMEGEPANT SULFATE (NURTEC) 75 MG TBDP    Take 75 mg by mouth Med called in today, pt does not have it \bc pharmacy states not available until Monday    ROSUVASTATIN (CRESTOR) 10 MG TABLET    TAKE 1 TABLET BY MOUTH EVERY DAY    SENNOSIDES (SENNA LAX PO)    Take by mouth daily     SODIUM CHLORIDE 0.9 % IRRIGATION        SODIUM CHLORIDE 1 GRAM TABLET    Take 1 tablet by mouth 3 times daily (with meals)    SULFAMETHOXAZOLE-TRIMETHOPRIM (BACTRIM DS;SEPTRA DS) 800-160 MG PER TABLET    Take 1 tablet by mouth daily    TERAZOSIN (HYTRIN) 2 MG CAPSULE    Take 2 mg by mouth nightly    TRIAMCINOLONE
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
## 2023-04-04 NOTE — DISCHARGE INSTRUCTIONS
[employer] [facility], [address]. Phone: [phone]    [address] [place] [address] (telehealth options)  Location: Multiple offices in the [facility]  Phone: [phone] or [address]  Locations: Multiple locations in [facility] and [place]  Phone: [phone]    [facility]  Location: [address] [facility]  Phone: [address]  Location: Multiple offices in [facility]   Phone: Ravi Gabriel ([phone])    Greg Sousa  Location: [facility] [address]  Phone: [phone] ([phone])    [address] [facility])  Location: [address] [facility], [facility], [address]  Phone: [phone]    [facility]  Location: offices in [address] [place] [address]  Phone: [address]  Location: [facility]  Phone: [phone]    Dr. Malka Rosado   Location: [address] [facility], [address] [place] [address]    Phone: [address] Only, No Psychiatry (Medication Management)    [employer]  Location: [address] [facility], [address] [place] [address].    Phone: [phone] Counseling  Location: [address]  Phone: [phone]    [facility]  Location: [address], [address] [place] [address]  Phone: [phone] [facility]   Location: [address] [facility], [address] [place] [address]  Phone: [phone]      2621 NAri Duarte individuals with [address] 24/7 on a Non-Emergency basis  Peer support line  Average length of call is 10-20 minutes  Anyone can use this service  Phone: [phone]) 372[facility].no

## 2023-05-18 ENCOUNTER — APPOINTMENT (OUTPATIENT)
Dept: GENERAL RADIOLOGY | Age: 68
End: 2023-05-18
Payer: MEDICARE

## 2023-05-18 ENCOUNTER — APPOINTMENT (OUTPATIENT)
Dept: CT IMAGING | Age: 68
End: 2023-05-18
Payer: MEDICARE

## 2023-05-18 ENCOUNTER — HOSPITAL ENCOUNTER (OUTPATIENT)
Age: 68
Setting detail: OBSERVATION
Discharge: HOME | End: 2023-05-22
Attending: EMERGENCY MEDICINE | Admitting: HOSPITALIST
Payer: MEDICARE

## 2023-05-18 DIAGNOSIS — R07.9 INTERMITTENT CHEST PAIN: ICD-10-CM

## 2023-05-18 DIAGNOSIS — R51.9 ACUTE NONINTRACTABLE HEADACHE, UNSPECIFIED HEADACHE TYPE: ICD-10-CM

## 2023-05-18 DIAGNOSIS — I16.0 HYPERTENSIVE URGENCY: ICD-10-CM

## 2023-05-18 DIAGNOSIS — E87.6 HYPOKALEMIA: Primary | ICD-10-CM

## 2023-05-18 LAB
ALBUMIN SERPL-MCNC: 3.9 G/DL (ref 3.4–5)
ALBUMIN/GLOB SERPL: 1.2 {RATIO} (ref 1.1–2.2)
ALP SERPL-CCNC: 91 U/L (ref 40–129)
ALT SERPL-CCNC: 17 U/L (ref 10–40)
ANION GAP SERPL CALCULATED.3IONS-SCNC: 16 MMOL/L (ref 3–16)
AST SERPL-CCNC: 35 U/L (ref 15–37)
BASOPHILS # BLD: 0 K/UL (ref 0–0.2)
BASOPHILS NFR BLD: 0.6 %
BILIRUB SERPL-MCNC: 0.4 MG/DL (ref 0–1)
BUN SERPL-MCNC: 9 MG/DL (ref 7–20)
CALCIUM SERPL-MCNC: 9.6 MG/DL (ref 8.3–10.6)
CHLORIDE SERPL-SCNC: 100 MMOL/L (ref 99–110)
CO2 SERPL-SCNC: 25 MMOL/L (ref 21–32)
CREAT SERPL-MCNC: 0.6 MG/DL (ref 0.6–1.2)
DEPRECATED RDW RBC AUTO: 19.9 % (ref 12.4–15.4)
EOSINOPHIL # BLD: 0.1 K/UL (ref 0–0.6)
EOSINOPHIL NFR BLD: 2 %
GFR SERPLBLD CREATININE-BSD FMLA CKD-EPI: >60 ML/MIN/{1.73_M2}
GLUCOSE SERPL-MCNC: 148 MG/DL (ref 70–99)
HCT VFR BLD AUTO: 33.8 % (ref 36–48)
HGB BLD-MCNC: 10.8 G/DL (ref 12–16)
INR PPP: 0.97 (ref 0.84–1.16)
LACTATE BLDV-SCNC: 1.5 MMOL/L (ref 0.4–2)
LYMPHOCYTES # BLD: 3.4 K/UL (ref 1–5.1)
LYMPHOCYTES NFR BLD: 53.4 %
MAGNESIUM SERPL-MCNC: 1.7 MG/DL (ref 1.8–2.4)
MCH RBC QN AUTO: 26.2 PG (ref 26–34)
MCHC RBC AUTO-ENTMCNC: 31.9 G/DL (ref 31–36)
MCV RBC AUTO: 82.1 FL (ref 80–100)
MONOCYTES # BLD: 0.5 K/UL (ref 0–1.3)
MONOCYTES NFR BLD: 8.3 %
NEUTROPHILS # BLD: 2.3 K/UL (ref 1.7–7.7)
NEUTROPHILS NFR BLD: 35.7 %
PLATELET # BLD AUTO: 377 K/UL (ref 135–450)
PMV BLD AUTO: 7.8 FL (ref 5–10.5)
POTASSIUM SERPL-SCNC: 2.6 MMOL/L (ref 3.5–5.1)
PROT SERPL-MCNC: 7.1 G/DL (ref 6.4–8.2)
PROTHROMBIN TIME: 12.9 SEC (ref 11.5–14.8)
RBC # BLD AUTO: 4.11 M/UL (ref 4–5.2)
SODIUM SERPL-SCNC: 141 MMOL/L (ref 136–145)
TROPONIN, HIGH SENSITIVITY: 14 NG/L (ref 0–14)
WBC # BLD AUTO: 6.4 K/UL (ref 4–11)

## 2023-05-18 PROCEDURE — 96375 TX/PRO/DX INJ NEW DRUG ADDON: CPT

## 2023-05-18 PROCEDURE — 36415 COLL VENOUS BLD VENIPUNCTURE: CPT

## 2023-05-18 PROCEDURE — 87635 SARS-COV-2 COVID-19 AMP PRB: CPT

## 2023-05-18 PROCEDURE — 84484 ASSAY OF TROPONIN QUANT: CPT

## 2023-05-18 PROCEDURE — 6360000004 HC RX CONTRAST MEDICATION: Performed by: EMERGENCY MEDICINE

## 2023-05-18 PROCEDURE — 6370000000 HC RX 637 (ALT 250 FOR IP)

## 2023-05-18 PROCEDURE — 70496 CT ANGIOGRAPHY HEAD: CPT

## 2023-05-18 PROCEDURE — 85610 PROTHROMBIN TIME: CPT

## 2023-05-18 PROCEDURE — 70450 CT HEAD/BRAIN W/O DYE: CPT

## 2023-05-18 PROCEDURE — 83605 ASSAY OF LACTIC ACID: CPT

## 2023-05-18 PROCEDURE — 71045 X-RAY EXAM CHEST 1 VIEW: CPT

## 2023-05-18 PROCEDURE — 6360000002 HC RX W HCPCS: Performed by: EMERGENCY MEDICINE

## 2023-05-18 PROCEDURE — 85025 COMPLETE CBC W/AUTO DIFF WBC: CPT

## 2023-05-18 PROCEDURE — 83735 ASSAY OF MAGNESIUM: CPT

## 2023-05-18 PROCEDURE — 80053 COMPREHEN METABOLIC PANEL: CPT

## 2023-05-18 PROCEDURE — 99285 EMERGENCY DEPT VISIT HI MDM: CPT

## 2023-05-18 PROCEDURE — 96374 THER/PROPH/DIAG INJ IV PUSH: CPT

## 2023-05-18 PROCEDURE — 6370000000 HC RX 637 (ALT 250 FOR IP): Performed by: EMERGENCY MEDICINE

## 2023-05-18 RX ORDER — MAGNESIUM SULFATE 1 G/100ML
1000 INJECTION INTRAVENOUS ONCE
Status: COMPLETED | OUTPATIENT
Start: 2023-05-18 | End: 2023-05-19

## 2023-05-18 RX ORDER — METOCLOPRAMIDE HYDROCHLORIDE 5 MG/ML
10 INJECTION INTRAMUSCULAR; INTRAVENOUS ONCE
Status: COMPLETED | OUTPATIENT
Start: 2023-05-18 | End: 2023-05-18

## 2023-05-18 RX ORDER — POTASSIUM CHLORIDE 20 MEQ/1
TABLET, EXTENDED RELEASE ORAL
Status: COMPLETED
Start: 2023-05-18 | End: 2023-05-18

## 2023-05-18 RX ORDER — POTASSIUM CHLORIDE 7.45 MG/ML
10 INJECTION INTRAVENOUS ONCE
Status: COMPLETED | OUTPATIENT
Start: 2023-05-18 | End: 2023-05-22

## 2023-05-18 RX ORDER — DIPHENHYDRAMINE HYDROCHLORIDE 50 MG/ML
12.5 INJECTION INTRAMUSCULAR; INTRAVENOUS ONCE
Status: COMPLETED | OUTPATIENT
Start: 2023-05-18 | End: 2023-05-18

## 2023-05-18 RX ORDER — OXYCODONE HYDROCHLORIDE 5 MG/1
5 TABLET ORAL ONCE
Status: COMPLETED | OUTPATIENT
Start: 2023-05-18 | End: 2023-05-18

## 2023-05-18 RX ORDER — POTASSIUM CHLORIDE 20 MEQ/1
60 TABLET, EXTENDED RELEASE ORAL ONCE
Status: COMPLETED | OUTPATIENT
Start: 2023-05-18 | End: 2023-05-18

## 2023-05-18 RX ORDER — LORAZEPAM 0.5 MG/1
0.5 TABLET ORAL ONCE
Status: COMPLETED | OUTPATIENT
Start: 2023-05-18 | End: 2023-05-18

## 2023-05-18 RX ADMIN — OXYCODONE 5 MG: 5 TABLET ORAL at 22:54

## 2023-05-18 RX ADMIN — POTASSIUM CHLORIDE 10 MEQ: 7.46 INJECTION, SOLUTION INTRAVENOUS at 23:50

## 2023-05-18 RX ADMIN — IOPAMIDOL 75 ML: 755 INJECTION, SOLUTION INTRAVENOUS at 23:10

## 2023-05-18 RX ADMIN — DIPHENHYDRAMINE HYDROCHLORIDE 12.5 MG: 50 INJECTION, SOLUTION INTRAMUSCULAR; INTRAVENOUS at 22:54

## 2023-05-18 RX ADMIN — POTASSIUM CHLORIDE 60 MEQ: 20 TABLET, EXTENDED RELEASE ORAL at 23:46

## 2023-05-18 RX ADMIN — LORAZEPAM 0.5 MG: 0.5 TABLET ORAL at 22:54

## 2023-05-18 RX ADMIN — POTASSIUM CHLORIDE 60 MEQ: 1500 TABLET, EXTENDED RELEASE ORAL at 23:46

## 2023-05-18 RX ADMIN — METOCLOPRAMIDE 10 MG: 5 INJECTION, SOLUTION INTRAMUSCULAR; INTRAVENOUS at 22:53

## 2023-05-18 ASSESSMENT — PAIN DESCRIPTION - DESCRIPTORS: DESCRIPTORS: THROBBING

## 2023-05-18 ASSESSMENT — PAIN DESCRIPTION - PAIN TYPE: TYPE: ACUTE PAIN

## 2023-05-18 ASSESSMENT — PAIN SCALES - GENERAL: PAINLEVEL_OUTOF10: 6

## 2023-05-18 ASSESSMENT — ENCOUNTER SYMPTOMS
NAUSEA: 0
EYE PAIN: 0
BACK PAIN: 0
SINUS PAIN: 1
SHORTNESS OF BREATH: 1
ABDOMINAL PAIN: 0
VOMITING: 0

## 2023-05-18 ASSESSMENT — PAIN - FUNCTIONAL ASSESSMENT: PAIN_FUNCTIONAL_ASSESSMENT: 0-10

## 2023-05-18 ASSESSMENT — PAIN DESCRIPTION - LOCATION: LOCATION: HEAD

## 2023-05-19 PROBLEM — I16.0 HYPERTENSIVE URGENCY: Status: ACTIVE | Noted: 2023-05-19

## 2023-05-19 PROBLEM — I10 HYPERTENSION, UNCONTROLLED: Status: ACTIVE | Noted: 2023-05-19

## 2023-05-19 PROBLEM — R51.9 ACUTE NONINTRACTABLE HEADACHE: Status: ACTIVE | Noted: 2023-05-19

## 2023-05-19 PROBLEM — E87.6 HYPOKALEMIA: Status: ACTIVE | Noted: 2023-05-19

## 2023-05-19 LAB
ANION GAP SERPL CALCULATED.3IONS-SCNC: 15 MMOL/L (ref 3–16)
BACTERIA URNS QL MICRO: ABNORMAL /HPF
BASOPHILS # BLD: 0 K/UL (ref 0–0.2)
BASOPHILS NFR BLD: 0.8 %
BILIRUB UR QL STRIP.AUTO: NEGATIVE
BUN SERPL-MCNC: 10 MG/DL (ref 7–20)
CALCIUM SERPL-MCNC: 9.1 MG/DL (ref 8.3–10.6)
CHLORIDE SERPL-SCNC: 98 MMOL/L (ref 99–110)
CLARITY UR: CLEAR
CO2 SERPL-SCNC: 24 MMOL/L (ref 21–32)
COLOR UR: YELLOW
CREAT SERPL-MCNC: <0.5 MG/DL (ref 0.6–1.2)
DEPRECATED RDW RBC AUTO: 19.3 % (ref 12.4–15.4)
EKG ATRIAL RATE: 94 BPM
EKG DIAGNOSIS: NORMAL
EKG P AXIS: 41 DEGREES
EKG P-R INTERVAL: 134 MS
EKG Q-T INTERVAL: 400 MS
EKG QRS DURATION: 80 MS
EKG QTC CALCULATION (BAZETT): 500 MS
EKG R AXIS: 28 DEGREES
EKG T AXIS: 22 DEGREES
EKG VENTRICULAR RATE: 94 BPM
EOSINOPHIL # BLD: 0.2 K/UL (ref 0–0.6)
EOSINOPHIL NFR BLD: 2.4 %
EPI CELLS #/AREA URNS HPF: ABNORMAL /HPF (ref 0–5)
GFR SERPLBLD CREATININE-BSD FMLA CKD-EPI: >60 ML/MIN/{1.73_M2}
GLUCOSE SERPL-MCNC: 108 MG/DL (ref 70–99)
GLUCOSE UR STRIP.AUTO-MCNC: NEGATIVE MG/DL
HCT VFR BLD AUTO: 32.1 % (ref 36–48)
HGB BLD-MCNC: 10.2 G/DL (ref 12–16)
HGB UR QL STRIP.AUTO: NEGATIVE
KETONES UR STRIP.AUTO-MCNC: NEGATIVE MG/DL
LEUKOCYTE ESTERASE UR QL STRIP.AUTO: ABNORMAL
LYMPHOCYTES # BLD: 3.2 K/UL (ref 1–5.1)
LYMPHOCYTES NFR BLD: 49.3 %
MAGNESIUM SERPL-MCNC: 2 MG/DL (ref 1.8–2.4)
MCH RBC QN AUTO: 26 PG (ref 26–34)
MCHC RBC AUTO-ENTMCNC: 31.7 G/DL (ref 31–36)
MCV RBC AUTO: 82.1 FL (ref 80–100)
MONOCYTES # BLD: 0.5 K/UL (ref 0–1.3)
MONOCYTES NFR BLD: 7.9 %
NEUTROPHILS # BLD: 2.5 K/UL (ref 1.7–7.7)
NEUTROPHILS NFR BLD: 39.6 %
NITRITE UR QL STRIP.AUTO: NEGATIVE
PH UR STRIP.AUTO: 7 [PH] (ref 5–8)
PLATELET # BLD AUTO: 331 K/UL (ref 135–450)
PMV BLD AUTO: 7.7 FL (ref 5–10.5)
POTASSIUM SERPL-SCNC: 2.6 MMOL/L (ref 3.5–5.1)
POTASSIUM SERPL-SCNC: 3.5 MMOL/L (ref 3.5–5.1)
PROT UR STRIP.AUTO-MCNC: NEGATIVE MG/DL
RBC # BLD AUTO: 3.91 M/UL (ref 4–5.2)
RBC #/AREA URNS HPF: ABNORMAL /HPF (ref 0–4)
SARS-COV-2 RDRP RESP QL NAA+PROBE: NOT DETECTED
SODIUM SERPL-SCNC: 137 MMOL/L (ref 136–145)
SP GR UR STRIP.AUTO: 1.01 (ref 1–1.03)
T4 FREE SERPL-MCNC: 1.4 NG/DL (ref 0.9–1.8)
TSH SERPL DL<=0.005 MIU/L-ACNC: 1.48 UIU/ML (ref 0.27–4.2)
TSH SERPL DL<=0.005 MIU/L-ACNC: 1.51 UIU/ML (ref 0.27–4.2)
UA COMPLETE W REFLEX CULTURE PNL UR: YES
UA DIPSTICK W REFLEX MICRO PNL UR: YES
URN SPEC COLLECT METH UR: ABNORMAL
UROBILINOGEN UR STRIP-ACNC: 0.2 E.U./DL
WBC # BLD AUTO: 6.4 K/UL (ref 4–11)
WBC #/AREA URNS HPF: ABNORMAL /HPF (ref 0–5)

## 2023-05-19 PROCEDURE — 2580000003 HC RX 258: Performed by: HOSPITALIST

## 2023-05-19 PROCEDURE — 80048 BASIC METABOLIC PNL TOTAL CA: CPT

## 2023-05-19 PROCEDURE — 6360000002 HC RX W HCPCS: Performed by: HOSPITALIST

## 2023-05-19 PROCEDURE — G0378 HOSPITAL OBSERVATION PER HR: HCPCS

## 2023-05-19 PROCEDURE — 87086 URINE CULTURE/COLONY COUNT: CPT

## 2023-05-19 PROCEDURE — 84439 ASSAY OF FREE THYROXINE: CPT

## 2023-05-19 PROCEDURE — 87077 CULTURE AEROBIC IDENTIFY: CPT

## 2023-05-19 PROCEDURE — 93010 ELECTROCARDIOGRAM REPORT: CPT | Performed by: INTERNAL MEDICINE

## 2023-05-19 PROCEDURE — 84132 ASSAY OF SERUM POTASSIUM: CPT

## 2023-05-19 PROCEDURE — 81001 URINALYSIS AUTO W/SCOPE: CPT

## 2023-05-19 PROCEDURE — 6360000002 HC RX W HCPCS: Performed by: EMERGENCY MEDICINE

## 2023-05-19 PROCEDURE — 36415 COLL VENOUS BLD VENIPUNCTURE: CPT

## 2023-05-19 PROCEDURE — 99223 1ST HOSP IP/OBS HIGH 75: CPT

## 2023-05-19 PROCEDURE — 85025 COMPLETE CBC W/AUTO DIFF WBC: CPT

## 2023-05-19 PROCEDURE — 6370000000 HC RX 637 (ALT 250 FOR IP)

## 2023-05-19 PROCEDURE — 87186 SC STD MICRODIL/AGAR DIL: CPT

## 2023-05-19 PROCEDURE — 6370000000 HC RX 637 (ALT 250 FOR IP): Performed by: HOSPITALIST

## 2023-05-19 PROCEDURE — 84443 ASSAY THYROID STIM HORMONE: CPT

## 2023-05-19 PROCEDURE — 83735 ASSAY OF MAGNESIUM: CPT

## 2023-05-19 PROCEDURE — 93005 ELECTROCARDIOGRAM TRACING: CPT

## 2023-05-19 RX ORDER — FAMOTIDINE 20 MG/1
40 TABLET, FILM COATED ORAL DAILY
Status: DISCONTINUED | OUTPATIENT
Start: 2023-05-19 | End: 2023-05-22 | Stop reason: HOSPADM

## 2023-05-19 RX ORDER — ROSUVASTATIN CALCIUM 10 MG/1
10 TABLET, COATED ORAL DAILY
Status: DISCONTINUED | OUTPATIENT
Start: 2023-05-19 | End: 2023-05-22 | Stop reason: HOSPADM

## 2023-05-19 RX ORDER — ENOXAPARIN SODIUM 100 MG/ML
40 INJECTION SUBCUTANEOUS DAILY
Status: DISCONTINUED | OUTPATIENT
Start: 2023-05-19 | End: 2023-05-22 | Stop reason: HOSPADM

## 2023-05-19 RX ORDER — SODIUM CHLORIDE 0.9 % (FLUSH) 0.9 %
5-40 SYRINGE (ML) INJECTION PRN
Status: DISCONTINUED | OUTPATIENT
Start: 2023-05-19 | End: 2023-05-22 | Stop reason: HOSPADM

## 2023-05-19 RX ORDER — HYDRALAZINE HYDROCHLORIDE 20 MG/ML
5 INJECTION INTRAMUSCULAR; INTRAVENOUS EVERY 6 HOURS PRN
Status: DISCONTINUED | OUTPATIENT
Start: 2023-05-19 | End: 2023-05-22 | Stop reason: HOSPADM

## 2023-05-19 RX ORDER — GABAPENTIN 300 MG/1
600 CAPSULE ORAL 3 TIMES DAILY
Status: DISCONTINUED | OUTPATIENT
Start: 2023-05-19 | End: 2023-05-22 | Stop reason: HOSPADM

## 2023-05-19 RX ORDER — SULFAMETHOXAZOLE AND TRIMETHOPRIM 800; 160 MG/1; MG/1
1 TABLET ORAL DAILY
Status: DISCONTINUED | OUTPATIENT
Start: 2023-05-19 | End: 2023-05-22 | Stop reason: HOSPADM

## 2023-05-19 RX ORDER — MAGNESIUM SULFATE IN WATER 40 MG/ML
2000 INJECTION, SOLUTION INTRAVENOUS PRN
Status: DISCONTINUED | OUTPATIENT
Start: 2023-05-19 | End: 2023-05-22 | Stop reason: HOSPADM

## 2023-05-19 RX ORDER — MIDODRINE HYDROCHLORIDE 10 MG/1
10 TABLET ORAL 3 TIMES DAILY
Status: DISCONTINUED | OUTPATIENT
Start: 2023-05-19 | End: 2023-05-22 | Stop reason: HOSPADM

## 2023-05-19 RX ORDER — ACETAMINOPHEN 650 MG/1
650 SUPPOSITORY RECTAL EVERY 6 HOURS PRN
Status: DISCONTINUED | OUTPATIENT
Start: 2023-05-19 | End: 2023-05-22 | Stop reason: HOSPADM

## 2023-05-19 RX ORDER — METOPROLOL SUCCINATE 25 MG/1
25 TABLET, EXTENDED RELEASE ORAL DAILY
Status: DISCONTINUED | OUTPATIENT
Start: 2023-05-19 | End: 2023-05-22 | Stop reason: HOSPADM

## 2023-05-19 RX ORDER — OXYCODONE HYDROCHLORIDE 5 MG/1
10 TABLET ORAL EVERY 8 HOURS PRN
Status: DISCONTINUED | OUTPATIENT
Start: 2023-05-19 | End: 2023-05-22 | Stop reason: HOSPADM

## 2023-05-19 RX ORDER — SENNA PLUS 8.6 MG/1
1 TABLET ORAL DAILY
Status: DISCONTINUED | OUTPATIENT
Start: 2023-05-19 | End: 2023-05-22 | Stop reason: HOSPADM

## 2023-05-19 RX ORDER — POTASSIUM CHLORIDE 7.45 MG/ML
10 INJECTION INTRAVENOUS PRN
Status: DISCONTINUED | OUTPATIENT
Start: 2023-05-19 | End: 2023-05-22 | Stop reason: HOSPADM

## 2023-05-19 RX ORDER — LEVOTHYROXINE SODIUM 88 UG/1
88 TABLET ORAL DAILY
Status: DISCONTINUED | OUTPATIENT
Start: 2023-05-19 | End: 2023-05-22 | Stop reason: HOSPADM

## 2023-05-19 RX ORDER — DESVENLAFAXINE SUCCINATE 50 MG/1
50 TABLET, EXTENDED RELEASE ORAL DAILY
Status: DISCONTINUED | OUTPATIENT
Start: 2023-05-19 | End: 2023-05-22 | Stop reason: HOSPADM

## 2023-05-19 RX ORDER — SODIUM CHLORIDE 9 MG/ML
INJECTION, SOLUTION INTRAVENOUS PRN
Status: DISCONTINUED | OUTPATIENT
Start: 2023-05-19 | End: 2023-05-22 | Stop reason: HOSPADM

## 2023-05-19 RX ORDER — CLONAZEPAM 1 MG/1
2 TABLET ORAL 2 TIMES DAILY
Status: DISCONTINUED | OUTPATIENT
Start: 2023-05-19 | End: 2023-05-22 | Stop reason: HOSPADM

## 2023-05-19 RX ORDER — POTASSIUM CHLORIDE 20 MEQ/1
20 TABLET, EXTENDED RELEASE ORAL 2 TIMES DAILY
Status: DISCONTINUED | OUTPATIENT
Start: 2023-05-19 | End: 2023-05-22 | Stop reason: HOSPADM

## 2023-05-19 RX ORDER — FLUDROCORTISONE ACETATE 0.1 MG/1
0.1 TABLET ORAL 2 TIMES DAILY
Status: DISCONTINUED | OUTPATIENT
Start: 2023-05-19 | End: 2023-05-20

## 2023-05-19 RX ORDER — ACETAMINOPHEN 325 MG/1
650 TABLET ORAL EVERY 6 HOURS PRN
Status: DISCONTINUED | OUTPATIENT
Start: 2023-05-19 | End: 2023-05-22 | Stop reason: HOSPADM

## 2023-05-19 RX ORDER — ONDANSETRON 4 MG/1
4 TABLET, ORALLY DISINTEGRATING ORAL EVERY 8 HOURS PRN
Status: DISCONTINUED | OUTPATIENT
Start: 2023-05-19 | End: 2023-05-22 | Stop reason: HOSPADM

## 2023-05-19 RX ORDER — POLYETHYLENE GLYCOL 3350 17 G/17G
17 POWDER, FOR SOLUTION ORAL DAILY PRN
Status: DISCONTINUED | OUTPATIENT
Start: 2023-05-19 | End: 2023-05-22 | Stop reason: HOSPADM

## 2023-05-19 RX ORDER — ONDANSETRON 2 MG/ML
4 INJECTION INTRAMUSCULAR; INTRAVENOUS EVERY 6 HOURS PRN
Status: DISCONTINUED | OUTPATIENT
Start: 2023-05-19 | End: 2023-05-22 | Stop reason: HOSPADM

## 2023-05-19 RX ORDER — SODIUM CHLORIDE 0.9 % (FLUSH) 0.9 %
5-40 SYRINGE (ML) INJECTION EVERY 12 HOURS SCHEDULED
Status: DISCONTINUED | OUTPATIENT
Start: 2023-05-19 | End: 2023-05-22 | Stop reason: HOSPADM

## 2023-05-19 RX ORDER — POTASSIUM CHLORIDE 20 MEQ/1
40 TABLET, EXTENDED RELEASE ORAL PRN
Status: DISCONTINUED | OUTPATIENT
Start: 2023-05-19 | End: 2023-05-22 | Stop reason: HOSPADM

## 2023-05-19 RX ADMIN — POTASSIUM CHLORIDE 10 MEQ: 7.46 INJECTION, SOLUTION INTRAVENOUS at 12:35

## 2023-05-19 RX ADMIN — OXYCODONE 10 MG: 5 TABLET ORAL at 06:06

## 2023-05-19 RX ADMIN — GABAPENTIN 600 MG: 300 CAPSULE ORAL at 08:46

## 2023-05-19 RX ADMIN — SODIUM CHLORIDE: 9 INJECTION, SOLUTION INTRAVENOUS at 08:37

## 2023-05-19 RX ADMIN — GABAPENTIN 600 MG: 300 CAPSULE ORAL at 13:19

## 2023-05-19 RX ADMIN — ENOXAPARIN SODIUM 40 MG: 100 INJECTION SUBCUTANEOUS at 08:47

## 2023-05-19 RX ADMIN — POTASSIUM CHLORIDE 20 MEQ: 1500 TABLET, EXTENDED RELEASE ORAL at 08:46

## 2023-05-19 RX ADMIN — POTASSIUM CHLORIDE 10 MEQ: 7.46 INJECTION, SOLUTION INTRAVENOUS at 08:44

## 2023-05-19 RX ADMIN — FAMOTIDINE 40 MG: 20 TABLET ORAL at 08:46

## 2023-05-19 RX ADMIN — HYDRALAZINE HYDROCHLORIDE 5 MG: 20 INJECTION INTRAMUSCULAR; INTRAVENOUS at 06:01

## 2023-05-19 RX ADMIN — POTASSIUM CHLORIDE 20 MEQ: 1500 TABLET, EXTENDED RELEASE ORAL at 20:39

## 2023-05-19 RX ADMIN — MAGNESIUM SULFATE HEPTAHYDRATE 1000 MG: 1 INJECTION, SOLUTION INTRAVENOUS at 00:43

## 2023-05-19 RX ADMIN — POTASSIUM CHLORIDE 10 MEQ: 7.46 INJECTION, SOLUTION INTRAVENOUS at 10:29

## 2023-05-19 RX ADMIN — LEVOTHYROXINE SODIUM 88 MCG: 88 TABLET ORAL at 10:37

## 2023-05-19 RX ADMIN — POTASSIUM CHLORIDE 10 MEQ: 7.46 INJECTION, SOLUTION INTRAVENOUS at 11:40

## 2023-05-19 RX ADMIN — ROSUVASTATIN CALCIUM 10 MG: 10 TABLET, FILM COATED ORAL at 08:46

## 2023-05-19 RX ADMIN — DESVENLAFAXINE 50 MG: 50 TABLET, EXTENDED RELEASE ORAL at 08:46

## 2023-05-19 RX ADMIN — CLONAZEPAM 2 MG: 1 TABLET ORAL at 20:39

## 2023-05-19 RX ADMIN — ONDANSETRON 4 MG: 2 INJECTION INTRAMUSCULAR; INTRAVENOUS at 17:25

## 2023-05-19 RX ADMIN — CLONAZEPAM 2 MG: 1 TABLET ORAL at 08:46

## 2023-05-19 RX ADMIN — SODIUM CHLORIDE, PRESERVATIVE FREE 10 ML: 5 INJECTION INTRAVENOUS at 08:47

## 2023-05-19 RX ADMIN — SODIUM CHLORIDE, PRESERVATIVE FREE 10 ML: 5 INJECTION INTRAVENOUS at 20:41

## 2023-05-19 RX ADMIN — SULFAMETHOXAZOLE AND TRIMETHOPRIM 1 TABLET: 800; 160 TABLET ORAL at 08:46

## 2023-05-19 RX ADMIN — METOPROLOL SUCCINATE 25 MG: 25 TABLET, EXTENDED RELEASE ORAL at 13:19

## 2023-05-19 RX ADMIN — SENNOSIDES 8.6 MG: 8.6 TABLET, FILM COATED ORAL at 08:46

## 2023-05-19 RX ADMIN — MIDODRINE HYDROCHLORIDE 10 MG: 10 TABLET ORAL at 14:00

## 2023-05-19 RX ADMIN — GABAPENTIN 600 MG: 300 CAPSULE ORAL at 20:39

## 2023-05-19 ASSESSMENT — PAIN SCALES - GENERAL
PAINLEVEL_OUTOF10: 3
PAINLEVEL_OUTOF10: 7
PAINLEVEL_OUTOF10: 0

## 2023-05-19 ASSESSMENT — PAIN DESCRIPTION - LOCATION: LOCATION: HEAD

## 2023-05-19 ASSESSMENT — LIFESTYLE VARIABLES: HOW OFTEN DO YOU HAVE A DRINK CONTAINING ALCOHOL: NEVER

## 2023-05-19 ASSESSMENT — PAIN DESCRIPTION - DESCRIPTORS: DESCRIPTORS: STABBING;ACHING

## 2023-05-20 LAB
ANION GAP SERPL CALCULATED.3IONS-SCNC: 6 MMOL/L (ref 3–16)
BASOPHILS # BLD: 0.1 K/UL (ref 0–0.2)
BASOPHILS NFR BLD: 0.9 %
BUN SERPL-MCNC: 17 MG/DL (ref 7–20)
CALCIUM SERPL-MCNC: 8.8 MG/DL (ref 8.3–10.6)
CHLORIDE SERPL-SCNC: 108 MMOL/L (ref 99–110)
CO2 SERPL-SCNC: 27 MMOL/L (ref 21–32)
CREAT SERPL-MCNC: 0.7 MG/DL (ref 0.6–1.2)
DEPRECATED RDW RBC AUTO: 19.3 % (ref 12.4–15.4)
EOSINOPHIL # BLD: 0.3 K/UL (ref 0–0.6)
EOSINOPHIL NFR BLD: 4.5 %
GFR SERPLBLD CREATININE-BSD FMLA CKD-EPI: >60 ML/MIN/{1.73_M2}
GLUCOSE SERPL-MCNC: 98 MG/DL (ref 70–99)
HCT VFR BLD AUTO: 28.3 % (ref 36–48)
HGB BLD-MCNC: 9.2 G/DL (ref 12–16)
LV EF: 58 %
LVEF MODALITY: NORMAL
LYMPHOCYTES # BLD: 2.6 K/UL (ref 1–5.1)
LYMPHOCYTES NFR BLD: 47.1 %
MCH RBC QN AUTO: 26.6 PG (ref 26–34)
MCHC RBC AUTO-ENTMCNC: 32.4 G/DL (ref 31–36)
MCV RBC AUTO: 82.1 FL (ref 80–100)
MONOCYTES # BLD: 0.5 K/UL (ref 0–1.3)
MONOCYTES NFR BLD: 8.3 %
NEUTROPHILS # BLD: 2.2 K/UL (ref 1.7–7.7)
NEUTROPHILS NFR BLD: 39.2 %
PLATELET # BLD AUTO: 283 K/UL (ref 135–450)
PMV BLD AUTO: 7.4 FL (ref 5–10.5)
POTASSIUM SERPL-SCNC: 4.2 MMOL/L (ref 3.5–5.1)
RBC # BLD AUTO: 3.44 M/UL (ref 4–5.2)
SODIUM SERPL-SCNC: 141 MMOL/L (ref 136–145)
WBC # BLD AUTO: 5.6 K/UL (ref 4–11)

## 2023-05-20 PROCEDURE — 6370000000 HC RX 637 (ALT 250 FOR IP): Performed by: INTERNAL MEDICINE

## 2023-05-20 PROCEDURE — 93306 TTE W/DOPPLER COMPLETE: CPT

## 2023-05-20 PROCEDURE — G0378 HOSPITAL OBSERVATION PER HR: HCPCS

## 2023-05-20 PROCEDURE — 80048 BASIC METABOLIC PNL TOTAL CA: CPT

## 2023-05-20 PROCEDURE — 6370000000 HC RX 637 (ALT 250 FOR IP): Performed by: HOSPITALIST

## 2023-05-20 PROCEDURE — 97530 THERAPEUTIC ACTIVITIES: CPT

## 2023-05-20 PROCEDURE — 99232 SBSQ HOSP IP/OBS MODERATE 35: CPT | Performed by: INTERNAL MEDICINE

## 2023-05-20 PROCEDURE — 6370000000 HC RX 637 (ALT 250 FOR IP)

## 2023-05-20 PROCEDURE — 2580000003 HC RX 258: Performed by: HOSPITALIST

## 2023-05-20 PROCEDURE — 97116 GAIT TRAINING THERAPY: CPT

## 2023-05-20 PROCEDURE — 97163 PT EVAL HIGH COMPLEX 45 MIN: CPT

## 2023-05-20 PROCEDURE — 6360000002 HC RX W HCPCS: Performed by: HOSPITALIST

## 2023-05-20 PROCEDURE — 97166 OT EVAL MOD COMPLEX 45 MIN: CPT

## 2023-05-20 PROCEDURE — 36415 COLL VENOUS BLD VENIPUNCTURE: CPT

## 2023-05-20 PROCEDURE — 85025 COMPLETE CBC W/AUTO DIFF WBC: CPT

## 2023-05-20 RX ORDER — FLUDROCORTISONE ACETATE 0.1 MG/1
0.05 TABLET ORAL DAILY
Status: DISCONTINUED | OUTPATIENT
Start: 2023-05-20 | End: 2023-05-22 | Stop reason: HOSPADM

## 2023-05-20 RX ADMIN — GABAPENTIN 600 MG: 300 CAPSULE ORAL at 10:29

## 2023-05-20 RX ADMIN — MIDODRINE HYDROCHLORIDE 10 MG: 10 TABLET ORAL at 13:36

## 2023-05-20 RX ADMIN — ENOXAPARIN SODIUM 40 MG: 100 INJECTION SUBCUTANEOUS at 10:28

## 2023-05-20 RX ADMIN — GABAPENTIN 600 MG: 300 CAPSULE ORAL at 13:36

## 2023-05-20 RX ADMIN — SULFAMETHOXAZOLE AND TRIMETHOPRIM 1 TABLET: 800; 160 TABLET ORAL at 10:34

## 2023-05-20 RX ADMIN — SENNOSIDES 8.6 MG: 8.6 TABLET, FILM COATED ORAL at 10:35

## 2023-05-20 RX ADMIN — DESVENLAFAXINE 50 MG: 50 TABLET, EXTENDED RELEASE ORAL at 10:29

## 2023-05-20 RX ADMIN — POTASSIUM CHLORIDE 20 MEQ: 1500 TABLET, EXTENDED RELEASE ORAL at 21:24

## 2023-05-20 RX ADMIN — CLONAZEPAM 2 MG: 1 TABLET ORAL at 21:23

## 2023-05-20 RX ADMIN — POTASSIUM BICARBONATE 40 MEQ: 782 TABLET, EFFERVESCENT ORAL at 10:35

## 2023-05-20 RX ADMIN — ROSUVASTATIN CALCIUM 10 MG: 10 TABLET, FILM COATED ORAL at 10:35

## 2023-05-20 RX ADMIN — MIDODRINE HYDROCHLORIDE 10 MG: 10 TABLET ORAL at 21:23

## 2023-05-20 RX ADMIN — SODIUM CHLORIDE, PRESERVATIVE FREE 10 ML: 5 INJECTION INTRAVENOUS at 21:24

## 2023-05-20 RX ADMIN — CLONAZEPAM 2 MG: 1 TABLET ORAL at 10:29

## 2023-05-20 RX ADMIN — GABAPENTIN 600 MG: 300 CAPSULE ORAL at 21:24

## 2023-05-20 RX ADMIN — LEVOTHYROXINE SODIUM 88 MCG: 88 TABLET ORAL at 06:04

## 2023-05-20 RX ADMIN — FLUDROCORTISONE ACETATE 0.05 MG: 0.1 TABLET ORAL at 13:36

## 2023-05-20 RX ADMIN — FAMOTIDINE 40 MG: 20 TABLET ORAL at 10:29

## 2023-05-20 RX ADMIN — POTASSIUM CHLORIDE 20 MEQ: 1500 TABLET, EXTENDED RELEASE ORAL at 10:51

## 2023-05-20 RX ADMIN — SODIUM CHLORIDE, PRESERVATIVE FREE 10 ML: 5 INJECTION INTRAVENOUS at 09:00

## 2023-05-20 ASSESSMENT — PAIN SCALES - GENERAL: PAINLEVEL_OUTOF10: 0

## 2023-05-21 PROBLEM — Z16.24 MULTIPLE DRUG RESISTANT ORGANISM (MDRO) CULTURE POSITIVE: Status: ACTIVE | Noted: 2023-05-21

## 2023-05-21 LAB
ANION GAP SERPL CALCULATED.3IONS-SCNC: 10 MMOL/L (ref 3–16)
BACTERIA UR CULT: ABNORMAL
BASOPHILS # BLD: 0 K/UL (ref 0–0.2)
BASOPHILS NFR BLD: 0.9 %
BUN SERPL-MCNC: 20 MG/DL (ref 7–20)
CALCIUM SERPL-MCNC: 8.8 MG/DL (ref 8.3–10.6)
CHLORIDE SERPL-SCNC: 108 MMOL/L (ref 99–110)
CO2 SERPL-SCNC: 24 MMOL/L (ref 21–32)
CREAT SERPL-MCNC: 0.6 MG/DL (ref 0.6–1.2)
DEPRECATED RDW RBC AUTO: 19.2 % (ref 12.4–15.4)
EOSINOPHIL # BLD: 0.3 K/UL (ref 0–0.6)
EOSINOPHIL NFR BLD: 5.1 %
GFR SERPLBLD CREATININE-BSD FMLA CKD-EPI: >60 ML/MIN/{1.73_M2}
GLUCOSE SERPL-MCNC: 93 MG/DL (ref 70–99)
HCT VFR BLD AUTO: 27.2 % (ref 36–48)
HGB BLD-MCNC: 8.7 G/DL (ref 12–16)
LYMPHOCYTES # BLD: 2.9 K/UL (ref 1–5.1)
LYMPHOCYTES NFR BLD: 54.7 %
MCH RBC QN AUTO: 26.3 PG (ref 26–34)
MCHC RBC AUTO-ENTMCNC: 31.8 G/DL (ref 31–36)
MCV RBC AUTO: 82.8 FL (ref 80–100)
MONOCYTES # BLD: 0.5 K/UL (ref 0–1.3)
MONOCYTES NFR BLD: 9.2 %
NEUTROPHILS # BLD: 1.6 K/UL (ref 1.7–7.7)
NEUTROPHILS NFR BLD: 30.1 %
ORGANISM: ABNORMAL
PLATELET # BLD AUTO: 274 K/UL (ref 135–450)
PMV BLD AUTO: 7.8 FL (ref 5–10.5)
POTASSIUM SERPL-SCNC: 4.5 MMOL/L (ref 3.5–5.1)
RBC # BLD AUTO: 3.29 M/UL (ref 4–5.2)
SODIUM SERPL-SCNC: 142 MMOL/L (ref 136–145)
WBC # BLD AUTO: 5.2 K/UL (ref 4–11)

## 2023-05-21 PROCEDURE — 6370000000 HC RX 637 (ALT 250 FOR IP)

## 2023-05-21 PROCEDURE — 6360000002 HC RX W HCPCS: Performed by: HOSPITALIST

## 2023-05-21 PROCEDURE — 2580000003 HC RX 258: Performed by: INTERNAL MEDICINE

## 2023-05-21 PROCEDURE — 99233 SBSQ HOSP IP/OBS HIGH 50: CPT | Performed by: INTERNAL MEDICINE

## 2023-05-21 PROCEDURE — 85025 COMPLETE CBC W/AUTO DIFF WBC: CPT

## 2023-05-21 PROCEDURE — G0378 HOSPITAL OBSERVATION PER HR: HCPCS

## 2023-05-21 PROCEDURE — 6370000000 HC RX 637 (ALT 250 FOR IP): Performed by: HOSPITALIST

## 2023-05-21 PROCEDURE — 36415 COLL VENOUS BLD VENIPUNCTURE: CPT

## 2023-05-21 PROCEDURE — 6360000002 HC RX W HCPCS: Performed by: INTERNAL MEDICINE

## 2023-05-21 PROCEDURE — 6370000000 HC RX 637 (ALT 250 FOR IP): Performed by: INTERNAL MEDICINE

## 2023-05-21 PROCEDURE — 80048 BASIC METABOLIC PNL TOTAL CA: CPT

## 2023-05-21 PROCEDURE — 2580000003 HC RX 258: Performed by: HOSPITALIST

## 2023-05-21 RX ADMIN — SULFAMETHOXAZOLE AND TRIMETHOPRIM 1 TABLET: 800; 160 TABLET ORAL at 08:38

## 2023-05-21 RX ADMIN — POTASSIUM CHLORIDE 20 MEQ: 1500 TABLET, EXTENDED RELEASE ORAL at 21:05

## 2023-05-21 RX ADMIN — LEVOTHYROXINE SODIUM 88 MCG: 88 TABLET ORAL at 05:09

## 2023-05-21 RX ADMIN — MIDODRINE HYDROCHLORIDE 10 MG: 10 TABLET ORAL at 15:07

## 2023-05-21 RX ADMIN — MIDODRINE HYDROCHLORIDE 10 MG: 10 TABLET ORAL at 08:38

## 2023-05-21 RX ADMIN — GABAPENTIN 600 MG: 300 CAPSULE ORAL at 08:37

## 2023-05-21 RX ADMIN — ROSUVASTATIN CALCIUM 10 MG: 10 TABLET, FILM COATED ORAL at 08:37

## 2023-05-21 RX ADMIN — POTASSIUM CHLORIDE 20 MEQ: 1500 TABLET, EXTENDED RELEASE ORAL at 08:38

## 2023-05-21 RX ADMIN — FLUDROCORTISONE ACETATE 0.05 MG: 0.1 TABLET ORAL at 08:37

## 2023-05-21 RX ADMIN — METOPROLOL SUCCINATE 25 MG: 25 TABLET, EXTENDED RELEASE ORAL at 08:37

## 2023-05-21 RX ADMIN — CLONAZEPAM 2 MG: 1 TABLET ORAL at 08:37

## 2023-05-21 RX ADMIN — FAMOTIDINE 40 MG: 20 TABLET ORAL at 08:37

## 2023-05-21 RX ADMIN — CLONAZEPAM 2 MG: 1 TABLET ORAL at 21:06

## 2023-05-21 RX ADMIN — DESVENLAFAXINE 50 MG: 50 TABLET, EXTENDED RELEASE ORAL at 08:38

## 2023-05-21 RX ADMIN — VANCOMYCIN HYDROCHLORIDE 1000 MG: 1 INJECTION, POWDER, LYOPHILIZED, FOR SOLUTION INTRAVENOUS at 23:42

## 2023-05-21 RX ADMIN — VANCOMYCIN HYDROCHLORIDE 1000 MG: 1 INJECTION, POWDER, LYOPHILIZED, FOR SOLUTION INTRAVENOUS at 12:24

## 2023-05-21 RX ADMIN — GABAPENTIN 600 MG: 300 CAPSULE ORAL at 15:07

## 2023-05-21 RX ADMIN — MIDODRINE HYDROCHLORIDE 10 MG: 10 TABLET ORAL at 21:05

## 2023-05-21 RX ADMIN — ENOXAPARIN SODIUM 40 MG: 100 INJECTION SUBCUTANEOUS at 08:36

## 2023-05-21 RX ADMIN — SODIUM CHLORIDE, PRESERVATIVE FREE 10 ML: 5 INJECTION INTRAVENOUS at 08:39

## 2023-05-21 RX ADMIN — SODIUM CHLORIDE: 9 INJECTION, SOLUTION INTRAVENOUS at 12:22

## 2023-05-21 RX ADMIN — SENNOSIDES 8.6 MG: 8.6 TABLET, FILM COATED ORAL at 08:37

## 2023-05-21 RX ADMIN — SODIUM CHLORIDE, PRESERVATIVE FREE 10 ML: 5 INJECTION INTRAVENOUS at 21:08

## 2023-05-21 RX ADMIN — GABAPENTIN 600 MG: 300 CAPSULE ORAL at 21:05

## 2023-05-22 VITALS
BODY MASS INDEX: 30.72 KG/M2 | RESPIRATION RATE: 18 BRPM | OXYGEN SATURATION: 96 % | WEIGHT: 162.7 LBS | TEMPERATURE: 97.3 F | HEART RATE: 74 BPM | HEIGHT: 61 IN | DIASTOLIC BLOOD PRESSURE: 62 MMHG | SYSTOLIC BLOOD PRESSURE: 120 MMHG

## 2023-05-22 LAB
ANION GAP SERPL CALCULATED.3IONS-SCNC: 7 MMOL/L (ref 3–16)
BASOPHILS # BLD: 0 K/UL (ref 0–0.2)
BASOPHILS NFR BLD: 0.8 %
BUN SERPL-MCNC: 20 MG/DL (ref 7–20)
CALCIUM SERPL-MCNC: 8.8 MG/DL (ref 8.3–10.6)
CHLORIDE SERPL-SCNC: 107 MMOL/L (ref 99–110)
CO2 SERPL-SCNC: 25 MMOL/L (ref 21–32)
CREAT SERPL-MCNC: 0.8 MG/DL (ref 0.6–1.2)
DEPRECATED RDW RBC AUTO: 19.6 % (ref 12.4–15.4)
EOSINOPHIL # BLD: 0.2 K/UL (ref 0–0.6)
EOSINOPHIL NFR BLD: 4.5 %
GFR SERPLBLD CREATININE-BSD FMLA CKD-EPI: >60 ML/MIN/{1.73_M2}
GLUCOSE SERPL-MCNC: 90 MG/DL (ref 70–99)
HCT VFR BLD AUTO: 28.2 % (ref 36–48)
HGB BLD-MCNC: 8.8 G/DL (ref 12–16)
LYMPHOCYTES # BLD: 2.8 K/UL (ref 1–5.1)
LYMPHOCYTES NFR BLD: 56.1 %
MCH RBC QN AUTO: 26.2 PG (ref 26–34)
MCHC RBC AUTO-ENTMCNC: 31.2 G/DL (ref 31–36)
MCV RBC AUTO: 84 FL (ref 80–100)
MONOCYTES # BLD: 0.5 K/UL (ref 0–1.3)
MONOCYTES NFR BLD: 10.3 %
NEUTROPHILS # BLD: 1.4 K/UL (ref 1.7–7.7)
NEUTROPHILS NFR BLD: 28.3 %
PLATELET # BLD AUTO: 253 K/UL (ref 135–450)
PMV BLD AUTO: 8.1 FL (ref 5–10.5)
POTASSIUM SERPL-SCNC: 4.2 MMOL/L (ref 3.5–5.1)
RBC # BLD AUTO: 3.36 M/UL (ref 4–5.2)
SODIUM SERPL-SCNC: 139 MMOL/L (ref 136–145)
WBC # BLD AUTO: 5 K/UL (ref 4–11)

## 2023-05-22 PROCEDURE — 97530 THERAPEUTIC ACTIVITIES: CPT

## 2023-05-22 PROCEDURE — 6370000000 HC RX 637 (ALT 250 FOR IP): Performed by: INTERNAL MEDICINE

## 2023-05-22 PROCEDURE — 97116 GAIT TRAINING THERAPY: CPT

## 2023-05-22 PROCEDURE — 36415 COLL VENOUS BLD VENIPUNCTURE: CPT

## 2023-05-22 PROCEDURE — 85025 COMPLETE CBC W/AUTO DIFF WBC: CPT

## 2023-05-22 PROCEDURE — 6370000000 HC RX 637 (ALT 250 FOR IP): Performed by: HOSPITALIST

## 2023-05-22 PROCEDURE — G0378 HOSPITAL OBSERVATION PER HR: HCPCS

## 2023-05-22 PROCEDURE — 6370000000 HC RX 637 (ALT 250 FOR IP)

## 2023-05-22 PROCEDURE — 99238 HOSP IP/OBS DSCHRG MGMT 30/<: CPT | Performed by: INTERNAL MEDICINE

## 2023-05-22 PROCEDURE — 6360000002 HC RX W HCPCS: Performed by: HOSPITALIST

## 2023-05-22 PROCEDURE — 80048 BASIC METABOLIC PNL TOTAL CA: CPT

## 2023-05-22 PROCEDURE — 2580000003 HC RX 258: Performed by: HOSPITALIST

## 2023-05-22 RX ORDER — FLUTICASONE PROPIONATE 50 MCG
1 SPRAY, SUSPENSION (ML) NASAL PRN
Qty: 1 EACH | Refills: 0
Start: 2023-05-22

## 2023-05-22 RX ORDER — FLUDROCORTISONE ACETATE 0.1 MG/1
0.1 TABLET ORAL 2 TIMES DAILY
Qty: 30 TABLET | Refills: 0 | Status: SHIPPED | OUTPATIENT
Start: 2023-05-22

## 2023-05-22 RX ORDER — LEVOFLOXACIN 500 MG/1
500 TABLET, FILM COATED ORAL DAILY
Status: DISCONTINUED | OUTPATIENT
Start: 2023-05-22 | End: 2023-05-22 | Stop reason: HOSPADM

## 2023-05-22 RX ORDER — LEVOFLOXACIN 500 MG/1
500 TABLET, FILM COATED ORAL DAILY
Qty: 4 TABLET | Refills: 0 | Status: SHIPPED | OUTPATIENT
Start: 2023-05-23 | End: 2023-05-27

## 2023-05-22 RX ORDER — METOPROLOL SUCCINATE 25 MG/1
25 TABLET, EXTENDED RELEASE ORAL DAILY
Qty: 30 TABLET | Refills: 3 | Status: SHIPPED | OUTPATIENT
Start: 2023-05-23

## 2023-05-22 RX ADMIN — MIDODRINE HYDROCHLORIDE 10 MG: 10 TABLET ORAL at 14:05

## 2023-05-22 RX ADMIN — SODIUM CHLORIDE, PRESERVATIVE FREE 10 ML: 5 INJECTION INTRAVENOUS at 08:41

## 2023-05-22 RX ADMIN — SENNOSIDES 8.6 MG: 8.6 TABLET, FILM COATED ORAL at 08:41

## 2023-05-22 RX ADMIN — DESVENLAFAXINE 50 MG: 50 TABLET, EXTENDED RELEASE ORAL at 08:40

## 2023-05-22 RX ADMIN — LEVOTHYROXINE SODIUM 88 MCG: 88 TABLET ORAL at 06:21

## 2023-05-22 RX ADMIN — LEVOFLOXACIN 500 MG: 500 TABLET, FILM COATED ORAL at 09:56

## 2023-05-22 RX ADMIN — POTASSIUM CHLORIDE 20 MEQ: 1500 TABLET, EXTENDED RELEASE ORAL at 08:45

## 2023-05-22 RX ADMIN — GABAPENTIN 600 MG: 300 CAPSULE ORAL at 08:40

## 2023-05-22 RX ADMIN — CLONAZEPAM 2 MG: 1 TABLET ORAL at 08:41

## 2023-05-22 RX ADMIN — ENOXAPARIN SODIUM 40 MG: 100 INJECTION SUBCUTANEOUS at 08:47

## 2023-05-22 RX ADMIN — GABAPENTIN 600 MG: 300 CAPSULE ORAL at 14:05

## 2023-05-22 RX ADMIN — FLUDROCORTISONE ACETATE 0.05 MG: 0.1 TABLET ORAL at 08:40

## 2023-05-22 RX ADMIN — FAMOTIDINE 40 MG: 20 TABLET ORAL at 08:40

## 2023-05-22 RX ADMIN — ROSUVASTATIN CALCIUM 10 MG: 10 TABLET, FILM COATED ORAL at 08:40

## 2023-05-22 RX ADMIN — MIDODRINE HYDROCHLORIDE 10 MG: 10 TABLET ORAL at 08:40

## 2023-05-22 RX ADMIN — METOPROLOL SUCCINATE 25 MG: 25 TABLET, EXTENDED RELEASE ORAL at 08:40

## 2023-05-22 ASSESSMENT — PAIN DESCRIPTION - LOCATION: LOCATION: ABDOMEN

## 2023-05-22 ASSESSMENT — PAIN SCALES - GENERAL: PAINLEVEL_OUTOF10: 8

## 2023-05-22 ASSESSMENT — PAIN - FUNCTIONAL ASSESSMENT: PAIN_FUNCTIONAL_ASSESSMENT: ACTIVITIES ARE NOT PREVENTED

## 2023-05-22 ASSESSMENT — PAIN DESCRIPTION - ORIENTATION: ORIENTATION: OTHER (COMMENT)

## 2023-05-22 ASSESSMENT — PAIN DESCRIPTION - DESCRIPTORS: DESCRIPTORS: TENDER

## 2023-05-22 ASSESSMENT — PAIN DESCRIPTION - ONSET: ONSET: ON-GOING

## 2023-05-22 ASSESSMENT — PAIN DESCRIPTION - PAIN TYPE: TYPE: ACUTE PAIN

## 2023-05-22 NOTE — PROGRESS NOTES
Inpatient Physical Therapy Treatment    Unit: PCU  Date:  5/22/2023  Patient Name:    Rohini Leigh  Admitting diagnosis:  Hypokalemia [E87.6]  Hypertensive urgency [I16.0]  Hypertension, uncontrolled [I10]  Intermittent chest pain [R07.9]  Acute nonintractable headache, unspecified headache type [R51.9]  Admit Date:  5/18/2023  Precautions/Restrictions/WB Status/ Lines/ Wounds/ Oxygen: Fall risk, Bed/chair alarm, Lines (IV and Nephrostomy tube), Telemetry, and WB Restrictions (NWB RUE, WBAT RLE in high tide boot) Watch BP    Treatment Time:  14:32 - 14:55  Treatment Number:  1  Timed Code Treatment Minutes: 23 minutes  Total Treatment Minutes: 23 minutes    Patient Stated Goals for Therapy: \" Sit up in the chair \"          Discharge Recommendations: Home with 24hr assistance and Home PT  DME needs for discharge:  Platform RW       Therapy recommendation for EMS Transport: can transport by wheelchair    Therapy recommendations for staff:   Assist of 1 for ambulation with use of rolling walker (RW) and RUE platform attachment to/from chair  to/from bathroom  within room  within halls    History of Present Illness:   Per H&P  \"The patient is a 75 y.o. female with pmhx of HTN, HLD, NATHAN, bipolar disorder who presented to [facility] ED with complaint of dizziness and headache. Patient had an episode yesterday where she got up to ambulate and became light-headed, dizzy and felt like she was going to pass out. Also felt like her heart was beating extremely fast and had shortness of breath. She called for help and friend assisted her back to bed. She did not fall or pass out. She has a history of POTS and has had this happen before but it has been a year or so. Then she developed severe headache, has history of migraines, similar characteristics but this one is worse. No thunderclap onset, it has improved today. No vision changes, no extremity weakness. \"     Pt with R high tide boot, and R wrist splint.  RLE
Patient educated on discharge instructions as well as new medications use, dosage, administration and possible side effects. Patient verified knowledge. IV removed without difficulty and dry dressing in place. Telemetry monitor removed and returned to [facility]. Pt left facility in stable condition to Home with all of their personal belongings.
Spoke with Dr Coletta Simmonds regarding drop in hgb. He states it is from her recent sx. NNO. Steri strips to R wrist clean, dry, and intact to surgical incision. Brace in place.
[facility] Progress Note    Daily Progress Note for 2023 9:02 PM /0352-24  Eliana Lawler : 1955 Age: 75 y.o. Sex: female  Length of Stay:  0    Interval History:      CC: F/U Headache (Patient arrives via EMS for headache and HTN)    Subjective:       BPs better. Pt says she is having more mucus coming from her suprapubic site. She has also had abdominal pain which she says is coming from her colon and also from her bladder. She always has some pain. No nausea. No fever. She performs intermittent self cath via the suprapubic site. Objective:     Vitals:    23 1200 23 1500 23 1900 23   BP: (!) 145/62 (!) 113/56 (!) 159/82 139/69   Pulse: 79 79 73 70   Resp: 16 16 18 18   Temp: 97.7 °F (36.5 °C) 97.6 °F (36.4 °C) 97.6 °F (36.4 °C) 97.6 °F (36.4 °C)   TempSrc: Oral Oral Oral Oral   SpO2:  96% 96% 96%   Weight:       Height:              Intake/Output Summary (Last 24 hours) at 2023 2102  Last data filed at 2023 1420  Gross per 24 hour   Intake 60 ml   Output 725 ml   Net -665 ml       Body mass index is 30.91 kg/m². Physical Exam:  General: Cooperative, pleasant/Ill appearing, on  Nasal cannula  HEENT:  Head: normocephalic,atraumatic, anicteric sclera, clear conjunctiva  Neck: Normal size, Jugular venous pulsations: normal  Respiratory:unlabored breathing, clear to auscultation with no crackles, wheezes rhonchi  Heart: Regular rate and rhythm, S1, S2-normal, No murmurs  Abdomen: soft, nondistended, nontender, normoactive bowel sounds,  Neurological/Psych: Alert and oriented times three, no focal neurological deficits, Mood and affect appropriate.   Skin: No obvious rashes    Extremities:  no edema, Pedal pulses 2+ bilaterally    Scheduled Medications:  vancomycin, 1,000 mg, Q12H  fludrocortisone, 0.05 mg, Daily  clonazePAM, 2 mg, BID  desvenlafaxine succinate, 50 mg, Daily  famotidine, 40 mg, Daily  gabapentin, 600 mg, TID  potassium
of chronic microvascular ischemic change. XR CHEST PORTABLE   Final Result   Borderline cardiomegaly and mild central pulmonary congestion. No acute infiltrate or effusion. Microbiology: Cultures reviewed. @[facility]@  @[email or web address] [email or web address]@    No results found for: LABA1C   Body mass index is 30.74 kg/m².        Impression/Plan:          #Pre-syncopal episode - likely chronic orthostatic Hypotension  #Hx of POTS and paroxysmal SVT however pt noting recently high to very high blood pressures and headache  -imaging neg as above with no acute findings  -had echo and stress test done last year that were normal--> repeat echo no major abnormalities  -PT/OT obtained and recommend home assist    -pacemaker interrogation pending   -Resumed midodrine 10 mg TID rather than 15 mg  -+orthostats, resumed Florinef bid        #Hypertensive urgency   #Hx of hypotension  -CT head and CTA head and neck neg for acute pathology   -BP trending down, normally has orthostatic  hypotension on florinef and midodrine   -restarted metoprolol  -Resumed midodrine and florinef at lower doses  - BP stable today      #Migraines, acute on chronic   -gets botox injections as outpt  -will add prn fiorecet      #Hypokalemia   #Hypomagnesemia   -replaced  -depletion from chronic diarrhea      #Recurrent UTIs vs chronic colonization  #Bladder augment, continent diversion 2017  -urine culture +Enterococcus, vancomycin sensitive  -on bactrim prophylaxis as outpatient  -performs intermittent suprapubic self caths but says she has had trouble recently due mucus blockage, unsure if still doing bladder irrigation.   -urology consulted  - changed abx to levaquin      #Recent fall   #Right radius fracture   #Right ankle fracture   -s/p surgical repair, in brace   -also has right boot   -PT/OT      #Hx of bradycardia   -s/p pacemaker   -remain on tele      #Bipolar disorder   -on klonopin, pristiq      #HLD   -on statin      #NATHAN   -on BIPAP

## 2023-05-22 NOTE — FLOWSHEET NOTE
05/22/23 0936   Vital Signs   Orthostatic B/P and Pulse? Yes   Blood Pressure Lying 133/78   Pulse Lying 73 PER MINUTE   Blood Pressure Sitting 131/74   Pulse Sitting 80 PER MINUTE   Blood Pressure Standing 108/61   Pulse Standing 107 PER MINUTE     Orthostatics as above. States she did get lightheaded when standing but she states she always does. She never lost balance however.

## 2023-05-22 NOTE — DISCHARGE INSTR - COC
Continuity of Care Form    Patient Name: Eliana Lawler   :  1955  MRN:  [id number]    Admit date:  2023  Discharge date:  2023    Code Status Order: Full Code   Advance Directives:     Admitting Physician:  Jon Quevedo MD  PCP: Shell Aviles MD    Discharging Nurse: Leslie [facility] Unit/Room#: /8071-09  Discharging Unit Phone Number: [phone]    Emergency Contact:   Extended Emergency Contact Information  Primary Emergency Contact: [address] [facility] Phone: [phone]  Mobile Phone: [phone]  Relation: Other   needed? No  Secondary Emergency Contact: [address] [facility] Phone: [phone]  Mobile Phone: [phone]  Relation: Other  Preferred language: English   needed? No    Past Surgical History:  Past Surgical History:   Procedure Laterality Date    ABDOMEN SURGERY  2011     REPAIR INCISIONAL HERNIA REDUCIBLE WITH POSSIBLE MESH    BACK SURGERY      x3    BACK SURGERY      stimulator    BLADDER SUSPENSION      CARDIAC PACEMAKER PLACEMENT      [address] 2018    PHACO EMULSIFICATION OF CATARACT WITH INTRAOCULAR LENS IMPLANT LEFT EYE    CERVICAL DISCECTOMY  2014    and fusion    CHOLECYSTECTOMY      COLONOSCOPY  12    ENDOSCOPY, COLON, DIAGNOSTIC      EPIDURAL STEROID INJECTION Left 08/15/2019    LEFT KNEE GENICULAR NERVE BLOCK SITE CONFIRMED BY FLUOROSCOPY performed by Emmie Stock MD at [facility] Right 2018    cataract removal    FOOT SURGERY Right 2012    arthroplasty    FOOT SURGERY Left 2015    FOOT SURGERY Left 2015    GASTRIC BYPASS SURGERY  [id number]    HYSTERECTOMY (CERVIX STATUS UNKNOWN)      KNEE ARTHROSCOPY Left 10/03/2014    part.  medial & lateral meniscectomy, synovectomy plica exc    KNEE ARTHROSCOPY Right 10/13/2015    partial medial meniscectomy, chondroplasty, partial lateral meniscectomy

## 2023-05-22 NOTE — FLOWSHEET NOTE
05/22/23 0745   Vital Signs   Temp 97.9 °F (36.6 °C)   Temp Source Oral   Pulse 71   Heart Rate Source Monitor   Respirations 16   BP (!) 149/78   MAP (Calculated) 102   BP Location Left upper arm   BP Method Automatic   Patient Position Semi fowlers   Level of Consciousness 0   MEWS Score 1   Pain Assessment   Pain Assessment 0-10   Pain Level 8   Patient's Stated Pain Goal 0 - No pain   Pain Location Abdomen   Pain Orientation Other (Comment)  (suprapubic cath)   Pain Descriptors Tender   Functional Pain Assessment Activities are not prevented   Pain Type Acute pain   Pain Onset On-going   Non-Pharmaceutical Pain Intervention(s) Repositioned; Rest   Oxygen Therapy   SpO2 95 %   O2 Device None (Room air)     AM assessment complete. Pt a&o x4. C/o pain to lower abd at suprapubic cath insertion site. States she feels it may be becoming irritated. Trace edema to ble. Brace to R wrist. Call light and bedside table within reach. Will continue to monitor.

## 2023-05-22 NOTE — FLOWSHEET NOTE
05/21/23 2045   Vital Signs   Temp 97.6 °F (36.4 °C)   Temp Source Oral   Pulse 70   Heart Rate Source Monitor   Respirations 18   /69   MAP (Calculated) 92   BP Location Left upper arm   BP Method Automatic   Patient Position Semi fowlers   Level of Consciousness 0   MEWS Score 1   Oxygen Therapy   SpO2 96 %   O2 Device None (Room air)     Assessment done. Patient is alert and oriented x4. Call light within reach.

## 2023-05-22 NOTE — CONSULTS
Urology Consult Note      Reason for Consultation: mucous when CIC    Chief Complaint: \"I don't have problems with cath\"  HPI:  Ludwin Pete is a 75 y.o. female with multiple medical issues who is admitted due to pre syncopal episode. She has urologic history including neurogenic bladder who underwent bladder augmentation continent diversion and practices self cath TID. She has followed with both [facility] and Dr Gayatri Lopez for urologic care in the past.  Most recently she re established care with [facility] and underwent cystoscopy with Dr Sabra Lange in March 2023 (this was normal).   We are consulted due to mucous when she caths     Past Medical History:   Diagnosis Date    Angina at rest [facility])     Anxiety     Arthritis     hands and hip    Blood transfusion     after back surgery    Bradycardia     Bronchiectasis ([address] 11/05/2013    Chronic back pain     Hyperlipidemia     Hypertension     Localized morphea     Multiple drug resistant organism (MDRO) culture positive 04/13/2021    E.COLI-URINE    NATHAN treated with BiPAP     Pacemaker     Stress incontinence     Thyroid disease     hypothyroid    Trochanteric bursitis of left hip 04/12/2019       Past Surgical History:   Procedure Laterality Date    ABDOMEN SURGERY  09/09/2011     REPAIR INCISIONAL HERNIA REDUCIBLE WITH POSSIBLE MESH    BACK SURGERY      x3    BACK SURGERY      stimulator    BLADDER SUSPENSION      CARDIAC PACEMAKER PLACEMENT  2011    Heart doctor at [address] [employer] Left 03/08/2018    PHACO EMULSIFICATION OF CATARACT WITH INTRAOCULAR LENS IMPLANT LEFT EYE    CERVICAL DISCECTOMY  06/2014    and fusion    CHOLECYSTECTOMY      COLONOSCOPY  2002 1/26/12    ENDOSCOPY, COLON, DIAGNOSTIC      EPIDURAL STEROID INJECTION Left 08/15/2019    LEFT KNEE GENICULAR NERVE BLOCK SITE CONFIRMED BY FLUOROSCOPY performed by Becky Meza MD at [facility] Right 04/05/2018    cataract removal    FOOT SURGERY Right 12/06/2012

## 2023-05-22 NOTE — FLOWSHEET NOTE
05/22/23 0400   Vital Signs   Temp 98 °F (36.7 °C)   Temp Source Oral   Pulse 67   Heart Rate Source Monitor   Respirations 16   /65   MAP (Calculated) 81   BP Location Left upper arm   BP Method Automatic   Patient Position Right side   Level of Consciousness 0   MEWS Score 1   Opioid-Induced Sedation   POSS Score S   RASS   Garcia Agitation Sedation Scale (RASS) 0   Oxygen Therapy   SpO2 95 %   O2 Device None (Room air)   Height and Weight   Weight - Scale 162 lb 11.2 oz (73.8 kg)   Weight Method Actual;Bed scale   BMI (Calculated) 30.8     Reassessment done. Patient is alert and oriented. Room air. Call light within reach.

## 2023-05-22 NOTE — CARE COORDINATION
DISCHARGE ORDER  Date/Time 2023 2:04 PM  Completed by: Ramu Kwan RN, Case Management    Patient Name: Gm Garcia      : 1955  Admitting Diagnosis: Hypokalemia [E87.6]  Hypertensive urgency [I16.0]  Hypertension, uncontrolled [I10]  Intermittent chest pain [R07.9]  Acute nonintractable headache, unspecified headache type [R51.9]      Admit order Date and Status:23 observation  (verify MD's last order for status of admission)      Noted discharge order. If applicable PT/OT recommendation at Discharge: Continue to Assess,Likely  with home PT  DME recommendation by PT/OT:Platform RW  Confirmed discharge plan   Yes  with whom patient  If pt confirmed DC plan does family need to be contacted by CM No i  Discharge Plan: Order for dc noted. Spoke with pt who cont plan for home. Staes friends will assist as needed. Spoke with Tricia Salas from [facility] who will bring Platform walker to pt prior to dc today. Pt aware and agreeable to wait. Spoke with [facility] re: admit and dc and they will resume services and pull dc info from epic. Chart reviewed and no other dc needs identified. Spoke with Tricia Salas from [facility] who request pt be given platform walker by therapy today and he will replace in AM.     Date of Last IMM Given: N/A    Reviewed chart. Role of discharge planner explained and patient verbalized understanding. Discharge order is noted. Has Home O2 in place on admit:  No  Informed of need to bring portable home O2 tank on day of discharge for nursing to connect prior to leaving:   Not Indicated  Verbalized agreement/Understanding:   Not Indicated  Pt is being d/c'd to home today. Pt's O2 sats are 96% on RA. Discharge timeout done with nsg, CM and pt. All discharge needs and concerns addressed.

## 2023-05-22 NOTE — PLAN OF CARE
Problem: Discharge Planning  Goal: Discharge to home or other facility with appropriate resources  5/21/2023 2204 by Lauro Delgado RN  Outcome: Progressing  Flowsheets (Taken 5/21/2023 2204)  Discharge to home or other facility with appropriate resources: Identify barriers to discharge with patient and caregiver  5/21/2023 1735 by Fide Mcnair RN  Outcome: Progressing     Problem: Pain  Goal: Verbalizes/displays adequate comfort level or baseline comfort level  5/21/2023 1735 by Fide Mcnair RN  Outcome: Progressing     Problem: Safety - Adult  Goal: Free from fall injury  5/21/2023 2204 by Lauro Delgado RN  Outcome: Progressing  Flowsheets (Taken 5/21/2023 2204)  Free From Fall Injury: Instruct family/caregiver on patient safety     Problem: ABCDS Injury Assessment  Goal: Absence of physical injury  5/21/2023 2204 by Lauro Delgado RN  Outcome: Progressing  Flowsheets (Taken 5/21/2023 2204)  Absence of Physical Injury: Implement safety measures based on patient assessment  5/21/2023 1735 by Fide Mcnair RN  Outcome: Progressing

## 2023-05-29 ENCOUNTER — APPOINTMENT (OUTPATIENT)
Dept: GENERAL RADIOLOGY | Age: 68
End: 2023-05-29
Payer: MEDICARE

## 2023-05-29 ENCOUNTER — APPOINTMENT (OUTPATIENT)
Dept: CT IMAGING | Age: 68
End: 2023-05-29
Payer: MEDICARE

## 2023-05-29 ENCOUNTER — HOSPITAL ENCOUNTER (OUTPATIENT)
Age: 68
Setting detail: OBSERVATION
Discharge: HOME | End: 2023-06-02
Attending: STUDENT IN AN ORGANIZED HEALTH CARE EDUCATION/TRAINING PROGRAM | Admitting: INTERNAL MEDICINE
Payer: MEDICARE

## 2023-05-29 DIAGNOSIS — E83.42 HYPOMAGNESEMIA: ICD-10-CM

## 2023-05-29 DIAGNOSIS — R29.6 FREQUENT FALLS: ICD-10-CM

## 2023-05-29 DIAGNOSIS — E87.6 HYPOKALEMIA: ICD-10-CM

## 2023-05-29 DIAGNOSIS — R77.8 ELEVATED TROPONIN: ICD-10-CM

## 2023-05-29 DIAGNOSIS — Z78.9 UNABLE TO CARE FOR SELF: Primary | ICD-10-CM

## 2023-05-29 LAB
ALBUMIN SERPL-MCNC: 3.9 G/DL (ref 3.4–5)
ALBUMIN/GLOB SERPL: 1.4 {RATIO} (ref 1.1–2.2)
ALP SERPL-CCNC: 82 U/L (ref 40–129)
ALT SERPL-CCNC: 14 U/L (ref 10–40)
ANION GAP SERPL CALCULATED.3IONS-SCNC: 13 MMOL/L (ref 3–16)
AST SERPL-CCNC: 30 U/L (ref 15–37)
BASOPHILS # BLD: 0 K/UL (ref 0–0.2)
BASOPHILS NFR BLD: 0.4 %
BILIRUB SERPL-MCNC: 0.4 MG/DL (ref 0–1)
BUN SERPL-MCNC: 11 MG/DL (ref 7–20)
CALCIUM SERPL-MCNC: 9.3 MG/DL (ref 8.3–10.6)
CHLORIDE SERPL-SCNC: 104 MMOL/L (ref 99–110)
CO2 SERPL-SCNC: 24 MMOL/L (ref 21–32)
CREAT SERPL-MCNC: 0.6 MG/DL (ref 0.6–1.2)
DEPRECATED RDW RBC AUTO: 18.5 % (ref 12.4–15.4)
EKG ATRIAL RATE: 103 BPM
EKG DIAGNOSIS: NORMAL
EKG P AXIS: 27 DEGREES
EKG P-R INTERVAL: 128 MS
EKG Q-T INTERVAL: 368 MS
EKG QRS DURATION: 84 MS
EKG QTC CALCULATION (BAZETT): 482 MS
EKG R AXIS: 49 DEGREES
EKG T AXIS: 45 DEGREES
EKG VENTRICULAR RATE: 103 BPM
EOSINOPHIL # BLD: 0 K/UL (ref 0–0.6)
EOSINOPHIL NFR BLD: 0.3 %
GFR SERPLBLD CREATININE-BSD FMLA CKD-EPI: >60 ML/MIN/{1.73_M2}
GLUCOSE SERPL-MCNC: 123 MG/DL (ref 70–99)
HCT VFR BLD AUTO: 30 % (ref 36–48)
HGB BLD-MCNC: 9.6 G/DL (ref 12–16)
LYMPHOCYTES # BLD: 2.4 K/UL (ref 1–5.1)
LYMPHOCYTES NFR BLD: 31.1 %
MAGNESIUM SERPL-MCNC: 1.4 MG/DL (ref 1.8–2.4)
MCH RBC QN AUTO: 25.9 PG (ref 26–34)
MCHC RBC AUTO-ENTMCNC: 32.1 G/DL (ref 31–36)
MCV RBC AUTO: 80.7 FL (ref 80–100)
MONOCYTES # BLD: 0.8 K/UL (ref 0–1.3)
MONOCYTES NFR BLD: 9.9 %
NEUTROPHILS # BLD: 4.5 K/UL (ref 1.7–7.7)
NEUTROPHILS NFR BLD: 58.3 %
PLATELET # BLD AUTO: 229 K/UL (ref 135–450)
PMV BLD AUTO: 7.1 FL (ref 5–10.5)
POTASSIUM SERPL-SCNC: 2.9 MMOL/L (ref 3.5–5.1)
PROT SERPL-MCNC: 6.7 G/DL (ref 6.4–8.2)
RBC # BLD AUTO: 3.72 M/UL (ref 4–5.2)
SODIUM SERPL-SCNC: 141 MMOL/L (ref 136–145)
TROPONIN, HIGH SENSITIVITY: 14 NG/L (ref 0–14)
TROPONIN, HIGH SENSITIVITY: 15 NG/L (ref 0–14)
WBC # BLD AUTO: 7.8 K/UL (ref 4–11)

## 2023-05-29 PROCEDURE — 99285 EMERGENCY DEPT VISIT HI MDM: CPT

## 2023-05-29 PROCEDURE — 96372 THER/PROPH/DIAG INJ SC/IM: CPT

## 2023-05-29 PROCEDURE — 80053 COMPREHEN METABOLIC PANEL: CPT

## 2023-05-29 PROCEDURE — 83735 ASSAY OF MAGNESIUM: CPT

## 2023-05-29 PROCEDURE — 96375 TX/PRO/DX INJ NEW DRUG ADDON: CPT

## 2023-05-29 PROCEDURE — 96365 THER/PROPH/DIAG IV INF INIT: CPT

## 2023-05-29 PROCEDURE — 70450 CT HEAD/BRAIN W/O DYE: CPT

## 2023-05-29 PROCEDURE — 72131 CT LUMBAR SPINE W/O DYE: CPT

## 2023-05-29 PROCEDURE — 96366 THER/PROPH/DIAG IV INF ADDON: CPT

## 2023-05-29 PROCEDURE — 84484 ASSAY OF TROPONIN QUANT: CPT

## 2023-05-29 PROCEDURE — 6370000000 HC RX 637 (ALT 250 FOR IP): Performed by: INTERNAL MEDICINE

## 2023-05-29 PROCEDURE — 6370000000 HC RX 637 (ALT 250 FOR IP): Performed by: REGISTERED NURSE

## 2023-05-29 PROCEDURE — 6360000002 HC RX W HCPCS: Performed by: INTERNAL MEDICINE

## 2023-05-29 PROCEDURE — 72192 CT PELVIS W/O DYE: CPT

## 2023-05-29 PROCEDURE — 93005 ELECTROCARDIOGRAM TRACING: CPT | Performed by: REGISTERED NURSE

## 2023-05-29 PROCEDURE — 72128 CT CHEST SPINE W/O DYE: CPT

## 2023-05-29 PROCEDURE — 93010 ELECTROCARDIOGRAM REPORT: CPT | Performed by: INTERNAL MEDICINE

## 2023-05-29 PROCEDURE — 72125 CT NECK SPINE W/O DYE: CPT

## 2023-05-29 PROCEDURE — 85025 COMPLETE CBC W/AUTO DIFF WBC: CPT

## 2023-05-29 PROCEDURE — 2580000003 HC RX 258: Performed by: INTERNAL MEDICINE

## 2023-05-29 PROCEDURE — 36415 COLL VENOUS BLD VENIPUNCTURE: CPT

## 2023-05-29 PROCEDURE — G0378 HOSPITAL OBSERVATION PER HR: HCPCS

## 2023-05-29 PROCEDURE — 71046 X-RAY EXAM CHEST 2 VIEWS: CPT

## 2023-05-29 PROCEDURE — 6360000002 HC RX W HCPCS: Performed by: REGISTERED NURSE

## 2023-05-29 PROCEDURE — 96376 TX/PRO/DX INJ SAME DRUG ADON: CPT

## 2023-05-29 RX ORDER — SODIUM CHLORIDE 0.9 % (FLUSH) 0.9 %
5-40 SYRINGE (ML) INJECTION EVERY 12 HOURS SCHEDULED
Status: DISCONTINUED | OUTPATIENT
Start: 2023-05-29 | End: 2023-06-02 | Stop reason: HOSPADM

## 2023-05-29 RX ORDER — ONDANSETRON 4 MG/1
4 TABLET, ORALLY DISINTEGRATING ORAL ONCE
Status: COMPLETED | OUTPATIENT
Start: 2023-05-29 | End: 2023-05-29

## 2023-05-29 RX ORDER — FLUDROCORTISONE ACETATE 0.1 MG/1
0.1 TABLET ORAL 2 TIMES DAILY
Status: DISCONTINUED | OUTPATIENT
Start: 2023-05-29 | End: 2023-06-02 | Stop reason: HOSPADM

## 2023-05-29 RX ORDER — ENOXAPARIN SODIUM 100 MG/ML
40 INJECTION SUBCUTANEOUS DAILY
Status: DISCONTINUED | OUTPATIENT
Start: 2023-05-29 | End: 2023-06-02 | Stop reason: HOSPADM

## 2023-05-29 RX ORDER — SODIUM CHLORIDE 0.9 % (FLUSH) 0.9 %
5-40 SYRINGE (ML) INJECTION PRN
Status: DISCONTINUED | OUTPATIENT
Start: 2023-05-29 | End: 2023-06-02 | Stop reason: HOSPADM

## 2023-05-29 RX ORDER — ROSUVASTATIN CALCIUM 10 MG/1
10 TABLET, COATED ORAL DAILY
Status: DISCONTINUED | OUTPATIENT
Start: 2023-05-29 | End: 2023-06-02 | Stop reason: HOSPADM

## 2023-05-29 RX ORDER — OXYCODONE HYDROCHLORIDE 5 MG/1
10 TABLET ORAL EVERY 6 HOURS PRN
Status: DISCONTINUED | OUTPATIENT
Start: 2023-05-29 | End: 2023-06-02 | Stop reason: HOSPADM

## 2023-05-29 RX ORDER — POTASSIUM CHLORIDE 7.45 MG/ML
10 INJECTION INTRAVENOUS
Status: COMPLETED | OUTPATIENT
Start: 2023-05-29 | End: 2023-05-29

## 2023-05-29 RX ORDER — SODIUM CHLORIDE 9 MG/ML
INJECTION, SOLUTION INTRAVENOUS PRN
Status: DISCONTINUED | OUTPATIENT
Start: 2023-05-29 | End: 2023-06-02 | Stop reason: HOSPADM

## 2023-05-29 RX ORDER — POLYETHYLENE GLYCOL 3350 17 G/17G
17 POWDER, FOR SOLUTION ORAL DAILY PRN
Status: DISCONTINUED | OUTPATIENT
Start: 2023-05-29 | End: 2023-06-02 | Stop reason: HOSPADM

## 2023-05-29 RX ORDER — LIDOCAINE 4 G/G
1 PATCH TOPICAL ONCE
Status: COMPLETED | OUTPATIENT
Start: 2023-05-29 | End: 2023-05-30

## 2023-05-29 RX ORDER — POTASSIUM CHLORIDE 20 MEQ/1
40 TABLET, EXTENDED RELEASE ORAL ONCE
Status: COMPLETED | OUTPATIENT
Start: 2023-05-29 | End: 2023-05-29

## 2023-05-29 RX ORDER — MIDODRINE HYDROCHLORIDE 10 MG/1
10 TABLET ORAL 3 TIMES DAILY
Status: DISCONTINUED | OUTPATIENT
Start: 2023-05-29 | End: 2023-06-02 | Stop reason: HOSPADM

## 2023-05-29 RX ORDER — ONDANSETRON 4 MG/1
4 TABLET, ORALLY DISINTEGRATING ORAL EVERY 8 HOURS PRN
Status: DISCONTINUED | OUTPATIENT
Start: 2023-05-29 | End: 2023-06-02 | Stop reason: HOSPADM

## 2023-05-29 RX ORDER — MORPHINE SULFATE 4 MG/ML
4 INJECTION, SOLUTION INTRAMUSCULAR; INTRAVENOUS EVERY 4 HOURS PRN
Status: DISCONTINUED | OUTPATIENT
Start: 2023-05-29 | End: 2023-05-29

## 2023-05-29 RX ORDER — ONDANSETRON 2 MG/ML
4 INJECTION INTRAMUSCULAR; INTRAVENOUS EVERY 6 HOURS PRN
Status: DISCONTINUED | OUTPATIENT
Start: 2023-05-29 | End: 2023-06-02 | Stop reason: HOSPADM

## 2023-05-29 RX ORDER — MAGNESIUM SULFATE IN WATER 40 MG/ML
4000 INJECTION, SOLUTION INTRAVENOUS ONCE
Status: COMPLETED | OUTPATIENT
Start: 2023-05-29 | End: 2023-05-29

## 2023-05-29 RX ADMIN — POTASSIUM CHLORIDE 40 MEQ: 1500 TABLET, EXTENDED RELEASE ORAL at 18:37

## 2023-05-29 RX ADMIN — POTASSIUM CHLORIDE 40 MEQ: 1500 TABLET, EXTENDED RELEASE ORAL at 14:54

## 2023-05-29 RX ADMIN — ONDANSETRON 4 MG: 4 TABLET, ORALLY DISINTEGRATING ORAL at 12:35

## 2023-05-29 RX ADMIN — POTASSIUM CHLORIDE 10 MEQ: 7.46 INJECTION, SOLUTION INTRAVENOUS at 15:47

## 2023-05-29 RX ADMIN — MORPHINE SULFATE 4 MG: 4 INJECTION, SOLUTION INTRAMUSCULAR; INTRAVENOUS at 12:35

## 2023-05-29 RX ADMIN — POTASSIUM CHLORIDE 10 MEQ: 7.46 INJECTION, SOLUTION INTRAVENOUS at 14:57

## 2023-05-29 RX ADMIN — ENOXAPARIN SODIUM 40 MG: 100 INJECTION SUBCUTANEOUS at 18:37

## 2023-05-29 RX ADMIN — MIDODRINE HYDROCHLORIDE 10 MG: 10 TABLET ORAL at 21:43

## 2023-05-29 RX ADMIN — OXYCODONE HYDROCHLORIDE 10 MG: 5 TABLET ORAL at 21:43

## 2023-05-29 RX ADMIN — FLUDROCORTISONE ACETATE 0.1 MG: 0.1 TABLET ORAL at 21:43

## 2023-05-29 RX ADMIN — SODIUM CHLORIDE, PRESERVATIVE FREE 10 ML: 5 INJECTION INTRAVENOUS at 21:43

## 2023-05-29 RX ADMIN — MAGNESIUM SULFATE HEPTAHYDRATE 4000 MG: 40 INJECTION, SOLUTION INTRAVENOUS at 14:56

## 2023-05-29 RX ADMIN — ROSUVASTATIN CALCIUM 10 MG: 10 TABLET, FILM COATED ORAL at 18:37

## 2023-05-29 ASSESSMENT — PAIN DESCRIPTION - ORIENTATION: ORIENTATION: UPPER

## 2023-05-29 ASSESSMENT — ENCOUNTER SYMPTOMS
RHINORRHEA: 0
EYE PAIN: 0
SHORTNESS OF BREATH: 0
COUGH: 0
VOMITING: 0
BACK PAIN: 1
COLOR CHANGE: 0
SORE THROAT: 0
NAUSEA: 0
CONSTIPATION: 0
CHEST TIGHTNESS: 0
DIARRHEA: 0
ABDOMINAL PAIN: 0
PHOTOPHOBIA: 0

## 2023-05-29 ASSESSMENT — PAIN SCALES - GENERAL
PAINLEVEL_OUTOF10: 9
PAINLEVEL_OUTOF10: 7

## 2023-05-29 ASSESSMENT — LIFESTYLE VARIABLES
HOW MANY STANDARD DRINKS CONTAINING ALCOHOL DO YOU HAVE ON A TYPICAL DAY: PATIENT DOES NOT DRINK
HOW OFTEN DO YOU HAVE A DRINK CONTAINING ALCOHOL: NEVER

## 2023-05-29 ASSESSMENT — PAIN - FUNCTIONAL ASSESSMENT
PAIN_FUNCTIONAL_ASSESSMENT: ACTIVITIES ARE NOT PREVENTED
PAIN_FUNCTIONAL_ASSESSMENT: 0-10

## 2023-05-29 ASSESSMENT — PAIN DESCRIPTION - DESCRIPTORS: DESCRIPTORS: ACHING

## 2023-05-29 ASSESSMENT — PAIN DESCRIPTION - LOCATION: LOCATION: HEAD

## 2023-05-29 NOTE — PLAN OF CARE
76 yo female with hx of POTS, PSVT, neurogenic bladder, recent admission for headache, HTN urgency. Fall related to wrist pain.  She attempted to use her right hand to steady herself coming from the bathroom but this caused pain and she immediately pulled back which caused her to fall  Generalized weakness  PT/OT  Replace potassium as below  CT head, C-T-L spine, pelvis negative    Troponin elevation  No chest pain  Repeat is pending     Hypokalemia  Continue to replace potassium, additional 40 meq ordered  Repeat labs in am  Replace Mg    POTS  Continue home doses of florinef and midodrine    Neurogenic bladder with concern for not using sterile technique with intermittent self cath during last admission  -Urology consulted at that time, education provided  -has mucus which is from the bowel portion of her urinary tract  -Was on antibiotics, discharged with course of [place], DO

## 2023-05-29 NOTE — ED PROVIDER NOTES
[address] ED  EMERGENCY DEPARTMENT ENCOUNTER        Pt Name: Cris Sotelo  MRN: [id number]  [place] 1955  Date of evaluation: 5/29/2023  Provider: MATIAS Hernandez - CNP  PCP: Mindi Pastor MD    This patient was seen and evaluated by the attending physician No att. providers found. I have evaluated this patient. My supervising physician was available for consultation. CHIEF COMPLAINT       Chief Complaint   Patient presents with    Fall     PT FELL LAST NIGHT. LOWER BACK PAIN. HIT HEAD. DENIES BLOOD THINNERS. HISTORY OF PRESENT ILLNESS   (Location/Symptom, Timing/Onset, Context/Setting, Quality, Duration, Modifying Factors, Severity)  Note limiting factors. Cris Sotelo is a 75 y.o. female who presents via ems for  low back pain status post mechanical fall overnight. Onset was overnight. Duration has been since the onset. Context includes patient presents to the emergency department today via EMS after sustaining a mechanical fall overnight. She states that she got up to go to the bathroom and was walking back from the bathroom attempting to use her right hand to balance her self on her nightstand and she had recent surgery on this hand so she notes it caused pain and she put her hand back causing herself to fall forward. She states that she twisted and she fell falling on her back and striking her head. She denies any loss of consciousness or vomiting and does not take blood thinners. She denies any perineal paresthesia, loss of bowel or bladder control, numbness tingling or changes in sensation to her lower extremities since the fall. She localizes her pain to her lower lumbar paraspinal and spinal area. She denies pain to her neck or upper back. She does endorse \"pain to my butt\" and is worse when she sitting on it. She denies dizziness or lightheadedness prior to or after the fall.   She states that she was unable to get a bed this morning secondary
the dictating provider for clarification.         Cholo Springer,   05/29/23 1537

## 2023-05-29 NOTE — ED NOTES
Report given to TAYA [facility], RN     [address] [place] [address]  05/29/23 4777

## 2023-05-29 NOTE — ED NOTES
1329  Ambulated patient. Starting SaO2 of 100% on room air, with resting pulse of 100. Patient was weightbearing, however, required assistance to stay steady. After 10 steps, patient had a room air SaO2 of 94%, with a pulse of 150. Patient assisted back to bed.      Severiano Rushing  05/29/23 8423

## 2023-05-29 NOTE — FLOWSHEET NOTE
05/29/23 1730   Vital Signs   Temp 100 °F (37.8 °C)   Temp Source Axillary   Pulse (!) 104   Respirations 16   /71   MAP (Calculated) 90   Level of Consciousness 0   MEWS Score 2   Oxygen Therapy   SpO2 97 %   O2 Device None (Room air)     Pt arrived to unit in stable condition. No s/s of distress. CHG done. 4 eyes done. Pt denies pain or SOB. Alert and oriented x4. Assessment and meds completed. VSS. Pt on tele. Denies needs. Bed in low position call light within reach bed alarm on.

## 2023-05-30 ENCOUNTER — APPOINTMENT (OUTPATIENT)
Dept: CT IMAGING | Age: 68
End: 2023-05-30
Payer: MEDICARE

## 2023-05-30 ENCOUNTER — APPOINTMENT (OUTPATIENT)
Dept: GENERAL RADIOLOGY | Age: 68
End: 2023-05-30
Payer: MEDICARE

## 2023-05-30 PROBLEM — Z78.9 UNABLE TO CARE FOR SELF: Status: ACTIVE | Noted: 2023-05-30

## 2023-05-30 PROBLEM — R65.10 SIRS (SYSTEMIC INFLAMMATORY RESPONSE SYNDROME) (HCC): Status: ACTIVE | Noted: 2023-05-30

## 2023-05-30 PROBLEM — E83.42 HYPOMAGNESEMIA: Status: ACTIVE | Noted: 2023-05-30

## 2023-05-30 PROBLEM — R29.6 FREQUENT FALLS: Status: ACTIVE | Noted: 2023-05-30

## 2023-05-30 LAB
ANION GAP SERPL CALCULATED.3IONS-SCNC: 10 MMOL/L (ref 3–16)
BASOPHILS # BLD: 0 K/UL (ref 0–0.2)
BASOPHILS NFR BLD: 0.7 %
BUN SERPL-MCNC: 14 MG/DL (ref 7–20)
CALCIUM SERPL-MCNC: 8.9 MG/DL (ref 8.3–10.6)
CHLORIDE SERPL-SCNC: 104 MMOL/L (ref 99–110)
CO2 SERPL-SCNC: 23 MMOL/L (ref 21–32)
CREAT SERPL-MCNC: 0.6 MG/DL (ref 0.6–1.2)
DEPRECATED RDW RBC AUTO: 18.7 % (ref 12.4–15.4)
EOSINOPHIL # BLD: 0.2 K/UL (ref 0–0.6)
EOSINOPHIL NFR BLD: 3.8 %
GFR SERPLBLD CREATININE-BSD FMLA CKD-EPI: >60 ML/MIN/{1.73_M2}
GLUCOSE SERPL-MCNC: 105 MG/DL (ref 70–99)
HCT VFR BLD AUTO: 31.6 % (ref 36–48)
HGB BLD-MCNC: 10.1 G/DL (ref 12–16)
LACTATE BLDV-SCNC: 1.7 MMOL/L (ref 0.4–2)
LYMPHOCYTES # BLD: 2.9 K/UL (ref 1–5.1)
LYMPHOCYTES NFR BLD: 50.4 %
MAGNESIUM SERPL-MCNC: 2.2 MG/DL (ref 1.8–2.4)
MCH RBC QN AUTO: 26.1 PG (ref 26–34)
MCHC RBC AUTO-ENTMCNC: 32 G/DL (ref 31–36)
MCV RBC AUTO: 81.8 FL (ref 80–100)
MONOCYTES # BLD: 0.5 K/UL (ref 0–1.3)
MONOCYTES NFR BLD: 9.1 %
NEUTROPHILS # BLD: 2 K/UL (ref 1.7–7.7)
NEUTROPHILS NFR BLD: 36 %
PLATELET # BLD AUTO: 209 K/UL (ref 135–450)
PMV BLD AUTO: 7.6 FL (ref 5–10.5)
POTASSIUM SERPL-SCNC: 4.1 MMOL/L (ref 3.5–5.1)
PROCALCITONIN SERPL IA-MCNC: 0.07 NG/ML (ref 0–0.15)
RBC # BLD AUTO: 3.86 M/UL (ref 4–5.2)
SODIUM SERPL-SCNC: 137 MMOL/L (ref 136–145)
WBC # BLD AUTO: 5.7 K/UL (ref 4–11)

## 2023-05-30 PROCEDURE — 0202U NFCT DS 22 TRGT SARS-COV-2: CPT

## 2023-05-30 PROCEDURE — 36415 COLL VENOUS BLD VENIPUNCTURE: CPT

## 2023-05-30 PROCEDURE — 83605 ASSAY OF LACTIC ACID: CPT

## 2023-05-30 PROCEDURE — 99233 SBSQ HOSP IP/OBS HIGH 50: CPT | Performed by: INTERNAL MEDICINE

## 2023-05-30 PROCEDURE — 71045 X-RAY EXAM CHEST 1 VIEW: CPT

## 2023-05-30 PROCEDURE — 97162 PT EVAL MOD COMPLEX 30 MIN: CPT

## 2023-05-30 PROCEDURE — 2580000003 HC RX 258: Performed by: INTERNAL MEDICINE

## 2023-05-30 PROCEDURE — 84145 PROCALCITONIN (PCT): CPT

## 2023-05-30 PROCEDURE — 87040 BLOOD CULTURE FOR BACTERIA: CPT

## 2023-05-30 PROCEDURE — 6370000000 HC RX 637 (ALT 250 FOR IP): Performed by: INTERNAL MEDICINE

## 2023-05-30 PROCEDURE — 6360000002 HC RX W HCPCS: Performed by: INTERNAL MEDICINE

## 2023-05-30 PROCEDURE — G0378 HOSPITAL OBSERVATION PER HR: HCPCS

## 2023-05-30 PROCEDURE — 85025 COMPLETE CBC W/AUTO DIFF WBC: CPT

## 2023-05-30 PROCEDURE — 6360000004 HC RX CONTRAST MEDICATION: Performed by: INTERNAL MEDICINE

## 2023-05-30 PROCEDURE — 97166 OT EVAL MOD COMPLEX 45 MIN: CPT

## 2023-05-30 PROCEDURE — 96361 HYDRATE IV INFUSION ADD-ON: CPT

## 2023-05-30 PROCEDURE — 73201 CT UPPER EXTREMITY W/DYE: CPT

## 2023-05-30 PROCEDURE — 80048 BASIC METABOLIC PNL TOTAL CA: CPT

## 2023-05-30 PROCEDURE — 83735 ASSAY OF MAGNESIUM: CPT

## 2023-05-30 PROCEDURE — 96372 THER/PROPH/DIAG INJ SC/IM: CPT

## 2023-05-30 PROCEDURE — 97530 THERAPEUTIC ACTIVITIES: CPT

## 2023-05-30 RX ORDER — FAMOTIDINE 20 MG/1
20 TABLET, FILM COATED ORAL DAILY
Status: DISCONTINUED | OUTPATIENT
Start: 2023-05-30 | End: 2023-06-02 | Stop reason: HOSPADM

## 2023-05-30 RX ORDER — DESVENLAFAXINE 25 MG/1
25 TABLET, EXTENDED RELEASE ORAL DAILY
Status: DISCONTINUED | OUTPATIENT
Start: 2023-05-30 | End: 2023-06-02 | Stop reason: HOSPADM

## 2023-05-30 RX ORDER — POTASSIUM CHLORIDE 20 MEQ/1
20 TABLET, EXTENDED RELEASE ORAL 2 TIMES DAILY
Status: DISCONTINUED | OUTPATIENT
Start: 2023-05-30 | End: 2023-06-02

## 2023-05-30 RX ORDER — DESVENLAFAXINE SUCCINATE 50 MG/1
50 TABLET, EXTENDED RELEASE ORAL DAILY
Status: DISCONTINUED | OUTPATIENT
Start: 2023-05-30 | End: 2023-05-30 | Stop reason: SDUPTHER

## 2023-05-30 RX ORDER — CLONAZEPAM 1 MG/1
2 TABLET ORAL 2 TIMES DAILY
Status: DISCONTINUED | OUTPATIENT
Start: 2023-05-30 | End: 2023-06-02 | Stop reason: HOSPADM

## 2023-05-30 RX ORDER — METOPROLOL SUCCINATE 25 MG/1
25 TABLET, EXTENDED RELEASE ORAL DAILY
Status: DISCONTINUED | OUTPATIENT
Start: 2023-05-30 | End: 2023-06-02 | Stop reason: HOSPADM

## 2023-05-30 RX ORDER — SULFAMETHOXAZOLE AND TRIMETHOPRIM 800; 160 MG/1; MG/1
1 TABLET ORAL DAILY
Status: DISCONTINUED | OUTPATIENT
Start: 2023-05-30 | End: 2023-06-02 | Stop reason: HOSPADM

## 2023-05-30 RX ORDER — ACETAMINOPHEN 325 MG/1
650 TABLET ORAL ONCE
Status: COMPLETED | OUTPATIENT
Start: 2023-05-30 | End: 2023-05-30

## 2023-05-30 RX ORDER — LEVOTHYROXINE SODIUM 88 UG/1
88 TABLET ORAL DAILY
Status: DISCONTINUED | OUTPATIENT
Start: 2023-05-30 | End: 2023-06-02 | Stop reason: HOSPADM

## 2023-05-30 RX ORDER — ACETAMINOPHEN 325 MG/1
650 TABLET ORAL EVERY 4 HOURS PRN
Status: DISCONTINUED | OUTPATIENT
Start: 2023-05-30 | End: 2023-06-02 | Stop reason: HOSPADM

## 2023-05-30 RX ORDER — SODIUM CHLORIDE 9 MG/ML
INJECTION, SOLUTION INTRAVENOUS CONTINUOUS
Status: DISCONTINUED | OUTPATIENT
Start: 2023-05-30 | End: 2023-05-31

## 2023-05-30 RX ORDER — GABAPENTIN 300 MG/1
600 CAPSULE ORAL 3 TIMES DAILY
Status: DISCONTINUED | OUTPATIENT
Start: 2023-05-30 | End: 2023-06-02 | Stop reason: HOSPADM

## 2023-05-30 RX ADMIN — GABAPENTIN 600 MG: 300 CAPSULE ORAL at 14:33

## 2023-05-30 RX ADMIN — MIDODRINE HYDROCHLORIDE 10 MG: 10 TABLET ORAL at 21:04

## 2023-05-30 RX ADMIN — CLONAZEPAM 2 MG: 1 TABLET ORAL at 21:04

## 2023-05-30 RX ADMIN — FAMOTIDINE 20 MG: 20 TABLET ORAL at 08:40

## 2023-05-30 RX ADMIN — GABAPENTIN 600 MG: 300 CAPSULE ORAL at 21:04

## 2023-05-30 RX ADMIN — ENOXAPARIN SODIUM 40 MG: 100 INJECTION SUBCUTANEOUS at 19:03

## 2023-05-30 RX ADMIN — SULFAMETHOXAZOLE AND TRIMETHOPRIM 1 TABLET: 800; 160 TABLET ORAL at 08:38

## 2023-05-30 RX ADMIN — METOPROLOL SUCCINATE 25 MG: 25 TABLET, EXTENDED RELEASE ORAL at 08:37

## 2023-05-30 RX ADMIN — CLONAZEPAM 2 MG: 1 TABLET ORAL at 08:38

## 2023-05-30 RX ADMIN — ROSUVASTATIN CALCIUM 10 MG: 10 TABLET, FILM COATED ORAL at 08:38

## 2023-05-30 RX ADMIN — ACETAMINOPHEN 650 MG: 325 TABLET ORAL at 08:37

## 2023-05-30 RX ADMIN — ACETAMINOPHEN 650 MG: 325 TABLET ORAL at 15:24

## 2023-05-30 RX ADMIN — OXYCODONE HYDROCHLORIDE 10 MG: 5 TABLET ORAL at 14:41

## 2023-05-30 RX ADMIN — GABAPENTIN 600 MG: 300 CAPSULE ORAL at 08:37

## 2023-05-30 RX ADMIN — LEVOTHYROXINE SODIUM 88 MCG: 88 TABLET ORAL at 06:35

## 2023-05-30 RX ADMIN — SODIUM CHLORIDE, PRESERVATIVE FREE 5 ML: 5 INJECTION INTRAVENOUS at 08:38

## 2023-05-30 RX ADMIN — IOPAMIDOL 75 ML: 755 INJECTION, SOLUTION INTRAVENOUS at 16:51

## 2023-05-30 RX ADMIN — DESVENLAFAXINE SUCCINATE 25 MG: 25 TABLET, EXTENDED RELEASE ORAL at 22:34

## 2023-05-30 RX ADMIN — FLUDROCORTISONE ACETATE 0.1 MG: 0.1 TABLET ORAL at 21:04

## 2023-05-30 RX ADMIN — SODIUM CHLORIDE: 9 INJECTION, SOLUTION INTRAVENOUS at 20:50

## 2023-05-30 RX ADMIN — FLUDROCORTISONE ACETATE 0.1 MG: 0.1 TABLET ORAL at 08:38

## 2023-05-30 RX ADMIN — POTASSIUM CHLORIDE 20 MEQ: 1500 TABLET, EXTENDED RELEASE ORAL at 21:04

## 2023-05-30 ASSESSMENT — PAIN DESCRIPTION - FREQUENCY: FREQUENCY: CONTINUOUS

## 2023-05-30 ASSESSMENT — PAIN - FUNCTIONAL ASSESSMENT: PAIN_FUNCTIONAL_ASSESSMENT: ACTIVITIES ARE NOT PREVENTED

## 2023-05-30 ASSESSMENT — PAIN DESCRIPTION - ONSET: ONSET: GRADUAL

## 2023-05-30 ASSESSMENT — PAIN DESCRIPTION - ORIENTATION: ORIENTATION: ANTERIOR

## 2023-05-30 ASSESSMENT — PAIN SCALES - GENERAL
PAINLEVEL_OUTOF10: 8
PAINLEVEL_OUTOF10: 0

## 2023-05-30 ASSESSMENT — PAIN DESCRIPTION - LOCATION: LOCATION: HEAD

## 2023-05-30 ASSESSMENT — PAIN DESCRIPTION - PAIN TYPE: TYPE: ACUTE PAIN

## 2023-05-30 ASSESSMENT — PAIN DESCRIPTION - DESCRIPTORS: DESCRIPTORS: ACHING;THROBBING

## 2023-05-30 NOTE — ACP (ADVANCE CARE PLANNING)
Advance Care Planning     General Advance Care Planning (ACP) Conversation    Date of Conversation: 5/29/2023  Conducted with: Patient with Decision Making Capacity    Healthcare Decision Maker:    Primary Decision Maker: Gina ShoreVALERIE - [facility] - [phone]  Click here to complete [address] including selection of the Healthcare Decision Maker Relationship (ie \"Primary\"). Today we documented Decision Maker(s) consistent with Legal Next of Kin hierarchy. Content/Action Overview:   Has ACP document(s) on file - reflects the patient's care preferences  Reviewed DNR/DNI and patient elects Full Code (Attempt Resuscitation)    Length of Voluntary ACP Conversation in minutes:  <16 minutes (Non-Billable)    Terri Escobar RN

## 2023-05-30 NOTE — H&P
been produced using speech recognition software and may contain errors related to that system including errors in grammar, punctuation, and spelling, as well as words and phrases that may be inappropriate. If there are any questions or concerns please feel free to contact the dictating provider for clarification.

## 2023-05-30 NOTE — SIGNIFICANT EVENT
RRT called overhead. Pt. Awake, alert and oriented x4. RRT called r/t MEWS of 5; temp of 103.5. See doc flowsheet. Pt. Visibly shivering. No prn tylenol orders at this time. Dr. Danis Valenzuela responded. No new orders placed by writer. POC reviewed between shift RAGHAV Dotson and Dr. Danis Valenzuela.  Lino Orellana RN Clinical

## 2023-05-30 NOTE — CARE COORDINATION
Case Management Assessment  Initial Evaluation    Date/Time of Evaluation: 5/30/2023 12:33 PM  Assessment Completed by: Дмитрий Abreu RN    If patient is discharged prior to next notation, then this note serves as note for discharge by case management. Patient Name: Isabel Hampton                   YOB: 1955  Diagnosis: Hypokalemia [E87.6]  Hypomagnesemia [E83.42]  Elevated troponin [R77.8]  Frequent falls [R29.6]  Unable to care for self [Z78.9]                   Date / Time: 5/29/2023 11:42 AM    Patient Admission Status: Observation   Readmission Risk (Low < 19, Mod (19-27), High > 27): Readmission Risk Score: 14.7 ( )    Current PCP: Aaron Sanon MD  PCP verified by CM? (P) Yes    Chart Reviewed: Yes      History Provided by: (P) Patient  Patient Orientation: (P) Alert and Oriented, Person, Place, Situation    Patient Cognition: (P) Alert    Hospitalization in the last 30 days (Readmission):  Yes    If yes, Readmission Assessment in CM Navigator will be completed.     Advance Directives:      Code Status: Full Code   Patient's Primary Decision Maker is: (P) Named in Scanned ACP Document    Primary Decision Maker: [address] [place] [address] - [phone]    Discharge Planning:    Patient lives with: (P) Alone Type of Home: (P) House  Primary Care Giver: (P) Private caregiver  Patient Support Systems include: (P) Friends/Neighbors (friend provides Edilberto Hebert early am until she goes to bed)   Current Financial resources: (P) Medicare  Current community resources: (P) ECF/Home Care  Current services prior to admission: (P) Durable Medical Equipment            Current DME: (P) Other (Comment), Shower Chair, Wheelchair, Walker (arm brace, leg brace, modified walker)            Type of Home Care services:  (P) OT, PT, Nursing Services [employer] [facility] (PT/OT/SN))    ADLS  Prior functional level: (P) Assistance with the following:, Cooking, Housework, Shopping, Mobility  Current functional level: (P)

## 2023-05-30 NOTE — PLAN OF CARE
Problem: Discharge Planning  Goal: Discharge to home or other facility with appropriate resources  5/29/2023 2148 by Juan F Alvarado RN  Outcome: Progressing  5/29/2023 1912 by Yee Venegas RN  Outcome: Progressing     Problem: Pain  Goal: Verbalizes/displays adequate comfort level or baseline comfort level  5/29/2023 2148 by Juan F Alvarado RN  Outcome: Progressing  5/29/2023 1912 by Yee Venegas RN  Outcome: Progressing     Problem: Safety - Adult  Goal: Free from fall injury  5/29/2023 2148 by Juan F Alvarado RN  Outcome: Progressing  5/29/2023 1912 by Yee Venegas RN  Outcome: Progressing

## 2023-05-30 NOTE — FLOWSHEET NOTE
Received bedside report from Awa Rice RN. Pt shivering, CMU called with elevated HR. This writer obtained VS, see below. Patient febrile with temperature 103.5 & elevated ; MEWS score 5. No PRN Tylenol orders. Placed ice packs to bilateral axillary and groin, removed extra covers, and left just a sheet on patient. Called Rapid Reponse. See Clinical  event note. 05/30/23 0751   Vital Signs   Temp (!) 103.5 °F (39.7 °C)   Temp Source Oral   Pulse (!) 121   Heart Rate Source Monitor   Respirations 20   BP (!) 142/64   MAP (Calculated) 90   BP Location Left upper arm   BP Method Automatic   Patient Position Semi fowlers   Level of Consciousness 0   MEWS Score 5   Pain Assessment   Pain Assessment None - Denies Pain   Pain Level 0   Care Plan - Pain Goals   Verbalizes/displays adequate comfort level or baseline comfort level Encourage patient to monitor pain and request assistance;Assess pain using appropriate pain scale; Implement non-pharmacological measures as appropriate and evaluate response   Opioid-Induced Sedation   POSS Score 1   Oxygen Therapy   SpO2 93 %   O2 Device Nasal cannula   O2 Flow Rate (L/min) 2 L/min

## 2023-05-31 ENCOUNTER — APPOINTMENT (OUTPATIENT)
Dept: VASCULAR LAB | Age: 68
End: 2023-05-31
Payer: MEDICARE

## 2023-05-31 LAB
ANION GAP SERPL CALCULATED.3IONS-SCNC: 9 MMOL/L (ref 3–16)
BASOPHILS # BLD: 0 K/UL (ref 0–0.2)
BASOPHILS NFR BLD: 0.4 %
BILIRUB UR QL STRIP.AUTO: NEGATIVE
BUN SERPL-MCNC: 15 MG/DL (ref 7–20)
CALCIUM SERPL-MCNC: 8.1 MG/DL (ref 8.3–10.6)
CHLORIDE SERPL-SCNC: 108 MMOL/L (ref 99–110)
CLARITY UR: CLEAR
CO2 SERPL-SCNC: 23 MMOL/L (ref 21–32)
COLOR UR: YELLOW
CREAT SERPL-MCNC: <0.5 MG/DL (ref 0.6–1.2)
DEPRECATED RDW RBC AUTO: 18 % (ref 12.4–15.4)
EOSINOPHIL # BLD: 0.2 K/UL (ref 0–0.6)
EOSINOPHIL NFR BLD: 3.2 %
GFR SERPLBLD CREATININE-BSD FMLA CKD-EPI: >60 ML/MIN/{1.73_M2}
GLUCOSE SERPL-MCNC: 88 MG/DL (ref 70–99)
GLUCOSE UR STRIP.AUTO-MCNC: NEGATIVE MG/DL
HCT VFR BLD AUTO: 25.2 % (ref 36–48)
HGB BLD-MCNC: 8 G/DL (ref 12–16)
HGB UR QL STRIP.AUTO: NEGATIVE
KETONES UR STRIP.AUTO-MCNC: NEGATIVE MG/DL
LEUKOCYTE ESTERASE UR QL STRIP.AUTO: NEGATIVE
LYMPHOCYTES # BLD: 2.2 K/UL (ref 1–5.1)
LYMPHOCYTES NFR BLD: 29.1 %
MCH RBC QN AUTO: 25.5 PG (ref 26–34)
MCHC RBC AUTO-ENTMCNC: 31.7 G/DL (ref 31–36)
MCV RBC AUTO: 80.3 FL (ref 80–100)
MONOCYTES # BLD: 0.5 K/UL (ref 0–1.3)
MONOCYTES NFR BLD: 7.2 %
NEUTROPHILS # BLD: 4.5 K/UL (ref 1.7–7.7)
NEUTROPHILS NFR BLD: 60.1 %
NITRITE UR QL STRIP.AUTO: NEGATIVE
PH UR STRIP.AUTO: 6 [PH] (ref 5–8)
PLATELET # BLD AUTO: 194 K/UL (ref 135–450)
PMV BLD AUTO: 7.8 FL (ref 5–10.5)
POTASSIUM SERPL-SCNC: 3.7 MMOL/L (ref 3.5–5.1)
PROT UR STRIP.AUTO-MCNC: NEGATIVE MG/DL
RBC # BLD AUTO: 3.14 M/UL (ref 4–5.2)
REPORT: NORMAL
RESP PATH DNA+RNA PNL NPH NAA+NON-PROBE: NORMAL
SODIUM SERPL-SCNC: 140 MMOL/L (ref 136–145)
SP GR UR STRIP.AUTO: 1.01 (ref 1–1.03)
UA COMPLETE W REFLEX CULTURE PNL UR: NORMAL
UA DIPSTICK W REFLEX MICRO PNL UR: NORMAL
URN SPEC COLLECT METH UR: NORMAL
UROBILINOGEN UR STRIP-ACNC: 1 E.U./DL
WBC # BLD AUTO: 7.5 K/UL (ref 4–11)

## 2023-05-31 PROCEDURE — 80048 BASIC METABOLIC PNL TOTAL CA: CPT

## 2023-05-31 PROCEDURE — 2580000003 HC RX 258: Performed by: INTERNAL MEDICINE

## 2023-05-31 PROCEDURE — 96361 HYDRATE IV INFUSION ADD-ON: CPT

## 2023-05-31 PROCEDURE — G0378 HOSPITAL OBSERVATION PER HR: HCPCS

## 2023-05-31 PROCEDURE — 85025 COMPLETE CBC W/AUTO DIFF WBC: CPT

## 2023-05-31 PROCEDURE — 6370000000 HC RX 637 (ALT 250 FOR IP): Performed by: INTERNAL MEDICINE

## 2023-05-31 PROCEDURE — 6360000002 HC RX W HCPCS: Performed by: INTERNAL MEDICINE

## 2023-05-31 PROCEDURE — 2700000000 HC OXYGEN THERAPY PER DAY

## 2023-05-31 PROCEDURE — 96372 THER/PROPH/DIAG INJ SC/IM: CPT

## 2023-05-31 PROCEDURE — 36415 COLL VENOUS BLD VENIPUNCTURE: CPT

## 2023-05-31 PROCEDURE — 94761 N-INVAS EAR/PLS OXIMETRY MLT: CPT

## 2023-05-31 PROCEDURE — 93970 EXTREMITY STUDY: CPT

## 2023-05-31 PROCEDURE — 96367 TX/PROPH/DG ADDL SEQ IV INF: CPT

## 2023-05-31 PROCEDURE — 81003 URINALYSIS AUTO W/O SCOPE: CPT

## 2023-05-31 PROCEDURE — 99233 SBSQ HOSP IP/OBS HIGH 50: CPT | Performed by: INTERNAL MEDICINE

## 2023-05-31 PROCEDURE — 96366 THER/PROPH/DIAG IV INF ADDON: CPT

## 2023-05-31 RX ADMIN — CLONAZEPAM 2 MG: 1 TABLET ORAL at 09:36

## 2023-05-31 RX ADMIN — CLONAZEPAM 2 MG: 1 TABLET ORAL at 20:28

## 2023-05-31 RX ADMIN — MIDODRINE HYDROCHLORIDE 10 MG: 10 TABLET ORAL at 13:26

## 2023-05-31 RX ADMIN — GABAPENTIN 600 MG: 300 CAPSULE ORAL at 13:26

## 2023-05-31 RX ADMIN — MIDODRINE HYDROCHLORIDE 10 MG: 10 TABLET ORAL at 09:36

## 2023-05-31 RX ADMIN — LEVOTHYROXINE SODIUM 88 MCG: 88 TABLET ORAL at 06:02

## 2023-05-31 RX ADMIN — DESVENLAFAXINE SUCCINATE 25 MG: 25 TABLET, EXTENDED RELEASE ORAL at 09:37

## 2023-05-31 RX ADMIN — MIDODRINE HYDROCHLORIDE 10 MG: 10 TABLET ORAL at 20:28

## 2023-05-31 RX ADMIN — GABAPENTIN 600 MG: 300 CAPSULE ORAL at 09:36

## 2023-05-31 RX ADMIN — POTASSIUM CHLORIDE 20 MEQ: 1500 TABLET, EXTENDED RELEASE ORAL at 20:28

## 2023-05-31 RX ADMIN — FAMOTIDINE 20 MG: 20 TABLET ORAL at 09:36

## 2023-05-31 RX ADMIN — FLUDROCORTISONE ACETATE 0.1 MG: 0.1 TABLET ORAL at 20:28

## 2023-05-31 RX ADMIN — VANCOMYCIN HYDROCHLORIDE 1000 MG: 1 INJECTION, POWDER, LYOPHILIZED, FOR SOLUTION INTRAVENOUS at 09:44

## 2023-05-31 RX ADMIN — ENOXAPARIN SODIUM 40 MG: 100 INJECTION SUBCUTANEOUS at 20:33

## 2023-05-31 RX ADMIN — SODIUM CHLORIDE, PRESERVATIVE FREE 10 ML: 5 INJECTION INTRAVENOUS at 09:37

## 2023-05-31 RX ADMIN — ROSUVASTATIN CALCIUM 10 MG: 10 TABLET, FILM COATED ORAL at 09:36

## 2023-05-31 RX ADMIN — VANCOMYCIN HYDROCHLORIDE 1000 MG: 1 INJECTION, POWDER, LYOPHILIZED, FOR SOLUTION INTRAVENOUS at 22:27

## 2023-05-31 RX ADMIN — OXYCODONE HYDROCHLORIDE 10 MG: 5 TABLET ORAL at 10:49

## 2023-05-31 RX ADMIN — POTASSIUM CHLORIDE 20 MEQ: 1500 TABLET, EXTENDED RELEASE ORAL at 09:36

## 2023-05-31 RX ADMIN — GABAPENTIN 600 MG: 300 CAPSULE ORAL at 20:28

## 2023-05-31 RX ADMIN — FLUDROCORTISONE ACETATE 0.1 MG: 0.1 TABLET ORAL at 09:36

## 2023-05-31 RX ADMIN — METOPROLOL SUCCINATE 25 MG: 25 TABLET, EXTENDED RELEASE ORAL at 09:36

## 2023-05-31 RX ADMIN — CEFEPIME 2000 MG: 2 INJECTION, POWDER, FOR SOLUTION INTRAVENOUS at 16:46

## 2023-05-31 ASSESSMENT — PAIN SCALES - GENERAL: PAINLEVEL_OUTOF10: 8

## 2023-05-31 ASSESSMENT — PAIN DESCRIPTION - DESCRIPTORS: DESCRIPTORS: SHARP

## 2023-05-31 ASSESSMENT — PAIN DESCRIPTION - LOCATION: LOCATION: HEAD;BACK

## 2023-05-31 NOTE — FLOWSHEET NOTE
05/30/23 1915   Handoff   Communication Given Shift Handoff   Handoff Given To [employer] [facility] Received From [facility] Communication Face to Face; At bedside   Time Handoff Given 1915   End of Shift Check Performed Yes

## 2023-05-31 NOTE — PLAN OF CARE
Problem: Discharge Planning  Goal: Discharge to home or other facility with appropriate resources  Outcome: [facility] [facility] (Taken 5/30/2023 0751)  Discharge to home or other facility with appropriate resources: Identify barriers to discharge with patient and caregiver     Problem: Pain  Goal: Verbalizes/displays adequate comfort level or baseline comfort level  Outcome: HH/[facility]SPC Progressing  Flowsheets  Taken 5/30/2023 1417  Verbalizes/displays adequate comfort level or baseline comfort level:   Assess pain using appropriate pain scale   Encourage patient to monitor pain and request assistance  Taken 5/30/2023 0751  Verbalizes/displays adequate comfort level or baseline comfort level:   Encourage patient to monitor pain and request assistance   Assess pain using appropriate pain scale   Implement non-pharmacological measures as appropriate and evaluate response     Problem: Safety - Adult  Goal: Free from fall injury  Outcome: [facility] Progressing     Problem: Infection - Adult  Goal: Absence of infection at discharge  Outcome: [facility] (Taken 5/30/2023 0751)  Absence of infection at discharge:   Assess and monitor for signs and symptoms of infection   Monitor lab/diagnostic results

## 2023-05-31 NOTE — PLAN OF CARE
Problem: Discharge Planning  Goal: Discharge to home or other facility with appropriate resources  5/31/2023 0133 by Aishwarya Rosales RN  Outcome: Progressing     Problem: Pain  Goal: Verbalizes/displays adequate comfort level or baseline comfort level  5/31/2023 0133 by Aishwarya Rosales RN  Outcome: Progressing     Problem: Safety - Adult  Goal: Free from fall injury  5/31/2023 0133 by Aishwarya Rosales RN  Outcome: Progressing     Problem: Infection - Adult  Goal: Absence of infection at discharge  5/31/2023 0133 by Aishwarya Rosales RN  Outcome: Progressing

## 2023-05-31 NOTE — PLAN OF CARE
Problem: Discharge Planning  Goal: Discharge to home or other facility with appropriate resources  5/31/2023 1154 by Elba Wilde RN  Outcome: Progressing     Problem: Pain  Goal: Verbalizes/displays adequate comfort level or baseline comfort level  5/31/2023 1154 by Elba Wilde RN  Outcome: Progressing     Problem: Safety - Adult  Goal: Free from fall injury  5/31/2023 1154 by Elba Wilde RN  Outcome: Progressing     Problem: Infection - Adult  Goal: Absence of infection at discharge  5/31/2023 1154 by Elba Wilde RN  Outcome: Progressing

## 2023-06-01 PROBLEM — D50.9 IRON DEFICIENCY ANEMIA: Status: ACTIVE | Noted: 2023-06-01

## 2023-06-01 LAB
ANION GAP SERPL CALCULATED.3IONS-SCNC: 8 MMOL/L (ref 3–16)
BASOPHILS # BLD: 0 K/UL (ref 0–0.2)
BASOPHILS NFR BLD: 0.5 %
BUN SERPL-MCNC: 11 MG/DL (ref 7–20)
CALCIUM SERPL-MCNC: 8.1 MG/DL (ref 8.3–10.6)
CHLORIDE SERPL-SCNC: 108 MMOL/L (ref 99–110)
CO2 SERPL-SCNC: 23 MMOL/L (ref 21–32)
CREAT SERPL-MCNC: <0.5 MG/DL (ref 0.6–1.2)
DEPRECATED RDW RBC AUTO: 18.6 % (ref 12.4–15.4)
EOSINOPHIL # BLD: 0.2 K/UL (ref 0–0.6)
EOSINOPHIL NFR BLD: 4.8 %
FOLATE SERPL-MCNC: 14.51 NG/ML (ref 4.78–24.2)
GFR SERPLBLD CREATININE-BSD FMLA CKD-EPI: >60 ML/MIN/{1.73_M2}
GLUCOSE SERPL-MCNC: 94 MG/DL (ref 70–99)
HCT VFR BLD AUTO: 24.6 % (ref 36–48)
HGB BLD-MCNC: 7.9 G/DL (ref 12–16)
IRON SATN MFR SERPL: 6 % (ref 15–50)
IRON SERPL-MCNC: 16 UG/DL (ref 37–145)
LYMPHOCYTES # BLD: 1.5 K/UL (ref 1–5.1)
LYMPHOCYTES NFR BLD: 30.7 %
MAGNESIUM SERPL-MCNC: 1.8 MG/DL (ref 1.8–2.4)
MAGNESIUM SERPL-MCNC: 1.8 MG/DL (ref 1.8–2.4)
MCH RBC QN AUTO: 26 PG (ref 26–34)
MCHC RBC AUTO-ENTMCNC: 32.2 G/DL (ref 31–36)
MCV RBC AUTO: 80.9 FL (ref 80–100)
MONOCYTES # BLD: 0.5 K/UL (ref 0–1.3)
MONOCYTES NFR BLD: 10.3 %
NEUTROPHILS # BLD: 2.6 K/UL (ref 1.7–7.7)
NEUTROPHILS NFR BLD: 53.7 %
PLATELET # BLD AUTO: 224 K/UL (ref 135–450)
PMV BLD AUTO: 7.7 FL (ref 5–10.5)
POTASSIUM SERPL-SCNC: 3.4 MMOL/L (ref 3.5–5.1)
RBC # BLD AUTO: 3.04 M/UL (ref 4–5.2)
SODIUM SERPL-SCNC: 139 MMOL/L (ref 136–145)
TIBC SERPL-MCNC: 266 UG/DL (ref 260–445)
VIT B12 SERPL-MCNC: >2000 PG/ML (ref 211–911)
WBC # BLD AUTO: 4.8 K/UL (ref 4–11)

## 2023-06-01 PROCEDURE — 97530 THERAPEUTIC ACTIVITIES: CPT

## 2023-06-01 PROCEDURE — 6360000002 HC RX W HCPCS: Performed by: INTERNAL MEDICINE

## 2023-06-01 PROCEDURE — 82607 VITAMIN B-12: CPT

## 2023-06-01 PROCEDURE — 96366 THER/PROPH/DIAG IV INF ADDON: CPT

## 2023-06-01 PROCEDURE — 85025 COMPLETE CBC W/AUTO DIFF WBC: CPT

## 2023-06-01 PROCEDURE — 85045 AUTOMATED RETICULOCYTE COUNT: CPT

## 2023-06-01 PROCEDURE — 83540 ASSAY OF IRON: CPT

## 2023-06-01 PROCEDURE — 82746 ASSAY OF FOLIC ACID SERUM: CPT

## 2023-06-01 PROCEDURE — 83735 ASSAY OF MAGNESIUM: CPT

## 2023-06-01 PROCEDURE — 96372 THER/PROPH/DIAG INJ SC/IM: CPT

## 2023-06-01 PROCEDURE — 83550 IRON BINDING TEST: CPT

## 2023-06-01 PROCEDURE — 99232 SBSQ HOSP IP/OBS MODERATE 35: CPT | Performed by: INTERNAL MEDICINE

## 2023-06-01 PROCEDURE — 80048 BASIC METABOLIC PNL TOTAL CA: CPT

## 2023-06-01 PROCEDURE — 6370000000 HC RX 637 (ALT 250 FOR IP): Performed by: INTERNAL MEDICINE

## 2023-06-01 PROCEDURE — 36415 COLL VENOUS BLD VENIPUNCTURE: CPT

## 2023-06-01 PROCEDURE — G0378 HOSPITAL OBSERVATION PER HR: HCPCS

## 2023-06-01 PROCEDURE — 2580000003 HC RX 258: Performed by: INTERNAL MEDICINE

## 2023-06-01 RX ADMIN — LEVOTHYROXINE SODIUM 88 MCG: 88 TABLET ORAL at 06:13

## 2023-06-01 RX ADMIN — MIDODRINE HYDROCHLORIDE 10 MG: 10 TABLET ORAL at 13:15

## 2023-06-01 RX ADMIN — SODIUM CHLORIDE, PRESERVATIVE FREE 10 ML: 5 INJECTION INTRAVENOUS at 22:18

## 2023-06-01 RX ADMIN — MIDODRINE HYDROCHLORIDE 10 MG: 10 TABLET ORAL at 08:36

## 2023-06-01 RX ADMIN — CEFEPIME 2000 MG: 2 INJECTION, POWDER, FOR SOLUTION INTRAVENOUS at 03:58

## 2023-06-01 RX ADMIN — ENOXAPARIN SODIUM 40 MG: 100 INJECTION SUBCUTANEOUS at 17:55

## 2023-06-01 RX ADMIN — POTASSIUM CHLORIDE 20 MEQ: 1500 TABLET, EXTENDED RELEASE ORAL at 22:15

## 2023-06-01 RX ADMIN — CLONAZEPAM 2 MG: 1 TABLET ORAL at 22:15

## 2023-06-01 RX ADMIN — FLUDROCORTISONE ACETATE 0.1 MG: 0.1 TABLET ORAL at 22:15

## 2023-06-01 RX ADMIN — POTASSIUM CHLORIDE 20 MEQ: 1500 TABLET, EXTENDED RELEASE ORAL at 08:37

## 2023-06-01 RX ADMIN — METOPROLOL SUCCINATE 25 MG: 25 TABLET, EXTENDED RELEASE ORAL at 08:36

## 2023-06-01 RX ADMIN — ROSUVASTATIN CALCIUM 10 MG: 10 TABLET, FILM COATED ORAL at 08:36

## 2023-06-01 RX ADMIN — CLONAZEPAM 2 MG: 1 TABLET ORAL at 08:36

## 2023-06-01 RX ADMIN — DESVENLAFAXINE SUCCINATE 25 MG: 25 TABLET, EXTENDED RELEASE ORAL at 08:37

## 2023-06-01 RX ADMIN — GABAPENTIN 600 MG: 300 CAPSULE ORAL at 08:36

## 2023-06-01 RX ADMIN — OXYCODONE HYDROCHLORIDE 10 MG: 5 TABLET ORAL at 22:15

## 2023-06-01 RX ADMIN — FLUDROCORTISONE ACETATE 0.1 MG: 0.1 TABLET ORAL at 08:36

## 2023-06-01 RX ADMIN — FAMOTIDINE 20 MG: 20 TABLET ORAL at 08:37

## 2023-06-01 RX ADMIN — GABAPENTIN 600 MG: 300 CAPSULE ORAL at 13:15

## 2023-06-01 RX ADMIN — SODIUM CHLORIDE, PRESERVATIVE FREE 10 ML: 5 INJECTION INTRAVENOUS at 08:37

## 2023-06-01 RX ADMIN — GABAPENTIN 600 MG: 300 CAPSULE ORAL at 22:17

## 2023-06-01 RX ADMIN — MIDODRINE HYDROCHLORIDE 10 MG: 10 TABLET ORAL at 22:15

## 2023-06-01 ASSESSMENT — PAIN - FUNCTIONAL ASSESSMENT: PAIN_FUNCTIONAL_ASSESSMENT: ACTIVITIES ARE NOT PREVENTED

## 2023-06-01 ASSESSMENT — PAIN DESCRIPTION - LOCATION: LOCATION: ANKLE

## 2023-06-01 ASSESSMENT — PAIN DESCRIPTION - DESCRIPTORS: DESCRIPTORS: DISCOMFORT

## 2023-06-01 ASSESSMENT — PAIN DESCRIPTION - ORIENTATION: ORIENTATION: RIGHT

## 2023-06-01 ASSESSMENT — PAIN SCALES - GENERAL
PAINLEVEL_OUTOF10: 7
PAINLEVEL_OUTOF10: 3

## 2023-06-01 NOTE — PLAN OF CARE
Problem: Discharge Planning  Goal: Discharge to home or other facility with appropriate resources  6/1/2023 1019 by Kai Nissen, RN  Outcome: Progressing     Problem: Pain  Goal: Verbalizes/displays adequate comfort level or baseline comfort level  6/1/2023 1019 by Kai Nissen, RN  Outcome: Progressing     Problem: Safety - Adult  Goal: Free from fall injury  6/1/2023 1019 by Kai Nissen, RN  Outcome: Progressing     Problem: Infection - Adult  Goal: Absence of infection at discharge  6/1/2023 1019 by Kai Nissen, RN  Outcome: Progressing

## 2023-06-02 VITALS
HEART RATE: 74 BPM | DIASTOLIC BLOOD PRESSURE: 60 MMHG | WEIGHT: 165 LBS | BODY MASS INDEX: 31.15 KG/M2 | OXYGEN SATURATION: 100 % | TEMPERATURE: 99 F | RESPIRATION RATE: 16 BRPM | HEIGHT: 61 IN | SYSTOLIC BLOOD PRESSURE: 124 MMHG

## 2023-06-02 LAB
ANION GAP SERPL CALCULATED.3IONS-SCNC: 10 MMOL/L (ref 3–16)
BASOPHILS # BLD: 0 K/UL (ref 0–0.2)
BASOPHILS NFR BLD: 1.3 %
BUN SERPL-MCNC: 14 MG/DL (ref 7–20)
CALCIUM SERPL-MCNC: 8.9 MG/DL (ref 8.3–10.6)
CHLORIDE SERPL-SCNC: 110 MMOL/L (ref 99–110)
CO2 SERPL-SCNC: 22 MMOL/L (ref 21–32)
CREAT SERPL-MCNC: <0.5 MG/DL (ref 0.6–1.2)
DEPRECATED RDW RBC AUTO: 18.9 % (ref 12.4–15.4)
EOSINOPHIL # BLD: 0.2 K/UL (ref 0–0.6)
EOSINOPHIL NFR BLD: 5.2 %
GFR SERPLBLD CREATININE-BSD FMLA CKD-EPI: >60 ML/MIN/{1.73_M2}
GLUCOSE SERPL-MCNC: 139 MG/DL (ref 70–99)
HCT VFR BLD AUTO: 26.5 % (ref 36–48)
HEMOCCULT STL QL: NORMAL
HGB BLD-MCNC: 8.2 G/DL (ref 12–16)
IMMATURE RETIC FRACT: 0.61 (ref 0.21–0.37)
LYMPHOCYTES # BLD: 1.8 K/UL (ref 1–5.1)
LYMPHOCYTES NFR BLD: 45.8 %
MAGNESIUM SERPL-MCNC: 1.7 MG/DL (ref 1.8–2.4)
MCH RBC QN AUTO: 25.9 PG (ref 26–34)
MCHC RBC AUTO-ENTMCNC: 31.1 G/DL (ref 31–36)
MCV RBC AUTO: 83.3 FL (ref 80–100)
MONOCYTES # BLD: 0.4 K/UL (ref 0–1.3)
MONOCYTES NFR BLD: 9.2 %
NEUTROPHILS # BLD: 1.5 K/UL (ref 1.7–7.7)
NEUTROPHILS NFR BLD: 38.5 %
PLATELET # BLD AUTO: 232 K/UL (ref 135–450)
PMV BLD AUTO: 7.7 FL (ref 5–10.5)
POTASSIUM SERPL-SCNC: 3.3 MMOL/L (ref 3.5–5.1)
RBC # BLD AUTO: 3.18 M/UL (ref 4–5.2)
RETICS # AUTO: 0.06 M/UL (ref 0.02–0.1)
RETICS/RBC NFR AUTO: 2.01 % (ref 0.5–2.18)
SODIUM SERPL-SCNC: 142 MMOL/L (ref 136–145)
WBC # BLD AUTO: 3.9 K/UL (ref 4–11)

## 2023-06-02 PROCEDURE — G0378 HOSPITAL OBSERVATION PER HR: HCPCS

## 2023-06-02 PROCEDURE — 82270 OCCULT BLOOD FECES: CPT

## 2023-06-02 PROCEDURE — 36415 COLL VENOUS BLD VENIPUNCTURE: CPT

## 2023-06-02 PROCEDURE — 6370000000 HC RX 637 (ALT 250 FOR IP): Performed by: INTERNAL MEDICINE

## 2023-06-02 PROCEDURE — 83735 ASSAY OF MAGNESIUM: CPT

## 2023-06-02 PROCEDURE — 99239 HOSP IP/OBS DSCHRG MGMT >30: CPT | Performed by: INTERNAL MEDICINE

## 2023-06-02 PROCEDURE — 80048 BASIC METABOLIC PNL TOTAL CA: CPT

## 2023-06-02 PROCEDURE — 2580000003 HC RX 258: Performed by: INTERNAL MEDICINE

## 2023-06-02 PROCEDURE — 85025 COMPLETE CBC W/AUTO DIFF WBC: CPT

## 2023-06-02 PROCEDURE — 87506 IADNA-DNA/RNA PROBE TQ 6-11: CPT

## 2023-06-02 RX ORDER — SUCRALFATE 1 G/1
1 TABLET ORAL
Qty: 120 TABLET | Refills: 3 | Status: CANCELLED | OUTPATIENT
Start: 2023-06-02

## 2023-06-02 RX ORDER — POTASSIUM CHLORIDE 20 MEQ/1
20 TABLET, EXTENDED RELEASE ORAL
Qty: 90 TABLET | Refills: 2 | Status: SHIPPED | OUTPATIENT
Start: 2023-06-02 | End: 2023-08-31

## 2023-06-02 RX ORDER — PANTOPRAZOLE SODIUM 40 MG/1
40 TABLET, DELAYED RELEASE ORAL
Status: DISCONTINUED | OUTPATIENT
Start: 2023-06-02 | End: 2023-06-02 | Stop reason: HOSPADM

## 2023-06-02 RX ORDER — FERROUS SULFATE 325(65) MG
325 TABLET ORAL EVERY OTHER DAY
Status: DISCONTINUED | OUTPATIENT
Start: 2023-06-02 | End: 2023-06-02 | Stop reason: HOSPADM

## 2023-06-02 RX ORDER — FERROUS SULFATE 325(65) MG
325 TABLET ORAL EVERY OTHER DAY
Qty: 30 TABLET | Refills: 3 | Status: SHIPPED | OUTPATIENT
Start: 2023-06-02

## 2023-06-02 RX ORDER — POTASSIUM CHLORIDE 20 MEQ/1
20 TABLET, EXTENDED RELEASE ORAL
Status: DISCONTINUED | OUTPATIENT
Start: 2023-06-02 | End: 2023-06-02 | Stop reason: HOSPADM

## 2023-06-02 RX ORDER — SUCRALFATE 1 G/1
1 TABLET ORAL EVERY 6 HOURS SCHEDULED
Status: DISCONTINUED | OUTPATIENT
Start: 2023-06-02 | End: 2023-06-02 | Stop reason: HOSPADM

## 2023-06-02 RX ORDER — LANOLIN ALCOHOL/MO/W.PET/CERES
400 CREAM (GRAM) TOPICAL DAILY
Status: DISCONTINUED | OUTPATIENT
Start: 2023-06-02 | End: 2023-06-02 | Stop reason: HOSPADM

## 2023-06-02 RX ADMIN — FLUDROCORTISONE ACETATE 0.1 MG: 0.1 TABLET ORAL at 08:55

## 2023-06-02 RX ADMIN — FAMOTIDINE 20 MG: 20 TABLET ORAL at 08:55

## 2023-06-02 RX ADMIN — LEVOTHYROXINE SODIUM 88 MCG: 88 TABLET ORAL at 05:54

## 2023-06-02 RX ADMIN — CLONAZEPAM 2 MG: 1 TABLET ORAL at 08:55

## 2023-06-02 RX ADMIN — ROSUVASTATIN CALCIUM 10 MG: 10 TABLET, FILM COATED ORAL at 08:56

## 2023-06-02 RX ADMIN — METOPROLOL SUCCINATE 25 MG: 25 TABLET, EXTENDED RELEASE ORAL at 08:56

## 2023-06-02 RX ADMIN — GABAPENTIN 600 MG: 300 CAPSULE ORAL at 14:17

## 2023-06-02 RX ADMIN — SULFAMETHOXAZOLE AND TRIMETHOPRIM 1 TABLET: 800; 160 TABLET ORAL at 08:56

## 2023-06-02 RX ADMIN — DESVENLAFAXINE SUCCINATE 25 MG: 25 TABLET, EXTENDED RELEASE ORAL at 08:56

## 2023-06-02 RX ADMIN — POTASSIUM CHLORIDE 20 MEQ: 1500 TABLET, EXTENDED RELEASE ORAL at 08:56

## 2023-06-02 RX ADMIN — MIDODRINE HYDROCHLORIDE 10 MG: 10 TABLET ORAL at 14:17

## 2023-06-02 RX ADMIN — GABAPENTIN 600 MG: 300 CAPSULE ORAL at 08:56

## 2023-06-02 RX ADMIN — SODIUM CHLORIDE, PRESERVATIVE FREE 10 ML: 5 INJECTION INTRAVENOUS at 08:56

## 2023-06-02 RX ADMIN — Medication 400 MG: at 14:17

## 2023-06-02 RX ADMIN — MIDODRINE HYDROCHLORIDE 10 MG: 10 TABLET ORAL at 08:55

## 2023-06-02 NOTE — DISCHARGE SUMMARY
Name:  Perez Hernández  Room:  0213/0213-02  MRN:    [id number]    Discharge Summary      This discharge summary is in conjunction with a complete physical exam done on the day of discharge. Discharging Physician: Dr. Jason Stage: 5/29/2023  Discharge:  5/31/2023    HPI taken from admission H&P:    Aiyana Shrestha is a 75 y.o. female with hypertension, hyperlipidemia, NATHAN, depression, anxiety, history of POTS, neurogenic bladder, hypothyroidism, recent right radius fracture presented to ED after having a fall at home. Patient reported that she has fracture of her right wrist, was in the bathroom today. While trying to get up from the commode she tried to use her fingertips to get off the toilet but fell backwards. Patient's friend helped her in getting up from the floor. She hit her head lightly but did not lose consciousness. Denying any other complaints. Patient denying any chest pain, shortness of breath, denied any abdominal pain, denied any nausea, vomiting, urinary complaints, denied any constipation or diarrhea. Vital sign ED-/86, , RR 18, temp 98.1, saturating 97% on room air. Labs were significant for potassium 2.9, magnesium 1.4, random glucose 123, troponin 15, repeat 14, LFTs within normal range, hemoglobin 9.6. CT head, CT C-spine, T-spine, L-spine, CT pelvis done in ED. No new acute fractures. CT chest-no acute process. EKG-sinus tachycardia. Patient was given potassium and magnesium supplementation, lidocaine patch in ED. Diagnoses this Admission and Hospital Course      #. Fall  -CT head, CT C-spine, T-spine, L-spine, CT pelvis-no acute fractures.  -Probably old superior endplate compression deformity of T9, old fracture deformity of right iliac wing. -PT/OT evaluation.      Fever, SIRS (HR, BP, fever) with unknown source, recent treatment for UTI  Denies any abdominal symptoms  -wrist pain - CT no infection  -Possible recent diarrhea - send stool studies if any
stenosis of the brachiocephalic or subclavian arteries. CAROTID ARTERIES: No dissection, arterial injury, or hemodynamically significant stenosis by NASCET criteria. VERTEBRAL ARTERIES: No dissection, arterial injury, or significant stenosis. SOFT TISSUES: The lung apices are clear. No cervical or superior mediastinal lymphadenopathy. The larynx and pharynx are unremarkable. No acute abnormality of the salivary and thyroid glands. BONES: No acute osseous abnormality. CTA HEAD: ANTERIOR CIRCULATION: No significant stenosis of the intracranial internal carotid, anterior cerebral, or middle cerebral arteries. No aneurysm. POSTERIOR CIRCULATION: No significant stenosis of the vertebral, basilar, or posterior cerebral arteries. No aneurysm. OTHER: No dural venous sinus thrombosis on this non-dedicated study. BRAIN: Please refer to the report for the dedicated noncontrast head CT. Unremarkable CTA of the head and neck. The patient was seen and examined on day of discharge and this discharge summary is in conjunction with any daily progress note from day of discharge. Time Spent on discharge is more than 30 minutes in the examination, evaluation, counseling and review of medications and discharge plan. Thanh Neves DO   6/2/2023      Thank you Fortunato Prakash MD for the opportunity to be involved in this patient's care. If you have any questions or concerns please feel free to contact me at [facility].

## 2023-06-02 NOTE — CARE COORDINATION
DISCHARGE ORDER  Date/Time 2023 1:50 PM  Completed by: Dav Mckay RN, Case Management    Patient Name: Gi Baig      : 1955  Admitting Diagnosis: Hypokalemia [E87.6]  Hypomagnesemia [E83.42]  Elevated troponin [R77.8]  Frequent falls [R29.6]  Unable to care for self [Z78.9]      Admit order Date and Status:OBS  (verify MD's last order for status of admission)      Noted discharge order. If applicable PT/OT recommendation at Discharge: [facility]  Discharge Recommendations: Home with 24hr assistance and Home PT  DME needs for discharge: Needs Met    Confirmed discharge plan   Discharge Plan: Chart reviewed. Met with pt at bedside and explained the role of the CM. Pt will call friend for transport home. Stays alone at night but has  with her from 9:00 am till 9-10PM most days. She does not have 24/7 assistance and adamantly refuses SNF. She does agree to resumption of [place] [address] services with [facility]. Orders and DANIELLE/AVS in Epic and will be pulled for start of care. Date of Last IMM Given: BRUNER letter 2023    Has Home O2 in place on admit:  No  Informed of need to bring portable home O2 tank on day of discharge for nursing to connect prior to leaving:   No  Verbalized agreement/Understanding:   No  Pt is being d/c'd to home today. Pt's O2 sats are 100% on RA. Discharge timeout done with [facility]. All discharge needs and concerns addressed. Dental

## 2023-06-02 NOTE — PLAN OF CARE
Problem: Discharge Planning  Goal: Discharge to home or other facility with appropriate resources  6/2/2023 1006 by Jordan Levi RN  Outcome: Progressing     Problem: Pain  Goal: Verbalizes/displays adequate comfort level or baseline comfort level  6/2/2023 1006 by Jordan Levi RN  Outcome: Progressing     Problem: Safety - Adult  Goal: Free from fall injury  6/2/2023 1006 by Jordan Levi RN  Outcome: Progressing     Problem: Infection - Adult  Goal: Absence of infection at discharge  6/2/2023 1006 by Jordan Levi RN  Outcome: Progressing

## 2023-06-02 NOTE — CONSULTS
Pharmacy to dose IV Vanco Sepsis x7 days:  Please give an IV loading dose of 1.5g after urine Cx obtained. Continue to dose at 1g IV Q12hrs and check a Trough 6/1 at 2000.   Pred , Tr 15mcg/mL, Tox 10%  EDWARD Hodge [facility] PharmD 5/30/2023 8:16 PM
SURGERY  09/09/2011     REPAIR INCISIONAL HERNIA REDUCIBLE WITH POSSIBLE MESH    BACK SURGERY      x3    BACK SURGERY      stimulator    BLADDER SUSPENSION      CARDIAC PACEMAKER PLACEMENT  2011    Heart doctor at [address] Left 03/08/2018    PHACO EMULSIFICATION OF CATARACT WITH INTRAOCULAR LENS IMPLANT LEFT EYE    CERVICAL DISCECTOMY  06/2014    and fusion    CHOLECYSTECTOMY      COLONOSCOPY  2002 1/26/12    ENDOSCOPY, COLON, DIAGNOSTIC      EPIDURAL STEROID INJECTION Left 08/15/2019    LEFT KNEE GENICULAR NERVE BLOCK SITE CONFIRMED BY FLUOROSCOPY performed by Chau Ko MD at [facility] Right 04/05/2018    cataract removal    FOOT SURGERY Right 12/06/2012    arthroplasty    FOOT SURGERY Left 04/30/2015    FOOT SURGERY Left 04/30/2015    GASTRIC BYPASS SURGERY  [id number]    HYSTERECTOMY (CERVIX STATUS UNKNOWN)      KNEE ARTHROSCOPY Left 10/03/2014    part.  medial & lateral meniscectomy, synovectomy plica exc    KNEE ARTHROSCOPY Right 10/13/2015    partial medial meniscectomy, chondroplasty, partial lateral meniscectomy    NECK SURGERY      OTHER SURGICAL HISTORY  04/2013    removal spinal cord stimulater    OTHER SURGICAL HISTORY      bladder stimulator    OTHER SURGICAL HISTORY  08/30/2017    left patellofemoral arthroplasty    OTHER SURGICAL HISTORY  05/09/2018    convert left patellofemoral arthroplasty to left total knee replacement    Marcello Moran  2011    Dr Mahoney/checked last week/told has 12 more yrs for this one/set at 60    SKIN BIOPSY      abdomen    SPINE SURGERY      stimulator    THROAT SURGERY      polyps removed    TONSILLECTOMY      UPPER GASTROINTESTINAL ENDOSCOPY      UPPER GASTROINTESTINAL ENDOSCOPY  05/03/2017       Social:   Social History     Tobacco Use    Smoking status: Never    Smokeless tobacco: Never   Substance Use Topics    Alcohol use: No     Alcohol/week: 0.0 standard drinks     Family:   Family

## 2023-06-02 NOTE — DISCHARGE INSTR - COC
Continuity of Care Form    Patient Name: Radha Hernandez   :  1955  MRN:  [id number]    Admit date:  2023  Discharge date:  23    Code Status Order: Full Code   Advance Directives:     Admitting Physician:  Danielito Gagnon DO  PCP: Isabel Lester MD    Discharging Nurse: [facility] Unit/Room#: 0213/0213-02  Discharging Unit Phone Number: [phone]    Emergency Contact:   Extended Emergency Contact Information  Primary Emergency Contact: [address] [facility] Phone: [phone]  Mobile Phone: [phone]  Relation: Other   needed? No  Secondary Emergency Contact: [address] [facility] Phone: [phone]  Mobile Phone: [phone]  Relation: Other  Preferred language: English   needed? No    Past Surgical History:  Past Surgical History:   Procedure Laterality Date    ABDOMEN SURGERY  2011     REPAIR INCISIONAL HERNIA REDUCIBLE WITH POSSIBLE MESH    BACK SURGERY      x3    BACK SURGERY      stimulator    BLADDER SUSPENSION      CARDIAC PACEMAKER PLACEMENT      Heart doctor at [address], [facility] Left 2018    PHACO EMULSIFICATION OF CATARACT WITH INTRAOCULAR LENS IMPLANT LEFT EYE    CERVICAL DISCECTOMY  2014    and fusion    CHOLECYSTECTOMY      COLONOSCOPY  12    ENDOSCOPY, COLON, DIAGNOSTIC      EPIDURAL STEROID INJECTION Left 08/15/2019    LEFT KNEE GENICULAR NERVE BLOCK SITE CONFIRMED BY FLUOROSCOPY performed by Ana M Butcher MD at [facility] Right 2018    cataract removal    FOOT SURGERY Right 2012    arthroplasty    FOOT SURGERY Left 2015    FOOT SURGERY Left 2015    GASTRIC BYPASS SURGERY  [id number]    HYSTERECTOMY (CERVIX STATUS UNKNOWN)      KNEE ARTHROSCOPY Left 10/03/2014    part.  medial & lateral meniscectomy, synovectomy plica exc    KNEE ARTHROSCOPY Right 10/13/2015    partial medial meniscectomy, chondroplasty, partial lateral meniscectomy    NECK SURGERY

## 2023-06-02 NOTE — DISCHARGE INSTRUCTIONS
Follow-up with your regular gastroenterologist due to anemia with iron deficiency    See your primary care for hospital follow-up, anemia    Follow-up with Dr. Mara Carbone who has seen you in the past for hormone problems, possible adrenal and thyroid problems    Follow-up with Ortho for your recent fx  Your information:  Name: Daisy Pepe  : 1955    Your instructions: Follow up with family doctor in 1 week    What to do after you leave the hospital:    Recommended diet: regular diet    Recommended activity: activity as tolerated        The following personal items were collected during your admission and were returned to you:    Belongings  Dental Appliances: None  Vision - Corrective Lenses: None  Hearing Aid: None  Clothing: Pajamas  Jewelry: None  Electronic Devices: Cell Phone,   Weapons (Notify Protective Services/Security): None  Home Medications: None  Valuables Given To: Patient  Provide Name(s) of Who Valuable(s) Were Given To: PATIENT    Information obtained by:  By signing below, I understand that if any problems occur once I leave the hospital I am to contact family doctor. I understand and acknowledge receipt of the instructions indicated above.

## 2023-06-02 NOTE — PROGRESS NOTES
Accessed patient chart to assist ultrasound tech and to check O2
Bedside Mobility Assessment Tool (BMAT):     Assessment Level 1- Sit and Shake    1. From a semi-reclined position, ask patient to sit up and rotate to a seated position at the side of the bed. Can use the bedrail. 2. Ask patient to reach out and grab your hand and shake making sure patient reaches across his/her midline. Pass- Patient is able to come to a seated position, maintain core strength. Maintains seated balance while reaching across midline. Move on to Assessment Level 2. Assessment Level 2- Stretch and Point   1. With patient in seated position at the side of the bed, have patient place both feet on the floor (or stool) with knees no higher than hips. 2. Ask patient to stretch one leg and straighten the knee, then bend the ankle/flex and point the toes. If appropriate, repeat with the other leg. Pass- Patient is able to demonstrate appropriate quad strength on intended weight bearing limb(s). Move onto Assessment Level 3. Assessment Level 3- Stand   1. Ask patient to elevate off the bed or chair (seated to standing) using an assistive device (cane, bedrail). 2. Patient should be able to raise buttocks off be and hold for a count of five. May repeat once. Pass- Patient maintains standing stability for at least 5 seconds, proceed to assessment level 4. Assessment Level 4- Walk   1. Ask patient to march in place at bedside. 2. Then ask patient to advance step and return each foot. Some medical conditions may render a patient from stepping backwards, use your best clinical judgement. Pass- Patient demonstrates balance while shifting weight and ability to step, takes independent steps, does not use assistive device patient is MOBILITY LEVEL 4.       Mobility Level- 4
[facility] Progress Note    Daily Progress Note for 2023 7:54 PM 3/0213-02  Veronica Monroy : 1955 Age: 75 y.o. Sex: female  Length of Stay:  0    Interval History:      CC: F/U Fall (PT FELL LAST NIGHT. LOWER BACK PAIN. HIT HEAD. DENIES BLOOD THINNERS. )    Subjective:       Pt spiked fever, has chills. Feels cold. She reports only complaint is wrist pain. She is not having any abdominal pain or chest pain. No cough or shortness of breath. She says she has had some diarrhea. No body aches except chronic aches and pains. She reports she completed Levaquin and did not have any pills left when she came to the hospital. Denies any new wounds or injuries. Objective:     Vitals:    23 0751 23 0947 23 1025 23 1417   BP: (!) 142/64 (!) 91/53  127/66   Pulse: (!) 121 (!) 104  95   Resp: 20 15  18   Temp: (!) 103.5 °F (39.7 °C) 100.1 °F (37.8 °C) (!) 101.1 °F (38.4 °C) (!) 100.8 °F (38.2 °C)   TempSrc: Oral Oral Oral Oral   SpO2: 93% 90%  94%   Weight:       Height:              Intake/Output Summary (Last 24 hours) at 2023  Last data filed at 2023 1447  Gross per 24 hour   Intake 5 ml   Output 700 ml   Net -695 ml     Body mass index is 31.18 kg/m². Physical Exam:  General: Cooperative, pleasant/Ill appearing, on  Nasal cannula  HEENT:  Head: normocephalic,atraumatic, anicteric sclera, clear conjunctiva  Neck: Normal size, Jugular venous pulsations: normal  Respiratory:unlabored breathing, clear to auscultation with no crackles, wheezes rhonchi  Heart: Regular rate and rhythm, S1, S2-normal, No murmurs  Abdomen: soft, nondistended, nontender, normoactive bowel sounds,  Neurological/Psych: Alert and oriented times three, no focal neurological deficits, Mood and affect appropriate.   Skin: No obvious rashes    Extremities:  no edema, Pedal pulses 2+ bilaterally    Scheduled Medications:  clonazePAM, 2 mg, BID  desvenlafaxine succinate, 50 mg,
Consult called in to [address] [facility], 6-2-23 @921.  Jhon Lowery
Discharge instructions given to pt verbalize understanding. Pt waiting for her ride.
Family here transport called pt discharged.
HS assessment completed. No complaints of pain or discomfort voiced. No signs of symptoms of distress noted. Patient tolerated night medications well. Respirations easy and even. Bed in lowest position, bed alarm in place and functioning properly, bed rails x2 up,  Call light within reach. Bedside Mobility Assessment Tool (BMAT):     Assessment Level 1- Sit and Shake    1. From a semi-reclined position, ask patient to sit up and rotate to a seated position at the side of the bed. Can use the bedrail. 2. Ask patient to reach out and grab your hand and shake making sure patient reaches across his/her midline. Pass- Patient is able to come to a seated position, maintain core strength. Maintains seated balance while reaching across midline. Move on to Assessment Level 2. Assessment Level 2- Stretch and Point   1. With patient in seated position at the side of the bed, have patient place both feet on the floor (or stool) with knees no higher than hips. 2. Ask patient to stretch one leg and straighten the knee, then bend the ankle/flex and point the toes. If appropriate, repeat with the other leg. Pass- Patient is able to demonstrate appropriate quad strength on intended weight bearing limb(s). Move onto Assessment Level 3. Assessment Level 3- Stand   1. Ask patient to elevate off the bed or chair (seated to standing) using an assistive device (cane, bedrail). 2. Patient should be able to raise buttocks off be and hold for a count of five. May repeat once. Pass- Patient maintains standing stability for at least 5 seconds, proceed to assessment level 4. Assessment Level 4- Walk   1. Ask patient to march in place at bedside. 2. Then ask patient to advance step and return each foot. Some medical conditions may render a patient from stepping backwards, use your best clinical judgement. Fail- Patient not able to complete tasks OR requires use of assistive device. Patient is MOBILITY LEVEL 3.
Handoff report and transfer of care given at bedside to [facility]. Patient in stable condition, denies needs/concerns at this time. Call light within reach.
Handoff report and transfer of care given at bedside to [facility]. Patient in stable condition, denies needs/concerns at this time. Call light within reach.
Inpatient Occupational Therapy Evaluation and Treatment    Unit: [facility]  Date:  5/30/2023  Patient Name:    Aiyana Shrestha  Admitting diagnosis:  Hypokalemia [E87.6]  Hypomagnesemia [E83.42]  Elevated troponin [R77.8]  Frequent falls [R29.6]  Unable to care for self [Z78.9]  Admit Date:  5/29/2023  Precautions/Restrictions/WB Status/ Lines/ Wounds/ Oxygen: Fall risk, Bed/chair alarm, Telemetry, and Continuous pulse oximetry  -Probably old superior endplate compression deformity of T9, old fracture deformity of right iliac wing. Right radius fracture  -S/p surgical repair-in brace     R high tide boot, RLE WBAT. R wrist splint, RUE NWB. Treatment Time:  2930-6085  Treatment Number:  1  Timed Code Treatment Minutes: 20 minutes  Total Treatment Minutes:  30  minutes    Patient Goals for Therapy: \"go home  \"          Discharge Recommendations: Home with 24/7 assist   DME needs for discharge:  palat from walker         Therapy recommendations for staff:   Assist of 1 for transfers with use of rolling walker (RW) to/from [facility]  to/from chair with platform on RW and gait belt     History of Present Illness: per H&P    75 y.o. female who presents via ems for  low back pain status post mechanical fall overnight. Onset was overnight. Duration has been since the onset. Context includes patient presents to the emergency department today via EMS after sustaining a mechanical fall overnight. She states that she got up to go to the bathroom and was walking back from the bathroom attempting to use her right hand to balance her self on her nightstand and she had recent surgery on this hand so she notes it caused pain and she put her hand back causing herself to fall forward. She states that she twisted and she fell falling on her back and striking her head. She denies any loss of consciousness or vomiting and does not take blood thinners.   She denies any perineal paresthesia, loss of bowel or bladder control, numbness tingling
Inpatient Physical Therapy Evaluation & Treatment    Unit: [address]  Date:  5/30/2023  Patient Name:    Mamie Coleman  Admitting diagnosis:  Hypokalemia [E87.6]  Hypomagnesemia [E83.42]  Elevated troponin [R77.8]  Frequent falls [R29.6]  Unable to care for self [Z78.9]  Admit Date:  5/29/2023  Precautions/Restrictions/WB Status/ Lines/ Wounds/ Oxygen: Fall risk, Bed/chair alarm, Lines (IV and Supplemental O2 (2L)), and Telemetry    Treatment Time:  1320- 1350  Treatment Number:  1   Timed Code Treatment Minutes: 10 minutes  Total Treatment Minutes:  30  minutes (minutes split with OT)    Patient Stated Goals for Therapy: none stated          Discharge Recommendations: Home with 24hr assistance and Home PT  DME needs for discharge: Needs Met       Therapy recommendation for EMS Transport: can transport by wheelchair    Therapy recommendations for staff:   Assist of 1 for transfers with use of gait belt and platform walker to/from [facility]    History of Present Illness:   Per ED note:   Pt is a  is a 75 y.o. female who presents via ems for  low back pain status post mechanical fall overnight. Onset was overnight. Duration has been since the onset. Context includes patient presents to the emergency department today via EMS after sustaining a mechanical fall overnight. She states that she got up to go to the bathroom and was walking back from the bathroom attempting to use her right hand to balance her self on her nightstand and she had recent surgery on this hand so she notes it caused pain and she put her hand back causing herself to fall forward. She states that she twisted and she fell falling on her back and striking her head. She denies any loss of consciousness or vomiting and does not take blood thinners. She denies any perineal paresthesia, loss of bowel or bladder control, numbness tingling or changes in sensation to her lower extremities since the fall.   She localizes her pain to her lower lumbar paraspinal and
Inpatient Physical Therapy Treatment    Unit: [address]  Date:  6/1/2023  Patient Name:    Kendy Scott  Admitting diagnosis:  Hypokalemia [E87.6]  Hypomagnesemia [E83.42]  Elevated troponin [R77.8]  Frequent falls [R29.6]  Unable to care for self [Z78.9]  Admit Date:  5/29/2023  Precautions/Restrictions/WB Status/ Lines/ Wounds/ Oxygen: Fall risk, Bed/chair alarm, Lines (IV), Telemetry, and WB Restrictions (NWB RUE in brace, WBAT RLE in high tide boot)    Treatment Time:  11:10 - 11:52  Treatment Number:  2   Timed Code Treatment Minutes: 41 minutes  Total Treatment Minutes:  41 minutes    Patient Stated Goals for Therapy: none stated          Discharge Recommendations: SNF - pt adamantly refuses SNF and will return home, will need 24hr assist and HHPT at D/C  DME needs for discharge: Needs Met - pt has platform RW at home       Therapy recommendation for EMS Transport: can transport by wheelchair    Therapy recommendations for staff:   Assist of 1 for transfers with use of gait belt and platform walker to/from [facility]    History of Present Illness:   Per ED note:   Pt is a  is a 75 y.o. female who presents via ems for  low back pain status post mechanical fall overnight. Onset was overnight. Duration has been since the onset. Context includes patient presents to the emergency department today via EMS after sustaining a mechanical fall overnight. She states that she got up to go to the bathroom and was walking back from the bathroom attempting to use her right hand to balance her self on her nightstand and she had recent surgery on this hand so she notes it caused pain and she put her hand back causing herself to fall forward. She states that she twisted and she fell falling on her back and striking her head. She denies any loss of consciousness or vomiting and does not take blood thinners.   She denies any perineal paresthesia, loss of bowel or bladder control, numbness tingling or changes in sensation to her lower
O2 monitor reading 84% room air ,pt sleeping. Awakened,O2 stat 90%, O2 2 liters applied while she is asleep.
Oxy 10mg po for c/o chronic back and head pain rated 8.
Pharmacy to dose IV Vanco Sepsis x7 days:  Please give an IV loading dose of 1.5g after urine Cx obtained. Continue to dose at 1g IV Q12hrs and check a Trough 6/1 at 2000.   Pred , Tr 15mcg/mL, Tox 10%  Nelia Klinefelter, [facility] Pharm[place] 5/30/2023 8:16 PM
Physical Therapy    Attempted to see pt for PT tx. Spoke with RN who requested PT hold tx at this time pending results of BLE dopplers. Will follow-up as PT schedule and pt status permit. No Charge.     Prosper Galloway, PT, DPT, OMT-C # [id number]
Pt resting as manifested by eyes closed in dark room. Respirations even and easy. Call light and bedside table in reach. Bed in low locked position. Denies any needs at this time.
Pt resting as manifested by eyes closed in dark room. Respirations even and easy. Call light and bedside table in reach. Bed in low locked position. Denies any needs at this time.
Respiratory panel collected and sent to lab. Droplet plus precautions ordered per protocol.
Shift assessment complete. See flow sheet. Due medications administered per MD orders. See MAR. Vital signs stable. Patient is alert and oriented x 4. Pt denies any present needs/concerns. Call light explained and in reach.
Shift assessment complete; see flow sheet. Scheduled medications administered; See MAR. IV flushed without difficulty. Pt c/o ankle pain 7/10 PRN oycodone given at this time. Pt denies any needs at this time.  Pt educated on use of call light and to call out with needs, verbalized understanding, bed in low locked position, bed alarm on for pt safety
Shift assessment complete; see flow sheet. Scheduled medications administered; See MAR. IV infusing without difficulty. O2 2 LPM nc in place. Pt denies pain at this time. Pt denies any needs at this time.  Pt educated on use of call light and to call out with needs, verbalized understanding, bed in low locked position, bed alarm on for pt safety
[facility] Progress Note    Daily Progress Note for 2023 6:40 PM 0213/0213-02  Gaylan Virginia : 1955 Age: 75 y.o. Sex: female  Length of Stay:  0    Interval History:      CC: F/U Fall (PT FELL LAST NIGHT. LOWER BACK PAIN. HIT HEAD. DENIES BLOOD THINNERS. )    Subjective:       Feels very tired, has some HA in am she takes percocet and it resolves. She thinks she may have had some dark stool one day but she isnt sure. Objective:     Vitals:    23 2031 23 0345 23 0800 23 1315   BP: 124/62 110/60 127/73 124/64   Pulse: 94 95 85 85   Resp: 16 16 16 16   Temp: 99.9 °F (37.7 °C) 98.9 °F (37.2 °C) 99.3 °F (37.4 °C) 98.3 °F (36.8 °C)   TempSrc: Oral Oral Oral Oral   SpO2: 99% 99% 93% 95%   Weight:       Height:              Intake/Output Summary (Last 24 hours) at 2023 1840  Last data filed at 2023 0811  Gross per 24 hour   Intake --   Output 700 ml   Net -700 ml       Body mass index is 31.18 kg/m². Physical Exam:  General: Cooperative, pleasant/Ill appearing, on  Nasal cannula  HEENT:  Head: normocephalic,atraumatic, anicteric sclera, clear conjunctiva  Neck: Normal size, Jugular venous pulsations: normal  Respiratory:unlabored breathing, clear to auscultation with no crackles, wheezes rhonchi  Heart: Regular rate and rhythm, S1, S2-normal, No murmurs  Abdomen: soft, nondistended, nontender, normoactive bowel sounds,  Neurological/Psych: Alert and oriented times three, no focal neurological deficits, Mood and affect appropriate.   Skin: No obvious rashes    Extremities:  no edema, Pedal pulses 2+ bilaterally    Scheduled Medications:  clonazePAM, 2 mg, BID  famotidine, 20 mg, Daily  gabapentin, 600 mg, TID  potassium chloride, 20 mEq, BID  levothyroxine, 88 mcg, Daily  metoprolol succinate, 25 mg, Daily  [Held by provider] sulfamethoxazole-trimethoprim, 1 tablet, Daily  desvenlafaxine succinate, 25 mg, Daily  sodium chloride flush, 5-40 mL, 2 times per
Transferred care to [facility]. Face to face bedside report given, no need voiced at this time.
Transferred care to [facility]. Face to face bedside report given, pt resting with eyes shut, rise and fall of chest noted. Resp e/e. No need voiced at this time.
with RN,     Electronically signed by: Erin Ro DO, 5/31/2023 6:22 PM

## 2023-06-03 LAB
BACTERIA BLD CULT ORG #2: NORMAL
BACTERIA BLD CULT: NORMAL
GI PATHOGENS PNL STL NAA+PROBE: NORMAL

## 2023-06-21 ENCOUNTER — HOSPITAL ENCOUNTER (OUTPATIENT)
Age: 68
Discharge: HOME | End: 2023-06-21
Payer: MEDICARE

## 2023-06-21 LAB
ALBUMIN SERPL-MCNC: 4 G/DL (ref 3.4–5)
ALBUMIN/GLOB SERPL: 1.3 {RATIO} (ref 1.1–2.2)
ALP SERPL-CCNC: 122 U/L (ref 40–129)
ALT SERPL-CCNC: 12 U/L (ref 10–40)
ANION GAP SERPL CALCULATED.3IONS-SCNC: 11 MMOL/L (ref 3–16)
AST SERPL-CCNC: 27 U/L (ref 15–37)
BILIRUB SERPL-MCNC: 0.3 MG/DL (ref 0–1)
BUN SERPL-MCNC: 20 MG/DL (ref 7–20)
CALCIUM SERPL-MCNC: 9.7 MG/DL (ref 8.3–10.6)
CHLORIDE SERPL-SCNC: 109 MMOL/L (ref 99–110)
CO2 SERPL-SCNC: 19 MMOL/L (ref 21–32)
CREAT SERPL-MCNC: 0.8 MG/DL (ref 0.6–1.2)
GFR SERPLBLD CREATININE-BSD FMLA CKD-EPI: >60 ML/MIN/{1.73_M2}
GLUCOSE SERPL-MCNC: 105 MG/DL (ref 70–99)
POTASSIUM SERPL-SCNC: 4.9 MMOL/L (ref 3.5–5.1)
PROT SERPL-MCNC: 7.1 G/DL (ref 6.4–8.2)
SODIUM SERPL-SCNC: 139 MMOL/L (ref 136–145)

## 2023-06-21 PROCEDURE — 80053 COMPREHEN METABOLIC PANEL: CPT

## 2023-06-21 PROCEDURE — 36415 COLL VENOUS BLD VENIPUNCTURE: CPT

## 2023-11-28 ENCOUNTER — APPOINTMENT (OUTPATIENT)
Dept: GENERAL RADIOLOGY | Age: 68
End: 2023-11-28
Payer: MEDICARE

## 2023-11-28 ENCOUNTER — APPOINTMENT (OUTPATIENT)
Dept: CT IMAGING | Age: 68
End: 2023-11-28
Payer: MEDICARE

## 2023-11-28 ENCOUNTER — HOSPITAL ENCOUNTER (EMERGENCY)
Age: 68
Discharge: HOME | End: 2023-11-28
Attending: EMERGENCY MEDICINE
Payer: MEDICARE

## 2023-11-28 VITALS
RESPIRATION RATE: 16 BRPM | HEART RATE: 60 BPM | TEMPERATURE: 98.3 F | WEIGHT: 155 LBS | SYSTOLIC BLOOD PRESSURE: 101 MMHG | BODY MASS INDEX: 29.29 KG/M2 | DIASTOLIC BLOOD PRESSURE: 62 MMHG | OXYGEN SATURATION: 100 %

## 2023-11-28 DIAGNOSIS — K52.9 COLITIS: ICD-10-CM

## 2023-11-28 DIAGNOSIS — G89.29 ACUTE EXACERBATION OF CHRONIC LOW BACK PAIN: Primary | ICD-10-CM

## 2023-11-28 DIAGNOSIS — M54.32 SCIATICA OF LEFT SIDE: ICD-10-CM

## 2023-11-28 DIAGNOSIS — M54.50 ACUTE EXACERBATION OF CHRONIC LOW BACK PAIN: Primary | ICD-10-CM

## 2023-11-28 LAB
ALBUMIN SERPL-MCNC: 3.8 G/DL (ref 3.4–5)
ALP SERPL-CCNC: 83 U/L (ref 40–129)
ALT SERPL-CCNC: 16 U/L (ref 10–40)
ANION GAP SERPL CALCULATED.3IONS-SCNC: 8 MMOL/L (ref 3–16)
AST SERPL-CCNC: 30 U/L (ref 15–37)
BASOPHILS # BLD: 0.1 K/UL (ref 0–0.2)
BASOPHILS NFR BLD: 0.9 %
BILIRUB DIRECT SERPL-MCNC: <0.2 MG/DL (ref 0–0.3)
BILIRUB INDIRECT SERPL-MCNC: ABNORMAL MG/DL (ref 0–1)
BILIRUB SERPL-MCNC: <0.2 MG/DL (ref 0–1)
BILIRUB UR QL STRIP.AUTO: NEGATIVE
BUN SERPL-MCNC: 14 MG/DL (ref 7–20)
CALCIUM SERPL-MCNC: 8.2 MG/DL (ref 8.3–10.6)
CHLORIDE SERPL-SCNC: 109 MMOL/L (ref 99–110)
CLARITY UR: CLEAR
CO2 SERPL-SCNC: 23 MMOL/L (ref 21–32)
COLOR UR: YELLOW
CREAT SERPL-MCNC: 0.8 MG/DL (ref 0.6–1.2)
DEPRECATED RDW RBC AUTO: 18.6 % (ref 12.4–15.4)
EKG ATRIAL RATE: 60 BPM
EKG DIAGNOSIS: NORMAL
EKG P AXIS: 16 DEGREES
EKG P-R INTERVAL: 108 MS
EKG Q-T INTERVAL: 458 MS
EKG QRS DURATION: 88 MS
EKG QTC CALCULATION (BAZETT): 458 MS
EKG R AXIS: 53 DEGREES
EKG T AXIS: 77 DEGREES
EKG VENTRICULAR RATE: 60 BPM
EOSINOPHIL # BLD: 0.1 K/UL (ref 0–0.6)
EOSINOPHIL NFR BLD: 2.2 %
GFR SERPLBLD CREATININE-BSD FMLA CKD-EPI: >60 ML/MIN/{1.73_M2}
GLUCOSE SERPL-MCNC: 76 MG/DL (ref 70–99)
GLUCOSE UR STRIP.AUTO-MCNC: NEGATIVE MG/DL
HCT VFR BLD AUTO: 30.8 % (ref 36–48)
HGB BLD-MCNC: 9.6 G/DL (ref 12–16)
HGB UR QL STRIP.AUTO: NEGATIVE
IRON SATN MFR SERPL: 10 % (ref 15–50)
IRON SERPL-MCNC: 43 UG/DL (ref 37–145)
KETONES UR STRIP.AUTO-MCNC: NEGATIVE MG/DL
LEUKOCYTE ESTERASE UR QL STRIP.AUTO: NEGATIVE
LIPASE SERPL-CCNC: 18 U/L (ref 13–60)
LYMPHOCYTES # BLD: 2.3 K/UL (ref 1–5.1)
LYMPHOCYTES NFR BLD: 41.1 %
MCH RBC QN AUTO: 24.9 PG (ref 26–34)
MCHC RBC AUTO-ENTMCNC: 31.2 G/DL (ref 31–36)
MCV RBC AUTO: 79.8 FL (ref 80–100)
MONOCYTES # BLD: 0.6 K/UL (ref 0–1.3)
MONOCYTES NFR BLD: 10.1 %
NEUTROPHILS # BLD: 2.6 K/UL (ref 1.7–7.7)
NEUTROPHILS NFR BLD: 45.7 %
NITRITE UR QL STRIP.AUTO: NEGATIVE
PH UR STRIP.AUTO: 6 [PH] (ref 5–8)
PLATELET # BLD AUTO: 206 K/UL (ref 135–450)
PMV BLD AUTO: 7.6 FL (ref 5–10.5)
POTASSIUM SERPL-SCNC: 4.4 MMOL/L (ref 3.5–5.1)
PROT SERPL-MCNC: 6.3 G/DL (ref 6.4–8.2)
PROT UR STRIP.AUTO-MCNC: NEGATIVE MG/DL
RBC # BLD AUTO: 3.86 M/UL (ref 4–5.2)
SODIUM SERPL-SCNC: 140 MMOL/L (ref 136–145)
SP GR UR STRIP.AUTO: 1.02 (ref 1–1.03)
TIBC SERPL-MCNC: 428 UG/DL (ref 260–445)
TROPONIN, HIGH SENSITIVITY: 7 NG/L (ref 0–14)
UA COMPLETE W REFLEX CULTURE PNL UR: NORMAL
UA DIPSTICK W REFLEX MICRO PNL UR: NORMAL
URN SPEC COLLECT METH UR: NORMAL
UROBILINOGEN UR STRIP-ACNC: 0.2 E.U./DL
WBC # BLD AUTO: 5.6 K/UL (ref 4–11)

## 2023-11-28 PROCEDURE — 6360000004 HC RX CONTRAST MEDICATION: Performed by: PHYSICIAN ASSISTANT

## 2023-11-28 PROCEDURE — 71045 X-RAY EXAM CHEST 1 VIEW: CPT

## 2023-11-28 PROCEDURE — 81003 URINALYSIS AUTO W/O SCOPE: CPT

## 2023-11-28 PROCEDURE — 72131 CT LUMBAR SPINE W/O DYE: CPT

## 2023-11-28 PROCEDURE — 99285 EMERGENCY DEPT VISIT HI MDM: CPT

## 2023-11-28 PROCEDURE — 83690 ASSAY OF LIPASE: CPT

## 2023-11-28 PROCEDURE — 93010 ELECTROCARDIOGRAM REPORT: CPT | Performed by: INTERNAL MEDICINE

## 2023-11-28 PROCEDURE — 6360000002 HC RX W HCPCS: Performed by: PHYSICIAN ASSISTANT

## 2023-11-28 PROCEDURE — 80048 BASIC METABOLIC PNL TOTAL CA: CPT

## 2023-11-28 PROCEDURE — 36415 COLL VENOUS BLD VENIPUNCTURE: CPT

## 2023-11-28 PROCEDURE — 83540 ASSAY OF IRON: CPT

## 2023-11-28 PROCEDURE — 83550 IRON BINDING TEST: CPT

## 2023-11-28 PROCEDURE — 6370000000 HC RX 637 (ALT 250 FOR IP): Performed by: PHYSICIAN ASSISTANT

## 2023-11-28 PROCEDURE — 80076 HEPATIC FUNCTION PANEL: CPT

## 2023-11-28 PROCEDURE — 2500000003 HC RX 250 WO HCPCS: Performed by: PHYSICIAN ASSISTANT

## 2023-11-28 PROCEDURE — 74177 CT ABD & PELVIS W/CONTRAST: CPT

## 2023-11-28 PROCEDURE — 2580000003 HC RX 258: Performed by: PHYSICIAN ASSISTANT

## 2023-11-28 PROCEDURE — 93005 ELECTROCARDIOGRAM TRACING: CPT | Performed by: PHYSICIAN ASSISTANT

## 2023-11-28 PROCEDURE — 96375 TX/PRO/DX INJ NEW DRUG ADDON: CPT

## 2023-11-28 PROCEDURE — 84484 ASSAY OF TROPONIN QUANT: CPT

## 2023-11-28 PROCEDURE — 96374 THER/PROPH/DIAG INJ IV PUSH: CPT

## 2023-11-28 PROCEDURE — 85025 COMPLETE CBC W/AUTO DIFF WBC: CPT

## 2023-11-28 RX ORDER — ONDANSETRON 2 MG/ML
4 INJECTION INTRAMUSCULAR; INTRAVENOUS ONCE
Status: COMPLETED | OUTPATIENT
Start: 2023-11-28 | End: 2023-11-28

## 2023-11-28 RX ORDER — LIDOCAINE 50 MG/G
1 PATCH TOPICAL DAILY
Qty: 10 PATCH | Refills: 0 | Status: SHIPPED | OUTPATIENT
Start: 2023-11-28 | End: 2023-12-08

## 2023-11-28 RX ORDER — LOPERAMIDE HYDROCHLORIDE 2 MG/1
2 CAPSULE ORAL ONCE
Status: COMPLETED | OUTPATIENT
Start: 2023-11-28 | End: 2023-11-28

## 2023-11-28 RX ORDER — DIPHENHYDRAMINE HYDROCHLORIDE 50 MG/ML
12.5 INJECTION INTRAMUSCULAR; INTRAVENOUS ONCE
Status: COMPLETED | OUTPATIENT
Start: 2023-11-28 | End: 2023-11-28

## 2023-11-28 RX ORDER — LIDOCAINE 4 G/G
1 PATCH TOPICAL ONCE
Status: DISCONTINUED | OUTPATIENT
Start: 2023-11-28 | End: 2023-11-28 | Stop reason: HOSPADM

## 2023-11-28 RX ADMIN — DIPHENHYDRAMINE HYDROCHLORIDE 12.5 MG: 50 INJECTION INTRAMUSCULAR; INTRAVENOUS at 10:55

## 2023-11-28 RX ADMIN — HYDROMORPHONE HYDROCHLORIDE 0.5 MG: 1 INJECTION, SOLUTION INTRAMUSCULAR; INTRAVENOUS; SUBCUTANEOUS at 10:56

## 2023-11-28 RX ADMIN — Medication 20 MG: at 10:55

## 2023-11-28 RX ADMIN — LOPERAMIDE HYDROCHLORIDE 2 MG: 2 CAPSULE ORAL at 10:57

## 2023-11-28 RX ADMIN — IOPAMIDOL 75 ML: 755 INJECTION, SOLUTION INTRAVENOUS at 11:36

## 2023-11-28 RX ADMIN — ONDANSETRON 4 MG: 2 INJECTION INTRAMUSCULAR; INTRAVENOUS at 10:55

## 2023-11-28 ASSESSMENT — PAIN - FUNCTIONAL ASSESSMENT: PAIN_FUNCTIONAL_ASSESSMENT: 0-10

## 2023-11-28 ASSESSMENT — PAIN SCALES - GENERAL: PAINLEVEL_OUTOF10: 9

## 2023-11-28 NOTE — ED PROVIDER NOTES
[address] [address] ED  EMERGENCY DEPARTMENT ENCOUNTER        Pt Name: Otoniel Kelly  MRN: [id number]  [address] [facility] 1955  Date of evaluation: 11/28/2023  Provider: Veronica Robbins PA-C  PCP: Jolene English MD  Note Started: 10:18 AM EST 11/28/23      AGNES. I have evaluated this patient. CHIEF COMPLAINT       Chief Complaint   Patient presents with    Back Pain     Pt presents to the ER with lower back pain. Pt has a long hx of chronic back issues with multiple surgeries. Pt does see a pain specialist for the same. Pt is supposed to see a \"back specialist\" in December but states she is having too much pain. Pt states \"last time they gave her a shot in the back to make it better\". HISTORY OF PRESENT ILLNESS: 1 or more Elements     History From: Patient    Otoniel Kelly is a 70 y.o. female who presents to the emergency department by EMS for primary complaint of low back pain midline and to the left down left leg to mid thigh more posterior lateral to the thigh. History of chronic back pain. History of previous lumbar surgeries. Sees Dr. Howie Currie for pain control gabapentin and oxycodone. She indicates no bowel or bladder dysfunction. No saddle anesthesia. Patient with chronic abdominal pain 8 months with loose stool for about a month. States if she eats or drinks it goes right through her. She indicates no obvious blood or mucus she does not check. Abdominal surgeries to include gastric bypass, cholecystectomy, hysterectomy, lumbar spinal surgery, bladder suspension, suprapubic catheter, incisional hernia repair, spinal cord stimulator with removal, EGD, colonoscopy.     Medications: Fosamax, biotin, Klonopin, vitamin B12, Pristiq 50, Pepcid 20, ferrous sulfate, Toviaz, Florinef 1 mg 1 tablet twice daily, Flonase, gabapentin 600 3 times daily, levothyroxine 88 mcg, Toprol-XL 25 mg, protamine 10 mg twice daily, MVI, Zofran ODT 8 mg as needed, oxycodone IR 10 every 6 as needed,

## 2023-11-28 NOTE — DISCHARGE INSTRUCTIONS
CT lumbar spine was negative. Abdominal pelvic CT scan showing some mild colitis of the transverse colon. Continue antibiotics as previously prescribed. Lidocaine patch applied over the tender left back area. Continue all medications as prescribed. Follow-up with Dr. Gwendolyn Steven your pain management specialist.  CT scan of your abdomen pelvis otherwise did not show any concerning findings. I did send prescription for lidocaine patch to your local pharmacy.

## 2024-01-22 ENCOUNTER — TELEPHONE (OUTPATIENT)
Age: 69
End: 2024-01-22

## 2024-03-05 ENCOUNTER — HOSPITAL ENCOUNTER (OUTPATIENT)
Dept: MAMMOGRAPHY | Age: 69
Discharge: HOME | End: 2024-03-05
Payer: MEDICARE

## 2024-03-05 DIAGNOSIS — Z12.39 SCREENING BREAST EXAMINATION: ICD-10-CM

## 2024-03-05 PROCEDURE — 77063 BREAST TOMOSYNTHESIS BI: CPT

## 2024-03-17 ENCOUNTER — APPOINTMENT (OUTPATIENT)
Dept: CT IMAGING | Age: 69
End: 2024-03-17
Payer: MEDICARE

## 2024-03-17 ENCOUNTER — APPOINTMENT (OUTPATIENT)
Dept: GENERAL RADIOLOGY | Age: 69
End: 2024-03-17
Payer: MEDICARE

## 2024-03-17 ENCOUNTER — HOSPITAL ENCOUNTER (EMERGENCY)
Age: 69
Discharge: HOME | End: 2024-03-17
Attending: STUDENT IN AN ORGANIZED HEALTH CARE EDUCATION/TRAINING PROGRAM
Payer: MEDICARE

## 2024-03-17 VITALS
HEART RATE: 70 BPM | SYSTOLIC BLOOD PRESSURE: 145 MMHG | TEMPERATURE: 97.9 F | RESPIRATION RATE: 16 BRPM | OXYGEN SATURATION: 100 % | DIASTOLIC BLOOD PRESSURE: 60 MMHG

## 2024-03-17 DIAGNOSIS — R10.84 GENERALIZED ABDOMINAL PAIN: Primary | ICD-10-CM

## 2024-03-17 LAB
ALBUMIN SERPL-MCNC: 3.9 G/DL (ref 3.4–5)
ALBUMIN/GLOB SERPL: 1.6 {RATIO} (ref 1.1–2.2)
ALP SERPL-CCNC: 94 U/L (ref 40–129)
ALT SERPL-CCNC: 15 U/L (ref 10–40)
ANION GAP SERPL CALCULATED.3IONS-SCNC: 10 MMOL/L (ref 3–16)
AST SERPL-CCNC: 28 U/L (ref 15–37)
BASOPHILS # BLD: 0 K/UL (ref 0–0.2)
BASOPHILS NFR BLD: 0.7 %
BILIRUB SERPL-MCNC: <0.2 MG/DL (ref 0–1)
BILIRUB UR QL STRIP.AUTO: NEGATIVE
BUN SERPL-MCNC: 12 MG/DL (ref 7–20)
CALCIUM SERPL-MCNC: 9.4 MG/DL (ref 8.3–10.6)
CHLORIDE SERPL-SCNC: 102 MMOL/L (ref 99–110)
CLARITY UR: CLEAR
CO2 SERPL-SCNC: 26 MMOL/L (ref 21–32)
COLOR UR: YELLOW
CREAT SERPL-MCNC: 0.8 MG/DL (ref 0.6–1.2)
DEPRECATED RDW RBC AUTO: 18.3 % (ref 12.4–15.4)
EKG ATRIAL RATE: 74 BPM
EKG DIAGNOSIS: NORMAL
EKG P AXIS: 12 DEGREES
EKG P-R INTERVAL: 142 MS
EKG Q-T INTERVAL: 416 MS
EKG QRS DURATION: 76 MS
EKG QTC CALCULATION (BAZETT): 461 MS
EKG R AXIS: 24 DEGREES
EKG T AXIS: 46 DEGREES
EKG VENTRICULAR RATE: 74 BPM
EOSINOPHIL # BLD: 0.1 K/UL (ref 0–0.6)
EOSINOPHIL NFR BLD: 1.3 %
EPI CELLS #/AREA URNS HPF: NORMAL /HPF (ref 0–5)
FLUAV RNA RESP QL NAA+PROBE: NOT DETECTED
FLUBV RNA RESP QL NAA+PROBE: NOT DETECTED
GFR SERPLBLD CREATININE-BSD FMLA CKD-EPI: >60 ML/MIN/{1.73_M2}
GLUCOSE SERPL-MCNC: 94 MG/DL (ref 70–99)
GLUCOSE UR STRIP.AUTO-MCNC: NEGATIVE MG/DL
HCT VFR BLD AUTO: 31.7 % (ref 36–48)
HGB BLD-MCNC: 10 G/DL (ref 12–16)
HGB UR QL STRIP.AUTO: ABNORMAL
KETONES UR STRIP.AUTO-MCNC: NEGATIVE MG/DL
LACTATE BLDV-SCNC: 1.9 MMOL/L (ref 0.4–1.9)
LEUKOCYTE ESTERASE UR QL STRIP.AUTO: NEGATIVE
LIPASE SERPL-CCNC: 23 U/L (ref 13–60)
LYMPHOCYTES # BLD: 2.1 K/UL (ref 1–5.1)
LYMPHOCYTES NFR BLD: 38.3 %
MCH RBC QN AUTO: 24.3 PG (ref 26–34)
MCHC RBC AUTO-ENTMCNC: 31.4 G/DL (ref 31–36)
MCV RBC AUTO: 77.5 FL (ref 80–100)
MONOCYTES # BLD: 0.5 K/UL (ref 0–1.3)
MONOCYTES NFR BLD: 9 %
NEUTROPHILS # BLD: 2.8 K/UL (ref 1.7–7.7)
NEUTROPHILS NFR BLD: 50.7 %
NITRITE UR QL STRIP.AUTO: NEGATIVE
PH UR STRIP.AUTO: 7.5 [PH] (ref 5–8)
PLATELET # BLD AUTO: 253 K/UL (ref 135–450)
PMV BLD AUTO: 7.7 FL (ref 5–10.5)
POTASSIUM SERPL-SCNC: 4.6 MMOL/L (ref 3.5–5.1)
PROT SERPL-MCNC: 6.3 G/DL (ref 6.4–8.2)
PROT UR STRIP.AUTO-MCNC: NEGATIVE MG/DL
RBC # BLD AUTO: 4.09 M/UL (ref 4–5.2)
RBC #/AREA URNS HPF: NORMAL /HPF (ref 0–4)
SARS-COV-2 RNA RESP QL NAA+PROBE: NOT DETECTED
SODIUM SERPL-SCNC: 138 MMOL/L (ref 136–145)
SP GR UR STRIP.AUTO: 1.01 (ref 1–1.03)
TROPONIN, HIGH SENSITIVITY: 8 NG/L (ref 0–14)
TROPONIN, HIGH SENSITIVITY: 9 NG/L (ref 0–14)
UA COMPLETE W REFLEX CULTURE PNL UR: ABNORMAL
UA DIPSTICK W REFLEX MICRO PNL UR: YES
URN SPEC COLLECT METH UR: ABNORMAL
UROBILINOGEN UR STRIP-ACNC: 0.2 E.U./DL
WBC # BLD AUTO: 5.6 K/UL (ref 4–11)
WBC #/AREA URNS HPF: NORMAL /HPF (ref 0–5)

## 2024-03-17 PROCEDURE — 96361 HYDRATE IV INFUSION ADD-ON: CPT

## 2024-03-17 PROCEDURE — 85025 COMPLETE CBC W/AUTO DIFF WBC: CPT

## 2024-03-17 PROCEDURE — 99285 EMERGENCY DEPT VISIT HI MDM: CPT

## 2024-03-17 PROCEDURE — 84484 ASSAY OF TROPONIN QUANT: CPT

## 2024-03-17 PROCEDURE — 2580000003 HC RX 258: Performed by: REGISTERED NURSE

## 2024-03-17 PROCEDURE — 87636 SARSCOV2 & INF A&B AMP PRB: CPT

## 2024-03-17 PROCEDURE — 93010 ELECTROCARDIOGRAM REPORT: CPT | Performed by: INTERNAL MEDICINE

## 2024-03-17 PROCEDURE — 6360000004 HC RX CONTRAST MEDICATION: Performed by: REGISTERED NURSE

## 2024-03-17 PROCEDURE — 80053 COMPREHEN METABOLIC PANEL: CPT

## 2024-03-17 PROCEDURE — 81001 URINALYSIS AUTO W/SCOPE: CPT

## 2024-03-17 PROCEDURE — 71045 X-RAY EXAM CHEST 1 VIEW: CPT

## 2024-03-17 PROCEDURE — 96374 THER/PROPH/DIAG INJ IV PUSH: CPT

## 2024-03-17 PROCEDURE — 83690 ASSAY OF LIPASE: CPT

## 2024-03-17 PROCEDURE — 6370000000 HC RX 637 (ALT 250 FOR IP): Performed by: STUDENT IN AN ORGANIZED HEALTH CARE EDUCATION/TRAINING PROGRAM

## 2024-03-17 PROCEDURE — 96375 TX/PRO/DX INJ NEW DRUG ADDON: CPT

## 2024-03-17 PROCEDURE — 74177 CT ABD & PELVIS W/CONTRAST: CPT

## 2024-03-17 PROCEDURE — 83605 ASSAY OF LACTIC ACID: CPT

## 2024-03-17 PROCEDURE — 6360000002 HC RX W HCPCS: Performed by: REGISTERED NURSE

## 2024-03-17 PROCEDURE — 36415 COLL VENOUS BLD VENIPUNCTURE: CPT

## 2024-03-17 PROCEDURE — 93005 ELECTROCARDIOGRAM TRACING: CPT | Performed by: REGISTERED NURSE

## 2024-03-17 RX ORDER — 0.9 % SODIUM CHLORIDE 0.9 %
500 INTRAVENOUS SOLUTION INTRAVENOUS ONCE
Status: COMPLETED | OUTPATIENT
Start: 2024-03-17 | End: 2024-03-17

## 2024-03-17 RX ORDER — ONDANSETRON 2 MG/ML
4 INJECTION INTRAMUSCULAR; INTRAVENOUS ONCE
Status: DISCONTINUED | OUTPATIENT
Start: 2024-03-17 | End: 2024-03-17

## 2024-03-17 RX ORDER — ONDANSETRON 2 MG/ML
4 INJECTION INTRAMUSCULAR; INTRAVENOUS ONCE
Status: COMPLETED | OUTPATIENT
Start: 2024-03-17 | End: 2024-03-17

## 2024-03-17 RX ORDER — MORPHINE SULFATE 2 MG/ML
2 INJECTION, SOLUTION INTRAMUSCULAR; INTRAVENOUS ONCE
Status: DISCONTINUED | OUTPATIENT
Start: 2024-03-17 | End: 2024-03-17

## 2024-03-17 RX ORDER — MORPHINE SULFATE 2 MG/ML
2 INJECTION, SOLUTION INTRAMUSCULAR; INTRAVENOUS ONCE
Status: COMPLETED | OUTPATIENT
Start: 2024-03-17 | End: 2024-03-17

## 2024-03-17 RX ORDER — OXYCODONE HYDROCHLORIDE 5 MG/1
5 TABLET ORAL ONCE
Status: COMPLETED | OUTPATIENT
Start: 2024-03-17 | End: 2024-03-17

## 2024-03-17 RX ORDER — ONDANSETRON 4 MG/1
4 TABLET, ORALLY DISINTEGRATING ORAL ONCE
Status: COMPLETED | OUTPATIENT
Start: 2024-03-17 | End: 2024-03-17

## 2024-03-17 RX ADMIN — SODIUM CHLORIDE 500 ML: 9 INJECTION, SOLUTION INTRAVENOUS at 15:00

## 2024-03-17 RX ADMIN — IOPAMIDOL 75 ML: 755 INJECTION, SOLUTION INTRAVENOUS at 16:09

## 2024-03-17 RX ADMIN — MORPHINE SULFATE 2 MG: 2 INJECTION, SOLUTION INTRAMUSCULAR; INTRAVENOUS at 15:00

## 2024-03-17 RX ADMIN — ONDANSETRON 4 MG: 2 INJECTION INTRAMUSCULAR; INTRAVENOUS at 15:00

## 2024-03-17 RX ADMIN — OXYCODONE 5 MG: 5 TABLET ORAL at 19:59

## 2024-03-17 RX ADMIN — ONDANSETRON 4 MG: 4 TABLET, ORALLY DISINTEGRATING ORAL at 19:59

## 2024-03-17 ASSESSMENT — PAIN DESCRIPTION - PAIN TYPE: TYPE: ACUTE PAIN

## 2024-03-17 ASSESSMENT — PAIN DESCRIPTION - DESCRIPTORS
DESCRIPTORS: DISCOMFORT
DESCRIPTORS: CRAMPING

## 2024-03-17 ASSESSMENT — PAIN DESCRIPTION - LOCATION
LOCATION: ABDOMEN
LOCATION: ABDOMEN

## 2024-03-17 ASSESSMENT — PAIN - FUNCTIONAL ASSESSMENT: PAIN_FUNCTIONAL_ASSESSMENT: 0-10

## 2024-03-17 ASSESSMENT — PAIN DESCRIPTION - ORIENTATION: ORIENTATION: LOWER

## 2024-03-17 ASSESSMENT — PAIN SCALES - GENERAL
PAINLEVEL_OUTOF10: 8
PAINLEVEL_OUTOF10: 6

## 2024-03-17 NOTE — ED PROVIDER NOTES
I independently performed a history and physical on Cordelia Pillai.     I have discussed the case with the AGNES/resident at 1900 and approve / take responsibility for the initial management plan and anticipated disposition as documented below.     In summary the patient presents with acute on chronic abdominal pain as well as some nausea and diarrhea.  She does report some abdominal bloating which has made it somewhat difficult for her to self cath via urostomy.  On my evaluation the patient is afebrile hemodynamically stable in no acute distress.  She is symptomatically improved over the course of her observation here.  Breath sounds are clear abdomen is soft nontender nondistended no guarding rigidity or rebound no peritoneal signs no CVA tenderness.  Extremities are warm and well-perfused.  We reviewed her workup bedside which has been overall reassuring including laboratory evaluation and CT imaging.  She has been unable to produce a stool sample under observation here.  We discussed at length her condition and testing today.  On further discussion she reports that the majority of her symptoms are more subacute and she is reassured by the testing completed today.  I did question her further regarding her ability to care for herself at home as she also describes some generalized weakness and fatigue/malaise and she states that she is doing sufficiently well to care for self at home.  I asked about her symptoms and if she felt they were adequately controlled and she states that they are.  I did explicitly offer hospitalization for further symptom control and possible consideration for discharge to skilled nursing or assisted living as her testing here has been overall reassuring against an acute abnormality.  The patient declined this offer and ultimately via shared decision making she will discharge for further observation at home with return precautions should her condition worsen.    I interpreted the following 
R-3Normal      potassium chloride (KLOR-CON M20) 20 MEQ extended release tablet Take 1 tablet by mouth 3 times daily (with meals), Disp-90 tablet, R-2Normal      Magnesium Chloride-Calcium  MG Take 1 tablet by mouth daily, Disp-30 tablet, R-0Normal      fludrocortisone (FLORINEF) 0.1 MG tablet Take 1 tablet by mouth 2 times daily, Disp-30 tablet, R-0Normal      fluticasone (FLONASE) 50 MCG/ACT nasal spray 1 spray by Nasal route as needed for Allergies, Disp-1 each, R-0NO PRINT      metoprolol succinate (TOPROL XL) 25 MG extended release tablet Take 1 tablet by mouth daily, Disp-30 tablet, R-3Normal      sodium chloride 1 gram tablet Take 1 tablet by mouth 3 times daily (with meals), Disp-90 tablet, R-0Normal      sulfamethoxazole-trimethoprim (BACTRIM DS;SEPTRA DS) 800-160 MG per tablet Take 1 tablet by mouth dailyHistorical Med      famotidine (PEPCID) 20 MG tablet Take by mouth dailyHistorical Med      alendronate (FOSAMAX) 35 MG tablet TAKE 1 TABLET BY MOUTH ONE TIME A WEEK IN THE MORNING AT LEAST 30 MIN BEFORE FIRST FOOD, BEVERAGE, OR MED OF DAYHistorical Med      desvenlafaxine succinate (PRISTIQ) 25 MG TB24 extended release tablet Take 2 tablets by mouth dailyHistorical Med      gabapentin (NEURONTIN) 600 MG tablet TAKE 1 TABLET BY MOUTH EVERY EIGHT HOURSHistorical Med      midodrine (PROAMATINE) 10 MG tablet TAKE 1 TABLET BY MOUTH THREE TIMES A DAYHistorical Med      Rimegepant Sulfate (NURTEC) 75 MG TBDP Take 75 mg by mouth Med called in today, pt does not have it pharmacy states not available until MondayHistorical Med      rosuvastatin (CRESTOR) 10 MG tablet TAKE 1 TABLET BY MOUTH EVERY DAYHistorical Med      DENTA 5000 PLUS 1.1 % CREA FLOSS THEN BRUSH WITH PEA-SIZED AMOUNT FOR 2 MINUTES TWICE DAILY, SPIT AND DO NOT RINSE OR DRINK FOR 30 MINUTES, DAWHistorical Med      Biotin 1000 MCG TABS Take 2,500 mcg by mouth in the morning and at bedtimeHistorical Med      oxyCODONE HCl (OXY-IR) 10 MG immediate

## 2024-05-02 ENCOUNTER — OFFICE VISIT (OUTPATIENT)
Age: 69
End: 2024-05-02
Payer: MEDICARE

## 2024-05-02 VITALS
SYSTOLIC BLOOD PRESSURE: 130 MMHG | OXYGEN SATURATION: 100 % | BODY MASS INDEX: 30.73 KG/M2 | HEART RATE: 72 BPM | DIASTOLIC BLOOD PRESSURE: 78 MMHG | HEIGHT: 62 IN | RESPIRATION RATE: 15 BRPM | WEIGHT: 167 LBS

## 2024-05-02 DIAGNOSIS — R40.4 SPELL OF ALTERED CONSCIOUSNESS: Primary | ICD-10-CM

## 2024-05-02 PROCEDURE — 3075F SYST BP GE 130 - 139MM HG: CPT | Performed by: PSYCHIATRY & NEUROLOGY

## 2024-05-02 PROCEDURE — 1123F ACP DISCUSS/DSCN MKR DOCD: CPT | Performed by: PSYCHIATRY & NEUROLOGY

## 2024-05-02 PROCEDURE — 3078F DIAST BP <80 MM HG: CPT | Performed by: PSYCHIATRY & NEUROLOGY

## 2024-05-02 PROCEDURE — 99204 OFFICE O/P NEW MOD 45 MIN: CPT | Performed by: PSYCHIATRY & NEUROLOGY

## 2024-05-02 RX ORDER — CEPHALEXIN 500 MG/1
500 CAPSULE ORAL 2 TIMES DAILY
COMMUNITY
Start: 2024-03-13

## 2024-05-02 NOTE — PATIENT INSTRUCTIONS
YOU MUST CONFIRM YOUR APPOINTMENT 1 DAY PRIOR OR IT WILL BE CANCELLED!!   Our office will call you 3 times the day prior to your appointment in an attempt to confirm.  Please return our call ASAP or confirm your appt through [id number] no later than 3 pm the day before your appointment.  If we do not hear back from you by 3 pm to confirm, your appointment will be cancelled & someone will be added into that slot from our wait list.       EEG PREP:  1. Patient should take seizure medicine, as prescribed, on the day of the test  2. Patient must NOT have more than 5 hours of sleep prior to the EEG  3. Patient should have CLEAN hair, no hairspray, gel, cream rinse, hair pins, or chemical treatments within 48 hours prior to EEG  4. NO caffeine, pain medications or sedatives on the day of the test (TAKE SEIZURE MEDS!)  5. Arrive 30 minutes prior to appointment time (check in at Registration Desk on 1st floor of hospital main entrance - by the [facility])  6. Procedure will last 60-90 minutes.

## 2024-05-02 NOTE — PROGRESS NOTES
Neurology outpatient new visit    Patient name: Cordelia Pillai      Chief Complaint:  Recurrent spell with loss of consciousness.    History of present illness:  This is a 68 years old right-handed female.  The patient is here for evaluation of recurrent spell with loss of consciousness.  The onset was 4 to 5 years ago.  The course is refractory.  The patient also developed seizure-like activity 2 years ago.  Per the patient, she was unable to talk at that time.  The patient could hear her surroundings at that time.  It lasted less than 10 minutes.  The patient did not have generalized convulsion, tongue biting or bladder incontinence.  The patient denies history of seizure disorder.  However, the patient never had any further workup from that seizure-like activity.  The patient also has a different spell with blackout type.  The patient can feel when the spell starts.  Then the patient becomes unresponsive.  The patient reports multiple falls with the spell.  The patient is noted for underlying cardiac arrhythmia with pacemaker.  The patient also has evidence of orthostatic hypotension in the past.  She is currently on midodrine.  The patient denies significant tremor or shuffling gait.  But the patient has gait difficulty from OA knee.    The patient denies significant memory difficulty at this time.  The patient is able to maintain her daily activities at home.    Past medical history:    Past Medical History:   Diagnosis Date    Angina at rest (HCC)     Anxiety     Arthritis     hands and hip    Blood transfusion     after back surgery    Bradycardia     Bronchiectasis (HCC) 11/05/2013    Chronic back pain     Hyperlipidemia     Hypertension     Iron deficiency anemia 6/1/2023    Localized morphea     Multiple drug resistant organism (MDRO) culture positive 04/13/2021    E.COLI-URINE    NATHAN treated with BiPAP     Pacemaker     Stress incontinence     Thyroid disease     hypothyroid    Trochanteric bursitis of

## 2024-06-04 ENCOUNTER — TELEPHONE (OUTPATIENT)
Age: 69
End: 2024-06-04

## 2024-06-04 NOTE — TELEPHONE ENCOUNTER
Pt called and cancelled EEG results appt with Dr. Campbell on 6/5/24. Pt also cancelled EEG testing.   Pt stated she was having issues with financial assistance and would cb and reschedule.

## 2024-06-15 ENCOUNTER — HOSPITAL ENCOUNTER (EMERGENCY)
Age: 69
Discharge: HOME | End: 2024-06-15
Payer: MEDICARE

## 2024-06-15 ENCOUNTER — APPOINTMENT (OUTPATIENT)
Dept: GENERAL RADIOLOGY | Age: 69
End: 2024-06-15
Payer: MEDICARE

## 2024-06-15 VITALS
HEIGHT: 62 IN | WEIGHT: 167 LBS | SYSTOLIC BLOOD PRESSURE: 149 MMHG | DIASTOLIC BLOOD PRESSURE: 66 MMHG | HEART RATE: 68 BPM | RESPIRATION RATE: 16 BRPM | OXYGEN SATURATION: 99 % | BODY MASS INDEX: 30.73 KG/M2 | TEMPERATURE: 98.9 F

## 2024-06-15 DIAGNOSIS — R50.9 SUBJECTIVE FEVER: ICD-10-CM

## 2024-06-15 DIAGNOSIS — R53.83 OTHER FATIGUE: Primary | ICD-10-CM

## 2024-06-15 DIAGNOSIS — R10.84 GENERALIZED ABDOMINAL PAIN: ICD-10-CM

## 2024-06-15 LAB
ALBUMIN SERPL-MCNC: 4 G/DL (ref 3.4–5)
ALBUMIN/GLOB SERPL: 1.3 {RATIO} (ref 1.1–2.2)
ALP SERPL-CCNC: 96 U/L (ref 40–129)
ALT SERPL-CCNC: 14 U/L (ref 10–40)
ANION GAP SERPL CALCULATED.3IONS-SCNC: 12 MMOL/L (ref 3–16)
AST SERPL-CCNC: 31 U/L (ref 15–37)
BASOPHILS # BLD: 0.1 K/UL (ref 0–0.2)
BASOPHILS NFR BLD: 1.1 %
BILIRUB SERPL-MCNC: <0.2 MG/DL (ref 0–1)
BILIRUB UR QL STRIP.AUTO: NEGATIVE
BUN SERPL-MCNC: 18 MG/DL (ref 7–20)
CALCIUM SERPL-MCNC: 9.5 MG/DL (ref 8.3–10.6)
CHLORIDE SERPL-SCNC: 102 MMOL/L (ref 99–110)
CLARITY UR: CLEAR
CO2 SERPL-SCNC: 22 MMOL/L (ref 21–32)
COLOR UR: YELLOW
CREAT SERPL-MCNC: 0.8 MG/DL (ref 0.6–1.2)
DEPRECATED RDW RBC AUTO: 19 % (ref 12.4–15.4)
EOSINOPHIL # BLD: 0.1 K/UL (ref 0–0.6)
EOSINOPHIL NFR BLD: 1.6 %
FLUAV RNA RESP QL NAA+PROBE: NOT DETECTED
FLUBV RNA RESP QL NAA+PROBE: NOT DETECTED
GFR SERPLBLD CREATININE-BSD FMLA CKD-EPI: 79 ML/MIN/{1.73_M2}
GLUCOSE SERPL-MCNC: 173 MG/DL (ref 70–99)
GLUCOSE UR STRIP.AUTO-MCNC: NEGATIVE MG/DL
HCT VFR BLD AUTO: 29.1 % (ref 36–48)
HGB BLD-MCNC: 9.2 G/DL (ref 12–16)
HGB UR QL STRIP.AUTO: NEGATIVE
KETONES UR STRIP.AUTO-MCNC: NEGATIVE MG/DL
LACTATE BLDV-SCNC: 1.9 MMOL/L (ref 0.4–1.9)
LACTATE BLDV-SCNC: 2.7 MMOL/L (ref 0.4–1.9)
LEUKOCYTE ESTERASE UR QL STRIP.AUTO: NEGATIVE
LYMPHOCYTES # BLD: 2.1 K/UL (ref 1–5.1)
LYMPHOCYTES NFR BLD: 40.1 %
MCH RBC QN AUTO: 22.8 PG (ref 26–34)
MCHC RBC AUTO-ENTMCNC: 31.6 G/DL (ref 31–36)
MCV RBC AUTO: 72.1 FL (ref 80–100)
MONOCYTES # BLD: 0.4 K/UL (ref 0–1.3)
MONOCYTES NFR BLD: 7.5 %
NEUTROPHILS # BLD: 2.6 K/UL (ref 1.7–7.7)
NEUTROPHILS NFR BLD: 49.7 %
NITRITE UR QL STRIP.AUTO: NEGATIVE
PH UR STRIP.AUTO: 6 [PH] (ref 5–8)
PLATELET # BLD AUTO: 272 K/UL (ref 135–450)
PMV BLD AUTO: 7.7 FL (ref 5–10.5)
POTASSIUM SERPL-SCNC: 4 MMOL/L (ref 3.5–5.1)
PROT SERPL-MCNC: 7 G/DL (ref 6.4–8.2)
PROT UR STRIP.AUTO-MCNC: NEGATIVE MG/DL
RBC # BLD AUTO: 4.04 M/UL (ref 4–5.2)
SARS-COV-2 RNA RESP QL NAA+PROBE: NOT DETECTED
SODIUM SERPL-SCNC: 136 MMOL/L (ref 136–145)
SP GR UR STRIP.AUTO: 1.02 (ref 1–1.03)
TROPONIN, HIGH SENSITIVITY: 14 NG/L (ref 0–14)
UA COMPLETE W REFLEX CULTURE PNL UR: NORMAL
UA DIPSTICK W REFLEX MICRO PNL UR: NORMAL
URN SPEC COLLECT METH UR: NORMAL
UROBILINOGEN UR STRIP-ACNC: 0.2 E.U./DL
WBC # BLD AUTO: 5.2 K/UL (ref 4–11)

## 2024-06-15 PROCEDURE — 87636 SARSCOV2 & INF A&B AMP PRB: CPT

## 2024-06-15 PROCEDURE — 71045 X-RAY EXAM CHEST 1 VIEW: CPT

## 2024-06-15 PROCEDURE — 96360 HYDRATION IV INFUSION INIT: CPT

## 2024-06-15 PROCEDURE — 99285 EMERGENCY DEPT VISIT HI MDM: CPT

## 2024-06-15 PROCEDURE — 93005 ELECTROCARDIOGRAM TRACING: CPT | Performed by: PHYSICIAN ASSISTANT

## 2024-06-15 PROCEDURE — 80053 COMPREHEN METABOLIC PANEL: CPT

## 2024-06-15 PROCEDURE — 83605 ASSAY OF LACTIC ACID: CPT

## 2024-06-15 PROCEDURE — 36415 COLL VENOUS BLD VENIPUNCTURE: CPT

## 2024-06-15 PROCEDURE — 85025 COMPLETE CBC W/AUTO DIFF WBC: CPT

## 2024-06-15 PROCEDURE — 84484 ASSAY OF TROPONIN QUANT: CPT

## 2024-06-15 PROCEDURE — 2580000003 HC RX 258: Performed by: PHYSICIAN ASSISTANT

## 2024-06-15 PROCEDURE — 81003 URINALYSIS AUTO W/O SCOPE: CPT

## 2024-06-15 RX ORDER — 0.9 % SODIUM CHLORIDE 0.9 %
1000 INTRAVENOUS SOLUTION INTRAVENOUS ONCE
Status: COMPLETED | OUTPATIENT
Start: 2024-06-15 | End: 2024-06-15

## 2024-06-15 RX ADMIN — SODIUM CHLORIDE 1000 ML: 9 INJECTION, SOLUTION INTRAVENOUS at 15:55

## 2024-06-15 ASSESSMENT — ENCOUNTER SYMPTOMS
DIARRHEA: 0
SORE THROAT: 0
NAUSEA: 0
EYE PAIN: 0
COUGH: 0
RHINORRHEA: 0
CONSTIPATION: 0
SHORTNESS OF BREATH: 1
VOMITING: 0
BACK PAIN: 0
ABDOMINAL PAIN: 0

## 2024-06-15 ASSESSMENT — PAIN DESCRIPTION - LOCATION: LOCATION: GENERALIZED

## 2024-06-15 ASSESSMENT — PAIN - FUNCTIONAL ASSESSMENT: PAIN_FUNCTIONAL_ASSESSMENT: 0-10

## 2024-06-15 ASSESSMENT — LIFESTYLE VARIABLES
HOW OFTEN DO YOU HAVE A DRINK CONTAINING ALCOHOL: NEVER
HOW MANY STANDARD DRINKS CONTAINING ALCOHOL DO YOU HAVE ON A TYPICAL DAY: PATIENT DOES NOT DRINK

## 2024-06-15 NOTE — DISCHARGE INSTR - COC
Continuity of Care Form    Patient Name: Cordelia Pillai   :  1955  MRN:  [id number]    Admit date:  6/15/2024  Discharge date:  ***    Code Status Order: Prior   Advance Directives:     Admitting Physician:  No admitting provider for patient encounter.  PCP: Connie Franco MD    Discharging Nurse: ***  Discharging Hospital Unit/Room#:   Discharging Unit Phone Number: ***    Emergency Contact:   Extended Emergency Contact Information  Primary Emergency Contact: RosasKelsie  Address: [address] [place] [address] [place] [address] [facility]  Home Phone: [phone]  Relation: Child  Secondary Emergency Contact: Gina Shore POA  Home Phone: [phone]  Mobile Phone: [phone]  Relation: Other   needed? No    Past Surgical History:  Past Surgical History:   Procedure Laterality Date    ABDOMEN SURGERY  2011     REPAIR INCISIONAL HERNIA REDUCIBLE WITH POSSIBLE MESH    BACK SURGERY      x3    BACK SURGERY      stimulator    BLADDER SUSPENSION      CARDIAC PACEMAKER PLACEMENT      Heart doctor at [facility]    CATARACT REMOVAL WITH IMPLANT Left 2018    PHACO EMULSIFICATION OF CATARACT WITH INTRAOCULAR LENS IMPLANT LEFT EYE    CERVICAL DISCECTOMY  2014    and fusion    CHOLECYSTECTOMY      COLONOSCOPY  12    ENDOSCOPY, COLON, DIAGNOSTIC      EPIDURAL STEROID INJECTION Left 08/15/2019    LEFT KNEE GENICULAR NERVE BLOCK SITE CONFIRMED BY FLUOROSCOPY performed by Raman Silveira MD at [facility] OR    EYE SURGERY Right 2018    cataract removal    FOOT SURGERY Right 2012    arthroplasty    FOOT SURGERY Left 2015    FOOT SURGERY Left 2015    GASTRIC BYPASS SURGERY  ,    HYSTERECTOMY (CERVIX STATUS UNKNOWN)      KNEE ARTHROSCOPY Left 10/03/2014    part. medial & lateral meniscectomy, synovectomy plica exc    KNEE ARTHROSCOPY Right 10/13/2015    partial medial meniscectomy, chondroplasty, partial lateral meniscectomy    NECK  SURGERY      OTHER SURGICAL HISTORY  04/2013    removal spinal cord stimulater    OTHER SURGICAL HISTORY      bladder stimulator    OTHER SURGICAL HISTORY  08/30/2017    left patellofemoral arthroplasty    OTHER SURGICAL HISTORY  05/09/2018    convert left patellofemoral arthroplasty to left total knee replacement    OVARY REMOVAL      PACEMAKER INSERTION  2011    Dr Mahoney/checked last week/told has 12 more yrs for this one/set at 59    SKIN BIOPSY      abdomen    SPINE SURGERY      stimulator    THROAT SURGERY      polyps removed    TONSILLECTOMY      UPPER GASTROINTESTINAL ENDOSCOPY      UPPER GASTROINTESTINAL ENDOSCOPY  05/03/2017       Immunization History:   Immunization History   Administered Date(s) Administered    COVID-19, MODERNA BLUE border, Primary or Immunocompromised, (age 12y+), IM, 100 mcg/0.5mL 02/11/2021, 03/18/2021    COVID-19, MODERNA, (2023-24 formula), (age 12y+), IM, 50mcg/0.5mL 12/02/2023    Hep A, HAVRIX, VAQTA, (age 19y+), IM, 1mL 10/03/2019    Hepatitis B 10/03/2019, 11/07/2019    Influenza A (F8Z2-50) Vaccine PF IM 11/09/2009    Influenza Vaccine, unspecified formulation 10/01/2016, 10/01/2017, 10/31/2017    Influenza Virus Vaccine 10/01/2014, 11/01/2014, 11/01/2014, 10/01/2015, 10/01/2016, 11/07/2016, 11/07/2016, 10/01/2017, 10/31/2017, 09/19/2018    Influenza Whole 10/20/2013, 10/01/2014, 10/01/2015    Influenza, FLUARIX, FLULAVAL, FLUZONE (age 6 mo+) AND AFLURIA, (age 3 y+), PF, 0.5mL 11/09/2016, 09/19/2018, 10/03/2019    Pneumococcal Conjugate 7-valent (Prevnar7) 10/01/2014    Pneumococcal, PPSV23, PNEUMOVAX 23, (age 2y+), SC/IM, 0.5mL 09/19/2018    TDaP, ADACEL (age 10y-64y), BOOSTRIX (age 10y+), IM, 0.5mL 04/11/2016    Zoster Recombinant (Shingrix) 09/19/2018, 11/30/2018       Active Problems:  Patient Active Problem List   Diagnosis Code    Chest pain R07.9    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

## 2024-06-15 NOTE — ED PROVIDER NOTES
I have reviewed the below EKG. I was not otherwise involved in this patient's care.      EKG  The Ekg interpreted by myself  normal sinus rhythm with a rate of 73  Axis is   Normal  QTc is  normal  Intervals and Durations are unremarkable.      No specific ST-T wave changes appreciated.  No evidence of acute ischemia.   No significant change from prior EKG dated 3/17/2024        Ella Kaur MD  06/15/24 [id number]

## 2024-06-15 NOTE — ED PROVIDER NOTES
[facility] ED  EMERGENCY DEPARTMENT ENCOUNTER        Pt Name: Cordelia Pillai  MRN: [id number]  Birthdate 1955  Date of evaluation: 6/15/2024  Provider: AIMEE Cole  PCP: Connie Franco MD  Note Started: 3:04 PM EDT 6/15/24      AGNES. I have evaluated this patient.        CHIEF COMPLAINT       Chief Complaint   Patient presents with    Fever     Fevers, chills x several days. Pt reports she self-caths daily and has had frequency lately. Reports chronic SOB, no O2 requirement. Reports she does not ambulate regularly, was able to transfer from wheelchair to bed independently with cane.        HISTORY OF PRESENT ILLNESS: 1 or more Elements     History from : Patient    Limitations to history : None    Cordelia Pillai is a 68 y.o. female who presents to the emergency room due to not feeling well over the last 3 to 4 days.  Patient states that she has some subjective fevers and chills state that she does not feel well.  She states that she has some shortness of breath which is chronic for her but states that she feels that this may be related to her becoming septic.  She states that she does self cath herself and has noticed some mucus in her urine when she caths herself.  She does have a history of multi drug-resistant organisms in the past.  She states that she is on Bactrim daily due to these infections.  States that she has some mild abdominal pain that is generalized as well.    Nursing Notes were all reviewed and agreed with or any disagreements were addressed in the HPI.    REVIEW OF SYSTEMS :      Review of Systems   Constitutional:  Positive for chills and fever. Negative for diaphoresis.   HENT:  Negative for congestion, rhinorrhea and sore throat.    Eyes:  Negative for pain and visual disturbance.   Respiratory:  Positive for shortness of breath. Negative for cough.    Cardiovascular:  Negative for chest pain and leg swelling.   Gastrointestinal:  Negative for abdominal pain,  in the emergency department.  Patient will be discharged to follow-up primary care doctor and continue take her Bactrim and Zofran as needed.    At this time I do not see any need for admission as there is unremarkable workup here today.  I also considered CT scan of the abdomen pelvis however her workup was benign and she had a benign abdomen on exam.      Appropriate for outpatient management        I am the Primary Clinician of Record.    FINAL IMPRESSION      1. Other fatigue    2. Generalized abdominal pain    3. Subjective fever          DISPOSITION/PLAN     DISPOSITION Decision To Discharge 06/15/2024 05:08:10 PM      PATIENT REFERRED TO:  Connie Franco MD  [address] [place] [address] [address]  [address]  [facility] [address]  [phone]    Schedule an appointment as soon as possible for a visit in 1 week  [facility] ED  [address]  [facility] [address]  [phone]    As needed, If symptoms worsen      DISCHARGE MEDICATIONS:  Discharge Medication List as of 6/15/2024  5:32 PM          DISCONTINUED MEDICATIONS:  Discharge Medication List as of 6/15/2024  5:32 PM                 (Please note that portions of this note were completed with a voice recognition program.  Efforts were made to edit the dictations but occasionally words are mis-transcribed.)    AIMEE Cole (electronically signed)            Jose Sun PA  06/15/24 [id number]

## 2024-06-15 NOTE — DISCHARGE INSTRUCTIONS
Follow up with your primary care provider in one week for a recheck    Continue antibiotics that was prescribed to you with Bactrim.  Take antinausea medication that you have at home if needed    Return to the ED if you have any worsening symptoms.

## 2024-06-15 NOTE — ED NOTES
Patient self caths her suprapubic area for urine. Patient self cath'd herself and 75ml returned. Urine sample sent to lab.

## 2024-06-16 LAB
EKG ATRIAL RATE: 73 BPM
EKG DIAGNOSIS: NORMAL
EKG P-R INTERVAL: 168 MS
EKG Q-T INTERVAL: 402 MS
EKG QRS DURATION: 80 MS
EKG QTC CALCULATION (BAZETT): 442 MS
EKG R AXIS: 44 DEGREES
EKG T AXIS: 40 DEGREES
EKG VENTRICULAR RATE: 73 BPM

## 2024-06-16 PROCEDURE — 93010 ELECTROCARDIOGRAM REPORT: CPT | Performed by: STUDENT IN AN ORGANIZED HEALTH CARE EDUCATION/TRAINING PROGRAM

## 2024-07-30 ENCOUNTER — HOSPITAL ENCOUNTER (OUTPATIENT)
Dept: NEUROLOGY | Age: 69
Discharge: HOME | End: 2024-07-30
Payer: MEDICARE

## 2024-07-30 DIAGNOSIS — R40.4 SPELL OF ALTERED CONSCIOUSNESS: ICD-10-CM

## 2024-07-30 PROCEDURE — 95813 EEG EXTND MNTR 61-119 MIN: CPT | Performed by: PSYCHIATRY & NEUROLOGY

## 2024-07-30 PROCEDURE — 95813 EEG EXTND MNTR 61-119 MIN: CPT

## 2024-08-01 ENCOUNTER — OFFICE VISIT (OUTPATIENT)
Age: 69
End: 2024-08-01
Payer: MEDICARE

## 2024-08-01 VITALS
OXYGEN SATURATION: 99 % | HEIGHT: 62 IN | HEART RATE: 72 BPM | SYSTOLIC BLOOD PRESSURE: 98 MMHG | DIASTOLIC BLOOD PRESSURE: 64 MMHG | BODY MASS INDEX: 31.47 KG/M2 | WEIGHT: 171 LBS

## 2024-08-01 DIAGNOSIS — R40.4 SPELL OF ALTERED CONSCIOUSNESS: Primary | ICD-10-CM

## 2024-08-01 PROCEDURE — 3074F SYST BP LT 130 MM HG: CPT | Performed by: PSYCHIATRY & NEUROLOGY

## 2024-08-01 PROCEDURE — 99213 OFFICE O/P EST LOW 20 MIN: CPT | Performed by: PSYCHIATRY & NEUROLOGY

## 2024-08-01 PROCEDURE — 1123F ACP DISCUSS/DSCN MKR DOCD: CPT | Performed by: PSYCHIATRY & NEUROLOGY

## 2024-08-01 PROCEDURE — 3078F DIAST BP <80 MM HG: CPT | Performed by: PSYCHIATRY & NEUROLOGY

## 2024-08-01 RX ORDER — MIDODRINE HYDROCHLORIDE 2.5 MG/1
2.5 TABLET ORAL 3 TIMES DAILY
COMMUNITY
Start: 2024-06-20

## 2024-08-01 RX ORDER — TROSPIUM CHLORIDE 20 MG/1
20 TABLET, FILM COATED ORAL 2 TIMES DAILY
COMMUNITY
Start: 2024-06-02

## 2024-08-01 RX ORDER — GALANTAMINE HYDROBROMIDE 8 MG/1
8 CAPSULE, EXTENDED RELEASE ORAL DAILY
COMMUNITY
Start: 2024-07-28

## 2024-08-01 RX ORDER — METHYLPREDNISOLONE 4 MG/1
4 TABLET ORAL
COMMUNITY
Start: 2024-07-25

## 2024-08-01 RX ORDER — ESCITALOPRAM OXALATE 10 MG/1
10 TABLET ORAL DAILY
COMMUNITY
Start: 2024-07-26

## 2024-08-01 RX ORDER — OXYBUTYNIN CHLORIDE 10 MG/1
10 TABLET, EXTENDED RELEASE ORAL DAILY
COMMUNITY
Start: 2024-07-15

## 2024-08-01 RX ORDER — RIZATRIPTAN BENZOATE 5 MG/1
5 TABLET ORAL PRN
COMMUNITY
Start: 2024-07-15

## 2024-08-01 NOTE — PATIENT INSTRUCTIONS

## 2024-08-01 NOTE — PROGRESS NOTES
daily      oxyCODONE HCl (OXY-IR) 10 MG immediate release tablet TAKE 1 TABLET BY MOUTH EVERY SIX HOURS AS NEEDED      sodium chloride 0.9 % irrigation       ondansetron (ZOFRAN-ODT) 8 MG TBDP disintegrating tablet Take 1 tablet by mouth every 8 hours as needed      clonazePAM (KLONOPIN) 2 MG tablet TAKE 1 TABLET BY MOUTH TWO TIMES A DAY      triamcinolone (KENALOG) 0.1 % cream Apply topically 2 times daily Apply topically 2 times daily.      levothyroxine (SYNTHROID) 75 MCG tablet Take 1 tablet by mouth Daily      MONOJECT 60CC SYRINGE CATH TIP 60 ML MISC by Other route in the morning, at noon, and at bedtime Wound/hole on abd, uses to keep clean      pantoprazole (PROTONIX) 40 MG tablet TAKE 1 TABLET BY MOUTH DAILY  0    DENTA 5000 PLUS 1.1 % CREA FLOSS THEN BRUSH WITH PEA-SIZED AMOUNT FOR 2 MINUTES TWICE DAILY, SPIT AND DO NOT RINSE OR DRINK FOR 30 MINUTES       No current facility-administered medications for this visit.       Allergies:    Allergies as of 08/01/2024 - Fully Reviewed 08/01/2024   Allergen Reaction Noted    Meloxicam Itching and Other (See Comments) 02/26/2018    Acetaminophen Other (See Comments) 10/12/2020    Benzoin compound  10/09/2014    Lyrica [pregabalin]  07/12/2018    Quetiapine fumarate  04/15/2010    Seroquel [quetiapine fumarate] Other (See Comments) 04/15/2010    Tamsulosin  05/11/2022    Tizanidine  03/29/2013    Adhesive tape Rash 09/09/2011    Balsam Rash 05/02/2013    Balsam peru-castor oil Rash 05/02/2013    Castor oil Rash 05/02/2013    Linaclotide Rash 01/20/2020    Other Rash 06/17/2016    Quetiapine Other (See Comments) 04/15/2010    Wound dressings Rash 08/03/2015        Social history:     reports that she has never smoked. She has never used smokeless tobacco. She reports that she does not drink alcohol and does not use drugs.     Family history:    Family History   Problem Relation Age of Onset    Heart Disease Father     Cancer Sister         multiple organs    Other

## 2024-08-02 ENCOUNTER — TELEPHONE (OUTPATIENT)
Age: 69
End: 2024-08-02

## 2024-08-02 NOTE — TELEPHONE ENCOUNTER
Called patient she states the medication that was wrote down on paper in the room and handed to her by provider.  She states she was supposed to ask her urologist about this medication to help with her dizziness and to check with the Urologist about the oxybutynin, and trospium I did ask if it meclizine pt states no.    I did call [facility] pharmacy and requested recent med list to be faxed to office however they state they are not able to release that to office they would have to verbally list off all medications, or pt could  med list with copy of ID

## 2024-08-02 NOTE — TELEPHONE ENCOUNTER
Patient calling in stating that she was informed by Dr Campbell there is a medication she could possibly try that starts with a M? I do not see medication listed in LOV. Please advise

## 2024-08-12 ENCOUNTER — APPOINTMENT (OUTPATIENT)
Dept: CT IMAGING | Age: 69
End: 2024-08-12
Payer: MEDICARE

## 2024-08-12 ENCOUNTER — HOSPITAL ENCOUNTER (EMERGENCY)
Age: 69
Discharge: ANOTHER HOSPITAL | End: 2024-08-12
Attending: EMERGENCY MEDICINE
Payer: MEDICARE

## 2024-08-12 ENCOUNTER — HOSPITAL ENCOUNTER (INPATIENT)
Age: 69
LOS: 2 days | Discharge: HOME | DRG: 689 | End: 2024-08-15
Attending: INTERNAL MEDICINE | Admitting: INTERNAL MEDICINE
Payer: MEDICARE

## 2024-08-12 ENCOUNTER — APPOINTMENT (OUTPATIENT)
Dept: GENERAL RADIOLOGY | Age: 69
End: 2024-08-12
Payer: MEDICARE

## 2024-08-12 VITALS
WEIGHT: 141 LBS | SYSTOLIC BLOOD PRESSURE: 113 MMHG | RESPIRATION RATE: 10 BRPM | HEART RATE: 60 BPM | HEIGHT: 62 IN | DIASTOLIC BLOOD PRESSURE: 62 MMHG | BODY MASS INDEX: 25.95 KG/M2 | TEMPERATURE: 97.7 F | OXYGEN SATURATION: 98 %

## 2024-08-12 DIAGNOSIS — Z78.9 UNABLE TO CARE FOR SELF: ICD-10-CM

## 2024-08-12 DIAGNOSIS — R53.1 GENERALIZED WEAKNESS: ICD-10-CM

## 2024-08-12 DIAGNOSIS — N39.0 URINARY TRACT INFECTION WITHOUT HEMATURIA, SITE UNSPECIFIED: Primary | ICD-10-CM

## 2024-08-12 DIAGNOSIS — R40.4 TRANSIENT ALTERATION OF AWARENESS: ICD-10-CM

## 2024-08-12 LAB
ALBUMIN SERPL-MCNC: 3.7 G/DL (ref 3.4–5)
ALBUMIN/GLOB SERPL: 1.2 {RATIO} (ref 1.1–2.2)
ALP SERPL-CCNC: 102 U/L (ref 40–129)
ALT SERPL-CCNC: 15 U/L (ref 10–40)
AMMONIA PLAS-SCNC: 13 UMOL/L (ref 11–51)
AMORPH SED URNS QL MICRO: ABNORMAL /HPF
AMPHETAMINES UR QL SCN>1000 NG/ML: ABNORMAL
ANION GAP SERPL CALCULATED.3IONS-SCNC: 14 MMOL/L (ref 3–16)
ANISOCYTOSIS BLD QL SMEAR: ABNORMAL
AST SERPL-CCNC: 35 U/L (ref 15–37)
BACTERIA URNS QL MICRO: ABNORMAL /HPF
BARBITURATES UR QL SCN>200 NG/ML: ABNORMAL
BASE EXCESS BLDV CALC-SCNC: -6.5 MMOL/L (ref -3–3)
BASE EXCESS BLDV CALC-SCNC: -6.8 MMOL/L (ref -3–3)
BASOPHILS # BLD: 0.1 K/UL (ref 0–0.2)
BASOPHILS NFR BLD: 1.1 %
BENZODIAZ UR QL SCN>200 NG/ML: POSITIVE
BILIRUB SERPL-MCNC: <0.2 MG/DL (ref 0–1)
BILIRUB UR QL STRIP.AUTO: NEGATIVE
BUN SERPL-MCNC: 15 MG/DL (ref 7–20)
CALCIUM SERPL-MCNC: 9.1 MG/DL (ref 8.3–10.6)
CANNABINOIDS UR QL SCN>50 NG/ML: ABNORMAL
CHLORIDE SERPL-SCNC: 106 MMOL/L (ref 99–110)
CK SERPL-CCNC: 57 U/L (ref 26–192)
CLARITY UR: ABNORMAL
CO2 BLDV-SCNC: 22 MMOL/L
CO2 BLDV-SCNC: 23 MMOL/L
CO2 SERPL-SCNC: 20 MMOL/L (ref 21–32)
COCAINE UR QL SCN: ABNORMAL
COHGB MFR BLDV: 1 % (ref 0–1.5)
COHGB MFR BLDV: 1.1 % (ref 0–1.5)
COLOR UR: YELLOW
CREAT SERPL-MCNC: 0.9 MG/DL (ref 0.6–1.2)
DEPRECATED RDW RBC AUTO: 19.8 % (ref 12.4–15.4)
DRUG SCREEN COMMENT UR-IMP: ABNORMAL
EKG ATRIAL RATE: 60 BPM
EKG DIAGNOSIS: NORMAL
EKG P AXIS: 21 DEGREES
EKG P-R INTERVAL: 200 MS
EKG Q-T INTERVAL: 496 MS
EKG QRS DURATION: 88 MS
EKG QTC CALCULATION (BAZETT): 496 MS
EKG R AXIS: 35 DEGREES
EKG T AXIS: 85 DEGREES
EKG VENTRICULAR RATE: 60 BPM
EOSINOPHIL # BLD: 0.1 K/UL (ref 0–0.6)
EOSINOPHIL NFR BLD: 1.6 %
FENTANYL SCREEN, URINE: ABNORMAL
GFR SERPLBLD CREATININE-BSD FMLA CKD-EPI: 69 ML/MIN/{1.73_M2}
GLUCOSE SERPL-MCNC: 104 MG/DL (ref 70–99)
GLUCOSE UR STRIP.AUTO-MCNC: NEGATIVE MG/DL
HCO3 BLDV-SCNC: 20.3 MMOL/L (ref 23–29)
HCO3 BLDV-SCNC: 21.2 MMOL/L (ref 23–29)
HCT VFR BLD AUTO: 31.5 % (ref 36–48)
HGB BLD-MCNC: 9.6 G/DL (ref 12–16)
HGB UR QL STRIP.AUTO: NEGATIVE
HYALINE CASTS #/AREA URNS LPF: ABNORMAL /LPF (ref 0–2)
HYPOCHROMIA BLD QL SMEAR: ABNORMAL
KETONES UR STRIP.AUTO-MCNC: NEGATIVE MG/DL
LACTATE BLDV-SCNC: 1.5 MMOL/L (ref 0.4–1.9)
LACTATE BLDV-SCNC: 2 MMOL/L (ref 0.4–1.9)
LEUKOCYTE ESTERASE UR QL STRIP.AUTO: ABNORMAL
LYMPHOCYTES # BLD: 2.6 K/UL (ref 1–5.1)
LYMPHOCYTES NFR BLD: 31.2 %
MCH RBC QN AUTO: 21.7 PG (ref 26–34)
MCHC RBC AUTO-ENTMCNC: 30.6 G/DL (ref 31–36)
MCV RBC AUTO: 71.2 FL (ref 80–100)
METHADONE UR QL SCN>300 NG/ML: ABNORMAL
METHGB MFR BLDV: 0.3 %
METHGB MFR BLDV: 0.3 %
MONOCYTES # BLD: 0.6 K/UL (ref 0–1.3)
MONOCYTES NFR BLD: 7.1 %
MUCOUS THREADS #/AREA URNS LPF: ABNORMAL /LPF
NEUTROPHILS # BLD: 5 K/UL (ref 1.7–7.7)
NEUTROPHILS NFR BLD: 59 %
NITRITE UR QL STRIP.AUTO: NEGATIVE
NT-PROBNP SERPL-MCNC: 547 PG/ML (ref 0–124)
O2 CT VFR BLDV CALC: 10 VOL %
O2 CT VFR BLDV CALC: 10 VOL %
O2 THERAPY: ABNORMAL
O2 THERAPY: ABNORMAL
OPIATES UR QL SCN>300 NG/ML: ABNORMAL
OXYCODONE UR QL SCN: POSITIVE
PCO2 BLDV: 48.1 MMHG (ref 40–50)
PCO2 BLDV: 52.6 MMHG (ref 40–50)
PCP UR QL SCN>25 NG/ML: ABNORMAL
PH BLDV: 7.22 [PH] (ref 7.35–7.45)
PH BLDV: 7.24 [PH] (ref 7.35–7.45)
PH UR STRIP.AUTO: 6.5 [PH] (ref 5–8)
PH UR STRIP: 6.5 [PH]
PLATELET # BLD AUTO: 207 K/UL (ref 135–450)
PLATELET BLD QL SMEAR: ABNORMAL
PMV BLD AUTO: 8.4 FL (ref 5–10.5)
PO2 BLDV: 40.8 MMHG (ref 25–40)
PO2 BLDV: 47.4 MMHG (ref 25–40)
POLYCHROMASIA BLD QL SMEAR: ABNORMAL
POTASSIUM SERPL-SCNC: 3.8 MMOL/L (ref 3.5–5.1)
PROCALCITONIN SERPL IA-MCNC: 0.04 NG/ML (ref 0–0.15)
PROT SERPL-MCNC: 6.9 G/DL (ref 6.4–8.2)
PROT UR STRIP.AUTO-MCNC: NEGATIVE MG/DL
RBC # BLD AUTO: 4.43 M/UL (ref 4–5.2)
RBC #/AREA URNS HPF: ABNORMAL /HPF (ref 0–4)
SAO2 % BLDV: 66 %
SAO2 % BLDV: 76 %
SLIDE REVIEW: ABNORMAL
SODIUM SERPL-SCNC: 140 MMOL/L (ref 136–145)
SP GR UR STRIP.AUTO: 1.02 (ref 1–1.03)
TROPONIN, HIGH SENSITIVITY: 10 NG/L (ref 0–14)
TROPONIN, HIGH SENSITIVITY: 16 NG/L (ref 0–14)
UA COMPLETE W REFLEX CULTURE PNL UR: YES
UA DIPSTICK W REFLEX MICRO PNL UR: YES
URN SPEC COLLECT METH UR: ABNORMAL
UROBILINOGEN UR STRIP-ACNC: 0.2 E.U./DL
WBC # BLD AUTO: 8.5 K/UL (ref 4–11)
WBC #/AREA URNS HPF: ABNORMAL /HPF (ref 0–5)

## 2024-08-12 PROCEDURE — 70498 CT ANGIOGRAPHY NECK: CPT

## 2024-08-12 PROCEDURE — 96365 THER/PROPH/DIAG IV INF INIT: CPT

## 2024-08-12 PROCEDURE — 82550 ASSAY OF CK (CPK): CPT

## 2024-08-12 PROCEDURE — 87186 SC STD MICRODIL/AGAR DIL: CPT

## 2024-08-12 PROCEDURE — 93005 ELECTROCARDIOGRAM TRACING: CPT | Performed by: EMERGENCY MEDICINE

## 2024-08-12 PROCEDURE — 6360000002 HC RX W HCPCS: Performed by: NURSE PRACTITIONER

## 2024-08-12 PROCEDURE — G0378 HOSPITAL OBSERVATION PER HR: HCPCS

## 2024-08-12 PROCEDURE — 80053 COMPREHEN METABOLIC PANEL: CPT

## 2024-08-12 PROCEDURE — G0379 DIRECT REFER HOSPITAL OBSERV: HCPCS

## 2024-08-12 PROCEDURE — 2580000003 HC RX 258: Performed by: NURSE PRACTITIONER

## 2024-08-12 PROCEDURE — 85025 COMPLETE CBC W/AUTO DIFF WBC: CPT

## 2024-08-12 PROCEDURE — 87077 CULTURE AEROBIC IDENTIFY: CPT

## 2024-08-12 PROCEDURE — 87040 BLOOD CULTURE FOR BACTERIA: CPT

## 2024-08-12 PROCEDURE — 36415 COLL VENOUS BLD VENIPUNCTURE: CPT

## 2024-08-12 PROCEDURE — 83880 ASSAY OF NATRIURETIC PEPTIDE: CPT

## 2024-08-12 PROCEDURE — 81001 URINALYSIS AUTO W/SCOPE: CPT

## 2024-08-12 PROCEDURE — 87086 URINE CULTURE/COLONY COUNT: CPT

## 2024-08-12 PROCEDURE — 2580000003 HC RX 258: Performed by: EMERGENCY MEDICINE

## 2024-08-12 PROCEDURE — 71045 X-RAY EXAM CHEST 1 VIEW: CPT

## 2024-08-12 PROCEDURE — 6360000004 HC RX CONTRAST MEDICATION: Performed by: NURSE PRACTITIONER

## 2024-08-12 PROCEDURE — 96367 TX/PROPH/DG ADDL SEQ IV INF: CPT

## 2024-08-12 PROCEDURE — 96366 THER/PROPH/DIAG IV INF ADDON: CPT

## 2024-08-12 PROCEDURE — 96361 HYDRATE IV INFUSION ADD-ON: CPT

## 2024-08-12 PROCEDURE — 93010 ELECTROCARDIOGRAM REPORT: CPT | Performed by: STUDENT IN AN ORGANIZED HEALTH CARE EDUCATION/TRAINING PROGRAM

## 2024-08-12 PROCEDURE — 82803 BLOOD GASES ANY COMBINATION: CPT

## 2024-08-12 PROCEDURE — 99285 EMERGENCY DEPT VISIT HI MDM: CPT

## 2024-08-12 PROCEDURE — 84484 ASSAY OF TROPONIN QUANT: CPT

## 2024-08-12 PROCEDURE — 80307 DRUG TEST PRSMV CHEM ANLYZR: CPT

## 2024-08-12 PROCEDURE — 83605 ASSAY OF LACTIC ACID: CPT

## 2024-08-12 PROCEDURE — 84145 PROCALCITONIN (PCT): CPT

## 2024-08-12 PROCEDURE — 82140 ASSAY OF AMMONIA: CPT

## 2024-08-12 PROCEDURE — 70450 CT HEAD/BRAIN W/O DYE: CPT

## 2024-08-12 PROCEDURE — 6360000002 HC RX W HCPCS: Performed by: EMERGENCY MEDICINE

## 2024-08-12 RX ORDER — ONDANSETRON 2 MG/ML
4 INJECTION INTRAMUSCULAR; INTRAVENOUS EVERY 6 HOURS PRN
Status: DISCONTINUED | OUTPATIENT
Start: 2024-08-12 | End: 2024-08-15 | Stop reason: HOSPADM

## 2024-08-12 RX ORDER — ROSUVASTATIN CALCIUM 10 MG/1
10 TABLET, COATED ORAL DAILY
Status: DISCONTINUED | OUTPATIENT
Start: 2024-08-13 | End: 2024-08-15 | Stop reason: HOSPADM

## 2024-08-12 RX ORDER — ONDANSETRON 4 MG/1
4 TABLET, ORALLY DISINTEGRATING ORAL EVERY 8 HOURS PRN
Status: DISCONTINUED | OUTPATIENT
Start: 2024-08-12 | End: 2024-08-15 | Stop reason: HOSPADM

## 2024-08-12 RX ORDER — OXYCODONE HYDROCHLORIDE 5 MG/1
10 TABLET ORAL EVERY 6 HOURS PRN
Status: DISCONTINUED | OUTPATIENT
Start: 2024-08-12 | End: 2024-08-15 | Stop reason: HOSPADM

## 2024-08-12 RX ORDER — ENOXAPARIN SODIUM 100 MG/ML
40 INJECTION SUBCUTANEOUS DAILY
Status: DISCONTINUED | OUTPATIENT
Start: 2024-08-13 | End: 2024-08-15 | Stop reason: HOSPADM

## 2024-08-12 RX ORDER — PANTOPRAZOLE SODIUM 40 MG/1
40 TABLET, DELAYED RELEASE ORAL DAILY
Status: DISCONTINUED | OUTPATIENT
Start: 2024-08-13 | End: 2024-08-15 | Stop reason: HOSPADM

## 2024-08-12 RX ORDER — ASPIRIN 300 MG/1
300 SUPPOSITORY RECTAL DAILY
Status: DISCONTINUED | OUTPATIENT
Start: 2024-08-13 | End: 2024-08-13

## 2024-08-12 RX ORDER — SODIUM CHLORIDE 9 MG/ML
INJECTION, SOLUTION INTRAVENOUS PRN
Status: DISCONTINUED | OUTPATIENT
Start: 2024-08-12 | End: 2024-08-15 | Stop reason: HOSPADM

## 2024-08-12 RX ORDER — SODIUM CHLORIDE 0.9 % (FLUSH) 0.9 %
5-40 SYRINGE (ML) INJECTION PRN
Status: DISCONTINUED | OUTPATIENT
Start: 2024-08-12 | End: 2024-08-15 | Stop reason: HOSPADM

## 2024-08-12 RX ORDER — SODIUM CHLORIDE 0.9 % (FLUSH) 0.9 %
5-40 SYRINGE (ML) INJECTION EVERY 12 HOURS SCHEDULED
Status: DISCONTINUED | OUTPATIENT
Start: 2024-08-12 | End: 2024-08-15 | Stop reason: HOSPADM

## 2024-08-12 RX ORDER — CLONAZEPAM 1 MG/1
2 TABLET ORAL 2 TIMES DAILY
Status: DISCONTINUED | OUTPATIENT
Start: 2024-08-12 | End: 2024-08-15 | Stop reason: HOSPADM

## 2024-08-12 RX ORDER — METOPROLOL SUCCINATE 25 MG/1
25 TABLET, EXTENDED RELEASE ORAL DAILY
Status: DISCONTINUED | OUTPATIENT
Start: 2024-08-13 | End: 2024-08-13

## 2024-08-12 RX ORDER — TROSPIUM CHLORIDE 20 MG/1
20 TABLET, FILM COATED ORAL
Status: DISCONTINUED | OUTPATIENT
Start: 2024-08-13 | End: 2024-08-15 | Stop reason: HOSPADM

## 2024-08-12 RX ORDER — 0.9 % SODIUM CHLORIDE 0.9 %
1000 INTRAVENOUS SOLUTION INTRAVENOUS ONCE
Status: COMPLETED | OUTPATIENT
Start: 2024-08-12 | End: 2024-08-12

## 2024-08-12 RX ORDER — ASPIRIN 81 MG/1
81 TABLET, CHEWABLE ORAL DAILY
Status: DISCONTINUED | OUTPATIENT
Start: 2024-08-13 | End: 2024-08-13

## 2024-08-12 RX ORDER — SODIUM CHLORIDE 9 MG/ML
INJECTION, SOLUTION INTRAVENOUS CONTINUOUS
Status: ACTIVE | OUTPATIENT
Start: 2024-08-12 | End: 2024-08-13

## 2024-08-12 RX ORDER — POLYETHYLENE GLYCOL 3350 17 G/17G
17 POWDER, FOR SOLUTION ORAL DAILY PRN
Status: DISCONTINUED | OUTPATIENT
Start: 2024-08-12 | End: 2024-08-15 | Stop reason: HOSPADM

## 2024-08-12 RX ADMIN — IOPAMIDOL 75 ML: 755 INJECTION, SOLUTION INTRAVENOUS at 12:23

## 2024-08-12 RX ADMIN — VANCOMYCIN HYDROCHLORIDE 1500 MG: 10 INJECTION, POWDER, LYOPHILIZED, FOR SOLUTION INTRAVENOUS at 15:33

## 2024-08-12 RX ADMIN — CEFTRIAXONE SODIUM 1000 MG: 1 INJECTION, POWDER, FOR SOLUTION INTRAMUSCULAR; INTRAVENOUS at 12:30

## 2024-08-12 RX ADMIN — SODIUM CHLORIDE 1000 ML: 9 INJECTION, SOLUTION INTRAVENOUS at 11:29

## 2024-08-12 ASSESSMENT — PAIN DESCRIPTION - LOCATION: LOCATION: GENERALIZED

## 2024-08-12 ASSESSMENT — ENCOUNTER SYMPTOMS
SHORTNESS OF BREATH: 1
FACIAL SWELLING: 0
COLOR CHANGE: 0
SORE THROAT: 0
ABDOMINAL PAIN: 0
RHINORRHEA: 0

## 2024-08-12 ASSESSMENT — PAIN SCALES - GENERAL: PAINLEVEL_OUTOF10: 8

## 2024-08-12 ASSESSMENT — PAIN - FUNCTIONAL ASSESSMENT: PAIN_FUNCTIONAL_ASSESSMENT: NONE - DENIES PAIN

## 2024-08-12 NOTE — ED PROVIDER NOTES
THIS IS MY AGNES SUPERVISORY AND SHARED VISIT NOTE:    I personally saw the patient and made/approved the management plan and take responsibility for the patient management.      I independently performed a history and physical on Cordelia Pillai.   All diagnostic, treatment, and disposition decisions were made by myself in conjunction with the advanced practice provider.  I personally saw the patient and performed a substantive portion of the visit including all aspects of the medical decision making.      For further details of Cordelia Pillai's emergency department encounter, please see Melina Montoya NP's documentation.      History: Patient is a 68-year-old female presenting today due to concern for shortness of breath along with generalized fatigue and weakness and some slurred speech with this speech starting yesterday.  There was concern initially that she was very lethargic and hard to arouse although when I went to the room as long as I spoke to her and she easily aroused to just a slightly higher voice and would follow commands appropriately.  She had no respiratory distress or trouble breathing.  When I asked her if she was depressed she did state yes although denied feeling suicidal.  She does have a prescription for Klonopin along with Percocet.  She denies taking this more than she was supposed to.  No reported vomiting.  No reported falls or trauma although her daughter states he did have to catch her at 1 point.  She does have a pacemaker.  Due to concern for slurred speech that has been constant since yesterday although worse today she was brought to the emergency department by EMS for further evaluation.  The patient also was reporting some chest pain today.  She denies any abdominal pain.  She does report decreased appetite recently.      Physical exam:   Gen: No acute distress.  Somewhat lethargic but easily aroused to voice and answers questions appropriately.  Psych: Depressed mood and flat 
or rales.      Comments: Tenderness with palpation on the sternum  Chest:      Chest wall: Tenderness present.   Abdominal:      General: There is no distension.      Palpations: Abdomen is soft.      Tenderness: There is no abdominal tenderness.   Musculoskeletal:         General: Normal range of motion.      Cervical back: Normal range of motion.   Skin:     General: Skin is warm and dry.   Neurological:      Mental Status: She is alert.      Comments: Able to answer person place and time however is very lethargic         DIAGNOSTIC RESULTS   LABS:    Labs Reviewed   CBC WITH AUTO DIFFERENTIAL - Abnormal; Notable for the following components:       Result Value    Hemoglobin 9.6 (*)     Hematocrit 31.5 (*)     MCV 71.2 (*)     MCH 21.7 (*)     MCHC 30.6 (*)     RDW 19.8 (*)     Anisocytosis 1+ (*)     Polychromasia Occasional (*)     Hypochromia Occasional (*)     All other components within normal limits   COMPREHENSIVE METABOLIC PANEL W/ REFLEX TO MG FOR LOW K - Abnormal; Notable for the following components:    CO2 20 (*)     Glucose 104 (*)     All other components within normal limits   BLOOD GAS, VENOUS - Abnormal; Notable for the following components:    pH, Russell 7.224 (*)     pCO2, Russell 52.6 (*)     PO2, Russell 40.8 (*)     HCO3, Venous 21.2 (*)     Base Excess, Russell -6.5 (*)     All other components within normal limits   LACTATE, SEPSIS - Abnormal; Notable for the following components:    Lactic Acid, Sepsis 2.0 (*)     All other components within normal limits   URINALYSIS WITH REFLEX TO CULTURE - Abnormal; Notable for the following components:    Clarity, UA SL CLOUDY (*)     Leukocyte Esterase, Urine MODERATE (*)     All other components within normal limits   TROPONIN - Abnormal; Notable for the following components:    Troponin, High Sensitivity 16 (*)     All other components within normal limits   BRAIN NATRIURETIC PEPTIDE - Abnormal; Notable for the following components:    Pro- (*)     All

## 2024-08-12 NOTE — PROGRESS NOTES
Vanc  consult per ED  Dx: UTI  Pt wt 64kg  Vanc 1500mg times one  Bhakti Alvarenga Pharm D 8/12/20242:33 PM  .

## 2024-08-12 NOTE — ED NOTES
Salvatore placed ADT20 and did all the speaking with [facility]. Patient is accepted to [facility]. Just waiting on a bed. However they are discharge dependent.

## 2024-08-13 ENCOUNTER — APPOINTMENT (OUTPATIENT)
Dept: CT IMAGING | Age: 69
DRG: 689 | End: 2024-08-13
Attending: INTERNAL MEDICINE
Payer: MEDICARE

## 2024-08-13 LAB
ALBUMIN SERPL-MCNC: 3.5 G/DL (ref 3.4–5)
ALBUMIN/GLOB SERPL: 1.3 {RATIO} (ref 1.1–2.2)
ALP SERPL-CCNC: 99 U/L (ref 40–129)
ALT SERPL-CCNC: 11 U/L (ref 10–40)
ANION GAP SERPL CALCULATED.3IONS-SCNC: 11 MMOL/L (ref 3–16)
AST SERPL-CCNC: 29 U/L (ref 15–37)
BASOPHILS # BLD: 0 K/UL (ref 0–0.2)
BASOPHILS NFR BLD: 0.9 %
BILIRUB SERPL-MCNC: <0.2 MG/DL (ref 0–1)
BUN SERPL-MCNC: 13 MG/DL (ref 7–20)
CALCIUM SERPL-MCNC: 8.7 MG/DL (ref 8.3–10.6)
CHLORIDE SERPL-SCNC: 110 MMOL/L (ref 99–110)
CHOLEST SERPL-MCNC: 121 MG/DL (ref 0–199)
CK SERPL-CCNC: 170 U/L (ref 26–192)
CO2 SERPL-SCNC: 21 MMOL/L (ref 21–32)
CREAT SERPL-MCNC: 1 MG/DL (ref 0.6–1.2)
DEPRECATED RDW RBC AUTO: 19.8 % (ref 12.4–15.4)
EOSINOPHIL # BLD: 0.2 K/UL (ref 0–0.6)
EOSINOPHIL NFR BLD: 2.9 %
EST. AVERAGE GLUCOSE BLD GHB EST-MCNC: 116.9 MG/DL
GFR SERPLBLD CREATININE-BSD FMLA CKD-EPI: 61 ML/MIN/{1.73_M2}
GLUCOSE BLD-MCNC: 129 MG/DL (ref 70–99)
GLUCOSE BLD-MCNC: 162 MG/DL (ref 70–99)
GLUCOSE SERPL-MCNC: 111 MG/DL (ref 70–99)
HBA1C MFR BLD: 5.7 %
HCT VFR BLD AUTO: 28.4 % (ref 36–48)
HDLC SERPL-MCNC: 29 MG/DL (ref 40–60)
HGB BLD-MCNC: 8.5 G/DL (ref 12–16)
LACTATE BLDV-SCNC: 1.6 MMOL/L (ref 0.4–2)
LDLC SERPL CALC-MCNC: 65 MG/DL
LYMPHOCYTES # BLD: 2 K/UL (ref 1–5.1)
LYMPHOCYTES NFR BLD: 37.7 %
MCH RBC QN AUTO: 21.4 PG (ref 26–34)
MCHC RBC AUTO-ENTMCNC: 30 G/DL (ref 31–36)
MCV RBC AUTO: 71.3 FL (ref 80–100)
MONOCYTES # BLD: 0.5 K/UL (ref 0–1.3)
MONOCYTES NFR BLD: 9.7 %
NEUTROPHILS # BLD: 2.5 K/UL (ref 1.7–7.7)
NEUTROPHILS NFR BLD: 48.8 %
PERFORMED ON: ABNORMAL
PERFORMED ON: ABNORMAL
PLATELET # BLD AUTO: 143 K/UL (ref 135–450)
PMV BLD AUTO: 8.4 FL (ref 5–10.5)
POTASSIUM SERPL-SCNC: 3.7 MMOL/L (ref 3.5–5.1)
PROT SERPL-MCNC: 6.2 G/DL (ref 6.4–8.2)
RBC # BLD AUTO: 3.98 M/UL (ref 4–5.2)
SODIUM SERPL-SCNC: 142 MMOL/L (ref 136–145)
T3 SERPL-MCNC: 0.98 NG/ML (ref 0.8–2)
T4 FREE SERPL-MCNC: 1.6 NG/DL (ref 0.9–1.8)
TRIGL SERPL-MCNC: 133 MG/DL (ref 0–150)
TROPONIN, HIGH SENSITIVITY: 10 NG/L (ref 0–14)
TSH SERPL DL<=0.005 MIU/L-ACNC: 0.1 UIU/ML (ref 0.27–4.2)
VLDLC SERPL CALC-MCNC: 27 MG/DL
WBC # BLD AUTO: 5.2 K/UL (ref 4–11)

## 2024-08-13 PROCEDURE — 96372 THER/PROPH/DIAG INJ SC/IM: CPT

## 2024-08-13 PROCEDURE — 96367 TX/PROPH/DG ADDL SEQ IV INF: CPT

## 2024-08-13 PROCEDURE — 70450 CT HEAD/BRAIN W/O DYE: CPT

## 2024-08-13 PROCEDURE — 74176 CT ABD & PELVIS W/O CONTRAST: CPT

## 2024-08-13 PROCEDURE — 94761 N-INVAS EAR/PLS OXIMETRY MLT: CPT

## 2024-08-13 PROCEDURE — 6370000000 HC RX 637 (ALT 250 FOR IP): Performed by: NURSE PRACTITIONER

## 2024-08-13 PROCEDURE — 83036 HEMOGLOBIN GLYCOSYLATED A1C: CPT

## 2024-08-13 PROCEDURE — APPSS45 APP SPLIT SHARED TIME 31-45 MINUTES

## 2024-08-13 PROCEDURE — 80061 LIPID PANEL: CPT

## 2024-08-13 PROCEDURE — 85025 COMPLETE CBC W/AUTO DIFF WBC: CPT

## 2024-08-13 PROCEDURE — 84480 ASSAY TRIIODOTHYRONINE (T3): CPT

## 2024-08-13 PROCEDURE — 6370000000 HC RX 637 (ALT 250 FOR IP): Performed by: INTERNAL MEDICINE

## 2024-08-13 PROCEDURE — 84439 ASSAY OF FREE THYROXINE: CPT

## 2024-08-13 PROCEDURE — 83605 ASSAY OF LACTIC ACID: CPT

## 2024-08-13 PROCEDURE — G0378 HOSPITAL OBSERVATION PER HR: HCPCS

## 2024-08-13 PROCEDURE — 82550 ASSAY OF CK (CPK): CPT

## 2024-08-13 PROCEDURE — 84484 ASSAY OF TROPONIN QUANT: CPT

## 2024-08-13 PROCEDURE — 96375 TX/PRO/DX INJ NEW DRUG ADDON: CPT

## 2024-08-13 PROCEDURE — 2700000000 HC OXYGEN THERAPY PER DAY

## 2024-08-13 PROCEDURE — 96365 THER/PROPH/DIAG IV INF INIT: CPT

## 2024-08-13 PROCEDURE — 99222 1ST HOSP IP/OBS MODERATE 55: CPT | Performed by: STUDENT IN AN ORGANIZED HEALTH CARE EDUCATION/TRAINING PROGRAM

## 2024-08-13 PROCEDURE — 84443 ASSAY THYROID STIM HORMONE: CPT

## 2024-08-13 PROCEDURE — 51798 US URINE CAPACITY MEASURE: CPT

## 2024-08-13 PROCEDURE — 96361 HYDRATE IV INFUSION ADD-ON: CPT

## 2024-08-13 PROCEDURE — 80053 COMPREHEN METABOLIC PANEL: CPT

## 2024-08-13 PROCEDURE — 2580000003 HC RX 258: Performed by: INTERNAL MEDICINE

## 2024-08-13 PROCEDURE — 6360000002 HC RX W HCPCS: Performed by: NURSE PRACTITIONER

## 2024-08-13 PROCEDURE — P9045 ALBUMIN (HUMAN), 5%, 250 ML: HCPCS | Performed by: NURSE PRACTITIONER

## 2024-08-13 PROCEDURE — 1200000000 HC SEMI PRIVATE

## 2024-08-13 PROCEDURE — 2580000003 HC RX 258: Performed by: NURSE PRACTITIONER

## 2024-08-13 PROCEDURE — 36415 COLL VENOUS BLD VENIPUNCTURE: CPT

## 2024-08-13 RX ORDER — ASPIRIN 81 MG/1
81 TABLET, CHEWABLE ORAL DAILY
Status: DISCONTINUED | OUTPATIENT
Start: 2024-08-13 | End: 2024-08-15

## 2024-08-13 RX ORDER — SODIUM CHLORIDE, SODIUM LACTATE, POTASSIUM CHLORIDE, AND CALCIUM CHLORIDE .6; .31; .03; .02 G/100ML; G/100ML; G/100ML; G/100ML
1000 INJECTION, SOLUTION INTRAVENOUS ONCE
Status: COMPLETED | OUTPATIENT
Start: 2024-08-13 | End: 2024-08-13

## 2024-08-13 RX ORDER — NALOXONE HYDROCHLORIDE 0.4 MG/ML
0.4 INJECTION, SOLUTION INTRAMUSCULAR; INTRAVENOUS; SUBCUTANEOUS ONCE
Status: COMPLETED | OUTPATIENT
Start: 2024-08-13 | End: 2024-08-13

## 2024-08-13 RX ORDER — NALOXONE HYDROCHLORIDE 0.4 MG/ML
0.4 INJECTION, SOLUTION INTRAMUSCULAR; INTRAVENOUS; SUBCUTANEOUS PRN
Status: DISCONTINUED | OUTPATIENT
Start: 2024-08-13 | End: 2024-08-15 | Stop reason: HOSPADM

## 2024-08-13 RX ORDER — ALPRAZOLAM 0.25 MG
0.5 TABLET ORAL
Status: DISCONTINUED | OUTPATIENT
Start: 2024-08-13 | End: 2024-08-15 | Stop reason: HOSPADM

## 2024-08-13 RX ORDER — ASPIRIN 300 MG/1
300 SUPPOSITORY RECTAL DAILY
Status: DISCONTINUED | OUTPATIENT
Start: 2024-08-13 | End: 2024-08-15

## 2024-08-13 RX ORDER — METOPROLOL SUCCINATE 25 MG/1
25 TABLET, EXTENDED RELEASE ORAL 2 TIMES DAILY
Status: ON HOLD | COMMUNITY
End: 2024-09-19

## 2024-08-13 RX ORDER — ALBUMIN HUMAN 50 G/1000ML
25 SOLUTION INTRAVENOUS ONCE
Status: COMPLETED | OUTPATIENT
Start: 2024-08-13 | End: 2024-08-13

## 2024-08-13 RX ORDER — METOPROLOL SUCCINATE 25 MG/1
25 TABLET, EXTENDED RELEASE ORAL 2 TIMES DAILY
Status: DISCONTINUED | OUTPATIENT
Start: 2024-08-13 | End: 2024-08-15 | Stop reason: HOSPADM

## 2024-08-13 RX ORDER — MIDODRINE HYDROCHLORIDE 5 MG/1
12.5 TABLET ORAL
Status: DISCONTINUED | OUTPATIENT
Start: 2024-08-13 | End: 2024-08-15 | Stop reason: HOSPADM

## 2024-08-13 RX ADMIN — OXYCODONE HYDROCHLORIDE 10 MG: 5 TABLET ORAL at 00:29

## 2024-08-13 RX ADMIN — CLONAZEPAM 2 MG: 1 TABLET ORAL at 01:12

## 2024-08-13 RX ADMIN — OXYCODONE HYDROCHLORIDE 10 MG: 5 TABLET ORAL at 16:09

## 2024-08-13 RX ADMIN — PANTOPRAZOLE SODIUM 40 MG: 40 TABLET, DELAYED RELEASE ORAL at 09:05

## 2024-08-13 RX ADMIN — ALBUMIN (HUMAN) 25 G: 12.5 INJECTION, SOLUTION INTRAVENOUS at 06:17

## 2024-08-13 RX ADMIN — ASPIRIN 81 MG 81 MG: 81 TABLET ORAL at 09:05

## 2024-08-13 RX ADMIN — SODIUM CHLORIDE, PRESERVATIVE FREE 10 ML: 5 INJECTION INTRAVENOUS at 20:11

## 2024-08-13 RX ADMIN — TROSPIUM CHLORIDE 20 MG: 20 TABLET, FILM COATED ORAL at 16:09

## 2024-08-13 RX ADMIN — ENOXAPARIN SODIUM 40 MG: 100 INJECTION SUBCUTANEOUS at 09:05

## 2024-08-13 RX ADMIN — CEFTRIAXONE SODIUM 1000 MG: 1 INJECTION, POWDER, FOR SOLUTION INTRAMUSCULAR; INTRAVENOUS at 12:13

## 2024-08-13 RX ADMIN — SODIUM CHLORIDE: 9 INJECTION, SOLUTION INTRAVENOUS at 00:13

## 2024-08-13 RX ADMIN — ROSUVASTATIN CALCIUM 10 MG: 10 TABLET, FILM COATED ORAL at 09:04

## 2024-08-13 RX ADMIN — SODIUM CHLORIDE, PRESERVATIVE FREE 10 ML: 5 INJECTION INTRAVENOUS at 09:06

## 2024-08-13 RX ADMIN — TROSPIUM CHLORIDE 20 MG: 20 TABLET, FILM COATED ORAL at 06:19

## 2024-08-13 RX ADMIN — LEVOTHYROXINE SODIUM 75 MCG: 0.03 TABLET ORAL at 06:19

## 2024-08-13 RX ADMIN — NALXONE HYDROCHLORIDE 0.4 MG: 0.4 INJECTION INTRAMUSCULAR; INTRAVENOUS; SUBCUTANEOUS at 04:52

## 2024-08-13 RX ADMIN — SODIUM CHLORIDE, POTASSIUM CHLORIDE, SODIUM LACTATE AND CALCIUM CHLORIDE 1000 ML: 600; 310; 30; 20 INJECTION, SOLUTION INTRAVENOUS at 06:10

## 2024-08-13 ASSESSMENT — PAIN DESCRIPTION - DESCRIPTORS
DESCRIPTORS: SHARP
DESCRIPTORS: SHARP

## 2024-08-13 ASSESSMENT — PAIN SCALES - GENERAL
PAINLEVEL_OUTOF10: 0
PAINLEVEL_OUTOF10: 8
PAINLEVEL_OUTOF10: 0
PAINLEVEL_OUTOF10: 8
PAINLEVEL_OUTOF10: 8
PAINLEVEL_OUTOF10: 0

## 2024-08-13 ASSESSMENT — PAIN DESCRIPTION - LOCATION
LOCATION: GENERALIZED
LOCATION: HAND;KNEE
LOCATION: HEAD;KNEE

## 2024-08-13 NOTE — PROGRESS NOTES
Received call from [facility]. Stated they needed more info on pt's pacemaker and stimulator, needing card or make/model number. Pt's daughter, Kelsie, stated she knows they are both [employer], but doesn't have a card or knows the make/model numbers. They also wanted to know her Cardiologist and she stated his name was Dr. Marie, but didn't know a first name or where she saw him at. Spoke with [facility] and updated them on Cardiologist name and that both pacemaker and stimulator are [employer]. They stated they will call [employer] to try to get the info they need and look into Cardiologist. But [facility] said they would not be able to do MRI today since they need to find out all the information before, and that it would be tomorrow. Pt's daughter notified and will notify Dr. Hernandez.

## 2024-08-13 NOTE — PLAN OF CARE
Problem: Safety - Adult  Goal: Free from fall injury  8/13/2024 1241 by Belinda Rouse, RN  Outcome: Progressing  Flowsheets (Taken 8/13/2024 0244 by Keanu Castillo, RN)  Free From Fall Injury:   Instruct family/caregiver on patient safety   Based on caregiver fall risk screen, instruct family/caregiver to ask for assistance with transferring infant if caregiver noted to have fall risk factors

## 2024-08-13 NOTE — PROGRESS NOTES
Spoke with Dr. Hernandez. Stated to attempt straight cath one more time in a couple of hours and if still unable to get anything out may need to consult Urology to assess.

## 2024-08-13 NOTE — PROGRESS NOTES
Attempted straight cath via ileoconduit, minimal output, less than 5 ml. RN instructor at bedside. Primary RN notified.

## 2024-08-13 NOTE — H&P
multiple organs    Other Sister     Asthma Neg Hx     Diabetes Neg Hx     Emphysema Neg Hx     Heart Failure Neg Hx     Hypertension Neg Hx      Past Medical History:        Diagnosis Date    Angina at rest (HCC)     Anxiety     Arthritis     hands and hip    Blood transfusion     after back surgery    Bradycardia     Bronchiectasis (HCC) 11/05/2013    Chronic back pain     Hyperlipidemia     Hypertension     Iron deficiency anemia 6/1/2023    Localized morphea     Multiple drug resistant organism (MDRO) culture positive 04/13/2021    E.COLI-URINE    NATHAN treated with BiPAP     Pacemaker     Stress incontinence     Thyroid disease     hypothyroid    Trochanteric bursitis of left hip 04/12/2019     Past Surgical History:        Procedure Laterality Date    ABDOMEN SURGERY  09/09/2011     REPAIR INCISIONAL HERNIA REDUCIBLE WITH POSSIBLE MESH    BACK SURGERY      x3    BACK SURGERY      stimulator    BLADDER SUSPENSION      CARDIAC PACEMAKER PLACEMENT  2011    Heart doctor at [facility]    CATARACT REMOVAL WITH IMPLANT Left 03/08/2018    PHACO EMULSIFICATION OF CATARACT WITH INTRAOCULAR LENS IMPLANT LEFT EYE    CERVICAL DISCECTOMY  06/2014    and fusion    CHOLECYSTECTOMY      COLONOSCOPY  2002 1/26/12    ENDOSCOPY, COLON, DIAGNOSTIC      EPIDURAL STEROID INJECTION Left 08/15/2019    LEFT KNEE GENICULAR NERVE BLOCK SITE CONFIRMED BY FLUOROSCOPY performed by Raman Silveira MD at [facility] OR    EYE SURGERY Right 04/05/2018    cataract removal    FOOT SURGERY Right 12/06/2012    arthroplasty    FOOT SURGERY Left 04/30/2015    FOOT SURGERY Left 04/30/2015    GASTRIC BYPASS SURGERY  1998,2010    HYSTERECTOMY (CERVIX STATUS UNKNOWN)      KNEE ARTHROSCOPY Left 10/03/2014    part. medial & lateral meniscectomy, synovectomy plica exc    KNEE ARTHROSCOPY Right 10/13/2015    partial medial meniscectomy, chondroplasty, partial lateral meniscectomy    NECK SURGERY      OTHER SURGICAL HISTORY  04/2013    removal spinal cord  stimulater    OTHER SURGICAL HISTORY      bladder stimulator    OTHER SURGICAL HISTORY  08/30/2017    left patellofemoral arthroplasty    OTHER SURGICAL HISTORY  05/09/2018    convert left patellofemoral arthroplasty to left total knee replacement    OVARY REMOVAL      PACEMAKER INSERTION  2011    Dr Mahoney/checked last week/told has 12 more yrs for this one/set at 59    SKIN BIOPSY      abdomen    SPINE SURGERY      stimulator    THROAT SURGERY      polyps removed    TONSILLECTOMY      UPPER GASTROINTESTINAL ENDOSCOPY      UPPER GASTROINTESTINAL ENDOSCOPY  05/03/2017     Medications Prior to Admission:   Prior to Admission medications    Medication Sig Start Date End Date Taking? Authorizing Provider   midodrine (PROAMATINE) 2.5 MG tablet Take 1 tablet by mouth 3 times daily 6/20/24   Tayla Hays MD   escitalopram (LEXAPRO) 10 MG tablet Take 1 tablet by mouth daily 7/26/24   Tayla Hays MD   methylPREDNISolone (MEDROL DOSEPACK) 4 MG tablet Take 1 tablet by mouth 7/25/24   Tayla Hays MD   galantamine (RAZADYNE ER) 8 MG extended release capsule Take 1 capsule by mouth daily 7/28/24   Tayla Hays MD   oxyBUTYnin (DITROPAN-XL) 10 MG extended release tablet Take 1 tablet by mouth daily 7/15/24   Tayla Hays MD   rizatriptan (MAXALT) 5 MG tablet Take 1 tablet by mouth as needed for Migraine 7/15/24   Tayla Hays MD   trospium (SANCTURA) 20 MG tablet Take 1 tablet by mouth 2 times daily 6/2/24   Tayla Hays MD   cephALEXin (KEFLEX) 500 MG capsule Take 1 capsule by mouth 2 times daily 3/13/24   Tayla Hays MD   fluticasone (FLONASE) 50 MCG/ACT nasal spray 1 spray by Nasal route as needed for Allergies 5/22/23   Margarito Faustin MD   metoprolol succinate (TOPROL XL) 25 MG extended release tablet Take 1 tablet by mouth daily 5/23/23   Margarito Faustin MD   sulfamethoxazole-trimethoprim (BACTRIM DS;SEPTRA DS) 800-160 MG per tablet Take 1

## 2024-08-13 NOTE — PROGRESS NOTES
Straight cathed urostomy and irrigated with 60cc sterile water. Small amount of mucous returned and 560cc yellow urine.

## 2024-08-13 NOTE — PROGRESS NOTES
Pt admitted to unit from [facility]. Pt oriented to call light and to room. Vital signs stable at this time. Alert and oriented x4. Call light in reach.

## 2024-08-13 NOTE — PROGRESS NOTES
Previous Urology note from 10/2023 says--     - Patient aware she should avoid UTI testing if not symptomatic as she will always grow bacteria given bowel-augmented bladder    Reinforced the need for daily irrigation of the mucus to avoid stone formation recurrent urine tract infections  Recommended against waiting too long before catheterization to avoid bladder distention and kinking at the stoma site. Recommended at least 4-5 times catheterizations per day.

## 2024-08-13 NOTE — PROGRESS NOTES
In patient Neurology consult        [facility]            Cordelia Pillai  1955    Date of Service: 8/13/2024    Referring Physician: Sridhar Hernandez MD      Reason for the consult and CC: Generalized weakness, slurred speech    HPI:   The patient is a 68 y.o.  years old female with a PMHx of HTN, HLD, NATHAN who presented to the hospital with concern for generalized weakness. Pt states that on Sunday pt was having trouble getting up, her daughter was concerned that she was having slurred speech since the day prior. Pt states she typically uses a walker or holds onto furniture when ambulating. Pt was found to have UTI in the ED and is currently being treated for that. Pt's BP was labile overnight, at times in the 70s systolic. There was also concern that pt was lethargic overnight and received narcan with no affect. Around 0600 this AM was called due to anisocoria and R side weakness, however TNK was not administered d/t LKW >4.5 hours. A repeat CTH, CTA revealed no acute findings. On my exam today, pt does not have any focal deficits and is alert & oriented x4.        Constitutional:   Vitals:    08/13/24 0509 08/13/24 0800 08/13/24 0845 08/13/24 1100   BP: 108/70 97/62 97/62 98/60   Pulse:  78 78 78   Resp:  16  16   Temp:  97.8 °F (36.6 °C)  98 °F (36.7 °C)   TempSrc:  Oral  Oral   SpO2:  97%  97%   Weight:       Height:             I personally reviewed and updated social history, past medical history, medications, allergy, surgical history, and family history as documented in the patient's electronic health records.       ROS: 10-14 ROS reviewed with the patient/nurse/family which were unremarkable except mentioned in H&P.    General appearance:  Normal development and appear in no acute distress.   Mental Status:   Oriented to person, place, problem, and time.    Memory: Good immediate recall.  Intact remote memory  Normal attention span and concentration.  Language: intact naming, repeating  medically appropriate exam and/or evaluation, counseling and educating the patient/family/caregiver, ordering medications, tests, or procedures, referring and communicating with other health care professions, documenting clinical information in the electronic or other health record, independently interpreting results and communicating results to the patient/family/caregiver, and care coordination.    Electronically signed by Donavon Rivas MD on 8/13/24 at 3:42 PM EDT

## 2024-08-13 NOTE — PLAN OF CARE
Problem: Pain  Goal: Verbalizes/displays adequate comfort level or baseline comfort level  8/13/2024 0244 by Keanu Castillo RN  Outcome: Progressing  8/13/2024 0244 by Keanu Castillo RN  Outcome: Progressing  Flowsheets (Taken 8/13/2024 0244)  Verbalizes/displays adequate comfort level or baseline comfort level:   Encourage patient to monitor pain and request assistance   Administer analgesics based on type and severity of pain and evaluate response   Consider cultural and social influences on pain and pain management   Notify Licensed Independent Practitioner if interventions unsuccessful or patient reports new pain   Implement non-pharmacological measures as appropriate and evaluate response   Assess pain using appropriate pain scale     Problem: Skin/Tissue Integrity  Goal: Absence of new skin breakdown  Description: 1.  Monitor for areas of redness and/or skin breakdown  2.  Assess vascular access sites hourly  3.  Every 4-6 hours minimum:  Change oxygen saturation probe site  4.  Every 4-6 hours:  If on nasal continuous positive airway pressure, respiratory therapy assess nares and determine need for appliance change or resting period.  8/13/2024 0244 by Keanu Castillo RN  Outcome: Progressing  8/13/2024 0244 by Keanu Castillo RN  Outcome: Progressing     Problem: Safety - Adult  Goal: Free from fall injury  8/13/2024 0244 by Keanu Castillo RN  Outcome: Progressing  8/13/2024 0244 by Keanu Castillo RN  Outcome: Progressing  Flowsheets (Taken 8/13/2024 0244)  Free From Fall Injury:   Instruct family/caregiver on patient safety   Based on caregiver fall risk screen, instruct family/caregiver to ask for assistance with transferring infant if caregiver noted to have fall risk factors

## 2024-08-13 NOTE — PROGRESS NOTES
Nursing instructor and student attempted to straight cath as pt does at home, but only got a tiny amount out and couldn't get any more. Pt stated they did everything that she currently does at home. We bladder scanned afterwards and it was 236. She said shes had issues with that in the past as well. Dr. Hernandez paged and notified and asked if he would like to consult Urology. Awaiting response.

## 2024-08-13 NOTE — SIGNIFICANT EVENT
Code stroke was called    Patient was last known to be at midnight.  Patient was able to move lower extremity bilaterally above gravity in addition did not have her pupil dilated on the right side more than the left side in addition has been more tremulous.  Accu-Chek obtained was around 120+ glucose. NIHSS 6 due to left leg and right leg not giving any effort against gravity.  Patient is not a candidate for tPA given last known well was well beyond>4.5 hours.  Neurology already consulted on admission, MRI brain, echo.  CT head stat obtained and was negative.  Aspirin already ordered, but timing for now.  Stat labs ordered and pending

## 2024-08-13 NOTE — PROGRESS NOTES
Occupational Therapy/ Physical Therapy    Orders received for OT/PT eval and tx - pt with two code strokes called since order placed, RN reports pt not appropriate for therapy at this time d/t pt with soft BP and change in status. Will sign off at this time. Please re-order when pt medically appropriate.     Stefani Malin, OTR/L  Sarai Burrows PT, DPT

## 2024-08-13 NOTE — PROGRESS NOTES
Received call from MRI that they have all needed information for pacemaker and stimulators, but they need the remote for the stimulators for when they do the MRI tomorrow. Spoke with Kelsie that it was needed and she is not sure where the remote is, but she will try to find them.

## 2024-08-13 NOTE — PROGRESS NOTES
4 Eyes Skin Assessment     The patient is being assess for  4 Eye Shift  Assessment     I agree that 2 RN's have performed a thorough Head to Toe Skin Assessment on the patient. ALL assessment sites listed below have been assessed.      Areas assessed by both nurses:   [x]   Head, Face, and Ears   [x]   Shoulders, Back, and Chest  [x]   Arms, Elbows, and Hands   [x]   Coccyx, Sacrum, and IschIum  [x]   Legs, Feet, and Heels        Does the Patient have Skin Breakdown?  No         Van Prevention initiated:  Yes   Wound Care Orders initiated:  NA      [facility] nurse consulted for Pressure Injury (Stage 3,4, Unstageable, DTI, NWPT, and Complex wounds), New and Established Ostomies:  NA      Nurse 1 eSignature: Electronically signed by Belinda Rouse RN on 8/13/24 at 12:46 PM EDT    **SHARE this note so that the co-signing nurse is able to place an eSignature**    Nurse 2 eSignature: {Esignature:[id number]}

## 2024-08-13 NOTE — PROGRESS NOTES
Pt blood pressures during the night, soft and labile. Pt was lethargic, minimally responsive. Notified charge RN d/t concern for over sedation. Narcan ordered by NP. Medication given, minimal to no effect. Spoke with Pharmacist as well, who stated that cardiac/cardiovascular involvement should not be indicated in the pt from the medications taken over night. Wash clothes with ice cold water applied, along with bright lighting, pt began to become alert, responsive, and BP improved.      As Primary RN was leaving bedside, noticed new onset of uneven pupils, right eye noticeably dilated. Hand grasps were uneven, right side weaker. Code stroke called. Pt AO4 during the code stroke.    After coming back from CT, pt hooked up to IV fluids. Meds given. Went to straight cath patient. Only got about 150ml out. Began to make statements about hearing knocking, seeing a man in the room that was not there, unable to answer orientation questions at this point. PCA delegated to bladder scan pt.

## 2024-08-13 NOTE — PROGRESS NOTES
-      Code status: Full Code  PT/OT Eval Status:   []NOT yet ordered  [x]Ordered and Pending   []Seen with Recommendations for:  []Home independently  []Home w/ assist  []HHC  []SNF  []Acute Rehab    Anticipated Discharge Day/Date:  1-2 days    Anticipated Discharge Location: [x]Home  []HHC  []SNF  []Acute Rehab  []ECF  []LTAC  []Hospice  []Other -      Consults:      IP CONSULT TO NEUROLOGY      ------------------------------------------------------------------------------------------------------------------------------------------------------------------------    MDM    [x] High (any 2)    A. Problems (any 1)  [x] Acute/Chronic Illness/injury posing threat to life or bodily function:    [] Severe exacerbation of chronic illness:    ---------------------------------------------------------------------  B. Risk of Treatment (any 1)   [] Drugs/treatments that require intensive monitoring for toxicity include:    [] IV ABX requiring serial renal monitoring for nephrotoxicity:     [] IV Narcotic analgesia for adverse drug reaction  [] IV diuresis requiring serial monitoring for renal impairment and electrolyte derangements  [] Critical electrolyte abnormalities requiring IV replacement and close serial monitoring  [] Insulin - monitoring serial FSBS for Hypoglycemic adverse drug reaction  [] Anticoagulation requiring serial monitoring of coagulation factors  [] Other -   [] Change in code status:    [] Decision to escalate care:    [] Major surgery/procedure with associated risk factors:    ----------------------------------------------------------------------  C. Data (any 2)  [x] Discussed current management and discharge planning options with Case Management.  [] Discussed management of the case with:    [x] Telemetry personally reviewed and interpreted as documented above    [] Imaging personally reviewed and interpreted, includes:    [x] Data Review (any 3)  [x] All available Consultant notes from yesterday/today

## 2024-08-14 ENCOUNTER — APPOINTMENT (OUTPATIENT)
Dept: CT IMAGING | Age: 69
DRG: 689 | End: 2024-08-14
Attending: INTERNAL MEDICINE
Payer: MEDICARE

## 2024-08-14 LAB
ANION GAP SERPL CALCULATED.3IONS-SCNC: 11 MMOL/L (ref 3–16)
BACTERIA UR CULT: ABNORMAL
BUN SERPL-MCNC: 8 MG/DL (ref 7–20)
CALCIUM SERPL-MCNC: 8.8 MG/DL (ref 8.3–10.6)
CHLORIDE SERPL-SCNC: 108 MMOL/L (ref 99–110)
CO2 SERPL-SCNC: 21 MMOL/L (ref 21–32)
CREAT SERPL-MCNC: 0.7 MG/DL (ref 0.6–1.2)
DEPRECATED RDW RBC AUTO: 19.9 % (ref 12.4–15.4)
FOLATE SERPL-MCNC: 6.28 NG/ML (ref 4.78–24.2)
GFR SERPLBLD CREATININE-BSD FMLA CKD-EPI: >90 ML/MIN/{1.73_M2}
GLUCOSE SERPL-MCNC: 99 MG/DL (ref 70–99)
HCT VFR BLD AUTO: 24 % (ref 36–48)
HGB BLD-MCNC: 7.4 G/DL (ref 12–16)
MAGNESIUM SERPL-MCNC: 1.7 MG/DL (ref 1.8–2.4)
MCH RBC QN AUTO: 22.1 PG (ref 26–34)
MCHC RBC AUTO-ENTMCNC: 31 G/DL (ref 31–36)
MCV RBC AUTO: 71.2 FL (ref 80–100)
ORGANISM: ABNORMAL
PLATELET # BLD AUTO: 146 K/UL (ref 135–450)
PLATELET BLD QL SMEAR: ADEQUATE
PMV BLD AUTO: 8.3 FL (ref 5–10.5)
POTASSIUM SERPL-SCNC: 3.3 MMOL/L (ref 3.5–5.1)
RBC # BLD AUTO: 3.37 M/UL (ref 4–5.2)
SLIDE REVIEW: ABNORMAL
SODIUM SERPL-SCNC: 140 MMOL/L (ref 136–145)
VIT B12 SERPL-MCNC: 376 PG/ML (ref 211–911)
WBC # BLD AUTO: 6.4 K/UL (ref 4–11)

## 2024-08-14 PROCEDURE — 1200000000 HC SEMI PRIVATE

## 2024-08-14 PROCEDURE — 2700000000 HC OXYGEN THERAPY PER DAY

## 2024-08-14 PROCEDURE — 80048 BASIC METABOLIC PNL TOTAL CA: CPT

## 2024-08-14 PROCEDURE — 6370000000 HC RX 637 (ALT 250 FOR IP): Performed by: NURSE PRACTITIONER

## 2024-08-14 PROCEDURE — 2580000003 HC RX 258: Performed by: NURSE PRACTITIONER

## 2024-08-14 PROCEDURE — 97530 THERAPEUTIC ACTIVITIES: CPT

## 2024-08-14 PROCEDURE — 83921 ORGANIC ACID SINGLE QUANT: CPT

## 2024-08-14 PROCEDURE — 97110 THERAPEUTIC EXERCISES: CPT

## 2024-08-14 PROCEDURE — 72125 CT NECK SPINE W/O DYE: CPT

## 2024-08-14 PROCEDURE — 70450 CT HEAD/BRAIN W/O DYE: CPT

## 2024-08-14 PROCEDURE — 97162 PT EVAL MOD COMPLEX 30 MIN: CPT

## 2024-08-14 PROCEDURE — 36415 COLL VENOUS BLD VENIPUNCTURE: CPT

## 2024-08-14 PROCEDURE — 84630 ASSAY OF ZINC: CPT

## 2024-08-14 PROCEDURE — 84207 ASSAY OF VITAMIN B-6: CPT

## 2024-08-14 PROCEDURE — 94761 N-INVAS EAR/PLS OXIMETRY MLT: CPT

## 2024-08-14 PROCEDURE — 6370000000 HC RX 637 (ALT 250 FOR IP): Performed by: INTERNAL MEDICINE

## 2024-08-14 PROCEDURE — 83735 ASSAY OF MAGNESIUM: CPT

## 2024-08-14 PROCEDURE — 82607 VITAMIN B-12: CPT

## 2024-08-14 PROCEDURE — 6360000002 HC RX W HCPCS: Performed by: NURSE PRACTITIONER

## 2024-08-14 PROCEDURE — APPSS30 APP SPLIT SHARED TIME 16-30 MINUTES

## 2024-08-14 PROCEDURE — 82746 ASSAY OF FOLIC ACID SERUM: CPT

## 2024-08-14 PROCEDURE — 85027 COMPLETE CBC AUTOMATED: CPT

## 2024-08-14 PROCEDURE — 99232 SBSQ HOSP IP/OBS MODERATE 35: CPT | Performed by: STUDENT IN AN ORGANIZED HEALTH CARE EDUCATION/TRAINING PROGRAM

## 2024-08-14 PROCEDURE — 82525 ASSAY OF COPPER: CPT

## 2024-08-14 RX ADMIN — ENOXAPARIN SODIUM 40 MG: 100 INJECTION SUBCUTANEOUS at 10:10

## 2024-08-14 RX ADMIN — ONDANSETRON 4 MG: 2 INJECTION INTRAMUSCULAR; INTRAVENOUS at 17:55

## 2024-08-14 RX ADMIN — TROSPIUM CHLORIDE 20 MG: 20 TABLET, FILM COATED ORAL at 06:21

## 2024-08-14 RX ADMIN — TROSPIUM CHLORIDE 20 MG: 20 TABLET, FILM COATED ORAL at 17:56

## 2024-08-14 RX ADMIN — SODIUM CHLORIDE, PRESERVATIVE FREE 10 ML: 5 INJECTION INTRAVENOUS at 22:00

## 2024-08-14 RX ADMIN — ROSUVASTATIN CALCIUM 10 MG: 10 TABLET, FILM COATED ORAL at 10:10

## 2024-08-14 RX ADMIN — OXYCODONE HYDROCHLORIDE 10 MG: 5 TABLET ORAL at 17:56

## 2024-08-14 RX ADMIN — CLONAZEPAM 2 MG: 1 TABLET ORAL at 10:10

## 2024-08-14 RX ADMIN — PANTOPRAZOLE SODIUM 40 MG: 40 TABLET, DELAYED RELEASE ORAL at 10:10

## 2024-08-14 RX ADMIN — CEFTRIAXONE SODIUM 1000 MG: 1 INJECTION, POWDER, FOR SOLUTION INTRAMUSCULAR; INTRAVENOUS at 14:28

## 2024-08-14 RX ADMIN — CLONAZEPAM 2 MG: 1 TABLET ORAL at 21:21

## 2024-08-14 RX ADMIN — SODIUM CHLORIDE, PRESERVATIVE FREE 10 ML: 5 INJECTION INTRAVENOUS at 10:13

## 2024-08-14 RX ADMIN — LEVOTHYROXINE SODIUM 75 MCG: 0.03 TABLET ORAL at 06:21

## 2024-08-14 RX ADMIN — ASPIRIN 81 MG 81 MG: 81 TABLET ORAL at 10:10

## 2024-08-14 ASSESSMENT — PAIN SCALES - GENERAL: PAINLEVEL_OUTOF10: 0

## 2024-08-14 NOTE — PROGRESS NOTES
Physical Therapy  Facility/Department: [facility] C3 TELE/MED SURG/ONC  Physical Therapy Initial Assessment    Name: Cordelia Pillai  : 1955  MRN: [id number]  Date of Service: 2024    Discharge Recommendations:  Subacute/Skilled Nursing Facility, Home with Home health PT   PT Equipment Recommendations  Equipment Needed: Yes  Mobility Devices: Walker  Walker: Rolling   Therapy discharge recommendations take into account each patient's current medical complexities and are subject to input/oversight from the patient's healthcare team.   Barriers to Home Discharge:   [x] Steps to access home entry or bed/bath:   [x] Unable to transfer, ambulate, or propel wheelchair household distances without assist   [x] Limited available assist at home upon discharge    [] Patient or family requests d/c to post-acute facility    [] Poor cognition/safety awareness for d/c to home alone    []Unable to maintain ordered weight bearing status    [] Patient with salient signs of long-standing immobility   [x] Patient is at risk for falls due to: weakness   [] Other:    If pt is unable to be seen after this session, please let this note serve as discharge summary.  Please see case management note for discharge disposition.  Thank you.     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  Past Surgical History:  has a past surgical history that includes Gastric bypass surgery (,); Cholecystectomy; Tonsillectomy; Hysterectomy; back surgery; skin biopsy; Spine surgery; Colonoscopy (); Pacemaker insertion (); back surgery; Foot surgery

## 2024-08-14 NOTE — CONSULTS
Cordelia Pillai  Neurology Follow-up  [facility]    Date of Service: 8/14/2024    Subjective:   CC: Follow up today regarding: Generalized weakness, slurred speech     Events noted. Chart and lab reviewed. Pt is somewhat more confused than she was yesterday. Called pt's daughter regarding pt's baseline mental status and medications. Daughter states she is unsure what medications she takes and does not manage this for her, pt typically cares for herself and is somewhat forgetful although not nearly as bad as she has been the last 3-4 days.      ROS : A 10-12 system review obtained and updated today and is unremarkable except as mentioned  in my interval history.     Medication, past medical history, social history, and family history reviewed.    Objective:  Exam:   Constitutional:   Vitals:    08/13/24 2301 08/14/24 0433 08/14/24 1000 08/14/24 1100   BP: 117/66 117/70 (!) 149/85 (!) 152/82   Pulse: 79 75 88 90   Resp: 20 19 18 18   Temp: 98.3 °F (36.8 °C) 98.5 °F (36.9 °C) 97.9 °F (36.6 °C) 98.2 °F (36.8 °C)   TempSrc: Oral  Oral Oral   SpO2: 96% 95% 98% 99%   Weight:       Height:         General appearance:  Normal development and appear in no acute distress.   Mental Status:   Oriented to person, place only.  Memory: Good immediate recall.  Intact remote memory  Normal attention span and concentration.  Language: intact naming, repeating and fluency   Good fund of Knowledge. Aware of current events and vocabulary   Cranial Nerves:   II: Visual fields: Full. Pupils: R pupil 4mm, L 3mm. Both reactive bilaterally.   III,IV,VI: Extra Ocular Movements are intact. No nystagmus  V: Facial sensation is intact  VII: Facial strength and movements: intact and symmetric  IX: Normal palatal elevation and shoulder shrug  XII: Tongue movements are normal  Musculoskeletal: 4/5 strength BUE, 2/5 strength BLE. Pt complaining bilateral hip pain, states her legs are weak because of this.   Tone: Normal tone.   Planters:  related to dementia (PTA galantamine). Repeat NCHCT was performed <24 hours after initial HCT.   Painful bilateral leg paraparesis. Rule out cord compression. Unable to get MRI.     RECOMMENDATIONS:   DC MRI brain and c spine  NCHCT ordered. If infarct occurred, then it should be present >48 hours after presentation.   CT C spine without contrast  If no acute findings above or significant cord compression, then okay to discharge from neuro perspective.   Continue aspirin 81mg daily for now. If no evidence of evolving infarct on NCHCT, then no neurological indication for aspirin    Electronically signed by Donavon Rivas MD on 8/14/24 at 6:21 PM EDT

## 2024-08-14 NOTE — CARE COORDINATION
Chart reviewed. Status changed to IP provided IMM. Care managed by neurology and IM. MRI's remain pending. Following for clinical improvement. Patient from home alone, ambulatory with cane mostly. Dtr assists with transportation. Joshua Farfan RN

## 2024-08-14 NOTE — PROGRESS NOTES
Hospital Medicine Progress Note      Date of Admission: 8/12/2024  Hospital Day: 3    Chief Admission Complaint:  Generalized weakness.      Subjective:  Some improvement in weakness. No new symptoms.     Telemetry (reviewed by me):  NSR    Presenting Admission History:       68 y.o. female, with PMH of obesity, hypertension, hyperlipidemia, hypothyroidism, NATHAN and anxiety/depression, who was a direct admit from [facility] to [facility] with generalized weakness, fatigue and dizziness. History obtained from the patient and review of EMR.  The patient stated she has been experiencing generalized weakness, fatigue and dizziness for the last couple of days.  Per EMR, the patient was brought to the emergency department by her family due to fatigue and dizziness.  At the time of arrival, the patient was lethargic, but did respond to verbal and touch stimuli.  The patient's daughter reported that the patient's grandson was at the house and called the daughter stating that the patient was having difficulty getting around due to weakness.  The daughter reported that when she arrived at the house she felt like the patient was experiencing weakness, but also appeared to have some slurred speech. In the emergency department a CT head was obtained that revealed no evidence of acute intracranial abnormality.  CTA head and neck was obtained that revealed no flow-limiting stenosis or occlusion within the head or neck.  The patient's urine drug screen was positive for benzodiazepines and oxycodone.  However, she does have a prescription for these medications.  UA was obtained that revealed moderate leukocyte esterase, 21-50 WBC and 2+ bacteria.  The patient was started on Rocephin and vancomycin.  Rocephin has been continued.  The patient was ultimately [facility]  She denied any other associated symptoms as well as any  aggravating and/or alleviating factors. At the time of this assessment, the patient was resting comfortably in bed. She currently denies any chest pain, back pain, abdominal pain, shortness of breath, numbness, tingling, N/V/C/D, fever and/or chills.      Assessment/Plan:       Possible TIA vs UTI; CVA less likely - symptoms include generalized weakness, fatigue and dizziness; later noted to have bilateral LE weakness.   -CT head revealed: No evidence of acute intracranial abnormality.  Minimal paranasal sinus disease.  - CTA head and neck revealed: No flow-limiting stenosis or occlusion within the head or neck  -UA revealed moderate leukocyte esterase, 21-50 WBC and 2+ bacteria. UCx positive.   -Continue Abx  -PT/OT evaluation  - Neuro checks  -Monitor on tele.  -Unable to get MRI d/t devices  -Neurology recommends repeat Non-con Head CT as well as CT C-spine     Obesity  With Body mass index is 31.4 kg/m². Complicating assessment and treatment. Placing patient at risk for multiple co-morbidities as well as early death and contributing to the patient's presentation.      Essential hypertension  - Continue metoprolol     Hyperlipidemia  - Continue rosuvastatin     Hypothyroidism  - Continue levothyroxine     Anxiety/depression  - Mood appears stable at this time  - Continue home medications     NATHAN  - CPAP nightly     Chronic microcytic anemia  -Hb slightly lower, but no signs or symptoms of bleeding at this time  -Monitor    Physical Exam Performed:      General appearance:  No apparent distress  Respiratory:  Normal respiratory effort.   Cardiovascular:  Regular rate and rhythm.  Abdomen:  Soft, non-tender, non-distended.  Musculoskelatal:  No edema  Neurologic:  Non-focal  Psychiatric:  Alert and oriented    BP (!) 149/85   Pulse 88   Temp 97.9 °F (36.6 °C) (Oral)   Resp 18   Ht 1.575 m (5' 2\")   Wt 77.8 kg (171 lb 8.3 oz)   SpO2 98%   BMI 31.37 kg/m²     Diet: ADULT DIET; Regular  DVT Prophylaxis: [x]PPx

## 2024-08-14 NOTE — PROGRESS NOTES
Withheld Klonopin d/t pt became lethargic after oral pain medication per day shift RN. VSS at this time. Call light in reach.

## 2024-08-14 NOTE — PROGRESS NOTES
4 Eyes Skin Assessment     The patient is being assess for  Low Van    I agree that 2 RN's have performed a thorough Head to Toe Skin Assessment on the patient. ALL assessment sites listed below have been assessed.       Areas assessed by both nurses: Kimmie RN, Renea RN  [x]   Head, Face, and Ears   [x]   Shoulders, Back, and Chest  [x]   Arms, Elbows, and Hands   [x]   Coccyx, Sacrum, and IschIum  [x]   Legs, Feet, and Heels        Does the Patient have Skin Breakdown?  Ileal conduit RUQ, scattered ecchymosis        Van Prevention initiated:  Yes   Wound Care Orders initiated:  NA      WOC nurse consulted for Pressure Injury (Stage 3,4, Unstageable, DTI, NWPT, and Complex wounds), New and Established Ostomies:  NA      Nurse 1 eSignature: Electronically signed by KIMMIE MORENO RN on 8/14/24 at 6:48 PM EDT    **SHARE this note so that the co-signing nurse is able to place an eSignature**    Nurse 2 eSignature: Electronically signed by Juanis Mauricio RN on 8/14/24 at 6:49 PM EDT

## 2024-08-14 NOTE — PROGRESS NOTES
Pt having nausea with one episode of vomiting this evening. Administered PRN zofran and Oxycodone per MAR. Pt A/O x 4, VSS. Intermittent straight catheterization performed throughout the day. Output and irrigation completed and charted. Daughter bedside this afternoon. Standard safety precautions in place. Pt denies any other needs at this time. All care per orders.

## 2024-08-14 NOTE — PROGRESS NOTES
4 Eyes Skin Assessment     The patient is being assess for  Shift Handoff    I agree that 2 RN's have performed a thorough Head to Toe Skin Assessment on the patient. ALL assessment sites listed below have been assessed.       Areas assessed by both nurses: Keanu RN and Arley RN  [x]   Head, Face, and Ears   [x]   Shoulders, Back, and Chest  [x]   Arms, Elbows, and Hands   [x]   Coccyx, Sacrum, and IschIum  [x]   Legs, Feet, and Heels        Does the Patient have Skin Breakdown?  No         Van Prevention initiated:  Yes   Wound Care Orders initiated:  No      WOC nurse consulted for Pressure Injury (Stage 3,4, Unstageable, DTI, NWPT, and Complex wounds), New and Established Ostomies:  No      Nurse 1 eSignature: Electronically signed by KEANU SHAW RN on 8/14/24 at 4:36 AM EDT    **SHARE this note so that the co-signing nurse is able to place an eSignature**    Nurse 2 eSignature: {Esignature:[id number]}

## 2024-08-14 NOTE — PLAN OF CARE
Problem: Pain  Goal: Verbalizes/displays adequate comfort level or baseline comfort level  Outcome: Progressing  Flowsheets (Taken 8/13/2024 0244)  Verbalizes/displays adequate comfort level or baseline comfort level:   Encourage patient to monitor pain and request assistance   Administer analgesics based on type and severity of pain and evaluate response   Consider cultural and social influences on pain and pain management   Notify Licensed Independent Practitioner if interventions unsuccessful or patient reports new pain   Implement non-pharmacological measures as appropriate and evaluate response   Assess pain using appropriate pain scale     Problem: Skin/Tissue Integrity  Goal: Absence of new skin breakdown  Description: 1.  Monitor for areas of redness and/or skin breakdown  2.  Assess vascular access sites hourly  3.  Every 4-6 hours minimum:  Change oxygen saturation probe site  4.  Every 4-6 hours:  If on nasal continuous positive airway pressure, respiratory therapy assess nares and determine need for appliance change or resting period.  Outcome: Progressing     Problem: Safety - Adult  Goal: Free from fall injury  Outcome: Progressing  Flowsheets (Taken 8/13/2024 0244)  Free From Fall Injury:   Instruct family/caregiver on patient safety   Based on caregiver fall risk screen, instruct family/caregiver to ask for assistance with transferring infant if caregiver noted to have fall risk factors     Problem: Musculoskeletal - Adult  Goal: Return mobility to safest level of function  Outcome: Progressing  Flowsheets (Taken 8/14/2024 0252)  Return Mobility to Safest Level of Function:   Ensure adequate protection for wounds/incisions during mobilization   Obtain physical therapy/occupational therapy consults as needed     Problem: Infection - Adult  Goal: Absence of infection at discharge  Outcome: Progressing  Flowsheets (Taken 8/14/2024 0252)  Absence of infection at discharge:   Assess and monitor for  signs and symptoms of infection   Monitor lab/diagnostic results   Monitor all insertion sites i.e., indwelling lines, tubes and drains

## 2024-08-14 NOTE — PLAN OF CARE
Problem: Pain  Goal: Verbalizes/displays adequate comfort level or baseline comfort level  Outcome: Progressing  Flowsheets (Taken 8/13/2024 0244 by Keanu Castillo, RN)  Verbalizes/displays adequate comfort level or baseline comfort level:   Encourage patient to monitor pain and request assistance   Administer analgesics based on type and severity of pain and evaluate response   Consider cultural and social influences on pain and pain management   Notify Licensed Independent Practitioner if interventions unsuccessful or patient reports new pain   Implement non-pharmacological measures as appropriate and evaluate response   Assess pain using appropriate pain scale     Problem: Skin/Tissue Integrity  Goal: Absence of new skin breakdown  Description: 1.  Monitor for areas of redness and/or skin breakdown  2.  Assess vascular access sites hourly  3.  Every 4-6 hours minimum:  Change oxygen saturation probe site  4.  Every 4-6 hours:  If on nasal continuous positive airway pressure, respiratory therapy assess nares and determine need for appliance change or resting period.  Outcome: Progressing     Problem: Safety - Adult  Goal: Free from fall injury  Outcome: Progressing  Flowsheets (Taken 8/14/2024 1845)  Free From Fall Injury: Instruct family/caregiver on patient safety     Problem: Musculoskeletal - Adult  Goal: Return mobility to safest level of function  Outcome: Progressing  Flowsheets (Taken 8/14/2024 0252 by Keanu Castillo, RN)  Return Mobility to Safest Level of Function:   Ensure adequate protection for wounds/incisions during mobilization   Obtain physical therapy/occupational therapy consults as needed     Problem: Infection - Adult  Goal: Absence of infection at discharge  Outcome: Progressing  Flowsheets (Taken 8/14/2024 0252 by Keanu Castillo, RN)  Absence of infection at discharge:   Assess and monitor for signs and symptoms of infection   Monitor lab/diagnostic results   Monitor all insertion sites  i.e., indwelling lines, tubes and drains

## 2024-08-15 ENCOUNTER — TELEPHONE (OUTPATIENT)
Dept: NEUROLOGY | Age: 69
End: 2024-08-15

## 2024-08-15 VITALS
DIASTOLIC BLOOD PRESSURE: 67 MMHG | SYSTOLIC BLOOD PRESSURE: 130 MMHG | HEIGHT: 62 IN | WEIGHT: 171.52 LBS | HEART RATE: 81 BPM | OXYGEN SATURATION: 99 % | RESPIRATION RATE: 16 BRPM | BODY MASS INDEX: 31.56 KG/M2 | TEMPERATURE: 97.4 F

## 2024-08-15 LAB
ANION GAP SERPL CALCULATED.3IONS-SCNC: 9 MMOL/L (ref 3–16)
BUN SERPL-MCNC: 8 MG/DL (ref 7–20)
CALCIUM SERPL-MCNC: 8.9 MG/DL (ref 8.3–10.6)
CHLORIDE SERPL-SCNC: 104 MMOL/L (ref 99–110)
CO2 SERPL-SCNC: 28 MMOL/L (ref 21–32)
COPPER SERPL-MCNC: 115.2 UG/DL (ref 80–155)
CREAT SERPL-MCNC: 0.7 MG/DL (ref 0.6–1.2)
DEPRECATED RDW RBC AUTO: 20 % (ref 12.4–15.4)
GFR SERPLBLD CREATININE-BSD FMLA CKD-EPI: >90 ML/MIN/{1.73_M2}
GLUCOSE SERPL-MCNC: 99 MG/DL (ref 70–99)
HCT VFR BLD AUTO: 25.1 % (ref 36–48)
HGB BLD-MCNC: 7.7 G/DL (ref 12–16)
MAGNESIUM SERPL-MCNC: 1.7 MG/DL (ref 1.8–2.4)
MCH RBC QN AUTO: 21.5 PG (ref 26–34)
MCHC RBC AUTO-ENTMCNC: 30.7 G/DL (ref 31–36)
MCV RBC AUTO: 70.1 FL (ref 80–100)
PLATELET # BLD AUTO: 159 K/UL (ref 135–450)
PMV BLD AUTO: 7.5 FL (ref 5–10.5)
POTASSIUM SERPL-SCNC: 3.2 MMOL/L (ref 3.5–5.1)
RBC # BLD AUTO: 3.58 M/UL (ref 4–5.2)
SODIUM SERPL-SCNC: 141 MMOL/L (ref 136–145)
WBC # BLD AUTO: 5.1 K/UL (ref 4–11)
ZINC SERPL-MCNC: 58.1 UG/DL (ref 60–120)

## 2024-08-15 PROCEDURE — 6370000000 HC RX 637 (ALT 250 FOR IP): Performed by: NURSE PRACTITIONER

## 2024-08-15 PROCEDURE — 97166 OT EVAL MOD COMPLEX 45 MIN: CPT

## 2024-08-15 PROCEDURE — 97530 THERAPEUTIC ACTIVITIES: CPT

## 2024-08-15 PROCEDURE — 6360000002 HC RX W HCPCS: Performed by: INTERNAL MEDICINE

## 2024-08-15 PROCEDURE — 2580000003 HC RX 258: Performed by: NURSE PRACTITIONER

## 2024-08-15 PROCEDURE — 6370000000 HC RX 637 (ALT 250 FOR IP): Performed by: INTERNAL MEDICINE

## 2024-08-15 PROCEDURE — 80048 BASIC METABOLIC PNL TOTAL CA: CPT

## 2024-08-15 PROCEDURE — 97116 GAIT TRAINING THERAPY: CPT

## 2024-08-15 PROCEDURE — 97535 SELF CARE MNGMENT TRAINING: CPT

## 2024-08-15 PROCEDURE — 6360000002 HC RX W HCPCS: Performed by: NURSE PRACTITIONER

## 2024-08-15 PROCEDURE — APPSS30 APP SPLIT SHARED TIME 16-30 MINUTES

## 2024-08-15 PROCEDURE — 36415 COLL VENOUS BLD VENIPUNCTURE: CPT

## 2024-08-15 PROCEDURE — 83735 ASSAY OF MAGNESIUM: CPT

## 2024-08-15 PROCEDURE — 99232 SBSQ HOSP IP/OBS MODERATE 35: CPT | Performed by: STUDENT IN AN ORGANIZED HEALTH CARE EDUCATION/TRAINING PROGRAM

## 2024-08-15 PROCEDURE — 85027 COMPLETE CBC AUTOMATED: CPT

## 2024-08-15 RX ORDER — MAGNESIUM SULFATE 1 G/100ML
1000 INJECTION INTRAVENOUS ONCE
Status: COMPLETED | OUTPATIENT
Start: 2024-08-15 | End: 2024-08-15

## 2024-08-15 RX ORDER — POTASSIUM CHLORIDE 1500 MG/1
20 TABLET, EXTENDED RELEASE ORAL ONCE
Status: COMPLETED | OUTPATIENT
Start: 2024-08-15 | End: 2024-08-15

## 2024-08-15 RX ORDER — CIPROFLOXACIN 500 MG/1
500 TABLET, FILM COATED ORAL 2 TIMES DAILY
Qty: 10 TABLET | Refills: 0 | Status: SHIPPED | OUTPATIENT
Start: 2024-08-15 | End: 2024-08-20

## 2024-08-15 RX ADMIN — POTASSIUM CHLORIDE 20 MEQ: 1500 TABLET, EXTENDED RELEASE ORAL at 13:52

## 2024-08-15 RX ADMIN — TROSPIUM CHLORIDE 20 MG: 20 TABLET, FILM COATED ORAL at 15:27

## 2024-08-15 RX ADMIN — PANTOPRAZOLE SODIUM 40 MG: 40 TABLET, DELAYED RELEASE ORAL at 07:44

## 2024-08-15 RX ADMIN — CLONAZEPAM 2 MG: 1 TABLET ORAL at 07:44

## 2024-08-15 RX ADMIN — CEFTRIAXONE SODIUM 1000 MG: 1 INJECTION, POWDER, FOR SOLUTION INTRAMUSCULAR; INTRAVENOUS at 12:29

## 2024-08-15 RX ADMIN — SODIUM CHLORIDE: 9 INJECTION, SOLUTION INTRAVENOUS at 12:22

## 2024-08-15 RX ADMIN — LEVOTHYROXINE SODIUM 75 MCG: 0.03 TABLET ORAL at 06:44

## 2024-08-15 RX ADMIN — SODIUM CHLORIDE, PRESERVATIVE FREE 10 ML: 5 INJECTION INTRAVENOUS at 07:45

## 2024-08-15 RX ADMIN — TROSPIUM CHLORIDE 20 MG: 20 TABLET, FILM COATED ORAL at 06:44

## 2024-08-15 RX ADMIN — ENOXAPARIN SODIUM 40 MG: 100 INJECTION SUBCUTANEOUS at 07:44

## 2024-08-15 RX ADMIN — MAGNESIUM SULFATE HEPTAHYDRATE 1000 MG: 1 INJECTION, SOLUTION INTRAVENOUS at 13:54

## 2024-08-15 RX ADMIN — ROSUVASTATIN CALCIUM 10 MG: 10 TABLET, FILM COATED ORAL at 07:44

## 2024-08-15 RX ADMIN — ASPIRIN 81 MG 81 MG: 81 TABLET ORAL at 07:44

## 2024-08-15 ASSESSMENT — PAIN DESCRIPTION - LOCATION: LOCATION: LEG;ABDOMEN

## 2024-08-15 ASSESSMENT — PAIN SCALES - GENERAL: PAINLEVEL_OUTOF10: 7

## 2024-08-15 ASSESSMENT — PAIN DESCRIPTION - DESCRIPTORS: DESCRIPTORS: SHARP;DISCOMFORT

## 2024-08-15 ASSESSMENT — PAIN DESCRIPTION - ORIENTATION: ORIENTATION: RIGHT;ANTERIOR

## 2024-08-15 NOTE — PROGRESS NOTES
Cordelia NIK Pillai  Neurology Follow-up  [facility]rology    Date of Service: 8/15/2024    Subjective:   CC: Follow up today regarding: Generalized weakness, slurred speech     Events noted. Chart and lab reviewed. Pt's mental status is somewhat improved since my exam yesterday. Pt unable to receive MRI d/t bladder stimulator. CTH, CT c-spine yesterday with no acute findings.       ROS : A 10-12 system review obtained and updated today and is unremarkable except as mentioned  in my interval history.     Medication, past medical history, social history, and family history reviewed.    Objective:  Exam:   Constitutional:   Vitals:    08/14/24 2036 08/15/24 0049 08/15/24 0743 08/15/24 1126   BP: (!) 154/92 109/69 122/77 (!) 148/79   Pulse: 99 97 87 85   Resp: 17 16 16 16   Temp: 98.6 °F (37 °C) 98.1 °F (36.7 °C) 98 °F (36.7 °C)    TempSrc: Oral Oral Oral    SpO2: 94% 94% 96% 95%   Weight:       Height:           General appearance:  Normal development and appear in no acute distress.   Mental Status:   Oriented to person, place only.  Memory: Good immediate recall.  Intact remote memory  Normal attention span and concentration.  Language: intact naming, repeating and fluency   Good fund of Knowledge. Aware of current events and vocabulary   Cranial Nerves:   II: Visual fields: Full. Pupils: R pupil 4mm, L 3mm. Both reactive bilaterally.   III,IV,VI: Extra Ocular Movements are intact. No nystagmus  V: Facial sensation is intact  VII: Facial strength and movements: intact and symmetric  IX: Normal palatal elevation and shoulder shrug  XII: Tongue movements are normal  Musculoskeletal: 4/5 strength BUE, 2/5 strength BLE. Pt complaining bilateral hip pain, states her legs are weak because of this.   Tone: Normal tone.   Planters: Flexor bilaterally  Coordination: no pronator drift, no dysmetria with FNF.  Sensation: Grossly normal.   Gait/Posture: Deferred for patient safety.            Data:  LABS:   Lab Results  for accuracy, there may be errors in the transcription that are not intended.      ===    I saw and evaluated the patient. I agree with the findings and plan of care as documented in the NP’s note. Additionally, I have included further information about my assessment and plan below:     Neurology consulted for confusion, bilateral leg weakness.     She worked with PT walking in the hallway today.     My examination demonstrated the following:   Alert. Fully oriented.    Language expression and comprehension normal.   Facial strength symmetrically intact and the speech was clear.   Hearing acuity was normal.    Moving BUE and BLE symmetric at least greater than antigravity.    Station and gait able to walk with walker with supervision. No foot drop observed.      DATA REVIEWED:   I independently interpreted the 8/14 NCHCT and CT Cspine which did not reveal emerging evidence of infarct or evidence of cord compression or spine fracture    ASSESSMENT  Encephalopathy, stable. Likely toxic metabolic due to meds and UTI. Aphasia is mild and possibly chronic related to dementia (PTA galantamine). No evidence of stroke on repeat head CT.   Painful bilateral leg paraparesis, stable. Unable to get MRI. Low suspicion for cord compression. I suspect leg weakness is effort dependent due to pain. She has chronic pain.     RECOMMENDATIONS:   No further inpatient work up indicated for leg weakness  I will arrange follow up with me in neurology clinic in 3 months.   Neurology signing off.     Electronically signed by Donavon Rivas MD on 8/15/2024 at 4:17 PM

## 2024-08-15 NOTE — DISCHARGE SUMMARY
Hospital Medicine Progress Note      Date of Admission: 8/12/2024  Hospital Day: 4    Chief Admission Complaint:  Generalized weakness.      Subjective:  Some improvement in weakness. No new symptoms.     Telemetry (reviewed by me):  NSR    Presenting Admission History:       68 y.o. female, with PMH of obesity, hypertension, hyperlipidemia, hypothyroidism, NATHAN and anxiety/depression, who was a direct admit from [facility] to [facility] with generalized weakness, fatigue and dizziness. History obtained from the patient and review of EMR.  The patient stated she has been experiencing generalized weakness, fatigue and dizziness for the last couple of days.  Per EMR, the patient was brought to the emergency department by her family due to fatigue and dizziness.  At the time of arrival, the patient was lethargic, but did respond to verbal and touch stimuli.  The patient's daughter reported that the patient's grandson was at the house and called the daughter stating that the patient was having difficulty getting around due to weakness.  The daughter reported that when she arrived at the house she felt like the patient was experiencing weakness, but also appeared to have some slurred speech. In the emergency department a CT head was obtained that revealed no evidence of acute intracranial abnormality.  CTA head and neck was obtained that revealed no flow-limiting stenosis or occlusion within the head or neck.  The patient's urine drug screen was positive for benzodiazepines and oxycodone.  However, she does have a prescription for these medications.  UA was obtained that revealed moderate leukocyte esterase, 21-50 WBC and 2+ bacteria.  The patient was started on Rocephin and vancomycin.  Rocephin has been continued.  The patient was ultimately transferred to [facility] for further evaluation and treatment due to [facility]ist refusing admission.  She denied any other associated symptoms as well as any    Component Value Date/Time    BC  08/12/2024 11:30 AM     No Growth to date.  Any change in status will be called.     Lab Results   Component Value Date/Time    BLOODCULT2  08/12/2024 11:30 AM     No Growth to date.  Any change in status will be called.     Organism:   Lab Results   Component Value Date/Time    ORG Enterococcus faecium 08/12/2024 11:30 AM         Sridhar Hernandez MD

## 2024-08-15 NOTE — PROGRESS NOTES
Assessment completed and documented. VSS. A/ox4, disoriented at times. Denies pain during assessment. Takes pills whole with water. Tolerating diet. Hopeful to go home today. Urostomy on RUQ to be straight cathed 4-5x daily, last cath was at shift change per night shift Keanu RN. Bed locked and in lowest position. Bedside table and call light within reach. Denies further needs at this time.

## 2024-08-15 NOTE — CARE COORDINATION
Chart reviewed day 2. Spoke with patient. She is sitting up in chair. Discussed therapy recs for SNF. [facility] declines. Open to HHC. No agency preference. Referral made to [facility]. Can accept. Spoke with Dr Hernandez medically ready to d/c today. RW ordered aware needs C orders and completed judit. Spoke with Beck with [facility], provided documentation for RW. Patient requested d/c early so she has ride home. MD updated. Joshua Farfan, RN

## 2024-08-15 NOTE — CARE COORDINATION
CASE MANAGEMENT DISCHARGE SUMMARY      Discharge to: home with [facility] SN/PT/OT      IMM given: 8/14/24    New Durable Medical Equipment ordered/agency: RW provided by [employer]    Transportation: pvt       Confirmed discharge plan with:     Patient: yes     Family:  yes dtr on the way     Facility/Agency, name:  [facility] faxed pulled per [facility]        RN, name: Iesha Farfan RN

## 2024-08-15 NOTE — PROGRESS NOTES
assistance;Adaptive equipment;Additional time  Bed to Chair: Contact-guard assistance;Additional time;Adaptive equipment (bed > toilet > chair)  Toilet Transfer: Contact-guard assistance;Additional time;Adaptive equipment  Gait  Gait Training: Yes  Overall Level of Assistance: Contact-guard assistance;Adaptive equipment;Additional time  Distance (ft): 150 Feet  Assistive Device: Gait belt;Walker, rolling  Interventions: Safety awareness training;Weight shifting training/pressure relief  Base of Support: Widened  Speed/Chitra: Shuffled  Step Length: Right shortened;Left shortened  Gait Abnormalities:  (pt declares baseline pattern except for RW)  Rail Use: Both  Stairs - Level of Assistance: Contact-guard assistance;Adaptive equipment;Additional time (non-reciprocal pattern, verbal cues to place whole foot on step prior to stepping)  Number of Stairs Trained: 2           Safety Devices  Type of Devices: All fall risk precautions in place;Bed alarm in place;Call light within reach;Gait belt;Left in bed;Nurse notified  Restraints  Restraints Initially in Place: No       Goals  Short Term Goals  Time Frame for Short Term Goals: 8/20  Short Term Goal 1: Pt will complete bed mobility with SBA - met   Short Term Goal 2: Pt will sit to stand with Mod I  Short Term Goal 3: Pt will ambulate x 50' with RW with supervision  Short Term Goal 4: Pt will complete B LE exercises to improved functional strength by 8/16  Patient Goals   Patient Goals : to go home    Education  Patient Education  Education Given To: Patient  Education Provided: Role of Therapy;Plan of Care;Equipment;Transfer Training;Fall Prevention Strategies  Education Provided Comments: Disease Specific Education: Pt educated on importance of OOB mobility, prevention of complications of bedrest, and general safety during hospitalization.  Education Method: Verbal  Barriers to Learning: Cognition  Education Outcome: Verbalized understanding;Continued education  needed    AM-PAC - Mobility    AM-PAC Basic Mobility - Inpatient   How much help is needed turning from your back to your side while in a flat bed without using bedrails?: None  How much help is needed moving from lying on your back to sitting on the side of a flat bed without using bedrails?: A Little  How much help is needed moving to and from a bed to a chair?: A Little  How much help is needed standing up from a chair using your arms?: A Little  How much help is needed walking in hospital room?: A Little  How much help is needed climbing 3-5 steps with a railing?: A Little  AM-PAC Inpatient Mobility Raw Score : 19  AM-PAC Inpatient T-Scale Score : 45.44  Mobility Inpatient CMS 0-100% Score: 41.77  Mobility Inpatient CMS G-Code Modifier : CK         Therapy Time   Individual Concurrent Group Co-treatment   Time In 1440         Time Out 1504         Minutes 24         Timed Code Treatment Minutes: 24 Minutes     If pt is unable to be seen after this session, please let this note serve as discharge summary.  Please see case management note for discharge disposition.  Thank you.   Rosalva Vick, PT

## 2024-08-15 NOTE — DISCHARGE INSTR - COC
Continuity of Care Form    Patient Name: Cordelia Pillai   :  1955  MRN:  [id number]    Admit date:  2024  Discharge date:  8/15    Code Status Order: Full Code   Advance Directives:   Advance Care Flowsheet Documentation             Admitting Physician:  Sridhar Hernandez MD  PCP: Connie Franco MD    Discharging Nurse: Iesha AVILEZ   Discharging Hospital Unit/Room#: 0347/0347-01  Discharging Unit Phone Number: [phone]    Emergency Contact:   Extended Emergency Contact Information  Primary Emergency Contact: RosasKelsie  Address: [address] [place] [address] [place] [address] [facility] [place]  Home Phone: [phone]  Mobile Phone: [phone]  Relation: Child  Secondary Emergency Contact: no,[place] [address] [place]  Relation: Unknown   needed? No    Past Surgical History:  Past Surgical History:   Procedure Laterality Date    ABDOMEN SURGERY  2011     REPAIR INCISIONAL HERNIA REDUCIBLE WITH POSSIBLE MESH    BACK SURGERY      x3    BACK SURGERY      stimulator    BLADDER SUSPENSION      CARDIAC PACEMAKER PLACEMENT      Heart doctor at [facility]    CATARACT REMOVAL WITH IMPLANT Left 2018    PHACO EMULSIFICATION OF CATARACT WITH INTRAOCULAR LENS IMPLANT LEFT EYE    CERVICAL DISCECTOMY  2014    and fusion    CHOLECYSTECTOMY      COLONOSCOPY  12    ENDOSCOPY, COLON, DIAGNOSTIC      EPIDURAL STEROID INJECTION Left 08/15/2019    LEFT KNEE GENICULAR NERVE BLOCK SITE CONFIRMED BY FLUOROSCOPY performed by Raman Silveira MD at [facility] OR    EYE SURGERY Right 2018    cataract removal    FOOT SURGERY Right 2012    arthroplasty    FOOT SURGERY Left 2015    FOOT SURGERY Left 2015    GASTRIC BYPASS SURGERY  ,    HYSTERECTOMY (CERVIX STATUS UNKNOWN)      KNEE ARTHROSCOPY Left 10/03/2014    part. medial & lateral meniscectomy, synovectomy plica exc    KNEE ARTHROSCOPY Right 10/13/2015    partial  [Urine:[id number]]    Safety Concerns:     At Risk for Falls    Impairments/Disabilities:      Vision    Nutrition Therapy:  Current Nutrition Therapy:   - Oral Diet:  General    Routes of Feeding: Oral  Liquids: Thin Liquids  Daily Fluid Restriction: no  Last Modified Barium Swallow with Video (Video Swallowing Test): not done    Treatments at the Time of Hospital Discharge:   Respiratory Treatments:   Oxygen Therapy:  is not on home oxygen therapy.  Ventilator:    - CPAP   only when sleeping    Rehab Therapies: Physical Therapy and Occupational Therapy,SN  Weight Bearing Status/Restrictions: No weight bearing restrictions  Other Medical Equipment (for information only, NOT a DME order):  walker  Other Treatments:     Patient's personal belongings (please select all that are sent with patient):  Glasses    RN SIGNATURE:  Electronically signed by Iesha Cisse RN on 8/15/24 at 3:40 PM EDT    CASE MANAGEMENT/SOCIAL WORK SECTION    Inpatient Status Date: 8/13/24    Readmission Risk Assessment Score:  Readmission Risk              Risk of Unplanned Readmission:  19           Discharging to Facility/ Agency   [facility]  Services: Home Health Services  [address]  [facility] [id number] [phone]   / signature: Electronically signed by Joshua Farfan RN on 8/15/24 at 1:48 PM EDT    PHYSICIAN SECTION    Prognosis: Good    Condition at Discharge: Stable    Rehab Potential (if transferring to Rehab): Good    Recommended Labs or Other Treatments After Discharge:   Home Care  Follow-up with PCP    Physician Certification: I certify the above information and transfer of Cordelia Pillai  is necessary for the continuing treatment of the diagnosis listed and that she requires Home Care for greater 30 days.     Update Admission H&P: No change in H&P    PHYSICIAN SIGNATURE:  Electronically signed by Sridhar Hernandez MD on 8/15/24 at 3:27 PM EDT

## 2024-08-15 NOTE — PLAN OF CARE
Problem: Pain  Goal: Verbalizes/displays adequate comfort level or baseline comfort level  8/15/2024 0743 by Iesha Cisse RN  Outcome: Progressing  8/15/2024 0313 by Keanu Castillo RN  Outcome: Progressing  Flowsheets (Taken 8/13/2024 0244)  Verbalizes/displays adequate comfort level or baseline comfort level:   Encourage patient to monitor pain and request assistance   Administer analgesics based on type and severity of pain and evaluate response   Consider cultural and social influences on pain and pain management   Notify Licensed Independent Practitioner if interventions unsuccessful or patient reports new pain   Implement non-pharmacological measures as appropriate and evaluate response   Assess pain using appropriate pain scale  8/14/2024 1845 by Kimmie Wilhelm RN  Outcome: Progressing  Flowsheets (Taken 8/13/2024 0244 by Keanu Castillo RN)  Verbalizes/displays adequate comfort level or baseline comfort level:   Encourage patient to monitor pain and request assistance   Administer analgesics based on type and severity of pain and evaluate response   Consider cultural and social influences on pain and pain management   Notify Licensed Independent Practitioner if interventions unsuccessful or patient reports new pain   Implement non-pharmacological measures as appropriate and evaluate response   Assess pain using appropriate pain scale     Problem: Skin/Tissue Integrity  Goal: Absence of new skin breakdown  Description: 1.  Monitor for areas of redness and/or skin breakdown  2.  Assess vascular access sites hourly  3.  Every 4-6 hours minimum:  Change oxygen saturation probe site  4.  Every 4-6 hours:  If on nasal continuous positive airway pressure, respiratory therapy assess nares and determine need for appliance change or resting period.  8/15/2024 0743 by Iesha Cisse RN  Outcome: Progressing  8/15/2024 0313 by Keanu Castillo RN  Outcome: Progressing  8/14/2024 1845 by Choco  RAGHAV Burks  Outcome: Progressing

## 2024-08-15 NOTE — PLAN OF CARE
Problem: Pain  Goal: Verbalizes/displays adequate comfort level or baseline comfort level  8/15/2024 0313 by Keanu Castillo RN  Outcome: Progressing  Flowsheets (Taken 8/13/2024 0244)  Verbalizes/displays adequate comfort level or baseline comfort level:   Encourage patient to monitor pain and request assistance   Administer analgesics based on type and severity of pain and evaluate response   Consider cultural and social influences on pain and pain management   Notify Licensed Independent Practitioner if interventions unsuccessful or patient reports new pain   Implement non-pharmacological measures as appropriate and evaluate response   Assess pain using appropriate pain scale  8/14/2024 1845 by Kimmie Wilhelm RN  Outcome: Progressing  Flowsheets (Taken 8/13/2024 0244 by Keanu Castillo RN)  Verbalizes/displays adequate comfort level or baseline comfort level:   Encourage patient to monitor pain and request assistance   Administer analgesics based on type and severity of pain and evaluate response   Consider cultural and social influences on pain and pain management   Notify Licensed Independent Practitioner if interventions unsuccessful or patient reports new pain   Implement non-pharmacological measures as appropriate and evaluate response   Assess pain using appropriate pain scale     Problem: Skin/Tissue Integrity  Goal: Absence of new skin breakdown  Description: 1.  Monitor for areas of redness and/or skin breakdown  2.  Assess vascular access sites hourly  3.  Every 4-6 hours minimum:  Change oxygen saturation probe site  4.  Every 4-6 hours:  If on nasal continuous positive airway pressure, respiratory therapy assess nares and determine need for appliance change or resting period.  8/15/2024 0313 by Keanu Castillo, RN  Outcome: Progressing  8/14/2024 1845 by Kimmie Wilhelm RN  Outcome: Progressing     Problem: Safety - Adult  Goal: Free from fall injury  8/15/2024 0313 by Keanu Castillo,

## 2024-08-15 NOTE — PROGRESS NOTES
Occupational Therapy  Facility/Department: [facility] C3 TELE/MED SURG/ONC  Occupational Therapy Initial Assessment and Treatment    Name: Cordelia Pillai  : 1955  MRN: [id number]  Date of Service: 8/15/2024    Discharge Recommendations:  Subacute/Skilled Nursing Facility, 24 hour supervision or assist, Home with Home health OT (if pt refuses SNF will need 24 hour assist and HHOT)  OT Equipment Recommendations  Equipment Needed: No     Therapy discharge recommendations are subject to collaboration from the patient’s interdisciplinary healthcare team, including MD and case management recommendations.    Barriers to Home Discharge:   [x] Steps to access home entry or bed/bath:   [x] Unable to transfer, ambulate, or propel wheelchair household distances without assist   [x] Limited available assist at home upon discharge    [] Patient or family requests d/c to post-acute facility    [x] Poor cognition/safety awareness for d/c to home alone    [] Unable to maintain ordered weight bearing status    [] Patient with salient signs of long-standing immobility   [x] Decreased independence with ADLs   [x] Increased risk for falls   [] Other:    If pt is unable to be seen after this session, please let this note serve as discharge summary.  Please see case management note for discharge disposition.  Thank you.    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  Past Surgical History:  has a past surgical history that includes Gastric bypass surgery (,); Cholecystectomy; Tonsillectomy; Hysterectomy; back surgery; skin biopsy; Spine  surgery; Colonoscopy (2002); Pacemaker insertion (2011); back surgery; Foot surgery (Right, 12/06/2012); Throat surgery; bladder suspension; other surgical history (04/2013); Abdomen surgery (09/09/2011); other surgical history; Cervical discectomy (06/2014); Knee arthroscopy (Left, 10/03/2014); Endoscopy, colon, diagnostic; Upper gastrointestinal endoscopy; Neck surgery; Foot surgery (Left, 04/30/2015); Foot surgery (Left, 04/30/2015); Knee arthroscopy (Right, 10/13/2015); Upper gastrointestinal endoscopy (05/03/2017); other surgical history (08/30/2017); Cataract removal with implant (Left, 03/08/2018); eye surgery (Right, 04/05/2018); other surgical history (05/09/2018); epidural steroid injection (Left, 08/15/2019); Cardiac pacemaker placement (2011); and Ovary removal.           Assessment   Performance deficits / Impairments: Decreased ADL status;Decreased strength;Decreased safe awareness;Decreased endurance;Decreased balance;Decreased functional mobility ;Decreased high-level IADLs  Assessment: Pt supine in bed at start of session. Pt tolerates OT evaluation/ treatment session fair. Pt is a(n) 68 year old female who presents to [facility] with UTI. Pt reports being independent with ADLs and functional mobility at baseline. Pt completes bed mobility with SBA and functional mobility/ transfers with CGA and SW. Pt with very delayed processing and poor cognition. Pt requires increased time for all tasks and mobility this date. Pt educated on SNF versus HHOT and verbalizes understanding. Pt is limited by cognition and weakness and functioning below baseline - OT recommends SNF to increase pt's independence with ADLs/ mobility and be able to return to baseline function. Pt will continue to benefit from continued skilled OT services at this time.  Prognosis: Fair  Decision Making: Medium Complexity  REQUIRES OT FOLLOW-UP: Yes  Activity Tolerance  Activity Tolerance: Patient limited by fatigue        Plan   Occupational

## 2024-08-15 NOTE — PROGRESS NOTES
4 Eyes Skin Assessment     The patient is being assess for  Shift Handoff    I agree that 2 RN's have performed a thorough Head to Toe Skin Assessment on the patient. ALL assessment sites listed below have been assessed.       Areas assessed by both nurses: Keanu RN and Arley Rn  [x]   Head, Face, and Ears   [x]   Shoulders, Back, and Chest  [x]   Arms, Elbows, and Hands   [x]   Coccyx, Sacrum, and IschIum  [x]   Legs, Feet, and Heels        Does the Patient have Skin Breakdown?  No         Van Prevention initiated:  Yes   Wound Care Orders initiated:  No      WOC nurse consulted for Pressure Injury (Stage 3,4, Unstageable, DTI, NWPT, and Complex wounds), New and Established Ostomies:  No      Nurse 1 eSignature: Electronically signed by KEANU SHAW RN on 8/15/24 at 7:29 AM EDT    **SHARE this note so that the co-signing nurse is able to place an eSignature**    Nurse 2 eSignature: {Esignature:[id number]}

## 2024-08-15 NOTE — TELEPHONE ENCOUNTER
----- Message from Dr. Donavon Rivas MD sent at 8/15/2024 10:52 AM EDT -----  Regarding: follow up  Please call Cordelia Pillai to schedule follow up with me at [place] location in 3 months. Thanks.   Akil

## 2024-08-15 NOTE — HOME CARE
Cordelia Pillai will require the following home care treatments or therapies: Skilled Nursing, vital signs, medication compliance and education, PT/OT, wound care, teaching and management of medical conditions, etc.  Home care will be necessary because of UTI and deconditioning.  The patient is in agreement to receiving home care.

## 2024-08-16 LAB
BACTERIA BLD CULT ORG #2: NORMAL
BACTERIA BLD CULT: NORMAL

## 2024-08-16 NOTE — TELEPHONE ENCOUNTER
Patient is established with Dr Campbell.     Per LOV on 8/1/24     Plan:     1.  There is no need to do further investigation at this time.  2.  Follow-up with neurology as needed.    Ok to schedule with Dr Rivas?

## 2024-08-16 NOTE — TELEPHONE ENCOUNTER
Patient is established with Dr Campbell. Per LOV pt to follow up with Neurology PRN. Will send message back to Dr Campbell to see if he would like to see patient back in 3 months per office protocol

## 2024-08-17 LAB — VIT B6 SERPL-MCNC: 10.8 NMOL/L (ref 20–125)

## 2024-08-20 DIAGNOSIS — E53.1 VITAMIN B6 DEFICIENCY: Primary | ICD-10-CM

## 2024-08-20 NOTE — TELEPHONE ENCOUNTER
Spoke with pt - scheduled hosp f/u with Dr. Campbell on 10/17/24 @ 12:00 in [place] for generalized weakness and painful bilateral leg paraparesis. Rule out cord compression

## 2024-08-22 LAB — METHYLMALONATE SERPL-SCNC: 0.19 UMOL/L (ref 0–0.4)

## 2024-08-29 ENCOUNTER — HOSPITAL ENCOUNTER (OUTPATIENT)
Dept: GENERAL RADIOLOGY | Age: 69
Discharge: HOME | End: 2024-08-29
Payer: MEDICARE

## 2024-08-29 ENCOUNTER — HOSPITAL ENCOUNTER (OUTPATIENT)
Age: 69
Discharge: HOME | End: 2024-08-29
Payer: MEDICARE

## 2024-08-29 DIAGNOSIS — R07.81 RIB PAIN ON LEFT SIDE: ICD-10-CM

## 2024-08-29 PROCEDURE — 71101 X-RAY EXAM UNILAT RIBS/CHEST: CPT

## 2024-09-05 ENCOUNTER — APPOINTMENT (OUTPATIENT)
Dept: CT IMAGING | Age: 69
End: 2024-09-05
Payer: MEDICARE

## 2024-09-05 ENCOUNTER — HOSPITAL ENCOUNTER (EMERGENCY)
Age: 69
Discharge: ANOTHER HOSPITAL | End: 2024-09-06
Attending: EMERGENCY MEDICINE
Payer: MEDICARE

## 2024-09-05 VITALS
HEIGHT: 62 IN | SYSTOLIC BLOOD PRESSURE: 160 MMHG | RESPIRATION RATE: 12 BRPM | BODY MASS INDEX: 31.28 KG/M2 | WEIGHT: 170 LBS | OXYGEN SATURATION: 100 % | TEMPERATURE: 98.1 F | HEART RATE: 67 BPM | DIASTOLIC BLOOD PRESSURE: 74 MMHG

## 2024-09-05 DIAGNOSIS — K56.601 COMPLETE INTESTINAL OBSTRUCTION, UNSPECIFIED CAUSE (HCC): Primary | ICD-10-CM

## 2024-09-05 LAB
ALBUMIN SERPL-MCNC: 4.1 G/DL (ref 3.4–5)
ALBUMIN/GLOB SERPL: 1.5 {RATIO} (ref 1.1–2.2)
ALP SERPL-CCNC: 152 U/L (ref 40–129)
ALT SERPL-CCNC: 9 U/L (ref 10–40)
ANION GAP SERPL CALCULATED.3IONS-SCNC: 12 MMOL/L (ref 3–16)
ANISOCYTOSIS BLD QL SMEAR: ABNORMAL
AST SERPL-CCNC: 23 U/L (ref 15–37)
BASOPHILS # BLD: 0 K/UL (ref 0–0.2)
BASOPHILS NFR BLD: 0.5 %
BILIRUB SERPL-MCNC: 0.4 MG/DL (ref 0–1)
BUN SERPL-MCNC: 15 MG/DL (ref 7–20)
CALCIUM SERPL-MCNC: 9.4 MG/DL (ref 8.3–10.6)
CHLORIDE SERPL-SCNC: 103 MMOL/L (ref 99–110)
CO2 SERPL-SCNC: 24 MMOL/L (ref 21–32)
CREAT SERPL-MCNC: 0.8 MG/DL (ref 0.6–1.2)
DEPRECATED RDW RBC AUTO: 21 % (ref 12.4–15.4)
EOSINOPHIL # BLD: 0.1 K/UL (ref 0–0.6)
EOSINOPHIL NFR BLD: 1.7 %
GFR SERPLBLD CREATININE-BSD FMLA CKD-EPI: 79 ML/MIN/{1.73_M2}
GLUCOSE SERPL-MCNC: 124 MG/DL (ref 70–99)
HCT VFR BLD AUTO: 30.5 % (ref 36–48)
HGB BLD-MCNC: 9.2 G/DL (ref 12–16)
LIPASE SERPL-CCNC: 11 U/L (ref 13–60)
LYMPHOCYTES # BLD: 3.3 K/UL (ref 1–5.1)
LYMPHOCYTES NFR BLD: 41.8 %
MACROCYTES BLD QL SMEAR: ABNORMAL
MCH RBC QN AUTO: 21 PG (ref 26–34)
MCHC RBC AUTO-ENTMCNC: 30.2 G/DL (ref 31–36)
MCV RBC AUTO: 69.7 FL (ref 80–100)
MICROCYTES BLD QL SMEAR: ABNORMAL
MONOCYTES # BLD: 0.5 K/UL (ref 0–1.3)
MONOCYTES NFR BLD: 6.8 %
NEUTROPHILS # BLD: 3.9 K/UL (ref 1.7–7.7)
NEUTROPHILS NFR BLD: 49.2 %
OVALOCYTES BLD QL SMEAR: ABNORMAL
PATH INTERP BLD-IMP: YES
PLATELET # BLD AUTO: 258 K/UL (ref 135–450)
PLATELET BLD QL SMEAR: ADEQUATE
PMV BLD AUTO: 7 FL (ref 5–10.5)
POIKILOCYTOSIS BLD QL SMEAR: ABNORMAL
POLYCHROMASIA BLD QL SMEAR: ABNORMAL
POTASSIUM SERPL-SCNC: 3.5 MMOL/L (ref 3.5–5.1)
PROT SERPL-MCNC: 6.8 G/DL (ref 6.4–8.2)
RBC # BLD AUTO: 4.38 M/UL (ref 4–5.2)
SCHISTOCYTES BLD QL SMEAR: ABNORMAL
SLIDE REVIEW: ABNORMAL
SODIUM SERPL-SCNC: 139 MMOL/L (ref 136–145)
TROPONIN, HIGH SENSITIVITY: 8 NG/L (ref 0–14)
WBC # BLD AUTO: 7.8 K/UL (ref 4–11)

## 2024-09-05 PROCEDURE — 74177 CT ABD & PELVIS W/CONTRAST: CPT

## 2024-09-05 PROCEDURE — 6360000004 HC RX CONTRAST MEDICATION: Performed by: NURSE PRACTITIONER

## 2024-09-05 PROCEDURE — 99285 EMERGENCY DEPT VISIT HI MDM: CPT

## 2024-09-05 PROCEDURE — 80053 COMPREHEN METABOLIC PANEL: CPT

## 2024-09-05 PROCEDURE — 83690 ASSAY OF LIPASE: CPT

## 2024-09-05 PROCEDURE — 96374 THER/PROPH/DIAG INJ IV PUSH: CPT

## 2024-09-05 PROCEDURE — 6360000002 HC RX W HCPCS: Performed by: NURSE PRACTITIONER

## 2024-09-05 PROCEDURE — 84484 ASSAY OF TROPONIN QUANT: CPT

## 2024-09-05 PROCEDURE — 96375 TX/PRO/DX INJ NEW DRUG ADDON: CPT

## 2024-09-05 PROCEDURE — 93005 ELECTROCARDIOGRAM TRACING: CPT | Performed by: NURSE PRACTITIONER

## 2024-09-05 PROCEDURE — 85025 COMPLETE CBC W/AUTO DIFF WBC: CPT

## 2024-09-05 RX ORDER — ONDANSETRON 2 MG/ML
4 INJECTION INTRAMUSCULAR; INTRAVENOUS ONCE
Status: COMPLETED | OUTPATIENT
Start: 2024-09-05 | End: 2024-09-05

## 2024-09-05 RX ORDER — IOPAMIDOL 755 MG/ML
75 INJECTION, SOLUTION INTRAVASCULAR
Status: COMPLETED | OUTPATIENT
Start: 2024-09-05 | End: 2024-09-05

## 2024-09-05 RX ORDER — MORPHINE SULFATE 4 MG/ML
4 INJECTION, SOLUTION INTRAMUSCULAR; INTRAVENOUS
Status: DISCONTINUED | OUTPATIENT
Start: 2024-09-05 | End: 2024-09-06 | Stop reason: HOSPADM

## 2024-09-05 RX ADMIN — ONDANSETRON 4 MG: 2 INJECTION INTRAMUSCULAR; INTRAVENOUS at 20:02

## 2024-09-05 RX ADMIN — IOPAMIDOL 75 ML: 755 INJECTION, SOLUTION INTRAVENOUS at 18:53

## 2024-09-05 RX ADMIN — MORPHINE SULFATE 4 MG: 4 INJECTION, SOLUTION INTRAMUSCULAR; INTRAVENOUS at 20:03

## 2024-09-05 ASSESSMENT — PAIN - FUNCTIONAL ASSESSMENT: PAIN_FUNCTIONAL_ASSESSMENT: 0-10

## 2024-09-05 ASSESSMENT — PAIN SCALES - GENERAL
PAINLEVEL_OUTOF10: 10
PAINLEVEL_OUTOF10: 8

## 2024-09-05 NOTE — ED PROVIDER NOTES
The Ekg interpreted by me shows  normal sinus rhythm with a rate of 65  Axis is   Normal  QTc is  normal  Intervals and Durations are unremarkable.      ST Segments: normal  Normal sinus rhythm has replaced atrial paced rhythm compared to previous performed 8/12/2024         Megan Cobb MD  09/05/24 [id number]

## 2024-09-06 LAB
EKG ATRIAL RATE: 65 BPM
EKG DIAGNOSIS: NORMAL
EKG P AXIS: 17 DEGREES
EKG P-R INTERVAL: 140 MS
EKG Q-T INTERVAL: 452 MS
EKG QRS DURATION: 82 MS
EKG QTC CALCULATION (BAZETT): 470 MS
EKG R AXIS: 47 DEGREES
EKG T AXIS: 58 DEGREES
EKG VENTRICULAR RATE: 65 BPM
PATH INTERP BLD-IMP: NORMAL

## 2024-09-06 PROCEDURE — 93010 ELECTROCARDIOGRAM REPORT: CPT | Performed by: INTERNAL MEDICINE

## 2024-09-06 NOTE — ED NOTES
Bariatric Surgery Consult  Called [employer] to reach bariatric surgery at [facility] @ 2039, per Aly Worrell  RE: bariatric surgery at [facility]  Repaged @ 2104, [facility] [facility] still trying to get in contact with attending  Repaged @ 2119  Repaged @ 2134, [facility] [facility] still trying to get in contact  Dr Kosta Garcias called back @2155, transferred to Aly Worrell

## 2024-09-06 NOTE — ED NOTES
General Surgery Consult  Placed a PS to general surgery @2018   RE: Obstruction per MARTÍN Worrell Dr. called back @2029, transferred to MATIAS Pichardo-CNP

## 2024-09-06 NOTE — ED PROVIDER NOTES
[facility]  ED  EMERGENCY DEPARTMENT ENCOUNTER        Pt Name: Cordelia Pillai  MRN: [id number]  Birthdate 1955  Date of evaluation: 9/5/2024  Provider: MATIAS Irby - RANI  PCP: Connie Franco MD  Note Started: 2:13 AM EDT 9/6/24       I have seen and evaluated this patient with my supervising physician dr. Rausch      CHIEF COMPLAINT       Chief Complaint   Patient presents with    Abdominal Pain     Pt states that she has a hole in her bowel from a previous surgery       HISTORY OF PRESENT ILLNESS: 1 or more Elements     History From: the patient  Limitations to history : None    Cordelia Pillai is a 68 y.o. female who presents to the ER today with complaints of abdominal pain, patient states that she has had abdominal surgeries in the past, she has had obstructions, states that this feels similar to an obstruction.  Patient has had vomiting with it.  Denies any trauma.  Patient denies any shortness of breath or chest pain.  She is here for further evaluation.    Nursing Notes were all reviewed and agreed with or any disagreements were addressed in the HPI.    REVIEW OF SYSTEMS :      Review of Systems    Positives and Pertinent negatives as per HPI.     SURGICAL HISTORY     Past Surgical History:   Procedure Laterality Date    ABDOMEN SURGERY  09/09/2011     REPAIR INCISIONAL HERNIA REDUCIBLE WITH POSSIBLE MESH    BACK SURGERY      x3    BACK SURGERY      stimulator    BLADDER SUSPENSION      CARDIAC PACEMAKER PLACEMENT  2011    Heart doctor at [facility]    CATARACT REMOVAL WITH IMPLANT Left 03/08/2018    PHACO EMULSIFICATION OF CATARACT WITH INTRAOCULAR LENS IMPLANT LEFT EYE    CERVICAL DISCECTOMY  06/2014    and fusion    CHOLECYSTECTOMY      COLONOSCOPY  2002 1/26/12    ENDOSCOPY, COLON, DIAGNOSTIC      EPIDURAL STEROID INJECTION Left 08/15/2019    LEFT KNEE GENICULAR NERVE BLOCK SITE CONFIRMED BY FLUOROSCOPY performed by Raman Silveira MD at [facility] OR    EYE SURGERY Right  times dailyHistorical Med      fluticasone (FLONASE) 50 MCG/ACT nasal spray 1 spray by Nasal route as needed for Allergies, Disp-1 each, R-0NO PRINT      famotidine (PEPCID) 40 MG tablet Take 1 tablet by mouth dailyHistorical Med      alendronate (FOSAMAX) 35 MG tablet TAKE 1 TABLET BY MOUTH ONE TIME A WEEK IN THE MORNING AT LEAST 30 MIN BEFORE FIRST FOOD, BEVERAGE, OR MED OF DAYHistorical Med      gabapentin (NEURONTIN) 600 MG tablet TAKE 1 TABLET BY MOUTH EVERY EIGHT HOURSHistorical Med      !! midodrine (PROAMATINE) 10 MG tablet 1 tablet 3 times daily Along with 2.5mg = total dose is 12.5mg tidHistorical Med      rosuvastatin (CRESTOR) 10 MG tablet TAKE 1 TABLET BY MOUTH EVERY DAYHistorical Med      DENTA 5000 PLUS 1.1 % CREA FLOSS THEN BRUSH WITH PEA-SIZED AMOUNT FOR 2 MINUTES TWICE DAILY, SPIT AND DO NOT RINSE OR DRINK FOR 30 MINUTES, DAWHistorical Med      Biotin 1000 MCG TABS Take 2,500 mcg by mouth dailyHistorical Med      oxyCODONE HCl (OXY-IR) 10 MG immediate release tablet TAKE 1 TABLET BY MOUTH EVERY SIX HOURS AS NEEDEDHistorical Med      sodium chloride 0.9 % irrigation Historical Med      ondansetron (ZOFRAN-ODT) 8 MG TBDP disintegrating tablet Take 1 tablet by mouth every 8 hours as neededHistorical Med      clonazePAM (KLONOPIN) 2 MG tablet TAKE 1 TABLET BY MOUTH TWO TIMES A DAYHistorical Med      triamcinolone (KENALOG) 0.1 % cream Apply topically 2 times daily Apply topically 2 times daily., Topical, 2 TIMES DAILY, Historical Med      levothyroxine (SYNTHROID) 75 MCG tablet Take 1 tablet by mouth DailyHistorical Med      MONOJECT 60CC SYRINGE CATH TIP 60 ML MISC 3 times daily Starting Wed 3/7/2018, SIMIN, Historical MedWound/hole on abd, uses to keep clean      pantoprazole (PROTONIX) 40 MG tablet TAKE 1 TABLET BY MOUTH DAILY, R-0       !! - Potential duplicate medications found. Please discuss with provider.          ALLERGIES     Meloxicam, Acetaminophen, Benzoin compound, Lyrica [pregabalin],

## 2024-09-06 NOTE — ED PROVIDER NOTES
I have personally performed a face to face diagnostic evaluation on this patient. I have fully participated in the care of this patient I personally saw the patient and performed a substantive portion of the visit including all aspects of the medical decision making.  I have reviewed and agree with all pertinent clinical information including history, physical exam, diagnostic tests, and the plan.      HPI: Cordelia Pillai presented with generalized abdominal pain worse in the upper abdomen but also radiates down into the lower abdomen.  Patient has a previous history of gastric bypass with prior Santiago-en-Y greater than 25 years ago as well as previous bladder enlargement surgery greater than 7 or 8 years ago.  Patient has had multiple previous abdominal surgeries.  Patient's symptoms have worsened today.  Some nausea, vomiting.  No fevers or chills.  See AGNES note for further details.  Chief Complaint   Patient presents with    Abdominal Pain     Pt states that she has a hole in her bowel from a previous surgery      Review of Systems: See AGNES note  Vital Signs: BP (!) 177/97   Pulse 76   Temp 98.1 °F (36.7 °C) (Oral)   Resp 21   Ht 1.575 m (5' 2\")   Wt 77.1 kg (170 lb)   SpO2 99%   BMI 31.09 kg/m²     Alert 68 y.o. female who does not appear toxic or acutely ill  HENT: Atraumatic, oral mucosa moist  Neck: Grossly normal ROM  Chest/Lungs: respiratory effort normal   Abdomen: Mild generalized abdominal tenderness without rebound or guarding  Musculoskeletal: Grossly normal ROM  Skin: No palor or diaphoresis    Medical Decision Making and Plan:  Pertinent Labs & Imaging studies reviewed. (See AGNES chart for details)  I agree with AGNES assessment and plan.  68-year-old female who presents for abdominal pain found to have obstruction at one of the limbs of her previous Santiago-en-Y from greater than 25 years ago.  Patient is currently not vomiting.  Will keep patient NPO.  See AGNES note however general surgery and

## 2024-09-11 PROBLEM — N39.0 UTI (URINARY TRACT INFECTION): Status: RESOLVED | Noted: 2024-08-12 | Resolved: 2024-09-11

## 2024-09-14 ENCOUNTER — APPOINTMENT (OUTPATIENT)
Dept: GENERAL RADIOLOGY | Age: 69
DRG: 092 | End: 2024-09-14
Payer: MEDICARE

## 2024-09-14 ENCOUNTER — APPOINTMENT (OUTPATIENT)
Dept: ULTRASOUND IMAGING | Age: 69
DRG: 092 | End: 2024-09-14
Payer: MEDICARE

## 2024-09-14 ENCOUNTER — APPOINTMENT (OUTPATIENT)
Dept: CT IMAGING | Age: 69
DRG: 092 | End: 2024-09-14
Payer: MEDICARE

## 2024-09-14 ENCOUNTER — HOSPITAL ENCOUNTER (INPATIENT)
Age: 69
LOS: 5 days | Discharge: NURSING, REHAB OR LONG-TERM CARE | DRG: 092 | End: 2024-09-19
Attending: EMERGENCY MEDICINE | Admitting: INTERNAL MEDICINE
Payer: MEDICARE

## 2024-09-14 DIAGNOSIS — S82.874A CLOSED NONDISPLACED PILON FRACTURE OF RIGHT TIBIA, INITIAL ENCOUNTER: ICD-10-CM

## 2024-09-14 DIAGNOSIS — N17.9 ACUTE KIDNEY INJURY (NONTRAUMATIC) (HCC): Primary | ICD-10-CM

## 2024-09-14 DIAGNOSIS — S92.314A CLOSED NONDISPLACED FRACTURE OF FIRST METATARSAL BONE OF RIGHT FOOT, INITIAL ENCOUNTER: ICD-10-CM

## 2024-09-14 DIAGNOSIS — F41.9 ANXIETY: ICD-10-CM

## 2024-09-14 LAB
ALBUMIN SERPL-MCNC: 4.1 G/DL (ref 3.4–5)
ALBUMIN/GLOB SERPL: 1.1 {RATIO} (ref 1.1–2.2)
ALP SERPL-CCNC: 146 U/L (ref 40–129)
ALT SERPL-CCNC: 9 U/L (ref 10–40)
ANION GAP SERPL CALCULATED.3IONS-SCNC: 19 MMOL/L (ref 3–16)
AST SERPL-CCNC: 26 U/L (ref 15–37)
BACTERIA URNS QL MICRO: ABNORMAL /HPF
BASOPHILS # BLD: 0.1 K/UL (ref 0–0.2)
BASOPHILS NFR BLD: 0.8 %
BILIRUB SERPL-MCNC: 0.3 MG/DL (ref 0–1)
BILIRUB UR QL STRIP.AUTO: NEGATIVE
BUN SERPL-MCNC: 27 MG/DL (ref 7–20)
CALCIUM SERPL-MCNC: 9.9 MG/DL (ref 8.3–10.6)
CHLORIDE SERPL-SCNC: 94 MMOL/L (ref 99–110)
CLARITY UR: CLEAR
CO2 SERPL-SCNC: 23 MMOL/L (ref 21–32)
COLOR UR: YELLOW
CREAT SERPL-MCNC: 2 MG/DL (ref 0.6–1.2)
CREAT UR-MCNC: 44.3 MG/DL (ref 28–259)
DEPRECATED RDW RBC AUTO: 21.7 % (ref 12.4–15.4)
EKG ATRIAL RATE: 65 BPM
EKG DIAGNOSIS: NORMAL
EKG P AXIS: 21 DEGREES
EKG P-R INTERVAL: 176 MS
EKG Q-T INTERVAL: 474 MS
EKG QRS DURATION: 88 MS
EKG QTC CALCULATION (BAZETT): 492 MS
EKG R AXIS: 61 DEGREES
EKG T AXIS: 192 DEGREES
EKG VENTRICULAR RATE: 65 BPM
EOSINOPHIL # BLD: 0.2 K/UL (ref 0–0.6)
EOSINOPHIL NFR BLD: 1.9 %
EPI CELLS #/AREA URNS HPF: ABNORMAL /HPF (ref 0–5)
GFR SERPLBLD CREATININE-BSD FMLA CKD-EPI: 26 ML/MIN/{1.73_M2}
GLUCOSE SERPL-MCNC: 96 MG/DL (ref 70–99)
GLUCOSE UR STRIP.AUTO-MCNC: NEGATIVE MG/DL
HCT VFR BLD AUTO: 29.5 % (ref 36–48)
HGB BLD-MCNC: 9 G/DL (ref 12–16)
HGB UR QL STRIP.AUTO: NEGATIVE
KETONES UR STRIP.AUTO-MCNC: NEGATIVE MG/DL
LEUKOCYTE ESTERASE UR QL STRIP.AUTO: ABNORMAL
LYMPHOCYTES # BLD: 1.7 K/UL (ref 1–5.1)
LYMPHOCYTES NFR BLD: 21.2 %
MCH RBC QN AUTO: 21.3 PG (ref 26–34)
MCHC RBC AUTO-ENTMCNC: 30.4 G/DL (ref 31–36)
MCV RBC AUTO: 70.1 FL (ref 80–100)
MONOCYTES # BLD: 0.5 K/UL (ref 0–1.3)
MONOCYTES NFR BLD: 5.9 %
NEUTROPHILS # BLD: 5.5 K/UL (ref 1.7–7.7)
NEUTROPHILS NFR BLD: 70.2 %
NITRITE UR QL STRIP.AUTO: NEGATIVE
OSMOLALITY UR: 178 MOSM/KG (ref 390–1070)
PH UR STRIP.AUTO: 6 [PH] (ref 5–8)
PLATELET # BLD AUTO: 297 K/UL (ref 135–450)
PMV BLD AUTO: 8.5 FL (ref 5–10.5)
POTASSIUM SERPL-SCNC: 3.9 MMOL/L (ref 3.5–5.1)
PROT SERPL-MCNC: 7.7 G/DL (ref 6.4–8.2)
PROT UR STRIP.AUTO-MCNC: NEGATIVE MG/DL
RBC # BLD AUTO: 4.21 M/UL (ref 4–5.2)
RBC #/AREA URNS HPF: ABNORMAL /HPF (ref 0–4)
RENAL EPI CELLS #/AREA UR COMP ASSIST: ABNORMAL /HPF (ref 0–1)
SODIUM SERPL-SCNC: 136 MMOL/L (ref 136–145)
SODIUM UR-SCNC: 22 MMOL/L
SP GR UR STRIP.AUTO: <=1.005 (ref 1–1.03)
TROPONIN, HIGH SENSITIVITY: 15 NG/L (ref 0–14)
TROPONIN, HIGH SENSITIVITY: 18 NG/L (ref 0–14)
TROPONIN, HIGH SENSITIVITY: 23 NG/L (ref 0–14)
UA COMPLETE W REFLEX CULTURE PNL UR: YES
UA DIPSTICK W REFLEX MICRO PNL UR: YES
URN SPEC COLLECT METH UR: ABNORMAL
UROBILINOGEN UR STRIP-ACNC: 0.2 E.U./DL
WBC # BLD AUTO: 7.9 K/UL (ref 4–11)
WBC #/AREA URNS HPF: ABNORMAL /HPF (ref 0–5)

## 2024-09-14 PROCEDURE — 73610 X-RAY EXAM OF ANKLE: CPT

## 2024-09-14 PROCEDURE — 99223 1ST HOSP IP/OBS HIGH 75: CPT | Performed by: INTERNAL MEDICINE

## 2024-09-14 PROCEDURE — 83935 ASSAY OF URINE OSMOLALITY: CPT

## 2024-09-14 PROCEDURE — 99285 EMERGENCY DEPT VISIT HI MDM: CPT

## 2024-09-14 PROCEDURE — 96360 HYDRATION IV INFUSION INIT: CPT

## 2024-09-14 PROCEDURE — 84484 ASSAY OF TROPONIN QUANT: CPT

## 2024-09-14 PROCEDURE — 6370000000 HC RX 637 (ALT 250 FOR IP): Performed by: NURSE PRACTITIONER

## 2024-09-14 PROCEDURE — 36415 COLL VENOUS BLD VENIPUNCTURE: CPT

## 2024-09-14 PROCEDURE — 96361 HYDRATE IV INFUSION ADD-ON: CPT

## 2024-09-14 PROCEDURE — 6360000002 HC RX W HCPCS: Performed by: NURSE PRACTITIONER

## 2024-09-14 PROCEDURE — 87077 CULTURE AEROBIC IDENTIFY: CPT

## 2024-09-14 PROCEDURE — 87086 URINE CULTURE/COLONY COUNT: CPT

## 2024-09-14 PROCEDURE — 80053 COMPREHEN METABOLIC PANEL: CPT

## 2024-09-14 PROCEDURE — 85025 COMPLETE CBC W/AUTO DIFF WBC: CPT

## 2024-09-14 PROCEDURE — 83550 IRON BINDING TEST: CPT

## 2024-09-14 PROCEDURE — 76770 US EXAM ABDO BACK WALL COMP: CPT

## 2024-09-14 PROCEDURE — 83540 ASSAY OF IRON: CPT

## 2024-09-14 PROCEDURE — 84300 ASSAY OF URINE SODIUM: CPT

## 2024-09-14 PROCEDURE — 87186 SC STD MICRODIL/AGAR DIL: CPT

## 2024-09-14 PROCEDURE — 2580000003 HC RX 258: Performed by: NURSE PRACTITIONER

## 2024-09-14 PROCEDURE — 2580000003 HC RX 258: Performed by: PHYSICIAN ASSISTANT

## 2024-09-14 PROCEDURE — 81001 URINALYSIS AUTO W/SCOPE: CPT

## 2024-09-14 PROCEDURE — 1200000000 HC SEMI PRIVATE

## 2024-09-14 PROCEDURE — 82570 ASSAY OF URINE CREATININE: CPT

## 2024-09-14 PROCEDURE — 73630 X-RAY EXAM OF FOOT: CPT

## 2024-09-14 PROCEDURE — 93005 ELECTROCARDIOGRAM TRACING: CPT | Performed by: PHYSICIAN ASSISTANT

## 2024-09-14 PROCEDURE — 70450 CT HEAD/BRAIN W/O DYE: CPT

## 2024-09-14 PROCEDURE — 93010 ELECTROCARDIOGRAM REPORT: CPT | Performed by: INTERNAL MEDICINE

## 2024-09-14 RX ORDER — SODIUM CHLORIDE 0.9 % (FLUSH) 0.9 %
5-40 SYRINGE (ML) INJECTION EVERY 12 HOURS SCHEDULED
Status: DISCONTINUED | OUTPATIENT
Start: 2024-09-14 | End: 2024-09-19 | Stop reason: HOSPADM

## 2024-09-14 RX ORDER — OXYCODONE HYDROCHLORIDE 5 MG/1
10 TABLET ORAL EVERY 6 HOURS PRN
Status: DISCONTINUED | OUTPATIENT
Start: 2024-09-14 | End: 2024-09-18

## 2024-09-14 RX ORDER — SODIUM CHLORIDE 0.9 % (FLUSH) 0.9 %
5-40 SYRINGE (ML) INJECTION PRN
Status: DISCONTINUED | OUTPATIENT
Start: 2024-09-14 | End: 2024-09-19 | Stop reason: HOSPADM

## 2024-09-14 RX ORDER — ESCITALOPRAM OXALATE 10 MG/1
10 TABLET ORAL DAILY
Status: DISCONTINUED | OUTPATIENT
Start: 2024-09-14 | End: 2024-09-19 | Stop reason: HOSPADM

## 2024-09-14 RX ORDER — BIOTIN 1 MG
2500 TABLET ORAL DAILY
Status: DISCONTINUED | OUTPATIENT
Start: 2024-09-14 | End: 2024-09-14

## 2024-09-14 RX ORDER — ROSUVASTATIN CALCIUM 10 MG/1
10 TABLET, COATED ORAL DAILY
Status: DISCONTINUED | OUTPATIENT
Start: 2024-09-14 | End: 2024-09-19 | Stop reason: HOSPADM

## 2024-09-14 RX ORDER — SODIUM CHLORIDE 9 MG/ML
INJECTION, SOLUTION INTRAVENOUS CONTINUOUS
Status: DISCONTINUED | OUTPATIENT
Start: 2024-09-14 | End: 2024-09-18

## 2024-09-14 RX ORDER — METOPROLOL SUCCINATE 25 MG/1
25 TABLET, EXTENDED RELEASE ORAL 2 TIMES DAILY
Status: DISCONTINUED | OUTPATIENT
Start: 2024-09-14 | End: 2024-09-19 | Stop reason: HOSPADM

## 2024-09-14 RX ORDER — SODIUM CHLORIDE 9 MG/ML
INJECTION, SOLUTION INTRAVENOUS PRN
Status: DISCONTINUED | OUTPATIENT
Start: 2024-09-14 | End: 2024-09-19 | Stop reason: HOSPADM

## 2024-09-14 RX ORDER — GALANTAMINE HYDROBROMIDE 8 MG/1
8 CAPSULE, EXTENDED RELEASE ORAL DAILY
Status: DISCONTINUED | OUTPATIENT
Start: 2024-09-14 | End: 2024-09-19 | Stop reason: HOSPADM

## 2024-09-14 RX ORDER — GABAPENTIN 400 MG/1
800 CAPSULE ORAL 3 TIMES DAILY
Status: DISCONTINUED | OUTPATIENT
Start: 2024-09-14 | End: 2024-09-15

## 2024-09-14 RX ORDER — 0.9 % SODIUM CHLORIDE 0.9 %
1000 INTRAVENOUS SOLUTION INTRAVENOUS ONCE
Status: COMPLETED | OUTPATIENT
Start: 2024-09-14 | End: 2024-09-14

## 2024-09-14 RX ORDER — TROSPIUM CHLORIDE 20 MG/1
20 TABLET, FILM COATED ORAL NIGHTLY
Status: DISCONTINUED | OUTPATIENT
Start: 2024-09-14 | End: 2024-09-19 | Stop reason: HOSPADM

## 2024-09-14 RX ORDER — ACETAMINOPHEN 325 MG/1
650 TABLET ORAL EVERY 6 HOURS PRN
Status: DISCONTINUED | OUTPATIENT
Start: 2024-09-14 | End: 2024-09-14

## 2024-09-14 RX ORDER — ONDANSETRON 2 MG/ML
4 INJECTION INTRAMUSCULAR; INTRAVENOUS EVERY 6 HOURS PRN
Status: DISCONTINUED | OUTPATIENT
Start: 2024-09-14 | End: 2024-09-19 | Stop reason: HOSPADM

## 2024-09-14 RX ORDER — PANTOPRAZOLE SODIUM 40 MG/1
40 TABLET, DELAYED RELEASE ORAL DAILY
Status: DISCONTINUED | OUTPATIENT
Start: 2024-09-14 | End: 2024-09-19 | Stop reason: HOSPADM

## 2024-09-14 RX ORDER — ACETAMINOPHEN 650 MG/1
650 SUPPOSITORY RECTAL EVERY 6 HOURS PRN
Status: DISCONTINUED | OUTPATIENT
Start: 2024-09-14 | End: 2024-09-14

## 2024-09-14 RX ORDER — LANOLIN ALCOHOL/MO/W.PET/CERES
100 CREAM (GRAM) TOPICAL DAILY
Status: DISCONTINUED | OUTPATIENT
Start: 2024-09-14 | End: 2024-09-19 | Stop reason: HOSPADM

## 2024-09-14 RX ORDER — ENOXAPARIN SODIUM 100 MG/ML
30 INJECTION SUBCUTANEOUS DAILY
Status: DISCONTINUED | OUTPATIENT
Start: 2024-09-14 | End: 2024-09-16

## 2024-09-14 RX ORDER — CLONAZEPAM 1 MG/1
2 TABLET ORAL 2 TIMES DAILY
Status: DISCONTINUED | OUTPATIENT
Start: 2024-09-14 | End: 2024-09-18

## 2024-09-14 RX ORDER — ONDANSETRON 4 MG/1
4 TABLET, ORALLY DISINTEGRATING ORAL EVERY 8 HOURS PRN
Status: DISCONTINUED | OUTPATIENT
Start: 2024-09-14 | End: 2024-09-19 | Stop reason: HOSPADM

## 2024-09-14 RX ADMIN — ROSUVASTATIN CALCIUM 10 MG: 10 TABLET, FILM COATED ORAL at 18:14

## 2024-09-14 RX ADMIN — MIDODRINE HYDROCHLORIDE 12.5 MG: 5 TABLET ORAL at 18:15

## 2024-09-14 RX ADMIN — SODIUM CHLORIDE 1000 ML: 9 INJECTION, SOLUTION INTRAVENOUS at 11:34

## 2024-09-14 RX ADMIN — SODIUM CHLORIDE: 9 INJECTION, SOLUTION INTRAVENOUS at 22:04

## 2024-09-14 RX ADMIN — GALANTAMINE HYDROBROMIDE 8 MG: 8 CAPSULE, EXTENDED RELEASE ORAL at 18:14

## 2024-09-14 RX ADMIN — Medication 10 ML: at 21:51

## 2024-09-14 RX ADMIN — PANTOPRAZOLE SODIUM 40 MG: 40 TABLET, DELAYED RELEASE ORAL at 18:15

## 2024-09-14 RX ADMIN — LEVOTHYROXINE SODIUM 75 MCG: 25 TABLET ORAL at 18:14

## 2024-09-14 RX ADMIN — ESCITALOPRAM OXALATE 10 MG: 10 TABLET ORAL at 18:15

## 2024-09-14 RX ADMIN — OXYCODONE HYDROCHLORIDE 10 MG: 5 TABLET ORAL at 18:15

## 2024-09-14 RX ADMIN — CLONAZEPAM 2 MG: 1 TABLET ORAL at 21:52

## 2024-09-14 RX ADMIN — METOPROLOL SUCCINATE 25 MG: 25 TABLET, EXTENDED RELEASE ORAL at 21:52

## 2024-09-14 RX ADMIN — PYRIDOXINE HCL TAB 50 MG 100 MG: 50 TAB at 18:14

## 2024-09-14 RX ADMIN — SODIUM CHLORIDE 100 MG: 9 INJECTION, SOLUTION INTRAVENOUS at 22:05

## 2024-09-14 RX ADMIN — ENOXAPARIN SODIUM 30 MG: 100 INJECTION SUBCUTANEOUS at 18:18

## 2024-09-14 RX ADMIN — TROSPIUM CHLORIDE 20 MG: 20 TABLET, FILM COATED ORAL at 21:52

## 2024-09-14 ASSESSMENT — PAIN DESCRIPTION - LOCATION
LOCATION: ANKLE;FOOT
LOCATION: FOOT
LOCATION: LEG

## 2024-09-14 ASSESSMENT — PAIN DESCRIPTION - DESCRIPTORS
DESCRIPTORS: ACHING
DESCRIPTORS: BURNING

## 2024-09-14 ASSESSMENT — PAIN - FUNCTIONAL ASSESSMENT
PAIN_FUNCTIONAL_ASSESSMENT: 0-10
PAIN_FUNCTIONAL_ASSESSMENT: PREVENTS OR INTERFERES SOME ACTIVE ACTIVITIES AND ADLS
PAIN_FUNCTIONAL_ASSESSMENT: PREVENTS OR INTERFERES WITH ALL ACTIVE AND SOME PASSIVE ACTIVITIES

## 2024-09-14 ASSESSMENT — PAIN DESCRIPTION - ORIENTATION
ORIENTATION: RIGHT
ORIENTATION: RIGHT
ORIENTATION: RIGHT;LEFT

## 2024-09-14 ASSESSMENT — PAIN SCALES - GENERAL
PAINLEVEL_OUTOF10: 7
PAINLEVEL_OUTOF10: 8
PAINLEVEL_OUTOF10: 8

## 2024-09-14 ASSESSMENT — PAIN DESCRIPTION - PAIN TYPE: TYPE: ACUTE PAIN

## 2024-09-15 PROBLEM — S82.874A CLOSED NONDISPLACED PILON FRACTURE OF RIGHT TIBIA: Status: ACTIVE | Noted: 2024-09-15

## 2024-09-15 LAB
ALBUMIN SERPL-MCNC: 3.4 G/DL (ref 3.4–5)
ALBUMIN/GLOB SERPL: 1.1 {RATIO} (ref 1.1–2.2)
ALP SERPL-CCNC: 125 U/L (ref 40–129)
ALT SERPL-CCNC: 6 U/L (ref 10–40)
ANION GAP SERPL CALCULATED.3IONS-SCNC: 14 MMOL/L (ref 3–16)
AST SERPL-CCNC: 22 U/L (ref 15–37)
BASOPHILS # BLD: 0 K/UL (ref 0–0.2)
BASOPHILS NFR BLD: 0.7 %
BILIRUB SERPL-MCNC: 0.3 MG/DL (ref 0–1)
BUN SERPL-MCNC: 19 MG/DL (ref 7–20)
CALCIUM SERPL-MCNC: 8.8 MG/DL (ref 8.3–10.6)
CHLORIDE SERPL-SCNC: 99 MMOL/L (ref 99–110)
CO2 SERPL-SCNC: 21 MMOL/L (ref 21–32)
CREAT SERPL-MCNC: 1.3 MG/DL (ref 0.6–1.2)
DEPRECATED RDW RBC AUTO: 21.7 % (ref 12.4–15.4)
EOSINOPHIL # BLD: 0.2 K/UL (ref 0–0.6)
EOSINOPHIL NFR BLD: 3.8 %
GFR SERPLBLD CREATININE-BSD FMLA CKD-EPI: 44 ML/MIN/{1.73_M2}
GLUCOSE BLD-MCNC: 92 MG/DL (ref 70–99)
GLUCOSE BLD-MCNC: 96 MG/DL (ref 70–99)
GLUCOSE SERPL-MCNC: 103 MG/DL (ref 70–99)
HCT VFR BLD AUTO: 30.7 % (ref 36–48)
HGB BLD-MCNC: 9.2 G/DL (ref 12–16)
IRON SATN MFR SERPL: 3 % (ref 15–50)
IRON SERPL-MCNC: 13 UG/DL (ref 37–145)
LYMPHOCYTES # BLD: 2.9 K/UL (ref 1–5.1)
LYMPHOCYTES NFR BLD: 46.1 %
MCH RBC QN AUTO: 21.6 PG (ref 26–34)
MCHC RBC AUTO-ENTMCNC: 30 G/DL (ref 31–36)
MCV RBC AUTO: 72.2 FL (ref 80–100)
MONOCYTES # BLD: 0.7 K/UL (ref 0–1.3)
MONOCYTES NFR BLD: 11.5 %
NEUTROPHILS # BLD: 2.4 K/UL (ref 1.7–7.7)
NEUTROPHILS NFR BLD: 37.9 %
PERFORMED ON: NORMAL
PERFORMED ON: NORMAL
PLATELET # BLD AUTO: 274 K/UL (ref 135–450)
PMV BLD AUTO: 7.6 FL (ref 5–10.5)
POTASSIUM SERPL-SCNC: 4.6 MMOL/L (ref 3.5–5.1)
PROT SERPL-MCNC: 6.5 G/DL (ref 6.4–8.2)
RBC # BLD AUTO: 4.26 M/UL (ref 4–5.2)
SODIUM SERPL-SCNC: 134 MMOL/L (ref 136–145)
T4 FREE SERPL-MCNC: 1.7 NG/DL (ref 0.9–1.8)
TIBC SERPL-MCNC: 414 UG/DL (ref 260–445)
TSH SERPL DL<=0.005 MIU/L-ACNC: 17.72 UIU/ML (ref 0.27–4.2)
WBC # BLD AUTO: 6.3 K/UL (ref 4–11)

## 2024-09-15 PROCEDURE — 2580000003 HC RX 258: Performed by: NURSE PRACTITIONER

## 2024-09-15 PROCEDURE — 80053 COMPREHEN METABOLIC PANEL: CPT

## 2024-09-15 PROCEDURE — 6360000002 HC RX W HCPCS: Performed by: NURSE PRACTITIONER

## 2024-09-15 PROCEDURE — 99232 SBSQ HOSP IP/OBS MODERATE 35: CPT | Performed by: INTERNAL MEDICINE

## 2024-09-15 PROCEDURE — 84439 ASSAY OF FREE THYROXINE: CPT

## 2024-09-15 PROCEDURE — 6370000000 HC RX 637 (ALT 250 FOR IP): Performed by: INTERNAL MEDICINE

## 2024-09-15 PROCEDURE — 85025 COMPLETE CBC W/AUTO DIFF WBC: CPT

## 2024-09-15 PROCEDURE — 36415 COLL VENOUS BLD VENIPUNCTURE: CPT

## 2024-09-15 PROCEDURE — 6370000000 HC RX 637 (ALT 250 FOR IP): Performed by: NURSE PRACTITIONER

## 2024-09-15 PROCEDURE — 1200000000 HC SEMI PRIVATE

## 2024-09-15 PROCEDURE — 84443 ASSAY THYROID STIM HORMONE: CPT

## 2024-09-15 PROCEDURE — 99222 1ST HOSP IP/OBS MODERATE 55: CPT | Performed by: ORTHOPAEDIC SURGERY

## 2024-09-15 RX ORDER — GABAPENTIN 300 MG/1
300 CAPSULE ORAL 3 TIMES DAILY
Status: DISCONTINUED | OUTPATIENT
Start: 2024-09-15 | End: 2024-09-18

## 2024-09-15 RX ADMIN — SODIUM CHLORIDE 100 MG: 9 INJECTION, SOLUTION INTRAVENOUS at 17:48

## 2024-09-15 RX ADMIN — METOPROLOL SUCCINATE 25 MG: 25 TABLET, EXTENDED RELEASE ORAL at 09:53

## 2024-09-15 RX ADMIN — MIDODRINE HYDROCHLORIDE 12.5 MG: 5 TABLET ORAL at 17:46

## 2024-09-15 RX ADMIN — OXYCODONE HYDROCHLORIDE 10 MG: 5 TABLET ORAL at 00:33

## 2024-09-15 RX ADMIN — CLONAZEPAM 2 MG: 1 TABLET ORAL at 23:56

## 2024-09-15 RX ADMIN — ROSUVASTATIN CALCIUM 10 MG: 10 TABLET, FILM COATED ORAL at 09:53

## 2024-09-15 RX ADMIN — OXYCODONE HYDROCHLORIDE 10 MG: 5 TABLET ORAL at 06:12

## 2024-09-15 RX ADMIN — LEVOTHYROXINE SODIUM 75 MCG: 25 TABLET ORAL at 06:13

## 2024-09-15 RX ADMIN — ESCITALOPRAM OXALATE 10 MG: 10 TABLET ORAL at 09:53

## 2024-09-15 RX ADMIN — METOPROLOL SUCCINATE 25 MG: 25 TABLET, EXTENDED RELEASE ORAL at 23:56

## 2024-09-15 RX ADMIN — GABAPENTIN 300 MG: 300 CAPSULE ORAL at 23:56

## 2024-09-15 RX ADMIN — GALANTAMINE HYDROBROMIDE 8 MG: 8 CAPSULE, EXTENDED RELEASE ORAL at 09:56

## 2024-09-15 RX ADMIN — CLONAZEPAM 2 MG: 1 TABLET ORAL at 09:53

## 2024-09-15 RX ADMIN — MIDODRINE HYDROCHLORIDE 12.5 MG: 5 TABLET ORAL at 09:52

## 2024-09-15 RX ADMIN — GABAPENTIN 300 MG: 300 CAPSULE ORAL at 14:16

## 2024-09-15 RX ADMIN — PYRIDOXINE HCL TAB 50 MG 100 MG: 50 TAB at 09:52

## 2024-09-15 RX ADMIN — PANTOPRAZOLE SODIUM 40 MG: 40 TABLET, DELAYED RELEASE ORAL at 09:53

## 2024-09-15 RX ADMIN — MIDODRINE HYDROCHLORIDE 12.5 MG: 5 TABLET ORAL at 12:06

## 2024-09-15 RX ADMIN — SODIUM CHLORIDE: 9 INJECTION, SOLUTION INTRAVENOUS at 11:08

## 2024-09-15 RX ADMIN — TROSPIUM CHLORIDE 20 MG: 20 TABLET, FILM COATED ORAL at 23:56

## 2024-09-15 ASSESSMENT — PAIN SCALES - GENERAL
PAINLEVEL_OUTOF10: 8
PAINLEVEL_OUTOF10: 7

## 2024-09-15 ASSESSMENT — PAIN DESCRIPTION - ONSET
ONSET: ON-GOING
ONSET: ON-GOING

## 2024-09-15 ASSESSMENT — PAIN DESCRIPTION - FREQUENCY
FREQUENCY: CONTINUOUS
FREQUENCY: CONTINUOUS

## 2024-09-15 ASSESSMENT — PAIN DESCRIPTION - ORIENTATION
ORIENTATION: LEFT;RIGHT
ORIENTATION: RIGHT;LEFT

## 2024-09-15 ASSESSMENT — PAIN DESCRIPTION - PAIN TYPE
TYPE: ACUTE PAIN
TYPE: ACUTE PAIN

## 2024-09-15 ASSESSMENT — PAIN DESCRIPTION - DESCRIPTORS
DESCRIPTORS: ACHING;DISCOMFORT;SORE;TENDER;SHARP
DESCRIPTORS: ACHING;DISCOMFORT;SORE;TENDER;SHARP

## 2024-09-15 ASSESSMENT — PAIN DESCRIPTION - LOCATION
LOCATION: FOOT;ANKLE
LOCATION: ANKLE;FOOT

## 2024-09-16 LAB
ALBUMIN SERPL-MCNC: 2.7 G/DL (ref 3.4–5)
ALBUMIN/GLOB SERPL: 1 {RATIO} (ref 1.1–2.2)
ALP SERPL-CCNC: 112 U/L (ref 40–129)
ALT SERPL-CCNC: 7 U/L (ref 10–40)
ANION GAP SERPL CALCULATED.3IONS-SCNC: 12 MMOL/L (ref 3–16)
AST SERPL-CCNC: 20 U/L (ref 15–37)
BASOPHILS # BLD: 0.1 K/UL (ref 0–0.2)
BASOPHILS NFR BLD: 1.3 %
BILIRUB SERPL-MCNC: <0.2 MG/DL (ref 0–1)
BUN SERPL-MCNC: 12 MG/DL (ref 7–20)
CALCIUM SERPL-MCNC: 8.1 MG/DL (ref 8.3–10.6)
CHLORIDE SERPL-SCNC: 107 MMOL/L (ref 99–110)
CO2 SERPL-SCNC: 18 MMOL/L (ref 21–32)
CREAT SERPL-MCNC: 1 MG/DL (ref 0.6–1.2)
DEPRECATED RDW RBC AUTO: 21.8 % (ref 12.4–15.4)
EOSINOPHIL # BLD: 0.3 K/UL (ref 0–0.6)
EOSINOPHIL NFR BLD: 4.4 %
GFR SERPLBLD CREATININE-BSD FMLA CKD-EPI: 61 ML/MIN/{1.73_M2}
GLUCOSE SERPL-MCNC: 82 MG/DL (ref 70–99)
HCT VFR BLD AUTO: 27.5 % (ref 36–48)
HGB BLD-MCNC: 8.3 G/DL (ref 12–16)
LYMPHOCYTES # BLD: 2.9 K/UL (ref 1–5.1)
LYMPHOCYTES NFR BLD: 45.1 %
MAGNESIUM SERPL-MCNC: 2.1 MG/DL (ref 1.8–2.4)
MCH RBC QN AUTO: 22.1 PG (ref 26–34)
MCHC RBC AUTO-ENTMCNC: 30 G/DL (ref 31–36)
MCV RBC AUTO: 73.5 FL (ref 80–100)
MONOCYTES # BLD: 0.7 K/UL (ref 0–1.3)
MONOCYTES NFR BLD: 10.8 %
NEUTROPHILS # BLD: 2.4 K/UL (ref 1.7–7.7)
NEUTROPHILS NFR BLD: 38.4 %
PLATELET # BLD AUTO: 246 K/UL (ref 135–450)
PMV BLD AUTO: 7.8 FL (ref 5–10.5)
POTASSIUM SERPL-SCNC: 4.1 MMOL/L (ref 3.5–5.1)
PROT SERPL-MCNC: 5.3 G/DL (ref 6.4–8.2)
RBC # BLD AUTO: 3.74 M/UL (ref 4–5.2)
SODIUM SERPL-SCNC: 137 MMOL/L (ref 136–145)
WBC # BLD AUTO: 6.4 K/UL (ref 4–11)

## 2024-09-16 PROCEDURE — 1200000000 HC SEMI PRIVATE

## 2024-09-16 PROCEDURE — 99232 SBSQ HOSP IP/OBS MODERATE 35: CPT | Performed by: INTERNAL MEDICINE

## 2024-09-16 PROCEDURE — 97166 OT EVAL MOD COMPLEX 45 MIN: CPT

## 2024-09-16 PROCEDURE — 85025 COMPLETE CBC W/AUTO DIFF WBC: CPT

## 2024-09-16 PROCEDURE — 6370000000 HC RX 637 (ALT 250 FOR IP): Performed by: INTERNAL MEDICINE

## 2024-09-16 PROCEDURE — 6360000002 HC RX W HCPCS: Performed by: NURSE PRACTITIONER

## 2024-09-16 PROCEDURE — 97162 PT EVAL MOD COMPLEX 30 MIN: CPT

## 2024-09-16 PROCEDURE — 83735 ASSAY OF MAGNESIUM: CPT

## 2024-09-16 PROCEDURE — 2580000003 HC RX 258: Performed by: NURSE PRACTITIONER

## 2024-09-16 PROCEDURE — 6370000000 HC RX 637 (ALT 250 FOR IP): Performed by: NURSE PRACTITIONER

## 2024-09-16 PROCEDURE — 97530 THERAPEUTIC ACTIVITIES: CPT

## 2024-09-16 PROCEDURE — 36415 COLL VENOUS BLD VENIPUNCTURE: CPT

## 2024-09-16 PROCEDURE — 97535 SELF CARE MNGMENT TRAINING: CPT

## 2024-09-16 PROCEDURE — 80053 COMPREHEN METABOLIC PANEL: CPT

## 2024-09-16 PROCEDURE — 76937 US GUIDE VASCULAR ACCESS: CPT

## 2024-09-16 RX ORDER — ENOXAPARIN SODIUM 100 MG/ML
40 INJECTION SUBCUTANEOUS DAILY
Status: DISCONTINUED | OUTPATIENT
Start: 2024-09-16 | End: 2024-09-19 | Stop reason: HOSPADM

## 2024-09-16 RX ADMIN — ROSUVASTATIN CALCIUM 10 MG: 10 TABLET, FILM COATED ORAL at 08:48

## 2024-09-16 RX ADMIN — SODIUM CHLORIDE, PRESERVATIVE FREE 10 ML: 5 INJECTION INTRAVENOUS at 20:26

## 2024-09-16 RX ADMIN — PYRIDOXINE HCL TAB 50 MG 100 MG: 50 TAB at 08:48

## 2024-09-16 RX ADMIN — ESCITALOPRAM OXALATE 10 MG: 10 TABLET ORAL at 08:48

## 2024-09-16 RX ADMIN — LEVOTHYROXINE SODIUM 75 MCG: 25 TABLET ORAL at 05:04

## 2024-09-16 RX ADMIN — GABAPENTIN 300 MG: 300 CAPSULE ORAL at 14:44

## 2024-09-16 RX ADMIN — Medication 10 ML: at 20:27

## 2024-09-16 RX ADMIN — MIDODRINE HYDROCHLORIDE 12.5 MG: 5 TABLET ORAL at 14:44

## 2024-09-16 RX ADMIN — PANTOPRAZOLE SODIUM 40 MG: 40 TABLET, DELAYED RELEASE ORAL at 08:49

## 2024-09-16 RX ADMIN — CLONAZEPAM 2 MG: 1 TABLET ORAL at 20:26

## 2024-09-16 RX ADMIN — GABAPENTIN 300 MG: 300 CAPSULE ORAL at 20:26

## 2024-09-16 RX ADMIN — OXYCODONE HYDROCHLORIDE 10 MG: 5 TABLET ORAL at 20:29

## 2024-09-16 RX ADMIN — GABAPENTIN 300 MG: 300 CAPSULE ORAL at 08:48

## 2024-09-16 RX ADMIN — CLONAZEPAM 2 MG: 1 TABLET ORAL at 08:48

## 2024-09-16 RX ADMIN — MIDODRINE HYDROCHLORIDE 12.5 MG: 5 TABLET ORAL at 08:48

## 2024-09-16 RX ADMIN — SODIUM CHLORIDE 100 MG: 9 INJECTION, SOLUTION INTRAVENOUS at 17:13

## 2024-09-16 RX ADMIN — TROSPIUM CHLORIDE 20 MG: 20 TABLET, FILM COATED ORAL at 20:26

## 2024-09-16 RX ADMIN — ENOXAPARIN SODIUM 40 MG: 100 INJECTION SUBCUTANEOUS at 08:48

## 2024-09-16 RX ADMIN — MIDODRINE HYDROCHLORIDE 12.5 MG: 5 TABLET ORAL at 17:11

## 2024-09-16 RX ADMIN — METOPROLOL SUCCINATE 25 MG: 25 TABLET, EXTENDED RELEASE ORAL at 20:26

## 2024-09-16 RX ADMIN — GALANTAMINE HYDROBROMIDE 8 MG: 8 CAPSULE, EXTENDED RELEASE ORAL at 08:54

## 2024-09-16 RX ADMIN — METOPROLOL SUCCINATE 25 MG: 25 TABLET, EXTENDED RELEASE ORAL at 08:48

## 2024-09-16 ASSESSMENT — PAIN DESCRIPTION - ORIENTATION: ORIENTATION: RIGHT;LEFT

## 2024-09-16 ASSESSMENT — PAIN SCALES - GENERAL
PAINLEVEL_OUTOF10: 5
PAINLEVEL_OUTOF10: 10

## 2024-09-16 ASSESSMENT — PAIN DESCRIPTION - DESCRIPTORS: DESCRIPTORS: ACHING

## 2024-09-17 LAB
ALBUMIN SERPL-MCNC: 2.8 G/DL (ref 3.4–5)
ALBUMIN/GLOB SERPL: 1.1 {RATIO} (ref 1.1–2.2)
ALP SERPL-CCNC: 117 U/L (ref 40–129)
ALT SERPL-CCNC: 6 U/L (ref 10–40)
ANION GAP SERPL CALCULATED.3IONS-SCNC: 7 MMOL/L (ref 3–16)
AST SERPL-CCNC: 19 U/L (ref 15–37)
BACTERIA UR CULT: ABNORMAL
BASOPHILS # BLD: 0.1 K/UL (ref 0–0.2)
BASOPHILS NFR BLD: 0.9 %
BILIRUB SERPL-MCNC: <0.2 MG/DL (ref 0–1)
BUN SERPL-MCNC: 8 MG/DL (ref 7–20)
CALCIUM SERPL-MCNC: 8.4 MG/DL (ref 8.3–10.6)
CHLORIDE SERPL-SCNC: 107 MMOL/L (ref 99–110)
CO2 SERPL-SCNC: 24 MMOL/L (ref 21–32)
CREAT SERPL-MCNC: 0.8 MG/DL (ref 0.6–1.2)
DEPRECATED RDW RBC AUTO: 22.1 % (ref 12.4–15.4)
EOSINOPHIL # BLD: 0.2 K/UL (ref 0–0.6)
EOSINOPHIL NFR BLD: 3.8 %
GFR SERPLBLD CREATININE-BSD FMLA CKD-EPI: 79 ML/MIN/{1.73_M2}
GLUCOSE SERPL-MCNC: 82 MG/DL (ref 70–99)
HCT VFR BLD AUTO: 27.4 % (ref 36–48)
HGB BLD-MCNC: 8.5 G/DL (ref 12–16)
LYMPHOCYTES # BLD: 2.9 K/UL (ref 1–5.1)
LYMPHOCYTES NFR BLD: 46.3 %
MCH RBC QN AUTO: 22.1 PG (ref 26–34)
MCHC RBC AUTO-ENTMCNC: 31 G/DL (ref 31–36)
MCV RBC AUTO: 71.5 FL (ref 80–100)
MONOCYTES # BLD: 0.5 K/UL (ref 0–1.3)
MONOCYTES NFR BLD: 8.4 %
NEUTROPHILS # BLD: 2.5 K/UL (ref 1.7–7.7)
NEUTROPHILS NFR BLD: 40.6 %
ORGANISM: ABNORMAL
PLATELET # BLD AUTO: 311 K/UL (ref 135–450)
PMV BLD AUTO: 7.4 FL (ref 5–10.5)
POTASSIUM SERPL-SCNC: 4.1 MMOL/L (ref 3.5–5.1)
PROT SERPL-MCNC: 5.3 G/DL (ref 6.4–8.2)
RBC # BLD AUTO: 3.83 M/UL (ref 4–5.2)
SODIUM SERPL-SCNC: 138 MMOL/L (ref 136–145)
WBC # BLD AUTO: 6.2 K/UL (ref 4–11)

## 2024-09-17 PROCEDURE — 85025 COMPLETE CBC W/AUTO DIFF WBC: CPT

## 2024-09-17 PROCEDURE — 51798 US URINE CAPACITY MEASURE: CPT

## 2024-09-17 PROCEDURE — 80053 COMPREHEN METABOLIC PANEL: CPT

## 2024-09-17 PROCEDURE — 6370000000 HC RX 637 (ALT 250 FOR IP): Performed by: INTERNAL MEDICINE

## 2024-09-17 PROCEDURE — 36415 COLL VENOUS BLD VENIPUNCTURE: CPT

## 2024-09-17 PROCEDURE — 1200000000 HC SEMI PRIVATE

## 2024-09-17 PROCEDURE — 2580000003 HC RX 258: Performed by: NURSE PRACTITIONER

## 2024-09-17 PROCEDURE — 99232 SBSQ HOSP IP/OBS MODERATE 35: CPT | Performed by: INTERNAL MEDICINE

## 2024-09-17 PROCEDURE — 6370000000 HC RX 637 (ALT 250 FOR IP): Performed by: NURSE PRACTITIONER

## 2024-09-17 PROCEDURE — 6360000002 HC RX W HCPCS: Performed by: NURSE PRACTITIONER

## 2024-09-17 RX ORDER — LEVOFLOXACIN 500 MG/1
500 TABLET, FILM COATED ORAL NIGHTLY
Status: DISCONTINUED | OUTPATIENT
Start: 2024-09-17 | End: 2024-09-19 | Stop reason: HOSPADM

## 2024-09-17 RX ADMIN — CLONAZEPAM 2 MG: 1 TABLET ORAL at 12:14

## 2024-09-17 RX ADMIN — PYRIDOXINE HCL TAB 50 MG 100 MG: 50 TAB at 11:00

## 2024-09-17 RX ADMIN — CLONAZEPAM 2 MG: 1 TABLET ORAL at 20:38

## 2024-09-17 RX ADMIN — PANTOPRAZOLE SODIUM 40 MG: 40 TABLET, DELAYED RELEASE ORAL at 11:00

## 2024-09-17 RX ADMIN — SODIUM CHLORIDE: 9 INJECTION, SOLUTION INTRAVENOUS at 05:22

## 2024-09-17 RX ADMIN — ESCITALOPRAM OXALATE 10 MG: 10 TABLET ORAL at 11:00

## 2024-09-17 RX ADMIN — SODIUM CHLORIDE 100 MG: 9 INJECTION, SOLUTION INTRAVENOUS at 18:56

## 2024-09-17 RX ADMIN — GABAPENTIN 300 MG: 300 CAPSULE ORAL at 20:38

## 2024-09-17 RX ADMIN — OXYCODONE HYDROCHLORIDE 10 MG: 5 TABLET ORAL at 03:31

## 2024-09-17 RX ADMIN — GABAPENTIN 300 MG: 300 CAPSULE ORAL at 14:14

## 2024-09-17 RX ADMIN — LEVOFLOXACIN 500 MG: 500 TABLET, FILM COATED ORAL at 20:38

## 2024-09-17 RX ADMIN — GALANTAMINE HYDROBROMIDE 8 MG: 8 CAPSULE, EXTENDED RELEASE ORAL at 11:00

## 2024-09-17 RX ADMIN — GABAPENTIN 300 MG: 300 CAPSULE ORAL at 11:00

## 2024-09-17 RX ADMIN — SODIUM CHLORIDE, PRESERVATIVE FREE 10 ML: 5 INJECTION INTRAVENOUS at 11:28

## 2024-09-17 RX ADMIN — ROSUVASTATIN CALCIUM 10 MG: 10 TABLET, FILM COATED ORAL at 11:00

## 2024-09-17 RX ADMIN — MIDODRINE HYDROCHLORIDE 12.5 MG: 5 TABLET ORAL at 17:31

## 2024-09-17 RX ADMIN — MIDODRINE HYDROCHLORIDE 12.5 MG: 5 TABLET ORAL at 09:30

## 2024-09-17 RX ADMIN — SODIUM CHLORIDE: 9 INJECTION, SOLUTION INTRAVENOUS at 17:37

## 2024-09-17 RX ADMIN — LEVOTHYROXINE SODIUM 75 MCG: 25 TABLET ORAL at 06:01

## 2024-09-17 RX ADMIN — TROSPIUM CHLORIDE 20 MG: 20 TABLET, FILM COATED ORAL at 20:38

## 2024-09-17 RX ADMIN — ENOXAPARIN SODIUM 40 MG: 100 INJECTION SUBCUTANEOUS at 11:02

## 2024-09-17 RX ADMIN — MIDODRINE HYDROCHLORIDE 12.5 MG: 5 TABLET ORAL at 12:10

## 2024-09-17 ASSESSMENT — PAIN DESCRIPTION - ORIENTATION
ORIENTATION: RIGHT;LEFT
ORIENTATION: RIGHT;LOWER

## 2024-09-17 ASSESSMENT — PAIN SCALES - GENERAL
PAINLEVEL_OUTOF10: 6
PAINLEVEL_OUTOF10: 6
PAINLEVEL_OUTOF10: 7
PAINLEVEL_OUTOF10: 0

## 2024-09-17 ASSESSMENT — PAIN DESCRIPTION - DESCRIPTORS
DESCRIPTORS: ACHING
DESCRIPTORS: ACHING

## 2024-09-17 ASSESSMENT — PAIN DESCRIPTION - LOCATION: LOCATION: LEG;BACK;ABDOMEN

## 2024-09-18 ENCOUNTER — APPOINTMENT (OUTPATIENT)
Dept: CT IMAGING | Age: 69
DRG: 092 | End: 2024-09-18
Payer: MEDICARE

## 2024-09-18 LAB
ANION GAP SERPL CALCULATED.3IONS-SCNC: 8 MMOL/L (ref 3–16)
BUN SERPL-MCNC: 8 MG/DL (ref 7–20)
CALCIUM SERPL-MCNC: 8.2 MG/DL (ref 8.3–10.6)
CHLORIDE SERPL-SCNC: 112 MMOL/L (ref 99–110)
CO2 SERPL-SCNC: 18 MMOL/L (ref 21–32)
CREAT SERPL-MCNC: 0.7 MG/DL (ref 0.6–1.2)
GFR SERPLBLD CREATININE-BSD FMLA CKD-EPI: >90 ML/MIN/{1.73_M2}
GLUCOSE SERPL-MCNC: 90 MG/DL (ref 70–99)
POTASSIUM SERPL-SCNC: 4 MMOL/L (ref 3.5–5.1)
SODIUM SERPL-SCNC: 138 MMOL/L (ref 136–145)

## 2024-09-18 PROCEDURE — 36415 COLL VENOUS BLD VENIPUNCTURE: CPT

## 2024-09-18 PROCEDURE — 80048 BASIC METABOLIC PNL TOTAL CA: CPT

## 2024-09-18 PROCEDURE — 51798 US URINE CAPACITY MEASURE: CPT

## 2024-09-18 PROCEDURE — 99232 SBSQ HOSP IP/OBS MODERATE 35: CPT | Performed by: INTERNAL MEDICINE

## 2024-09-18 PROCEDURE — 73700 CT LOWER EXTREMITY W/O DYE: CPT

## 2024-09-18 PROCEDURE — 6370000000 HC RX 637 (ALT 250 FOR IP): Performed by: NURSE PRACTITIONER

## 2024-09-18 PROCEDURE — 6370000000 HC RX 637 (ALT 250 FOR IP): Performed by: INTERNAL MEDICINE

## 2024-09-18 PROCEDURE — 2580000003 HC RX 258: Performed by: NURSE PRACTITIONER

## 2024-09-18 PROCEDURE — 6360000002 HC RX W HCPCS: Performed by: NURSE PRACTITIONER

## 2024-09-18 PROCEDURE — 2580000003 HC RX 258: Performed by: INTERNAL MEDICINE

## 2024-09-18 PROCEDURE — 1200000000 HC SEMI PRIVATE

## 2024-09-18 RX ORDER — GABAPENTIN 100 MG/1
100 CAPSULE ORAL 3 TIMES DAILY PRN
Status: DISCONTINUED | OUTPATIENT
Start: 2024-09-18 | End: 2024-09-19 | Stop reason: HOSPADM

## 2024-09-18 RX ORDER — OXYCODONE HYDROCHLORIDE 5 MG/1
5 TABLET ORAL EVERY 6 HOURS PRN
Status: DISCONTINUED | OUTPATIENT
Start: 2024-09-18 | End: 2024-09-19 | Stop reason: HOSPADM

## 2024-09-18 RX ORDER — CLONAZEPAM 1 MG/1
1 TABLET ORAL 2 TIMES DAILY PRN
Status: DISCONTINUED | OUTPATIENT
Start: 2024-09-18 | End: 2024-09-19 | Stop reason: HOSPADM

## 2024-09-18 RX ORDER — POLYETHYLENE GLYCOL 3350 17 G/17G
17 POWDER, FOR SOLUTION ORAL DAILY
Status: DISCONTINUED | OUTPATIENT
Start: 2024-09-18 | End: 2024-09-19 | Stop reason: HOSPADM

## 2024-09-18 RX ORDER — SODIUM CHLORIDE, SODIUM LACTATE, POTASSIUM CHLORIDE, CALCIUM CHLORIDE 600; 310; 30; 20 MG/100ML; MG/100ML; MG/100ML; MG/100ML
INJECTION, SOLUTION INTRAVENOUS CONTINUOUS
Status: DISCONTINUED | OUTPATIENT
Start: 2024-09-18 | End: 2024-09-19 | Stop reason: HOSPADM

## 2024-09-18 RX ADMIN — SODIUM CHLORIDE 100 MG: 9 INJECTION, SOLUTION INTRAVENOUS at 17:20

## 2024-09-18 RX ADMIN — PANTOPRAZOLE SODIUM 40 MG: 40 TABLET, DELAYED RELEASE ORAL at 09:09

## 2024-09-18 RX ADMIN — SODIUM CHLORIDE, POTASSIUM CHLORIDE, SODIUM LACTATE AND CALCIUM CHLORIDE: 600; 310; 30; 20 INJECTION, SOLUTION INTRAVENOUS at 21:41

## 2024-09-18 RX ADMIN — ROSUVASTATIN CALCIUM 10 MG: 10 TABLET, FILM COATED ORAL at 09:09

## 2024-09-18 RX ADMIN — GABAPENTIN 300 MG: 300 CAPSULE ORAL at 09:09

## 2024-09-18 RX ADMIN — GALANTAMINE HYDROBROMIDE 8 MG: 8 CAPSULE, EXTENDED RELEASE ORAL at 09:07

## 2024-09-18 RX ADMIN — MIDODRINE HYDROCHLORIDE 12.5 MG: 5 TABLET ORAL at 09:07

## 2024-09-18 RX ADMIN — MIDODRINE HYDROCHLORIDE 12.5 MG: 5 TABLET ORAL at 16:51

## 2024-09-18 RX ADMIN — LEVOFLOXACIN 500 MG: 500 TABLET, FILM COATED ORAL at 21:42

## 2024-09-18 RX ADMIN — ESCITALOPRAM OXALATE 10 MG: 10 TABLET ORAL at 09:09

## 2024-09-18 RX ADMIN — MIDODRINE HYDROCHLORIDE 12.5 MG: 5 TABLET ORAL at 12:05

## 2024-09-18 RX ADMIN — ENOXAPARIN SODIUM 40 MG: 100 INJECTION SUBCUTANEOUS at 09:07

## 2024-09-18 RX ADMIN — POLYETHYLENE GLYCOL (3350) 17 G: 17 POWDER, FOR SOLUTION ORAL at 12:05

## 2024-09-18 RX ADMIN — LEVOTHYROXINE SODIUM 75 MCG: 25 TABLET ORAL at 05:54

## 2024-09-18 RX ADMIN — PYRIDOXINE HCL TAB 50 MG 100 MG: 50 TAB at 09:09

## 2024-09-18 RX ADMIN — TROSPIUM CHLORIDE 20 MG: 20 TABLET, FILM COATED ORAL at 21:42

## 2024-09-18 RX ADMIN — SODIUM CHLORIDE, POTASSIUM CHLORIDE, SODIUM LACTATE AND CALCIUM CHLORIDE: 600; 310; 30; 20 INJECTION, SOLUTION INTRAVENOUS at 12:05

## 2024-09-18 ASSESSMENT — PAIN SCALES - GENERAL
PAINLEVEL_OUTOF10: 0
PAINLEVEL_OUTOF10: 0

## 2024-09-19 VITALS
DIASTOLIC BLOOD PRESSURE: 81 MMHG | TEMPERATURE: 97.5 F | RESPIRATION RATE: 16 BRPM | WEIGHT: 167 LBS | SYSTOLIC BLOOD PRESSURE: 129 MMHG | HEART RATE: 69 BPM | OXYGEN SATURATION: 99 % | BODY MASS INDEX: 30.73 KG/M2 | HEIGHT: 62 IN

## 2024-09-19 PROCEDURE — 6360000002 HC RX W HCPCS: Performed by: NURSE PRACTITIONER

## 2024-09-19 PROCEDURE — 99232 SBSQ HOSP IP/OBS MODERATE 35: CPT | Performed by: ORTHOPAEDIC SURGERY

## 2024-09-19 PROCEDURE — 99238 HOSP IP/OBS DSCHRG MGMT 30/<: CPT | Performed by: INTERNAL MEDICINE

## 2024-09-19 PROCEDURE — 6370000000 HC RX 637 (ALT 250 FOR IP): Performed by: INTERNAL MEDICINE

## 2024-09-19 PROCEDURE — 6370000000 HC RX 637 (ALT 250 FOR IP): Performed by: NURSE PRACTITIONER

## 2024-09-19 RX ORDER — TROSPIUM CHLORIDE 20 MG/1
20 TABLET, FILM COATED ORAL NIGHTLY
DISCHARGE
Start: 2024-09-19

## 2024-09-19 RX ORDER — ENOXAPARIN SODIUM 100 MG/ML
40 INJECTION SUBCUTANEOUS DAILY
DISCHARGE
Start: 2024-09-20 | End: 2024-10-04

## 2024-09-19 RX ORDER — LEVOFLOXACIN 500 MG/1
500 TABLET, FILM COATED ORAL NIGHTLY
Status: ON HOLD | DISCHARGE
Start: 2024-09-19 | End: 2024-09-26 | Stop reason: HOSPADM

## 2024-09-19 RX ORDER — METOPROLOL SUCCINATE 25 MG/1
12.5 TABLET, EXTENDED RELEASE ORAL 2 TIMES DAILY
Qty: 30 TABLET | Refills: 3 | Status: SHIPPED | OUTPATIENT
Start: 2024-09-19

## 2024-09-19 RX ORDER — OXYCODONE HYDROCHLORIDE 5 MG/1
5 TABLET ORAL EVERY 6 HOURS PRN
Qty: 12 TABLET | Refills: 0 | Status: ON HOLD | OUTPATIENT
Start: 2024-09-19 | End: 2024-09-26 | Stop reason: HOSPADM

## 2024-09-19 RX ORDER — CLONAZEPAM 1 MG/1
1 TABLET ORAL 2 TIMES DAILY PRN
Qty: 6 TABLET | Refills: 0 | Status: ON HOLD | OUTPATIENT
Start: 2024-09-19 | End: 2024-09-26

## 2024-09-19 RX ADMIN — ROSUVASTATIN CALCIUM 10 MG: 10 TABLET, FILM COATED ORAL at 09:37

## 2024-09-19 RX ADMIN — ESCITALOPRAM OXALATE 10 MG: 10 TABLET ORAL at 09:38

## 2024-09-19 RX ADMIN — POLYETHYLENE GLYCOL (3350) 17 G: 17 POWDER, FOR SOLUTION ORAL at 09:35

## 2024-09-19 RX ADMIN — PYRIDOXINE HCL TAB 50 MG 100 MG: 50 TAB at 09:37

## 2024-09-19 RX ADMIN — PANTOPRAZOLE SODIUM 40 MG: 40 TABLET, DELAYED RELEASE ORAL at 09:39

## 2024-09-19 RX ADMIN — MIDODRINE HYDROCHLORIDE 12.5 MG: 5 TABLET ORAL at 11:03

## 2024-09-19 RX ADMIN — OXYCODONE HYDROCHLORIDE 5 MG: 5 TABLET ORAL at 08:32

## 2024-09-19 RX ADMIN — ENOXAPARIN SODIUM 40 MG: 100 INJECTION SUBCUTANEOUS at 09:39

## 2024-09-19 RX ADMIN — OXYCODONE HYDROCHLORIDE 5 MG: 5 TABLET ORAL at 01:01

## 2024-09-19 RX ADMIN — MIDODRINE HYDROCHLORIDE 12.5 MG: 5 TABLET ORAL at 08:37

## 2024-09-19 RX ADMIN — GALANTAMINE HYDROBROMIDE 8 MG: 8 CAPSULE, EXTENDED RELEASE ORAL at 09:36

## 2024-09-19 RX ADMIN — LEVOTHYROXINE SODIUM 75 MCG: 25 TABLET ORAL at 05:39

## 2024-09-19 RX ADMIN — CLONAZEPAM 1 MG: 1 TABLET ORAL at 01:01

## 2024-09-19 ASSESSMENT — PAIN DESCRIPTION - FREQUENCY
FREQUENCY: INTERMITTENT

## 2024-09-19 ASSESSMENT — PAIN DESCRIPTION - LOCATION
LOCATION: LEG
LOCATION: ABDOMEN
LOCATION_2: ABDOMEN
LOCATION: ABDOMEN;LEG
LOCATION: LEG

## 2024-09-19 ASSESSMENT — PAIN DESCRIPTION - ORIENTATION
ORIENTATION: RIGHT;LEFT

## 2024-09-19 ASSESSMENT — PAIN SCALES - GENERAL
PAINLEVEL_OUTOF10: 0
PAINLEVEL_OUTOF10: 10
PAINLEVEL_OUTOF10: 8
PAINLEVEL_OUTOF10: 8

## 2024-09-19 ASSESSMENT — PAIN DESCRIPTION - ONSET
ONSET: AWAKENED FROM SLEEP
ONSET: AWAKENED FROM SLEEP

## 2024-09-19 ASSESSMENT — PAIN SCALES - WONG BAKER
WONGBAKER_NUMERICALRESPONSE: HURTS A LITTLE BIT
WONGBAKER_NUMERICALRESPONSE: HURTS A LITTLE BIT
WONGBAKER_NUMERICALRESPONSE: NO HURT

## 2024-09-19 ASSESSMENT — PAIN DESCRIPTION - DESCRIPTORS
DESCRIPTORS: ACHING
DESCRIPTORS: SHARP
DESCRIPTORS: ACHING
DESCRIPTORS_2: SHARP

## 2024-09-19 ASSESSMENT — PAIN DESCRIPTION - PAIN TYPE
TYPE: ACUTE PAIN

## 2024-09-19 ASSESSMENT — PAIN DESCRIPTION - INTENSITY
RATING_2: 8
RATING_2: 0

## 2024-09-19 ASSESSMENT — PAIN - FUNCTIONAL ASSESSMENT
PAIN_FUNCTIONAL_ASSESSMENT: PREVENTS OR INTERFERES SOME ACTIVE ACTIVITIES AND ADLS
PAIN_FUNCTIONAL_ASSESSMENT: PREVENTS OR INTERFERES SOME ACTIVE ACTIVITIES AND ADLS
PAIN_FUNCTIONAL_ASSESSMENT: ACTIVITIES ARE NOT PREVENTED
PAIN_FUNCTIONAL_ASSESSMENT: PREVENTS OR INTERFERES SOME ACTIVE ACTIVITIES AND ADLS

## 2024-09-21 ENCOUNTER — APPOINTMENT (OUTPATIENT)
Dept: GENERAL RADIOLOGY | Age: 69
DRG: 563 | End: 2024-09-21
Payer: MEDICARE

## 2024-09-21 ENCOUNTER — HOSPITAL ENCOUNTER (INPATIENT)
Age: 69
LOS: 5 days | Discharge: NURSING, REHAB OR LONG-TERM CARE | DRG: 563 | End: 2024-09-26
Attending: STUDENT IN AN ORGANIZED HEALTH CARE EDUCATION/TRAINING PROGRAM | Admitting: FAMILY MEDICINE
Payer: MEDICARE

## 2024-09-21 DIAGNOSIS — R26.2 UNABLE TO AMBULATE: Primary | ICD-10-CM

## 2024-09-21 DIAGNOSIS — G89.29 OTHER CHRONIC PAIN: ICD-10-CM

## 2024-09-21 DIAGNOSIS — S92.404A CLOSED NONDISPLACED FRACTURE OF PHALANX OF RIGHT GREAT TOE, UNSPECIFIED PHALANX, INITIAL ENCOUNTER: ICD-10-CM

## 2024-09-21 DIAGNOSIS — F41.9 ANXIETY: ICD-10-CM

## 2024-09-21 DIAGNOSIS — S82.54XA CLOSED NONDISPLACED FRACTURE OF MEDIAL MALLEOLUS OF RIGHT TIBIA, INITIAL ENCOUNTER: ICD-10-CM

## 2024-09-21 DIAGNOSIS — Z78.9 DECREASED ACTIVITIES OF DAILY LIVING (ADL): ICD-10-CM

## 2024-09-21 DIAGNOSIS — D50.9 MICROCYTIC ANEMIA: ICD-10-CM

## 2024-09-21 PROBLEM — R53.81 PHYSICAL DECONDITIONING: Status: ACTIVE | Noted: 2024-09-21

## 2024-09-21 LAB
ALBUMIN SERPL-MCNC: 2.7 G/DL (ref 3.4–5)
ALBUMIN/GLOB SERPL: 0.9 {RATIO} (ref 1.1–2.2)
ALP SERPL-CCNC: 120 U/L (ref 40–129)
ALT SERPL-CCNC: 11 U/L (ref 10–40)
ANION GAP SERPL CALCULATED.3IONS-SCNC: 7 MMOL/L (ref 3–16)
AST SERPL-CCNC: 52 U/L (ref 15–37)
BASOPHILS # BLD: 0.1 K/UL (ref 0–0.2)
BASOPHILS NFR BLD: 0.9 %
BILIRUB SERPL-MCNC: 0.5 MG/DL (ref 0–1)
BUN SERPL-MCNC: 8 MG/DL (ref 7–20)
CALCIUM SERPL-MCNC: 8.7 MG/DL (ref 8.3–10.6)
CHLORIDE SERPL-SCNC: 103 MMOL/L (ref 99–110)
CO2 SERPL-SCNC: 25 MMOL/L (ref 21–32)
CREAT SERPL-MCNC: 0.7 MG/DL (ref 0.6–1.2)
DEPRECATED RDW RBC AUTO: 23.4 % (ref 12.4–15.4)
EOSINOPHIL # BLD: 0.1 K/UL (ref 0–0.6)
EOSINOPHIL NFR BLD: 1.7 %
GFR SERPLBLD CREATININE-BSD FMLA CKD-EPI: >90 ML/MIN/{1.73_M2}
GLUCOSE SERPL-MCNC: 90 MG/DL (ref 70–99)
HCT VFR BLD AUTO: 30.3 % (ref 36–48)
HGB BLD-MCNC: 9.2 G/DL (ref 12–16)
LYMPHOCYTES # BLD: 2.3 K/UL (ref 1–5.1)
LYMPHOCYTES NFR BLD: 32.6 %
MCH RBC QN AUTO: 22.4 PG (ref 26–34)
MCHC RBC AUTO-ENTMCNC: 30.2 G/DL (ref 31–36)
MCV RBC AUTO: 74.2 FL (ref 80–100)
MONOCYTES # BLD: 0.6 K/UL (ref 0–1.3)
MONOCYTES NFR BLD: 8.1 %
NEUTROPHILS # BLD: 4 K/UL (ref 1.7–7.7)
NEUTROPHILS NFR BLD: 56.7 %
PLATELET # BLD AUTO: 269 K/UL (ref 135–450)
PMV BLD AUTO: 7.1 FL (ref 5–10.5)
POTASSIUM SERPL-SCNC: 4.4 MMOL/L (ref 3.5–5.1)
PROT SERPL-MCNC: 5.8 G/DL (ref 6.4–8.2)
RBC # BLD AUTO: 4.09 M/UL (ref 4–5.2)
SODIUM SERPL-SCNC: 135 MMOL/L (ref 136–145)
WBC # BLD AUTO: 7.1 K/UL (ref 4–11)

## 2024-09-21 PROCEDURE — 73630 X-RAY EXAM OF FOOT: CPT

## 2024-09-21 PROCEDURE — 6370000000 HC RX 637 (ALT 250 FOR IP): Performed by: STUDENT IN AN ORGANIZED HEALTH CARE EDUCATION/TRAINING PROGRAM

## 2024-09-21 PROCEDURE — 85025 COMPLETE CBC W/AUTO DIFF WBC: CPT

## 2024-09-21 PROCEDURE — 80053 COMPREHEN METABOLIC PANEL: CPT

## 2024-09-21 PROCEDURE — 73610 X-RAY EXAM OF ANKLE: CPT

## 2024-09-21 PROCEDURE — 99285 EMERGENCY DEPT VISIT HI MDM: CPT

## 2024-09-21 PROCEDURE — 1200000000 HC SEMI PRIVATE

## 2024-09-21 RX ORDER — OXYCODONE HCL 10 MG/1
10 TABLET, FILM COATED, EXTENDED RELEASE ORAL ONCE
Status: COMPLETED | OUTPATIENT
Start: 2024-09-21 | End: 2024-09-21

## 2024-09-21 RX ORDER — SODIUM CHLORIDE 0.9 % (FLUSH) 0.9 %
5-40 SYRINGE (ML) INJECTION EVERY 12 HOURS SCHEDULED
Status: DISCONTINUED | OUTPATIENT
Start: 2024-09-21 | End: 2024-09-26 | Stop reason: HOSPADM

## 2024-09-21 RX ORDER — METOPROLOL SUCCINATE 25 MG/1
12.5 TABLET, EXTENDED RELEASE ORAL 2 TIMES DAILY
Status: DISCONTINUED | OUTPATIENT
Start: 2024-09-21 | End: 2024-09-26 | Stop reason: HOSPADM

## 2024-09-21 RX ORDER — POLYETHYLENE GLYCOL 3350 17 G/17G
17 POWDER, FOR SOLUTION ORAL DAILY PRN
Status: DISCONTINUED | OUTPATIENT
Start: 2024-09-21 | End: 2024-09-26 | Stop reason: HOSPADM

## 2024-09-21 RX ORDER — SODIUM CHLORIDE 9 MG/ML
INJECTION, SOLUTION INTRAVENOUS PRN
Status: DISCONTINUED | OUTPATIENT
Start: 2024-09-21 | End: 2024-09-26 | Stop reason: HOSPADM

## 2024-09-21 RX ORDER — ROSUVASTATIN CALCIUM 10 MG/1
10 TABLET, COATED ORAL DAILY
Status: DISCONTINUED | OUTPATIENT
Start: 2024-09-22 | End: 2024-09-26 | Stop reason: HOSPADM

## 2024-09-21 RX ORDER — ESCITALOPRAM OXALATE 10 MG/1
10 TABLET ORAL DAILY
Status: DISCONTINUED | OUTPATIENT
Start: 2024-09-22 | End: 2024-09-26 | Stop reason: HOSPADM

## 2024-09-21 RX ORDER — ENOXAPARIN SODIUM 100 MG/ML
40 INJECTION SUBCUTANEOUS DAILY
Status: DISCONTINUED | OUTPATIENT
Start: 2024-09-22 | End: 2024-09-26 | Stop reason: HOSPADM

## 2024-09-21 RX ORDER — ONDANSETRON 4 MG/1
4 TABLET, ORALLY DISINTEGRATING ORAL EVERY 8 HOURS PRN
Status: DISCONTINUED | OUTPATIENT
Start: 2024-09-21 | End: 2024-09-26 | Stop reason: HOSPADM

## 2024-09-21 RX ORDER — SODIUM CHLORIDE 0.9 % (FLUSH) 0.9 %
5-40 SYRINGE (ML) INJECTION PRN
Status: DISCONTINUED | OUTPATIENT
Start: 2024-09-21 | End: 2024-09-26 | Stop reason: HOSPADM

## 2024-09-21 RX ORDER — FLUTICASONE PROPIONATE 50 MCG
1 SPRAY, SUSPENSION (ML) NASAL PRN
Status: DISCONTINUED | OUTPATIENT
Start: 2024-09-21 | End: 2024-09-26 | Stop reason: HOSPADM

## 2024-09-21 RX ORDER — OXYCODONE HYDROCHLORIDE 5 MG/1
5 TABLET ORAL EVERY 6 HOURS PRN
Status: DISCONTINUED | OUTPATIENT
Start: 2024-09-21 | End: 2024-09-23

## 2024-09-21 RX ORDER — CLONAZEPAM 1 MG/1
1 TABLET ORAL 2 TIMES DAILY PRN
Status: DISCONTINUED | OUTPATIENT
Start: 2024-09-21 | End: 2024-09-26 | Stop reason: HOSPADM

## 2024-09-21 RX ORDER — MAGNESIUM SULFATE IN WATER 40 MG/ML
2000 INJECTION, SOLUTION INTRAVENOUS PRN
Status: DISCONTINUED | OUTPATIENT
Start: 2024-09-21 | End: 2024-09-26 | Stop reason: HOSPADM

## 2024-09-21 RX ORDER — PANTOPRAZOLE SODIUM 40 MG/1
40 TABLET, DELAYED RELEASE ORAL DAILY
Status: DISCONTINUED | OUTPATIENT
Start: 2024-09-22 | End: 2024-09-23

## 2024-09-21 RX ORDER — ONDANSETRON 2 MG/ML
4 INJECTION INTRAMUSCULAR; INTRAVENOUS EVERY 6 HOURS PRN
Status: DISCONTINUED | OUTPATIENT
Start: 2024-09-21 | End: 2024-09-26 | Stop reason: HOSPADM

## 2024-09-21 RX ORDER — GABAPENTIN 100 MG/1
100 CAPSULE ORAL 2 TIMES DAILY PRN
Status: DISCONTINUED | OUTPATIENT
Start: 2024-09-21 | End: 2024-09-26 | Stop reason: HOSPADM

## 2024-09-21 RX ORDER — POTASSIUM CHLORIDE 1500 MG/1
40 TABLET, EXTENDED RELEASE ORAL PRN
Status: DISCONTINUED | OUTPATIENT
Start: 2024-09-21 | End: 2024-09-26 | Stop reason: HOSPADM

## 2024-09-21 RX ORDER — GALANTAMINE HYDROBROMIDE 8 MG/1
8 CAPSULE, EXTENDED RELEASE ORAL DAILY
Status: DISCONTINUED | OUTPATIENT
Start: 2024-09-22 | End: 2024-09-26 | Stop reason: HOSPADM

## 2024-09-21 RX ORDER — POTASSIUM CHLORIDE 7.45 MG/ML
10 INJECTION INTRAVENOUS PRN
Status: DISCONTINUED | OUTPATIENT
Start: 2024-09-21 | End: 2024-09-26 | Stop reason: HOSPADM

## 2024-09-21 RX ADMIN — OXYCODONE HYDROCHLORIDE 10 MG: 10 TABLET, FILM COATED, EXTENDED RELEASE ORAL at 19:38

## 2024-09-21 ASSESSMENT — PAIN SCALES - WONG BAKER: WONGBAKER_NUMERICALRESPONSE: NO HURT

## 2024-09-21 ASSESSMENT — PAIN DESCRIPTION - ORIENTATION: ORIENTATION: RIGHT;LEFT

## 2024-09-21 ASSESSMENT — PAIN - FUNCTIONAL ASSESSMENT: PAIN_FUNCTIONAL_ASSESSMENT: 0-10

## 2024-09-21 ASSESSMENT — LIFESTYLE VARIABLES
HOW OFTEN DO YOU HAVE A DRINK CONTAINING ALCOHOL: NEVER
HOW MANY STANDARD DRINKS CONTAINING ALCOHOL DO YOU HAVE ON A TYPICAL DAY: PATIENT DOES NOT DRINK
HOW MANY STANDARD DRINKS CONTAINING ALCOHOL DO YOU HAVE ON A TYPICAL DAY: PATIENT DOES NOT DRINK
HOW OFTEN DO YOU HAVE A DRINK CONTAINING ALCOHOL: NEVER

## 2024-09-21 ASSESSMENT — PAIN SCALES - GENERAL: PAINLEVEL_OUTOF10: 9

## 2024-09-21 NOTE — ED TRIAGE NOTES
Patient presents to the ED from home with c/o ULQ abd pain, back pain, bilateral ankle pain, headache. Patient missed her pain management appointment on 9/13 because she was in the hospital. Reports not being on her strict pain plan.

## 2024-09-21 NOTE — ED PROVIDER NOTES
Emergency Department Provider Note  Location: [address]-SURGICAL  9/21/2024     Patient Identification  Cordelia Pillai is a 68 y.o. female    Chief Complaint  Pain      Mode of Arrival  EMS    HPI  (History provided by patient)  This is a 68 y.o. female with a PMH significant for GERD, Obesity, NATHAN, HTN, recent right medial malleolus and right great toe fracture  presented today for 9 bilateral ankle and leg pain.  Patient reports that she fractured her right lower extremity on September 14.  During that time she was admitted for an AINSLEY.  She reports increasing pain.  She is currently followed by pain management but does not have any pain medication due to her being recently hospitalized.  She missed her appointment due to this.  She is currently out of her pain medicine.  She denies any new fall or injury.  She denies any fever.  Denies any chest pain or shortness of breath.  She reports that she is urinating more frequently but denies any dysuria or hematuria.  She denies any abdominal pain.  She reports that she currently cannot ambulate due to her injuries. Daughter reports she is unable to care for patient at home. Patient has been unable to ambulate or care for self. Requesting SNF placement     ROS  Review of Systems   Musculoskeletal:  Positive for arthralgias and myalgias.   All other systems reviewed and are negative.        I have reviewed the following nursing documentation:  Allergies:   Allergies   Allergen Reactions    Meloxicam Itching and Other (See Comments)     Tolerates Ketorolac/Bromfenac ophthalmic drops.    Acetaminophen Other (See Comments)     Unable to take due to liver  Unable to take due to liver  Unable to take due to liver  Unable to take due to liver      Benzoin      Blisters from adhesive compound under steri strips after knee VAS      Lyrica [Pregabalin]      Keeps patient awake    Quetiapine Fumarate      Other reaction(s): confused, dizzy  Other reaction(s):  for clarification.     Rosaura Araujo MD   Acute Care [facility]        Rosaura Araujo MD  09/22/24 0003

## 2024-09-22 PROBLEM — S92.404A CLOSED NONDISPLACED FRACTURE OF RIGHT GREAT TOE: Status: ACTIVE | Noted: 2024-09-22

## 2024-09-22 PROBLEM — R26.2 UNABLE TO AMBULATE: Status: ACTIVE | Noted: 2024-09-22

## 2024-09-22 PROBLEM — S82.54XA CLOSED NONDISPLACED FRACTURE OF MEDIAL MALLEOLUS OF RIGHT TIBIA: Status: ACTIVE | Noted: 2024-09-22

## 2024-09-22 LAB
AMORPH SED URNS QL MICRO: ABNORMAL /HPF
ANION GAP SERPL CALCULATED.3IONS-SCNC: 9 MMOL/L (ref 3–16)
BACTERIA URNS QL MICRO: ABNORMAL /HPF
BASOPHILS # BLD: 0 K/UL (ref 0–0.2)
BASOPHILS NFR BLD: 0.7 %
BILIRUB UR QL STRIP.AUTO: ABNORMAL
BUN SERPL-MCNC: 9 MG/DL (ref 7–20)
CALCIUM SERPL-MCNC: 8.5 MG/DL (ref 8.3–10.6)
CHLORIDE SERPL-SCNC: 105 MMOL/L (ref 99–110)
CLARITY UR: ABNORMAL
CO2 SERPL-SCNC: 27 MMOL/L (ref 21–32)
COLOR UR: YELLOW
CREAT SERPL-MCNC: 0.8 MG/DL (ref 0.6–1.2)
DEPRECATED RDW RBC AUTO: 22.2 % (ref 12.4–15.4)
EOSINOPHIL # BLD: 0.2 K/UL (ref 0–0.6)
EOSINOPHIL NFR BLD: 2.4 %
EPI CELLS #/AREA URNS HPF: ABNORMAL /HPF (ref 0–5)
GFR SERPLBLD CREATININE-BSD FMLA CKD-EPI: 79 ML/MIN/{1.73_M2}
GLUCOSE SERPL-MCNC: 104 MG/DL (ref 70–99)
GLUCOSE UR STRIP.AUTO-MCNC: NEGATIVE MG/DL
HCT VFR BLD AUTO: 27.1 % (ref 36–48)
HGB BLD-MCNC: 8.6 G/DL (ref 12–16)
HGB UR QL STRIP.AUTO: ABNORMAL
KETONES UR STRIP.AUTO-MCNC: NEGATIVE MG/DL
LEUKOCYTE ESTERASE UR QL STRIP.AUTO: NEGATIVE
LYMPHOCYTES # BLD: 2.3 K/UL (ref 1–5.1)
LYMPHOCYTES NFR BLD: 36 %
MCH RBC QN AUTO: 22.8 PG (ref 26–34)
MCHC RBC AUTO-ENTMCNC: 31.6 G/DL (ref 31–36)
MCV RBC AUTO: 72.4 FL (ref 80–100)
MONOCYTES # BLD: 0.6 K/UL (ref 0–1.3)
MONOCYTES NFR BLD: 8.9 %
MUCOUS THREADS #/AREA URNS LPF: ABNORMAL /LPF
NEUTROPHILS # BLD: 3.3 K/UL (ref 1.7–7.7)
NEUTROPHILS NFR BLD: 52 %
NITRITE UR QL STRIP.AUTO: NEGATIVE
PH UR STRIP.AUTO: 6 [PH] (ref 5–8)
PLATELET # BLD AUTO: 232 K/UL (ref 135–450)
PMV BLD AUTO: 6.9 FL (ref 5–10.5)
POTASSIUM SERPL-SCNC: 3.4 MMOL/L (ref 3.5–5.1)
PROT UR STRIP.AUTO-MCNC: ABNORMAL MG/DL
RBC # BLD AUTO: 3.75 M/UL (ref 4–5.2)
RBC #/AREA URNS HPF: ABNORMAL /HPF (ref 0–4)
SODIUM SERPL-SCNC: 141 MMOL/L (ref 136–145)
SP GR UR STRIP.AUTO: >=1.03 (ref 1–1.03)
UA COMPLETE W REFLEX CULTURE PNL UR: YES
UA DIPSTICK W REFLEX MICRO PNL UR: YES
URN SPEC COLLECT METH UR: ABNORMAL
UROBILINOGEN UR STRIP-ACNC: 0.2 E.U./DL
WBC # BLD AUTO: 6.4 K/UL (ref 4–11)
WBC #/AREA URNS HPF: ABNORMAL /HPF (ref 0–5)

## 2024-09-22 PROCEDURE — 87086 URINE CULTURE/COLONY COUNT: CPT

## 2024-09-22 PROCEDURE — 1200000000 HC SEMI PRIVATE

## 2024-09-22 PROCEDURE — 80048 BASIC METABOLIC PNL TOTAL CA: CPT

## 2024-09-22 PROCEDURE — 6360000002 HC RX W HCPCS: Performed by: FAMILY MEDICINE

## 2024-09-22 PROCEDURE — 2580000003 HC RX 258: Performed by: FAMILY MEDICINE

## 2024-09-22 PROCEDURE — 85025 COMPLETE CBC W/AUTO DIFF WBC: CPT

## 2024-09-22 PROCEDURE — 99233 SBSQ HOSP IP/OBS HIGH 50: CPT | Performed by: PHYSICIAN ASSISTANT

## 2024-09-22 PROCEDURE — 99232 SBSQ HOSP IP/OBS MODERATE 35: CPT | Performed by: INTERNAL MEDICINE

## 2024-09-22 PROCEDURE — 81001 URINALYSIS AUTO W/SCOPE: CPT

## 2024-09-22 PROCEDURE — 6370000000 HC RX 637 (ALT 250 FOR IP): Performed by: FAMILY MEDICINE

## 2024-09-22 PROCEDURE — 36415 COLL VENOUS BLD VENIPUNCTURE: CPT

## 2024-09-22 RX ADMIN — OXYCODONE 5 MG: 5 TABLET ORAL at 05:40

## 2024-09-22 RX ADMIN — ESCITALOPRAM OXALATE 10 MG: 10 TABLET ORAL at 09:32

## 2024-09-22 RX ADMIN — OXYCODONE 5 MG: 5 TABLET ORAL at 19:41

## 2024-09-22 RX ADMIN — MIDODRINE HYDROCHLORIDE 12.5 MG: 10 TABLET ORAL at 09:32

## 2024-09-22 RX ADMIN — MIDODRINE HYDROCHLORIDE 12.5 MG: 10 TABLET ORAL at 11:56

## 2024-09-22 RX ADMIN — LEVOTHYROXINE SODIUM 75 MCG: 25 TABLET ORAL at 05:40

## 2024-09-22 RX ADMIN — PANTOPRAZOLE SODIUM 40 MG: 40 TABLET, DELAYED RELEASE ORAL at 09:32

## 2024-09-22 RX ADMIN — MIDODRINE HYDROCHLORIDE 12.5 MG: 10 TABLET ORAL at 17:07

## 2024-09-22 RX ADMIN — GALANTAMINE HYDROBROMIDE 8 MG: 8 CAPSULE, EXTENDED RELEASE ORAL at 09:36

## 2024-09-22 RX ADMIN — Medication 10 ML: at 09:32

## 2024-09-22 RX ADMIN — ROSUVASTATIN CALCIUM 10 MG: 10 TABLET, FILM COATED ORAL at 09:32

## 2024-09-22 RX ADMIN — METOPROLOL SUCCINATE 12.5 MG: 25 TABLET, EXTENDED RELEASE ORAL at 19:41

## 2024-09-22 RX ADMIN — OXYCODONE 5 MG: 5 TABLET ORAL at 11:59

## 2024-09-22 RX ADMIN — ENOXAPARIN SODIUM 40 MG: 100 INJECTION SUBCUTANEOUS at 09:33

## 2024-09-22 RX ADMIN — METOPROLOL SUCCINATE 12.5 MG: 25 TABLET, EXTENDED RELEASE ORAL at 09:32

## 2024-09-22 ASSESSMENT — PAIN DESCRIPTION - DESCRIPTORS
DESCRIPTORS: ACHING

## 2024-09-22 ASSESSMENT — PAIN DESCRIPTION - FREQUENCY
FREQUENCY: INTERMITTENT
FREQUENCY: INTERMITTENT

## 2024-09-22 ASSESSMENT — PAIN SCALES - GENERAL
PAINLEVEL_OUTOF10: 8
PAINLEVEL_OUTOF10: 3
PAINLEVEL_OUTOF10: 9
PAINLEVEL_OUTOF10: 7

## 2024-09-22 ASSESSMENT — PAIN SCALES - WONG BAKER
WONGBAKER_NUMERICALRESPONSE: NO HURT

## 2024-09-22 ASSESSMENT — PAIN DESCRIPTION - ORIENTATION
ORIENTATION: LEFT;UPPER
ORIENTATION: LEFT;RIGHT
ORIENTATION: LEFT;RIGHT
ORIENTATION: RIGHT;LEFT

## 2024-09-22 ASSESSMENT — PAIN DESCRIPTION - ONSET
ONSET: ON-GOING
ONSET: ON-GOING

## 2024-09-22 ASSESSMENT — PAIN - FUNCTIONAL ASSESSMENT
PAIN_FUNCTIONAL_ASSESSMENT: ACTIVITIES ARE NOT PREVENTED

## 2024-09-22 ASSESSMENT — PAIN DESCRIPTION - LOCATION
LOCATION: LEG
LOCATION: FOOT

## 2024-09-22 ASSESSMENT — PAIN DESCRIPTION - PAIN TYPE
TYPE: ACUTE PAIN
TYPE: ACUTE PAIN

## 2024-09-22 NOTE — H&P
08/15/2019    LEFT KNEE GENICULAR NERVE BLOCK SITE CONFIRMED BY FLUOROSCOPY performed by Raman Silveira MD at [facility]    EYE SURGERY Right 04/05/2018    cataract removal    FOOT SURGERY Right 12/06/2012    arthroplasty    FOOT SURGERY Left 04/30/2015    FOOT SURGERY Left 04/30/2015    GASTRIC BYPASS SURGERY  1998,2010    HYSTERECTOMY (CERVIX STATUS UNKNOWN)      KNEE ARTHROSCOPY Left 10/03/2014    part. medial & lateral meniscectomy, synovectomy plica exc    KNEE ARTHROSCOPY Right 10/13/2015    partial medial meniscectomy, chondroplasty, partial lateral meniscectomy    NECK SURGERY      OTHER SURGICAL HISTORY  04/2013    removal spinal cord stimulater    OTHER SURGICAL HISTORY      bladder stimulator    OTHER SURGICAL HISTORY  08/30/2017    left patellofemoral arthroplasty    OTHER SURGICAL HISTORY  05/09/2018    convert left patellofemoral arthroplasty to left total knee replacement    OVARY REMOVAL      PACEMAKER INSERTION  2011    Dr Mahoney/checked last week/told has 12 more yrs for this one/set at 59    SKIN BIOPSY      abdomen    SPINE SURGERY      stimulator    THROAT SURGERY      polyps removed    TONSILLECTOMY      UPPER GASTROINTESTINAL ENDOSCOPY      UPPER GASTROINTESTINAL ENDOSCOPY  05/03/2017       Medications Prior to Admission:   Prior to Admission medications    Medication Sig Start Date End Date Taking? Authorizing Provider   clonazePAM (KLONOPIN) 1 MG tablet Take 1 tablet by mouth 2 times daily as needed for Anxiety for up to 3 days. Max Daily Amount: 2 mg 9/19/24 9/22/24  Che Cha DO   gabapentin 50 MG TABS Take 100 mg by mouth 2 times daily as needed (pain) for up to 30 days. 9/19/24 10/19/24  Che Cha DO   levoFLOXacin (LEVAQUIN) 500 MG tablet Take 1 tablet by mouth nightly for 3 doses 9/19/24 9/22/24  Che Cha DO   metoprolol succinate (TOPROL XL) 25 MG extended release tablet Take 0.5 tablets by mouth in the morning and at bedtime 9/19/24   Che Cha DO   oxyCODONE  (ROXICODONE) 5 MG immediate release tablet Take 1 tablet by mouth every 6 hours as needed for Pain for up to 3 days. Max Daily Amount: 20 mg 9/19/24 9/22/24  Che Cha DO   trospium (SANCTURA) 20 MG tablet Take 1 tablet by mouth nightly 9/19/24   Che Cha DO   enoxaparin (LOVENOX) 40 MG/0.4ML Inject 0.4 mLs into the skin daily for 14 days 9/20/24 10/4/24  Che Cha DO   midodrine (PROAMATINE) 2.5 MG tablet Take 1 tablet by mouth 3 times daily Along with 10mg tid = total dose is 12.5mg tid 6/20/24   Tayla Hays MD   escitalopram (LEXAPRO) 10 MG tablet Take 1 tablet by mouth daily 7/26/24   Tayla Hays MD   galantamine (RAZADYNE ER) 8 MG extended release capsule Take 1 capsule by mouth daily 7/28/24   Tayla Hays MD   fluticasone (FLONASE) 50 MCG/ACT nasal spray 1 spray by Nasal route as needed for Allergies 5/22/23   Margarito Faustin MD   famotidine (PEPCID) 40 MG tablet Take 1 tablet by mouth daily    Tayla Hays MD   alendronate (FOSAMAX) 35 MG tablet TAKE 1 TABLET BY MOUTH ONE TIME A WEEK IN THE MORNING AT LEAST 30 MIN BEFORE FIRST FOOD, BEVERAGE, OR MED OF DAY 3/15/22   Tayla Hays MD   midodrine (PROAMATINE) 10 MG tablet 1 tablet 3 times daily Along with 2.5mg = total dose is 12.5mg tid 1/27/22   Tayla Hays MD   rosuvastatin (CRESTOR) 10 MG tablet TAKE 1 TABLET BY MOUTH EVERY DAY 1/11/22   aTyla Hays MD   ondansetron (ZOFRAN-ODT) 8 MG TBDP disintegrating tablet Take 1 tablet by mouth every 8 hours as needed    Tayla Hays MD   levothyroxine (SYNTHROID) 75 MCG tablet Take 1 tablet by mouth Daily    Tayla Hays MD   MONOJECT 60CC SYRINGE CATH TIP 60 ML MISC by Other route in the morning, at noon, and at bedtime Wound/hole on abd, uses to keep clean 3/7/18   Tayla Hays MD   pantoprazole (PROTONIX) 40 MG tablet TAKE 1 TABLET BY MOUTH DAILY 2/16/17   ProviderTayla MD       Labs: Personally

## 2024-09-22 NOTE — ACP (ADVANCE CARE PLANNING)
Advance Care Planning     General Advance Care Planning (ACP) Conversation    Date of Conversation: 9/22/2024  Conducted with: Patient with Decision Making Capacity  Other persons present: None    Healthcare Decision Maker:   Primary Decision Maker: Kelsie Pillai - Child - [phone]    Supplemental (Other) Decision Maker: no,one - Unknown       Content/Action Overview:  Has ACP document(s) on file - reflects the patient's care preferences  Reviewed DNR/DNI and patient elects Full Code (Attempt Resuscitation)        Length of Voluntary ACP Conversation in minutes:  <16 minutes (Non-Billable)    Pieter oRy RN

## 2024-09-22 NOTE — CARE COORDINATION
Case Management Assessment  Initial Evaluation    Date/Time of Evaluation: 9/22/2024 8:46 AM  Assessment Completed by: Pieter Roy RN    If patient is discharged prior to next notation, then this note serves as note for discharge by case management.    Patient Name: Cordelia Pillai                   YOB: 1955  Diagnosis: Physical deconditioning [R53.81]  Unable to ambulate [R26.2]  Closed nondisplaced fracture of medial malleolus of right tibia, initial encounter [S82.54XA]  Decreased activities of daily living (ADL) [Z78.9]  Closed nondisplaced fracture of phalanx of right great toe, unspecified phalanx, initial encounter [S92.404A]                   Date / Time: 9/21/2024  6:55 PM    Patient Admission Status: Inpatient   Readmission Risk (Low < 19, Mod (19-27), High > 27): Readmission Risk Score: 24.4    Current PCP: Connie Franco MD  PCP verified by CM? Yes (Pt sees Stefani Abdalla)    Chart Reviewed: Yes      History Provided by: Patient  Patient Orientation: Alert and Oriented    Patient Cognition: Alert    Hospitalization in the last 30 days (Readmission):  Yes    If yes, Readmission Assessment in CM Navigator will be completed.    Advance Directives:      Code Status: Full Code   Patient's Primary Decision Maker is: Named in Scanned ACP Document    Primary Decision Maker: Kelsie Pillai - Child - [phone]    Supplemental (Other) Decision Maker: no,one - Unknown    Discharge Planning:    Patient lives with: Family Members Type of Home: House  Primary Care Giver: Self  Patient Support Systems include: Family Members, Children   Current Financial resources: Medicare  Current community resources: None  Current services prior to admission: Durable Medical Equipment            Current DME: Bedside Commode, Shower Chair, Walker, Wheelchair, Cane            Type of Home Care services:  OT, PT, Nursing Services    ADLS  Prior functional level: Assistance with the following:, Bathing, Dressing,  Toileting, Cooking, Housework, Shopping, Mobility  Current functional level: Assistance with the following:, Bathing, Dressing, Toileting, Cooking, Housework, Shopping, Mobility    PT AM-PAC:   /24  OT AM-PAC:   /24    Family can provide assistance at DC: Yes  Would you like Case Management to discuss the discharge plan with any other family members/significant others, and if so, who? No  Plans to Return to Present Housing: Yes  Other Identified Issues/Barriers to RETURNING to current housing: na    Potential Assistance needed at discharge: Home Care            Potential DME:    Patient expects to discharge to: House  Plan for transportation at discharge:      Financial    Payor: [employer] MEDICARE / Plan: [employer]ET[employer] MEDICARE ADVANTAGE HMO / Product Type: Medicare /     Does insurance require precert for SNF: Yes    Potential assistance Purchasing Medications: No  Meds-to-Beds request:        [id number] - [place], [place] - [address] - P [phone] - F [phone]  [address] [place] [address]   [facility] [address]  Phone: [phone] Fax: [phone]      Notes:    Factors facilitating achievement of predicted outcomes: Family support, Caregiver support, Cooperative, and Pleasant    Barriers to discharge: physical deconditioning    Additional Case Management Notes: Reviewed chart. Met with pt at bedside. Role of cm explained. Lives alone. Pt granddaughter has been staying with pt 24/7. Pt son and daughter have been taking turns checking in on pt. Pt was DC recently from here to [facility]. Pt states she stayed at [facility] for one night and asked to go home due to \"they were mean to me\". Pt unable to care for herself safely at home due to freq falls and increased weakness. Pt requesting HHC thru [facility]. Referral called and made to [facility]. Accepted pt. Pt also would like Gina Shore removed from her AD and add Kelsie Pillai. Consult made to Spiritual Care. Will follow.       The Plan for Transition of Care is related to

## 2024-09-22 NOTE — PLAN OF CARE
Problem: Pain  Goal: Verbalizes/displays adequate comfort level or baseline comfort level  Outcome: Progressing  Flowsheets (Taken 9/22/2024 0915)  Verbalizes/displays adequate comfort level or baseline comfort level: Encourage patient to monitor pain and request assistance

## 2024-09-22 NOTE — PROGRESS NOTES
[facility] Progress Note    Daily Progress Note for 2024 7:36 AM 0204/0204-01  Cordelia Pillai : 1955 Age: 68 y.o. Sex: female  Length of Stay:  1    Interval History:      CC: F/U Pain    Subjective:       Pt says she is doing ok. Says family is on their way over with boot.      Objective:     Vitals:    24 2000 24 2100 24 2245 24 0540   BP: 135/79 (!) 119/51 113/72    Pulse:   70    Resp:   18 18   Temp:   97.9 °F (36.6 °C)    TempSrc:   Oral    SpO2: 100% 98% 99%    Weight:   78.2 kg (172 lb 6.4 oz)    Height:   1.575 m (5' 2\")         No intake or output data in the 24 hours ending 24 0736  Body mass index is 31.53 kg/m².    Physical Exam:  General: Cooperative, pleasant/Ill appearing, on  Nasal cannula  HEENT:  Head: normocephalic,atraumatic, anicteric sclera, clear conjunctiva  Neck: Normal size, Jugular venous pulsations: normal  Respiratory:unlabored breathing, clear to auscultation with no crackles, wheezes rhonchi  Heart: Regular rate and rhythm, S1, S2-normal, No murmurs  Abdomen: soft, nondistended, nontender, normoactive bowel sounds,  Neurological/Psych: Alert and oriented times three, no focal neurological deficits, Mood and affect appropriate.  Skin: No obvious rashes    Extremities:  no edema, Pedal pulses 2+ bilaterally    Scheduled Medications:  escitalopram, 10 mg, Daily  galantamine, 8 mg, Daily  levothyroxine, 75 mcg, Daily  metoprolol succinate, 12.5 mg, BID  midodrine, 12.5 mg, TID WC  pantoprazole, 40 mg, Daily  rosuvastatin, 10 mg, Daily  sodium chloride flush, 5-40 mL, 2 times per day  enoxaparin, 40 mg, Daily        PRN Medications:  clonazePAM, 1 mg, BID PRN  fluticasone, 1 spray, PRN  gabapentin, 100 mg, BID PRN  oxyCODONE, 5 mg, Q6H PRN  sodium chloride flush, 5-40 mL, PRN  sodium chloride, , PRN  potassium chloride, 40 mEq, PRN   Or  potassium alternative oral replacement, 40 mEq, PRN   Or  potassium chloride, 10 mEq,  3 VIEWS)   Final Result   Mild soft tissue swelling around the ankle and diffuse osteopenia with no   obvious ankle fracture or dislocation.      Probable mildly displaced fracture along the base of the 1st metatarsal bone.   Recommend correlating with the foot series obtained on the same day..             Lab Results   Component Value Date    LABA1C 5.7 08/13/2024      Body mass index is 31.53 kg/m².       Impression/Plan:      Recent admission for:    -Fracture of 1st metatarsal right foot  -Possible nondisplaced fracture involving the anterior aspect of distal tibia  -Possible nondisplaced medial malleolus fracture  -Also possible nondisplaced fracture of the Left lateral malleolus  -SNF was recommended but patient declined despite discussions, returned to ER as daughter not able to care for her  -PT/OT  -Ortho recs reviewed, she has boot on the right, and ankle brace on the left. Ice and elevation for swelling. Boot can be removed for hygiene, icing, skin rest.     Hx of gastric bypass- Santiago-en-Y  Recent SBO  Poor absorption  JEROME     Bipolar disorder  - resume home regimen, klonopin and Lexapro      Chronic pain  - resume home regimen - decreased this due to drowsiness     Hypothyroidism  - continue home regimen  - check TSH - elevated however pt says she has not been taking her meds, synthroid was resumed on admission    Chronic headaches  - follows with neurology      NATHAN    Dysuria, she believes she is getting UTI  Was on Levaquin prior    DVT Prophylaxis: Lovenox    Management discussed with RNKELSEY planning    Electronically signed by: Che Cha DO, 9/22/2024 7:36 AM

## 2024-09-22 NOTE — CONSULTS
abd, uses to keep clean 3/7/18   Provider, MD Tayla   pantoprazole (PROTONIX) 40 MG tablet TAKE 1 TABLET BY MOUTH DAILY 2/16/17   Provider, MD Tayla       Allergies:  Meloxicam, Acetaminophen, Benzoin, Lyrica [pregabalin], Quetiapine fumarate, Seroquel [quetiapine fumarate], Tamsulosin, Tizanidine, Adhesive tape, Balsam, Balsam peru-castor oil, Castor oil, Linaclotide, Other, Quetiapine, and Wound dressings    Social History:    Tobacco:  reports that she has never smoked. She has never used smokeless tobacco.   Alcohol:  reports no history of alcohol use.   Illicit Drug: No  Family History:       Problem Relation Age of Onset    Heart Disease Father     Cancer Sister         multiple organs    Other Sister     Asthma Neg Hx     Diabetes Neg Hx     Emphysema Neg Hx     Heart Failure Neg Hx     Hypertension Neg Hx        REVIEW OF SYSTEMS:    CONSTITUTIONAL:  negative  MUSCULOSKELETAL:  positive for  pain  All other systems reviewed and negative    PHYSICAL EXAM:    awake, alert, cooperative, no apparent distress, and appears stated age  MUSCULOSKELETAL:  there is no redness, warmth, or swelling of the joints  full range of motion noted  motor strength is 5 out of 5 all extremities bilaterally  tone is normal  with exception of the right foot/ankle: no open wounds.  Swelling remains in the dorsal foot and there is continued pain with palpation of the great toe and forefoot today.  No obvious deformity and mild ecchymosis.  Sensation intact.  Pulses intact.  Moving all toes and able to DF and PF with some pain.  No calf tenderness.    Left foot and ankle with minimal swelling and some tenderness both lateral and medial ankle regions.  No foot tenderness.  Sensation intact to touch.  Moving toes.  Good distal pulses.  No deformity.    DATA:    CBC:   Recent Labs     09/21/24 1941 09/22/24  0531   WBC 7.1 6.4   HGB 9.2* 8.6*    232     BMP:    Recent Labs     09/21/24 1941 09/22/24  0531   *  141   K 4.4 3.4*    105   CO2 25 27   BUN 8 9   CREATININE 0.7 0.8   GLUCOSE 90 104*     INR: No results for input(s): \"INR\" in the last 72 hours.    Radiology:   XR FOOT RIGHT (MIN 3 VIEWS)   Final Result   Right foot:      1.  Stable appearance of 1st metatarsal base fracture without significant   interval healing.      2.  Slight interval increase in conspicuity of 4th proximal phalangeal   fracture, possibly acute on chronic in nature.  2nd and 3rd proximal   phalanges appear unchanged.      Left ankle:      1.  No acute fracture or dislocation.      2.  Chronic distal fibular shaft fracture.         XR ANKLE LEFT (MIN 3 VIEWS)   Final Result   Right foot:      1.  Stable appearance of 1st metatarsal base fracture without significant   interval healing.      2.  Slight interval increase in conspicuity of 4th proximal phalangeal   fracture, possibly acute on chronic in nature.  2nd and 3rd proximal   phalanges appear unchanged.      Left ankle:      1.  No acute fracture or dislocation.      2.  Chronic distal fibular shaft fracture.         XR ANKLE RIGHT (MIN 3 VIEWS)   Final Result   Mild soft tissue swelling around the ankle and diffuse osteopenia with no   obvious ankle fracture or dislocation.      Probable mildly displaced fracture along the base of the 1st metatarsal bone.   Recommend correlating with the foot series obtained on the same day..                IMPRESSION/RECOMMENDATIONS:    Assessment:    Right minimally displaced great toe metatarsal base fracture  Right nondisplaced medial malleolus fracture    Plan:  1) Would continue the initial plan outlined by Dr Garza.  Can f/u as recommended.  Told patient to bring in boot for work with PT.  Continue ice and elevation for swelling.  Can have boot off for hygiene and when in bed for ice/elevation but should be on whenever else.  She can be heel WB on the right and WBAT on the left.  No plans for surgery.          Thank you for the opportunity to

## 2024-09-22 NOTE — CARE COORDINATION
Spoke with Dr. Cha. Recommendation SNF due to pt verbalizing her daughter is unable to help care for her at home now. Met with pt at bedside to discuss DC plans. Pt agreeable to SNF. Requested [facility]. Pt states \"I will give them one week of my time\". Referral called to [facility]. Awaiting decision. Updated [facility]. Plan to keep on list just in care pt changes her mind about SNF. Will cont to follow.     8910- call from Sowmya at [facility]. Accepted pt. Precert started.

## 2024-09-23 LAB
ANION GAP SERPL CALCULATED.3IONS-SCNC: 8 MMOL/L (ref 3–16)
BACTERIA UR CULT: ABNORMAL
BACTERIA UR CULT: ABNORMAL
BASOPHILS # BLD: 0.1 K/UL (ref 0–0.2)
BASOPHILS NFR BLD: 0.9 %
BUN SERPL-MCNC: 13 MG/DL (ref 7–20)
CALCIUM SERPL-MCNC: 7.9 MG/DL (ref 8.3–10.6)
CHLORIDE SERPL-SCNC: 103 MMOL/L (ref 99–110)
CO2 SERPL-SCNC: 26 MMOL/L (ref 21–32)
CREAT SERPL-MCNC: 0.6 MG/DL (ref 0.6–1.2)
DEPRECATED RDW RBC AUTO: 23.5 % (ref 12.4–15.4)
EOSINOPHIL # BLD: 0.2 K/UL (ref 0–0.6)
EOSINOPHIL NFR BLD: 2.3 %
GFR SERPLBLD CREATININE-BSD FMLA CKD-EPI: >90 ML/MIN/{1.73_M2}
GLUCOSE SERPL-MCNC: 95 MG/DL (ref 70–99)
HCT VFR BLD AUTO: 28.8 % (ref 36–48)
HGB BLD-MCNC: 8.7 G/DL (ref 12–16)
LYMPHOCYTES # BLD: 2.9 K/UL (ref 1–5.1)
LYMPHOCYTES NFR BLD: 43.4 %
MCH RBC QN AUTO: 22.6 PG (ref 26–34)
MCHC RBC AUTO-ENTMCNC: 30.2 G/DL (ref 31–36)
MCV RBC AUTO: 74.8 FL (ref 80–100)
MONOCYTES # BLD: 0.7 K/UL (ref 0–1.3)
MONOCYTES NFR BLD: 10.3 %
NEUTROPHILS # BLD: 2.8 K/UL (ref 1.7–7.7)
NEUTROPHILS NFR BLD: 43.1 %
ORGANISM: ABNORMAL
PLATELET # BLD AUTO: 208 K/UL (ref 135–450)
PMV BLD AUTO: 8.3 FL (ref 5–10.5)
POTASSIUM SERPL-SCNC: 3.5 MMOL/L (ref 3.5–5.1)
RBC # BLD AUTO: 3.85 M/UL (ref 4–5.2)
SODIUM SERPL-SCNC: 137 MMOL/L (ref 136–145)
WBC # BLD AUTO: 6.6 K/UL (ref 4–11)

## 2024-09-23 PROCEDURE — 97535 SELF CARE MNGMENT TRAINING: CPT

## 2024-09-23 PROCEDURE — 6370000000 HC RX 637 (ALT 250 FOR IP): Performed by: FAMILY MEDICINE

## 2024-09-23 PROCEDURE — 97530 THERAPEUTIC ACTIVITIES: CPT

## 2024-09-23 PROCEDURE — 2580000003 HC RX 258: Performed by: INTERNAL MEDICINE

## 2024-09-23 PROCEDURE — 6360000002 HC RX W HCPCS: Performed by: FAMILY MEDICINE

## 2024-09-23 PROCEDURE — 36415 COLL VENOUS BLD VENIPUNCTURE: CPT

## 2024-09-23 PROCEDURE — 97166 OT EVAL MOD COMPLEX 45 MIN: CPT

## 2024-09-23 PROCEDURE — 1200000000 HC SEMI PRIVATE

## 2024-09-23 PROCEDURE — 99233 SBSQ HOSP IP/OBS HIGH 50: CPT | Performed by: INTERNAL MEDICINE

## 2024-09-23 PROCEDURE — 80048 BASIC METABOLIC PNL TOTAL CA: CPT

## 2024-09-23 PROCEDURE — 85025 COMPLETE CBC W/AUTO DIFF WBC: CPT

## 2024-09-23 PROCEDURE — 6370000000 HC RX 637 (ALT 250 FOR IP): Performed by: INTERNAL MEDICINE

## 2024-09-23 PROCEDURE — 97162 PT EVAL MOD COMPLEX 30 MIN: CPT

## 2024-09-23 RX ORDER — BENZOCAINE/MENTHOL 6 MG-10 MG
LOZENGE MUCOUS MEMBRANE 2 TIMES DAILY
Status: DISCONTINUED | OUTPATIENT
Start: 2024-09-23 | End: 2024-09-26 | Stop reason: HOSPADM

## 2024-09-23 RX ORDER — PANTOPRAZOLE SODIUM 40 MG/1
40 TABLET, DELAYED RELEASE ORAL
Status: DISCONTINUED | OUTPATIENT
Start: 2024-09-23 | End: 2024-09-26 | Stop reason: HOSPADM

## 2024-09-23 RX ORDER — SODIUM CHLORIDE 9 MG/ML
INJECTION, SOLUTION INTRAVENOUS CONTINUOUS
Status: DISCONTINUED | OUTPATIENT
Start: 2024-09-23 | End: 2024-09-25

## 2024-09-23 RX ORDER — OXYCODONE HYDROCHLORIDE 5 MG/1
10 TABLET ORAL EVERY 4 HOURS PRN
Status: DISCONTINUED | OUTPATIENT
Start: 2024-09-23 | End: 2024-09-26 | Stop reason: HOSPADM

## 2024-09-23 RX ORDER — FLUCONAZOLE 100 MG/1
150 TABLET ORAL ONCE
Status: COMPLETED | OUTPATIENT
Start: 2024-09-23 | End: 2024-09-23

## 2024-09-23 RX ORDER — PROMETHAZINE HYDROCHLORIDE 25 MG/1
25 TABLET ORAL EVERY 6 HOURS PRN
Status: DISCONTINUED | OUTPATIENT
Start: 2024-09-23 | End: 2024-09-26 | Stop reason: HOSPADM

## 2024-09-23 RX ORDER — SUCRALFATE 1 G/1
1 TABLET ORAL
Status: DISCONTINUED | OUTPATIENT
Start: 2024-09-23 | End: 2024-09-26 | Stop reason: HOSPADM

## 2024-09-23 RX ADMIN — PROMETHAZINE HYDROCHLORIDE 25 MG: 25 TABLET ORAL at 16:09

## 2024-09-23 RX ADMIN — GABAPENTIN 100 MG: 100 CAPSULE ORAL at 06:09

## 2024-09-23 RX ADMIN — PANTOPRAZOLE SODIUM 40 MG: 40 TABLET, DELAYED RELEASE ORAL at 09:01

## 2024-09-23 RX ADMIN — SUCRALFATE 1 G: 1 TABLET ORAL at 12:20

## 2024-09-23 RX ADMIN — HYDROCORTISONE: 1 CREAM TOPICAL at 19:52

## 2024-09-23 RX ADMIN — GALANTAMINE HYDROBROMIDE 8 MG: 8 CAPSULE, EXTENDED RELEASE ORAL at 09:00

## 2024-09-23 RX ADMIN — OXYCODONE 5 MG: 5 TABLET ORAL at 02:20

## 2024-09-23 RX ADMIN — SUCRALFATE 1 G: 1 TABLET ORAL at 19:52

## 2024-09-23 RX ADMIN — METOPROLOL SUCCINATE 12.5 MG: 25 TABLET, EXTENDED RELEASE ORAL at 19:52

## 2024-09-23 RX ADMIN — OXYCODONE 5 MG: 5 TABLET ORAL at 09:01

## 2024-09-23 RX ADMIN — CLONAZEPAM 1 MG: 1 TABLET ORAL at 09:01

## 2024-09-23 RX ADMIN — MIDODRINE HYDROCHLORIDE 12.5 MG: 10 TABLET ORAL at 12:20

## 2024-09-23 RX ADMIN — MIDODRINE HYDROCHLORIDE 12.5 MG: 10 TABLET ORAL at 09:00

## 2024-09-23 RX ADMIN — LEVOTHYROXINE SODIUM 75 MCG: 25 TABLET ORAL at 05:41

## 2024-09-23 RX ADMIN — METOPROLOL SUCCINATE 12.5 MG: 25 TABLET, EXTENDED RELEASE ORAL at 09:00

## 2024-09-23 RX ADMIN — SODIUM CHLORIDE: 9 INJECTION, SOLUTION INTRAVENOUS at 19:26

## 2024-09-23 RX ADMIN — ROSUVASTATIN CALCIUM 10 MG: 10 TABLET, FILM COATED ORAL at 09:01

## 2024-09-23 RX ADMIN — ESCITALOPRAM OXALATE 10 MG: 10 TABLET ORAL at 09:01

## 2024-09-23 RX ADMIN — ENOXAPARIN SODIUM 40 MG: 100 INJECTION SUBCUTANEOUS at 09:01

## 2024-09-23 RX ADMIN — ONDANSETRON 4 MG: 4 TABLET, ORALLY DISINTEGRATING ORAL at 12:47

## 2024-09-23 RX ADMIN — FLUCONAZOLE 150 MG: 100 TABLET ORAL at 12:20

## 2024-09-23 RX ADMIN — OXYCODONE 10 MG: 5 TABLET ORAL at 19:52

## 2024-09-23 RX ADMIN — GABAPENTIN 100 MG: 100 CAPSULE ORAL at 19:52

## 2024-09-23 RX ADMIN — HYDROCORTISONE: 1 CREAM TOPICAL at 12:20

## 2024-09-23 ASSESSMENT — PAIN DESCRIPTION - DESCRIPTORS
DESCRIPTORS: ACHING

## 2024-09-23 ASSESSMENT — PAIN DESCRIPTION - LOCATION
LOCATION: ABDOMEN
LOCATION: ANKLE;LEG
LOCATION: HEAD;LEG

## 2024-09-23 ASSESSMENT — PAIN SCALES - GENERAL
PAINLEVEL_OUTOF10: 8
PAINLEVEL_OUTOF10: 8
PAINLEVEL_OUTOF10: 4
PAINLEVEL_OUTOF10: 8

## 2024-09-23 ASSESSMENT — PAIN DESCRIPTION - ORIENTATION
ORIENTATION: RIGHT;LEFT

## 2024-09-23 NOTE — PLAN OF CARE
Problem: Discharge Planning  Goal: Discharge to home or other facility with appropriate resources  Outcome: Progressing  Flowsheets (Taken 9/22/2024 0915 by Ophelia Cabrera, RN)  Discharge to home or other facility with appropriate resources: Identify barriers to discharge with patient and caregiver     Problem: Pain  Goal: Verbalizes/displays adequate comfort level or baseline comfort level  9/22/2024 2131 by Idania Lutz, RN  Outcome: Progressing  9/22/2024 0921 by Ophelia Cabrera, RN  Outcome: Progressing  Flowsheets (Taken 9/22/2024 0915)  Verbalizes/displays adequate comfort level or baseline comfort level: Encourage patient to monitor pain and request assistance     Problem: Safety - Adult  Goal: Free from fall injury  Outcome: Progressing     Problem: ABCDS Injury Assessment  Goal: Absence of physical injury  Outcome: Progressing     Problem: Skin/Tissue Integrity  Goal: Absence of new skin breakdown  Description: 1.  Monitor for areas of redness and/or skin breakdown  2.  Assess vascular access sites hourly  3.  Every 4-6 hours minimum:  Change oxygen saturation probe site  4.  Every 4-6 hours:  If on nasal continuous positive airway pressure, respiratory therapy assess nares and determine need for appliance change or resting period.  Outcome: Progressing

## 2024-09-23 NOTE — CARE COORDINATION
INTERDISCIPLINARY PLAN OF CARE CONFERENCE    Date/Time: 9/23/2024 2:09 PM  Completed by: Daniella Luis RN, Case Management      Patient Name:  Cordelia Pillai  YOB: 1955  Admitting Diagnosis: Physical deconditioning [R53.81]  Unable to ambulate [R26.2]  Closed nondisplaced fracture of medial malleolus of right tibia, initial encounter [S82.54XA]  Decreased activities of daily living (ADL) [Z78.9]  Closed nondisplaced fracture of phalanx of right great toe, unspecified phalanx, initial encounter [S92.404A]     Admit Date/Time:  9/21/2024  6:55 PM    Chart reviewed. Interdisciplinary team contacted or reviewed plan related to patient progress and discharge plans.   Disciplines included Case Management, Nursing, and Dietitian.    Current Status:Stable  PT/OT recommendation for discharge plan of care: SNF    Expected D/C Disposition:  Rehab  Confirmed plan with patient Yes   Met with:patient  Discharge Plan Comments: Reviewed chart and met with pt who cont plan to go to [facility]. Spoke with Sowmya at [facility] who states pre cert started Pending auth # is [id number]    Home O2 in place on admit: No  Pt informed of need to bring portable home O2 tank on day of discharge for nursing to connect prior to leaving:  Not Indicated  Verbalized agreement/Understanding:  Not Indicated

## 2024-09-23 NOTE — ACP (ADVANCE CARE PLANNING)
Advance Care Planning     Advance Care Planning Inpatient Note  [facility] Department    Today's Date: 9/23/2024  Unit: [facility]    Received request from IDT Member.  Upon review of chart and communication with care team, patient's decision making abilities are not in question.. Patient was/were present in the room during visit.    Goals of ACP Conversation:  Discuss advance care planning documents    Health Care Decision Makers:       Primary Decision Maker: Kelsie Pillai - Child - [phone]  Summary:  Verified Documents  Verified Healthcare Decision Maker    Advance Care Planning Documents (Patient Wishes):  Healthcare Power of /Advance Directive Appointment of Health Care Agent  Living Will/Advance Directive     Assessment:  Patient states she only wants her daughter, Kelsie named on her advance medical directives.  She recently updated her health care power of  and living will with her personal .      Interventions:  Provided education on documents for clarity and greater understanding  Encouraged ongoing ACP conversation with future decision makers and loved ones    Care Preferences Communicated:   Ventilation:   If the patient, in their present state of health, suddenly became very ill and unable to breathe on their own,     the patient would desire the use of a ventilator (breathing machine).    If their health worsens and it becomes clear that the change of recovery is unlikely,     the patient would NOT desire the use of a ventilator (breathing machine).    Resuscitation:  In the event the patient's heart stopped as a result of an underlying serious health condition, the patient communicates a preference for      a natural death (no CPR).    Outcomes/Plan:  ACP Discussion: Completed  Existing advance directive reviewed with patient; no changes to patient's previously recorded wishes.  Returned original document(s) to patient, as well as copies for  distribution to appointed agents  Copy of advance directive given to staff to scan into medical record.    Old Health Care Power of  was voided.  New documents will be scanned into medical records.    Electronically signed by SHIRA Fung on 9/23/2024 at 1:27 PM

## 2024-09-23 NOTE — PROGRESS NOTES
Inpatient Physical Therapy Evaluation & Treatment    Unit: [facility]  Date:  9/23/2024  Patient Name:    Cordelia Pillai  Admitting diagnosis:  Physical deconditioning [R53.81]  Unable to ambulate [R26.2]  Closed nondisplaced fracture of medial malleolus of right tibia, initial encounter [S82.54XA]  Decreased activities of daily living (ADL) [Z78.9]  Closed nondisplaced fracture of phalanx of right great toe, unspecified phalanx, initial encounter [S92.404A]  Admit Date:  9/21/2024  Precautions/Restrictions/WB Status/ Lines/ Wounds/ Oxygen: Fall risk, Bed/chair alarm, Lines (IV and suprapubic catheter), and WB Restrictions (Heel WB on R with boot; WBAT on left)      Pt seen for cotreatment this date due to patient safety, patient endurance, complexity of condition, and acute illness/injury    Treatment Time:  653-999  Treatment Number:  1   Timed Code Treatment Minutes: 46 minutes  Total Treatment Minutes:  56  minutes    Patient Stated Goals for Therapy: \" To go home. \"          Discharge Recommendations: SNF  DME needs for discharge: Defer to facility       Therapy recommendation for EMS Transport: requires transport by cot due to pt with poor sitting balance/tolerance    Therapy recommendations for staff:   Assist of 2 for sitting EOB/ standing up at EOB with gait belt and RW.    History of Present Illness:   Per H&P on 9/21 from Dr. Lemus:    \"68 y.o. female with history of recent right medial malleolus and right great toe fracture who had been cleared by orthopedic team for outpatient follow-up presented to the ER with complaints of foot pain and generalized weakness leading to multiple falls.  Patient is unable to care for self at home due to ongoing multiple falls which seems to be related to patient's ongoing foot pain.  Patient follows with pain management in the outpatient and also missed her outpatient appointment while she was recently hospitalized.  Patient's daughter who is the caregiver is unable to  met in 6 visits:  1).  Supine to/from sit: Supervision  2).  Sit to/from stand: Supervision  3).  Bed to chair: Supervision  4).  Gait: Ambulate 50 ft.  with Supervision and use of LRAD (least restrictive assistive device)  5).  Tolerate B LE exercises 3 sets of 10-15 reps    Rehabilitation Potential: Good  Strengths for achieving goals include:   Pt motivated, PLOF, and Pt cooperative   Barriers to achieving goals include:    Complexity of condition, Chronicity of condition, Pain, and Weakness    Plan    To be seen 3-5 x/ week while in acute care setting for therapeutic exercises, bed mobility, transfers, progressive gait training, balance training, and family/patient education.    Signature: Curt Miller PT     If patient discharges from this facility prior to next visit, this note will serve as the Discharge Summary.    CHF Education  N/A

## 2024-09-23 NOTE — PLAN OF CARE
Problem: Pain  Goal: Verbalizes/displays adequate comfort level or baseline comfort level  9/23/2024 1048 by Samreen Hoffmann RN  Outcome: Progressing  Flowsheets (Taken 9/23/2024 1048)  Verbalizes/displays adequate comfort level or baseline comfort level: Encourage patient to monitor pain and request assistance     Problem: Safety - Adult  Goal: Free from fall injury  9/23/2024 1048 by Samreen Hoffmann RN  Outcome: Progressing  Flowsheets (Taken 9/23/2024 1048)  Free From Fall Injury:   Instruct family/caregiver on patient safety   Based on caregiver fall risk screen, instruct family/caregiver to ask for assistance with transferring infant if caregiver noted to have fall risk factors     Problem: ABCDS Injury Assessment  Goal: Absence of physical injury  9/23/2024 1048 by Samreen Hoffmann RN  Outcome: Progressing  Flowsheets (Taken 9/23/2024 1048)  Absence of Physical Injury: Implement safety measures based on patient assessment     Problem: Skin/Tissue Integrity  Goal: Absence of new skin breakdown  Description: 1.  Monitor for areas of redness and/or skin breakdown  2.  Assess vascular access sites hourly  3.  Every 4-6 hours minimum:  Change oxygen saturation probe site  4.  Every 4-6 hours:  If on nasal continuous positive airway pressure, respiratory therapy assess nares and determine need for appliance change or resting period.  9/23/2024 1048 by Samreen Hoffmann RN  Outcome: Progressing  Note: Notified MD of arm pain, tenderness and redness.

## 2024-09-23 NOTE — PROGRESS NOTES
IM Progress Note    Daily Progress Note for 2024 10:12 AM 0204/0204-01  Cordelia Pillai : 1955 Age: 68 y.o. Sex: female  Length of Stay:  2    Interval History:      CC: F/U Pain  Patient with recent admission for right ankle fracture  -SNF was recommended on discharge but patient refused and went back home  - presenting back with increased pain.  Able to care for herself needs SNF      Subjective:     Patient has been seen by therapy.  Does use a boot for her right ankle with therapy.  Seen and cleared by Ortho for discharge.  Case management following for SNF placement.  Complains of persistent right ankle pain.    Patient also has issues with chronic pain and neuropathy.  Complains of right shoulder pain.  She is also developed a rash on her right arm.  Complains of dysuria.  Complains of abdominal discomfort and nausea. .      Objective:     Vitals:    24 0215 24 0250 24 0859   BP:  (!) 102/52  (!) 104/52   Pulse:  91  65   Resp: 16 16 16 16   Temp:  98.4 °F (36.9 °C)     TempSrc:       SpO2:  94%  100%   Weight:       Height:              Intake/Output Summary (Last 24 hours) at 2024 1012  Last data filed at 2024 0638  Gross per 24 hour   Intake 960 ml   Output 260 ml   Net 700 ml     Body mass index is 31.53 kg/m².    Physical Exam:  General: Ill-appearing, awake alert and well-oriented and in no distress  HEENT:  Head: normocephalic,atraumatic, anicteric sclera, clear conjunctiva  Neck: Normal size, Jugular venous pulsations: normal  Respiratory:unlabored breathing, clear to auscultation with no crackles, wheezes rhonchi  Heart: Regular rate and rhythm, S1, S2-normal, No murmurs  Abdomen: soft, nondistended, epigastric area tender, normoactive bowel sounds,  Neurological/Psych: Alert and oriented times three, no focal neurological deficits, Mood and affect appropriate.  Skin: Rash present on the right arm.   Extremities:  no edema, Pedal pulses 2+  Collected: 09/14/24 1840    Order Status: Completed Specimen: Urine, clean catch Updated: 09/17/24 0241     Organism Enterococcus faecium     Urine Culture, Routine 10,000 CFU/ml    Narrative:      ORDER#: [id number]                          ORDERED BY: VIANNEY MCDONOUGH  SOURCE: Urine Clean Catch                  COLLECTED:  09/14/24 18:40  ANTIBIOTICS AT DAO.:                      RECEIVED :  09/14/24 22:50    Susceptibility        Enterococcus faecium      BACTERIAL SUSCEPTIBILITY PANEL BY CHAVEZ      ampicillin >=32 mcg/mL Resistant      ciprofloxacin 1 mcg/mL Sensitive      levofloxacin 2 mcg/mL Sensitive      nitrofurantoin 32 mcg/mL Sensitive      tetracycline >=16 mcg/mL Resistant      vancomycin <=0.5 mcg/mL Sensitive                                         Radiology reviewed:     XR FOOT RIGHT (MIN 3 VIEWS)   Final Result   Right foot:      1.  Stable appearance of 1st metatarsal base fracture without significant   interval healing.      2.  Slight interval increase in conspicuity of 4th proximal phalangeal   fracture, possibly acute on chronic in nature.  2nd and 3rd proximal   phalanges appear unchanged.      Left ankle:      1.  No acute fracture or dislocation.      2.  Chronic distal fibular shaft fracture.         XR ANKLE LEFT (MIN 3 VIEWS)   Final Result   Right foot:      1.  Stable appearance of 1st metatarsal base fracture without significant   interval healing.      2.  Slight interval increase in conspicuity of 4th proximal phalangeal   fracture, possibly acute on chronic in nature.  2nd and 3rd proximal   phalanges appear unchanged.      Left ankle:      1.  No acute fracture or dislocation.      2.  Chronic distal fibular shaft fracture.         XR ANKLE RIGHT (MIN 3 VIEWS)   Final Result   Mild soft tissue swelling around the ankle and diffuse osteopenia with no   obvious ankle fracture or dislocation.      Probable mildly displaced fracture along the base of the 1st metatarsal bone.

## 2024-09-23 NOTE — PROGRESS NOTES
LUE; reported dizziness in standing   End of session 117/71        Objective:  Does this pt have an acute or acute on chronic diagnosis of CHF? No    Upper Extremity ROM:    Limited in R shoulder flex due to pain in R upper arm - nursing in to assess pain/redness/swelling    Upper Extremity Strength:    Formal strength testing deferred due to pain    Upper Extremity Sensation:    NT    Upper Extremity Proprioception:  NT    Coordination:  WFL    Tone:  NT    Balance:  Sitting:    CGA static and dynamic at EOB  Standing:    Mod A x2 at edge of bed    Bed Mobility:   Supine to Sit:    Min A   Sit to Supine:   Mod A x2  Rolling:   CGA to either side  Scooting in sitting: Min A   Scooting in supine:  Max A x2 with drawsheet    Transfers:    Sit to stand:    Max A x2  Stand to sit:    Mod A x2  Bed to chair:     Not Tested  Bed/ chair to standard toilet:  Not Tested  Bed/chair to BSC:   Not Tested  Functional Mobility:   Not Tested    Pt unable to tolerate transfer to recliner due to dizziness, pain; pt tolerated short episode of standing next to bed with mod A x2 and BUE support on RW and bed    ADLs:  Dressing:      UE:   Not Tested  LE:    Max A to don socks, min A to thread LEs into pants, max A to pull pants up to waist in stance; max A to don boot    Bathing:    UE:  Not Tested  LE:  Not Tested    Eating:   Independent beverage mgmt    Toileting:  Not Tested    Grooming/hygiene: Independent hair brushing; remainder of oral care items left in room    Ther Ex / Activities Initiated:   N/A    Activity Tolerance:   Pt completed therapy session with dizziness/lightheadedness  pain    Positioning Needs:   Pt in bed, alarm set, call light provided and all needs within reach .     Patient/Family Education:   Pt educated on role of inpatient OT, plan of care, importance of continued activity, DC recommendations, Functional transfer/mobility safety, Transfer techniques, and Calling for assist with mobility.    CHF

## 2024-09-24 ENCOUNTER — APPOINTMENT (OUTPATIENT)
Dept: GENERAL RADIOLOGY | Age: 69
DRG: 563 | End: 2024-09-24
Payer: MEDICARE

## 2024-09-24 PROBLEM — N76.1 SUBACUTE VAGINITIS: Status: ACTIVE | Noted: 2024-09-24

## 2024-09-24 PROBLEM — G89.29 OTHER CHRONIC PAIN: Status: ACTIVE | Noted: 2024-09-24

## 2024-09-24 PROBLEM — R11.2 NAUSEA AND VOMITING: Status: ACTIVE | Noted: 2024-09-24

## 2024-09-24 LAB
ACANTHOCYTES BLD QL SMEAR: ABNORMAL
AMMONIA PLAS-SCNC: 22 UMOL/L (ref 11–51)
ANION GAP SERPL CALCULATED.3IONS-SCNC: 8 MMOL/L (ref 3–16)
ANISOCYTOSIS BLD QL SMEAR: ABNORMAL
BASE EXCESS BLDA CALC-SCNC: -4.5 MMOL/L (ref -3–3)
BASOPHILS # BLD: 0 K/UL (ref 0–0.2)
BASOPHILS NFR BLD: 0.8 %
BUN SERPL-MCNC: 13 MG/DL (ref 7–20)
CALCIUM SERPL-MCNC: 8.1 MG/DL (ref 8.3–10.6)
CHLORIDE SERPL-SCNC: 106 MMOL/L (ref 99–110)
CO2 BLDA-SCNC: 22.6 MMOL/L
CO2 SERPL-SCNC: 27 MMOL/L (ref 21–32)
COHGB MFR BLDA: 0.1 % (ref 0–1.5)
CREAT SERPL-MCNC: <0.5 MG/DL (ref 0.6–1.2)
DEPRECATED RDW RBC AUTO: 27.7 % (ref 12.4–15.4)
EOSINOPHIL # BLD: 0.1 K/UL (ref 0–0.6)
EOSINOPHIL NFR BLD: 2.7 %
GFR SERPLBLD CREATININE-BSD FMLA CKD-EPI: >90 ML/MIN/{1.73_M2}
GLUCOSE SERPL-MCNC: 97 MG/DL (ref 70–99)
HCO3 BLDA-SCNC: 21.3 MMOL/L (ref 21–29)
HCT VFR BLD AUTO: 27.6 % (ref 36–48)
HGB BLD-MCNC: 8.6 G/DL (ref 12–16)
HGB BLDA-MCNC: 9.7 G/DL (ref 12–16)
HYPOCHROMIA BLD QL SMEAR: ABNORMAL
LYMPHOCYTES # BLD: 2.3 K/UL (ref 1–5.1)
LYMPHOCYTES NFR BLD: 43.6 %
MCH RBC QN AUTO: 23.5 PG (ref 26–34)
MCHC RBC AUTO-ENTMCNC: 31.1 G/DL (ref 31–36)
MCV RBC AUTO: 75.5 FL (ref 80–100)
METHGB MFR BLDA: 0.3 %
MONOCYTES # BLD: 0.5 K/UL (ref 0–1.3)
MONOCYTES NFR BLD: 9.3 %
NEUTROPHILS # BLD: 2.3 K/UL (ref 1.7–7.7)
NEUTROPHILS NFR BLD: 43.6 %
O2 THERAPY: ABNORMAL
OVALOCYTES BLD QL SMEAR: ABNORMAL
PCO2 BLDA: 42.4 MMHG (ref 35–45)
PH BLDA: 7.32 [PH] (ref 7.35–7.45)
PLATELET # BLD AUTO: 170 K/UL (ref 135–450)
PLATELET BLD QL SMEAR: ADEQUATE
PMV BLD AUTO: 8.5 FL (ref 5–10.5)
PO2 BLDA: 58.5 MMHG (ref 75–108)
POIKILOCYTOSIS BLD QL SMEAR: ABNORMAL
POLYCHROMASIA BLD QL SMEAR: ABNORMAL
POTASSIUM SERPL-SCNC: 3.5 MMOL/L (ref 3.5–5.1)
RBC # BLD AUTO: 3.66 M/UL (ref 4–5.2)
SAO2 % BLDA: 88.2 %
SCHISTOCYTES BLD QL SMEAR: ABNORMAL
SLIDE REVIEW: ABNORMAL
SODIUM SERPL-SCNC: 141 MMOL/L (ref 136–145)
TARGETS BLD QL SMEAR: ABNORMAL
WBC # BLD AUTO: 5.2 K/UL (ref 4–11)

## 2024-09-24 PROCEDURE — 97110 THERAPEUTIC EXERCISES: CPT

## 2024-09-24 PROCEDURE — 85025 COMPLETE CBC W/AUTO DIFF WBC: CPT

## 2024-09-24 PROCEDURE — 80048 BASIC METABOLIC PNL TOTAL CA: CPT

## 2024-09-24 PROCEDURE — 6360000002 HC RX W HCPCS: Performed by: FAMILY MEDICINE

## 2024-09-24 PROCEDURE — 74018 RADEX ABDOMEN 1 VIEW: CPT

## 2024-09-24 PROCEDURE — 6370000000 HC RX 637 (ALT 250 FOR IP): Performed by: INTERNAL MEDICINE

## 2024-09-24 PROCEDURE — 36415 COLL VENOUS BLD VENIPUNCTURE: CPT

## 2024-09-24 PROCEDURE — 1200000000 HC SEMI PRIVATE

## 2024-09-24 PROCEDURE — 99233 SBSQ HOSP IP/OBS HIGH 50: CPT | Performed by: INTERNAL MEDICINE

## 2024-09-24 PROCEDURE — 82140 ASSAY OF AMMONIA: CPT

## 2024-09-24 PROCEDURE — 6370000000 HC RX 637 (ALT 250 FOR IP): Performed by: FAMILY MEDICINE

## 2024-09-24 PROCEDURE — 97530 THERAPEUTIC ACTIVITIES: CPT

## 2024-09-24 PROCEDURE — 82803 BLOOD GASES ANY COMBINATION: CPT

## 2024-09-24 RX ADMIN — ESCITALOPRAM OXALATE 10 MG: 10 TABLET ORAL at 10:43

## 2024-09-24 RX ADMIN — ENOXAPARIN SODIUM 40 MG: 100 INJECTION SUBCUTANEOUS at 10:44

## 2024-09-24 RX ADMIN — GALANTAMINE HYDROBROMIDE 8 MG: 8 CAPSULE, EXTENDED RELEASE ORAL at 10:45

## 2024-09-24 RX ADMIN — ONDANSETRON 4 MG: 4 TABLET, ORALLY DISINTEGRATING ORAL at 00:00

## 2024-09-24 RX ADMIN — METOPROLOL SUCCINATE 12.5 MG: 25 TABLET, EXTENDED RELEASE ORAL at 10:43

## 2024-09-24 RX ADMIN — HYDROCORTISONE: 1 CREAM TOPICAL at 20:13

## 2024-09-24 RX ADMIN — ROSUVASTATIN CALCIUM 10 MG: 10 TABLET, FILM COATED ORAL at 10:43

## 2024-09-24 RX ADMIN — PANTOPRAZOLE SODIUM 40 MG: 40 TABLET, DELAYED RELEASE ORAL at 18:19

## 2024-09-24 RX ADMIN — OXYCODONE 10 MG: 5 TABLET ORAL at 05:18

## 2024-09-24 RX ADMIN — PANTOPRAZOLE SODIUM 40 MG: 40 TABLET, DELAYED RELEASE ORAL at 05:18

## 2024-09-24 RX ADMIN — SUCRALFATE 1 G: 1 TABLET ORAL at 10:43

## 2024-09-24 RX ADMIN — LEVOTHYROXINE SODIUM 75 MCG: 25 TABLET ORAL at 05:18

## 2024-09-24 RX ADMIN — CLONAZEPAM 1 MG: 1 TABLET ORAL at 00:00

## 2024-09-24 RX ADMIN — MIDODRINE HYDROCHLORIDE 12.5 MG: 10 TABLET ORAL at 13:19

## 2024-09-24 RX ADMIN — PROMETHAZINE HYDROCHLORIDE 25 MG: 25 TABLET ORAL at 05:18

## 2024-09-24 RX ADMIN — OXYCODONE 10 MG: 5 TABLET ORAL at 00:00

## 2024-09-24 RX ADMIN — MIDODRINE HYDROCHLORIDE 12.5 MG: 10 TABLET ORAL at 10:43

## 2024-09-24 RX ADMIN — SUCRALFATE 1 G: 1 TABLET ORAL at 05:18

## 2024-09-24 RX ADMIN — OXYCODONE 10 MG: 5 TABLET ORAL at 10:43

## 2024-09-24 RX ADMIN — SUCRALFATE 1 G: 1 TABLET ORAL at 18:18

## 2024-09-24 ASSESSMENT — PAIN SCALES - GENERAL
PAINLEVEL_OUTOF10: 8
PAINLEVEL_OUTOF10: 7
PAINLEVEL_OUTOF10: 7
PAINLEVEL_OUTOF10: 0

## 2024-09-24 ASSESSMENT — PAIN DESCRIPTION - DESCRIPTORS
DESCRIPTORS: ACHING;DISCOMFORT
DESCRIPTORS: ACHING
DESCRIPTORS: ACHING

## 2024-09-24 ASSESSMENT — PAIN DESCRIPTION - ORIENTATION
ORIENTATION: RIGHT;LEFT
ORIENTATION: RIGHT;LEFT

## 2024-09-24 ASSESSMENT — PAIN SCALES - WONG BAKER
WONGBAKER_NUMERICALRESPONSE: NO HURT
WONGBAKER_NUMERICALRESPONSE: NO HURT

## 2024-09-24 ASSESSMENT — PAIN DESCRIPTION - LOCATION
LOCATION: LEG
LOCATION: GENERALIZED
LOCATION: LEG

## 2024-09-24 NOTE — PROGRESS NOTES
Pt has been lethargic, able to arouse pt but very difficult to arouse, meds reviewed and also her belongings were searched due to concerns for medication in her belongings but not medication was found.     While talking with pt she does not stay awake very well but is aroused when you talk louder.  MD made aware and new orders for ABG and ammonia levels were given.

## 2024-09-24 NOTE — PROGRESS NOTES
Inpatient Physical Therapy Treatment    Unit: [facility]  Date:  9/24/2024  Patient Name:    Cordelia Pillai  Admitting diagnosis:  Physical deconditioning [R53.81]  Unable to ambulate [R26.2]  Closed nondisplaced fracture of medial malleolus of right tibia, initial encounter [S82.54XA]  Decreased activities of daily living (ADL) [Z78.9]  Closed nondisplaced fracture of phalanx of right great toe, unspecified phalanx, initial encounter [S92.404A]  Admit Date:  9/21/2024  Precautions/Restrictions/WB Status/ Lines/ Wounds/ Oxygen: Fall risk, Bed/chair alarm, Lines (IV and suprapubic catheter), and WB Restrictions (Heel WB on R with boot; WBAT on left)       Pt seen for cotreatment this date due to patient safety, patient endurance, complexity of condition, acute illness/injury, and need for the assistance of 2 skilled therapists    Treatment Time:  9243-4361  Treatment Number:  2  Timed Code Treatment Minutes: 25 minutes  Total Treatment Minutes:  25  minutes    Patient Stated Goals for Therapy: No goals stated this date.      Discharge Recommendations: SNF  DME needs for discharge: Defer to facility       Therapy recommendation for EMS Transport: requires transport by cot due to pt needs A x 2 for safe transfers and pt with poor sitting balance/tolerance    Therapy recommendations for staff:   Assist of 2 for sitting EOB/ standing up at EOB with gait belt and RW.  for sitting EOB    History of Present Illness:   Per H&P on 9/21 from Dr. Lemus:     \"68 y.o. female with history of recent right medial malleolus and right great toe fracture who had been cleared by orthopedic team for outpatient follow-up presented to the ER with complaints of foot pain and generalized weakness leading to multiple falls.  Patient is unable to care for self at home due to ongoing multiple falls which seems to be related to patient's ongoing foot pain.  Patient follows with pain management in the outpatient and also missed her outpatient  appointment while she was recently hospitalized.  Patient's daughter who is the caregiver is unable to care for patient at home \"  [facility] was [facility] for [facility] and returned home. Pt's daughter unable to care for pt at home and returned to the ED.     Per AIMEE Garvey:  1) Would continue the initial plan outlined by Dr Garza.  Can f/u as recommended.  Told patient to bring in boot for work with PT.  Continue ice and elevation for swelling.  Can have boot off for hygiene and when in bed for ice/elevation but should be on whenever else.  She can be heel WB on the right and WBAT on the left.  No plans for surgery.      AM-PAC Mobility Score    AM-PAC Inpatient Mobility Raw Score : 8         Subjective  Patient lying reclined in bed with no family present.  Pt agreeable to this PT session. RN cleared pt for therapy session.    Cognition    A&O orientation not directly assessed.    Able to follow 1 step commands    Pain   Yes  Location: B feet  Rating: severe /10 - spasm, mild when not in spasm   Pain Medicine Status: No request made    Activity Tolerance   During therapy session noted pt with orthostatic hypotension. Pt lethargic throughout session frequently falling asleep. Pt with increased discomfort in R forearm, RN called to assess and will be placing new IV due to IV infiltrated.    Pt Position BP (mmHg) HR (bpm) SpO2 (%) on RA  Comments   Supine at rest 88/43  99/54  98/49 62  66  61 99   Post supine ex   Seated at EOB       Standing       End of session           Objective  Does this pt have an acute or acute on chronic diagnosis of CHF? No      Balance  Static Sitting:  Not tested; Not Tested  Dynamic Sitting:  Not tested; Not Tested   Comments: Sitting deferred 2/2 low BP    Static Standing: Not tested; Not Tested  Dynamic Standing: Not tested; Not Tested  Comments: Deferred 2/2 low BP    Posture  Seated: Not Tested  Standing: Not Tested    Bed Mobility   Supine to Sit:    Not Tested  Sit to Supine:   Not

## 2024-09-24 NOTE — PROGRESS NOTES
This RT attempted to obtain an ABG without success. Pt. Has a plate in right wrist and am not able to obtain. RN is aware.

## 2024-09-24 NOTE — PROGRESS NOTES
Pts IV infiltrated and a new IV needed to be inserted pt was lethargic through the process and also was sleeping through the ABG draw as well.     MD made aware and new orders were given for MRI, EEG, seizure precautions , and a new order for virtual .

## 2024-09-24 NOTE — PROGRESS NOTES
IM Progress Note    Daily Progress Note for 2024 3:52 PM 0204/0204-01  Cordelia Pillai : 1955 Age: 68 y.o. Sex: female  Length of Stay:  3    Interval History:      CC: F/U Pain  Patient with recent admission for right ankle fracture  -SNF was recommended on discharge but patient refused and went back home  - presenting back with increased pain. unable to care for herself - needs SNF.     Seen by therapy . Has boot    Seen and cleared by Ortho for discharge.  Case management following for SNF placement.Pending precert       Subjective:     Complains of persistent right ankle pain.  Patient also has issues with chronic pain and neuropathy.  Complains of right shoulder pain.  She is also developed a rash on her right arm.  Complains of dysuria.  Had  abdominal discomfort and nausea and vomiting- better today     -  Patient had episode of poor responsiveness today.   Awake now and alert and oriented  now. Pt states that she has had prior similar episodes. No clear etiology. She does not recall RN trying to wake her up.   Vital stable       Objective:     Vitals:    24 0548 24 1030 24 1113 24 1315   BP:  (!) 101/58  (!) 94/57   Pulse:  65  63   Resp: 16 16 16 16   Temp:  98.1 °F (36.7 °C)  98 °F (36.7 °C)   TempSrc:  Oral  Axillary   SpO2:  91%  90%   Weight:       Height:              Intake/Output Summary (Last 24 hours) at 2024 1552  Last data filed at 2024 0530  Gross per 24 hour   Intake 340 ml   Output 850 ml   Net -510 ml     Body mass index is 31.53 kg/m².    Physical Exam:  General: Ill-appearing, awake alert and well-oriented and in no distress  HEENT:  Head: normocephalic,atraumatic, anicteric sclera, clear conjunctiva  Neck: Normal size, Jugular venous pulsations: normal  Respiratory:unlabored breathing, clear to auscultation with no crackles, wheezes rhonchi  Heart: Regular rate and rhythm, S1, S2-normal, No murmurs  Abdomen: soft, nondistended, epigastric area

## 2024-09-24 NOTE — PROGRESS NOTES
Inpatient Occupational Therapy Treatment    Unit: [facility] [place]  Date:  9/24/2024  Patient Name:    Cordelia Pillai  Admitting diagnosis:  Physical deconditioning [R53.81]  Unable to ambulate [R26.2]  Closed nondisplaced fracture of medial malleolus of right tibia, initial encounter [S82.54XA]  Decreased activities of daily living (ADL) [Z78.9]  Closed nondisplaced fracture of phalanx of right great toe, unspecified phalanx, initial encounter [S92.404A]  Admit Date:  9/21/2024  Precautions/Restrictions/WB Status/ Lines/ Wounds/ Oxygen: Fall risk, Bed/chair alarm, Lines (IV and suprapubic catheter), and WB Restrictions (Heel WB on R with boot; WBAT on left)     Pt seen for cotreatment this date due to patient safety, patient endurance, and limited functional status information    Treatment Time:  4216-4891  Treatment Number:  2  Timed Code Treatment Minutes: 25 minutes  Total Treatment Minutes:  25  minutes    Patient Goals for Therapy: \"to get to [place]\"          Discharge Recommendations: SNF  DME needs for discharge: Defer to facility       Therapy recommendations for staff:   Assist of 2 for sitting EOB    History of Present Illness: Per Dr. Lemus H&P 9/21/24:  \"68 y.o. female with history of recent right medial malleolus and right great toe fracture who had been cleared by orthopedic team for outpatient follow-up presented to the ER with complaints of foot pain and generalized weakness leading to multiple falls.  Patient is unable to care for self at home due to ongoing multiple falls which seems to be related to patient's ongoing foot pain.  Patient follows with pain management in the outpatient and also missed her outpatient appointment while she was recently hospitalized.  Patient's daughter who is the caregiver is unable to care for patient at home\"     Per AIMEE Garvey:  1) Would continue the initial plan outlined by Dr Garza.  Can f/u as recommended.  Told patient to bring in boot for work with PT.

## 2024-09-24 NOTE — FLOWSHEET NOTE
09/24/24 1030   Vital Signs   Temp 98.1 °F (36.7 °C)   Temp Source Oral   Pulse 65   Heart Rate Source Monitor   Respirations 16   BP (!) 101/58   MAP (Calculated) 72   BP Method Automatic   [facility]   Pain Assessment   Pain Assessment 0-10   Pain Level 7   Pain Location Generalized   Pain Descriptors Aching;Discomfort   Non-Pharmaceutical Pain Intervention(s) Rest;Repositioned   Opioid-Induced Sedation   POSS Score 1   Oxygen Therapy   SpO2 91 %   O2 Device None (Room air)     AM assessment completed. No complaints of pain or discomfort voiced. No signs or symptoms of distress noted. Patient tolerated AM medications well. Respirations easy and even. Bed in lowest position, bed alarm in place and functioning properly, bed rails x2 up,  Call light within reach.     Bedside Mobility Assessment Tool (BMAT):     Assessment Level 1- Sit and Shake    1. From a semi-reclined position, ask patient to sit up and rotate to a seated position at the side of the bed. Can use the bedrail.    2. Ask patient to reach out and grab your hand and shake making sure patient reaches across his/her midline.   Pass- Patient is able to come to a seated position, maintain core strength. Maintains seated balance while reaching across midline. Move on to Assessment Level 2.     Assessment Level 2- Stretch and Point   1. With patient in seated position at the side of the bed, have patient place both feet on the floor (or stool) with knees no higher than hips.    2. Ask patient to stretch one leg and straighten the knee, then bend the ankle/flex and point the toes. If appropriate, repeat with the other leg.   Fail- Patient is unable to complete task. Patient is MOBILITY LEVEL 2.     Assessment Level 3- Stand   1. Ask patient to elevate off the bed or chair (seated to standing) using an assistive device (cane, bedrail).    2. Patient should be able to raise buttocks off be and hold for a count of five. May repeat once.

## 2024-09-24 NOTE — PLAN OF CARE
Problem: Discharge Planning  Goal: Discharge to home or other facility with appropriate resources  Outcome: Progressing     Problem: Pain  Goal: Verbalizes/displays adequate comfort level or baseline comfort level  9/23/2024 2210 by Idania Lutz RN  Outcome: Progressing  9/23/2024 1048 by Samreen Hoffmann RN  Outcome: Progressing  Flowsheets (Taken 9/23/2024 1048)  Verbalizes/displays adequate comfort level or baseline comfort level: Encourage patient to monitor pain and request assistance     Problem: Safety - Adult  Goal: Free from fall injury  9/23/2024 2210 by Idania Lutz RN  Outcome: Progressing  9/23/2024 1048 by Samreen Hoffmann RN  Outcome: Progressing  Flowsheets (Taken 9/23/2024 1048)  Free From Fall Injury:   Instruct family/caregiver on patient safety   Based on caregiver fall risk screen, instruct family/caregiver to ask for assistance with transferring infant if caregiver noted to have fall risk factors     Problem: ABCDS Injury Assessment  Goal: Absence of physical injury  9/23/2024 2210 by Idania Lutz RN  Outcome: Progressing  9/23/2024 1048 by Samreen Hoffmann RN  Outcome: Progressing  Flowsheets (Taken 9/23/2024 1048)  Absence of Physical Injury: Implement safety measures based on patient assessment     Problem: Skin/Tissue Integrity  Goal: Absence of new skin breakdown  Description: 1.  Monitor for areas of redness and/or skin breakdown  2.  Assess vascular access sites hourly  3.  Every 4-6 hours minimum:  Change oxygen saturation probe site  4.  Every 4-6 hours:  If on nasal continuous positive airway pressure, respiratory therapy assess nares and determine need for appliance change or resting period.  9/23/2024 2210 by Idania Lutz RN  Outcome: Progressing  9/23/2024 1048 by Samreen Hoffmann RN  Outcome: Progressing  Note: Notified MD of arm pain, tenderness and redness.

## 2024-09-25 PROBLEM — R40.4 ALTERED LEVEL OF CONSCIOUSNESS: Status: ACTIVE | Noted: 2024-09-25

## 2024-09-25 PROCEDURE — APPSS45 APP SPLIT SHARED TIME 31-45 MINUTES

## 2024-09-25 PROCEDURE — 99223 1ST HOSP IP/OBS HIGH 75: CPT | Performed by: PSYCHIATRY & NEUROLOGY

## 2024-09-25 PROCEDURE — 97535 SELF CARE MNGMENT TRAINING: CPT

## 2024-09-25 PROCEDURE — 99233 SBSQ HOSP IP/OBS HIGH 50: CPT | Performed by: INTERNAL MEDICINE

## 2024-09-25 PROCEDURE — 97530 THERAPEUTIC ACTIVITIES: CPT

## 2024-09-25 PROCEDURE — 6360000002 HC RX W HCPCS: Performed by: FAMILY MEDICINE

## 2024-09-25 PROCEDURE — 6370000000 HC RX 637 (ALT 250 FOR IP): Performed by: FAMILY MEDICINE

## 2024-09-25 PROCEDURE — 95819 EEG AWAKE AND ASLEEP: CPT

## 2024-09-25 PROCEDURE — 2580000003 HC RX 258: Performed by: FAMILY MEDICINE

## 2024-09-25 PROCEDURE — 95816 EEG AWAKE AND DROWSY: CPT | Performed by: PSYCHIATRY & NEUROLOGY

## 2024-09-25 PROCEDURE — 6370000000 HC RX 637 (ALT 250 FOR IP): Performed by: INTERNAL MEDICINE

## 2024-09-25 PROCEDURE — 1200000000 HC SEMI PRIVATE

## 2024-09-25 RX ADMIN — SUCRALFATE 1 G: 1 TABLET ORAL at 13:41

## 2024-09-25 RX ADMIN — HYDROCORTISONE: 1 CREAM TOPICAL at 20:42

## 2024-09-25 RX ADMIN — Medication 10 ML: at 20:41

## 2024-09-25 RX ADMIN — GALANTAMINE HYDROBROMIDE 8 MG: 8 CAPSULE, EXTENDED RELEASE ORAL at 08:38

## 2024-09-25 RX ADMIN — METOPROLOL SUCCINATE 12.5 MG: 25 TABLET, EXTENDED RELEASE ORAL at 08:38

## 2024-09-25 RX ADMIN — SUCRALFATE 1 G: 1 TABLET ORAL at 18:16

## 2024-09-25 RX ADMIN — MIDODRINE HYDROCHLORIDE 12.5 MG: 10 TABLET ORAL at 18:16

## 2024-09-25 RX ADMIN — HYDROCORTISONE: 1 CREAM TOPICAL at 08:42

## 2024-09-25 RX ADMIN — OXYCODONE 10 MG: 5 TABLET ORAL at 18:16

## 2024-09-25 RX ADMIN — SUCRALFATE 1 G: 1 TABLET ORAL at 20:41

## 2024-09-25 RX ADMIN — MIDODRINE HYDROCHLORIDE 12.5 MG: 10 TABLET ORAL at 08:35

## 2024-09-25 RX ADMIN — SUCRALFATE 1 G: 1 TABLET ORAL at 06:27

## 2024-09-25 RX ADMIN — ENOXAPARIN SODIUM 40 MG: 100 INJECTION SUBCUTANEOUS at 08:39

## 2024-09-25 RX ADMIN — LEVOTHYROXINE SODIUM 75 MCG: 25 TABLET ORAL at 06:27

## 2024-09-25 RX ADMIN — METOPROLOL SUCCINATE 12.5 MG: 25 TABLET, EXTENDED RELEASE ORAL at 20:41

## 2024-09-25 RX ADMIN — PANTOPRAZOLE SODIUM 40 MG: 40 TABLET, DELAYED RELEASE ORAL at 06:27

## 2024-09-25 RX ADMIN — ESCITALOPRAM OXALATE 10 MG: 10 TABLET ORAL at 08:37

## 2024-09-25 RX ADMIN — ROSUVASTATIN CALCIUM 10 MG: 10 TABLET, FILM COATED ORAL at 08:38

## 2024-09-25 ASSESSMENT — PAIN SCALES - WONG BAKER: WONGBAKER_NUMERICALRESPONSE: NO HURT

## 2024-09-25 ASSESSMENT — PAIN SCALES - GENERAL
PAINLEVEL_OUTOF10: 0
PAINLEVEL_OUTOF10: 7
PAINLEVEL_OUTOF10: 0
PAINLEVEL_OUTOF10: 0

## 2024-09-25 ASSESSMENT — PAIN DESCRIPTION - DESCRIPTORS: DESCRIPTORS: ACHING;DISCOMFORT

## 2024-09-25 ASSESSMENT — PAIN - FUNCTIONAL ASSESSMENT: PAIN_FUNCTIONAL_ASSESSMENT: ACTIVITIES ARE NOT PREVENTED

## 2024-09-25 ASSESSMENT — PAIN DESCRIPTION - LOCATION: LOCATION: GENERALIZED

## 2024-09-25 NOTE — PLAN OF CARE
Problem: Discharge Planning  Goal: Discharge to home or other facility with appropriate resources  9/25/2024 1248 by Maryam Beasley RN  Outcome: Progressing  9/24/2024 2322 by Idania Lutz RN  Outcome: Progressing     Problem: Pain  Goal: Verbalizes/displays adequate comfort level or baseline comfort level  9/25/2024 1248 by Maryam Beasley RN  Outcome: Progressing  9/24/2024 2322 by Idania Lutz RN  Outcome: Progressing     Problem: Safety - Adult  Goal: Free from fall injury  9/25/2024 1248 by Maryam Beasley RN  Outcome: Progressing  9/24/2024 2322 by Idania Lutz RN  Outcome: Progressing     Problem: ABCDS Injury Assessment  Goal: Absence of physical injury  9/25/2024 1248 by Maryam Beasley RN  Outcome: Progressing  9/24/2024 2322 by Idania Lutz RN  Outcome: Progressing     Problem: Skin/Tissue Integrity  Goal: Absence of new skin breakdown  Description: 1.  Monitor for areas of redness and/or skin breakdown  2.  Assess vascular access sites hourly  3.  Every 4-6 hours minimum:  Change oxygen saturation probe site  4.  Every 4-6 hours:  If on nasal continuous positive airway pressure, respiratory therapy assess nares and determine need for appliance change or resting period.  9/25/2024 1248 by Maryam Beasley RN  Outcome: Progressing  9/24/2024 2322 by Idania Lutz RN  Outcome: Progressing

## 2024-09-25 NOTE — PROGRESS NOTES
was recently hospitalized.  Patient's daughter who is the caregiver is unable to care for patient at home \"  Pt was at [facility] for [facility] night and returned home. Pt's daughter unable to care for pt at home and returned to the ED.     Per AIMEE Garvey:  1) Would continue the initial plan outlined by Dr Garza.  Can f/u as recommended.  Told patient to bring in boot for work with PT.  Continue ice and elevation for swelling.  Can have boot off for hygiene and when in bed for ice/elevation but should be on whenever else.  She can be heel WB on the right and WBAT on the left.  No plans for surgery.      AM-PAC Mobility Score    AM-PAC Inpatient Mobility Raw Score : 10         Subjective  Patient lying reclined in bed with  family arriving mid session .  Pt agreeable to this PT session. RN cleared pt for therapy session.  Reports belly feeling full, but reports has not eaten. Reports jerking movements in LEs.     Cognition    A&O orientation not directly assessed.    Able to follow 1 step commands    Pain   Yes  Location: Belly, B ankles  Ratin /10  Pain Medicine Status: No request made    Activity Tolerance   During therapy session noted pt with dizziness/lightheadedness    Pt Position BP (mmHg) HR (bpm) SpO2 (%) on RA  Comments   Supine at rest 148/65 66     Seated at /85 67     Standing 153/110       Mild dizziness   After return to sitting /81   Reports increased dizziness       Objective  Does this pt have an acute or acute on chronic diagnosis of CHF? No      Balance  Static Sitting:  Fair +; Supervision  Dynamic Sitting:  Fair +; SBA   Comments: EOB    Static Standing: Fair ; CGA  Dynamic Standing: Not tested; Not Tested  Comments: RW for support    Posture  Seated: Forward head and neck and Thoracic kyphosis  Standing: Forward head and neck and Thoracic kyphosis    Bed Mobility   Supine to Sit:    Max A   Sit to Supine:   Max A   Rolling:   Not Tested   Scooting in sitting: Min A    Scooting in supine:  visits:  1).  Supine to/from sit: Supervision  2).  Sit to/from stand: Supervision  3).  Bed to chair: Supervision  4).  Gait: Ambulate 50 ft.  with Supervision and use of LRAD (least restrictive assistive device)  5).  Tolerate B LE exercises 3 sets of 10-15 reps     Rehabilitation Potential: Good  Strengths for achieving goals include:              Pt motivated, PLOF, and Pt cooperative   Barriers to achieving goals include:               Complexity of condition, Chronicity of condition, Pain, and Weakness    Plan    To be seen 3-5 x/ week while in acute care setting for therapeutic exercises, bed mobility, transfers, progressive gait training, balance training, and family/patient education.     Signature: Curt Miller PT     If patient discharges from this facility prior to next visit, this note will serve as the Discharge Summary.    CHF Education  N/A

## 2024-09-25 NOTE — PROCEDURES
INTERPRETATION:  This 30-minute, computer-assisted video EEG recording is normal.  No potentially epileptiform activity was present during the recording.      CLASSIFICATION:  Normal (awake and drowsy).  Sleep - unsuccessful.  EKG channel.    DESCRIPTION:    BACKGROUND:  The awake recording revealed 9 Hz alpha activity over the posterior head region.  Given the extensive study, the patient still did not sleep.  The EEG showed normal V waves during drowsiness.  There was no significant change on the EEG background with photic stimulation.  Hyperventilation was omitted due to old age.      INTERICTAL DISCHARGES: None    CLINICAL EVENTS:  None    The EKG channel was unremarkable.

## 2024-09-25 NOTE — PLAN OF CARE
Problem: Discharge Planning  Goal: Discharge to home or other facility with appropriate resources  9/24/2024 2322 by Idania Lutz RN  Outcome: Progressing  9/24/2024 1704 by Maryam Beasley RN  Outcome: Not Progressing     Problem: Pain  Goal: Verbalizes/displays adequate comfort level or baseline comfort level  9/24/2024 2322 by Idania Lutz RN  Outcome: Progressing  9/24/2024 1704 by Maryam Beasley RN  Outcome: Progressing     Problem: Safety - Adult  Goal: Free from fall injury  9/24/2024 2322 by Idania Lutz RN  Outcome: Progressing  9/24/2024 1704 by Maryam Beasley RN  Outcome: Progressing     Problem: ABCDS Injury Assessment  Goal: Absence of physical injury  9/24/2024 2322 by Idania Lutz RN  Outcome: Progressing  9/24/2024 1704 by Maryam Beasley RN  Outcome: Progressing     Problem: Skin/Tissue Integrity  Goal: Absence of new skin breakdown  Description: 1.  Monitor for areas of redness and/or skin breakdown  2.  Assess vascular access sites hourly  3.  Every 4-6 hours minimum:  Change oxygen saturation probe site  4.  Every 4-6 hours:  If on nasal continuous positive airway pressure, respiratory therapy assess nares and determine need for appliance change or resting period.  Outcome: Progressing     Problem: Discharge Planning  Goal: Discharge to home or other facility with appropriate resources  9/24/2024 2322 by Idania Lutz RN  Outcome: Progressing  9/24/2024 1704 by Maryam Beasley RN  Outcome: Not Progressing      Yes

## 2024-09-25 NOTE — FLOWSHEET NOTE
09/25/24 0838   Vital Signs   Temp 98 °F (36.7 °C)   Temp Source Oral   Pulse 63   Heart Rate Source Monitor   Respirations 16   /71   MAP (Calculated) 88   BP Location Left upper arm   BP Method Automatic   Patient Position Semi fowlers   Pain Assessment   Pain Assessment None - Denies Pain   Pain Level 0   Oxygen Therapy   SpO2 98 %   O2 Device None (Room air)     AM assessment completed. No complaints of pain or discomfort voiced. No signs or symptoms of distress noted. Patient tolerated AM medications well. Respirations easy and even. Bed in lowest position, bed alarm in place and functioning properly, bed rails x2 up,  Call light within reach.     Bedside Mobility Assessment Tool (BMAT):     Assessment Level 1- Sit and Shake    1. From a semi-reclined position, ask patient to sit up and rotate to a seated position at the side of the bed. Can use the bedrail.    2. Ask patient to reach out and grab your hand and shake making sure patient reaches across his/her midline.   Pass- Patient is able to come to a seated position, maintain core strength. Maintains seated balance while reaching across midline. Move on to Assessment Level 2.     Assessment Level 2- Stretch and Point   1. With patient in seated position at the side of the bed, have patient place both feet on the floor (or stool) with knees no higher than hips.    2. Ask patient to stretch one leg and straighten the knee, then bend the ankle/flex and point the toes. If appropriate, repeat with the other leg.   Fail- Patient is unable to complete task. Patient is MOBILITY LEVEL 2.     Assessment Level 3- Stand   1. Ask patient to elevate off the bed or chair (seated to standing) using an assistive device (cane, bedrail).    2. Patient should be able to raise buttocks off be and hold for a count of five. May repeat once.   Fail- Patient unable to demonstrate standing stability. Patient is MOBILITY LEVEL 3.     Assessment Level 4- Walk   1. Ask patient

## 2024-09-25 NOTE — CONSULTS
Inpatient Neurology consult        [facility]            Cordelia Pillai  1955    Date of Service: 9/25/2024    Referring Physician: Che Cha DO      Reason for the consult and CC: seizure/transient decreased responsiveness     HPI:   The patient is a 68 y.o. female with a past medical history of anxiety, hypertension, hyperlipidemia, POTS, and cervical radiculopathy originally admitted to the hospital for concerns of increased pain and inability to care for herself at home. Patient was recently admitted for a right ankle fracture sustained from a syncopal episode and was discharged home refusing SNF placement. Patient seen by neurology on 8/1 for evaluation of recurrent spells of loss of consciousness onset 4 to 5 years ago with multiple falls during episodes.  Patient reportedly developed seizure-like activity approximately 2 years ago describing episodes as \"blackout types\" lasting approximately 10 minutes.  Patient underwent multiple EEGs that did not show seizure tendencies.  Neurology has been consulted for further evaluation of seizure-like activity/transient decreased responsiveness.       Constitutional:   Vitals:    09/24/24 2000 09/25/24 0034 09/25/24 0838 09/25/24 1345   BP: (!) 109/52 95/60 122/71 (!) 166/82   Pulse: 62 82 63 64   Resp: 17 18 16    Temp: 97.7 °F (36.5 °C) 98.1 °F (36.7 °C) 98 °F (36.7 °C)    TempSrc: Oral  Oral    SpO2: 94% 96% 98%    Weight:       Height:             I personally reviewed and updated social history, past medical history, medications, allergy, surgical history, and family history as documented in the patient's electronic health records.       ROS: 10-14 ROS reviewed with the patient/nurse/family which were unremarkable except mentioned in H&P.    General appearance:  Normal development and appear in no acute distress.   Mental Status:   Oriented to person, place, and time.  Memory: Good immediate recall.  Intact remote memory  Normal attention span  Results   Component Value Date/Time     09/24/2024 05:13 AM    K 3.5 09/24/2024 05:13 AM    K 4.4 09/21/2024 07:41 PM     09/24/2024 05:13 AM    CO2 27 09/24/2024 05:13 AM    BUN 13 09/24/2024 05:13 AM    CREATININE <0.5 09/24/2024 05:13 AM    GFRAA >60 06/26/2022 03:37 PM    GFRAA >60 05/13/2013 09:19 PM    LABGLOM >90 09/24/2024 05:13 AM    LABGLOM >60 03/17/2024 02:18 PM    GLUCOSE 97 09/24/2024 05:13 AM    PHOS 2.5 04/29/2020 06:24 AM    MG 2.10 09/16/2024 06:03 AM    CALCIUM 8.1 09/24/2024 05:13 AM     Lab Results   Component Value Date/Time    WBC 5.2 09/24/2024 05:13 AM    RBC 3.66 09/24/2024 05:13 AM    HGB 8.6 09/24/2024 05:13 AM    HCT 27.6 09/24/2024 05:13 AM    MCV 75.5 09/24/2024 05:13 AM    RDW 27.7 09/24/2024 05:13 AM     09/24/2024 05:13 AM     Lab Results   Component Value Date    INR 0.97 05/18/2023    PROTIME 12.9 05/18/2023         Impression:  Recurrent spells of loss of consciousness   Seizure-like activity    There is no new focal weakness or numbness during the assessment. NCHCT on 9/14 revealed no acute intracranial abnormalities     Recommendation:  Continue rosuvastatin 10 mg daily (home)   PT/OT to follow  EEG pending   Follow up with Neurology at  as scheduled       Thank you for referring such patient. If you have any questions regarding my consult note, please don't hesitate to call me.     MATIAS Butcher NP    This dictation was generated by voice recognition computer software. Although all attempts are made to edit the dictation for accuracy, there may be errors in the  transcription that are not intended      Addendum:  The patient may need to get EEG monitoring for further evaluation as there is no clear explanation about the spell.  The patient has multiple risk factors for her spell including underlying arrhythmia with cardiac pacemaker, and orthostatic intolerance.

## 2024-09-25 NOTE — PROCEDURES
PROCEDURE NOTE  Date: 9/25/2024   Name: Cordelia Pillai  YOB: 1955    EEG now complete. Results pending.

## 2024-09-25 NOTE — PROGRESS NOTES
IM Progress Note    Daily Progress Note for 2024 3:22 PM 0204/0204-01  Cordelia Pillai : 1955 Age: 68 y.o. Sex: female  Length of Stay:  4    Interval History:      CC: F/U Pain  Patient with recent admission for right ankle fracture  -SNF was recommended on discharge but patient refused and went back home  - presenting back with increased pain. unable to care for herself - needs SNF.     Seen by therapy . Has boot    Seen and cleared by Ortho for discharge.  Case management following for SNF placement.- Pending precert       Subjective:       Complains of persistent right ankle pain. On oxy 10s. Gets periods of decreased LOC- likely related to narcotics    Neuro work up neg .   issues with chronic pain and neuropathy.  Complains of right shoulder pain.  She is also developed a rash on her right arm.  Complains of dysuria- better   Had  abdominal discomfort and nausea and vomiting- better today . Tolerated liquids- can advance diety     -  Patient had episode of decreased LOC on . Resolved spontaneosly    Seen  by neuro - similar episodes in the past     Awake now and alert and oriented  now. Pt states that she has had prior similar episodes. No clear etiology. She does not recall RN trying to wake her up.   Follows with  neuro    Vital stable       Objective:     Vitals:    24 2000 24 0034 24 0838 24 1345   BP: (!) 109/52 95/60 122/71 (!) 166/82   Pulse: 62 82 63 64   Resp: 17 18 16    Temp: 97.7 °F (36.5 °C) 98.1 °F (36.7 °C) 98 °F (36.7 °C)    TempSrc: Oral  Oral    SpO2: 94% 96% 98%    Weight:       Height:              Intake/Output Summary (Last 24 hours) at 2024 1522  Last data filed at 2024 1458  Gross per 24 hour   Intake 840 ml   Output 1850 ml   Net -1010 ml     Body mass index is 31.53 kg/m².    Physical Exam:  General: Ill-appearing, awake alert and well-oriented and in no distress  HEENT:  Head: normocephalic,atraumatic, anicteric sclera, clear  patient declined despite discussions, returned to ER as daughter not able to care for her  -PT/OT  -Ortho recs reviewed, she has boot for the right foot- use when up with therapy; cont  ankle brace on the left. Ice and elevation for swelling.   Boot can be removed for hygiene, icing, skin rest.   -Case management consulted DC planning for SNF  -Oxycodone as needed for pain    Hx of gastric bypass- Santiago-en-Y  Recent SBO  Poor absorption  JEROME   H/o nausea, vomiting   - Continue PPI.  Increase to twice daily.    -Add Carafate.    - Continue Zofran as needed  - nausea better today  Nausea vomiting has resolved -can advance diet to regular diet stop IV fluids     Bipolar disorder  - resumed home regimen, klonopin and Lexapro      Chronic pain  - on oxy 10mg ;  has episodes of decreased responsiveness     Recurrent transient episodes of  responsiveness  - R/o  seizure- checked EEG- normal   - MRI brain- could not be done as pt has a pacemaker  - ABG - dose. No acidosis  - NH3 level normal   - exam non focal now   - neuro consult     -Patient seen and cleared by neurology -she has had multiple similar episodes in the past - workup has been negative; MRI cannot be done as she has a pacemaker; EEG normal .    Patient needs to follow-up with her primary neurologist at      Hypothyroidism  - continue home regimen  - check TSH - elevated however pt says she has not been taking her meds, synthroid was resumed on admission    Chronic headaches  - follows with neurology      NATHAN    Dysuria, she believes she is getting UTI  Was on Levaquin prior  Repeat UA and urine culture-  less than 06637 candida     Vaginitis  -given 1 dose of Diflucan    DVT Prophylaxis: Lovenox    Management discussed with RN,  MELVI.   Await precert for SNF    Total time today 35 minutes. > 50 % time dominated by coordination of care      Altaf Murillo MD   2024 3:22 PM

## 2024-09-25 NOTE — PLAN OF CARE
Problem: Discharge Planning  Goal: Discharge to home or other facility with appropriate resources  9/25/2024 1954 by Maryam Beasley RN  Outcome: Progressing  9/25/2024 1248 by Maryam Beasley RN  Outcome: Progressing     Problem: Pain  Goal: Verbalizes/displays adequate comfort level or baseline comfort level  9/25/2024 1955 by Maryam Beasley RN  Outcome: Progressing  9/25/2024 1248 by Maryam Beasley RN  Outcome: Progressing     Problem: Safety - Adult  Goal: Free from fall injury  9/25/2024 1955 by Maryam Beasley RN  Outcome: Progressing  9/25/2024 1248 by Maryam Beasley RN  Outcome: Progressing     Problem: ABCDS Injury Assessment  Goal: Absence of physical injury  9/25/2024 1955 by Maryam Beasley RN  Outcome: Progressing  9/25/2024 1248 by Maryam Beasley RN  Outcome: Progressing     Problem: Skin/Tissue Integrity  Goal: Absence of new skin breakdown  Description: 1.  Monitor for areas of redness and/or skin breakdown  2.  Assess vascular access sites hourly  3.  Every 4-6 hours minimum:  Change oxygen saturation probe site  4.  Every 4-6 hours:  If on nasal continuous positive airway pressure, respiratory therapy assess nares and determine need for appliance change or resting period.  Outcome: Progressing

## 2024-09-25 NOTE — PROGRESS NOTES
Inpatient Occupational Therapy Treatment    Unit: 2 [place]  Date:  9/25/2024  Patient Name:    Cordelia Pillai  Admitting diagnosis:  Physical deconditioning [R53.81]  Unable to ambulate [R26.2]  Closed nondisplaced fracture of medial malleolus of right tibia, initial encounter [S82.54XA]  Decreased activities of daily living (ADL) [Z78.9]  Closed nondisplaced fracture of phalanx of right great toe, unspecified phalanx, initial encounter [S92.404A]  Admit Date:  9/21/2024  Precautions/Restrictions/WB Status/ Lines/ Wounds/ Oxygen: Fall risk, Bed/chair alarm, Lines (IV and suprapubic catheter), and Telesitter; WB Restrictions (Heel WB on R with boot; WBAT on left)     Pt seen for cotreatment this date due to patient safety, patient endurance, complexity of condition, and acute illness/injury    Treatment Time:  3628-8806  Treatment Number:  3  Timed Code Treatment Minutes: 40 minutes  Total Treatment Minutes:  40  minutes    Patient Goals for Therapy: \"to go to rehab\"          Discharge Recommendations: SNF  DME needs for discharge: Defer to facility       Therapy recommendations for staff:   Assist of 1 for sitting EOB/standing at EOB with gait belt and RW    History of Present Illness: Per Dr. Lemus H&P 9/21/24:  \"68 y.o. female with history of recent right medial malleolus and right great toe fracture who had been cleared by orthopedic team for outpatient follow-up presented to the ER with complaints of foot pain and generalized weakness leading to multiple falls.  Patient is unable to care for self at home due to ongoing multiple falls which seems to be related to patient's ongoing foot pain.  Patient follows with pain management in the outpatient and also missed her outpatient appointment while she was recently hospitalized.  Patient's daughter who is the caregiver is unable to care for patient at home\"      Per AIMEE Garvey:  1) Would continue the initial plan outlined by Dr Garza.  Can f/u as recommended.  Told  serve as the Discharge Summary

## 2024-09-25 NOTE — CARE COORDINATION
Reviewed chart, spoke with Cassandra at [facility] who states she is still waiting on precert. Pt has [employer] and they have been taking a few days to get back. Will continue to monitor.

## 2024-09-25 NOTE — FLOWSHEET NOTE
09/25/24 1926   Vital Signs   Temp 98 °F (36.7 °C)   Temp Source Oral   Pulse 60   Heart Rate Source Monitor   Respirations 16   BP (!) 146/75   MAP (Calculated) 99   BP Location Right upper arm   BP Method Automatic   Patient Position Semi fowlers   Pain Assessment   Pain Assessment None - Denies Pain   Pain Level 0   Oxygen Therapy   SpO2 95 %   O2 Device None (Room air)     AM assessment completed. No complaints of pain or discomfort voiced. No signs or symptoms of distress noted. Patient tolerated AM medications well. Respirations easy and even. Bed in lowest position, bed alarm in place and functioning properly, bed rails x2 up,  Call light within reach.     Bedside Mobility Assessment Tool (BMAT):     Assessment Level 1- Sit and Shake    1. From a semi-reclined position, ask patient to sit up and rotate to a seated position at the side of the bed. Can use the bedrail.    2. Ask patient to reach out and grab your hand and shake making sure patient reaches across his/her midline.   Pass- Patient is able to come to a seated position, maintain core strength. Maintains seated balance while reaching across midline. Move on to Assessment Level 2.     Assessment Level 2- Stretch and Point   1. With patient in seated position at the side of the bed, have patient place both feet on the floor (or stool) with knees no higher than hips.    2. Ask patient to stretch one leg and straighten the knee, then bend the ankle/flex and point the toes. If appropriate, repeat with the other leg.   Fail- Patient is unable to complete task. Patient is MOBILITY LEVEL 2.     Assessment Level 3- Stand   1. Ask patient to elevate off the bed or chair (seated to standing) using an assistive device (cane, bedrail).    2. Patient should be able to raise buttocks off be and hold for a count of five. May repeat once.   Fail- Patient unable to demonstrate standing stability. Patient is MOBILITY LEVEL 3.     Assessment Level 4- Walk   1. Ask

## 2024-09-26 VITALS
RESPIRATION RATE: 16 BRPM | TEMPERATURE: 97.5 F | WEIGHT: 172.4 LBS | SYSTOLIC BLOOD PRESSURE: 125 MMHG | HEART RATE: 65 BPM | HEIGHT: 62 IN | DIASTOLIC BLOOD PRESSURE: 63 MMHG | BODY MASS INDEX: 31.73 KG/M2 | OXYGEN SATURATION: 96 %

## 2024-09-26 PROCEDURE — APPSS30 APP SPLIT SHARED TIME 16-30 MINUTES

## 2024-09-26 PROCEDURE — 6360000002 HC RX W HCPCS: Performed by: FAMILY MEDICINE

## 2024-09-26 PROCEDURE — 6370000000 HC RX 637 (ALT 250 FOR IP): Performed by: FAMILY MEDICINE

## 2024-09-26 PROCEDURE — 99233 SBSQ HOSP IP/OBS HIGH 50: CPT | Performed by: PSYCHIATRY & NEUROLOGY

## 2024-09-26 PROCEDURE — 2580000003 HC RX 258: Performed by: FAMILY MEDICINE

## 2024-09-26 PROCEDURE — 6370000000 HC RX 637 (ALT 250 FOR IP): Performed by: INTERNAL MEDICINE

## 2024-09-26 RX ORDER — SUCRALFATE 1 G/1
1 TABLET ORAL
Qty: 120 TABLET | Refills: 3 | Status: SHIPPED | OUTPATIENT
Start: 2024-09-26

## 2024-09-26 RX ORDER — OXYCODONE HYDROCHLORIDE 10 MG/1
10 TABLET ORAL EVERY 4 HOURS PRN
Qty: 10 TABLET | Refills: 0 | Status: SHIPPED | OUTPATIENT
Start: 2024-09-26 | End: 2024-09-29

## 2024-09-26 RX ORDER — BENZOCAINE/MENTHOL 6 MG-10 MG
LOZENGE MUCOUS MEMBRANE
DISCHARGE
Start: 2024-09-26 | End: 2024-10-03

## 2024-09-26 RX ORDER — PANTOPRAZOLE SODIUM 40 MG/1
40 TABLET, DELAYED RELEASE ORAL
DISCHARGE
Start: 2024-09-26

## 2024-09-26 RX ORDER — CLONAZEPAM 1 MG/1
1 TABLET ORAL 2 TIMES DAILY PRN
Qty: 6 TABLET | Refills: 0 | Status: SHIPPED | OUTPATIENT
Start: 2024-09-26 | End: 2024-09-29

## 2024-09-26 RX ADMIN — LEVOTHYROXINE SODIUM 75 MCG: 25 TABLET ORAL at 06:26

## 2024-09-26 RX ADMIN — MIDODRINE HYDROCHLORIDE 12.5 MG: 10 TABLET ORAL at 11:53

## 2024-09-26 RX ADMIN — SUCRALFATE 1 G: 1 TABLET ORAL at 11:53

## 2024-09-26 RX ADMIN — OXYCODONE 10 MG: 5 TABLET ORAL at 02:33

## 2024-09-26 RX ADMIN — HYDROCORTISONE: 1 CREAM TOPICAL at 09:48

## 2024-09-26 RX ADMIN — ROSUVASTATIN CALCIUM 10 MG: 10 TABLET, FILM COATED ORAL at 09:43

## 2024-09-26 RX ADMIN — METOPROLOL SUCCINATE 12.5 MG: 25 TABLET, EXTENDED RELEASE ORAL at 09:43

## 2024-09-26 RX ADMIN — SUCRALFATE 1 G: 1 TABLET ORAL at 06:26

## 2024-09-26 RX ADMIN — GALANTAMINE HYDROBROMIDE 8 MG: 8 CAPSULE, EXTENDED RELEASE ORAL at 09:52

## 2024-09-26 RX ADMIN — OXYCODONE 10 MG: 5 TABLET ORAL at 09:53

## 2024-09-26 RX ADMIN — GABAPENTIN 100 MG: 100 CAPSULE ORAL at 02:33

## 2024-09-26 RX ADMIN — Medication 10 ML: at 09:49

## 2024-09-26 RX ADMIN — ESCITALOPRAM OXALATE 10 MG: 10 TABLET ORAL at 09:43

## 2024-09-26 RX ADMIN — ENOXAPARIN SODIUM 40 MG: 100 INJECTION SUBCUTANEOUS at 09:43

## 2024-09-26 RX ADMIN — MIDODRINE HYDROCHLORIDE 12.5 MG: 10 TABLET ORAL at 08:18

## 2024-09-26 RX ADMIN — PANTOPRAZOLE SODIUM 40 MG: 40 TABLET, DELAYED RELEASE ORAL at 06:26

## 2024-09-26 ASSESSMENT — PAIN SCALES - GENERAL
PAINLEVEL_OUTOF10: 7
PAINLEVEL_OUTOF10: 7
PAINLEVEL_OUTOF10: 2
PAINLEVEL_OUTOF10: 2

## 2024-09-26 ASSESSMENT — PAIN DESCRIPTION - PAIN TYPE: TYPE: CHRONIC PAIN

## 2024-09-26 ASSESSMENT — PAIN DESCRIPTION - ORIENTATION
ORIENTATION: RIGHT;LEFT;LOWER
ORIENTATION: RIGHT;LEFT

## 2024-09-26 ASSESSMENT — PAIN DESCRIPTION - DESCRIPTORS
DESCRIPTORS: ACHING
DESCRIPTORS: ACHING;DISCOMFORT

## 2024-09-26 ASSESSMENT — PAIN SCALES - WONG BAKER: WONGBAKER_NUMERICALRESPONSE: NO HURT

## 2024-09-26 ASSESSMENT — PAIN DESCRIPTION - LOCATION: LOCATION: GENERALIZED

## 2024-09-26 ASSESSMENT — PAIN - FUNCTIONAL ASSESSMENT: PAIN_FUNCTIONAL_ASSESSMENT: ACTIVITIES ARE NOT PREVENTED

## 2024-09-26 NOTE — PROGRESS NOTES
Cordelia Pillai  Neurology Follow-up  [facility]    Date of Service: 9/26/2024    Subjective:   CC: Follow up today regarding:     Events noted. Chart and lab reviewed.       ROS : A 10-12 system review obtained and updated today and is unremarkable except as mentioned  in my interval history.     Past medical history, social history, medication and family history reviewed.       Objective:  Exam:   Constitutional:   Vitals:    09/26/24 0815 09/26/24 0953 09/26/24 1023 09/26/24 1245   BP: 122/65   125/63   Pulse: 64   65   Resp: 16 16 16 16   Temp: 97.8 °F (36.6 °C)   97.5 °F (36.4 °C)   TempSrc: Oral   Oral   SpO2: 97%   96%   Weight:       Height:         General appearance:  Normal development and appear in no acute distress.   Mental Status:   Oriented to person, place, and time.  Memory: Good immediate recall.  Intact remote memory  Normal attention span and concentration.  Language: intact naming, repeating and fluency   Good fund of Knowledge. Aware of current events and vocabulary   Cranial Nerves:   II: Visual fields: Full. Pupils: PERRLA  III,IV,VI: Extra Ocular Movements are intact. No nystagmus  V: Facial sensation is intact  VII: Facial strength and movements: intact and symmetric  IX: Normal palatal elevation and shoulder shrug  XII: Tongue movements are normal  Musculoskeletal: 4/5 strength BUE, 3/5 strength BLE.   Tone: Normal tone.   Planters: Flexor bilaterally  Coordination: no pronator drift, no dysmetria with FNF.  Sensation: Grossly normal.   Gait/Posture: Deferred for patient safety.         Medical decision making:        A. Problems (any 1)     High:     [x] Acute/Chronic Illness/injury posing threat to life or bodily function:    [] Severe exacerbation of chronic illness:       Moderate:     []     1 or more chronic illness with exacerbation, progression or side effect of treatment or  []     2 or more stable chronic illnesses or  []     1 acute illness with systemic symptoms      ---------------------------------------------------------------------  B. Risk of Treatment (any 1)   [x] Drugs/treatments that require intensive monitoring for toxicity include:    [] Change in code status:    [] Decision to escalate care:    [] Major surgery/procedure with associated risk factors:    [x] Prescription drug management  ----------------------------------------------------------------------  [x] High (any 2)  [] Moderate (any 1)     C. Data (any 2 for high and any 1 for moderate)  [x] Discussed management of the case with:    [x] Imaging personally reviewed and interpreted  [x] Data Review (any 3)  [x] Collateral history obtained from:    [x] All available Consultant notes from yesterday/today were reviewed  [x] All current labs were reviewed and interpreted for clinical significance   [] Appropriate follow-up labs were ordered      Data  LABS:   Lab Results   Component Value Date/Time     09/24/2024 05:13 AM    K 3.5 09/24/2024 05:13 AM    K 4.4 09/21/2024 07:41 PM     09/24/2024 05:13 AM    CO2 27 09/24/2024 05:13 AM    BUN 13 09/24/2024 05:13 AM    CREATININE <0.5 09/24/2024 05:13 AM    GFRAA >60 06/26/2022 03:37 PM    GFRAA >60 05/13/2013 09:19 PM    LABGLOM >90 09/24/2024 05:13 AM    LABGLOM >60 03/17/2024 02:18 PM    GLUCOSE 97 09/24/2024 05:13 AM    PHOS 2.5 04/29/2020 06:24 AM    MG 2.10 09/16/2024 06:03 AM    CALCIUM 8.1 09/24/2024 05:13 AM     Lab Results   Component Value Date/Time    WBC 5.2 09/24/2024 05:13 AM    RBC 3.66 09/24/2024 05:13 AM    HGB 8.6 09/24/2024 05:13 AM    HCT 27.6 09/24/2024 05:13 AM    MCV 75.5 09/24/2024 05:13 AM    RDW 27.7 09/24/2024 05:13 AM     09/24/2024 05:13 AM     Lab Results   Component Value Date    INR 0.97 05/18/2023    PROTIME 12.9 05/18/2023       Neuroimaging was independently reviewed by me and discussed results with the patient  I reviewed blood testing and other test results and discussed results with the

## 2024-09-26 NOTE — PROGRESS NOTES
Patient discharged to [facility] via stretcher with [facility] transport.  Patient alert and oriented. VSS

## 2024-09-26 NOTE — PROGRESS NOTES
IM Progress Note    Daily Progress Note for 2024 10:21 AM 0204/0204-01  Cordelia Pillai : 1955 Age: 68 y.o. Sex: female  Length of Stay:  5    Interval History:      CC: F/U Pain  Patient with recent admission for right ankle fracture  -SNF was recommended on discharge but patient refused and went back home  - presenting back with increased pain. unable to care for herself - needs SNF.     Seen by therapy . Has boot    Seen and cleared by Ortho for discharge.  Case management following for SNF placement.- Pending precert       Subjective:       Complains of persistent right ankle pain. On oxy 10s. Gets periods of decreased LOC- likely related to narcotics    Neuro work up neg .   issues with chronic pain and neuropathy.  Complains of right shoulder pain.  She is also developed a rash on her right arm.  Complains of dysuria- better   Had  abdominal discomfort and nausea and vomiting- better today . Tolerated liquids- can advance diety     -  Patient had episode of decreased LOC on . Resolved spontaneosly    Seen  by neuro - similar episodes in the past     Awake now and alert and oriented  now. Pt states that she has had prior similar episodes. No clear etiology. She does not recall RN trying to wake her up.   Follows with  neuro    Vital stable       Objective:     Vitals:    24 0229 24 0303 24 0815 24 0953   BP: (!) 100/56  122/65    Pulse: 60  64    Resp: 18 16 16 16   Temp: 98.2 °F (36.8 °C)  97.8 °F (36.6 °C)    TempSrc: Oral  Oral    SpO2: 95%  97%    Weight:       Height:              Intake/Output Summary (Last 24 hours) at 2024 1021  Last data filed at 2024 0634  Gross per 24 hour   Intake 822 ml   Output 1850 ml   Net -1028 ml     Body mass index is 31.53 kg/m².    Physical Exam:  General: Ill-appearing, awake alert and well-oriented and in no distress  HEENT:  Head: normocephalic,atraumatic, anicteric sclera, clear conjunctiva  Neck: Normal size, Jugular

## 2024-09-26 NOTE — PROGRESS NOTES
Pt a/o. Am assessment completed see flow sheet. Pt denies any pain/ needs at this time. Call light within reach.   Vitals:    09/26/24 0815   BP: 122/65   Pulse: 64   Resp: 16   Temp: 97.8 °F (36.6 °C)   SpO2: 97%

## 2024-09-26 NOTE — DISCHARGE INSTR - COC
pacemaker Z95.0    Neurogenic bladder N31.9    Closed head injury S09.90XA    Laceration of forehead S01.81XA    Orthostatic hypotension I95.1    Hypertension, uncontrolled I10    Hypokalemia E87.6    Hypertensive urgency I16.0    Acute nonintractable headache R51.9    Multiple drug resistant organism (MDRO) culture positive Z16.24    Decreased activities of daily living (ADL) Z78.9    Hypomagnesemia E83.42    Frequent falls R29.6    SIRS (systemic inflammatory response syndrome) ([facility]) R65.10    Microcytic anemia D50.9    Acute kidney injury (nontraumatic) ([facility]) N17.9    Closed nondisplaced fracture of first right metatarsal bone S92.314A    Closed nondisplaced pilon fracture of right tibia S82.874A    Physical deconditioning R53.81    Unable to ambulate R26.2    Closed nondisplaced fracture of medial malleolus of right tibia S82.54XA    Closed nondisplaced fracture of right great toe S92.404A    Nausea and vomiting R11.2    Subacute vaginitis N76.1    Other chronic pain G89.29    Altered level of consciousness R40.4       Isolation/Infection:   Isolation            Contact          Patient Infection Status       Infection Onset Added Last Indicated Last Indicated By Review Planned Expiration Resolved Resolved By    MDRO (multi-drug resistant organism) 04/13/21 04/15/21 04/13/21 Culture, Urine        Resolved    C-diff Rule Out 03/17/24 03/17/24 03/17/24 Clostridium difficile toxin/antigen   03/17/24 Rule-Out Test Canceled                       Nurse Assessment:  Last Vital Signs: /65   Pulse 64   Temp 97.8 °F (36.6 °C) (Oral)   Resp 16   Ht 1.575 m (5' 2\")   Wt 78.2 kg (172 lb 6.4 oz)   SpO2 97%   BMI 31.53 kg/m²     Last documented pain score (0-10 scale): Pain Level: 7  Last Weight:   Wt Readings from Last 1 Encounters:   09/21/24 78.2 kg (172 lb 6.4 oz)     Mental Status:  oriented and alert      Nursing Mobility/ADLs:  Walking   Assisted  Transfer  Assisted  Bathing  Assisted  Dressing

## 2024-09-26 NOTE — FLOWSHEET NOTE
09/26/24 0229   Vital Signs   Temp 98.2 °F (36.8 °C)   Temp Source Oral   Pulse 60   Respirations 18   BP (!) 100/56   MAP (Calculated) 71   BP Location Left upper arm   Pain Assessment   Pain Assessment 0-10   Pain Level 7   Pain Location Generalized   Pain Orientation Right;Left   Pain Descriptors Aching;Discomfort   Functional Pain Assessment Activities are not prevented   Pain Type Chronic pain   Non-Pharmaceutical Pain Intervention(s) Rest   Oxygen Therapy   SpO2 95 %   O2 Device None (Room air)     Pt c/o generalized pain \"all over\". Gabapentin and oxycodone po given to pt. Rosaura Hadley RN

## 2024-09-26 NOTE — CARE COORDINATION
DISCHARGE ORDER  Date/Time 2024 12:21 PM  Completed by: Daniella Luis, RN, Case Management    Patient Name: Cordelia Pillai    : 1955      Admit order Date and Status:24 inpt  Noted discharge order. (verify MD's last order for status of admission/Traditional Medicare 3 [place] Inpatient qualifying stay required for SNF)    Confirmed discharge plan with:              Patient:  Yes              When pt confirms DC plan does any support person need to be contacted by CM No                        Discharge to Facility: [facility]   Facility phone number for staff giving report: [phone]   Pre-certification completed: Yes   Hospital Exemption Notification (HENS) completed: Yes   Discharge orders and Continuity of Care faxed to facility:  facility will pull from [facility]      Transportation:               Medical Transport explained with choice list offered to pt/family.                Choice:(no preference)  Agency used: [facility]   time:   13:15      Pt/family/Nursing/Facility aware of  time:   Yes Names: Dolly allen, Nury ROB, Salvatore nurse, pt and Sowmya at [facility].  Ambulance form completed:  Yes:      Date Last IMM Given: 24    Comments:Order for dc noted. Spoke with Sowmya at [facility] who states pre cert completed and can accept today. Spoke with pt who cont plan to go ot [facility] for rehab. Chart reviewed and no other dc needs identified.    Pt is being d/c'd to Skilled Nursing Facility (SNF)  today. Pt's O2 sats are 97% on RA.    Discharge timeout done with nsg, CM and pt. All discharge needs and concerns addressed.    Discharging nurse to complete DANIELLE, reconcile AVS, and place final copy with patient's discharge packet. Discharging RN to ensure that written prescriptions for  Level II medications are sent with patient to the facility as per protocol.

## 2024-09-26 NOTE — PROGRESS NOTES
Have tried to call report x 3 times to [facility], no answer noted after 15 rings each time.

## 2024-10-08 ENCOUNTER — OFFICE VISIT (OUTPATIENT)
Dept: ORTHOPEDIC SURGERY | Age: 69
End: 2024-10-08
Payer: MEDICARE

## 2024-10-08 VITALS — WEIGHT: 170 LBS | HEIGHT: 62 IN | BODY MASS INDEX: 31.28 KG/M2

## 2024-10-08 DIAGNOSIS — G89.29 CHRONIC PAIN OF LEFT ANKLE: ICD-10-CM

## 2024-10-08 DIAGNOSIS — M25.572 CHRONIC PAIN OF LEFT ANKLE: ICD-10-CM

## 2024-10-08 DIAGNOSIS — S92.404A CLOSED NONDISPLACED FRACTURE OF PHALANX OF RIGHT GREAT TOE, UNSPECIFIED PHALANX, INITIAL ENCOUNTER: Primary | ICD-10-CM

## 2024-10-08 PROCEDURE — 99213 OFFICE O/P EST LOW 20 MIN: CPT | Performed by: ORTHOPAEDIC SURGERY

## 2024-10-08 PROCEDURE — 1123F ACP DISCUSS/DSCN MKR DOCD: CPT | Performed by: ORTHOPAEDIC SURGERY

## 2024-10-11 NOTE — PROGRESS NOTES
ORTHOPAEDIC SURGERY FOLLOWUP VISIT    CHIEF COMPLAINT: Bilateral ankle/foot pain    DATE OF INJURY: 9/14/2024  DIAGNOSIS:   Right foot minimally displaced great toe metatarsal base fracture  Right nondisplaced medial malleolus fracture, closed  Left distal fibula avulsion fracture, nondisplaced  DATE OF SURGERY: N/A, nonoperative management    HISTORY OF PRESENT ILLNESS:  68-year-old female presents 3.5 weeks status post bilateral ankle injury.  Overall, she is advancing her activities.  She has been weightbearing with a boot on the right lower extremity and with a lace up ankle brace on the left.  She has had improvement in her ambulatory status.  Reports that her pain is most significant in the great toe.  Denies numbness or tingling.    PHYSICAL EXAM:  General: Well-appearing.  No distress.    Focused examination of the right lower extremity:  Presents in cam boot.  This is removed in order to examine the skin.  Visualized portions of skin are without cuts, open wounds, or abrasions.  There is mild dorsal foot swelling overlying the great toe. Bruising/ecchymosis has resolved.  There is tenderness to palpation over the great toe metatarsal as well as the medial ankle. Sensation is intact to light touch in deep peroneal, superficial peroneal, tibial, sural, and saphenous nerve distributions.  Motor function is intact to EHL, FHL, tibialis anterior, and gastroc.  There is brisk capillary refill to the toes and a strong palpable dorsalis pedis pulse.  Compartments are soft and compressible.  There is no calf tenderness and a negative Homans' sign.     Focused examination of the left lower extremity:  No cuts, open wounds, or abrasions to the left ankle.  Minimal swelling.  No tenderness to palpation about the upper distal fibula.  There is tenderness about the tip of the fibula.  Minimal tenderness along the ATFL.  No tenderness about the medial ankle.  Ankle range of motion is within functional limits.  Sensation

## 2024-10-14 ENCOUNTER — HOSPITAL ENCOUNTER (OUTPATIENT)
Age: 69
Setting detail: SPECIMEN
Discharge: HOME | End: 2024-10-14
Payer: MEDICARE

## 2024-10-14 PROCEDURE — 87186 SC STD MICRODIL/AGAR DIL: CPT

## 2024-10-14 PROCEDURE — 87077 CULTURE AEROBIC IDENTIFY: CPT

## 2024-10-14 PROCEDURE — 87086 URINE CULTURE/COLONY COUNT: CPT

## 2024-10-17 ENCOUNTER — OFFICE VISIT (OUTPATIENT)
Age: 69
End: 2024-10-17
Payer: MEDICARE

## 2024-10-17 VITALS
HEART RATE: 64 BPM | SYSTOLIC BLOOD PRESSURE: 114 MMHG | RESPIRATION RATE: 16 BRPM | BODY MASS INDEX: 30.36 KG/M2 | HEIGHT: 62 IN | WEIGHT: 165 LBS | OXYGEN SATURATION: 98 % | DIASTOLIC BLOOD PRESSURE: 74 MMHG

## 2024-10-17 DIAGNOSIS — G95.9 MYELOPATHY (HCC): Primary | ICD-10-CM

## 2024-10-17 LAB
BACTERIA UR CULT: ABNORMAL
ORGANISM: ABNORMAL

## 2024-10-17 PROCEDURE — 1123F ACP DISCUSS/DSCN MKR DOCD: CPT | Performed by: PSYCHIATRY & NEUROLOGY

## 2024-10-17 PROCEDURE — 3074F SYST BP LT 130 MM HG: CPT | Performed by: PSYCHIATRY & NEUROLOGY

## 2024-10-17 PROCEDURE — 99214 OFFICE O/P EST MOD 30 MIN: CPT | Performed by: PSYCHIATRY & NEUROLOGY

## 2024-10-17 PROCEDURE — 3078F DIAST BP <80 MM HG: CPT | Performed by: PSYCHIATRY & NEUROLOGY

## 2024-10-17 RX ORDER — POTASSIUM CHLORIDE 1.5 G/1.58G
20 POWDER, FOR SOLUTION ORAL 3 TIMES DAILY
COMMUNITY

## 2024-10-17 NOTE — PROGRESS NOTES
capsule by mouth daily      fluticasone (FLONASE) 50 MCG/ACT nasal spray 1 spray by Nasal route as needed for Allergies 1 each 0    alendronate (FOSAMAX) 35 MG tablet TAKE 1 TABLET BY MOUTH ONE TIME A WEEK IN THE MORNING AT LEAST 30 MIN BEFORE FIRST FOOD, BEVERAGE, OR MED OF DAY      midodrine (PROAMATINE) 10 MG tablet 1 tablet 3 times daily Along with 2.5mg = total dose is 12.5mg tid      rosuvastatin (CRESTOR) 10 MG tablet TAKE 1 TABLET BY MOUTH EVERY DAY      ondansetron (ZOFRAN-ODT) 8 MG TBDP disintegrating tablet Take 1 tablet by mouth every 8 hours as needed      levothyroxine (SYNTHROID) 75 MCG tablet Take 1 tablet by mouth Daily      MONOJECT 60CC SYRINGE CATH TIP 60 ML MISC by Other route in the morning, at noon, and at bedtime Wound/hole on abd, uses to keep clean      clonazePAM (KLONOPIN) 1 MG tablet Take 1 tablet by mouth 2 times daily as needed for Anxiety for up to 3 days. Max Daily Amount: 2 mg 6 tablet 0     No current facility-administered medications for this visit.       Allergies:    Allergies as of 10/17/2024 - Fully Reviewed 10/17/2024   Allergen Reaction Noted    Meloxicam Itching and Other (See Comments) 02/26/2018    Acetaminophen Other (See Comments) 10/12/2020    Benzoin  10/09/2014    Benzoin  09/01/2024    Lyrica [pregabalin]  07/12/2018    Quetiapine fumarate  04/15/2010    Seroquel [quetiapine fumarate] Other (See Comments) 04/15/2010    Tamsulosin  05/11/2022    Tizanidine  03/29/2013    Tizanidine hcl  09/01/2024    Adhesive tape Rash 09/09/2011    Balsam Rash 05/02/2013    Balsam peru-castor oil Rash 05/02/2013    Castor oil Rash 05/02/2013    Linaclotide Rash 01/20/2020    Quetiapine Other (See Comments) 04/15/2010    Wound dressings Rash 08/03/2015        Social history:     reports that she has never smoked. She has never used smokeless tobacco. She reports that she does not drink alcohol and does not use drugs.     Family history:    Family History   Problem Relation Age of

## 2024-10-17 NOTE — PATIENT INSTRUCTIONS

## 2024-10-20 NOTE — TELEPHONE ENCOUNTER
Npt ref by Salvador, memory loss or impairment.    Attempted to contact pt, no answer. [facility] with VALERIE Fuentes on HIPAA to have pt cb to schedule.   
Pt scheduled 5/2/24  
See above HPI

## 2024-10-23 ENCOUNTER — HOSPITAL ENCOUNTER (OUTPATIENT)
Age: 69
Discharge: HOME | End: 2024-10-25
Payer: MEDICARE

## 2024-10-23 DIAGNOSIS — I73.9 PAD (PERIPHERAL ARTERY DISEASE) (HCC): ICD-10-CM

## 2024-10-23 LAB
VAS LEFT ABI: 1.21
VAS LEFT ARM BP: 133 MMHG
VAS LEFT ATA DIST PSV: 66.8 CM/S
VAS LEFT CFA DIST PSV: 71.8 CM/S
VAS LEFT CFA PROX PSV: 83.5 CM/S
VAS LEFT DORSALIS PEDIS BP: 145 MMHG
VAS LEFT PERONEAL MID PSV: 67.3 CM/S
VAS LEFT PFA PROX PSV: 59.5 CM/S
VAS LEFT POP A DIST PSV: 83.5 CM/S
VAS LEFT POP A PROX PSV: 67.8 CM/S
VAS LEFT POP A PROX VEL RATIO: 1.12
VAS LEFT PTA BP: 161 MMHG
VAS LEFT PTA DIST PSV: 91.4 CM/S
VAS LEFT PTA MID PSV: 84 CM/S
VAS LEFT SFA DIST PSV: 60.4 CM/S
VAS LEFT SFA DIST VEL RATIO: 0.73
VAS LEFT SFA MID PSV: 82.6 CM/S
VAS LEFT SFA MID VEL RATIO: 1.14
VAS LEFT SFA PROX PSV: 72.2 CM/S
VAS LEFT SFA PROX VEL RATIO: 0.86
VAS RIGHT ARM BP: 133 MMHG
VAS RIGHT ATA DIST PSV: 102 CM/S
VAS RIGHT CFA DIST PSV: 77.2 CM/S
VAS RIGHT CFA PROX PSV: 91.7 CM/S
VAS RIGHT PERONEAL MID PSV: 65.9 CM/S
VAS RIGHT PFA PROX PSV: 60.4 CM/S
VAS RIGHT POP A DIST PSV: 72.1 CM/S
VAS RIGHT POP A PROX PSV: 47 CM/S
VAS RIGHT POP A PROX VEL RATIO: 0.75
VAS RIGHT PTA DIST PSV: 79.5 CM/S
VAS RIGHT PTA MID PSV: 86.5 CM/S
VAS RIGHT SFA DIST PSV: 62.3 CM/S
VAS RIGHT SFA DIST VEL RATIO: 0.71
VAS RIGHT SFA MID PSV: 87.5 CM/S
VAS RIGHT SFA MID VEL RATIO: 1
VAS RIGHT SFA PROX PSV: 87.5 CM/S
VAS RIGHT SFA PROX VEL RATIO: 1

## 2024-10-23 PROCEDURE — 93925 LOWER EXTREMITY STUDY: CPT

## 2024-11-01 ENCOUNTER — TELEPHONE (OUTPATIENT)
Age: 69
End: 2024-11-01

## 2024-11-01 DIAGNOSIS — G95.9 MYELOPATHY (HCC): Primary | ICD-10-CM

## 2024-11-01 NOTE — TELEPHONE ENCOUNTER
Centralized scheduling calling stating that per MRI Department they are requesting patient complete both a CXR and xray of soft tissue of neck prior to scheduling her MRI lumbar/thoracic. Please advise

## 2024-11-05 ENCOUNTER — OFFICE VISIT (OUTPATIENT)
Dept: ORTHOPEDIC SURGERY | Age: 69
End: 2024-11-05

## 2024-11-05 VITALS — WEIGHT: 170 LBS | BODY MASS INDEX: 31.28 KG/M2 | HEIGHT: 62 IN

## 2024-11-05 DIAGNOSIS — S92.311D CLOSED DISPLACED FRACTURE OF FIRST METATARSAL BONE OF RIGHT FOOT WITH ROUTINE HEALING, SUBSEQUENT ENCOUNTER: Primary | ICD-10-CM

## 2024-11-05 DIAGNOSIS — S82.54XD CLOSED NONDISPLACED FRACTURE OF MEDIAL MALLEOLUS OF RIGHT TIBIA WITH ROUTINE HEALING, SUBSEQUENT ENCOUNTER: ICD-10-CM

## 2024-11-05 NOTE — PROGRESS NOTES
ORTHOPAEDIC SURGERY FOLLOWUP VISIT     CHIEF COMPLAINT: Bilateral ankle/foot pain     DATE OF INJURY: 9/14/2024  DIAGNOSIS:   Right foot minimally displaced great toe metatarsal base fracture  Right nondisplaced medial malleolus fracture, closed  Left distal fibula avulsion fracture, nondisplaced  DATE OF SURGERY: N/A, nonoperative management     HISTORY OF PRESENT ILLNESS:  68-year-old female presents 7.5 weeks status post bilateral ankle injury.  She reports that her left ankle has returned to normal and it is pain-free.  She is ambulating in regular footwear on that side.  She has some minor continued pain over the medial right ankle.  Denies great toe pain.  She has been ambulatory in the boot.  Reports that her swelling has improved tremendously.  No complaints today.     PHYSICAL EXAM:  General: Well-appearing.  No distress.     Focused examination of the right lower extremity:  Presents without immobilization.   No cuts, open wounds, or abrasions.  Has resolved.  Bruising/ecchymosis has resolved.  No deformity.  Minimal tenderness to palpation distal to the medial malleolus along the deltoid ligament.  No tenderness along the bone.  There is no tenderness to palpation over the great toe metatarsal. Sensation is intact to light touch in deep peroneal, superficial peroneal, tibial, sural, and saphenous nerve distributions.  Motor function is intact to EHL, FHL, tibialis anterior, and gastroc.  There is brisk capillary refill to the toes and a strong palpable dorsalis pedis pulse.  Compartments are soft and compressible.  There is no calf tenderness and a negative Homans' sign.     RADIOGRAPHIC EXAM:   3 views of the right foot including PA, oblique, lateral projections demonstrate mildly displaced, minimally angulated fracture involving the base of the great toe metatarsal.  There is comminution in this area.  Alignment is overall unchanged from previous radiographs.  No significant angular deformity in the

## 2024-11-11 ENCOUNTER — HOSPITAL ENCOUNTER (OUTPATIENT)
Age: 69
Discharge: HOME | End: 2024-11-11
Attending: PSYCHIATRY & NEUROLOGY
Payer: MEDICARE

## 2024-11-11 ENCOUNTER — HOSPITAL ENCOUNTER (OUTPATIENT)
Dept: GENERAL RADIOLOGY | Age: 69
Discharge: HOME | End: 2024-11-11
Attending: PSYCHIATRY & NEUROLOGY
Payer: MEDICARE

## 2024-11-11 ENCOUNTER — HOSPITAL ENCOUNTER (OUTPATIENT)
Dept: MRI IMAGING | Age: 69
Discharge: HOME | End: 2024-11-11
Attending: PSYCHIATRY & NEUROLOGY
Payer: MEDICARE

## 2024-11-11 ENCOUNTER — HOSPITAL ENCOUNTER (OUTPATIENT)
Dept: MRI IMAGING | Age: 69
Discharge: HOME | End: 2024-11-11
Attending: PSYCHIATRY & NEUROLOGY

## 2024-11-11 DIAGNOSIS — G95.9 MYELOPATHY (HCC): ICD-10-CM

## 2024-11-11 PROCEDURE — 71046 X-RAY EXAM CHEST 2 VIEWS: CPT

## 2024-11-11 PROCEDURE — 70360 X-RAY EXAM OF NECK: CPT

## 2024-11-18 ENCOUNTER — TELEPHONE (OUTPATIENT)
Dept: NEUROLOGY | Age: 69
End: 2024-11-18

## 2024-11-25 ENCOUNTER — HOSPITAL ENCOUNTER (EMERGENCY)
Age: 69
Discharge: HOME | End: 2024-11-25

## 2024-12-02 ENCOUNTER — TELEPHONE (OUTPATIENT)
Dept: ORTHOPEDIC SURGERY | Age: 69
End: 2024-12-02

## 2024-12-02 NOTE — PROGRESS NOTES
Discharge education provided both verbally and written, patient verbalized understanding, denies questions. Tele monitor removed, [facility]U notified. PIV removed without complications. Patient left with all belongings including new prescriptions. Patient aware the next straight cath is at 1700. VSS. Patient left with dtr via private vehicle.     fair balance

## 2024-12-02 NOTE — TELEPHONE ENCOUNTER
PT REQUESTING CALL BACK TO REPORT SYMPTOMS OF RT FOOT.    PT STATES SHE HAS NOT BEEN ABLE TO WALK ON FOOT, AND HAS A KNOT ON HER RT FOOT.     PT CAN BE REACHED .[phone]

## 2024-12-06 ENCOUNTER — HOSPITAL ENCOUNTER (OUTPATIENT)
Dept: GENERAL RADIOLOGY | Age: 69
Discharge: HOME | End: 2024-12-06
Payer: MEDICARE

## 2024-12-06 ENCOUNTER — HOSPITAL ENCOUNTER (OUTPATIENT)
Age: 69
Discharge: HOME | End: 2024-12-06
Payer: MEDICARE

## 2024-12-06 DIAGNOSIS — K59.09 CHRONIC CONSTIPATION: ICD-10-CM

## 2024-12-06 PROCEDURE — 74018 RADEX ABDOMEN 1 VIEW: CPT

## 2024-12-10 ENCOUNTER — HOSPITAL ENCOUNTER (OUTPATIENT)
Dept: MRI IMAGING | Age: 69
Discharge: HOME | End: 2024-12-10
Attending: PSYCHIATRY & NEUROLOGY
Payer: MEDICARE

## 2024-12-10 PROCEDURE — 72146 MRI CHEST SPINE W/O DYE: CPT

## 2024-12-11 ENCOUNTER — HOSPITAL ENCOUNTER (OUTPATIENT)
Dept: MRI IMAGING | Age: 69
Discharge: HOME | End: 2024-12-11
Attending: PSYCHIATRY & NEUROLOGY
Payer: MEDICARE

## 2024-12-11 ENCOUNTER — OFFICE VISIT (OUTPATIENT)
Dept: ORTHOPEDIC SURGERY | Age: 69
End: 2024-12-11
Payer: MEDICARE

## 2024-12-11 VITALS — HEIGHT: 62 IN | WEIGHT: 170 LBS | BODY MASS INDEX: 31.28 KG/M2

## 2024-12-11 DIAGNOSIS — S92.311D CLOSED DISPLACED FRACTURE OF FIRST METATARSAL BONE OF RIGHT FOOT WITH ROUTINE HEALING, SUBSEQUENT ENCOUNTER: Primary | ICD-10-CM

## 2024-12-11 PROCEDURE — 1123F ACP DISCUSS/DSCN MKR DOCD: CPT | Performed by: ORTHOPAEDIC SURGERY

## 2024-12-11 PROCEDURE — 1159F MED LIST DOCD IN RCRD: CPT | Performed by: ORTHOPAEDIC SURGERY

## 2024-12-11 PROCEDURE — 99213 OFFICE O/P EST LOW 20 MIN: CPT | Performed by: ORTHOPAEDIC SURGERY

## 2024-12-11 PROCEDURE — 72148 MRI LUMBAR SPINE W/O DYE: CPT

## 2024-12-11 NOTE — PROGRESS NOTES
ORTHOPAEDIC SURGERY FOLLOWUP VISIT     CHIEF COMPLAINT: Bilateral ankle/foot pain     DATE OF INJURY: 9/14/2024  DIAGNOSIS:   Right foot minimally displaced great toe metatarsal base   DATE OF SURGERY: N/A, nonoperative management     HISTORY OF PRESENT ILLNESS:  68-year-old female presents 3 months status post fracture of the right great toe metatarsal base.  This has undergone progressive healing.  She is now ambulating in regular footwear.  She reports significant pain overlying the dorsal and medial aspect of the great toe at the level of her fracture site.  She reports chronic daily pain which limits her activity level.      PHYSICAL EXAM:  General: Well-appearing.  No distress.     Focused examination of the right lower extremity:  Presents without immobilization.   No cuts, open wounds, or abrasions.  Has resolved.  Bruising/ecchymosis has resolved.  Slight flexion to the hallux metatarsal.  There is tenderness to palpation overlying the great toe metatarsal.  There is no mobility at the fracture site appreciated.  Sensation is intact to light touch in deep peroneal, superficial peroneal, tibial, sural, and saphenous nerve distributions.  Motor function is intact to EHL, FHL, tibialis anterior, and gastroc.  There is brisk capillary refill to the toes and a strong palpable dorsalis pedis pulse.  Compartments are soft and compressible.  There is no calf tenderness and a negative Homans' sign.     RADIOGRAPHIC EXAM:   3 views of the right foot including PA, oblique, lateral projections demonstrate displaced angulated metatarsal base fracture involving the hallux.  There is mature bridging bone most appreciated laterally and plantarly.  There is no change in alignment from prior x-rays.  There is overall shortening and flexion of the hallux.     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

## 2024-12-12 ENCOUNTER — TELEPHONE (OUTPATIENT)
Dept: ORTHOPEDIC SURGERY | Age: 69
End: 2024-12-12

## 2024-12-12 NOTE — TELEPHONE ENCOUNTER
Left VM. CT is approved okay to call and schedule.     Authorization Number: [id number]   Case Number: [id number]   Review Date: 12/12/2024 10:43:33 AM   Expiration Date: 6/10/2025   Status: Your case has been Approved.

## 2024-12-16 ENCOUNTER — HOSPITAL ENCOUNTER (OUTPATIENT)
Dept: CT IMAGING | Age: 69
Discharge: HOME | End: 2024-12-16
Attending: ORTHOPAEDIC SURGERY
Payer: MEDICARE

## 2024-12-16 DIAGNOSIS — S92.311D CLOSED DISPLACED FRACTURE OF FIRST METATARSAL BONE OF RIGHT FOOT WITH ROUTINE HEALING, SUBSEQUENT ENCOUNTER: ICD-10-CM

## 2024-12-16 PROCEDURE — 73700 CT LOWER EXTREMITY W/O DYE: CPT

## 2024-12-19 ENCOUNTER — OFFICE VISIT (OUTPATIENT)
Age: 69
End: 2024-12-19
Payer: MEDICARE

## 2024-12-19 VITALS
DIASTOLIC BLOOD PRESSURE: 76 MMHG | SYSTOLIC BLOOD PRESSURE: 122 MMHG | RESPIRATION RATE: 16 BRPM | HEIGHT: 62 IN | BODY MASS INDEX: 30 KG/M2 | HEART RATE: 87 BPM | OXYGEN SATURATION: 97 % | WEIGHT: 163 LBS

## 2024-12-19 DIAGNOSIS — R40.4 SPELL OF ALTERED CONSCIOUSNESS: ICD-10-CM

## 2024-12-19 DIAGNOSIS — R29.898 BILATERAL LEG WEAKNESS: Primary | ICD-10-CM

## 2024-12-19 PROCEDURE — 3074F SYST BP LT 130 MM HG: CPT | Performed by: PSYCHIATRY & NEUROLOGY

## 2024-12-19 PROCEDURE — 3078F DIAST BP <80 MM HG: CPT | Performed by: PSYCHIATRY & NEUROLOGY

## 2024-12-19 PROCEDURE — 99213 OFFICE O/P EST LOW 20 MIN: CPT | Performed by: PSYCHIATRY & NEUROLOGY

## 2024-12-19 PROCEDURE — 1123F ACP DISCUSS/DSCN MKR DOCD: CPT | Performed by: PSYCHIATRY & NEUROLOGY

## 2024-12-19 PROCEDURE — 1159F MED LIST DOCD IN RCRD: CPT | Performed by: PSYCHIATRY & NEUROLOGY

## 2024-12-19 NOTE — PROGRESS NOTES
mouth nightly      midodrine (PROAMATINE) 2.5 MG tablet Take 1 tablet by mouth 3 times daily Along with 10mg tid = total dose is 12.5mg tid      escitalopram (LEXAPRO) 10 MG tablet Take 1 tablet by mouth daily      galantamine (RAZADYNE ER) 8 MG extended release capsule Take 1 capsule by mouth daily      fluticasone (FLONASE) 50 MCG/ACT nasal spray 1 spray by Nasal route as needed for Allergies 1 each 0    alendronate (FOSAMAX) 35 MG tablet TAKE 1 TABLET BY MOUTH ONE TIME A WEEK IN THE MORNING AT LEAST 30 MIN BEFORE FIRST FOOD, BEVERAGE, OR MED OF DAY      midodrine (PROAMATINE) 10 MG tablet 1 tablet 3 times daily Along with 2.5mg = total dose is 12.5mg tid      rosuvastatin (CRESTOR) 10 MG tablet TAKE 1 TABLET BY MOUTH EVERY DAY      ondansetron (ZOFRAN-ODT) 8 MG TBDP disintegrating tablet Take 1 tablet by mouth every 8 hours as needed      levothyroxine (SYNTHROID) 75 MCG tablet Take 1 tablet by mouth Daily      MONOJECT 60CC SYRINGE CATH TIP 60 ML MISC by Other route in the morning, at noon, and at bedtime Wound/hole on abd, uses to keep clean      clonazePAM (KLONOPIN) 1 MG tablet Take 1 tablet by mouth 2 times daily as needed for Anxiety for up to 3 days. Max Daily Amount: 2 mg 6 tablet 0    gabapentin 50 MG TABS Take 100 mg by mouth 2 times daily as needed (pain) for up to 30 days.       No current facility-administered medications for this visit.       Allergies:    Allergies as of 12/19/2024 - Fully Reviewed 12/19/2024   Allergen Reaction Noted    Meloxicam Itching and Other (See Comments) 02/26/2018    Acetaminophen Other (See Comments) 10/12/2020    Benzoin  10/09/2014    Benzoin  09/01/2024    Lyrica [pregabalin]  07/12/2018    Quetiapine fumarate  04/15/2010    Seroquel [quetiapine fumarate] Other (See Comments) 04/15/2010    Tamsulosin  05/11/2022    Tizanidine  03/29/2013    Tizanidine hcl  09/01/2024    Adhesive tape Rash 09/09/2011    Balsam Rash 05/02/2013    Balsam peru-castor oil Rash 05/02/2013

## 2025-01-31 ENCOUNTER — HOSPITAL ENCOUNTER (OUTPATIENT)
Dept: GENERAL RADIOLOGY | Age: 70
Discharge: HOME | End: 2025-01-31
Payer: MEDICARE

## 2025-01-31 ENCOUNTER — HOSPITAL ENCOUNTER (OUTPATIENT)
Age: 70
Discharge: HOME | End: 2025-01-31
Payer: MEDICARE

## 2025-01-31 DIAGNOSIS — K59.09 CHRONIC CONSTIPATION: ICD-10-CM

## 2025-01-31 PROCEDURE — 74018 RADEX ABDOMEN 1 VIEW: CPT

## 2025-02-11 RX ORDER — METOPROLOL SUCCINATE 25 MG/1
TABLET, EXTENDED RELEASE ORAL
Qty: 30 TABLET | Refills: 0 | OUTPATIENT
Start: 2025-02-11

## 2025-02-12 RX ORDER — METOPROLOL SUCCINATE 25 MG/1
TABLET, EXTENDED RELEASE ORAL
Qty: 30 TABLET | Refills: 0 | OUTPATIENT
Start: 2025-02-12

## 2025-02-13 RX ORDER — METOPROLOL SUCCINATE 25 MG/1
TABLET, EXTENDED RELEASE ORAL
Qty: 30 TABLET | Refills: 0 | OUTPATIENT
Start: 2025-02-13

## 2025-02-17 RX ORDER — METOPROLOL SUCCINATE 25 MG/1
TABLET, EXTENDED RELEASE ORAL
Qty: 30 TABLET | Refills: 0 | OUTPATIENT
Start: 2025-02-17

## 2025-02-18 RX ORDER — METOPROLOL SUCCINATE 25 MG/1
TABLET, EXTENDED RELEASE ORAL
Qty: 30 TABLET | Refills: 0 | OUTPATIENT
Start: 2025-02-18

## 2025-03-06 ENCOUNTER — HOSPITAL ENCOUNTER (OUTPATIENT)
Dept: MAMMOGRAPHY | Age: 70
Discharge: HOME | End: 2025-03-06
Payer: MEDICARE

## 2025-03-06 VITALS — WEIGHT: 162 LBS | HEIGHT: 62 IN | BODY MASS INDEX: 29.81 KG/M2

## 2025-03-06 DIAGNOSIS — Z12.31 SCREENING MAMMOGRAM, ENCOUNTER FOR: ICD-10-CM

## 2025-03-06 PROCEDURE — 77063 BREAST TOMOSYNTHESIS BI: CPT

## 2025-03-18 ENCOUNTER — HOSPITAL ENCOUNTER (OUTPATIENT)
Dept: CT IMAGING | Age: 70
Discharge: HOME | End: 2025-03-18
Payer: MEDICARE

## 2025-03-18 DIAGNOSIS — K56.609 INTESTINAL OBSTRUCTION, UNSPECIFIED CAUSE, UNSPECIFIED WHETHER PARTIAL OR COMPLETE (HCC): ICD-10-CM

## 2025-03-18 LAB
PERFORMED ON: NORMAL
POC CREATININE: 0.9 MG/DL (ref 0.6–1.2)
POC SAMPLE TYPE: NORMAL

## 2025-03-18 PROCEDURE — 74177 CT ABD & PELVIS W/CONTRAST: CPT

## 2025-03-18 PROCEDURE — 82565 ASSAY OF CREATININE: CPT

## 2025-03-18 PROCEDURE — 6360000004 HC RX CONTRAST MEDICATION

## 2025-03-18 RX ORDER — IOPAMIDOL 755 MG/ML
75 INJECTION, SOLUTION INTRAVASCULAR
Status: COMPLETED | OUTPATIENT
Start: 2025-03-18 | End: 2025-03-18

## 2025-03-18 RX ADMIN — IOPAMIDOL 75 ML: 755 INJECTION, SOLUTION INTRAVENOUS at 16:08

## 2025-03-20 ENCOUNTER — TRANSCRIBE ORDERS (OUTPATIENT)
Dept: ADMINISTRATIVE | Age: 70
End: 2025-03-20

## 2025-03-20 DIAGNOSIS — R22.1 LUMP IN NECK: Primary | ICD-10-CM

## 2025-03-22 ENCOUNTER — HOSPITAL ENCOUNTER (OUTPATIENT)
Dept: ULTRASOUND IMAGING | Age: 70
Discharge: HOME | End: 2025-03-22
Attending: FAMILY MEDICINE
Payer: MEDICARE

## 2025-03-22 DIAGNOSIS — R22.1 LUMP IN NECK: ICD-10-CM

## 2025-03-22 PROCEDURE — 76536 US EXAM OF HEAD AND NECK: CPT

## 2025-03-28 ENCOUNTER — APPOINTMENT (OUTPATIENT)
Dept: GENERAL RADIOLOGY | Age: 70
End: 2025-03-28
Payer: MEDICARE

## 2025-03-28 ENCOUNTER — HOSPITAL ENCOUNTER (INPATIENT)
Age: 70
LOS: 8 days | Discharge: HOME | End: 2025-04-05
Attending: EMERGENCY MEDICINE | Admitting: INTERNAL MEDICINE
Payer: MEDICARE

## 2025-03-28 DIAGNOSIS — K56.7 ILEUS (HCC): Primary | ICD-10-CM

## 2025-03-28 DIAGNOSIS — N39.0 URINARY TRACT INFECTION WITHOUT HEMATURIA, SITE UNSPECIFIED: ICD-10-CM

## 2025-03-28 LAB
ALBUMIN SERPL-MCNC: 3.7 G/DL (ref 3.4–5)
ALBUMIN/GLOB SERPL: 1 {RATIO} (ref 1.1–2.2)
ALP SERPL-CCNC: 87 U/L (ref 40–129)
ALT SERPL-CCNC: 19 U/L (ref 10–40)
ANION GAP SERPL CALCULATED.3IONS-SCNC: 14 MMOL/L (ref 3–16)
AST SERPL-CCNC: 36 U/L (ref 15–37)
BACTERIA URNS QL MICRO: ABNORMAL /HPF
BASOPHILS # BLD: 0 K/UL (ref 0–0.2)
BASOPHILS NFR BLD: 0.5 %
BILIRUB SERPL-MCNC: 0.3 MG/DL (ref 0–1)
BILIRUB UR QL STRIP.AUTO: NEGATIVE
BUN SERPL-MCNC: 20 MG/DL (ref 7–20)
CALCIUM SERPL-MCNC: 10 MG/DL (ref 8.3–10.6)
CHLORIDE SERPL-SCNC: 104 MMOL/L (ref 99–110)
CLARITY UR: ABNORMAL
CO2 SERPL-SCNC: 21 MMOL/L (ref 21–32)
COARSE GRAN CASTS #/AREA URNS LPF: ABNORMAL /LPF (ref 0–2)
COLOR UR: YELLOW
CREAT SERPL-MCNC: 1 MG/DL (ref 0.6–1.2)
DEPRECATED RDW RBC AUTO: 16.4 % (ref 12.4–15.4)
EOSINOPHIL # BLD: 0.1 K/UL (ref 0–0.6)
EOSINOPHIL NFR BLD: 1 %
EPI CELLS #/AREA URNS HPF: ABNORMAL /HPF (ref 0–5)
FLUAV RNA RESP QL NAA+PROBE: NOT DETECTED
FLUBV RNA RESP QL NAA+PROBE: NOT DETECTED
GFR SERPLBLD CREATININE-BSD FMLA CKD-EPI: 60 ML/MIN/{1.73_M2}
GLUCOSE SERPL-MCNC: 73 MG/DL (ref 70–99)
GLUCOSE UR STRIP.AUTO-MCNC: NEGATIVE MG/DL
HCT VFR BLD AUTO: 40.1 % (ref 36–48)
HGB BLD-MCNC: 13.1 G/DL (ref 12–16)
HGB UR QL STRIP.AUTO: ABNORMAL
KETONES UR STRIP.AUTO-MCNC: NEGATIVE MG/DL
LACTATE BLDV-SCNC: 1.4 MMOL/L (ref 0.4–1.9)
LEUKOCYTE ESTERASE UR QL STRIP.AUTO: NEGATIVE
LYMPHOCYTES # BLD: 3.3 K/UL (ref 1–5.1)
LYMPHOCYTES NFR BLD: 43.9 %
MCH RBC QN AUTO: 29.1 PG (ref 26–34)
MCHC RBC AUTO-ENTMCNC: 32.7 G/DL (ref 31–36)
MCV RBC AUTO: 89.1 FL (ref 80–100)
MONOCYTES # BLD: 0.7 K/UL (ref 0–1.3)
MONOCYTES NFR BLD: 8.8 %
MUCOUS THREADS #/AREA URNS LPF: ABNORMAL /LPF
NEUTROPHILS # BLD: 3.4 K/UL (ref 1.7–7.7)
NEUTROPHILS NFR BLD: 45.8 %
NITRITE UR QL STRIP.AUTO: POSITIVE
PH UR STRIP.AUTO: 6 [PH] (ref 5–8)
PLATELET # BLD AUTO: 198 K/UL (ref 135–450)
PMV BLD AUTO: 8.6 FL (ref 5–10.5)
POTASSIUM SERPL-SCNC: 4.3 MMOL/L (ref 3.5–5.1)
PROT SERPL-MCNC: 7.3 G/DL (ref 6.4–8.2)
PROT UR STRIP.AUTO-MCNC: NEGATIVE MG/DL
RBC # BLD AUTO: 4.5 M/UL (ref 4–5.2)
RBC #/AREA URNS HPF: ABNORMAL /HPF (ref 0–4)
SARS-COV-2 RNA RESP QL NAA+PROBE: NOT DETECTED
SODIUM SERPL-SCNC: 139 MMOL/L (ref 136–145)
SP GR UR STRIP.AUTO: 1.02 (ref 1–1.03)
UA COMPLETE W REFLEX CULTURE PNL UR: YES
UA DIPSTICK W REFLEX MICRO PNL UR: YES
URN SPEC COLLECT METH UR: ABNORMAL
UROBILINOGEN UR STRIP-ACNC: 0.2 E.U./DL
WBC # BLD AUTO: 7.5 K/UL (ref 4–11)
WBC #/AREA URNS HPF: ABNORMAL /HPF (ref 0–5)

## 2025-03-28 PROCEDURE — 99285 EMERGENCY DEPT VISIT HI MDM: CPT

## 2025-03-28 PROCEDURE — 6360000002 HC RX W HCPCS: Performed by: EMERGENCY MEDICINE

## 2025-03-28 PROCEDURE — 87086 URINE CULTURE/COLONY COUNT: CPT

## 2025-03-28 PROCEDURE — 1200000000 HC SEMI PRIVATE

## 2025-03-28 PROCEDURE — 87040 BLOOD CULTURE FOR BACTERIA: CPT

## 2025-03-28 PROCEDURE — 81001 URINALYSIS AUTO W/SCOPE: CPT

## 2025-03-28 PROCEDURE — 80053 COMPREHEN METABOLIC PANEL: CPT

## 2025-03-28 PROCEDURE — 74022 RADEX COMPL AQT ABD SERIES: CPT

## 2025-03-28 PROCEDURE — 83605 ASSAY OF LACTIC ACID: CPT

## 2025-03-28 PROCEDURE — 96365 THER/PROPH/DIAG IV INF INIT: CPT

## 2025-03-28 PROCEDURE — 2580000003 HC RX 258: Performed by: EMERGENCY MEDICINE

## 2025-03-28 PROCEDURE — 87077 CULTURE AEROBIC IDENTIFY: CPT

## 2025-03-28 PROCEDURE — 36415 COLL VENOUS BLD VENIPUNCTURE: CPT

## 2025-03-28 PROCEDURE — 6370000000 HC RX 637 (ALT 250 FOR IP): Performed by: INTERNAL MEDICINE

## 2025-03-28 PROCEDURE — 87186 SC STD MICRODIL/AGAR DIL: CPT

## 2025-03-28 PROCEDURE — 87636 SARSCOV2 & INF A&B AMP PRB: CPT

## 2025-03-28 PROCEDURE — 85025 COMPLETE CBC W/AUTO DIFF WBC: CPT

## 2025-03-28 PROCEDURE — 6360000002 HC RX W HCPCS: Performed by: INTERNAL MEDICINE

## 2025-03-28 PROCEDURE — 2580000003 HC RX 258: Performed by: INTERNAL MEDICINE

## 2025-03-28 RX ORDER — PROMETHAZINE HYDROCHLORIDE 25 MG/1
12.5 TABLET ORAL EVERY 6 HOURS PRN
Status: DISCONTINUED | OUTPATIENT
Start: 2025-03-28 | End: 2025-04-05 | Stop reason: HOSPADM

## 2025-03-28 RX ORDER — OXYBUTYNIN CHLORIDE 10 MG/1
10 TABLET, EXTENDED RELEASE ORAL DAILY
COMMUNITY

## 2025-03-28 RX ORDER — GABAPENTIN 400 MG/1
800 CAPSULE ORAL 3 TIMES DAILY
Status: DISCONTINUED | OUTPATIENT
Start: 2025-03-28 | End: 2025-04-03

## 2025-03-28 RX ORDER — MIDODRINE HYDROCHLORIDE 10 MG/1
10 TABLET ORAL
Status: DISCONTINUED | OUTPATIENT
Start: 2025-03-29 | End: 2025-03-28

## 2025-03-28 RX ORDER — CEPHALEXIN 500 MG/1
500 CAPSULE ORAL DAILY
COMMUNITY

## 2025-03-28 RX ORDER — METOPROLOL SUCCINATE 25 MG/1
12.5 TABLET, EXTENDED RELEASE ORAL 2 TIMES DAILY
Status: DISCONTINUED | OUTPATIENT
Start: 2025-03-28 | End: 2025-04-05 | Stop reason: HOSPADM

## 2025-03-28 RX ORDER — CITALOPRAM HYDROBROMIDE 20 MG/1
10 TABLET ORAL DAILY
Status: DISCONTINUED | OUTPATIENT
Start: 2025-03-29 | End: 2025-04-01

## 2025-03-28 RX ORDER — CLONAZEPAM 1 MG/1
1 TABLET ORAL 2 TIMES DAILY PRN
Status: DISCONTINUED | OUTPATIENT
Start: 2025-03-28 | End: 2025-04-05 | Stop reason: HOSPADM

## 2025-03-28 RX ORDER — OXYBUTYNIN CHLORIDE 10 MG/1
10 TABLET, EXTENDED RELEASE ORAL DAILY
Status: DISCONTINUED | OUTPATIENT
Start: 2025-03-29 | End: 2025-04-05 | Stop reason: HOSPADM

## 2025-03-28 RX ORDER — SODIUM CHLORIDE 9 MG/ML
INJECTION, SOLUTION INTRAVENOUS PRN
Status: DISCONTINUED | OUTPATIENT
Start: 2025-03-28 | End: 2025-04-05 | Stop reason: HOSPADM

## 2025-03-28 RX ORDER — MEROPENEM AND SODIUM CHLORIDE 1 G/50ML
1000 INJECTION, SOLUTION INTRAVENOUS EVERY 8 HOURS
Status: COMPLETED | OUTPATIENT
Start: 2025-03-28 | End: 2025-03-31

## 2025-03-28 RX ORDER — SODIUM CHLORIDE 0.9 % (FLUSH) 0.9 %
10 SYRINGE (ML) INJECTION PRN
Status: DISCONTINUED | OUTPATIENT
Start: 2025-03-28 | End: 2025-04-05 | Stop reason: HOSPADM

## 2025-03-28 RX ORDER — MIDODRINE HYDROCHLORIDE 5 MG/1
5 TABLET ORAL ONCE
Status: DISCONTINUED | OUTPATIENT
Start: 2025-03-28 | End: 2025-03-28

## 2025-03-28 RX ORDER — ESCITALOPRAM OXALATE 10 MG/1
10 TABLET ORAL DAILY
Status: DISCONTINUED | OUTPATIENT
Start: 2025-03-29 | End: 2025-03-28

## 2025-03-28 RX ORDER — ROSUVASTATIN CALCIUM 10 MG/1
10 TABLET, COATED ORAL DAILY
Status: DISCONTINUED | OUTPATIENT
Start: 2025-03-29 | End: 2025-04-05 | Stop reason: HOSPADM

## 2025-03-28 RX ORDER — ONDANSETRON 2 MG/ML
4 INJECTION INTRAMUSCULAR; INTRAVENOUS EVERY 6 HOURS PRN
Status: DISCONTINUED | OUTPATIENT
Start: 2025-03-28 | End: 2025-04-05 | Stop reason: HOSPADM

## 2025-03-28 RX ORDER — CITALOPRAM HYDROBROMIDE 10 MG/1
10 TABLET ORAL DAILY
Status: ON HOLD | COMMUNITY
End: 2025-04-05

## 2025-03-28 RX ORDER — OXYCODONE HYDROCHLORIDE 5 MG/1
10 TABLET ORAL EVERY 4 HOURS PRN
Status: DISCONTINUED | OUTPATIENT
Start: 2025-03-28 | End: 2025-04-05

## 2025-03-28 RX ORDER — MIDODRINE HYDROCHLORIDE 5 MG/1
2.5 TABLET ORAL
Status: DISCONTINUED | OUTPATIENT
Start: 2025-03-29 | End: 2025-03-28

## 2025-03-28 RX ORDER — ENOXAPARIN SODIUM 100 MG/ML
40 INJECTION SUBCUTANEOUS DAILY
Status: DISCONTINUED | OUTPATIENT
Start: 2025-03-29 | End: 2025-04-05 | Stop reason: HOSPADM

## 2025-03-28 RX ORDER — SODIUM CHLORIDE 0.9 % (FLUSH) 0.9 %
10 SYRINGE (ML) INJECTION EVERY 12 HOURS SCHEDULED
Status: DISCONTINUED | OUTPATIENT
Start: 2025-03-28 | End: 2025-04-05 | Stop reason: HOSPADM

## 2025-03-28 RX ORDER — OXYCODONE HYDROCHLORIDE 5 MG/1
5 TABLET ORAL EVERY 4 HOURS PRN
Status: DISCONTINUED | OUTPATIENT
Start: 2025-03-28 | End: 2025-04-05

## 2025-03-28 RX ORDER — 0.9 % SODIUM CHLORIDE 0.9 %
1000 INTRAVENOUS SOLUTION INTRAVENOUS ONCE
Status: COMPLETED | OUTPATIENT
Start: 2025-03-28 | End: 2025-03-28

## 2025-03-28 RX ORDER — FAMOTIDINE 40 MG/1
40 TABLET, FILM COATED ORAL DAILY
COMMUNITY

## 2025-03-28 RX ORDER — PANTOPRAZOLE SODIUM 40 MG/1
40 TABLET, DELAYED RELEASE ORAL
Status: DISCONTINUED | OUTPATIENT
Start: 2025-03-29 | End: 2025-04-05 | Stop reason: HOSPADM

## 2025-03-28 RX ORDER — NICOTINE 21 MG/24HR
1 PATCH, TRANSDERMAL 24 HOURS TRANSDERMAL DAILY PRN
Status: DISCONTINUED | OUTPATIENT
Start: 2025-03-28 | End: 2025-04-05 | Stop reason: HOSPADM

## 2025-03-28 RX ORDER — FAMOTIDINE 20 MG/1
40 TABLET, FILM COATED ORAL DAILY
Status: DISCONTINUED | OUTPATIENT
Start: 2025-03-29 | End: 2025-03-29

## 2025-03-28 RX ORDER — OXYCODONE HYDROCHLORIDE 5 MG/1
10 CAPSULE ORAL EVERY 6 HOURS PRN
COMMUNITY

## 2025-03-28 RX ADMIN — MEROPENEM AND SODIUM CHLORIDE 1000 MG: 1 INJECTION, SOLUTION INTRAVENOUS at 23:45

## 2025-03-28 RX ADMIN — METOPROLOL SUCCINATE 12.5 MG: 25 TABLET, EXTENDED RELEASE ORAL at 23:39

## 2025-03-28 RX ADMIN — SODIUM CHLORIDE 100 ML: 0.9 INJECTION, SOLUTION INTRAVENOUS at 23:44

## 2025-03-28 RX ADMIN — GABAPENTIN 800 MG: 400 CAPSULE ORAL at 23:38

## 2025-03-28 RX ADMIN — SODIUM CHLORIDE 1000 ML: 0.9 INJECTION, SOLUTION INTRAVENOUS at 19:39

## 2025-03-28 RX ADMIN — SODIUM CHLORIDE 1000 MG: 9 INJECTION, SOLUTION INTRAVENOUS at 19:40

## 2025-03-28 ASSESSMENT — PAIN DESCRIPTION - DESCRIPTORS: DESCRIPTORS: ACHING

## 2025-03-28 ASSESSMENT — PAIN DESCRIPTION - LOCATION: LOCATION: ABDOMEN

## 2025-03-28 ASSESSMENT — PAIN DESCRIPTION - ORIENTATION: ORIENTATION: UPPER

## 2025-03-28 ASSESSMENT — PAIN SCALES - GENERAL: PAINLEVEL_OUTOF10: 7

## 2025-03-28 NOTE — ED PROVIDER NOTES
[address] MEDICAL-SURGICAL     EMERGENCY DEPARTMENT ENCOUNTER            Pt Name: Cordelia Pillai   MRN: [id number]   Birthdate 1955   Date of evaluation: 3/28/2025   Provider: Dejah Renae MD   PCP: Connie Franco MD   Note Started: 7:27 PM EDT 3/28/25          CHIEF COMPLAINT     Chief Complaint   Patient presents with    Constipation     Patient states she feels like she is constipated. Has hx of bowel obstruction and states this feels the same.             HISTORY OF PRESENT ILLNESS:       Cordelia Pillai is a 69 y.o. female who presents this afraid she has a bowel obstruction.  She reports she has had no bowel movements in 4 days, with the exception of a small amount of watery stool today, that was nonbloody nonmelanotic.  She has no obstipation.    She has a history of gastric bypass she had a an ostomy reversal with a persistent enterocutaneous tracts which I believe is since resolved, she had a bladder extension and has a suprapubic ostomy 3 which she self caths.  She does have a history of frequent UTIs.  There is a report in the notes of a previous enterovesical fistula.    She also has history of syncopal episodes, POTS, she has a pacemaker, she takes midodrine for chronic hypotension.    She was in a nursing home but currently lives at home with her family.    Nursing Notes were all reviewed and agreed with, or any disagreements were addressed in the HPI.     REVIEW OF SYSTEMS :    Positives and Pertinent negatives as per HPI.      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    Past

## 2025-03-29 LAB
ALBUMIN SERPL-MCNC: 3.1 G/DL (ref 3.4–5)
ALBUMIN/GLOB SERPL: 1 {RATIO} (ref 1.1–2.2)
ALP SERPL-CCNC: 72 U/L (ref 40–129)
ALT SERPL-CCNC: 15 U/L (ref 10–40)
ANION GAP SERPL CALCULATED.3IONS-SCNC: 10 MMOL/L (ref 3–16)
AST SERPL-CCNC: 29 U/L (ref 15–37)
BASOPHILS # BLD: 0 K/UL (ref 0–0.2)
BASOPHILS NFR BLD: 0.6 %
BILIRUB SERPL-MCNC: <0.2 MG/DL (ref 0–1)
BUN SERPL-MCNC: 16 MG/DL (ref 7–20)
CALCIUM SERPL-MCNC: 9 MG/DL (ref 8.3–10.6)
CHLORIDE SERPL-SCNC: 107 MMOL/L (ref 99–110)
CO2 SERPL-SCNC: 21 MMOL/L (ref 21–32)
CREAT SERPL-MCNC: 0.7 MG/DL (ref 0.6–1.2)
DEPRECATED RDW RBC AUTO: 16.4 % (ref 12.4–15.4)
EOSINOPHIL # BLD: 0.1 K/UL (ref 0–0.6)
EOSINOPHIL NFR BLD: 2.1 %
GFR SERPLBLD CREATININE-BSD FMLA CKD-EPI: >90 ML/MIN/{1.73_M2}
GLUCOSE SERPL-MCNC: 97 MG/DL (ref 70–99)
HCT VFR BLD AUTO: 37.8 % (ref 36–48)
HGB BLD-MCNC: 12.2 G/DL (ref 12–16)
LYMPHOCYTES # BLD: 2.7 K/UL (ref 1–5.1)
LYMPHOCYTES NFR BLD: 53.2 %
MCH RBC QN AUTO: 29.2 PG (ref 26–34)
MCHC RBC AUTO-ENTMCNC: 32.3 G/DL (ref 31–36)
MCV RBC AUTO: 90.3 FL (ref 80–100)
MONOCYTES # BLD: 0.5 K/UL (ref 0–1.3)
MONOCYTES NFR BLD: 9.5 %
NEUTROPHILS # BLD: 1.7 K/UL (ref 1.7–7.7)
NEUTROPHILS NFR BLD: 34.6 %
PLATELET # BLD AUTO: 157 K/UL (ref 135–450)
PMV BLD AUTO: 8.3 FL (ref 5–10.5)
POTASSIUM SERPL-SCNC: 3.8 MMOL/L (ref 3.5–5.1)
PROT SERPL-MCNC: 6.1 G/DL (ref 6.4–8.2)
RBC # BLD AUTO: 4.18 M/UL (ref 4–5.2)
SODIUM SERPL-SCNC: 138 MMOL/L (ref 136–145)
WBC # BLD AUTO: 5 K/UL (ref 4–11)

## 2025-03-29 PROCEDURE — 6370000000 HC RX 637 (ALT 250 FOR IP): Performed by: INTERNAL MEDICINE

## 2025-03-29 PROCEDURE — 97116 GAIT TRAINING THERAPY: CPT

## 2025-03-29 PROCEDURE — 36415 COLL VENOUS BLD VENIPUNCTURE: CPT

## 2025-03-29 PROCEDURE — 97530 THERAPEUTIC ACTIVITIES: CPT

## 2025-03-29 PROCEDURE — 97535 SELF CARE MNGMENT TRAINING: CPT

## 2025-03-29 PROCEDURE — 80053 COMPREHEN METABOLIC PANEL: CPT

## 2025-03-29 PROCEDURE — 97162 PT EVAL MOD COMPLEX 30 MIN: CPT

## 2025-03-29 PROCEDURE — 1200000000 HC SEMI PRIVATE

## 2025-03-29 PROCEDURE — 97166 OT EVAL MOD COMPLEX 45 MIN: CPT

## 2025-03-29 PROCEDURE — 85025 COMPLETE CBC W/AUTO DIFF WBC: CPT

## 2025-03-29 PROCEDURE — 6360000002 HC RX W HCPCS: Performed by: INTERNAL MEDICINE

## 2025-03-29 RX ORDER — SODIUM CHLORIDE 9 MG/ML
INJECTION, SOLUTION INTRAVENOUS CONTINUOUS
Status: DISCONTINUED | OUTPATIENT
Start: 2025-03-29 | End: 2025-04-01

## 2025-03-29 RX ADMIN — GABAPENTIN 800 MG: 400 CAPSULE ORAL at 08:23

## 2025-03-29 RX ADMIN — MIDODRINE HYDROCHLORIDE 12.5 MG: 10 TABLET ORAL at 08:21

## 2025-03-29 RX ADMIN — MEROPENEM AND SODIUM CHLORIDE 1000 MG: 1 INJECTION, SOLUTION INTRAVENOUS at 05:38

## 2025-03-29 RX ADMIN — MEROPENEM AND SODIUM CHLORIDE 1000 MG: 1 INJECTION, SOLUTION INTRAVENOUS at 22:19

## 2025-03-29 RX ADMIN — PANTOPRAZOLE SODIUM 40 MG: 40 TABLET, DELAYED RELEASE ORAL at 16:47

## 2025-03-29 RX ADMIN — MEROPENEM AND SODIUM CHLORIDE 1000 MG: 1 INJECTION, SOLUTION INTRAVENOUS at 14:09

## 2025-03-29 RX ADMIN — OXYBUTYNIN CHLORIDE 10 MG: 10 TABLET, EXTENDED RELEASE ORAL at 08:22

## 2025-03-29 RX ADMIN — CLONAZEPAM 1 MG: 1 TABLET ORAL at 05:45

## 2025-03-29 RX ADMIN — OXYCODONE HYDROCHLORIDE 10 MG: 5 TABLET ORAL at 05:35

## 2025-03-29 RX ADMIN — ROSUVASTATIN CALCIUM 10 MG: 10 TABLET, FILM COATED ORAL at 08:22

## 2025-03-29 RX ADMIN — ENOXAPARIN SODIUM 40 MG: 100 INJECTION SUBCUTANEOUS at 08:23

## 2025-03-29 RX ADMIN — GABAPENTIN 800 MG: 400 CAPSULE ORAL at 20:39

## 2025-03-29 RX ADMIN — GABAPENTIN 800 MG: 400 CAPSULE ORAL at 14:06

## 2025-03-29 RX ADMIN — METOPROLOL SUCCINATE 12.5 MG: 25 TABLET, EXTENDED RELEASE ORAL at 08:22

## 2025-03-29 RX ADMIN — CITALOPRAM HYDROBROMIDE 10 MG: 20 TABLET ORAL at 08:23

## 2025-03-29 RX ADMIN — MIDODRINE HYDROCHLORIDE 12.5 MG: 10 TABLET ORAL at 16:47

## 2025-03-29 RX ADMIN — PANTOPRAZOLE SODIUM 40 MG: 40 TABLET, DELAYED RELEASE ORAL at 05:34

## 2025-03-29 RX ADMIN — CLONAZEPAM 1 MG: 1 TABLET ORAL at 20:39

## 2025-03-29 RX ADMIN — OXYCODONE HYDROCHLORIDE 10 MG: 5 TABLET ORAL at 18:47

## 2025-03-29 RX ADMIN — MIDODRINE HYDROCHLORIDE 12.5 MG: 10 TABLET ORAL at 11:56

## 2025-03-29 RX ADMIN — OXYCODONE HYDROCHLORIDE 10 MG: 5 TABLET ORAL at 14:40

## 2025-03-29 RX ADMIN — LEVOTHYROXINE SODIUM 75 MCG: 0.05 TABLET ORAL at 05:34

## 2025-03-29 ASSESSMENT — PAIN SCALES - GENERAL
PAINLEVEL_OUTOF10: 8
PAINLEVEL_OUTOF10: 7
PAINLEVEL_OUTOF10: 8
PAINLEVEL_OUTOF10: 5
PAINLEVEL_OUTOF10: 7
PAINLEVEL_OUTOF10: 7

## 2025-03-29 ASSESSMENT — PAIN DESCRIPTION - LOCATION
LOCATION: ABDOMEN;GENERALIZED
LOCATION: GENERALIZED
LOCATION: GENERALIZED;HEAD

## 2025-03-29 ASSESSMENT — PAIN SCALES - WONG BAKER
WONGBAKER_NUMERICALRESPONSE: NO HURT
WONGBAKER_NUMERICALRESPONSE: NO HURT

## 2025-03-29 ASSESSMENT — PAIN DESCRIPTION - DESCRIPTORS
DESCRIPTORS: ACHING;SHARP;SPASM
DESCRIPTORS: ACHING
DESCRIPTORS: ACHING

## 2025-03-29 ASSESSMENT — PAIN DESCRIPTION - ORIENTATION
ORIENTATION: UPPER
ORIENTATION: MID

## 2025-03-29 NOTE — H&P
Hospital Medicine History & Physical      PCP: Connie Franco MD    Date of Admission: 3/28/2025    Date of Service: Pt seen/examined on 3/28/2025    Pt seen/examined face to face on and admitted as inpatient with expected LOS to be two days but can change depending on diagnostic work up and treatment response.     Chief Complaint:    Chief Complaint   Patient presents with    Constipation     Patient states she feels like she is constipated. Has hx of bowel obstruction and states this feels the same.            ASSESSMENT AND PLAN:    Active Hospital Problems    Diagnosis Date Noted    UTI (urinary tract infection) [N39.0] 03/28/2025   Acute cystitis:  Not meeting SIRS  Site of the insertion of the catheter appears to have drainage yellow clear  Suspected secondary to not cleaning the site prior to inserting the catheter  ID consulted, appreciated  Urology consulted, appreciated  IV meropenem empirically    Acute renal failure:  Creatinine 0.5 currently 1.0  IVF and recheck    Anxiety: Controlled, continue home medications  GERD: Continue home medication  Hyperlipidemia: Controlled on home Statin. Outpatient PCP follow up post-discharge  Hypothyroidism: Continue home medication      .Due to the above diagnosis makes the patient higher risk for morbidity and mortality requiring testing and treatment      Discussion with the primary ER physician in regards to symptoms, history, physical exam, diagnosis and treatment, collaborative decision was to admit the patient.    Diet: Cardiac diet    DVT Prophylaxis: lovenox    Dispo:   Expected LOS of two days      History Of Present Illness:      69 y.o. female who presented to [facility] with past medical history of anxiety, arthritis, bradycardia, hypertension, hyperlipidemia, MDRO, NATHAN on BiPAP, hypothyroidism presented ED with chief complaint of abdominal pain and urine tract infection next  Patient reported that she has been having recurrent UTIs with her last UTI                 Hermes Rider, DO

## 2025-03-29 NOTE — PROGRESS NOTES
4 Eyes Skin Assessment     NAME:  Cordelia Pillai  YOB: 1955  MEDICAL RECORD NUMBER:  [id number]    The patient is being assessed for  Admission    I agree that at least one RN has performed a thorough Head to Toe Skin Assessment on the patient. ALL assessment sites listed below have been assessed.      Areas assessed by both nurses:    Head, Face, Ears, Shoulders, Back, Chest, Arms, Elbows, Hands, Sacrum. Buttock, Coccyx, Ischium, and Legs. Feet and Heels        Does the Patient have a Wound? Yes wound(s) were present on assessment. LDA wound assessment was Initiated and completed by RN           Van Prevention initiated by RN: No  Wound Care Orders initiated by RN: No    Pressure Injury (Stage 3,4, Unstageable, DTI, NWPT, and Complex wounds) if present, place Wound referral order by RN under : No    New Ostomies, if present place, Ostomy referral order under : No     Nurse 1 eSignature: Electronically signed by Ashley Stoner RN on 3/29/25 at 1:38 AM EDT    **SHARE this note so that the co-signing nurse can place an eSignature**    Nurse 2 eSignature: Electronically signed by Sapna Carcamo RN on 3/29/25 at 1:51 AM EDT

## 2025-03-29 NOTE — PROGRESS NOTES
Inpatient Occupational Therapy Evaluation & Treatment    Unit: [address] [place]  Date:  3/29/2025  Patient Name:    Cordelia Pillai  Admitting diagnosis:  UTI (urinary tract infection) [N39.0]  Admit Date:  3/28/2025  Precautions/Restrictions/WB Status/ Lines/ Wounds/ Oxygen: Fall risk, Bed/chair alarm, Lines (IV), and Telemetry    Pt seen for cotreatment this date due to patient safety, patient endurance, and acute illness/injury    Treatment Time:  6821-7331  Treatment Number:  1  Timed Code Treatment Minutes: 38 minutes  Total Treatment Minutes:  48  minutes    Patient Goals for Therapy: \"to go home\"          Discharge Recommendations: Home with   24/7 assist  and Home OT  DME needs for discharge: Needs Met       Therapy recommendations for staff:   Assist of 1 for ambulation with use of rolling walker (RW) and gait belt to/from chair  to/from bathroom    History of Present Illness:   Per H&P Hermes Rider DO 3/28/25  Chief complaint: Constipation  \"69 y.o. female who presented to [facility] with past medical history of anxiety, arthritis, bradycardia, hypertension, hyperlipidemia, MDRO, NATHAN on BiPAP, hypothyroidism presented ED with chief complaint of abdominal pain and urine tract infection next  Patient reported that she has been having recurrent UTIs with her last UTI being less than 3 months ago treated.  Patient reports that the past 5 days has been having abdominal pain at the site where she inserted the catheter.  Patient reports that before inserting the catheter she does not clean the site and only cleans after.  Patient otherwise admits to having rigors with worsening pain came for further evaluation.  No association with chest pain shortness of breath nausea vomiting.  \"    Preadmission Environment:   Pt. Lives                                              with Family (daughter's family, available 24/7)   Home environment:                            one story home  Steps to enter first floor:                      Ramp  Steps to second floor/basement:        N/A  Laundry:                                              1st floor  Bathroom:                                           walk in shower, grab bars in shower, shower chair , grab bars around toilet, and raised toilet seat w/o arms  Pt sleeps in a:                                     Flat bed  Equipment owned:                              RW, rollator, SPC, shower chair/bench, and raised toilet seat     Preadmission Status:  Pt. Able to drive:                                 No - dtr drives  Pt. Fully independent with ADLs:       No - receives assistance with dressing  Pt. Required assistance for:               Cooking, Laundry , and grocery shopping (intermittently)  Pt. independent for functional transfers and utilized SPC/RW for mobility in home and RW out in community  History of falls:                                     Yes with injuries  Home Health Services:                       No    AM-PAC Score: AM-PAC Inpatient Daily Activity Raw Score: 18     Subjective:  Patient lying reclined in bed with no family present.   Pt agreeable to this OT session. RN cleared pt for therapy    Cognition:    A&O x4   Able to follow 2 step commands    Pain:   Yes  Location: head, back, L knee  Ratin-8 /10  Pain Medicine Status: Received pain med prior to tx and RN notified    Activity Tolerance:   Pt completed therapy session with lightheadedness     BP (mmHg) HR (bpm) SpO2 (%) on RA Comments   Supine at rest 114/82 61 99%    Seated at /89 72 100% Initial lightheadedness upon transferring to EOB that resolved   Standing       End of session 124/92 72 100%      Objective:  Does this pt have an acute or acute on chronic diagnosis of CHF? No    Upper Extremity ROM:    AROM WFL: Yes    Dominant Hand: Right    Upper Extremity Strength:    BUE strength impaired but not formally assessed w/ MMT    Upper Extremity Sensation:    Impaired c/o n/t in BUE    Coordination:  Impaired

## 2025-03-29 NOTE — PROGRESS NOTES
IM Progress Note    Admit Date:  3/28/2025    Patient presenting with increasing abdominal distention.  Concern for bowel obstruction.  No BM in 2 days.  Patient states that she has had bowel obstruction in the past.  She states that she does not ambulate as much due to history of POTS disease.   Abdominal x-ray on admission did not show any obstruction.  Concern for UTI per initial admission H&P.  UA positive ; patient does straight cath self    Subjective:  Ms. Pillai complains of persistent abdominal distention, she states that her abdomen is significantly much bigger than her baseline.  She is passing flatus.  No BM in 2 to 3 days.    Denies any nausea or vomiting  No fevers or chills    Objective:   BP 96/64   Pulse 62   Temp 97.7 °F (36.5 °C) (Oral)   Resp 18   Ht 1.575 m (5' 2\")   Wt 75.5 kg (166 lb 6.4 oz)   SpO2 98%   BMI 30.43 kg/m²     Intake/Output Summary (Last 24 hours) at 3/29/2025 1251  Last data filed at 3/29/2025 0546  Gross per 24 hour   Intake 1049.53 ml   Output 750 ml   Net 299.53 ml         Physical Exam:  General:  Awake, alert, NAD  Patient appears frail and fatigued but in no distress  Skin:  Warm and dry  Neck:  JVD absent. Neck supple  Chest:  Clear to auscultation, respiration easy. No wheezes, rales or rhonchi.   Cardiovascular:  RRR ,S1S2 normal  Abdomen:  Soft, non tender,  ++ distended, BS +  Extremities:  No edema.  Intact peripheral pulses. Brisk cap refill, < 2 secs  Neuro: non focal      Medications:   Scheduled Meds:   sodium chloride flush  10 mL IntraVENous 2 times per day    enoxaparin  40 mg SubCUTAneous Daily    meropenem  1,000 mg IntraVENous Q8H    citalopram  10 mg Oral Daily    gabapentin  800 mg Oral TID    levothyroxine  75 mcg Oral Daily    metoprolol succinate  12.5 mg Oral BID    oxyBUTYnin  10 mg Oral Daily    pantoprazole  40 mg Oral BID AC    rosuvastatin  10 mg Oral Daily    midodrine  12.5 mg Oral TID WC       Continuous Infusions:   sodium chloride

## 2025-03-29 NOTE — PROGRESS NOTES
Pt in bed during assessment. Pt is alert and oriented. Pt complains of generalized pain and abdominal distention. Pt states she had not had bowel movement in 2 days. No new concerns at this time. Bed alarm in place. Call light in reach. Vitals stable.     Bedside Mobility Assessment Tool (BMAT):     Assessment Level 1- Sit and Shake    1. From a semi-reclined position, ask patient to sit up and rotate to a seated position at the side of the bed. Can use the bedrail.    2. Ask patient to reach out and grab your hand and shake making sure patient reaches across his/her midline.   Pass- Patient is able to come to a seated position, maintain core strength. Maintains seated balance while reaching across midline. Move on to Assessment Level 2.     Assessment Level 2- Stretch and Point   1. With patient in seated position at the side of the bed, have patient place both feet on the floor (or stool) with knees no higher than hips.    2. Ask patient to stretch one leg and straighten the knee, then bend the ankle/flex and point the toes. If appropriate, repeat with the other leg.   Pass- Patient is able to demonstrate appropriate quad strength on intended weight bearing limb(s). Move onto Assessment Level 3.     Assessment Level 3- Stand   1. Ask patient to elevate off the bed or chair (seated to standing) using an assistive device (cane, bedrail).    2. Patient should be able to raise buttocks off be and hold for a count of five. May repeat once.   Pass- Patient maintains standing stability for at least 5 seconds, proceed to assessment level 4.    Assessment Level 4- Walk   1. Ask patient to march in place at bedside.    2. Then ask patient to advance step and return each foot. Some medical conditions may render a patient from stepping backwards, use your best clinical judgement.   Fail- Patient not able to complete tasks OR requires use of assistive device. Patient is MOBILITY LEVEL 3.       Mobility Level- 3

## 2025-03-29 NOTE — PROGRESS NOTES
Inpatient Physical Therapy Evaluation & Treatment    Unit: [facility]  Date:  3/29/2025  Patient Name:    Cordelia Pillai  Admitting diagnosis:  UTI (urinary tract infection) [N39.0]  Admit Date:  3/28/2025  Precautions/Restrictions/WB Status/ Lines/ Wounds/ Oxygen: Fall risk, Bed/chair alarm, Lines (IV), and Telemetry     Pt seen for cotreatment this date due to patient safety, patient endurance, and acute illness/injury    Treatment Time: 1512 -1600  Treatment Number:  1   Timed Code Treatment Minutes: 38 minutes  Total Treatment Minutes:  48  minutes    Patient Stated Goals for Therapy: \" to go home \"          Discharge Recommendations: Home with   24 hr assistance and Home PT  DME needs for discharge: Needs Met       Therapy recommendation for EMS Transport: can transport by wheelchair    Therapy recommendations for staff:   Assist of 1 for ambulation with use of rolling walker (RW) and gait belt to/from chair  to/from bathroom    History of Present Illness:   Per H&P Hermes Rider DO 3/28/25  Chief complaint: Constipation  \"69 y.o. female who presented to [facility] with past medical history of anxiety, arthritis, bradycardia, hypertension, hyperlipidemia, MDRO, NATHAN on BiPAP, hypothyroidism presented ED with chief complaint of abdominal pain and urine tract infection next  Patient reported that she has been having recurrent UTIs with her last UTI being less than 3 months ago treated.  Patient reports that the past 5 days has been having abdominal pain at the site where she inserted the catheter.  Patient reports that before inserting the catheter she does not clean the site and only cleans after.  Patient otherwise admits to having rigors with worsening pain came for further evaluation.  No association with chest pain shortness of breath nausea vomiting.  \"     Preadmission Environment:   Pt. Lives                                              with Family (daughter's family, available 24/7)   Home environment:          Lower Extremity ROM / Strength   AROM WFL: Yes  ROM limitations: grossly WFL    Strength Assessment (measured on a 0-5 scale):  R LE   Quad   4 to 4-   Ant Tib  4 to 4-   Hamstring 4 to 4-   Iliopsoas 4-  L LE  Quad   4 to 4-   Ant Tib  4 to 4-   Hamstring 4 to 4-   Iliopsoas 4-    Lower Extremity Sensation    Impaired - reports B neuropthy    Neuro  Coordination Testing  Blank box below indicates item is NOT TESTED  Finger to Nose:        []WNL  []Impaired    Comment:   Alternating pronation/supination:   []WNL  []Impaired    Comment:   Finger/Thumb opposition:  []WNL  []Impaired    Comment:   Heel to shin (sitting or supine):      []WNL  [x]Impaired    Comment: slower on R LE compared to L  Alternating Toe Tapping:       [x]WNL  []Impaired    Comment:     Tone  WNL    Balance  Static Sitting:  Supervision  Dynamic Sitting:  Supervision   Comments: at EOB donning shoes    Static Standing: SBA  Dynamic Standing:  CGA  Comments: at RW    Posture  Seated: Forward head and neck  Standing: Forward head and neck    Bed Mobility   Supine to Sit:    SBA and HOB elevated   Sit to Supine:   Not Tested  Rolling:   Not Tested  Scooting in sitting: SBA to scoot hips forward EOB   Scooting in supine:  Not Tested   Bridging:  Not Tested  Comments:     Transfer Training     Sit to stand:                                         CGA up to RW, with verbal cues for hand placement  Stand to sit:                                         CGA and With cues for hand placement from RW  Bed to chair:                                        Not Tested  Comments:     Gait gait completed as indicated below  Distance:      100 ft  Deviations (firm surface/linoleum):  decreased yordy and decreased step length bilaterally  Assistive Device Used:    gait belt and rolling walker (RW)  Level of Assist:    CGA   Comment: no LOB, note some \"shakiness\" of L knee but no true buckling    Stair Training deferred, pt does not have stairs in home

## 2025-03-29 NOTE — CARE COORDINATION
Case Management Assessment  Initial Evaluation    Date/Time of Evaluation: 3/29/2025 2:58 PM  Assessment Completed by: Rosaura Gomez    If patient is discharged prior to next notation, then this note serves as note for discharge by case management.    Patient Name: Cordelia Pillai                   YOB: 1955  Diagnosis: UTI (urinary tract infection) [N39.0]                   Date / Time: 3/28/2025  3:31 PM    Patient Admission Status: Inpatient   Readmission Risk (Low < 19, Mod (19-27), High > 27): Readmission Risk Score: 16.5    Current PCP: Connie Franco MD  PCP verified by CM? Yes (Salvador)    Chart Reviewed: Yes      History Provided by: Patient  Patient Orientation: Alert and Oriented    Patient Cognition: Alert    Hospitalization in the last 30 days (Readmission):  No    If yes, Readmission Assessment in  Navigator will be completed.    Advance Directives:      Code Status: Full Code   Patient's Primary Decision Maker is: Named in Scanned ACP Document    Primary Decision Maker: Kelsie Pillai - Child - [phone]    Discharge Planning:    Patient lives with: Family Members Type of Home: House  Primary Care Giver: Family  Patient Support Systems include: Children   Current Financial resources: Medicare  Current community resources: None  Current services prior to admission: Durable Medical Equipment            Current DME: Walker, Wheelchair, Cane            Type of Home Care services:  None    ADLS  Prior functional level: Assistance with the following:, Dressing, Bathing, Toileting, Mobility, Shopping, Housework, Cooking  Current functional level: Assistance with the following:, Bathing, Toileting, Dressing, Shopping, Housework, Cooking, Mobility    PT AM-PAC:   /24  OT AM-PAC:   /24    Family can provide assistance at DC: Yes  Would you like Case Management to discuss the discharge plan with any other family members/significant others, and if so, who? Yes (daughter)  Plans to Return to Present  Housing: Yes  Other Identified Issues/Barriers to RETURNING to current housing: none  Potential Assistance needed at discharge: N/A            Potential DME:    Patient expects to discharge to: House  Plan for transportation at discharge:      Financial    Payor: [employer] MEDICARE / Plan: [employer] MEDICARE-ADVANTAGE PPO / Product Type: Medicare /     Does insurance require precert for SNF: Yes    Potential assistance Purchasing Medications: No  Meds-to-Beds request:        [id number] - [place], [place] - [address] - P [phone] - F [phone]  [address]   [place] [address]  Phone: [phone] Fax: [phone]      Notes:    Factors facilitating achievement of predicted outcomes: Family support    Barriers to discharge: UTI    Additional Case Management Notes: Reviewed chart and met with pt at beside. From house with daughter, plan to return at DC. Daughter helps with all ADLs. Uses walker, cane and WC. No HC PTA. No needs identified at this time. CM following.       The Plan for Transition of Care is related to the following treatment goals of UTI (urinary tract infection) [N39.0]    IF APPLICABLE: The Patient and/or patient representative Cordelia and her family were provided with a choice of provider and agrees with the discharge plan. Freedom of choice list with basic dialogue that supports the patient's individualized plan of care/goals and shares the quality data associated with the providers was provided to:     Patient Representative Name:       The Patient and/or Patient Representative Agree with the Discharge Plan?      Rosaura Gomez  Case Management Department  Ph: [phone] Fax: [phone]

## 2025-03-29 NOTE — PROGRESS NOTES
This nurse at bedside, attempted to provided 10mg oxycodone for pain, this medication accidentally dropped on the floor. Wasted 10mg oxycodone in omni cell with Daisy AVILEZ and took out another 10mg oxycodone and gave that at 1847 and documented on MAR and notified [facility] pharmacy [employer]

## 2025-03-29 NOTE — CONSULTS
Urology Consult Note      Reason for Consultation: recurrent UTI    Chief Complaint: \"I have silent UTIs.  I came to hospital because my abdomen is distended\"  HPI:  Cordelia is a 69 y.o. female with multiple medical issues who is admitted with abdominal distention.  She has urologic history including neurogenic bladder who underwent bladder augmentation continent diversion and practices self cath TID.   She has followed with both  urology (Dr Spencer) but more recently she has seen Dr Velasquez for urologic care (last visit 2024, last cysto 2023 which was normal).  She now tells me that her abdomen feels distended, she feels constipated, feels like her prior bowel obstructions.  A urinalysis was sent in the emergency department and this showed bacteria, but the patient's anatomy consists of bowel use for her bladder augmentation therefore her urinalysis will always show bacteria.  She denies any fever or chills.  She has had no trouble catheterizing herself.  She practices self-catheterization 3 times a day.    Past Medical History:   Diagnosis Date    Angina at rest     Anxiety     Arthritis     hands and hip    Blood transfusion     after back surgery    Bradycardia     Bronchiectasis (HCC) 11/05/2013    Chronic back pain     Hyperlipidemia     Hypertension     Iron deficiency anemia 6/1/2023    Localized morphea     Multiple drug resistant organism (MDRO) culture positive 04/13/2021    E.COLI-URINE    NATHAN treated with BiPAP     Pacemaker     Stress incontinence     Thyroid disease     hypothyroid    Trochanteric bursitis of left hip 04/12/2019       Past Surgical History:   Procedure Laterality Date    ABDOMEN SURGERY  09/09/2011     REPAIR INCISIONAL HERNIA REDUCIBLE WITH POSSIBLE MESH    BACK SURGERY      x3    BACK SURGERY      stimulator    BLADDER SUSPENSION      CARDIAC PACEMAKER PLACEMENT  2011    Heart doctor at [facility] [place]    CATARACT REMOVAL WITH IMPLANT Left 03/08/2018    PHACO EMULSIFICATION OF CATARACT

## 2025-03-29 NOTE — PROGRESS NOTES
Pt in bed. Pt states she does straight cath to urostomy to 3x daily. Pt had supplies at bedside and with assistance of pt able to straight cath urostomy and drained 450mL of urine out. Provided pt with PRN pain medication at this time. No new concerns at this time.

## 2025-03-30 LAB
ALBUMIN SERPL-MCNC: 3.6 G/DL (ref 3.4–5)
ALBUMIN/GLOB SERPL: 1.3 {RATIO} (ref 1.1–2.2)
ALP SERPL-CCNC: 67 U/L (ref 40–129)
ALT SERPL-CCNC: 15 U/L (ref 10–40)
ANION GAP SERPL CALCULATED.3IONS-SCNC: 10 MMOL/L (ref 3–16)
AST SERPL-CCNC: 30 U/L (ref 15–37)
BACTERIA UR CULT: ABNORMAL
BASOPHILS # BLD: 0 K/UL (ref 0–0.2)
BASOPHILS NFR BLD: 0.6 %
BILIRUB SERPL-MCNC: <0.2 MG/DL (ref 0–1)
BUN SERPL-MCNC: 14 MG/DL (ref 7–20)
CALCIUM SERPL-MCNC: 9 MG/DL (ref 8.3–10.6)
CHLORIDE SERPL-SCNC: 108 MMOL/L (ref 99–110)
CO2 SERPL-SCNC: 22 MMOL/L (ref 21–32)
CREAT SERPL-MCNC: 0.6 MG/DL (ref 0.6–1.2)
DEPRECATED RDW RBC AUTO: 16.7 % (ref 12.4–15.4)
EOSINOPHIL # BLD: 0.1 K/UL (ref 0–0.6)
EOSINOPHIL NFR BLD: 3.5 %
GFR SERPLBLD CREATININE-BSD FMLA CKD-EPI: >90 ML/MIN/{1.73_M2}
GLUCOSE SERPL-MCNC: 80 MG/DL (ref 70–99)
HCT VFR BLD AUTO: 38 % (ref 36–48)
HGB BLD-MCNC: 12.2 G/DL (ref 12–16)
LYMPHOCYTES # BLD: 2.3 K/UL (ref 1–5.1)
LYMPHOCYTES NFR BLD: 61 %
MCH RBC QN AUTO: 29.3 PG (ref 26–34)
MCHC RBC AUTO-ENTMCNC: 32 G/DL (ref 31–36)
MCV RBC AUTO: 91.6 FL (ref 80–100)
MONOCYTES # BLD: 0.4 K/UL (ref 0–1.3)
MONOCYTES NFR BLD: 9.4 %
NEUTROPHILS # BLD: 1 K/UL (ref 1.7–7.7)
NEUTROPHILS NFR BLD: 25.5 %
ORGANISM: ABNORMAL
PLATELET # BLD AUTO: 150 K/UL (ref 135–450)
PMV BLD AUTO: 8.7 FL (ref 5–10.5)
POTASSIUM SERPL-SCNC: 4.2 MMOL/L (ref 3.5–5.1)
PROT SERPL-MCNC: 6.4 G/DL (ref 6.4–8.2)
RBC # BLD AUTO: 4.15 M/UL (ref 4–5.2)
SODIUM SERPL-SCNC: 140 MMOL/L (ref 136–145)
WBC # BLD AUTO: 3.8 K/UL (ref 4–11)

## 2025-03-30 PROCEDURE — 36415 COLL VENOUS BLD VENIPUNCTURE: CPT

## 2025-03-30 PROCEDURE — 2500000003 HC RX 250 WO HCPCS: Performed by: INTERNAL MEDICINE

## 2025-03-30 PROCEDURE — 80053 COMPREHEN METABOLIC PANEL: CPT

## 2025-03-30 PROCEDURE — 6360000002 HC RX W HCPCS: Performed by: INTERNAL MEDICINE

## 2025-03-30 PROCEDURE — 2580000003 HC RX 258: Performed by: INTERNAL MEDICINE

## 2025-03-30 PROCEDURE — 1200000000 HC SEMI PRIVATE

## 2025-03-30 PROCEDURE — P9047 ALBUMIN (HUMAN), 25%, 50ML: HCPCS | Performed by: STUDENT IN AN ORGANIZED HEALTH CARE EDUCATION/TRAINING PROGRAM

## 2025-03-30 PROCEDURE — 6370000000 HC RX 637 (ALT 250 FOR IP): Performed by: INTERNAL MEDICINE

## 2025-03-30 PROCEDURE — 6360000002 HC RX W HCPCS: Performed by: STUDENT IN AN ORGANIZED HEALTH CARE EDUCATION/TRAINING PROGRAM

## 2025-03-30 PROCEDURE — 85025 COMPLETE CBC W/AUTO DIFF WBC: CPT

## 2025-03-30 RX ORDER — BISACODYL 10 MG
10 SUPPOSITORY, RECTAL RECTAL ONCE
Status: COMPLETED | OUTPATIENT
Start: 2025-03-30 | End: 2025-03-30

## 2025-03-30 RX ORDER — ALBUMIN (HUMAN) 12.5 G/50ML
25 SOLUTION INTRAVENOUS ONCE
Status: COMPLETED | OUTPATIENT
Start: 2025-03-30 | End: 2025-03-30

## 2025-03-30 RX ORDER — POLYETHYLENE GLYCOL 3350 17 G/17G
17 POWDER, FOR SOLUTION ORAL 2 TIMES DAILY
Status: DISCONTINUED | OUTPATIENT
Start: 2025-03-30 | End: 2025-04-05 | Stop reason: HOSPADM

## 2025-03-30 RX ADMIN — CLONAZEPAM 1 MG: 1 TABLET ORAL at 23:38

## 2025-03-30 RX ADMIN — ROSUVASTATIN CALCIUM 10 MG: 10 TABLET, FILM COATED ORAL at 08:14

## 2025-03-30 RX ADMIN — MEROPENEM AND SODIUM CHLORIDE 1000 MG: 1 INJECTION, SOLUTION INTRAVENOUS at 14:06

## 2025-03-30 RX ADMIN — MIDODRINE HYDROCHLORIDE 12.5 MG: 10 TABLET ORAL at 08:13

## 2025-03-30 RX ADMIN — CITALOPRAM HYDROBROMIDE 10 MG: 20 TABLET ORAL at 08:14

## 2025-03-30 RX ADMIN — PANTOPRAZOLE SODIUM 40 MG: 40 TABLET, DELAYED RELEASE ORAL at 05:54

## 2025-03-30 RX ADMIN — GABAPENTIN 800 MG: 400 CAPSULE ORAL at 23:36

## 2025-03-30 RX ADMIN — MIDODRINE HYDROCHLORIDE 12.5 MG: 10 TABLET ORAL at 12:06

## 2025-03-30 RX ADMIN — POLYETHYLENE GLYCOL (3350) 17 G: 17 POWDER, FOR SOLUTION ORAL at 14:04

## 2025-03-30 RX ADMIN — OXYBUTYNIN CHLORIDE 10 MG: 10 TABLET, EXTENDED RELEASE ORAL at 08:13

## 2025-03-30 RX ADMIN — GABAPENTIN 800 MG: 400 CAPSULE ORAL at 14:04

## 2025-03-30 RX ADMIN — BISACODYL 10 MG: 10 SUPPOSITORY RECTAL at 14:04

## 2025-03-30 RX ADMIN — LEVOTHYROXINE SODIUM 75 MCG: 0.05 TABLET ORAL at 05:54

## 2025-03-30 RX ADMIN — ENOXAPARIN SODIUM 40 MG: 100 INJECTION SUBCUTANEOUS at 08:13

## 2025-03-30 RX ADMIN — SODIUM CHLORIDE, PRESERVATIVE FREE 10 ML: 5 INJECTION INTRAVENOUS at 23:52

## 2025-03-30 RX ADMIN — OXYCODONE HYDROCHLORIDE 5 MG: 5 TABLET ORAL at 16:24

## 2025-03-30 RX ADMIN — GABAPENTIN 800 MG: 400 CAPSULE ORAL at 08:13

## 2025-03-30 RX ADMIN — OXYCODONE HYDROCHLORIDE 10 MG: 5 TABLET ORAL at 23:37

## 2025-03-30 RX ADMIN — MEROPENEM AND SODIUM CHLORIDE 1000 MG: 1 INJECTION, SOLUTION INTRAVENOUS at 23:49

## 2025-03-30 RX ADMIN — ALBUMIN (HUMAN) 25 G: 0.25 INJECTION, SOLUTION INTRAVENOUS at 01:55

## 2025-03-30 RX ADMIN — PANTOPRAZOLE SODIUM 40 MG: 40 TABLET, DELAYED RELEASE ORAL at 16:25

## 2025-03-30 RX ADMIN — MEROPENEM AND SODIUM CHLORIDE 1000 MG: 1 INJECTION, SOLUTION INTRAVENOUS at 05:57

## 2025-03-30 RX ADMIN — SODIUM CHLORIDE: 0.9 INJECTION, SOLUTION INTRAVENOUS at 18:36

## 2025-03-30 ASSESSMENT — PAIN DESCRIPTION - DESCRIPTORS
DESCRIPTORS: SHARP
DESCRIPTORS: SHARP;THROBBING

## 2025-03-30 ASSESSMENT — PAIN SCALES - WONG BAKER
WONGBAKER_NUMERICALRESPONSE: NO HURT

## 2025-03-30 ASSESSMENT — PAIN DESCRIPTION - ONSET: ONSET: AWAKENED FROM SLEEP

## 2025-03-30 ASSESSMENT — PAIN DESCRIPTION - LOCATION
LOCATION: ABDOMEN;BACK
LOCATION: ABDOMEN

## 2025-03-30 ASSESSMENT — PAIN DESCRIPTION - ORIENTATION
ORIENTATION: MID
ORIENTATION: MID

## 2025-03-30 ASSESSMENT — PAIN SCALES - GENERAL
PAINLEVEL_OUTOF10: 4
PAINLEVEL_OUTOF10: 3
PAINLEVEL_OUTOF10: 9

## 2025-03-30 ASSESSMENT — PAIN DESCRIPTION - PAIN TYPE: TYPE: ACUTE PAIN

## 2025-03-30 ASSESSMENT — PAIN DESCRIPTION - FREQUENCY: FREQUENCY: CONTINUOUS

## 2025-03-30 NOTE — PROGRESS NOTES
Urology Progress Note    Subjective: I am feeling better.    HPI: Patient has been admitted for abdominal distension and pain.    P/E  /60   Pulse 61   Temp 97.5 °F (36.4 °C) (Oral)   Resp 18   Ht 1.575 m (5' 2\")   Wt 75.5 kg (166 lb 6.4 oz)   SpO2 99%   BMI 30.43 kg/m²   Skin: Intact  Respiratory: Breathing without difficulty, stable.  GI: No acute changes, stable po intake.  : Abdominal stoma intact, caths easily.  MSK: No acute changes, stable.  Neuro: No acute changes, stable.    Labs  Lab Results   Component Value Date/Time    WBC 3.8 03/30/2025 06:25 AM    HGB 12.2 03/30/2025 06:25 AM    HCT 38.0 03/30/2025 06:25 AM    MCV 91.6 03/30/2025 06:25 AM     03/30/2025 06:25 AM     Lab Results   Component Value Date/Time     03/30/2025 06:25 AM    K 4.2 03/30/2025 06:25 AM     03/30/2025 06:25 AM    CO2 22 03/30/2025 06:25 AM    BUN 14 03/30/2025 06:25 AM    CREATININE 0.6 03/30/2025 06:25 AM    CALCIUM 9.0 03/30/2025 06:25 AM       Assessment/Plan  Stable from  standpoint.  Continue with ISC as she is doing now.  No  intervention needed.  Will sign off.    Electronically signed by Curt Che MD on 3/30/2025 at 10:53 AM

## 2025-03-30 NOTE — PROGRESS NOTES
IM Progress Note    Admit Date:  3/28/2025    Patient presenting with increasing abdominal distention.  Concern for bowel obstruction.  No BM in 2 days.  Patient states that she has had bowel obstruction in the past.  She states that she does not ambulate as much due to history of POTS disease.   Abdominal x-ray on admission did not show any obstruction.  Concern for UTI per initial admission H&P.  UA positive ; patient does straight cath self    Subjective:  Ms. Pillai complains of persistent abdominal distention, she states that her abdomen is significantly much bigger than her baseline.  She is passing flatus.  No BM in 3 days.  Pt states that she takes linzess at home .  Denies any nausea or vomiting  No fevers or chills    Objective:   /68   Pulse 66   Temp 97.5 °F (36.4 °C) (Oral)   Resp 18   Ht 1.575 m (5' 2\")   Wt 75.5 kg (166 lb 6.4 oz)   SpO2 99%   BMI 30.43 kg/m²     Intake/Output Summary (Last 24 hours) at 3/30/2025 1217  Last data filed at 3/30/2025 0800  Gross per 24 hour   Intake 353.15 ml   Output 1700 ml   Net -1346.85 ml         Physical Exam:  General:  Awake, alert, NAD  Patient appears frail and fatigued but in no distress  Skin:  Warm and dry  Neck:  JVD absent. Neck supple  Chest:  Clear to auscultation, respiration easy. No wheezes, rales or rhonchi.   Cardiovascular:  RRR ,S1S2 normal  Abdomen:  Soft, non tender,  ++ distended, BS +   Urostomy +  Extremities:  No edema.  Intact peripheral pulses. Brisk cap refill, < 2 secs  Neuro: non focal      Medications:   Scheduled Meds:   [START ON 3/31/2025] linaclotide  145 mcg Oral QAM AC    polyethylene glycol  17 g Oral BID    bisacodyl  10 mg Rectal Once    sodium chloride flush  10 mL IntraVENous 2 times per day    enoxaparin  40 mg SubCUTAneous Daily    meropenem  1,000 mg IntraVENous Q8H    citalopram  10 mg Oral Daily    gabapentin  800 mg Oral TID    levothyroxine  75 mcg Oral Daily    metoprolol succinate  12.5 mg Oral BID     flatus.  Patient states that she does take Linzess at home-will resume.  Give Dulcolax suppository once.    Add scheduled MiraLAX      Acute renal failure ruled out: Continue IV fluids.  Creatinine is stable    History of POTS disease:  Weakness:  Patient ambulates minimally.  Will consult PT OT     Anxiety:  H/O bipolar disorder:   Continue home medications.  On lexapro, Klonopin as needed    GERD:   Continue home medication. On PPI    Hyperlipidemia:   Controlled on home Statin. Outpatient PCP follow up post-discharge    Hypothyroidism:   Continue home medication. On synthroid         Due to the above diagnosis makes the patient higher risk for morbidity and mortality requiring testing and treatment           ADULT DIET; Clear Liquid; 3 carb choices (45 gm/meal); Low Fat/Low Chol/High Fiber/2 gm Na; Low Sodium (2 gm)   Full Code  DVT Prophylaxis: lovenox     Plan of care was discussed with patient and patient's nurse.  Continue current management.  Will not advance diet and patient has a bowel movement.  DC planning once she has a bowel movement and once we can advance her diet.    Altaf Murillo MD   3/30/2025 12:17 PM

## 2025-03-30 NOTE — PROGRESS NOTES
Pt in bed during assessment. Pt is alert and oriented. Pt denies pain at this time. Straight cath completed at this time. Call light in reach. Bed alarm in place. Vitals stable. Pt accepting of morning medications. Abdomen soft and less distended and pt agrees. No new concerns at this time.

## 2025-03-30 NOTE — PLAN OF CARE
Problem: Discharge Planning  Goal: Discharge to home or other facility with appropriate resources  3/30/2025 0237 by Ashley Stoner RN  Outcome: Progressing  Flowsheets (Taken 3/30/2025 0237)  Discharge to home or other facility with appropriate resources:   Identify barriers to discharge with patient and caregiver   Arrange for needed discharge resources and transportation as appropriate   Identify discharge learning needs (meds, wound care, etc)     Problem: Pain  Goal: Verbalizes/displays adequate comfort level or baseline comfort level  3/30/2025 0237 by Ashley Stoner RN  Outcome: Progressing  Flowsheets (Taken 3/30/2025 0237)  Verbalizes/displays adequate comfort level or baseline comfort level:   Encourage patient to monitor pain and request assistance   Assess pain using appropriate pain scale   Administer analgesics based on type and severity of pain and evaluate response   Implement non-pharmacological measures as appropriate and evaluate response     Problem: Safety - Adult  Goal: Free from fall injury  3/30/2025 0237 by Ashley Stoner RN  Outcome: Progressing

## 2025-03-30 NOTE — PLAN OF CARE
Problem: Discharge Planning  Goal: Discharge to home or other facility with appropriate resources  3/30/2025 1509 by Ailyn Nathan RN  Outcome: Progressing  3/30/2025 0237 by Ashley Stoner RN  Outcome: Progressing  Flowsheets (Taken 3/30/2025 0237)  Discharge to home or other facility with appropriate resources:   Identify barriers to discharge with patient and caregiver   Arrange for needed discharge resources and transportation as appropriate   Identify discharge learning needs (meds, wound care, etc)     Problem: Pain  Goal: Verbalizes/displays adequate comfort level or baseline comfort level  3/30/2025 1509 by Ailyn Nathan RN  Outcome: Progressing  3/30/2025 0237 by Ashley Stoner RN  Outcome: Progressing  Flowsheets (Taken 3/30/2025 0237)  Verbalizes/displays adequate comfort level or baseline comfort level:   Encourage patient to monitor pain and request assistance   Assess pain using appropriate pain scale   Administer analgesics based on type and severity of pain and evaluate response   Implement non-pharmacological measures as appropriate and evaluate response     Problem: Safety - Adult  Goal: Free from fall injury  3/30/2025 1509 by Ailyn Nathan RN  Outcome: Progressing  3/30/2025 0237 by Ashley Stoner RN  Outcome: Progressing

## 2025-03-31 PROBLEM — F32.A ANXIETY AND DEPRESSION: Status: ACTIVE | Noted: 2025-03-31

## 2025-03-31 PROBLEM — Z98.890 HISTORY OF UROSTOMY: Status: ACTIVE | Noted: 2025-03-31

## 2025-03-31 PROBLEM — K56.7 ILEUS (HCC): Status: ACTIVE | Noted: 2025-03-31

## 2025-03-31 PROBLEM — F41.9 ANXIETY AND DEPRESSION: Status: ACTIVE | Noted: 2025-03-31

## 2025-03-31 LAB
ALBUMIN SERPL-MCNC: 3.4 G/DL (ref 3.4–5)
ALBUMIN/GLOB SERPL: 1.2 {RATIO} (ref 1.1–2.2)
ALP SERPL-CCNC: 78 U/L (ref 40–129)
ALT SERPL-CCNC: 17 U/L (ref 10–40)
ANION GAP SERPL CALCULATED.3IONS-SCNC: 10 MMOL/L (ref 3–16)
AST SERPL-CCNC: 30 U/L (ref 15–37)
BASOPHILS # BLD: 0 K/UL (ref 0–0.2)
BASOPHILS NFR BLD: 0.7 %
BILIRUB SERPL-MCNC: 0.3 MG/DL (ref 0–1)
BUN SERPL-MCNC: 10 MG/DL (ref 7–20)
CALCIUM SERPL-MCNC: 9.1 MG/DL (ref 8.3–10.6)
CHLORIDE SERPL-SCNC: 106 MMOL/L (ref 99–110)
CO2 SERPL-SCNC: 23 MMOL/L (ref 21–32)
CREAT SERPL-MCNC: 0.5 MG/DL (ref 0.6–1.2)
DEPRECATED RDW RBC AUTO: 16.6 % (ref 12.4–15.4)
EOSINOPHIL # BLD: 0.1 K/UL (ref 0–0.6)
EOSINOPHIL NFR BLD: 2.1 %
GFR SERPLBLD CREATININE-BSD FMLA CKD-EPI: >90 ML/MIN/{1.73_M2}
GLUCOSE SERPL-MCNC: 80 MG/DL (ref 70–99)
HCT VFR BLD AUTO: 35.9 % (ref 36–48)
HGB BLD-MCNC: 11.8 G/DL (ref 12–16)
LYMPHOCYTES # BLD: 2.4 K/UL (ref 1–5.1)
LYMPHOCYTES NFR BLD: 46.1 %
MCH RBC QN AUTO: 29 PG (ref 26–34)
MCHC RBC AUTO-ENTMCNC: 32.9 G/DL (ref 31–36)
MCV RBC AUTO: 88.2 FL (ref 80–100)
MONOCYTES # BLD: 0.4 K/UL (ref 0–1.3)
MONOCYTES NFR BLD: 8.4 %
NEUTROPHILS # BLD: 2.2 K/UL (ref 1.7–7.7)
NEUTROPHILS NFR BLD: 42.7 %
PLATELET # BLD AUTO: 163 K/UL (ref 135–450)
PMV BLD AUTO: 8.2 FL (ref 5–10.5)
POTASSIUM SERPL-SCNC: 4.2 MMOL/L (ref 3.5–5.1)
PROT SERPL-MCNC: 6.3 G/DL (ref 6.4–8.2)
RBC # BLD AUTO: 4.07 M/UL (ref 4–5.2)
SODIUM SERPL-SCNC: 139 MMOL/L (ref 136–145)
WBC # BLD AUTO: 5.2 K/UL (ref 4–11)

## 2025-03-31 PROCEDURE — 97535 SELF CARE MNGMENT TRAINING: CPT

## 2025-03-31 PROCEDURE — 6370000000 HC RX 637 (ALT 250 FOR IP): Performed by: INTERNAL MEDICINE

## 2025-03-31 PROCEDURE — 97530 THERAPEUTIC ACTIVITIES: CPT

## 2025-03-31 PROCEDURE — 99223 1ST HOSP IP/OBS HIGH 75: CPT | Performed by: INTERNAL MEDICINE

## 2025-03-31 PROCEDURE — 2500000003 HC RX 250 WO HCPCS: Performed by: INTERNAL MEDICINE

## 2025-03-31 PROCEDURE — 2580000003 HC RX 258: Performed by: INTERNAL MEDICINE

## 2025-03-31 PROCEDURE — 1200000000 HC SEMI PRIVATE

## 2025-03-31 PROCEDURE — 85025 COMPLETE CBC W/AUTO DIFF WBC: CPT

## 2025-03-31 PROCEDURE — 6360000002 HC RX W HCPCS: Performed by: INTERNAL MEDICINE

## 2025-03-31 PROCEDURE — 99233 SBSQ HOSP IP/OBS HIGH 50: CPT | Performed by: INTERNAL MEDICINE

## 2025-03-31 PROCEDURE — 36415 COLL VENOUS BLD VENIPUNCTURE: CPT

## 2025-03-31 PROCEDURE — 80053 COMPREHEN METABOLIC PANEL: CPT

## 2025-03-31 RX ADMIN — GABAPENTIN 800 MG: 400 CAPSULE ORAL at 20:30

## 2025-03-31 RX ADMIN — CITALOPRAM HYDROBROMIDE 10 MG: 20 TABLET ORAL at 09:02

## 2025-03-31 RX ADMIN — OXYCODONE HYDROCHLORIDE 10 MG: 5 TABLET ORAL at 17:23

## 2025-03-31 RX ADMIN — LINACLOTIDE 145 MCG: 145 CAPSULE, GELATIN COATED ORAL at 07:25

## 2025-03-31 RX ADMIN — MIDODRINE HYDROCHLORIDE 12.5 MG: 10 TABLET ORAL at 09:02

## 2025-03-31 RX ADMIN — POLYETHYLENE GLYCOL (3350) 17 G: 17 POWDER, FOR SOLUTION ORAL at 09:01

## 2025-03-31 RX ADMIN — PANTOPRAZOLE SODIUM 40 MG: 40 TABLET, DELAYED RELEASE ORAL at 16:10

## 2025-03-31 RX ADMIN — SODIUM CHLORIDE, PRESERVATIVE FREE 10 ML: 5 INJECTION INTRAVENOUS at 09:02

## 2025-03-31 RX ADMIN — GABAPENTIN 800 MG: 400 CAPSULE ORAL at 09:02

## 2025-03-31 RX ADMIN — ROSUVASTATIN CALCIUM 10 MG: 10 TABLET, FILM COATED ORAL at 09:02

## 2025-03-31 RX ADMIN — SODIUM CHLORIDE: 0.9 INJECTION, SOLUTION INTRAVENOUS at 13:59

## 2025-03-31 RX ADMIN — LEVOTHYROXINE SODIUM 75 MCG: 0.05 TABLET ORAL at 06:19

## 2025-03-31 RX ADMIN — ENOXAPARIN SODIUM 40 MG: 100 INJECTION SUBCUTANEOUS at 09:02

## 2025-03-31 RX ADMIN — CLONAZEPAM 1 MG: 1 TABLET ORAL at 16:12

## 2025-03-31 RX ADMIN — PANTOPRAZOLE SODIUM 40 MG: 40 TABLET, DELAYED RELEASE ORAL at 06:19

## 2025-03-31 RX ADMIN — OXYBUTYNIN CHLORIDE 10 MG: 10 TABLET, EXTENDED RELEASE ORAL at 09:02

## 2025-03-31 RX ADMIN — GABAPENTIN 800 MG: 400 CAPSULE ORAL at 13:56

## 2025-03-31 RX ADMIN — METOPROLOL SUCCINATE 12.5 MG: 25 TABLET, EXTENDED RELEASE ORAL at 09:02

## 2025-03-31 RX ADMIN — MEROPENEM AND SODIUM CHLORIDE 1000 MG: 1 INJECTION, SOLUTION INTRAVENOUS at 13:56

## 2025-03-31 RX ADMIN — MEROPENEM AND SODIUM CHLORIDE 1000 MG: 1 INJECTION, SOLUTION INTRAVENOUS at 06:19

## 2025-03-31 ASSESSMENT — PAIN DESCRIPTION - DESCRIPTORS: DESCRIPTORS: ACHING

## 2025-03-31 ASSESSMENT — PAIN DESCRIPTION - PAIN TYPE: TYPE: ACUTE PAIN

## 2025-03-31 ASSESSMENT — PAIN SCALES - GENERAL
PAINLEVEL_OUTOF10: 0
PAINLEVEL_OUTOF10: 0
PAINLEVEL_OUTOF10: 6
PAINLEVEL_OUTOF10: 7
PAINLEVEL_OUTOF10: 0

## 2025-03-31 ASSESSMENT — PAIN DESCRIPTION - ONSET: ONSET: ON-GOING

## 2025-03-31 ASSESSMENT — PAIN DESCRIPTION - LOCATION: LOCATION: GENERALIZED

## 2025-03-31 ASSESSMENT — PAIN SCALES - WONG BAKER: WONGBAKER_NUMERICALRESPONSE: NO HURT

## 2025-03-31 ASSESSMENT — PAIN DESCRIPTION - FREQUENCY: FREQUENCY: CONTINUOUS

## 2025-03-31 ASSESSMENT — PAIN DESCRIPTION - ORIENTATION: ORIENTATION: OTHER (COMMENT)

## 2025-03-31 ASSESSMENT — PAIN - FUNCTIONAL ASSESSMENT: PAIN_FUNCTIONAL_ASSESSMENT: ACTIVITIES ARE NOT PREVENTED

## 2025-03-31 ASSESSMENT — PAIN DESCRIPTION - DIRECTION: RADIATING_TOWARDS: NO

## 2025-03-31 NOTE — PROGRESS NOTES
IM Progress Note    Admit Date:  3/28/2025    Patient presenting with increasing abdominal distention.  Concern for bowel obstruction.  No BM in 2 days.  Patient states that she has had bowel obstruction in the past.    She states that she does not ambulate as much due to history of POTS disease.  But is ambulating in room.  Abdominal x-ray on admission did not show any obstruction.  Concern for UTI per initial admission H&P.  UA positive ; patient does straight cath self    Subjective:  Ms. Pillai complains of persistent abdominal distention, she states that her abdomen is significantly much bigger than her baseline.  She is passing flatus.  No BM in 3 days.  Pt states that she takes linzess at home - resumed. Had small BM last night .  Denies any nausea or vomiting  No fevers or chills.     Seen by ID for + urine cultures .     She is anxious, looks depressed.   Does not feel well      Objective:   /60   Pulse 65   Temp 97.3 °F (36.3 °C) (Oral)   Resp 14   Ht 1.575 m (5' 2\")   Wt 74.8 kg (165 lb)   SpO2 97%   BMI 30.18 kg/m²     Intake/Output Summary (Last 24 hours) at 3/31/2025 1228  Last data filed at 3/31/2025 0859  Gross per 24 hour   Intake 360 ml   Output 3700 ml   Net -3340 ml         Physical Exam:  General:  Awake, alert, NAD  Patient appears frail and fatigued but in no distress.   Looks depressed.   Skin:  Warm and dry  Neck:  JVD absent. Neck supple  Chest:  Clear to auscultation, respiration easy. No wheezes, rales or rhonchi.   Cardiovascular:  RRR ,S1S2 normal  Abdomen:  Soft,  lower abd tender,  mildly distended, BS +   Urostomy +  Extremities:  No edema.  Intact peripheral pulses. Brisk cap refill, < 2 secs  Neuro: non focal      Medications:   Scheduled Meds:   linaclotide  145 mcg Oral QAM AC    polyethylene glycol  17 g Oral BID    sodium chloride flush  10 mL IntraVENous 2 times per day    enoxaparin  40 mg SubCUTAneous Daily    meropenem  1,000 mg IntraVENous Q8H    citalopram

## 2025-03-31 NOTE — PROGRESS NOTES
Transferred care to RAGHAV Burks. Face to face bedside report given, no need voiced at this time. Pt in bed with eyes closed.   No signs of distress noted.  Call light within reach.   Denies needs.

## 2025-03-31 NOTE — PLAN OF CARE
Problem: Discharge Planning  Goal: Discharge to home or other facility with appropriate resources  3/31/2025 0142 by Emerson Head RN  Outcome: Progressing  Flowsheets (Taken 3/30/2025 2300)  Discharge to home or other facility with appropriate resources: Identify discharge learning needs (meds, wound care, etc)  3/30/2025 1509 by Ailyn Nathan RN  Outcome: Progressing     Problem: Pain  Goal: Verbalizes/displays adequate comfort level or baseline comfort level  3/31/2025 0142 by Emerson Head RN  Outcome: Progressing  Flowsheets (Taken 3/30/2025 2300)  Verbalizes/displays adequate comfort level or baseline comfort level: Encourage patient to monitor pain and request assistance  3/30/2025 1509 by Ailyn Nathan RN  Outcome: Progressing     Problem: Safety - Adult  Goal: Free from fall injury  3/31/2025 0142 by Emerson Head RN  Outcome: Progressing  3/30/2025 1509 by Ailyn Nathan RN  Outcome: Progressing

## 2025-03-31 NOTE — PROGRESS NOTES
Resting in chair with eyes closed. Resp e/e No distress noted. Call light in reach. Chair alarm in place and turned on.

## 2025-03-31 NOTE — CONSULTS
Infectious Diseases   Consult Note      Reason for Consult:  rec UTI   Requesting Physician:  Tereso       Date of Admission: 3/28/2025    Subjective:   CHIEF COMPLAINT:  none given       HPI:    Cordelia Pillai is a 69yoF with history of HTN, HLD, PPM in place, NATHAN, hypothyroid     Complex Urologic history   History of neurogenic bladder, s/p bladder augmentation with urostomy.  She performs ISC QID  She takes keflex alternating w Bactrim for UTI ppx per Urology.    She had episodes of weakness, tremor, \"felt like my body was shutting down\" on the evenings of 3/26 and 3/27, and again while shopping at [employer] on 3/28/25.  She came to the ED 3/28/25 for evaluation.  She also c/o abd distention and constipation and was concerned about recurrent SBO.  F/C denied.      She was hypotensive and appeared dehydrated.  BP normalized with IVF.    WBC was wnl   AINSLEY noted.   AXR was negative  UA was abnormal   She was admitted for management of UTI and possible ileus.    UC is positive for Raoutella sp    She has been consistently AF   On RA   BM x1 since admission recorded.   She says she really doesn't feel much better today, still weak, lips and mouth dry.  Still feels constipated  Diet is clear liquids                    Current abx:  Meropenem 1g q8, s 3/28    Rocephin x1 3/28       Past Surgical History:       Diagnosis Date    Angina at rest     Anxiety     Arthritis     hands and hip    Blood transfusion     after back surgery    Bradycardia     Bronchiectasis (HCC) 11/05/2013    Chronic back pain     Hyperlipidemia     Hypertension     Iron deficiency anemia 6/1/2023    Localized morphea     Multiple drug resistant organism (MDRO) culture positive 04/13/2021    E.COLI-URINE    NATHAN treated with BiPAP     Pacemaker     Stress incontinence     Thyroid disease     hypothyroid    Trochanteric bursitis of left hip 04/12/2019         Procedure Laterality Date    ABDOMEN SURGERY  09/09/2011     REPAIR INCISIONAL HERNIA REDUCIBLE  keflex/Bactrim     Discussed tightening criteria in the future for testing and treating for UTI - ie, don't use smell or character alone as triggers to test and treat.  These are more likely to reflect dehydration.  Only test and treat for infection when \"sick.\"  We discussed overy and more subtle manifestations of UTI in older popultion.     Bowel reg ordered  Linzess resumed this morning   Diet to be advanced as tolerated    All questions addressed    She can f/u w me PRN     Please reach out if further ID input is needed         Medical Decision Making:  The following items were considered in medical decision making:  Discussion of patient care with other providers  Reviewed clinical lab tests  Reviewed radiology tests  Reviewed other diagnostic tests/interventions  Independent review of radiologic images  Microbiology cultures and other micro tests reviewed       Risk of Complications/Morbidity: High   Illness(es)/ Infection present that pose threat to bodily function.   There is potential for severe exacerbation of infection/side effects of treatment.  Therapy requires intensive monitoring for antimicrobial agent toxicity         Oriana Fiore M.D.    Thank you for the opportunity to participate in the care of your patient.    Please do not hesitate to contact me:   [phone] office

## 2025-03-31 NOTE — PROGRESS NOTES
Inpatient Occupational Therapy Treatment    Unit: [address] [facility]  Date:  3/31/2025  Patient Name:    Cordelia Pillai  Admitting diagnosis:  UTI (urinary tract infection) [N39.0]  Admit Date:  3/28/2025  Precautions/Restrictions/WB Status/ Lines/ Wounds/ Oxygen: Fall risk, Bed/chair alarm, Lines (IV and ostomy), Telemetry, and Isolation Precautions: Contact    Treatment Time:  6121-5149  Treatment Number:  2  Timed Code Treatment Minutes: 38 minutes  Total Treatment Minutes:  38  minutes    Patient Goals for Therapy: \"to go home\"          Discharge Recommendations: Home with   24/7 assist  and Home OT  DME needs for discharge: Needs Met       Therapy recommendations for staff:   Assist of 1 for transfers with use of rolling walker (RW) and gait belt to/from BSC  to/from chair    History of Present Illness: Per H&P Hermes Rider DO 3/28/25  Chief complaint: Constipation  \"69 y.o. female who presented to [facility] with past medical history of anxiety, arthritis, bradycardia, hypertension, hyperlipidemia, MDRO, NATHAN on BiPAP, hypothyroidism presented ED with chief complaint of abdominal pain and urine tract infection next  Patient reported that she has been having recurrent UTIs with her last UTI being less than 3 months ago treated.  Patient reports that the past 5 days has been having abdominal pain at the site where she inserted the catheter.  Patient reports that before inserting the catheter she does not clean the site and only cleans after.  Patient otherwise admits to having rigors with worsening pain came for further evaluation.  No association with chest pain shortness of breath nausea vomiting.  \"     Preadmission Environment:   Pt. Lives                                              with Family (daughter's family, available 24/7)   Home environment:                            one story home  Steps to enter first floor:                     Ramp  Steps to second floor/basement:        N/A  Laundry:                      Visits:  Bed to toilet/BSC:                                                                   Supervision     To be met in 5 Visits:  Supine to/from Sit in preparation for ADL task:                      Independent  Toileting                                                                                  Supervision  Grooming                    GOAL MET 3/31/25                            Supervision  Upper Body Dressing:                                                            Supervision  Lower Body Dressing:                                                            Supervision with AE PRN  Pt to demonstrate UE therapeutic exs x 15 reps with minimal cues     Rehabilitation Potential: Good  Strengths for achieving goals include: Family Support and Pt cooperative   Barriers to achieving goals include:  Complexity of condition     Plan:  To be seen 3-5 x/ week while in acute care setting for therapeutic exercises/activities, bed mobility training, functional transfer training, family/patient education, ADL/IADL retraining, energy conservation training, and balance training.    Electronically signed by Cassandra Martin OT on 3/31/2025 at 11:51 AM      If patient discharges from this facility prior to next visit, this note will serve as the Discharge Summary

## 2025-03-31 NOTE — PROGRESS NOTES
Resting in bed awake. NO complaints or needs at present time. Am assessment complete Alert and oriented. Call light in reach. Bed alarm in place and turned on.    Bedside Mobility Assessment Tool (BMAT):     Assessment Level 1- Sit and Shake    1. From a semi-reclined position, ask patient to sit up and rotate to a seated position at the side of the bed. Can use the bedrail.    2. Ask patient to reach out and grab your hand and shake making sure patient reaches across his/her midline.   Pass- Patient is able to come to a seated position, maintain core strength. Maintains seated balance while reaching across midline. Move on to Assessment Level 2.     Assessment Level 2- Stretch and Point   1. With patient in seated position at the side of the bed, have patient place both feet on the floor (or stool) with knees no higher than hips.    2. Ask patient to stretch one leg and straighten the knee, then bend the ankle/flex and point the toes. If appropriate, repeat with the other leg.   Pass- Patient is able to demonstrate appropriate quad strength on intended weight bearing limb(s). Move onto Assessment Level 3.     Assessment Level 3- Stand   1. Ask patient to elevate off the bed or chair (seated to standing) using an assistive device (cane, bedrail).    2. Patient should be able to raise buttocks off be and hold for a count of five. May repeat once.   Pass- Patient maintains standing stability for at least 5 seconds, proceed to assessment level 4.    Assessment Level 4- Walk   1. Ask patient to march in place at bedside.    2. Then ask patient to advance step and return each foot. Some medical conditions may render a patient from stepping backwards, use your best clinical judgement.   Pass- Patient demonstrates balance while shifting weight and ability to step, takes independent steps, does not use assistive device patient is MOBILITY LEVEL 4.      Mobility Level- 4

## 2025-04-01 PROCEDURE — 6370000000 HC RX 637 (ALT 250 FOR IP): Performed by: INTERNAL MEDICINE

## 2025-04-01 PROCEDURE — 2500000003 HC RX 250 WO HCPCS: Performed by: INTERNAL MEDICINE

## 2025-04-01 PROCEDURE — 2580000003 HC RX 258: Performed by: INTERNAL MEDICINE

## 2025-04-01 PROCEDURE — 99232 SBSQ HOSP IP/OBS MODERATE 35: CPT | Performed by: INTERNAL MEDICINE

## 2025-04-01 PROCEDURE — 6370000000 HC RX 637 (ALT 250 FOR IP): Performed by: PSYCHIATRY & NEUROLOGY

## 2025-04-01 PROCEDURE — 6360000002 HC RX W HCPCS: Performed by: INTERNAL MEDICINE

## 2025-04-01 PROCEDURE — 90792 PSYCH DIAG EVAL W/MED SRVCS: CPT | Performed by: PSYCHIATRY & NEUROLOGY

## 2025-04-01 PROCEDURE — 1200000000 HC SEMI PRIVATE

## 2025-04-01 RX ORDER — CITALOPRAM HYDROBROMIDE 20 MG/1
20 TABLET ORAL DAILY
Status: DISCONTINUED | OUTPATIENT
Start: 2025-04-02 | End: 2025-04-05 | Stop reason: HOSPADM

## 2025-04-01 RX ORDER — CITALOPRAM HYDROBROMIDE 20 MG/1
10 TABLET ORAL ONCE
Status: COMPLETED | OUTPATIENT
Start: 2025-04-01 | End: 2025-04-01

## 2025-04-01 RX ADMIN — OXYBUTYNIN CHLORIDE 10 MG: 10 TABLET, EXTENDED RELEASE ORAL at 08:45

## 2025-04-01 RX ADMIN — GABAPENTIN 800 MG: 400 CAPSULE ORAL at 14:53

## 2025-04-01 RX ADMIN — SODIUM CHLORIDE, PRESERVATIVE FREE 10 ML: 5 INJECTION INTRAVENOUS at 08:49

## 2025-04-01 RX ADMIN — LINACLOTIDE 145 MCG: 145 CAPSULE, GELATIN COATED ORAL at 05:39

## 2025-04-01 RX ADMIN — SODIUM CHLORIDE: 0.9 INJECTION, SOLUTION INTRAVENOUS at 01:04

## 2025-04-01 RX ADMIN — CLONAZEPAM 1 MG: 1 TABLET ORAL at 08:46

## 2025-04-01 RX ADMIN — ENOXAPARIN SODIUM 40 MG: 100 INJECTION SUBCUTANEOUS at 08:45

## 2025-04-01 RX ADMIN — CLONAZEPAM 1 MG: 1 TABLET ORAL at 21:12

## 2025-04-01 RX ADMIN — MIDODRINE HYDROCHLORIDE 12.5 MG: 10 TABLET ORAL at 08:46

## 2025-04-01 RX ADMIN — OXYCODONE HYDROCHLORIDE 10 MG: 5 TABLET ORAL at 08:45

## 2025-04-01 RX ADMIN — PANTOPRAZOLE SODIUM 40 MG: 40 TABLET, DELAYED RELEASE ORAL at 16:01

## 2025-04-01 RX ADMIN — CITALOPRAM HYDROBROMIDE 10 MG: 20 TABLET ORAL at 08:45

## 2025-04-01 RX ADMIN — SODIUM CHLORIDE, PRESERVATIVE FREE 10 ML: 5 INJECTION INTRAVENOUS at 21:13

## 2025-04-01 RX ADMIN — CITALOPRAM HYDROBROMIDE 10 MG: 20 TABLET ORAL at 16:04

## 2025-04-01 RX ADMIN — PANTOPRAZOLE SODIUM 40 MG: 40 TABLET, DELAYED RELEASE ORAL at 05:39

## 2025-04-01 RX ADMIN — LEVOTHYROXINE SODIUM 75 MCG: 0.05 TABLET ORAL at 05:39

## 2025-04-01 RX ADMIN — MIDODRINE HYDROCHLORIDE 12.5 MG: 10 TABLET ORAL at 12:24

## 2025-04-01 RX ADMIN — OXYCODONE HYDROCHLORIDE 5 MG: 5 TABLET ORAL at 21:12

## 2025-04-01 RX ADMIN — METOPROLOL SUCCINATE 12.5 MG: 25 TABLET, EXTENDED RELEASE ORAL at 08:46

## 2025-04-01 RX ADMIN — ROSUVASTATIN CALCIUM 10 MG: 10 TABLET, FILM COATED ORAL at 08:45

## 2025-04-01 RX ADMIN — GABAPENTIN 800 MG: 400 CAPSULE ORAL at 08:46

## 2025-04-01 RX ADMIN — GABAPENTIN 800 MG: 400 CAPSULE ORAL at 21:12

## 2025-04-01 RX ADMIN — POLYETHYLENE GLYCOL (3350) 17 G: 17 POWDER, FOR SOLUTION ORAL at 08:43

## 2025-04-01 RX ADMIN — OXYCODONE HYDROCHLORIDE 10 MG: 5 TABLET ORAL at 00:27

## 2025-04-01 ASSESSMENT — PAIN DESCRIPTION - FREQUENCY
FREQUENCY: CONTINUOUS
FREQUENCY: CONTINUOUS
FREQUENCY: INTERMITTENT

## 2025-04-01 ASSESSMENT — PAIN DESCRIPTION - LOCATION
LOCATION: ABDOMEN;BACK
LOCATION: ABDOMEN
LOCATION: ABDOMEN

## 2025-04-01 ASSESSMENT — PAIN DESCRIPTION - DIRECTION: RADIATING_TOWARDS: NOME

## 2025-04-01 ASSESSMENT — PAIN DESCRIPTION - DESCRIPTORS
DESCRIPTORS: PRESSURE
DESCRIPTORS: ACHING
DESCRIPTORS: PRESSURE

## 2025-04-01 ASSESSMENT — PAIN DESCRIPTION - ORIENTATION
ORIENTATION: LEFT
ORIENTATION: MID
ORIENTATION: UPPER;LOWER;MID

## 2025-04-01 ASSESSMENT — PAIN SCALES - GENERAL
PAINLEVEL_OUTOF10: 5
PAINLEVEL_OUTOF10: 8
PAINLEVEL_OUTOF10: 6
PAINLEVEL_OUTOF10: 6
PAINLEVEL_OUTOF10: 7
PAINLEVEL_OUTOF10: 8

## 2025-04-01 ASSESSMENT — PAIN DESCRIPTION - PAIN TYPE
TYPE: ACUTE PAIN
TYPE: ACUTE PAIN

## 2025-04-01 ASSESSMENT — PAIN DESCRIPTION - ONSET
ONSET: ON-GOING

## 2025-04-01 NOTE — PROGRESS NOTES
IM Progress Note    Admit Date:  3/28/2025    Patient presenting with increasing abdominal distention.  Concern for bowel obstruction.  No BM in 2 days.  Patient states that she has had bowel obstruction in the past.    She states that she does not ambulate as much due to history of POTS disease.  But is ambulating in room.  Abdominal x-ray on admission did not show any obstruction.  Concern for UTI per initial admission H&P.  UA positive ; patient does straight cath self    Subjective:  Ms. Pillai complains of persistent abdominal distention, she states that her abdomen is significantly much bigger than her baseline.  She is passing flatus.  Pt states that she takes linzess at home - resumed. Had small BM last night .  Denies any nausea or vomiting  No fevers or chills.     Seen by ID for + urine cultures .  ID recommended only 3 days of Merrem and this has been completed     She is anxious, looks depressed.   Does not feel well  .  Psychiatry to see    Objective:   BP (!) 110/54   Pulse 61   Temp 97.7 °F (36.5 °C) (Oral)   Resp 16   Ht 1.575 m (5' 2\")   Wt 74.8 kg (165 lb)   SpO2 100%   BMI 30.18 kg/m²     Intake/Output Summary (Last 24 hours) at 4/1/2025 1416  Last data filed at 4/1/2025 1416  Gross per 24 hour   Intake 2014 ml   Output 4425 ml   Net -2411 ml         Physical Exam:  General:  Awake, alert, NAD  Patient appears frail and fatigued but in no distress.   Looks depressed.   Skin:  Warm and dry  Neck:  JVD absent. Neck supple  Chest:  Clear to auscultation, respiration easy. No wheezes, rales or rhonchi.   Cardiovascular:  RRR ,S1S2 normal  Abdomen:  Soft,  lower abd tender,  mildly distended, BS +   Urostomy +  Extremities:  No edema.  Intact peripheral pulses. Brisk cap refill, < 2 secs  Neuro: non focal      Medications:   Scheduled Meds:   linaclotide  145 mcg Oral QAM AC    polyethylene glycol  17 g Oral BID    sodium chloride flush  10 mL IntraVENous 2 times per day    enoxaparin  40 mg

## 2025-04-01 NOTE — PLAN OF CARE
Problem: Pain  Goal: Verbalizes/displays adequate comfort level or baseline comfort level  4/1/2025 0001 by Sherie Meléndez RN  Outcome: Progressing    Problem: Safety - Adult  Goal: Free from fall injury  Outcome: Progressing     Problem: Genitourinary - Adult  Goal: Absence of urinary retention  Outcome: Progressing     Problem: Infection - Adult  Goal: Absence of infection during hospitalization  Outcome: Progressing

## 2025-04-01 NOTE — PROGRESS NOTES
Bedside shift report given and care transferred to Myra AVILEZ. Pt resting in bed in stable condition, call light in reach, bed alarm on.

## 2025-04-01 NOTE — PROGRESS NOTES
Patient's diet was advanced for dinner and shortly after she ate she vomitted and had diarrhea. MD notified and she is back on clear liquid diet. Patient back in bed.

## 2025-04-01 NOTE — PLAN OF CARE
Problem: Discharge Planning  Goal: Discharge to home or other facility with appropriate resources  Outcome: Progressing  Flowsheets (Taken 4/1/2025 0856)  Discharge to home or other facility with appropriate resources: Identify barriers to discharge with patient and caregiver     Problem: Pain  Goal: Verbalizes/displays adequate comfort level or baseline comfort level  4/1/2025 1120 by Myra Tate RN  Outcome: Progressing  4/1/2025 0001 by Sherie Meléndez RN  Outcome: Progressing  4/1/2025 0000 by Sherie Meléndez RN  Outcome: Progressing  Flowsheets (Taken 3/31/2025 2020)  Verbalizes/displays adequate comfort level or baseline comfort level:   Encourage patient to monitor pain and request assistance   Assess pain using appropriate pain scale     Problem: Safety - Adult  Goal: Free from fall injury  4/1/2025 1120 by Myra Tate RN  Outcome: Progressing  4/1/2025 0000 by Sherie Meléndez RN  Outcome: Progressing     Problem: Genitourinary - Adult  Goal: Absence of urinary retention  4/1/2025 1120 by Myra Tate RN  Outcome: Progressing  4/1/2025 0001 by Sherie Meléndez RN  Outcome: Progressing     Problem: Infection - Adult  Goal: Absence of infection at discharge  Outcome: Progressing  Flowsheets (Taken 4/1/2025 0856)  Absence of infection at discharge: Assess and monitor for signs and symptoms of infection  Goal: Absence of infection during hospitalization  4/1/2025 1120 by Myra Tate RN  Outcome: Progressing  4/1/2025 0001 by Sherie Meléndez RN  Outcome: Progressing

## 2025-04-01 NOTE — PROGRESS NOTES
Vitals:    04/01/25 0830   BP: (!) 120/52   Pulse: 62   Resp: 16   Temp: 97.7 °F (36.5 °C)   SpO2: 100%     Patient alert and oriented. IVF infusing. Pain meds given per MAR. Patient straight caths her ilieo-conduit(site has redness)- had 800ml out this AM. VSS. Patient on clear liq diet. Denies nausea. Given miralax to help bm- bowel sounds active. Will monitor.

## 2025-04-01 NOTE — PROGRESS NOTES
Pt A/Ox4, awake in bed, on RA, pt refused glycolax stated she did not want it tonight. Lopressor held due to BP and HR-see flow sheet, vss, bed alarm on, call light in reach.

## 2025-04-02 LAB
ALBUMIN SERPL-MCNC: 3.5 G/DL (ref 3.4–5)
ALBUMIN/GLOB SERPL: 1.1 {RATIO} (ref 1.1–2.2)
ALP SERPL-CCNC: 94 U/L (ref 40–129)
ALT SERPL-CCNC: 24 U/L (ref 10–40)
ANION GAP SERPL CALCULATED.3IONS-SCNC: 12 MMOL/L (ref 3–16)
AST SERPL-CCNC: 45 U/L (ref 15–37)
BACTERIA BLD CULT ORG #2: NORMAL
BACTERIA BLD CULT: NORMAL
BILIRUB SERPL-MCNC: 0.3 MG/DL (ref 0–1)
BUN SERPL-MCNC: 9 MG/DL (ref 7–20)
CALCIUM SERPL-MCNC: 10 MG/DL (ref 8.3–10.6)
CHLORIDE SERPL-SCNC: 104 MMOL/L (ref 99–110)
CO2 SERPL-SCNC: 23 MMOL/L (ref 21–32)
CREAT SERPL-MCNC: 0.6 MG/DL (ref 0.6–1.2)
DEPRECATED RDW RBC AUTO: 16.7 % (ref 12.4–15.4)
GFR SERPLBLD CREATININE-BSD FMLA CKD-EPI: >90 ML/MIN/{1.73_M2}
GLUCOSE SERPL-MCNC: 86 MG/DL (ref 70–99)
HCT VFR BLD AUTO: 38.4 % (ref 36–48)
HGB BLD-MCNC: 12.5 G/DL (ref 12–16)
MCH RBC QN AUTO: 29 PG (ref 26–34)
MCHC RBC AUTO-ENTMCNC: 32.5 G/DL (ref 31–36)
MCV RBC AUTO: 89.2 FL (ref 80–100)
PLATELET # BLD AUTO: 190 K/UL (ref 135–450)
PMV BLD AUTO: 8.5 FL (ref 5–10.5)
POTASSIUM SERPL-SCNC: 4.2 MMOL/L (ref 3.5–5.1)
PROT SERPL-MCNC: 6.7 G/DL (ref 6.4–8.2)
RBC # BLD AUTO: 4.31 M/UL (ref 4–5.2)
SODIUM SERPL-SCNC: 139 MMOL/L (ref 136–145)
WBC # BLD AUTO: 7.4 K/UL (ref 4–11)

## 2025-04-02 PROCEDURE — 80053 COMPREHEN METABOLIC PANEL: CPT

## 2025-04-02 PROCEDURE — 36415 COLL VENOUS BLD VENIPUNCTURE: CPT

## 2025-04-02 PROCEDURE — 99232 SBSQ HOSP IP/OBS MODERATE 35: CPT | Performed by: INTERNAL MEDICINE

## 2025-04-02 PROCEDURE — 2500000003 HC RX 250 WO HCPCS: Performed by: INTERNAL MEDICINE

## 2025-04-02 PROCEDURE — 97530 THERAPEUTIC ACTIVITIES: CPT

## 2025-04-02 PROCEDURE — 6370000000 HC RX 637 (ALT 250 FOR IP): Performed by: INTERNAL MEDICINE

## 2025-04-02 PROCEDURE — 1200000000 HC SEMI PRIVATE

## 2025-04-02 PROCEDURE — 2580000003 HC RX 258: Performed by: INTERNAL MEDICINE

## 2025-04-02 PROCEDURE — 6360000002 HC RX W HCPCS: Performed by: INTERNAL MEDICINE

## 2025-04-02 PROCEDURE — 6370000000 HC RX 637 (ALT 250 FOR IP): Performed by: PSYCHIATRY & NEUROLOGY

## 2025-04-02 PROCEDURE — 85027 COMPLETE CBC AUTOMATED: CPT

## 2025-04-02 RX ORDER — SODIUM CHLORIDE 9 MG/ML
INJECTION, SOLUTION INTRAVENOUS CONTINUOUS
Status: DISCONTINUED | OUTPATIENT
Start: 2025-04-02 | End: 2025-04-03

## 2025-04-02 RX ADMIN — SODIUM CHLORIDE, PRESERVATIVE FREE 10 ML: 5 INJECTION INTRAVENOUS at 07:33

## 2025-04-02 RX ADMIN — METOPROLOL SUCCINATE 12.5 MG: 25 TABLET, EXTENDED RELEASE ORAL at 07:33

## 2025-04-02 RX ADMIN — MIDODRINE HYDROCHLORIDE 12.5 MG: 10 TABLET ORAL at 12:09

## 2025-04-02 RX ADMIN — GABAPENTIN 800 MG: 400 CAPSULE ORAL at 16:59

## 2025-04-02 RX ADMIN — ENOXAPARIN SODIUM 40 MG: 100 INJECTION SUBCUTANEOUS at 07:33

## 2025-04-02 RX ADMIN — CLONAZEPAM 1 MG: 1 TABLET ORAL at 12:09

## 2025-04-02 RX ADMIN — LINACLOTIDE 145 MCG: 145 CAPSULE, GELATIN COATED ORAL at 05:34

## 2025-04-02 RX ADMIN — GABAPENTIN 800 MG: 400 CAPSULE ORAL at 07:36

## 2025-04-02 RX ADMIN — POLYETHYLENE GLYCOL (3350) 17 G: 17 POWDER, FOR SOLUTION ORAL at 07:34

## 2025-04-02 RX ADMIN — MIDODRINE HYDROCHLORIDE 12.5 MG: 10 TABLET ORAL at 07:32

## 2025-04-02 RX ADMIN — SODIUM CHLORIDE: 0.9 INJECTION, SOLUTION INTRAVENOUS at 10:32

## 2025-04-02 RX ADMIN — GABAPENTIN 800 MG: 400 CAPSULE ORAL at 21:13

## 2025-04-02 RX ADMIN — PANTOPRAZOLE SODIUM 40 MG: 40 TABLET, DELAYED RELEASE ORAL at 16:59

## 2025-04-02 RX ADMIN — OXYBUTYNIN CHLORIDE 10 MG: 10 TABLET, EXTENDED RELEASE ORAL at 07:32

## 2025-04-02 RX ADMIN — LEVOTHYROXINE SODIUM 75 MCG: 0.05 TABLET ORAL at 05:34

## 2025-04-02 RX ADMIN — OXYCODONE HYDROCHLORIDE 10 MG: 5 TABLET ORAL at 12:08

## 2025-04-02 RX ADMIN — CITALOPRAM HYDROBROMIDE 20 MG: 20 TABLET ORAL at 07:32

## 2025-04-02 RX ADMIN — METOPROLOL SUCCINATE 12.5 MG: 25 TABLET, EXTENDED RELEASE ORAL at 21:13

## 2025-04-02 RX ADMIN — MIDODRINE HYDROCHLORIDE 12.5 MG: 10 TABLET ORAL at 16:59

## 2025-04-02 RX ADMIN — CLONAZEPAM 1 MG: 1 TABLET ORAL at 21:12

## 2025-04-02 RX ADMIN — ROSUVASTATIN CALCIUM 10 MG: 10 TABLET, FILM COATED ORAL at 07:33

## 2025-04-02 RX ADMIN — OXYCODONE HYDROCHLORIDE 10 MG: 5 TABLET ORAL at 18:55

## 2025-04-02 RX ADMIN — PANTOPRAZOLE SODIUM 40 MG: 40 TABLET, DELAYED RELEASE ORAL at 05:33

## 2025-04-02 ASSESSMENT — PAIN DESCRIPTION - LOCATION
LOCATION: ABDOMEN
LOCATION: ABDOMEN

## 2025-04-02 ASSESSMENT — PAIN SCALES - GENERAL
PAINLEVEL_OUTOF10: 7
PAINLEVEL_OUTOF10: 7

## 2025-04-02 ASSESSMENT — PAIN DESCRIPTION - ORIENTATION
ORIENTATION: RIGHT;LEFT
ORIENTATION: RIGHT;LEFT

## 2025-04-02 ASSESSMENT — PAIN DESCRIPTION - DESCRIPTORS
DESCRIPTORS: ACHING
DESCRIPTORS: ACHING

## 2025-04-02 NOTE — PROGRESS NOTES
Vitals:    04/02/25 0723   BP: (!) 105/59   Pulse: 66   Resp: 16   Temp: 97.3 °F (36.3 °C)   SpO2: 96%     Patient updated on bowel rest plan. Pain to ab 7/10- medicate per mar. Up to chair. IVF infusing. Emptying illeal conduit. Patient a bit anxious, klonopin given. Will  monitor.

## 2025-04-02 NOTE — PLAN OF CARE
Problem: Discharge Planning  Goal: Discharge to home or other facility with appropriate resources  Outcome: Progressing  Flowsheets (Taken 4/2/2025 0724)  Discharge to home or other facility with appropriate resources: Identify barriers to discharge with patient and caregiver     Problem: Pain  Goal: Verbalizes/displays adequate comfort level or baseline comfort level  4/2/2025 1001 by Myra Tate RN  Outcome: Progressing  4/1/2025 2117 by Sherie Meléndez RN  Outcome: Progressing  4/1/2025 2117 by Sherie Meléndez RN  Outcome: Progressing  Flowsheets (Taken 4/1/2025 2053)  Verbalizes/displays adequate comfort level or baseline comfort level:   Encourage patient to monitor pain and request assistance   Assess pain using appropriate pain scale     Problem: Safety - Adult  Goal: Free from fall injury  4/2/2025 1001 by Myra Tate RN  Outcome: Progressing  4/1/2025 2117 by Sherie Meléndez RN  Outcome: Progressing  Flowsheets (Taken 4/1/2025 2116)  Free From Fall Injury: Instruct family/caregiver on patient safety     Problem: Genitourinary - Adult  Goal: Absence of urinary retention  4/2/2025 1001 by Myra Tate RN  Outcome: Progressing  4/1/2025 2117 by Sherie Meléndez RN  Outcome: Progressing  4/1/2025 2117 by Sherie Meléndez RN  Outcome: Progressing     Problem: Infection - Adult  Goal: Absence of infection at discharge  4/2/2025 1001 by Myra Tate RN  Outcome: Progressing  Flowsheets (Taken 4/2/2025 0724)  Absence of infection at discharge: Assess and monitor for signs and symptoms of infection  4/1/2025 2117 by Sherie Meléndez RN  Outcome: Progressing  Goal: Absence of infection during hospitalization  4/2/2025 1001 by Myra Tate RN  Outcome: Progressing  4/1/2025 2117 by Sherie Meléndez RN  Outcome: Progressing

## 2025-04-02 NOTE — PROGRESS NOTES
Bedside shift report given and care transferred to Myra AVILEZ. Pt awake in bed, in stable condition, call light in reach, bed alarm on.

## 2025-04-02 NOTE — PROGRESS NOTES
Pt A/Ox4, awake in bed on RA, c/o pain in abdomen 8/10 requesting pain medication gave 5 mg oxycodone po. Pt requesting something for sleep/anxiety gave 1 mg Klonopin po. PM assessment completed, PM medications given, assisted pt with straight cath, bed locked, bed alarm on, call light in reach.

## 2025-04-02 NOTE — PROGRESS NOTES
4/2  5).  Tolerate B LE exercises 3 sets of 10-15 reps  - no longer indicated 4/2    Rehabilitation Potential: Good  Strengths for achieving goals include:   PLOF, Family Support, and Pt cooperative   Barriers to achieving goals include:    Complexity of condition, Pain, and Weakness    Plan    To be seen 3-5 x/ week while in acute care setting for therapeutic exercises/activities, bed mobility training, functional transfer training, family/patient education, energy conservation training, balance training, neuromuscular reeducation , gait training, and stair training.    Signature: Rosalva Gagnon, PT     If patient discharges from this facility prior to next visit, this note will serve as the Discharge Summary.

## 2025-04-02 NOTE — PROGRESS NOTES
IM Progress Note    Admit Date:  3/28/2025    Patient presenting with increasing abdominal distention.  Concern for bowel obstruction.  No BM in 2 days.  Patient states that she has had bowel obstruction in the past.    She states that she does not ambulate as much due to history of POTS disease.  But is ambulating in room.  Abdominal x-ray on admission did not show any obstruction.  Concern for UTI per initial admission H&P.  UA positive ; patient does straight cath self    Patient has been seen by ID, urine cultures positive-likely colonization.  Completed 3 days of antibiotics no further antibiotics recommended.     Patient continues to have issues with abdominal distention.   Tolerated clears but did not tolerate advancing diet.  Had emesis yesterday    Subjective:  Ms. Pillai still feels ill and fatigued .   Back on clear liquid diet and she is tolerating it now .  She is working with therapy and ambulating .  No fevers .  Having small BM .      Objective:   BP (!) 105/59   Pulse 66   Temp 97.3 °F (36.3 °C) (Oral)   Resp 16   Ht 1.575 m (5' 2\")   Wt 76.7 kg (169 lb 3.2 oz)   SpO2 96%   BMI 30.95 kg/m²     Intake/Output Summary (Last 24 hours) at 4/2/2025 1202  Last data filed at 4/2/2025 0619  Gross per 24 hour   Intake 444 ml   Output 2800 ml   Net -2356 ml         Physical Exam:  General:  Awake, alert, NAD  Patient appears frail and fatigued but in no distress.  Skin:  Warm and dry  Neck:  JVD absent. Neck supple  Chest:  Clear to auscultation, respiration easy. No wheezes, rales or rhonchi.   Cardiovascular:  RRR ,S1S2 normal  Abdomen:  Soft,  lower abd tender,  mildly distended, BS +   Urostomy +  Extremities:  No edema.  Intact peripheral pulses. Brisk cap refill, < 2 secs  Neuro: non focal      Medications:   Scheduled Meds:   citalopram  20 mg Oral Daily    linaclotide  145 mcg Oral QAM AC    polyethylene glycol  17 g Oral BID    sodium chloride flush  10 mL IntraVENous 2 times per day

## 2025-04-02 NOTE — PLAN OF CARE
Problem: Pain  Goal: Verbalizes/displays adequate comfort level or baseline comfort level  4/1/2025 2117 by Sherie Meléndez RN  Outcome: Progressing    Problem: Safety - Adult  Goal: Free from fall injury  4/1/2025 2117 by Sherie Meléndez RN  Outcome: Progressing         Problem: Genitourinary - Adult  Goal: Absence of urinary retention  4/1/2025 2117 by Sherie Meléndez RN  Outcome: Progressing    Problem: Infection - Adult  Goal: Absence of infection during hospitalization  4/1/2025 2117 by Sherie Meléndez RN  Outcome: Progressing

## 2025-04-03 PROCEDURE — 97535 SELF CARE MNGMENT TRAINING: CPT

## 2025-04-03 PROCEDURE — 6360000002 HC RX W HCPCS: Performed by: INTERNAL MEDICINE

## 2025-04-03 PROCEDURE — 99232 SBSQ HOSP IP/OBS MODERATE 35: CPT | Performed by: INTERNAL MEDICINE

## 2025-04-03 PROCEDURE — 6370000000 HC RX 637 (ALT 250 FOR IP): Performed by: INTERNAL MEDICINE

## 2025-04-03 PROCEDURE — 2500000003 HC RX 250 WO HCPCS: Performed by: INTERNAL MEDICINE

## 2025-04-03 PROCEDURE — 97530 THERAPEUTIC ACTIVITIES: CPT

## 2025-04-03 PROCEDURE — 2580000003 HC RX 258: Performed by: INTERNAL MEDICINE

## 2025-04-03 PROCEDURE — 6370000000 HC RX 637 (ALT 250 FOR IP): Performed by: PSYCHIATRY & NEUROLOGY

## 2025-04-03 PROCEDURE — 1200000000 HC SEMI PRIVATE

## 2025-04-03 RX ORDER — GABAPENTIN 400 MG/1
400 CAPSULE ORAL 2 TIMES DAILY
Status: DISCONTINUED | OUTPATIENT
Start: 2025-04-04 | End: 2025-04-05 | Stop reason: HOSPADM

## 2025-04-03 RX ADMIN — CITALOPRAM HYDROBROMIDE 20 MG: 20 TABLET ORAL at 08:37

## 2025-04-03 RX ADMIN — MIDODRINE HYDROCHLORIDE 12.5 MG: 10 TABLET ORAL at 08:37

## 2025-04-03 RX ADMIN — SODIUM CHLORIDE, PRESERVATIVE FREE 10 ML: 5 INJECTION INTRAVENOUS at 21:44

## 2025-04-03 RX ADMIN — CLONAZEPAM 1 MG: 1 TABLET ORAL at 12:01

## 2025-04-03 RX ADMIN — LEVOTHYROXINE SODIUM 75 MCG: 0.05 TABLET ORAL at 05:56

## 2025-04-03 RX ADMIN — OXYCODONE HYDROCHLORIDE 10 MG: 5 TABLET ORAL at 12:01

## 2025-04-03 RX ADMIN — ENOXAPARIN SODIUM 40 MG: 100 INJECTION SUBCUTANEOUS at 08:37

## 2025-04-03 RX ADMIN — POLYETHYLENE GLYCOL (3350) 17 G: 17 POWDER, FOR SOLUTION ORAL at 21:44

## 2025-04-03 RX ADMIN — SODIUM CHLORIDE, PRESERVATIVE FREE 10 ML: 5 INJECTION INTRAVENOUS at 08:40

## 2025-04-03 RX ADMIN — OXYCODONE HYDROCHLORIDE 10 MG: 5 TABLET ORAL at 05:58

## 2025-04-03 RX ADMIN — OXYCODONE HYDROCHLORIDE 10 MG: 5 TABLET ORAL at 21:43

## 2025-04-03 RX ADMIN — GABAPENTIN 800 MG: 400 CAPSULE ORAL at 16:34

## 2025-04-03 RX ADMIN — PANTOPRAZOLE SODIUM 40 MG: 40 TABLET, DELAYED RELEASE ORAL at 05:56

## 2025-04-03 RX ADMIN — SODIUM CHLORIDE: 0.9 INJECTION, SOLUTION INTRAVENOUS at 03:55

## 2025-04-03 RX ADMIN — OXYBUTYNIN CHLORIDE 10 MG: 10 TABLET, EXTENDED RELEASE ORAL at 08:37

## 2025-04-03 RX ADMIN — PANTOPRAZOLE SODIUM 40 MG: 40 TABLET, DELAYED RELEASE ORAL at 16:34

## 2025-04-03 RX ADMIN — ROSUVASTATIN CALCIUM 10 MG: 10 TABLET, FILM COATED ORAL at 08:37

## 2025-04-03 RX ADMIN — MIDODRINE HYDROCHLORIDE 12.5 MG: 10 TABLET ORAL at 16:34

## 2025-04-03 RX ADMIN — GABAPENTIN 800 MG: 400 CAPSULE ORAL at 08:37

## 2025-04-03 RX ADMIN — METOPROLOL SUCCINATE 12.5 MG: 25 TABLET, EXTENDED RELEASE ORAL at 21:44

## 2025-04-03 RX ADMIN — SODIUM CHLORIDE: 0.9 INJECTION, SOLUTION INTRAVENOUS at 13:59

## 2025-04-03 RX ADMIN — POLYETHYLENE GLYCOL (3350) 17 G: 17 POWDER, FOR SOLUTION ORAL at 08:37

## 2025-04-03 RX ADMIN — CLONAZEPAM 1 MG: 1 TABLET ORAL at 21:44

## 2025-04-03 ASSESSMENT — PAIN DESCRIPTION - LOCATION
LOCATION: ABDOMEN
LOCATION: ABDOMEN;BACK
LOCATION: ABDOMEN

## 2025-04-03 ASSESSMENT — PAIN SCALES - GENERAL
PAINLEVEL_OUTOF10: 7
PAINLEVEL_OUTOF10: 0
PAINLEVEL_OUTOF10: 7
PAINLEVEL_OUTOF10: 7

## 2025-04-03 ASSESSMENT — PAIN DESCRIPTION - ORIENTATION: ORIENTATION: LOWER

## 2025-04-03 ASSESSMENT — PAIN - FUNCTIONAL ASSESSMENT: PAIN_FUNCTIONAL_ASSESSMENT: ACTIVITIES ARE NOT PREVENTED

## 2025-04-03 ASSESSMENT — PAIN DESCRIPTION - DESCRIPTORS
DESCRIPTORS: ACHING;SHARP
DESCRIPTORS: ACHING

## 2025-04-03 NOTE — PLAN OF CARE
Problem: Discharge Planning  Goal: Discharge to home or other facility with appropriate resources  Outcome: Progressing     Problem: Pain  Goal: Verbalizes/displays adequate comfort level or baseline comfort level  Outcome: Progressing     Problem: Safety - Adult  Goal: Free from fall injury  Outcome: Progressing     Problem: Genitourinary - Adult  Goal: Absence of urinary retention  Outcome: Progressing     Problem: Infection - Adult  Goal: Absence of infection at discharge  Outcome: Progressing  Goal: Absence of infection during hospitalization  Outcome: Progressing

## 2025-04-03 NOTE — PROGRESS NOTES
Progress Note    Admit Date:  3/28/2025    Patient presenting with increasing abdominal distention.  Concern for bowel obstruction.  No BM in 2 days.  Patient states that she has had bowel obstruction in the past.    She states that she does not ambulate as much due to history of POTS disease.  But is ambulating in room.  Abdominal x-ray on admission did not show any obstruction.  Concern for UTI per initial admission H&P.  UA positive ; patient does straight cath self    Patient has been seen by ID, urine cultures positive-likely colonization.  Completed 3 days of antibiotics no further antibiotics recommended.     Patient continues to have issues with abdominal distention.   Tolerated clears but did not tolerate advancing diet.  Had emesis yesterday    Subjective:  Ms. Pillai still feels ill and fatigued .   She feels weak and dizzy - blood pressure low--getting midodrine  Been on a clear liquid diet for the last couple of days and is tolerating it well.  Still complains of abdominal distention and pain  She was passing flatus and did have a small BM a couple of days ago but currently states that she is not passing flatus.   She is working with therapy and ambulating .  No fevers .    Objective:   BP (!) 91/51   Pulse 60   Temp 97.5 °F (36.4 °C) (Oral)   Resp 18   Ht 1.575 m (5' 2\")   Wt 76.7 kg (169 lb 3.2 oz)   SpO2 96%   BMI 30.95 kg/m²   No intake or output data in the 24 hours ending 04/03/25 1850        Physical Exam:  General:  Awake, alert, NAD  Patient appears frail and fatigued but in no distress.  Skin:  Warm and dry  Neck:  JVD absent. Neck supple  Chest:  Clear to auscultation, respiration easy. No wheezes, rales or rhonchi.   Cardiovascular:  RRR ,S1S2 normal  Abdomen:  Soft,  lower abd tender,  mildly distended, BS +   Urostomy +  Extremities:  No edema.  Intact peripheral pulses. Brisk cap refill, < 2 secs  Neuro: non focal      Medications:   Scheduled Meds:   [START ON 4/4/2025]

## 2025-04-03 NOTE — PLAN OF CARE
Problem: Discharge Planning  Goal: Discharge to home or other facility with appropriate resources  Outcome: Progressing  Flowsheets (Taken 4/2/2025 2112)  Discharge to home or other facility with appropriate resources:   Identify barriers to discharge with patient and caregiver   Arrange for needed discharge resources and transportation as appropriate     Problem: Pain  Goal: Verbalizes/displays adequate comfort level or baseline comfort level  Outcome: Progressing     Problem: Safety - Adult  Goal: Free from fall injury  Outcome: Progressing     Problem: Genitourinary - Adult  Goal: Absence of urinary retention  Outcome: Progressing     Problem: Infection - Adult  Goal: Absence of infection at discharge  Outcome: Progressing  Goal: Absence of infection during hospitalization  Outcome: Progressing

## 2025-04-03 NOTE — PROGRESS NOTES
Visit with Cordelia, kina about illness, family.  Prayer as requested.    Thank you for consulting Spiritual Health    If you would like a 's presence for emotional, spiritual, grief or comfort care,   please dial \"[phone]\" and ask for the  on-call to be paged.    For help with Advanced Care Planning, Power of  for Healthcare or Living Will forms, you may also call us directly:    [phone] [place]  [phone]) Ryanne  [phone]) Outpatient    Spiritual Health  [facility]

## 2025-04-03 NOTE — PROGRESS NOTES
Inpatient Occupational Therapy Treatment    Unit: [address] [place]  Date:  4/3/2025  Patient Name:    Cordelia Pillai  Admitting diagnosis:  UTI (urinary tract infection) [N39.0]  Admit Date:  3/28/2025  Precautions/Restrictions/WB Status/ Lines/ Wounds/ Oxygen: Fall risk, Bed/chair alarm, urostomy, Telemetry, and Isolation Precautions: Contact    Treatment Time:  2227-1615  Treatment Number:  3  Timed Code Treatment Minutes: 60 minutes  Total Treatment Minutes:  60  minutes    Patient Goals for Therapy: \"try to poop\"          Discharge Recommendations: Home with initial 24/7 assist  and Home OT  DME needs for discharge: Needs Met       Therapy recommendations for staff:   Assist of 1 for ambulation with use of rolling walker (RW) and gait belt to/from chair  to/from bathroom  within room    History of Present Illness: Per H&P Hermes Rider DO 3/28/25  Chief complaint: Constipation  \"69 y.o. female who presented to [facility] with past medical history of anxiety, arthritis, bradycardia, hypertension, hyperlipidemia, MDRO, NATHAN on BiPAP, hypothyroidism presented ED with chief complaint of abdominal pain\"  Also PMHX POTS; Neurogenic bladder, history of bladder augmentation, has urostomy, self-catheterization 3 times daily; anxiety, depression, and bipolar disorder (seen by psychiatry this admission, no plans for inpatient admission)    Preadmission Environment:   Pt. Lives                                              with Family (daughter's family, available 24/7)   Home environment:                            one story home  Steps to enter first floor:                     Ramp  Steps to second floor/basement:        N/A  Laundry:                                              1st floor  Bathroom:                                           walk in shower, grab bars in shower, shower chair , grab bars around toilet, and raised toilet seat w/o arms  Pt sleeps in a:                                     Flat bed  Equipment owned:       Supervision  Lower Body Dressing:                                                            Supervision with AE PRN  Pt to demonstrate UE therapeutic exs x 15 reps with minimal cues     Rehabilitation Potential: Good  Strengths for achieving goals include: Family Support and Pt cooperative   Barriers to achieving goals include:  Complexity of condition     Plan:  To be seen 3-5 x/ week while in acute care setting for therapeutic exercises/activities, bed mobility training, functional transfer training, family/patient education, ADL/IADL retraining, energy conservation training, and balance training.    Electronically signed by Lola Gil OT on 4/3/2025 at 4:49 PM      If patient discharges from this facility prior to next visit, this note will serve as the Discharge Summary

## 2025-04-04 ENCOUNTER — APPOINTMENT (OUTPATIENT)
Dept: GENERAL RADIOLOGY | Age: 70
End: 2025-04-04
Payer: MEDICARE

## 2025-04-04 PROCEDURE — 99232 SBSQ HOSP IP/OBS MODERATE 35: CPT | Performed by: INTERNAL MEDICINE

## 2025-04-04 PROCEDURE — 6370000000 HC RX 637 (ALT 250 FOR IP): Performed by: PSYCHIATRY & NEUROLOGY

## 2025-04-04 PROCEDURE — 74018 RADEX ABDOMEN 1 VIEW: CPT

## 2025-04-04 PROCEDURE — 6360000002 HC RX W HCPCS: Performed by: INTERNAL MEDICINE

## 2025-04-04 PROCEDURE — 2500000003 HC RX 250 WO HCPCS: Performed by: INTERNAL MEDICINE

## 2025-04-04 PROCEDURE — 6370000000 HC RX 637 (ALT 250 FOR IP): Performed by: INTERNAL MEDICINE

## 2025-04-04 PROCEDURE — 1200000000 HC SEMI PRIVATE

## 2025-04-04 RX ADMIN — ENOXAPARIN SODIUM 40 MG: 100 INJECTION SUBCUTANEOUS at 08:41

## 2025-04-04 RX ADMIN — SODIUM CHLORIDE, PRESERVATIVE FREE 10 ML: 5 INJECTION INTRAVENOUS at 20:26

## 2025-04-04 RX ADMIN — GABAPENTIN 400 MG: 400 CAPSULE ORAL at 08:41

## 2025-04-04 RX ADMIN — PANTOPRAZOLE SODIUM 40 MG: 40 TABLET, DELAYED RELEASE ORAL at 05:21

## 2025-04-04 RX ADMIN — POLYETHYLENE GLYCOL (3350) 17 G: 17 POWDER, FOR SOLUTION ORAL at 08:41

## 2025-04-04 RX ADMIN — OXYBUTYNIN CHLORIDE 10 MG: 10 TABLET, EXTENDED RELEASE ORAL at 08:40

## 2025-04-04 RX ADMIN — LINACLOTIDE 145 MCG: 145 CAPSULE, GELATIN COATED ORAL at 05:21

## 2025-04-04 RX ADMIN — GABAPENTIN 400 MG: 400 CAPSULE ORAL at 20:27

## 2025-04-04 RX ADMIN — ROSUVASTATIN CALCIUM 10 MG: 10 TABLET, FILM COATED ORAL at 08:41

## 2025-04-04 RX ADMIN — CITALOPRAM HYDROBROMIDE 20 MG: 20 TABLET ORAL at 08:41

## 2025-04-04 RX ADMIN — SODIUM CHLORIDE, PRESERVATIVE FREE 10 ML: 5 INJECTION INTRAVENOUS at 08:42

## 2025-04-04 RX ADMIN — OXYCODONE HYDROCHLORIDE 10 MG: 5 TABLET ORAL at 18:54

## 2025-04-04 RX ADMIN — MIDODRINE HYDROCHLORIDE 12.5 MG: 10 TABLET ORAL at 08:41

## 2025-04-04 RX ADMIN — PANTOPRAZOLE SODIUM 40 MG: 40 TABLET, DELAYED RELEASE ORAL at 18:50

## 2025-04-04 RX ADMIN — CLONAZEPAM 1 MG: 1 TABLET ORAL at 18:54

## 2025-04-04 RX ADMIN — LEVOTHYROXINE SODIUM 75 MCG: 0.05 TABLET ORAL at 05:21

## 2025-04-04 ASSESSMENT — PAIN SCALES - GENERAL: PAINLEVEL_OUTOF10: 8

## 2025-04-04 ASSESSMENT — PAIN DESCRIPTION - LOCATION: LOCATION: ABDOMEN;BACK

## 2025-04-04 NOTE — PLAN OF CARE
Problem: Discharge Planning  Goal: Discharge to home or other facility with appropriate resources  4/3/2025 2317 by Se Gordon RN  Outcome: Progressing  4/3/2025 1500 by Faby Castillo RN  Outcome: Progressing     Problem: Pain  Goal: Verbalizes/displays adequate comfort level or baseline comfort level  4/3/2025 2317 by Se Gordon RN  Outcome: Progressing  4/3/2025 1500 by Faby Castillo RN  Outcome: Progressing     Problem: Safety - Adult  Goal: Free from fall injury  4/3/2025 2317 by Se Gordon RN  Outcome: Progressing  4/3/2025 1500 by Faby Castillo RN  Outcome: Progressing     Problem: Genitourinary - Adult  Goal: Absence of urinary retention  4/3/2025 2317 by Se Gordon RN  Outcome: Progressing  4/3/2025 1500 by Faby Castillo RN  Outcome: Progressing     Problem: Infection - Adult  Goal: Absence of infection at discharge  4/3/2025 2317 by Se Gordon RN  Outcome: Progressing  4/3/2025 1500 by Faby Castillo RN  Outcome: Progressing  Goal: Absence of infection during hospitalization  4/3/2025 2317 by Se Gordon RN  Outcome: Progressing  4/3/2025 1500 by Faby Castillo RN  Outcome: Progressing

## 2025-04-04 NOTE — PROGRESS NOTES
Nutrition Assessment     Type and Reason for Visit: Initial, LOS    Nutrition Recommendations/Plan:   Continue Reg 3 CC diet     Malnutrition Assessment:  Malnutrition Status: Insufficient data    Nutrition Assessment:  Pt. adequately nourished on admit.  Not at risk for further nutrition compromise.  Will f/u in 7-10 days.     Estimated Daily Nutrient Needs:  Energy (kcal):  6878-2030 based ~ 20-23 kcal/kg cbw Weight Used for Energy Requirements: Current     Protein (g):  66 based ~ 1.2 gr/kg cbw Weight Used for Protein Requirements: Ideal        Fluid (ml/day):  7940-4056 Method Used for Fluid Requirements: 1 ml/kcal    Nutrition Related Findings:   A & O x4; eating well; + BM today; Wound Type: None    Current Nutrition Therapies:    ADULT DIET; Regular; 3 carb choices (45 gm/meal)    Anthropometric Measures:  Height: 157.5 cm (5' 2.01\")  Current Body Wt: 80.3 kg (177 lb)   BMI: 32.4        Nutrition Diagnosis:   No nutrition diagnosis at this time related to   as evidenced by      Nutrition Interventions:   Food and/or Nutrient Delivery: Continue Current Diet  Nutrition Education/Counseling: No recommendation at this time          Goals:  Goals: PO intake 75% or greater, by next RD assessment  Type of Goal: New goal  Previous Goal Met: New Goal    Nutrition Monitoring and Evaluation:   Behavioral-Environmental Outcomes: None Identified  Food/Nutrient Intake Outcomes: Food and Nutrient Intake  Physical Signs/Symptoms Outcomes: Nutrition Focused Physical Findings, Weight, Biochemical Data    Discharge Planning:    No discharge needs at this time     Karen Krishna RD, LD  Contact: [phone]

## 2025-04-04 NOTE — PROGRESS NOTES
Shift report given to Lapaula at bedside. Patient is stable. All end of shift needs have been met. No further assistance needed at this time.

## 2025-04-04 NOTE — FLOWSHEET NOTE
04/03/25 2135   Vital Signs   Temp 97.4 °F (36.3 °C)   Temp Source Oral   Pulse 61   Heart Rate Source Monitor   Respirations 16   /84   MAP (Calculated) 101   BP Location Left upper arm   BP Method Automatic   Patient Position High fowlers   Opioid-Induced Sedation   POSS Score 1   RASS   Garcia Agitation Sedation Scale (RASS) 0   Oxygen Therapy   SpO2 97 %   O2 Device None (Room air)     Shift assessment complete.  Patient is alert and oriented.  Vital signs are stable.  Respirations are even and easy, no signs or symptoms of distress.  Pt denies any needs at this time.  Call light within reach.

## 2025-04-04 NOTE — PLAN OF CARE
Problem: Discharge Planning  Goal: Discharge to home or other facility with appropriate resources  4/4/2025 1200 by Faby Castillo RN  Outcome: Progressing  4/3/2025 2317 by Se Gordon RN  Outcome: Progressing     Problem: Pain  Goal: Verbalizes/displays adequate comfort level or baseline comfort level  4/4/2025 1200 by Faby Castillo RN  Outcome: Progressing  4/3/2025 2317 by Se Gordon RN  Outcome: Progressing     Problem: Safety - Adult  Goal: Free from fall injury  4/4/2025 1200 by Faby Castillo RN  Outcome: Progressing  4/3/2025 2317 by Se Gordon RN  Outcome: Progressing     Problem: Genitourinary - Adult  Goal: Absence of urinary retention  4/4/2025 1200 by Faby Castillo RN  Outcome: Progressing  4/3/2025 2317 by Se Gordon RN  Outcome: Progressing     Problem: Infection - Adult  Goal: Absence of infection at discharge  4/4/2025 1200 by Faby Castillo RN  Outcome: Progressing  4/3/2025 2317 by Se Gordon RN  Outcome: Progressing  Goal: Absence of infection during hospitalization  4/4/2025 1200 by Faby Castillo RN  Outcome: Progressing  4/3/2025 2317 by Se Gordon RN  Outcome: Progressing

## 2025-04-04 NOTE — PROGRESS NOTES
Progress Note    Admit Date:  3/28/2025    Patient presenting with increasing abdominal distention.  Concern for bowel obstruction.  No BM in 2 days.  Patient states that she has had bowel obstruction in the past.    She states that she does not ambulate as much due to history of POTS disease.  But is ambulating in room.  Abdominal x-ray on admission did not show any obstruction.  Concern for UTI per initial admission H&P.  UA positive ; patient does straight cath self    Patient has been seen by ID, urine cultures positive-likely colonization.  Completed 3 days of antibiotics no further antibiotics recommended.     Patient continues to have issues with abdominal distention.   Tolerated clears but did not tolerate advancing diet.  Had emesis again and was downgraded again to clear liquids for a couple of days      Subjective:  Ms. Pillai still feels ill and fatigued .   She is tolerating clear liquids now and passing flatus.   Abdominal x-ray was repeated and nonspecific bowel gas pattern.  She  Is now willing to try advancing diet to regular again    getting midodrine for low blood pressure.    She is working with therapy and ambulating .  No fevers .    Objective:   /70   Pulse 66   Temp 98.1 °F (36.7 °C) (Oral)   Resp 17   Ht 1.575 m (5' 2\")   Wt 80.6 kg (177 lb 12.8 oz)   SpO2 98%   BMI 32.52 kg/m²     Intake/Output Summary (Last 24 hours) at 4/4/2025 1529  Last data filed at 4/4/2025 0241  Gross per 24 hour   Intake --   Output 3650 ml   Net -3650 ml           Physical Exam:  General:  Awake, alert, NAD  Patient appears frail and fatigued but in no distress.  Skin:  Warm and dry  Neck:  JVD absent. Neck supple  Chest:  Clear to auscultation, respiration easy. No wheezes, rales or rhonchi.   Cardiovascular:  RRR ,S1S2 normal  Abdomen:  Soft,  lower abd tender, not distended, BS +   Urostomy +  Extremities:  No edema.  Intact peripheral pulses. Brisk cap refill, < 2 secs  Neuro: non  carb choices (45 gm/meal)   Full Code  DVT Prophylaxis: lovenox     Plan of care was discussed with patient   Trial of regular diet today.    If patient has a good BM, She can likely be discharged over the weekend      Altaf Murillo MD   4/4/2025 3:29 PM

## 2025-04-05 VITALS
DIASTOLIC BLOOD PRESSURE: 72 MMHG | TEMPERATURE: 98.1 F | RESPIRATION RATE: 18 BRPM | OXYGEN SATURATION: 94 % | HEIGHT: 62 IN | SYSTOLIC BLOOD PRESSURE: 136 MMHG | BODY MASS INDEX: 32.72 KG/M2 | HEART RATE: 60 BPM | WEIGHT: 177.8 LBS

## 2025-04-05 PROCEDURE — 6370000000 HC RX 637 (ALT 250 FOR IP): Performed by: INTERNAL MEDICINE

## 2025-04-05 PROCEDURE — 97535 SELF CARE MNGMENT TRAINING: CPT

## 2025-04-05 PROCEDURE — 6360000002 HC RX W HCPCS: Performed by: INTERNAL MEDICINE

## 2025-04-05 PROCEDURE — 2500000003 HC RX 250 WO HCPCS: Performed by: INTERNAL MEDICINE

## 2025-04-05 PROCEDURE — 6370000000 HC RX 637 (ALT 250 FOR IP): Performed by: PSYCHIATRY & NEUROLOGY

## 2025-04-05 PROCEDURE — 97530 THERAPEUTIC ACTIVITIES: CPT

## 2025-04-05 PROCEDURE — 99238 HOSP IP/OBS DSCHRG MGMT 30/<: CPT | Performed by: INTERNAL MEDICINE

## 2025-04-05 RX ORDER — DOCUSATE SODIUM 100 MG/1
100 CAPSULE, LIQUID FILLED ORAL DAILY
Status: DISCONTINUED | OUTPATIENT
Start: 2025-04-05 | End: 2025-04-05 | Stop reason: HOSPADM

## 2025-04-05 RX ORDER — OXYCODONE HYDROCHLORIDE 5 MG/1
10 TABLET ORAL EVERY 6 HOURS PRN
Refills: 0 | Status: DISCONTINUED | OUTPATIENT
Start: 2025-04-05 | End: 2025-04-05 | Stop reason: HOSPADM

## 2025-04-05 RX ORDER — NITROFURANTOIN 25; 75 MG/1; MG/1
100 CAPSULE ORAL 2 TIMES DAILY
Qty: 10 CAPSULE | Refills: 0 | Status: SHIPPED | OUTPATIENT
Start: 2025-04-05 | End: 2025-04-10

## 2025-04-05 RX ORDER — OXYCODONE HYDROCHLORIDE 5 MG/1
5 TABLET ORAL EVERY 6 HOURS PRN
Refills: 0 | Status: DISCONTINUED | OUTPATIENT
Start: 2025-04-05 | End: 2025-04-05 | Stop reason: HOSPADM

## 2025-04-05 RX ORDER — CITALOPRAM HYDROBROMIDE 20 MG/1
20 TABLET ORAL DAILY
Qty: 30 TABLET | Refills: 0 | Status: SHIPPED | OUTPATIENT
Start: 2025-04-05

## 2025-04-05 RX ADMIN — PANTOPRAZOLE SODIUM 40 MG: 40 TABLET, DELAYED RELEASE ORAL at 06:13

## 2025-04-05 RX ADMIN — MIDODRINE HYDROCHLORIDE 12.5 MG: 10 TABLET ORAL at 09:25

## 2025-04-05 RX ADMIN — GABAPENTIN 400 MG: 400 CAPSULE ORAL at 09:23

## 2025-04-05 RX ADMIN — POLYETHYLENE GLYCOL (3350) 17 G: 17 POWDER, FOR SOLUTION ORAL at 09:24

## 2025-04-05 RX ADMIN — CITALOPRAM HYDROBROMIDE 20 MG: 20 TABLET ORAL at 09:24

## 2025-04-05 RX ADMIN — LEVOTHYROXINE SODIUM 75 MCG: 0.05 TABLET ORAL at 06:13

## 2025-04-05 RX ADMIN — LINACLOTIDE 145 MCG: 145 CAPSULE, GELATIN COATED ORAL at 09:30

## 2025-04-05 RX ADMIN — OXYBUTYNIN CHLORIDE 10 MG: 10 TABLET, EXTENDED RELEASE ORAL at 09:24

## 2025-04-05 RX ADMIN — MIDODRINE HYDROCHLORIDE 12.5 MG: 10 TABLET ORAL at 13:41

## 2025-04-05 RX ADMIN — ROSUVASTATIN CALCIUM 10 MG: 10 TABLET, FILM COATED ORAL at 09:24

## 2025-04-05 RX ADMIN — DOCUSATE SODIUM 100 MG: 100 CAPSULE, LIQUID FILLED ORAL at 09:24

## 2025-04-05 RX ADMIN — CLONAZEPAM 1 MG: 1 TABLET ORAL at 09:32

## 2025-04-05 RX ADMIN — OXYCODONE HYDROCHLORIDE 10 MG: 5 TABLET ORAL at 09:32

## 2025-04-05 RX ADMIN — SODIUM CHLORIDE, PRESERVATIVE FREE 10 ML: 5 INJECTION INTRAVENOUS at 09:28

## 2025-04-05 RX ADMIN — METOPROLOL SUCCINATE 12.5 MG: 25 TABLET, EXTENDED RELEASE ORAL at 09:23

## 2025-04-05 RX ADMIN — ENOXAPARIN SODIUM 40 MG: 100 INJECTION SUBCUTANEOUS at 09:24

## 2025-04-05 ASSESSMENT — PAIN SCALES - GENERAL
PAINLEVEL_OUTOF10: 7
PAINLEVEL_OUTOF10: 0

## 2025-04-05 ASSESSMENT — PAIN DESCRIPTION - LOCATION: LOCATION: ABDOMEN

## 2025-04-05 ASSESSMENT — PAIN DESCRIPTION - DESCRIPTORS: DESCRIPTORS: ACHING;GNAWING

## 2025-04-05 NOTE — PROGRESS NOTES
Progress Note    Admit Date:  3/28/2025    Patient presenting with increasing abdominal distention.  Concern for bowel obstruction.  No BM in 2 days.  Patient states that she has had bowel obstruction in the past.    She states that she does not ambulate as much due to history of POTS disease.  But is ambulating in room.  Abdominal x-ray on admission did not show any obstruction.  Concern for UTI per initial admission H&P.  UA positive ; patient does straight cath self    Patient has been seen by ID, urine cultures positive-likely colonization.  Completed 3 days of antibiotics no further antibiotics recommended.     Patient continues to have issues with abdominal distention.   Tolerated clears but did not tolerate advancing diet.  Had emesis again and was downgraded again to clear liquids for a couple of days      Subjective:  Ms. Pillai still feels ill and fatigued .   She is tolerating clear liquids now and passing flatus.   Abdominal x-ray was repeated and nonspecific bowel gas pattern.  She  Is now willing to try advancing diet to regular again    getting midodrine for low blood pressure.    She is working with therapy and ambulating .  No fevers .    Objective:   /69   Pulse 74   Temp 98 °F (36.7 °C) (Oral)   Resp 16   Ht 1.575 m (5' 2.01\")   Wt 80.6 kg (177 lb 12.8 oz)   SpO2 97%   BMI 32.51 kg/m²     Intake/Output Summary (Last 24 hours) at 4/5/2025 0747  Last data filed at 4/4/2025 2019  Gross per 24 hour   Intake 472 ml   Output 600 ml   Net -128 ml           Physical Exam:  General:  Awake, alert, NAD  Patient appears frail and fatigued but in no distress.  Skin:  Warm and dry  Neck:  JVD absent. Neck supple  Chest:  Clear to auscultation, respiration easy. No wheezes, rales or rhonchi.   Cardiovascular:  RRR ,S1S2 normal  Abdomen:  Soft,  lower abd tender, not distended, BS +   Urostomy +  Extremities:  No edema.  Intact peripheral pulses. Brisk cap refill, < 2 secs  Neuro: non

## 2025-04-05 NOTE — PLAN OF CARE
Problem: Discharge Planning  Goal: Discharge to home or other facility with appropriate resources  4/4/2025 2250 by Se Gordon RN  Outcome: Progressing  4/4/2025 1200 by Faby Castillo RN  Outcome: Progressing     Problem: Pain  Goal: Verbalizes/displays adequate comfort level or baseline comfort level  4/4/2025 2250 by Se Gordon RN  Outcome: Progressing  4/4/2025 1200 by Faby Castillo RN  Outcome: Progressing     Problem: Safety - Adult  Goal: Free from fall injury  4/4/2025 2250 by Se Gordon RN  Outcome: Progressing  4/4/2025 1200 by Faby Castillo RN  Outcome: Progressing     Problem: Genitourinary - Adult  Goal: Absence of urinary retention  4/4/2025 2250 by Se Gordon RN  Outcome: Progressing  4/4/2025 1200 by Faby Castillo RN  Outcome: Progressing     Problem: Infection - Adult  Goal: Absence of infection at discharge  4/4/2025 2250 by Se Gordon RN  Outcome: Progressing  4/4/2025 1200 by Faby Castillo RN  Outcome: Progressing  Goal: Absence of infection during hospitalization  4/4/2025 2250 by Se Gordon RN  Outcome: Progressing  4/4/2025 1200 by Faby Castillo RN  Outcome: Progressing

## 2025-04-05 NOTE — FLOWSHEET NOTE
04/04/25 2019   Vital Signs   Temp 97.7 °F (36.5 °C)   Temp Source Oral   Pulse 65   Heart Rate Source Monitor   Respirations 16   BP (!) 102/50   MAP (Calculated) 67   BP Location Left upper arm   BP Method Automatic   Patient Position Supine   Pain Assessment   Pain Assessment None - Denies Pain   Opioid-Induced Sedation   POSS Score 1   RASS   Garcia Agitation Sedation Scale (RASS) 0   Oxygen Therapy   SpO2 97 %   O2 Device None (Room air)     Shift assessment complete.  Patient is alert and oriented.  Vital signs are stable.  Respirations are even and easy, no signs or symptoms of distress.  Pt denies any needs at this time.  Call light within reach.

## 2025-04-05 NOTE — PLAN OF CARE
Problem: Discharge Planning  Goal: Discharge to home or other facility with appropriate resources  4/5/2025 1058 by Faby Castillo RN  Outcome: Progressing  4/4/2025 2250 by Se Gordon RN  Outcome: Progressing     Problem: Pain  Goal: Verbalizes/displays adequate comfort level or baseline comfort level  4/5/2025 1058 by Faby Castillo RN  Outcome: Progressing  4/4/2025 2250 by Se Gordon RN  Outcome: Progressing     Problem: Safety - Adult  Goal: Free from fall injury  4/5/2025 1058 by Faby Castillo RN  Outcome: Progressing  4/4/2025 2250 by Se Gordon RN  Outcome: Progressing     Problem: Genitourinary - Adult  Goal: Absence of urinary retention  4/5/2025 1058 by Faby Castillo RN  Outcome: Progressing  4/4/2025 2250 by Se Gordon RN  Outcome: Progressing     Problem: Infection - Adult  Goal: Absence of infection at discharge  4/5/2025 1058 by Faby Castillo RN  Outcome: Progressing  4/4/2025 2250 by Se Gordon RN  Outcome: Progressing  Goal: Absence of infection during hospitalization  4/5/2025 1058 by Faby Castillo RN  Outcome: Progressing  4/4/2025 2250 by Se Gordon RN  Outcome: Progressing

## 2025-04-05 NOTE — CARE COORDINATION
DC order noted. Met with pt at bedside. Pt is agreeable to DC home with [facility]. Pt has had [facility] in the past. Pt states dtr will be providing transport home. Pt declines any additional DC or DME needs at this time.     IMM 4/5    O2 98% RA

## 2025-04-05 NOTE — PROGRESS NOTES
Patient being discharged to home. All discharge instructions given, all questions answered. No pain and or discomfort noted and or reported. Wheelchair assistance to family car

## 2025-04-05 NOTE — PROGRESS NOTES
Bedside Mobility Assessment Tool (BMAT):     Assessment Level 1- Sit and Shake    1. From a semi-reclined position, ask patient to sit up and rotate to a seated position at the side of the bed. Can use the bedrail.    2. Ask patient to reach out and grab your hand and shake making sure patient reaches across his/her midline.   Pass- Patient is able to come to a seated position, maintain core strength. Maintains seated balance while reaching across midline. Move on to Assessment Level 2.     Assessment Level 2- Stretch and Point   1. With patient in seated position at the side of the bed, have patient place both feet on the floor (or stool) with knees no higher than hips.    2. Ask patient to stretch one leg and straighten the knee, then bend the ankle/flex and point the toes. If appropriate, repeat with the other leg.   Pass- Patient is able to demonstrate appropriate quad strength on intended weight bearing limb(s). Move onto Assessment Level 3.     Assessment Level 3- Stand   1. Ask patient to elevate off the bed or chair (seated to standing) using an assistive device (cane, bedrail).    2. Patient should be able to raise buttocks off be and hold for a count of five. May repeat once.   Pass- Patient maintains standing stability for at least 5 seconds, proceed to assessment level 4.    Assessment Level 4- Walk   1. Ask patient to march in place at bedside.    2. Then ask patient to advance step and return each foot. Some medical conditions may render a patient from stepping backwards, use your best clinical judgement.   Pass- Patient demonstrates balance while shifting weight and ability to step, takes independent steps, does not use assistive device patient is MOBILITY LEVEL 4.      Mobility Level- 4    Azithromycin Pregnancy And Lactation Text: This medication is considered safe during pregnancy and is also secreted in breast milk.

## 2025-04-05 NOTE — PROGRESS NOTES
Occupational Therapy Daily Tx Note    Unit: [address]   Date:  2025  Patient Name:    Cordelia Pillai  Admitting diagnosis:  UTI (urinary tract infection) [N39.0]  Admit Date:  3/28/2025  Precautions/Restrictions/WB Status/ Lines/ Wounds/ Oxygen: Fall risk, Bed/chair alarm, urostomy, Telemetry, and Isolation Precautions: Contact   Treatment Time:  840-825    COPD Protocol Day / Treatment #: 4        Discharge/Disposition Recommendations:                     [x] Home Occupational Therapy    S4 Level: [] NA   [] Out Patient OT     []1 []2 [x]3 []4   [] Pulmonary Rehab    [] SNF   [] ARU       AM-PAC Score: 20         DME needs for discharge: needs met    Other Therapy Recommendations/Needs: HHOT       Subjective:    I need to self cath   Pt. Supine in bed with no family present.  Pt. Agreeable to tx     Pain   [x]Yes  []No  Ratin/10  Location: abdomen/back   Pain Medicine Status: Denies need; Pain med requested.  RN notified.      Activity Tolerance   Note vitals, issues with dizziness, shortness of breath  SpO2:   # Rest Breaks taken:     Bed Mobility:   Supine to Sit:SBA  Sit to Supine:NT  Rolling:SBA  Scooting:Supervision    Transfer Training:    Sit to stand:SBA  Stand to sit:SBA  Bed to Chair/BSC: SBA with RW     ADLs:  Dressing:   UE:NT  LE:sup to don shoes   Bathing:  UE:NT  LE:NT  Toileting:   IND with self cath in bed   Grooming/hygiene:   IND  Eating:    Ind    Therapeutic Exercise:   Pt declined due to breakfast had arrived     Patient/Family Education     Safe transfer     Positioning Needs:   In chair with chair alarm on   Family Present:  []Yes  [x]No    Assessment:   Pt was supine in bed and performed self cath Ind. Pt was SBA for supine to sit and sit to stand. Pt was SBA with RW to transfer this morning. Pt was sup for donning shoes.'  /82 sitting edge of bed with reports of min dizziness. Pt cooperative for therapy.    GOALS  To be met in 3 Visits:  Bed to toilet/BSC:                                                                    Supervision     To be met in 5 Visits:  Supine to/from Sit in preparation for ADL task:                      Independent  Toileting                                                                                  Supervision  Grooming                    GOAL MET 3/31/25                            Supervision  Upper Body Dressing:                                                            Supervision  Lower Body Dressing:                                                            Supervision with AE PRN  Pt to demonstrate UE therapeutic exs x 15 reps with minimal cues        Plan: cont with POC    At end of treatment, patient in  with call light and needs within reach.  Timed Code Treatment Minutes:    minutes  Total Treatment Time:   minutes    Signature, License #  Estevan Davi OTR/L [id number]     If patient discharges from this facility prior to next visit, this note will serve as the Discharge Summary

## 2025-04-05 NOTE — DISCHARGE INSTR - COC
Continuity of Care Form    Patient Name: Cordelia Pillai   :  1955  MRN:  [id number]    Admit date:  3/28/2025  Discharge date:  ***    Code Status Order: Full Code   Advance Directives:     Admitting Physician:  Hermes Rider DO  PCP: Connie Franco MD    Discharging Nurse: ***  Discharging Hospital Unit/Room#: 0233/0233-01  Discharging Unit Phone Number: ***    Emergency Contact:   Extended Emergency Contact Information  Primary Emergency Contact: NevilleKelsie zambrano  Address: [address] [place] [address]            [place] [address] [facility]  Home Phone: [phone]  Work Phone: [phone]  Mobile Phone: [phone]  Relation: Child    Past Surgical History:  Past Surgical History:   Procedure Laterality Date    ABDOMEN SURGERY  2011     REPAIR INCISIONAL HERNIA REDUCIBLE WITH POSSIBLE MESH    BACK SURGERY      x3    BACK SURGERY      stimulator    BLADDER SUSPENSION      CARDIAC PACEMAKER PLACEMENT      Heart doctor at [facility]    CATARACT REMOVAL WITH IMPLANT Left 2018    PHACO EMULSIFICATION OF CATARACT WITH INTRAOCULAR LENS IMPLANT LEFT EYE    CERVICAL DISCECTOMY  2014    and fusion    CHOLECYSTECTOMY      COLONOSCOPY  12    ENDOSCOPY, COLON, DIAGNOSTIC      EPIDURAL STEROID INJECTION Left 08/15/2019    LEFT KNEE GENICULAR NERVE BLOCK SITE CONFIRMED BY FLUOROSCOPY performed by Raman Silveira MD at [facility]    EYE SURGERY Right 2018    cataract removal    FOOT SURGERY Right 2012    arthroplasty    FOOT SURGERY Left 2015    FOOT SURGERY Left 2015    GASTRIC BYPASS SURGERY  ,    HYSTERECTOMY (CERVIX STATUS UNKNOWN)      KNEE ARTHROSCOPY Left 10/03/2014    part. medial & lateral meniscectomy, synovectomy plica exc    KNEE ARTHROSCOPY Right 10/13/2015    partial medial meniscectomy, chondroplasty, partial lateral meniscectomy    NECK SURGERY      OTHER SURGICAL HISTORY  2013    removal spinal cord stimulater    OTHER SURGICAL  hypotension I95.1    Hypertension, uncontrolled I10    Hypokalemia E87.6    Hypertensive urgency I16.0    Acute nonintractable headache R51.9    Multiple drug resistant organism (MDRO) culture positive Z16.24    Decreased activities of daily living (ADL) Z78.9    Hypomagnesemia E83.42    Frequent falls R29.6    SIRS (systemic inflammatory response syndrome) ([facility]) R65.10    Microcytic anemia D50.9    Acute kidney injury (nontraumatic) N17.9    Closed nondisplaced fracture of first right metatarsal bone S92.314A    Closed nondisplaced pilon fracture of right tibia S82.874A    Physical deconditioning R53.81    Unable to ambulate R26.2    Closed nondisplaced fracture of medial malleolus of right tibia S82.54XA    Closed nondisplaced fracture of right great toe S92.404A    Nausea and vomiting R11.2    Subacute vaginitis N76.1    Other chronic pain G89.29    Altered level of consciousness R40.4    UTI (urinary tract infection) N39.0    Ileus ([facility]) K56.7    Anxiety and depression F41.9, F32.A    History of urostomy Z98.890       Isolation/Infection:   Isolation            Contact          Patient Infection Status        Infection Onset Added Last Indicated Last Indicated By Review Planned Expiration    MDRO (multi-drug resistant organism) 04/13/21 04/15/21 03/28/25 Culture, Urine                 Resolved       Infection Onset Added Last Indicated Last Indicated By Resolved Resolved By    C-diff Rule Out 03/17/24 03/17/24 03/17/24 Clostridium difficile toxin/antigen 03/17/24 Rule-Out Test Canceled                         Nurse Assessment:  Last Vital Signs: /67   Pulse 65   Temp 97.7 °F (36.5 °C) (Oral)   Resp 17   Ht 1.575 m (5' 2.01\")   Wt 80.6 kg (177 lb 12.8 oz)   SpO2 94%   BMI 32.51 kg/m²     Last documented pain score (0-10 scale): Pain Level: 0  Last Weight:   Wt Readings from Last 1 Encounters:   04/04/25 80.6 kg (177 lb 12.8 oz)     Mental Status:  {IP PT MENTAL STATUS:18719}    IV Access:  {[facility]

## 2025-04-15 ENCOUNTER — OFFICE VISIT (OUTPATIENT)
Dept: ORTHOPEDIC SURGERY | Age: 70
End: 2025-04-15
Payer: MEDICARE

## 2025-04-15 VITALS — BODY MASS INDEX: 32.57 KG/M2 | HEIGHT: 62 IN | WEIGHT: 177 LBS

## 2025-04-15 DIAGNOSIS — M25.562 CHRONIC PAIN OF BOTH KNEES: Primary | ICD-10-CM

## 2025-04-15 DIAGNOSIS — M25.561 CHRONIC PAIN OF BOTH KNEES: Primary | ICD-10-CM

## 2025-04-15 DIAGNOSIS — G89.29 CHRONIC PAIN OF BOTH KNEES: Primary | ICD-10-CM

## 2025-04-15 DIAGNOSIS — Z96.652 HISTORY OF TOTAL KNEE ARTHROPLASTY, LEFT: ICD-10-CM

## 2025-04-15 PROCEDURE — 1123F ACP DISCUSS/DSCN MKR DOCD: CPT | Performed by: ORTHOPAEDIC SURGERY

## 2025-04-15 PROCEDURE — 99213 OFFICE O/P EST LOW 20 MIN: CPT | Performed by: ORTHOPAEDIC SURGERY

## 2025-04-15 NOTE — PROGRESS NOTES
ORTHOPAEDIC SURGERY FOLLOWUP VISIT    CHIEF COMPLAINT: Left knee pain    DATE OF SURGERY:  8/30/2016, left knee patellofemoral arthroplasty, Dr. Quispe  5/9/2018, conversion of prior patellofemoral arthroplasty to left total knee arthroplasty, Dr. Quispe    HISTORY OF PRESENT ILLNESS:  69-year-old female presents for evaluation of painful left total knee arthroplasty.  She reports that it has never been pain-free.  She indicates that her pain is globally about the knee, mostly about the anterior knee.  She has pain about the popliteal fossa.  She indicates that there is a chronic dull ache on a day-to-day basis.  This is most significant at night.  She indicates a lack of terminal range of motion.  She denies mechanical symptoms to include popping, locking up in the knee.  She reports a sense of weakness.    PHYSICAL EXAM:  General: Well-appearing.  No distress.  Left lower extremity: No cuts, open wounds, or abrasions.  Anterior midline surgical scar is maturely healed.  No sinus tracts.  No open areas.  No signs of dehiscence.  No obvious effusion.  Knee range of motion is full extension to 100 degrees of flexion.  There is no gross instability appreciated with varus or valgus stress at 0 or 30 degrees of flexion.  There is no significant translation with anterior drawer testing in 90 degrees of flexion.  There is reproducible pain with anterior drawer testing.  Patellofemoral tracking appears appropriate. Sensation is intact to light touch in deep peroneal, superficial peroneal, tibial, sural, and saphenous nerve distributions.  Motor function is intact to EHL, FHL, tibialis anterior, and gastroc.  There is brisk capillary refill to the toes and a strong palpable dorsalis pedis pulse.  Compartments are soft and compressible.  There is no calf tenderness and a negative Homans' sign.     RADIOGRAPHIC EXAM:  4 views of each knee including weightbearing AP, tunnel, lateral, sunrise x-rays demonstrate appropriately

## 2025-04-27 PROBLEM — N39.0 UTI (URINARY TRACT INFECTION): Status: RESOLVED | Noted: 2025-03-28 | Resolved: 2025-04-27

## 2025-05-05 RX ORDER — CITALOPRAM HYDROBROMIDE 20 MG/1
20 TABLET ORAL DAILY
Qty: 30 TABLET | Refills: 0 | OUTPATIENT
Start: 2025-05-05

## 2025-05-06 RX ORDER — CITALOPRAM HYDROBROMIDE 20 MG/1
20 TABLET ORAL DAILY
Qty: 30 TABLET | Refills: 0 | OUTPATIENT
Start: 2025-05-06

## 2025-05-07 RX ORDER — CITALOPRAM HYDROBROMIDE 20 MG/1
20 TABLET ORAL DAILY
Qty: 30 TABLET | Refills: 0 | OUTPATIENT
Start: 2025-05-07

## 2025-05-14 NOTE — PROGRESS NOTES
[facility]  DIVISION OF OTOLARYNGOLOGY- HEAD & NECK SURGERY  CONSULT    Patient Name: Cordelia Pillai  Medical Record Number:  [id number]  Primary Care Physician:  Connie Franco MD  Date of Consultation: 5/19/2025    Chief Complaint:   Chief Complaint   Patient presents with    New Patient    Dizziness      HISTORY OF PRESENT ILLNESS  Cordelia is a(n) 69 y.o. female who presents for evaluation of dizziness.  She states that she has POTS and will pass out a lot and also get dizzy with that.  For the last 3 years she has also been having dizziness when lying down in bed.  She is unsure if this related to the POTS or not.  It can happen when she turns to the left or to the right.  She denies any ear fullness or any other ear symptoms while the dizziness is occurring.  Patient Active Problem List   Diagnosis    Chest pain    Failed back surgical syndrome    GERD (gastroesophageal reflux disease)    Esophageal dysmotility    Bronchiectasis ([facility])    Anxiety & depression    Hypothyroidism    S/P gastric bypass    Osteoporosis    Obesity (BMI 30-39.9)    HAI (generalized anxiety disorder)    NATHAN (obstructive sleep apnea)    Generalized weakness    Essential tremor    HTN (hypertension), benign    Dyslipidemia    Primary insomnia    Status post total left knee replacement    MCI (mild cognitive impairment) with memory loss    Chronic bilateral low back pain without sciatica    Osteoarthritis of right knee    Urinary tract infection associated with cystostomy catheter    S/P placement of hypoglossal cranial nerve stimulator      History of bladder augmentation    Urinary retention    Pacemaker    Polyuria    Diarrhea    Hx of Enterococcus UTI    Sepsis ([facility])    Effusion of prosthetic L knee joint    Dysthymia    TIA involving left internal carotid artery    Trochanteric bursitis of left hip    Eyelash ptosis, left    MG (myasthenia gravis) ([facility])    Bilateral carotid artery stenosis    Syncope, near    Acute pain of right knee

## 2025-05-19 ENCOUNTER — OFFICE VISIT (OUTPATIENT)
Dept: ENT CLINIC | Age: 70
End: 2025-05-19
Payer: MEDICARE

## 2025-05-19 ENCOUNTER — PROCEDURE VISIT (OUTPATIENT)
Dept: AUDIOLOGY | Age: 70
End: 2025-05-19
Payer: MEDICARE

## 2025-05-19 VITALS
WEIGHT: 160 LBS | SYSTOLIC BLOOD PRESSURE: 124 MMHG | HEIGHT: 62 IN | BODY MASS INDEX: 29.44 KG/M2 | DIASTOLIC BLOOD PRESSURE: 86 MMHG | HEART RATE: 64 BPM

## 2025-05-19 DIAGNOSIS — R42 DIZZINESS: Primary | ICD-10-CM

## 2025-05-19 DIAGNOSIS — H93.13 TINNITUS, BILATERAL: Primary | ICD-10-CM

## 2025-05-19 DIAGNOSIS — R42 DIZZINESS AND GIDDINESS: ICD-10-CM

## 2025-05-19 DIAGNOSIS — H90.3 SENSORINEURAL HEARING LOSS (SNHL) OF BOTH EARS: ICD-10-CM

## 2025-05-19 DIAGNOSIS — H90.3 SENSORINEURAL HEARING LOSS, BILATERAL: ICD-10-CM

## 2025-05-19 PROCEDURE — 1159F MED LIST DOCD IN RCRD: CPT | Performed by: STUDENT IN AN ORGANIZED HEALTH CARE EDUCATION/TRAINING PROGRAM

## 2025-05-19 PROCEDURE — 92557 COMPREHENSIVE HEARING TEST: CPT | Performed by: AUDIOLOGIST

## 2025-05-19 PROCEDURE — 3074F SYST BP LT 130 MM HG: CPT | Performed by: STUDENT IN AN ORGANIZED HEALTH CARE EDUCATION/TRAINING PROGRAM

## 2025-05-19 PROCEDURE — 92567 TYMPANOMETRY: CPT | Performed by: AUDIOLOGIST

## 2025-05-19 PROCEDURE — 99204 OFFICE O/P NEW MOD 45 MIN: CPT | Performed by: STUDENT IN AN ORGANIZED HEALTH CARE EDUCATION/TRAINING PROGRAM

## 2025-05-19 PROCEDURE — 3079F DIAST BP 80-89 MM HG: CPT | Performed by: STUDENT IN AN ORGANIZED HEALTH CARE EDUCATION/TRAINING PROGRAM

## 2025-05-19 PROCEDURE — 1160F RVW MEDS BY RX/DR IN RCRD: CPT | Performed by: STUDENT IN AN ORGANIZED HEALTH CARE EDUCATION/TRAINING PROGRAM

## 2025-05-19 PROCEDURE — 1123F ACP DISCUSS/DSCN MKR DOCD: CPT | Performed by: STUDENT IN AN ORGANIZED HEALTH CARE EDUCATION/TRAINING PROGRAM

## 2025-05-19 ASSESSMENT — ENCOUNTER SYMPTOMS
VOMITING: 0
EYE PAIN: 0
SHORTNESS OF BREATH: 0
COUGH: 0
RHINORRHEA: 0
NAUSEA: 0

## 2025-05-19 NOTE — PROGRESS NOTES
Recognition Threshold: 5 dB HL  Word Recognition: Excellent 96%, based on NU-6 25-word list at 50 dBHL using recorded speech stimuli.    Tympanometry: Normal peak pressure and compliance, Type A tympanogram, consistent with normal middle ear function.      Reliability: Fair (marked fair as patient testing outside the sampson- patient in wheelchair and unable to step into sampson. Interpret results with caution).   Transducer: Inserts    See scanned audiogram dated 5/19/2025  for results.        PATIENT EDUCATION:       The following items were discussed with the patient:   - Good Communication Strategies  - Tinnitus Management Strategies    - Dizziness    Educational information was shared in the After Visit Summary.                                                RECOMMENDATIONS:                                                                                                                                                                                                                                                            The following items are recommended based on patient report and results from today's appointment:   - Continue medical follow-up with Camilo Gill DO.    - Retest hearing as medically indicated and/or sooner if a change in hearing is noted.  - Utilize \"Good Communication Strategies\" as discussed to assist in speech understanding with communication partners.  - Maintain a sound enriched environment to assist in the management of tinnitus symptoms.  - If medically indicated, consider vestibular evaluation to further investigate symptoms of dizziness.       Harshal Patel  Audiologist    Chart CC'd to: Camilo Gill DO      Degree of   Hearing Sensitivity dB Range   Within Normal Limits (WNL) 0 - 20   Mild 20 - 40   Moderate 40 - 55   Moderately-Severe 55 - 70   Severe 70 - 90   Profound 90 +

## 2025-05-27 NOTE — PROGRESS NOTES
lymphadenopathy. Radiology:     X-rays obtained and reviewed in office:  Views skyline  Location a skyline view of the left knee was obtained today which demonstrates the patella to be centrally located in the trochlear component. AP lateral and oblique views obtained in the emergency room demonstrate no obvious abnormality in the alignment of the prosthesis or evidence of fracture dislocation. Calcification is seen in the quadriceps tendon up above the level of the patella      Impression  1. Acute pain of left knee  XR KNEE LEFT (1-2 VIEWS)   2. Primary osteoarthritis of left knee              Plan  Plan to treat the patient conservatively with a course of an anti-inflammatory meloxicam 15 mg p.o. q. day for 30 days and see if this will improve her crepitation in her discomfort. She was taken off her anti-inflammatories due to cardiac disease but we'll just use a short dose to see if we can treat this and improve her symptoms. Other alternative would be to convert her to a total knee arthroplasty. Patient will begin a 30 day course of meloxicam and then follow up with us see how she's responded.     Alpa Velasco Additional Notes: Exc 12/2020 Detail Level: Simple Render Risk Assessment In Note?: no Additional Notes: Pt states he is using AmLactin and has noticed improvement.

## 2025-05-29 ENCOUNTER — APPOINTMENT (OUTPATIENT)
Dept: CT IMAGING | Age: 70
End: 2025-05-29
Payer: MEDICARE

## 2025-05-29 ENCOUNTER — HOSPITAL ENCOUNTER (EMERGENCY)
Age: 70
Discharge: HOME | End: 2025-05-30
Attending: STUDENT IN AN ORGANIZED HEALTH CARE EDUCATION/TRAINING PROGRAM
Payer: MEDICARE

## 2025-05-29 DIAGNOSIS — K59.00 CONSTIPATION, UNSPECIFIED CONSTIPATION TYPE: ICD-10-CM

## 2025-05-29 DIAGNOSIS — E83.42 HYPOMAGNESEMIA: ICD-10-CM

## 2025-05-29 DIAGNOSIS — R10.9 ABDOMINAL PAIN, UNSPECIFIED ABDOMINAL LOCATION: Primary | ICD-10-CM

## 2025-05-29 DIAGNOSIS — E87.6 HYPOKALEMIA: ICD-10-CM

## 2025-05-29 DIAGNOSIS — E86.0 DEHYDRATION: ICD-10-CM

## 2025-05-29 LAB
ALBUMIN SERPL-MCNC: 3.8 G/DL (ref 3.4–5)
ALBUMIN/GLOB SERPL: 1.2 {RATIO} (ref 1.1–2.2)
ALP SERPL-CCNC: 82 U/L (ref 40–129)
ALT SERPL-CCNC: 18 U/L (ref 10–40)
ALT SERPL-CCNC: ABNORMAL U/L (ref 10–40)
ALT SERPL-CCNC: NORMAL U/L (ref 10–40)
ANION GAP SERPL CALCULATED.3IONS-SCNC: 17 MMOL/L (ref 3–16)
ANION GAP SERPL CALCULATED.3IONS-SCNC: 20 MMOL/L (ref 3–16)
ANION GAP SERPL CALCULATED.3IONS-SCNC: 20 MMOL/L (ref 3–16)
AST SERPL-CCNC: 51 U/L (ref 15–37)
BASOPHILS # BLD: 0.1 K/UL (ref 0–0.2)
BASOPHILS NFR BLD: 0.9 %
BILIRUB SERPL-MCNC: 0.4 MG/DL (ref 0–1)
BILIRUB UR QL STRIP.AUTO: NEGATIVE
BUN SERPL-MCNC: 16 MG/DL (ref 7–20)
BUN SERPL-MCNC: 18 MG/DL (ref 7–20)
BUN SERPL-MCNC: 19 MG/DL (ref 7–20)
CALCIUM SERPL-MCNC: 8.8 MG/DL (ref 8.3–10.6)
CALCIUM SERPL-MCNC: 8.8 MG/DL (ref 8.3–10.6)
CALCIUM SERPL-MCNC: 9.4 MG/DL (ref 8.3–10.6)
CHLORIDE SERPL-SCNC: 100 MMOL/L (ref 99–110)
CHLORIDE SERPL-SCNC: 101 MMOL/L (ref 99–110)
CHLORIDE SERPL-SCNC: 98 MMOL/L (ref 99–110)
CLARITY UR: CLEAR
CO2 SERPL-SCNC: 17 MMOL/L (ref 21–32)
CO2 SERPL-SCNC: 18 MMOL/L (ref 21–32)
CO2 SERPL-SCNC: 21 MMOL/L (ref 21–32)
COLOR UR: YELLOW
CREAT SERPL-MCNC: 0.8 MG/DL (ref 0.6–1.2)
CREAT SERPL-MCNC: 0.8 MG/DL (ref 0.6–1.2)
CREAT SERPL-MCNC: 0.9 MG/DL (ref 0.6–1.2)
DEPRECATED RDW RBC AUTO: 14.8 % (ref 12.4–15.4)
EOSINOPHIL # BLD: 0.1 K/UL (ref 0–0.6)
EOSINOPHIL NFR BLD: 1.6 %
GFR SERPLBLD CREATININE-BSD FMLA CKD-EPI: 69 ML/MIN/{1.73_M2}
GFR SERPLBLD CREATININE-BSD FMLA CKD-EPI: 79 ML/MIN/{1.73_M2}
GFR SERPLBLD CREATININE-BSD FMLA CKD-EPI: 79 ML/MIN/{1.73_M2}
GLUCOSE SERPL-MCNC: 108 MG/DL (ref 70–99)
GLUCOSE SERPL-MCNC: 118 MG/DL (ref 70–99)
GLUCOSE SERPL-MCNC: 95 MG/DL (ref 70–99)
GLUCOSE UR STRIP.AUTO-MCNC: NEGATIVE MG/DL
HCT VFR BLD AUTO: 42.6 % (ref 36–48)
HGB BLD-MCNC: 13.7 G/DL (ref 12–16)
HGB UR QL STRIP.AUTO: NEGATIVE
KETONES UR STRIP.AUTO-MCNC: 15 MG/DL
LEUKOCYTE ESTERASE UR QL STRIP.AUTO: NEGATIVE
LIPASE SERPL-CCNC: 16 U/L (ref 13–60)
LYMPHOCYTES # BLD: 3.4 K/UL (ref 1–5.1)
LYMPHOCYTES NFR BLD: 45.8 %
MAGNESIUM SERPL-MCNC: 1.77 MG/DL (ref 1.8–2.4)
MCH RBC QN AUTO: 28.4 PG (ref 26–34)
MCHC RBC AUTO-ENTMCNC: 32.2 G/DL (ref 31–36)
MCV RBC AUTO: 88.2 FL (ref 80–100)
MONOCYTES # BLD: 0.6 K/UL (ref 0–1.3)
MONOCYTES NFR BLD: 7.9 %
NEUTROPHILS # BLD: 3.2 K/UL (ref 1.7–7.7)
NEUTROPHILS NFR BLD: 43.8 %
NITRITE UR QL STRIP.AUTO: NEGATIVE
PH UR STRIP.AUTO: 6 [PH] (ref 5–8)
PLATELET # BLD AUTO: 221 K/UL (ref 135–450)
PMV BLD AUTO: 8.6 FL (ref 5–10.5)
POTASSIUM SERPL-SCNC: 3.1 MMOL/L (ref 3.5–5.1)
POTASSIUM SERPL-SCNC: 3.1 MMOL/L (ref 3.5–5.1)
POTASSIUM SERPL-SCNC: 3.6 MMOL/L (ref 3.5–5.1)
POTASSIUM SERPL-SCNC: ABNORMAL MMOL/L (ref 3.5–5.1)
POTASSIUM SERPL-SCNC: NORMAL MMOL/L (ref 3.5–5.1)
PROT SERPL-MCNC: 7 G/DL (ref 6.4–8.2)
PROT UR STRIP.AUTO-MCNC: NEGATIVE MG/DL
RBC # BLD AUTO: 4.83 M/UL (ref 4–5.2)
REASON FOR REJECTION: NORMAL
REASON FOR REJECTION: NORMAL
REJECTED TEST: NORMAL
REJECTED TEST: NORMAL
SODIUM SERPL-SCNC: 136 MMOL/L (ref 136–145)
SODIUM SERPL-SCNC: 138 MMOL/L (ref 136–145)
SODIUM SERPL-SCNC: 138 MMOL/L (ref 136–145)
SP GR UR STRIP.AUTO: 1.01 (ref 1–1.03)
UA COMPLETE W REFLEX CULTURE PNL UR: ABNORMAL
UA DIPSTICK W REFLEX MICRO PNL UR: ABNORMAL
URN SPEC COLLECT METH UR: ABNORMAL
UROBILINOGEN UR STRIP-ACNC: 0.2 E.U./DL
WBC # BLD AUTO: 7.4 K/UL (ref 4–11)

## 2025-05-29 PROCEDURE — 96361 HYDRATE IV INFUSION ADD-ON: CPT

## 2025-05-29 PROCEDURE — 80053 COMPREHEN METABOLIC PANEL: CPT

## 2025-05-29 PROCEDURE — 99285 EMERGENCY DEPT VISIT HI MDM: CPT

## 2025-05-29 PROCEDURE — 81003 URINALYSIS AUTO W/O SCOPE: CPT

## 2025-05-29 PROCEDURE — 84132 ASSAY OF SERUM POTASSIUM: CPT

## 2025-05-29 PROCEDURE — 6360000004 HC RX CONTRAST MEDICATION: Performed by: STUDENT IN AN ORGANIZED HEALTH CARE EDUCATION/TRAINING PROGRAM

## 2025-05-29 PROCEDURE — 83690 ASSAY OF LIPASE: CPT

## 2025-05-29 PROCEDURE — 2580000003 HC RX 258: Performed by: STUDENT IN AN ORGANIZED HEALTH CARE EDUCATION/TRAINING PROGRAM

## 2025-05-29 PROCEDURE — 6370000000 HC RX 637 (ALT 250 FOR IP): Performed by: STUDENT IN AN ORGANIZED HEALTH CARE EDUCATION/TRAINING PROGRAM

## 2025-05-29 PROCEDURE — 83735 ASSAY OF MAGNESIUM: CPT

## 2025-05-29 PROCEDURE — 96360 HYDRATION IV INFUSION INIT: CPT

## 2025-05-29 PROCEDURE — 85025 COMPLETE CBC W/AUTO DIFF WBC: CPT

## 2025-05-29 PROCEDURE — 84460 ALANINE AMINO (ALT) (SGPT): CPT

## 2025-05-29 PROCEDURE — 36415 COLL VENOUS BLD VENIPUNCTURE: CPT

## 2025-05-29 PROCEDURE — 74177 CT ABD & PELVIS W/CONTRAST: CPT

## 2025-05-29 RX ORDER — IOPAMIDOL 755 MG/ML
75 INJECTION, SOLUTION INTRAVASCULAR
Status: COMPLETED | OUTPATIENT
Start: 2025-05-29 | End: 2025-05-29

## 2025-05-29 RX ORDER — SODIUM CHLORIDE, SODIUM LACTATE, POTASSIUM CHLORIDE, AND CALCIUM CHLORIDE .6; .31; .03; .02 G/100ML; G/100ML; G/100ML; G/100ML
1000 INJECTION, SOLUTION INTRAVENOUS ONCE
Status: COMPLETED | OUTPATIENT
Start: 2025-05-29 | End: 2025-05-29

## 2025-05-29 RX ORDER — POTASSIUM CHLORIDE 1500 MG/1
40 TABLET, EXTENDED RELEASE ORAL ONCE
Status: COMPLETED | OUTPATIENT
Start: 2025-05-29 | End: 2025-05-29

## 2025-05-29 RX ORDER — LANOLIN ALCOHOL/MO/W.PET/CERES
400 CREAM (GRAM) TOPICAL ONCE
Status: COMPLETED | OUTPATIENT
Start: 2025-05-29 | End: 2025-05-29

## 2025-05-29 RX ADMIN — SODIUM CHLORIDE, SODIUM LACTATE, POTASSIUM CHLORIDE, AND CALCIUM CHLORIDE 1000 ML: .6; .31; .03; .02 INJECTION, SOLUTION INTRAVENOUS at 22:13

## 2025-05-29 RX ADMIN — Medication 400 MG: at 22:12

## 2025-05-29 RX ADMIN — IOPAMIDOL 75 ML: 755 INJECTION, SOLUTION INTRAVENOUS at 18:24

## 2025-05-29 RX ADMIN — SODIUM CHLORIDE, SODIUM LACTATE, POTASSIUM CHLORIDE, AND CALCIUM CHLORIDE 1000 ML: .6; .31; .03; .02 INJECTION, SOLUTION INTRAVENOUS at 19:01

## 2025-05-29 RX ADMIN — POTASSIUM CHLORIDE 40 MEQ: 1500 TABLET, EXTENDED RELEASE ORAL at 22:12

## 2025-05-29 ASSESSMENT — PAIN DESCRIPTION - LOCATION: LOCATION: ABDOMEN

## 2025-05-29 ASSESSMENT — PAIN DESCRIPTION - DESCRIPTORS: DESCRIPTORS: BURNING

## 2025-05-29 ASSESSMENT — PAIN - FUNCTIONAL ASSESSMENT: PAIN_FUNCTIONAL_ASSESSMENT: 0-10

## 2025-05-29 ASSESSMENT — PAIN SCALES - GENERAL: PAINLEVEL_OUTOF10: 8

## 2025-05-29 ASSESSMENT — PAIN DESCRIPTION - PAIN TYPE: TYPE: ACUTE PAIN

## 2025-05-29 NOTE — ED PROVIDER NOTES
edema    Additional Exams:  na    DiagnosticResults     ECG    I have reviewed the ECG as follows:    na      RADIOLOGY:    My independent review of medical imaging is as follows:    See ED course    Final interpretation of all medical imaging per radiology as below:    CT ABDOMEN PELVIS W IV CONTRAST Additional Contrast? None   Final Result   No acute abnormality identified in the abdomen or pelvis.             LABS:   Results for orders placed or performed during the hospital encounter of 05/29/25   CBC with Auto Differential   Result Value Ref Range    WBC 7.4 4.0 - 11.0 K/uL    RBC 4.83 4.00 - 5.20 M/uL    Hemoglobin 13.7 12.0 - 16.0 g/dL    Hematocrit 42.6 36.0 - 48.0 %    MCV 88.2 80.0 - 100.0 fL    MCH 28.4 26.0 - 34.0 pg    MCHC 32.2 31.0 - 36.0 g/dL    RDW 14.8 12.4 - 15.4 %    Platelets 221 135 - 450 K/uL    MPV 8.6 5.0 - 10.5 fL    Neutrophils % 43.8 %    Lymphocytes % 45.8 %    Monocytes % 7.9 %    Eosinophils % 1.6 %    Basophils % 0.9 %    Neutrophils Absolute 3.2 1.7 - 7.7 K/uL    Lymphocytes Absolute 3.4 1.0 - 5.1 K/uL    Monocytes Absolute 0.6 0.0 - 1.3 K/uL    Eosinophils Absolute 0.1 0.0 - 0.6 K/uL    Basophils Absolute 0.1 0.0 - 0.2 K/uL   CMP w/ Reflex to MG   Result Value Ref Range    Sodium 138 136 - 145 mmol/L    Potassium reflex Magnesium see below 3.5 - 5.1 mmol/L    Chloride 101 99 - 110 mmol/L    CO2 17 (L) 21 - 32 mmol/L    Anion Gap 20 (H) 3 - 16    Glucose 118 (H) 70 - 99 mg/dL    BUN 18 7 - 20 mg/dL    Creatinine 0.9 0.6 - 1.2 mg/dL    Est, Glom Filt Rate 69 >60    Calcium 9.4 8.3 - 10.6 mg/dL    Total Protein 7.0 6.4 - 8.2 g/dL    Albumin 3.8 3.4 - 5.0 g/dL    Albumin/Globulin Ratio 1.2 1.1 - 2.2    Total Bilirubin 0.4 0.0 - 1.0 mg/dL    Alkaline Phosphatase 82 40 - 129 U/L    ALT see below 10 - 40 U/L    AST 51 (H) 15 - 37 U/L   Lipase   Result Value Ref Range    Lipase 16.0 13.0 - 60.0 U/L   Urinalysis with Reflex to Culture    Specimen: Urine   Result Value Ref Range    Color, UA  Yellow Straw/Yellow    Clarity, UA Clear Clear    Glucose, Ur Negative Negative mg/dL    Bilirubin, Urine Negative Negative    Ketones, Urine 15 (A) Negative mg/dL    Specific Gravity, UA 1.015 1.005 - 1.030    Blood, Urine Negative Negative    pH, Urine 6.0 5.0 - 8.0    Protein, UA Negative Negative mg/dL    Urobilinogen, Urine 0.2 <2.0 E.U./dL    Nitrite, Urine Negative Negative    Leukocyte Esterase, Urine Negative Negative    Microscopic Examination Not Indicated     Urine Type NotGiven     Urine Reflex to Culture Not Indicated    SPECIMEN REJECTION   Result Value Ref Range    Rejected Test K, AST     Reason for Rejection see below    Potassium   Result Value Ref Range    Potassium see below 3.5 - 5.1 mmol/L   ALT   Result Value Ref Range    ALT see below 10 - 40 U/L   SPECIMEN REJECTION   Result Value Ref Range    Rejected Test K, ALT     Reason for Rejection see below    Potassium   Result Value Ref Range    Potassium 3.1 (L) 3.5 - 5.1 mmol/L   ALT   Result Value Ref Range    ALT 18 10 - 40 U/L   BMP w/ Reflex to MG   Result Value Ref Range    Sodium 136 136 - 145 mmol/L    Potassium reflex Magnesium 3.1 (L) 3.5 - 5.1 mmol/L    Chloride 98 (L) 99 - 110 mmol/L    CO2 18 (L) 21 - 32 mmol/L    Anion Gap 20 (H) 3 - 16    Glucose 108 (H) 70 - 99 mg/dL    BUN 19 7 - 20 mg/dL    Creatinine 0.8 0.6 - 1.2 mg/dL    Est, Glom Filt Rate 79 >60    Calcium 8.8 8.3 - 10.6 mg/dL   Magnesium   Result Value Ref Range    Magnesium 1.77 (L) 1.80 - 2.40 mg/dL   BMP w/ Reflex to MG   Result Value Ref Range    Sodium 138 136 - 145 mmol/L    Potassium reflex Magnesium 3.6 3.5 - 5.1 mmol/L    Chloride 100 99 - 110 mmol/L    CO2 21 21 - 32 mmol/L    Anion Gap 17 (H) 3 - 16    Glucose 95 70 - 99 mg/dL    BUN 16 7 - 20 mg/dL    Creatinine 0.8 0.6 - 1.2 mg/dL    Est, Glom Filt Rate 79 >60    Calcium 8.8 8.3 - 10.6 mg/dL       ED BEDSIDE ULTRASOUND:  na    RECENT VITALS:  BP: (!) 136/98, Temp: 97.5 °F (36.4 °C), Pulse: 80,Respirations: 18,

## 2025-05-30 VITALS
BODY MASS INDEX: 30.36 KG/M2 | HEIGHT: 62 IN | RESPIRATION RATE: 18 BRPM | WEIGHT: 165 LBS | HEART RATE: 80 BPM | OXYGEN SATURATION: 100 % | SYSTOLIC BLOOD PRESSURE: 136 MMHG | DIASTOLIC BLOOD PRESSURE: 98 MMHG | TEMPERATURE: 97.5 F

## 2025-05-30 NOTE — DISCHARGE INSTRUCTIONS
You were evaluated in the emergency department for abdominal pain and concern for constipation. Assessments and testing completed during your visit were reassuring and at this time there is no indication for further testing, treatment or admission to the hospital. Given this it is appropriate to discharge you from the emergency department. At the time of discharge we discussed the following:    Please review this visit to the emergency department with your primary doctor for further recommendations.    Please note that sometimes it is difficult to diagnose a medical condition early in the disease process before the disease is fully manifest. Because of this, should you develop any new or worsening symptoms, you may return at any time to the emergency department for another evaluation. If available you are also recommended to review this visit with your primary care physician or other medical provider in the next 7 days. Thank you for allowing us to care for you today.

## 2025-05-30 NOTE — ED NOTES
Patient refusing additional fluid bolus, and states she can take the magnesium and potassium at home. Provider notified.

## 2025-06-17 ENCOUNTER — HOSPITAL ENCOUNTER (OUTPATIENT)
Dept: PHYSICAL THERAPY | Age: 70
Setting detail: THERAPIES SERIES
Discharge: HOME | End: 2025-06-17
Payer: MEDICARE

## 2025-06-17 DIAGNOSIS — E87.8 DISEQUILIBRIUM SYNDROME: Primary | ICD-10-CM

## 2025-06-17 DIAGNOSIS — H81.10 BENIGN PAROXYSMAL POSITIONAL VERTIGO, UNSPECIFIED LATERALITY: ICD-10-CM

## 2025-06-17 DIAGNOSIS — R26.2 DIFFICULTY IN WALKING: ICD-10-CM

## 2025-06-17 PROCEDURE — 97162 PT EVAL MOD COMPLEX 30 MIN: CPT

## 2025-06-17 PROCEDURE — 97112 NEUROMUSCULAR REEDUCATION: CPT

## 2025-06-17 PROCEDURE — 95992 CANALITH REPOSITIONING PROC: CPT

## 2025-06-17 NOTE — PLAN OF CARE
[facility] - Outpatient Rehabilitation and Therapy: [address] [place] [address] [place] [address] office: [phone] fax: [phone]     Physical Therapy Initial Evaluation Certification      Dear Camilo Gill DO,    We had the pleasure of evaluating the following patient for physical therapy services at [facility].  A summary of our findings can be found in the initial assessment below.  This includes our plan of care.  If you have any questions or concerns regarding these findings, please do not hesitate to contact me at the office phone number listed above.  Thank you for the referral.     Physician Signature:_______________________________Date:__________________  By signing above (or electronic signature), therapist’s plan is approved by physician       Physical Therapy: TREATMENT/PROGRESS NOTE   Patient: Cordelia Pillai (69 y.o. female)   Examination Date: 2025   :  1955 MRN: [id number]   Visit #: 1   Insurance Allowable Auth Needed   mn []Yes    [x]No    Insurance: Payor: AETNA MEDICARE / Plan: AETNA MEDICARE-ADVANTAGE PPO / Product Type: Medicare /   Insurance ID: [id number] - (Medicare Managed)  Secondary Insurance (if applicable):    Treatment Diagnosis:     ICD-10-CM    1. Disequilibrium syndrome  E87.8       2. Difficulty in walking  R26.2       3. Benign paroxysmal positional vertigo, unspecified laterality  H81.10          Medical Diagnosis:  Vestibular disorder [H81.90]   Referring Physician: Camilo Gill DO  PCP: Connie Franco MD       Plan of care signed (Y/N):     Date of Patient follow up with Physician:      Plan of Care Report: EVAL today  POC update due: (10 visits /OR AUTH LIMITS, whichever is less)  2025                                             Medical History:  Comorbidities:  Anxietyl, Bradycardia, HTN, Thyroid Disease  Relevant Medical History: POTS                                         Precautions/ Contra-indications:

## 2025-06-24 ENCOUNTER — HOSPITAL ENCOUNTER (OUTPATIENT)
Dept: PHYSICAL THERAPY | Age: 70
Setting detail: THERAPIES SERIES
Discharge: HOME | End: 2025-06-24
Payer: MEDICARE

## 2025-06-24 PROCEDURE — 95992 CANALITH REPOSITIONING PROC: CPT

## 2025-06-24 PROCEDURE — 97112 NEUROMUSCULAR REEDUCATION: CPT

## 2025-06-24 NOTE — FLOWSHEET NOTE
expected and requires additional follow up with physician  [] Other:     TREATMENT PLAN     Frequency/Duration: 1-2x/week for 8-10 weeks for the following treatment interventions:    Interventions:  Therapeutic Exercise (81982) including: strength training, ROM, and functional mobility  Therapeutic Activities (04992) including: functional mobility training and education.  Neuromuscular Re-education (85786) activation and proprioception, including postural re-education.    Gait Training (36628) for normalization of ambulation patterns and AD training.   Manual Therapy (08824) as indicated to include: Passive Range of Motion, Gr I-IV mobilizations, Trigger Point Release, and Myofascial Release  CRP for assessment, treatment and education of BPPV    Plan: POC initiated as per evaluation    Electronically Signed by Steph Soni PT  Date: 06/24/2025     Note: Portions of this note have been templated and/or copied from initial evaluation, reassessments and prior notes for documentation efficiency.    Note: If patient does not return for scheduled/recommended follow up visits, this note will serve as a discharge from care along with the most recent update on progress.    Vestibular Evaluation

## 2025-07-01 ENCOUNTER — HOSPITAL ENCOUNTER (OUTPATIENT)
Dept: PHYSICAL THERAPY | Age: 70
Setting detail: THERAPIES SERIES
Discharge: HOME | End: 2025-07-01
Payer: MEDICARE

## 2025-07-01 PROCEDURE — 97112 NEUROMUSCULAR REEDUCATION: CPT

## 2025-07-01 PROCEDURE — 95992 CANALITH REPOSITIONING PROC: CPT

## 2025-07-01 NOTE — FLOWSHEET NOTE
[facility] - Outpatient Rehabilitation and Therapy:  [place] [address] office: [phone] fax: [phone]         Physical Therapy: TREATMENT/PROGRESS NOTE   Patient: Cordelia Pillai (69 y.o. female)   Examination Date: 2025   :  1955 MRN: [id number]   Visit #: 3   Insurance Allowable Auth Needed   mn []Yes    [x]No    Insurance: Payor: AETNA MEDICARE / Plan: AETNA MEDICARE-ADVANTAGE PPO / Product Type: Medicare /   Insurance ID: [id number] - (Medicare Managed)  Secondary Insurance (if applicable):    Treatment Diagnosis:     ICD-10-CM    1. Disequilibrium syndrome  E87.8       2. Difficulty in walking  R26.2       3. Benign paroxysmal positional vertigo, unspecified laterality  H81.10          Medical Diagnosis:  Vestibular disorder [H81.90]   Referring Physician: Camilo Gill DO  PCP: Connie Franco MD       Plan of care signed (Y/N):     Date of Patient follow up with Physician:      Plan of Care Report: NO  POC update due: (10 visits /OR AUTH LIMITS, whichever is less)  2025                                             Medical History:  Comorbidities:  Anxietyl, Bradycardia, HTN, Thyroid Disease  Relevant Medical History: **POTS**                                         Precautions/ Contra-indications:           Latex allergy:  NO  Pacemaker:    YES; kicks on at 60 when her BP goes low.  Contraindications for Manipulation: NA    Red Flags:  None    Suicide Screening:   The patient did not verbalize a primary behavioral concern, suicidal ideation, suicidal intent, or demonstrate suicidal behaviors.    Preferred Language for Healthcare:   [x] English       [] other:    SUBJECTIVE EXAMINATION     Patient stated complaint/comment: Pt states that she is not too bad.  Has only gotten really dizzy once, so that is an improvement. Still has some ringing in her ears.        HPI:  Pt presents with complaints of dizziness in the setting of a PMH of POTS.  Arrives via wheel

## 2025-07-08 ENCOUNTER — HOSPITAL ENCOUNTER (OUTPATIENT)
Dept: PHYSICAL THERAPY | Age: 70
Setting detail: THERAPIES SERIES
Discharge: HOME | End: 2025-07-08
Payer: MEDICARE

## 2025-07-08 PROCEDURE — 97112 NEUROMUSCULAR REEDUCATION: CPT

## 2025-07-08 PROCEDURE — 97110 THERAPEUTIC EXERCISES: CPT

## 2025-07-08 NOTE — FLOWSHEET NOTE
[facility] - Outpatient Rehabilitation and Therapy:  [facility] [place] [address] office: [phone] fax: [phone]         Physical Therapy: TREATMENT/PROGRESS NOTE   Patient: Cordelia Pillai (69 y.o. female)   Examination Date: 2025   :  1955 MRN: [id number]   Visit #:   Insurance Allowable Auth Needed   mn []Yes    [x]No    Insurance: Payor: AETNA MEDICARE / Plan: AETNA MEDICARE-ADVANTAGE PPO / Product Type: Medicare /   Insurance ID: [id number] - (Medicare Managed)  Secondary Insurance (if applicable):    Treatment Diagnosis:     ICD-10-CM    1. Disequilibrium syndrome  E87.8       2. Difficulty in walking  R26.2       3. Benign paroxysmal positional vertigo, unspecified laterality  H81.10          Medical Diagnosis:  Vestibular disorder [H81.90]   Referring Physician: Camilo Gill DO  PCP: Connie Franco MD       Plan of care signed (Y/N):     Date of Patient follow up with Physician:      Plan of Care Report: NO  POC update due: (10 visits /OR AUTH LIMITS, whichever is less)  2025                                             Medical History:  Comorbidities:  Anxietyl, Bradycardia, HTN, Thyroid Disease  Relevant Medical History: **POTS**                                         Precautions/ Contra-indications:           Latex allergy:  NO  Pacemaker:    YES; kicks on at 60 when her BP goes low.  Contraindications for Manipulation: NA    Red Flags:  None    Suicide Screening:   The patient did not verbalize a primary behavioral concern, suicidal ideation, suicidal intent, or demonstrate suicidal behaviors.    Preferred Language for Healthcare:   [x] English       [] other:    SUBJECTIVE EXAMINATION     Patient stated complaint/comment: Pt arrives today alone.  She drove herself and walked all the way in from the car.  Reports dizziness that improved with supine posture.  States that she is no longer having ringing in her ears, but now has a buzzing

## 2025-07-15 ENCOUNTER — APPOINTMENT (OUTPATIENT)
Dept: PHYSICAL THERAPY | Age: 70
End: 2025-07-15
Payer: MEDICARE

## 2025-07-21 ENCOUNTER — HOSPITAL ENCOUNTER (OUTPATIENT)
Dept: LAB | Age: 70
Discharge: HOME | End: 2025-07-21
Attending: ORTHOPAEDIC SURGERY
Payer: MEDICARE

## 2025-07-21 ENCOUNTER — HOSPITAL ENCOUNTER (OUTPATIENT)
Dept: NUCLEAR MEDICINE | Age: 70
Discharge: HOME | End: 2025-07-21
Attending: ORTHOPAEDIC SURGERY
Payer: MEDICARE

## 2025-07-21 ENCOUNTER — HOSPITAL ENCOUNTER (OUTPATIENT)
Dept: NUCLEAR MEDICINE | Age: 70
Discharge: HOME | End: 2025-07-21
Payer: MEDICARE

## 2025-07-21 DIAGNOSIS — Z96.652 HISTORY OF TOTAL KNEE ARTHROPLASTY, LEFT: ICD-10-CM

## 2025-07-21 LAB
BASOPHILS # BLD: 0.1 K/UL (ref 0–0.2)
BASOPHILS NFR BLD: 0.9 %
CRP SERPL-MCNC: <3 MG/L (ref 0–5.1)
DEPRECATED RDW RBC AUTO: 17 % (ref 12.4–15.4)
EOSINOPHIL # BLD: 0.1 K/UL (ref 0–0.6)
EOSINOPHIL NFR BLD: 1.4 %
HCT VFR BLD AUTO: 40.4 % (ref 36–48)
HGB BLD-MCNC: 13.3 G/DL (ref 12–16)
LYMPHOCYTES # BLD: 2.8 K/UL (ref 1–5.1)
LYMPHOCYTES NFR BLD: 48 %
MCH RBC QN AUTO: 29.8 PG (ref 26–34)
MCHC RBC AUTO-ENTMCNC: 33 G/DL (ref 31–36)
MCV RBC AUTO: 90.3 FL (ref 80–100)
MONOCYTES # BLD: 0.5 K/UL (ref 0–1.3)
MONOCYTES NFR BLD: 9.3 %
NEUTROPHILS # BLD: 2.3 K/UL (ref 1.7–7.7)
NEUTROPHILS NFR BLD: 40.4 %
PLATELET # BLD AUTO: 200 K/UL (ref 135–450)
PMV BLD AUTO: 9 FL (ref 5–10.5)
RBC # BLD AUTO: 4.48 M/UL (ref 4–5.2)
REASON FOR REJECTION: NORMAL
REJECTED TEST: NORMAL
WBC # BLD AUTO: 5.7 K/UL (ref 4–11)

## 2025-07-21 PROCEDURE — 85025 COMPLETE CBC W/AUTO DIFF WBC: CPT

## 2025-07-21 PROCEDURE — 36415 COLL VENOUS BLD VENIPUNCTURE: CPT

## 2025-07-21 PROCEDURE — A9503 TC99M MEDRONATE: HCPCS | Performed by: ORTHOPAEDIC SURGERY

## 2025-07-21 PROCEDURE — 78830 RP LOCLZJ TUM SPECT W/CT 1: CPT

## 2025-07-21 PROCEDURE — 3430000000 HC RX DIAGNOSTIC RADIOPHARMACEUTICAL: Performed by: ORTHOPAEDIC SURGERY

## 2025-07-21 PROCEDURE — 78315 BONE IMAGING 3 PHASE: CPT

## 2025-07-21 PROCEDURE — 86140 C-REACTIVE PROTEIN: CPT

## 2025-07-21 RX ORDER — TC 99M MEDRONATE 20 MG/10ML
25.5 INJECTION, POWDER, LYOPHILIZED, FOR SOLUTION INTRAVENOUS
Status: COMPLETED | OUTPATIENT
Start: 2025-07-21 | End: 2025-07-21

## 2025-07-21 RX ADMIN — TC 99M MEDRONATE 25.5 MILLICURIE: 20 INJECTION, POWDER, LYOPHILIZED, FOR SOLUTION INTRAVENOUS at 09:23

## 2025-07-23 ENCOUNTER — HOSPITAL ENCOUNTER (OUTPATIENT)
Age: 70
Setting detail: SPECIMEN
Discharge: HOME | End: 2025-07-23
Payer: MEDICARE

## 2025-07-23 LAB — ERYTHROCYTE [SEDIMENTATION RATE] IN BLOOD BY WESTERGREN METHOD: 20 MM/HR (ref 0–30)

## 2025-07-23 PROCEDURE — 85652 RBC SED RATE AUTOMATED: CPT

## 2025-08-04 ENCOUNTER — PATIENT MESSAGE (OUTPATIENT)
Dept: ORTHOPEDIC SURGERY | Age: 70
End: 2025-08-04

## 2025-08-12 ENCOUNTER — OFFICE VISIT (OUTPATIENT)
Dept: ORTHOPEDIC SURGERY | Age: 70
End: 2025-08-12

## 2025-08-12 DIAGNOSIS — M17.11 PRIMARY OSTEOARTHRITIS OF RIGHT KNEE: ICD-10-CM

## 2025-08-12 DIAGNOSIS — M76.32 ILIOTIBIAL BAND SYNDROME OF LEFT SIDE: ICD-10-CM

## 2025-08-12 DIAGNOSIS — Z96.652 HISTORY OF TOTAL KNEE ARTHROPLASTY, LEFT: Primary | ICD-10-CM

## 2025-08-26 ENCOUNTER — APPOINTMENT (OUTPATIENT)
Dept: PHYSICAL THERAPY | Age: 70
End: 2025-08-26
Payer: MEDICARE

## 2025-08-26 ENCOUNTER — HOSPITAL ENCOUNTER (OUTPATIENT)
Dept: PHYSICAL THERAPY | Age: 70
Setting detail: THERAPIES SERIES
Discharge: HOME | End: 2025-08-26
Attending: ORTHOPAEDIC SURGERY
Payer: MEDICARE

## 2025-08-26 PROCEDURE — 97530 THERAPEUTIC ACTIVITIES: CPT

## 2025-08-26 PROCEDURE — 97110 THERAPEUTIC EXERCISES: CPT

## 2025-08-26 PROCEDURE — 97162 PT EVAL MOD COMPLEX 30 MIN: CPT

## 2025-09-02 ENCOUNTER — HOSPITAL ENCOUNTER (OUTPATIENT)
Dept: PHYSICAL THERAPY | Age: 70
Setting detail: THERAPIES SERIES
Discharge: HOME | End: 2025-09-02
Attending: ORTHOPAEDIC SURGERY
Payer: MEDICARE

## 2025-09-02 PROCEDURE — 97110 THERAPEUTIC EXERCISES: CPT

## 2025-09-02 PROCEDURE — 97530 THERAPEUTIC ACTIVITIES: CPT

## 2025-09-04 ENCOUNTER — HOSPITAL ENCOUNTER (OUTPATIENT)
Dept: PHYSICAL THERAPY | Age: 70
Setting detail: THERAPIES SERIES
Discharge: HOME | End: 2025-09-04
Attending: ORTHOPAEDIC SURGERY
Payer: MEDICARE

## 2025-09-04 PROCEDURE — 97110 THERAPEUTIC EXERCISES: CPT

## (undated) DEVICE — APPLICATOR PREP 26ML 0.7% IOD POVACRYLEX 74% ISO ALC ST

## (undated) DEVICE — ALCOHOL RUBBING 16OZ 70% ISO

## (undated) DEVICE — UNIVERSAL BLOCK TRAY: Brand: MEDLINE INDUSTRIES, INC.

## (undated) DEVICE — TOWEL OR BLUEE 16X26IN ST 8 PACK ORB08 16X26ORTWL

## (undated) DEVICE — GAUZE,SPONGE,4"X4",16PLY,STRL,LF,10/TRAY: Brand: MEDLINE

## (undated) DEVICE — STERILE POLYISOPRENE POWDER-FREE SURGICAL GLOVES: Brand: PROTEXIS